# Patient Record
Sex: MALE | Race: WHITE | NOT HISPANIC OR LATINO | Employment: FULL TIME | URBAN - METROPOLITAN AREA
[De-identification: names, ages, dates, MRNs, and addresses within clinical notes are randomized per-mention and may not be internally consistent; named-entity substitution may affect disease eponyms.]

---

## 2017-01-05 ENCOUNTER — ALLSCRIPTS OFFICE VISIT (OUTPATIENT)
Dept: OTHER | Facility: OTHER | Age: 27
End: 2017-01-05

## 2017-02-16 ENCOUNTER — ALLSCRIPTS OFFICE VISIT (OUTPATIENT)
Dept: OTHER | Facility: OTHER | Age: 27
End: 2017-02-16

## 2017-05-16 ENCOUNTER — ALLSCRIPTS OFFICE VISIT (OUTPATIENT)
Dept: OTHER | Facility: OTHER | Age: 27
End: 2017-05-16

## 2017-06-29 ENCOUNTER — ALLSCRIPTS OFFICE VISIT (OUTPATIENT)
Dept: OTHER | Facility: OTHER | Age: 27
End: 2017-06-29

## 2017-07-11 ENCOUNTER — TRANSCRIBE ORDERS (OUTPATIENT)
Dept: SLEEP CENTER | Facility: HOSPITAL | Age: 27
End: 2017-07-11

## 2017-07-11 DIAGNOSIS — G47.33 OBSTRUCTIVE SLEEP APNEA (ADULT) (PEDIATRIC): Primary | ICD-10-CM

## 2017-07-30 ENCOUNTER — HOSPITAL ENCOUNTER (OUTPATIENT)
Dept: SLEEP CENTER | Facility: HOSPITAL | Age: 27
Discharge: HOME | End: 2017-07-30
Payer: COMMERCIAL

## 2017-07-30 DIAGNOSIS — G47.33 OBSTRUCTIVE SLEEP APNEA (ADULT) (PEDIATRIC): ICD-10-CM

## 2017-07-30 PROCEDURE — 95810 POLYSOM 6/> YRS 4/> PARAM: CPT

## 2017-08-02 ENCOUNTER — TRANSCRIBE ORDERS (OUTPATIENT)
Dept: SLEEP CENTER | Facility: HOSPITAL | Age: 27
End: 2017-08-02

## 2017-08-02 DIAGNOSIS — G47.33 OBSTRUCTIVE SLEEP APNEA (ADULT) (PEDIATRIC): Primary | ICD-10-CM

## 2017-08-17 ENCOUNTER — ALLSCRIPTS OFFICE VISIT (OUTPATIENT)
Dept: OTHER | Facility: OTHER | Age: 27
End: 2017-08-17

## 2017-08-17 DIAGNOSIS — G47.33 OBSTRUCTIVE SLEEP APNEA: ICD-10-CM

## 2017-08-17 DIAGNOSIS — I10 ESSENTIAL (PRIMARY) HYPERTENSION: ICD-10-CM

## 2017-08-17 DIAGNOSIS — G25.81 RESTLESS LEGS SYNDROME: ICD-10-CM

## 2017-08-17 DIAGNOSIS — B19.20 VIRAL HEPATITIS C WITHOUT HEPATIC COMA: ICD-10-CM

## 2017-09-02 ENCOUNTER — HOSPITAL ENCOUNTER (OUTPATIENT)
Dept: SLEEP CENTER | Facility: HOSPITAL | Age: 27
Discharge: HOME | End: 2017-09-02
Payer: COMMERCIAL

## 2017-09-02 DIAGNOSIS — G47.33 OBSTRUCTIVE SLEEP APNEA (ADULT) (PEDIATRIC): ICD-10-CM

## 2017-09-02 PROCEDURE — 95811 POLYSOM 6/>YRS CPAP 4/> PARM: CPT

## 2017-09-20 ENCOUNTER — TRANSCRIBE ORDERS (OUTPATIENT)
Dept: SLEEP CENTER | Facility: HOSPITAL | Age: 27
End: 2017-09-20

## 2017-09-20 DIAGNOSIS — G47.33 OBSTRUCTIVE SLEEP APNEA (ADULT) (PEDIATRIC): Primary | ICD-10-CM

## 2017-10-09 ENCOUNTER — APPOINTMENT (OUTPATIENT)
Dept: LAB | Facility: MEDICAL CENTER | Age: 27
End: 2017-10-09
Payer: COMMERCIAL

## 2017-10-09 ENCOUNTER — TRANSCRIBE ORDERS (OUTPATIENT)
Dept: LAB | Facility: MEDICAL CENTER | Age: 27
End: 2017-10-09

## 2017-10-09 DIAGNOSIS — B19.20 VIRAL HEPATITIS C WITHOUT HEPATIC COMA: ICD-10-CM

## 2017-10-09 DIAGNOSIS — G47.33 OBSTRUCTIVE SLEEP APNEA: ICD-10-CM

## 2017-10-09 DIAGNOSIS — I10 ESSENTIAL (PRIMARY) HYPERTENSION: ICD-10-CM

## 2017-10-09 DIAGNOSIS — G25.81 RESTLESS LEGS SYNDROME: ICD-10-CM

## 2017-10-09 LAB
25(OH)D3 SERPL-MCNC: 31.5 NG/ML (ref 30–100)
ALBUMIN SERPL BCP-MCNC: 3.7 G/DL (ref 3.5–5)
ALP SERPL-CCNC: 76 U/L (ref 46–116)
ALT SERPL W P-5'-P-CCNC: 52 U/L (ref 12–78)
ANION GAP SERPL CALCULATED.3IONS-SCNC: 9 MMOL/L (ref 4–13)
AST SERPL W P-5'-P-CCNC: 29 U/L (ref 5–45)
BASOPHILS # BLD AUTO: 0.03 THOUSANDS/ΜL (ref 0–0.1)
BASOPHILS NFR BLD AUTO: 0 % (ref 0–1)
BILIRUB SERPL-MCNC: 0.31 MG/DL (ref 0.2–1)
BUN SERPL-MCNC: 12 MG/DL (ref 5–25)
CALCIUM SERPL-MCNC: 9 MG/DL (ref 8.3–10.1)
CHLORIDE SERPL-SCNC: 102 MMOL/L (ref 100–108)
CHOLEST SERPL-MCNC: 184 MG/DL (ref 50–200)
CO2 SERPL-SCNC: 27 MMOL/L (ref 21–32)
CREAT SERPL-MCNC: 0.78 MG/DL (ref 0.6–1.3)
EOSINOPHIL # BLD AUTO: 0.21 THOUSAND/ΜL (ref 0–0.61)
EOSINOPHIL NFR BLD AUTO: 3 % (ref 0–6)
ERYTHROCYTE [DISTWIDTH] IN BLOOD BY AUTOMATED COUNT: 13.5 % (ref 11.6–15.1)
GFR SERPL CREATININE-BSD FRML MDRD: 124 ML/MIN/1.73SQ M
GLUCOSE P FAST SERPL-MCNC: 90 MG/DL (ref 65–99)
HCT VFR BLD AUTO: 41.6 % (ref 36.5–49.3)
HDLC SERPL-MCNC: 43 MG/DL (ref 40–60)
HGB BLD-MCNC: 13.9 G/DL (ref 12–17)
LDLC SERPL CALC-MCNC: 123 MG/DL (ref 0–100)
LYMPHOCYTES # BLD AUTO: 2.15 THOUSANDS/ΜL (ref 0.6–4.47)
LYMPHOCYTES NFR BLD AUTO: 27 % (ref 14–44)
MCH RBC QN AUTO: 26.9 PG (ref 26.8–34.3)
MCHC RBC AUTO-ENTMCNC: 33.4 G/DL (ref 31.4–37.4)
MCV RBC AUTO: 81 FL (ref 82–98)
MONOCYTES # BLD AUTO: 0.61 THOUSAND/ΜL (ref 0.17–1.22)
MONOCYTES NFR BLD AUTO: 8 % (ref 4–12)
NEUTROPHILS # BLD AUTO: 5.06 THOUSANDS/ΜL (ref 1.85–7.62)
NEUTS SEG NFR BLD AUTO: 62 % (ref 43–75)
NRBC BLD AUTO-RTO: 0 /100 WBCS
PLATELET # BLD AUTO: 248 THOUSANDS/UL (ref 149–390)
PMV BLD AUTO: 9.4 FL (ref 8.9–12.7)
POTASSIUM SERPL-SCNC: 4.3 MMOL/L (ref 3.5–5.3)
PROT SERPL-MCNC: 8.3 G/DL (ref 6.4–8.2)
RBC # BLD AUTO: 5.17 MILLION/UL (ref 3.88–5.62)
SODIUM SERPL-SCNC: 138 MMOL/L (ref 136–145)
TRIGL SERPL-MCNC: 88 MG/DL
TSH SERPL DL<=0.05 MIU/L-ACNC: 2 UIU/ML (ref 0.36–3.74)
WBC # BLD AUTO: 8.08 THOUSAND/UL (ref 4.31–10.16)

## 2017-10-09 PROCEDURE — 80053 COMPREHEN METABOLIC PANEL: CPT

## 2017-10-09 PROCEDURE — 80061 LIPID PANEL: CPT

## 2017-10-09 PROCEDURE — 82306 VITAMIN D 25 HYDROXY: CPT

## 2017-10-09 PROCEDURE — 85025 COMPLETE CBC W/AUTO DIFF WBC: CPT

## 2017-10-09 PROCEDURE — 36415 COLL VENOUS BLD VENIPUNCTURE: CPT

## 2017-10-09 PROCEDURE — 84443 ASSAY THYROID STIM HORMONE: CPT

## 2017-10-19 ENCOUNTER — HOSPITAL ENCOUNTER (OUTPATIENT)
Dept: SLEEP CENTER | Facility: HOSPITAL | Age: 27
Discharge: HOME | End: 2017-10-19
Attending: INTERNAL MEDICINE
Payer: COMMERCIAL

## 2017-10-19 DIAGNOSIS — G47.33 OBSTRUCTIVE SLEEP APNEA: ICD-10-CM

## 2017-10-19 NOTE — PROGRESS NOTES
Consultation - [address]  31 y o  male  :1990  IVN:[id number]    Physician Requesting Consult: Mellisa Nelson DO     Reason for Consult : At your kind request I saw this patient for initial sleep evaluation today  The patient had both diagnostic and therapeutic sleep studies and is here to review results and to initiate therapy  The diagnostic study demonstrated an AHI of 122 per hour  Minimum oxygen saturation was 74% and 97% of the study was spent with saturations less than 90%  Somniloquay was also noted  A subsequent therapeutic study was unsuccessful:  He was titrated to a final setting of 13/7 cm H2O but developed central events, had ongoing obstructive events and desaturations to a ammy of 74% with mean oxygen saturation of 84%  Medications, Past, Family and Social Histories were reviewed  Comorbidities are notable for:  Hypertension, mood disturbance, restless leg syndrome, history of substance abuse for which he is in a methadone rehab program  Herewith findings in summary  Full details are available from our records  HPI:  He presented with complaints of disrupted sleep and awakenings with choking and gasping  He also reported loud snoring and witnessed apneas  Symptoms have been ongoing for several years and have escalated  He has ongoing restless leg symptoms and jerking movements during sleep in spite of Requip  He is aware of sleeptalking, reports vivid nightmares and thinks he acts out dream content  For however, he does not sleep walk and reported no injury to himself or others  Sleep Routine:  He is spending 12-14 hours in bed  He takes 2-3 hours to fall asleep  He attributes this to anxiety and notes no improvement in he is anxiety or sleep having trialed multiple prescription medications medications that include Ambien and Seroquel at high doses  Sleep is interrupted up to 7 times a night  He awakens spontaneously but is not rested  He has excessive daytime drowsiness, rated himself at 13/24 on the [place] Sleepiness Scale and is napping for up to an hour several times a week  Habits:  He is a former smoker and continues to vape  , he stopped alcohol use , he has a history of substance abuse, [he uses limited caffeine  He has started an exercise program ]     ROS:  Weight has been stable  He has nasal congestion and postnasal drip due to allergies  He reports acid reflux  He rated himself at 24 on the PHQ-9 scale  A [10] point review of systems was otherwise unremarkable  Physical Exam: Salient findings on exam, are a body mass index 46  Vitals are stable  He is anxious and has pressured speech  Mental state otherwise appears normal   Craniofacial anatomy [is normal]  Lurlean Euler is excess fatty tissue and neck is thick ] There are no abnormal neck masses  Nasal airway [is patent]  Mucous membranes appeared hyperemic, he has turbinate hypertrophy and clear rhinorrhea l  The oral airway [is crowded ] Base of tongue is at Mallampati class [IV]  Heart sounds are regular, he has tachycardia, there [are no] murmur[s], no JVD or pedal edema  [He has truncal obesity ]  The rest of his exam was unremarkable  Impression:   1  Severe obstructive sleep apnea  2  Sleep-related hypoxia and  3  Central apnea likely due to his use of methadone  4  Insomnia partly due to the above  He also has inadequate sleep hygiene   5  Restless leg syndrome  6  Hypersomnolence secondary to the above  7  Mood disturbance is contributing to his symptoms  8  Morbid obesity  9  Tachycardia  10  Hypertension  11  History of substance abuse    Plan:  1  I reviewed results of the Sleep study with the patient  2  With respect to above conditions, I counseled on pathophysiology, diagnosis, treatment options, risks and benefits; inter-relationship and effects on symptoms and comorbidities; risks of no treatment; costs and insurance aspects     3  I did some  Cognitive behavioral therapy, advised on Sleep Hygiene and behavioral techniques to manage Insomnia  Specifically limiting his time in bed to 7-1/2 hours, keeping a regular sleep-wake routine, avoiding daytime naps and on relaxation techniques  4   I also advised on weight reduction  5   He is in the process of weaning off methadone and states he should be off the medication in a month  6  Patient elected positive airway pressure therapy and this is treatment of choice for him  I gave him a prescription to initiate auto titrating Pap in the range of 10-20 cm H2O  Once he is off methadone, he will need a retitration study  7   He may continue with Requip  Consideration should be given to an alternate to effects off that may underlie his restless leg symptoms  8  Follow-up to be scheduled in a month to monitor progress, compliance and to adjust therapy           Sincerely,    Caty Mccormick MD  Board Certified Specialist

## 2017-10-23 ENCOUNTER — TRANSCRIBE ORDERS (OUTPATIENT)
Dept: SLEEP CENTER | Facility: HOSPITAL | Age: 27
End: 2017-10-23

## 2017-10-23 DIAGNOSIS — G47.33 OBSTRUCTIVE SLEEP APNEA: Primary | ICD-10-CM

## 2017-11-21 ENCOUNTER — ALLSCRIPTS OFFICE VISIT (OUTPATIENT)
Dept: OTHER | Facility: OTHER | Age: 27
End: 2017-11-21
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
## 2017-11-30 ENCOUNTER — HOSPITAL ENCOUNTER (OUTPATIENT)
Dept: SLEEP CENTER | Facility: HOSPITAL | Age: 27
Discharge: HOME | End: 2017-11-30
Attending: INTERNAL MEDICINE
Payer: COMMERCIAL

## 2017-11-30 ENCOUNTER — TRANSCRIBE ORDERS (OUTPATIENT)
Dept: SLEEP CENTER | Facility: HOSPITAL | Age: 27
End: 2017-11-30

## 2017-11-30 DIAGNOSIS — G47.33 OBSTRUCTIVE SLEEP APNEA: Primary | ICD-10-CM

## 2017-11-30 DIAGNOSIS — G47.33 OBSTRUCTIVE SLEEP APNEA: ICD-10-CM

## 2017-12-15 ENCOUNTER — ALLSCRIPTS OFFICE VISIT (OUTPATIENT)
Dept: OTHER | Facility: OTHER | Age: 27
End: 2017-12-15

## 2018-01-04 ENCOUNTER — HOSPITAL ENCOUNTER (OUTPATIENT)
Dept: SLEEP CENTER | Facility: HOSPITAL | Age: 28
Discharge: HOME | End: 2018-01-05
Attending: INTERNAL MEDICINE
Payer: COMMERCIAL

## 2018-01-04 ENCOUNTER — TRANSCRIBE ORDERS (OUTPATIENT)
Dept: SLEEP CENTER | Facility: HOSPITAL | Age: 28
End: 2018-01-04

## 2018-01-04 DIAGNOSIS — G47.33 OBSTRUCTIVE SLEEP APNEA: Primary | ICD-10-CM

## 2018-01-04 DIAGNOSIS — G47.33 OBSTRUCTIVE SLEEP APNEA: ICD-10-CM

## 2018-01-04 NOTE — PROGRESS NOTES
Follow-Up Note - [address]  28 y o  male  :1990  GJE:[id number]    CC: I saw this patient for follow-up in clinic today for his Sleep Disordered Breathing, Coexisting Sleep and Medical Problems  He is having difficulty sleeping  And has not been able to use CPAP as a result  Past medical, Family, Social History, ROS and medications were reviewed  Herewith my findings in summary  Full Details are available on request      HPI:  With respect to  positive airway pressure therapy Compliance data confirms use > 4 hours 18% of the time  The AHI is 53/hour  pressure of 14 cm H2O  He reports:   - tolerating PAP with no adverse effects and experiencing no major difficulties; he has dry nose  - and is benefiting from use;          Sleep Routine: Earl Vazquez reports getting insufficient sleep; he is having difficulty initiating and maintaining sleep  He has racing thoughts and he has anxiety has increased  He is spending 7 hours in bed but estimates he is only getting 2-4 hours sleep  Sharon Burow He awakens feeling unrefreshed  He has excessive drowsiness and He rated himself at 16 /24 on the [place] sleepiness scale  He is dozing off when sedentary in the afternoons  He has restless leg symptoms are controlled on Requip  He has less back pain  He has ongoing knee pain  He saw his psychiatrist and states sleeping pills do not work  He feels he has psychiatrists does not listen to me and medication changes caused worsening of his anxiety  On Exam:  He is clearly anxious and has pressured speech  Physical findings are essentially unchanged from previous  Impression :   1  Severe Obstructive Sleep Apnea  2  Sleep-related hypoxia  3  Insomnia partly due to the above  4  Mood disturbance is playing a large role  5  Hypersomnolence secondary to the above  6  Chronic pain is contributing to his sleep difficulties  7   Comorbidities:  Hypertension, morbid obesity, history of substance abuse    Plan:  1  Treatment with  PAP is medically necessary and Celio Rivera is agreable to continue use  2  Pressure setting: [Change to auto titrating Pap in the range of 16-20 cm H2O      3  Instruction on care of equipment and strategies to improve comfort with use of PAP were discussed  A prescription to replace supplies as needed was provided and care coordinated with the DME provider  4  Need for compliance with therapy and weight reduction were emphasized  5  He is to continue with Requip  6  He plans to change she has psychiatrists  7  Follow-up is advised in 1-2 months or sooner if needed to monitor progress and to adjust therapy  Thank you for allowing me to participate in the care of this patient      Sincerely,    Patricio Briggs MD  Board Certified Specialist

## 2018-01-09 NOTE — RESULT NOTES
Verified Results  (1) BASIC METABOLIC PROFILE 64GNS7467 02:17PM Criss Feliciano Order Number: [id number]     Test Name Result Flag Reference   GLUCOSE,RANDM 105 mg/dL     If the patient is fasting, the ADA then defines impaired fasting glucose as > 100 mg/dL and diabetes as > or equal to 123 mg/dL  SODIUM 140 mmol/L  136-145   POTASSIUM 4 1 mmol/L  3 5-5 3   CHLORIDE 102 mmol/L  100-108   CARBON DIOXIDE 29 mmol/L  21-32   ANION GAP (CALC) 9 mmol/L  4-13   BLOOD UREA NITROGEN 12 mg/dL  5-25   CREATININE 1 12 mg/dL  0 60-1 30   Standardized to IDMS reference method   CALCIUM 9 3 mg/dL  8 3-10 1   eGFR Non-African American      >60 0 ml/min/1 73sq m   [facility] [employer] Disease Education Program recommendations are as follows:  GFR calculation is accurate only with a steady state creatinine  Chronic Kidney disease less than 60 ml/min/1 73 sq  meters  Kidney failure less than 15 ml/min/1 73 sq  meters

## 2018-01-09 NOTE — RESULT NOTES
Verified Results  * XR KNEE 3 VW RIGHT NON INJURY [id number] 08:52AM [place] [employer] Order Number: [id number]     Test Name Result Flag Reference   XR KNEE 3 VW RIGHT (Report)     RIGHT KNEE     INDICATION: Right knee pain  COMPARISON: None     VIEWS: 3; 3 images     FINDINGS:     There is no acute fracture or dislocation  There is no joint effusion  No degenerative changes  No lytic or blastic lesions are seen  Soft tissues are unremarkable  IMPRESSION:     No acute osseous abnormality  Workstation performed: [id number]     Signed by:   Mook Coelho MD   11/11/16     * XR KNEE 3 VW LEFT NON INJURY [id number] 08:52AM Kwagae Meraki Order Number: [id number]     Test Name Result Flag Reference   XR KNEE 3 VW LEFT (Report)     LEFT KNEE     INDICATION: Left knee pain  COMPARISON: None     VIEWS: 3; 3 images     FINDINGS:     There is no acute fracture or dislocation  There is no joint effusion  No degenerative changes  No lytic or blastic lesions are seen  Soft tissues are unremarkable         IMPRESSION:     Unremarkable knee exam       Workstation performed: [id number]     Signed by:   Lulu Oliver MD   11/11/16

## 2018-01-12 VITALS
SYSTOLIC BLOOD PRESSURE: 122 MMHG | HEIGHT: 70 IN | HEART RATE: 106 BPM | TEMPERATURE: 98.2 F | DIASTOLIC BLOOD PRESSURE: 90 MMHG | OXYGEN SATURATION: 98 % | RESPIRATION RATE: 18 BRPM | WEIGHT: 315 LBS | BODY MASS INDEX: 45.1 KG/M2

## 2018-01-12 NOTE — RESULT NOTES
Verified Results  (1) LYME ANTIBODY PROFILE W/REFLEX TO WESTERN BLOT 43Ckl9800 08:13AM Tejal Luis Order Number: [id number]     Test Name Result Flag Reference   LYME IGG 0 31  0 00-0 79   NEGATIVE(0 00-0 79)-Absence of detectable Borrelia IgG Antibodies  A negative result does not exclude the possibility of Borrelia infection  If early Lyme disease is suspected,a second sample should be collected & tested 4 weeks after initial testing  LYME IGM 0 18  0 00-0 79   NEGATIVE (0 00-0 79)-Absence of detectable Borrelia IgM antibodies  A negative result does not exclude the possibility of Borrelia infection  If early lyme disease is suspected, a second sample should be collected & tested 4 weeks after initial testing

## 2018-01-13 VITALS
DIASTOLIC BLOOD PRESSURE: 80 MMHG | HEIGHT: 70 IN | BODY MASS INDEX: 44.24 KG/M2 | TEMPERATURE: 97.5 F | HEART RATE: 92 BPM | WEIGHT: 309 LBS | SYSTOLIC BLOOD PRESSURE: 124 MMHG | RESPIRATION RATE: 20 BRPM

## 2018-01-13 VITALS
SYSTOLIC BLOOD PRESSURE: 128 MMHG | TEMPERATURE: 97.7 F | HEART RATE: 104 BPM | WEIGHT: 310.38 LBS | RESPIRATION RATE: 20 BRPM | BODY MASS INDEX: 44.44 KG/M2 | HEIGHT: 70 IN | DIASTOLIC BLOOD PRESSURE: 84 MMHG

## 2018-01-13 VITALS
HEART RATE: 112 BPM | BODY MASS INDEX: 45.1 KG/M2 | HEIGHT: 70 IN | TEMPERATURE: 98.3 F | RESPIRATION RATE: 24 BRPM | SYSTOLIC BLOOD PRESSURE: 136 MMHG | WEIGHT: 315 LBS | DIASTOLIC BLOOD PRESSURE: 84 MMHG

## 2018-01-13 VITALS
BODY MASS INDEX: 44.67 KG/M2 | TEMPERATURE: 98.6 F | SYSTOLIC BLOOD PRESSURE: 116 MMHG | RESPIRATION RATE: 16 BRPM | WEIGHT: 312 LBS | HEART RATE: 80 BPM | DIASTOLIC BLOOD PRESSURE: 70 MMHG | HEIGHT: 70 IN

## 2018-01-14 NOTE — RESULT NOTES
Verified Results  (1) COMPREHENSIVE METABOLIC PANEL [id number] 99:17ZC [facility] Order Number: [id number]     Test Name Result Flag Reference   GLUCOSE,RANDM 100 mg/dL     If the patient is fasting, the ADA then defines impaired fasting glucose as > 100 mg/dL and diabetes as > or equal to 123 mg/dL  SODIUM 139 mmol/L  136-145   POTASSIUM 4 2 mmol/L  3 5-5 3   CHLORIDE 102 mmol/L  100-108   CARBON DIOXIDE 28 mmol/L  21-32   ANION GAP (CALC) 9 mmol/L  4-13   BLOOD UREA NITROGEN 11 mg/dL  5-25   CREATININE 0 88 mg/dL  0 60-1 30   Standardized to IDMS reference method   CALCIUM 9 1 mg/dL  8 3-10 1   BILI, TOTAL 0 40 mg/dL  0 20-1 00   ALK PHOSPHATAS 78 U/L     ALT (SGPT) 107 U/L H 12-78   AST(SGOT) 60 U/L H 5-45   ALBUMIN 3 7 g/dL  3 5-5 0   TOTAL PROTEIN 7 4 g/dL  6 4-8 2   eGFR Non-African American      >60 0 ml/min/1 73sq m   - Patient Instructions: This bloodwork is non-fasting  Please drink two glasses of water morning of bloodwork  National Kidney Disease Education Program recommendations are as follows:  GFR calculation is accurate only with a steady state creatinine  Chronic Kidney disease less than 60 ml/min/1 73 sq  meters  Kidney failure less than 15 ml/min/1 73 sq  meters       (1) CBC/PLT/DIFF [id number] 08:13AM [facility] Order Number: [id number]     Test Name Result Flag Reference   WBC COUNT 8 66 Thousand/uL  4 31-10 16   RBC COUNT 5 04 Million/uL  3 88-5 62   HEMOGLOBIN 13 5 g/dL  12 0-17 0   HEMATOCRIT 40 0 %  36 5-49 3   MCV 79 fL L 82-98   MCH 26 8 pg  26 8-34 3   MCHC 33 8 g/dL  31 4-37 4   RDW 13 7 %  11 6-15 1   MPV 9 3 fL  8 9-12 7   PLATELET COUNT 035 Thousands/uL  149-390   NEUTROPHILS RELATIVE PERCENT 63 %  43-75   LYMPHOCYTES RELATIVE PERCENT 24 %  14-44   MONOCYTES RELATIVE PERCENT 7 %  4-12   EOSINOPHILS RELATIVE PERCENT 6 %  0-6   BASOPHILS RELATIVE PERCENT 0 %  0-1   NEUTROPHILS ABSOLUTE COUNT 5 47 Thousands/?L  1 85-7 62   LYMPHOCYTES ABSOLUTE COUNT 2 06 Thousands/?L  0 60-4 47   MONOCYTES ABSOLUTE COUNT 0 59 Thousand/?L  0 17-1 22   EOSINOPHILS ABSOLUTE COUNT 0 51 Thousand/?L  0 00-0 61   BASOPHILS ABSOLUTE COUNT 0 03 Thousands/?L  0 00-0 10   - Patient Instructions: This bloodwork is non-fasting  Please drink two glasses of water morning of bloodwork  - Patient Instructions: This bloodwork is non-fasting  Please drink two glasses of water morning of bloodwork  (1) TSH [id number] 08:13AM Santos Gallardo Order Number: [id number]     Test Name Result Flag Reference   TSH 1 848 uIU/mL  0 358-3 740   - Patient Instructions: This bloodwork is non-fasting  Please drink two glasses of water morning of bloodwork  - Patient Instructions: This bloodwork is non-fasting  Please drink two glasses of water morning of bloodwork  Patients undergoing fluorescein dye angiography may retain small amounts of fluorescein in the body for 48-72 hours post procedure  Samples containing fluorescein can produce falsely depressed TSH values  If the patient had this procedure,a specimen should be resubmitted post fluorescein clearance       (1) SED RATE [id number] 08:13AM [facility] Number: [id number]     Test Name Result Flag Reference   SED RATE 25 mm/hour H 0-10     (1) URINALYSIS (will reflex a microscopy if leukocytes, occult blood, protein or nitrites are not within normal limits) [id number] 08:13AM [facility] Order Number: [id number]     Test Name Result Flag Reference   COLOR Yellow     CLARITY Clear     SPECIFIC GRAVITY UA >=1 030  1 003-1 030   PH UA 5 5  4 5-8 0   LEUKOCYTE ESTERASE UA Negative  Negative   NITRITE UA Negative  Negative   PROTEIN UA Negative mg/dl  Negative   GLUCOSE UA Negative mg/dl  Negative   KETONES UA Negative mg/dl  Negative   UROBILINOGEN UA 0 2 E U /dl  0 2, 1 0 E U /dl   BILIRUBIN UA Negative  Negative   BLOOD UA Negative  Negative, Trace-Intact     (1) HEMOGLOBIN A1C [id number] 08:13AM Ivette Trevino Order Number: [id number]     Test Name Result Flag Reference   HEMOGLOBIN A1C 5 5 %  4 2-6 3   EST  AVG  GLUCOSE 111 mg/dl       [facility] CHEST PA & LATERAL [id number] 08:01AM Ivette Trevino Order Number: [id number]     Test Name Result Flag Reference   XR CHEST PA & LATERAL (Report)     CHEST     INDICATION: Shortness of breath  Low back pain  COMPARISON: January 28, 2008     VIEWS: PA and lateral; 3 images     FINDINGS:     The cardiomediastinal silhouette is unremarkable  The lungs are clear  No pleural effusions  Bony thorax unremarkable  IMPRESSION:     No active pulmonary disease          Workstation performed: [id number]     Signed by:   Romayne Cross Carnella Pipe, MD   10/14/16     * XR SPINE LUMBAR MINIMUM 4 VIEWS NON INJURY 14Oct2016 08:01AM Ivette Trevino Order Number: [id number]     Test Name Result Flag Reference   XR SPINE LUMBAR MINIMUM 4 VIEWS (Report)     LUMBAR SPINE     INDICATION: Lower back pain  COMPARISON: None     VIEWS: AP, lateral and bilateral oblique projections; 4 images     FINDINGS:     Alignment is unremarkable  There is no radiographic evidence of acute fracture or destructive osseous lesion  No significant lumbar degenerative change noted  Visualized soft tissues appear unremarkable  IMPRESSION:     Normal lumbar spine radiographs         Workstation performed: [id number]     Signed by:   Gloria Church MD   10/14/16

## 2018-01-15 VITALS
SYSTOLIC BLOOD PRESSURE: 134 MMHG | TEMPERATURE: 98.5 F | RESPIRATION RATE: 22 BRPM | HEIGHT: 70 IN | HEART RATE: 116 BPM | DIASTOLIC BLOOD PRESSURE: 94 MMHG | BODY MASS INDEX: 45.1 KG/M2 | WEIGHT: 315 LBS

## 2018-01-23 VITALS
OXYGEN SATURATION: 97 % | BODY MASS INDEX: 34.07 KG/M2 | DIASTOLIC BLOOD PRESSURE: 88 MMHG | HEART RATE: 96 BPM | SYSTOLIC BLOOD PRESSURE: 118 MMHG | WEIGHT: 238 LBS | HEIGHT: 70 IN
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
## 2018-02-22 ENCOUNTER — OFFICE VISIT (OUTPATIENT)
Dept: INTERNAL MEDICINE CLINIC | Facility: CLINIC | Age: 28
End: 2018-02-22
Payer: COMMERCIAL

## 2018-02-22 VITALS
SYSTOLIC BLOOD PRESSURE: 118 MMHG | BODY MASS INDEX: 44.24 KG/M2 | WEIGHT: 309 LBS | OXYGEN SATURATION: 96 % | RESPIRATION RATE: 18 BRPM | DIASTOLIC BLOOD PRESSURE: 88 MMHG | HEART RATE: 88 BPM | TEMPERATURE: 97.4 F | HEIGHT: 70 IN

## 2018-02-22 DIAGNOSIS — F31.9 BIPOLAR 1 DISORDER (HCC): ICD-10-CM

## 2018-02-22 DIAGNOSIS — J01.00 ACUTE NON-RECURRENT MAXILLARY SINUSITIS: Primary | ICD-10-CM

## 2018-02-22 PROBLEM — G47.33 SLEEP APNEA, OBSTRUCTIVE: Status: ACTIVE | Noted: 2017-08-02

## 2018-02-22 PROBLEM — K21.9 ACID REFLUX: Status: ACTIVE | Noted: 2017-06-29

## 2018-02-22 PROCEDURE — 99214 OFFICE O/P EST MOD 30 MIN: CPT | Performed by: PHYSICIAN ASSISTANT

## 2018-02-22 RX ORDER — ARIPIPRAZOLE 5 MG/1
5 TABLET ORAL
COMMUNITY
End: 2018-02-22 | Stop reason: SDUPTHER

## 2018-02-22 RX ORDER — LANOLIN ALCOHOL/MO/W.PET/CERES
3 CREAM (GRAM) TOPICAL
COMMUNITY
End: 2018-02-22 | Stop reason: SDUPTHER

## 2018-02-22 RX ORDER — ARIPIPRAZOLE 5 MG/1
5 TABLET ORAL
Qty: 30 TABLET | Refills: 3 | Status: ON HOLD | OUTPATIENT
Start: 2018-02-22 | End: 2018-06-25 | Stop reason: SDUPTHER

## 2018-02-22 RX ORDER — AZITHROMYCIN 250 MG/1
250 TABLET, FILM COATED ORAL EVERY 24 HOURS
Qty: 6 TABLET | Refills: 0 | Status: SHIPPED | OUTPATIENT
Start: 2018-02-22 | End: 2018-02-27

## 2018-02-22 RX ORDER — LANOLIN ALCOHOL/MO/W.PET/CERES
3 CREAM (GRAM) TOPICAL
Qty: 30 TABLET | Refills: 0 | Status: SHIPPED | OUTPATIENT
Start: 2018-02-22 | End: 2018-06-24 | Stop reason: ALTCHOICE

## 2018-02-22 NOTE — PROGRESS NOTES
Assessment/Plan:   1  Sinusitis: Start zithromax to combat symptoms  The patient was instructed to change their toothbrush to avoid reinfection  2  Bipolar Disorder: Stable with current regime  Continue current medications  No problem-specific Assessment & Plan notes found for this encounter  Diagnoses and all orders for this visit:    Acute non-recurrent maxillary sinusitis  -     azithromycin (ZITHROMAX) 250 mg tablet; Take 1 tablet (250 mg total) by mouth every 24 hours for 5 days    Bipolar 1 disorder (HCC)  -     ARIPiprazole (ABILIFY) 5 mg tablet; Take 1 tablet (5 mg total) by mouth daily at bedtime  -     melatonin 3 mg; Take 1 tablet (3 mg total) by mouth daily at bedtime    Other orders  -     Discontinue: ARIPiprazole (ABILIFY) 5 mg tablet; Take 5 mg by mouth daily at bedtime  -     Discontinue: melatonin 3 mg; Take 3 mg by mouth daily at bedtime          Subjective:      Patient ID: Juan Mancuso is a 28 y o  male  Cough   This is a new problem  The current episode started in the past 7 days  The problem has been gradually worsening  The problem occurs hourly  The cough is productive of sputum  Associated symptoms include headaches, nasal congestion, postnasal drip, rhinorrhea and shortness of breath  Pertinent negatives include no chest pain, chills, ear pain, fever, myalgias, rash, sore throat or wheezing  The symptoms are aggravated by exercise  The following portions of the patient's history were reviewed and updated as appropriate:   He  has a past medical history of Allergic; GERD (gastroesophageal reflux disease); and Sleep disorder  He There are no active problems to display for this patient  He  has a past surgical history that includes [place] tooth extraction  His family history includes Alzheimer's disease in his maternal grandmother; Colon cancer in his father; Depression in his family  He  reports that he has quit smoking  His smoking use included E-Cigarettes   He has never used smokeless tobacco  He reports that he does not drink alcohol or use drugs  Current Outpatient Prescriptions   Medication Sig Dispense Refill    ARIPiprazole (ABILIFY) 5 mg tablet Take 1 tablet (5 mg total) by mouth daily at bedtime 30 tablet 3    diclofenac (VOLTAREN) 75 mg EC tablet Take 1 tablet by mouth 2 (two) times a day      EPINEPHrine (EPIPEN 2-ALEXI) 0 3 mg/0 3 mL SOAJ Inject as directed      fluticasone (FLONASE) 50 mcg/act nasal spray 2 sprays into each nostril daily      lisinopril (ZESTRIL) 5 mg tablet Take 1 tablet by mouth daily      melatonin 3 mg Take 1 tablet (3 mg total) by mouth daily at bedtime 30 tablet 0    omeprazole (PriLOSEC) 40 MG capsule Take 1 capsule by mouth daily      venlafaxine (EFFEXOR-XR) 150 mg 24 hr capsule Take 1 capsule by mouth daily      azithromycin (ZITHROMAX) 250 mg tablet Take 1 tablet (250 mg total) by mouth every 24 hours for 5 days 6 tablet 0     No current facility-administered medications for this visit  Current Outpatient Prescriptions on File Prior to Visit   Medication Sig    diclofenac (VOLTAREN) 75 mg EC tablet Take 1 tablet by mouth 2 (two) times a day    EPINEPHrine (EPIPEN 2-ALEXI) 0 3 mg/0 3 mL SOAJ Inject as directed    fluticasone (FLONASE) 50 mcg/act nasal spray 2 sprays into each nostril daily    lisinopril (ZESTRIL) 5 mg tablet Take 1 tablet by mouth daily    omeprazole (PriLOSEC) 40 MG capsule Take 1 capsule by mouth daily    venlafaxine (EFFEXOR-XR) 150 mg 24 hr capsule Take 1 capsule by mouth daily    [DISCONTINUED] cloNIDine (CATAPRES) 0 1 mg tablet take 1 tablet by mouth twice a day if needed anxiety    [DISCONTINUED] methadone (DOLOPHINE) 10 mg/mL injection Inject as directed    [DISCONTINUED] polyethylene glycol (MIRALAX) 17 g packet Take 1 packet by mouth daily     No current facility-administered medications on file prior to visit  He is allergic to bee venom       Review of Systems   Constitutional: Negative for chills and fever  HENT: Positive for postnasal drip and rhinorrhea  Negative for congestion, ear pain, hearing loss, sinus pain, sinus pressure, sore throat and trouble swallowing  Eyes: Negative for pain and visual disturbance  Respiratory: Positive for cough and shortness of breath  Negative for chest tightness and wheezing  Cardiovascular: Negative  Negative for chest pain, palpitations and leg swelling  Gastrointestinal: Negative for abdominal pain, blood in stool, constipation, diarrhea, nausea and vomiting  Endocrine: Negative for cold intolerance, heat intolerance, polydipsia, polyphagia and polyuria  Genitourinary: Negative for difficulty urinating, dysuria, flank pain and urgency  Musculoskeletal: Negative for arthralgias, back pain, gait problem and myalgias  Skin: Negative for rash  Allergic/Immunologic: Negative  Neurological: Positive for headaches  Negative for dizziness, weakness and light-headedness  Hematological: Negative  Psychiatric/Behavioral: Negative for behavioral problems, dysphoric mood and sleep disturbance  The patient is not nervous/anxious  Objective:      /88 (BP Location: Left arm, Patient Position: Sitting, Cuff Size: Large)   Pulse 88   Temp (!) 97 4 °F (36 3 °C) (Tympanic)   Resp 18   Ht 5' 10" (1 778 m)   Wt (!) 140 kg (309 lb)   SpO2 96%   BMI 44 34 kg/m²          Physical Exam   Constitutional: He is oriented to person, place, and time  He appears well-developed and well-nourished  No distress  HENT:   Head: Normocephalic and atraumatic  Right Ear: External ear normal    Left Ear: External ear normal    Nose: Mucosal edema and rhinorrhea present  Mouth/Throat: Posterior oropharyngeal edema present  No oropharyngeal exudate  Eyes: Conjunctivae and EOM are normal  Pupils are equal, round, and reactive to light  Right eye exhibits no discharge  Left eye exhibits no discharge  No scleral icterus     Neck: Normal range of motion  Neck supple  No thyromegaly present  Cardiovascular: Normal rate, regular rhythm and normal heart sounds  Exam reveals no gallop and no friction rub  No murmur heard  Pulmonary/Chest: Effort normal and breath sounds normal  No respiratory distress  He has no wheezes  He has no rales  Abdominal: Soft  Bowel sounds are normal  He exhibits no distension  There is no tenderness  Musculoskeletal: Normal range of motion  He exhibits no edema, tenderness or deformity  Neurological: He is alert and oriented to person, place, and time  No cranial nerve deficit  Skin: Skin is warm and dry  He is not diaphoretic  Psychiatric: He has a normal mood and affect   His behavior is normal  Judgment and thought content normal
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
## 2018-03-13 DIAGNOSIS — F32.A DEPRESSION, UNSPECIFIED DEPRESSION TYPE: Primary | ICD-10-CM

## 2018-03-13 RX ORDER — VENLAFAXINE HYDROCHLORIDE 150 MG/1
150 CAPSULE, EXTENDED RELEASE ORAL DAILY
Qty: 30 CAPSULE | Refills: 5 | Status: SHIPPED | OUTPATIENT
Start: 2018-03-13 | End: 2018-07-24

## 2018-03-15 ENCOUNTER — OFFICE VISIT (OUTPATIENT)
Dept: SLEEP CENTER | Facility: CLINIC | Age: 28
End: 2018-03-15

## 2018-03-15 VITALS
OXYGEN SATURATION: 96 % | SYSTOLIC BLOOD PRESSURE: 118 MMHG | WEIGHT: 310 LBS | BODY MASS INDEX: 44.38 KG/M2 | RESPIRATION RATE: 18 BRPM | HEART RATE: 88 BPM | DIASTOLIC BLOOD PRESSURE: 88 MMHG | HEIGHT: 70 IN

## 2018-03-15 DIAGNOSIS — F40.240 CLAUSTROPHOBIA: ICD-10-CM

## 2018-03-15 DIAGNOSIS — G89.4 CHRONIC PAIN DISORDER: ICD-10-CM

## 2018-03-15 DIAGNOSIS — G47.09 OTHER INSOMNIA: ICD-10-CM

## 2018-03-15 DIAGNOSIS — E66.01 MORBID OBESITY WITH BMI OF 40.0-44.9, ADULT (HCC): ICD-10-CM

## 2018-03-15 DIAGNOSIS — IMO0002 SLEEP-RELATED HYPOVENTILATION: ICD-10-CM

## 2018-03-15 DIAGNOSIS — F11.21 OPIOID DEPENDENCE IN REMISSION (HCC): ICD-10-CM

## 2018-03-15 DIAGNOSIS — G47.33 OBSTRUCTIVE SLEEP APNEA: Primary | ICD-10-CM

## 2018-03-15 DIAGNOSIS — G25.81 RESTLESS LEG SYNDROME: ICD-10-CM

## 2018-03-15 DIAGNOSIS — I10 ESSENTIAL HYPERTENSION: ICD-10-CM

## 2018-03-15 DIAGNOSIS — R45.86 MOOD DISTURBANCE: ICD-10-CM

## 2018-03-15 NOTE — PROGRESS NOTES
Review of Systems      Genitourinary none   Cardiology none   Gastrointestinal none   Neurology none   Constitutional none   Integumentary none   Psychiatry none   Musculoskeletal none   Pulmonary none   ENT none   Endocrine none   Hematological none

## 2018-03-15 NOTE — PROGRESS NOTES
Follow-Up Note - [address]  28 y o  male  :1990  JUE:[id number]    CC: I saw this patient for follow-up in clinic today for his Sleep Disordered Breathing, Coexisting Sleep and Medical Problems  Medications, Past, Family & Social History were reviewed  [Interval changes notable for increased anxiety that is affecting sleep and ability to use CPAP]   He  has a past medical history of Allergic; Benign essential hypertension (2016); Claustrophobia (3/15/2018); GERD (gastroesophageal reflux disease); Hepatitis C virus infection (2014); Obesity (10/12/2016); and Sleep disorder  He has a current medication list which includes the following prescription(s): aripiprazole, diclofenac, epinephrine, fluticasone, lisinopril, melatonin, omeprazole, and venlafaxine  ROS:  He denies nasal congestion, dry mouth, respiratory, cardiac or gastrointestinal symptoms  HPI:  With respect to positive airway pressure therapy (PAP) compliance data download shows: [ he is using PAP > 4 hours per night 6 7 % of the time and AHI is 14/hour at [90th percentile] pressure of 16 cm H2O ]   Zahira Seth reports:   -  significant difficulty tolerating PAP; [ Brisa Ebbing  -  benefiting from use ;    -  he feels his restless leg symptoms are controlled on Requip    -  he has states pain is controlled  Habits:  He is using much less caffeine  He stopped tobacco but vapes occasionally  He tries to exercise by walking  Sleep Routine:  Zahira Seth reports getting 5-6 hours sleep but only if he does not use CPAP; without use, has no difficulty initiating or maintaining sleep   He awakens spontaneously feeling unrefreshed He has excessive drowsiness  [facility] rated himself at Total score: 10 /24 on the [place] sleepiness scale ]  He is napping for 1-2 hours during the day  EXAM: Vitals are stable: Blood pressure 118/88, pulse 88, resp   rate 18, height 5' 10" (1 778 m), weight (!) 141 kg (310 lb), SpO2 96 %  Body mass index is 44 48 kg/m²  [facility] Neck Circumference: 47 5 cm  Patient is alert, orientated, cooperative [and in no distress]  He is somewhat anxious  Mental state otherwise appears normal  There are [no] facial pressure marks or rashes  Physical findings are essentially unchanged from previous  IMPRESSION:     1  Obstructive sleep apnea  Sleep F/U W/Compliance    CPAP DME Supplies   2  Sleep-related hypoventilation     3  Other insomnia     4  Claustrophobia     5  Restless leg syndrome     6  Mood disturbance ([facility] )     7  Chronic pain disorder     8  Essential hypertension     9  Morbid obesity with BMI of 40 0-44 9, adult ([facility] )     10  Opioid dependence in remission ([facility] )         PLAN:  1  I reviewed results of the Sleep studies with the patient  2   With respect to above conditions, I once again counseled on pathophysiology, diagnosis, treatment options, risks and benefits; interrelationship and effects on symptoms and comorbidities; risks of no treatment; costs and insurance aspects  3  He has limited treatment options because of severity of his sleep disordered breathing  4  Treatment with  PAP is medically necessary and Raleigh Real is agreable to continue use  5  I advised formal desensitization  Pressure settin to 16 cm H2O in 1 cm increments every 2 days  Then to change to auto titrating Pap in the range of 16-20 cm H2O   6  Instruction on care of equipment and strategies to improve comfort with use of PAP were discussed [:controlling mucosal dryness, nasal symptoms and Mask leaks]  7  Care coordinated with DME provider and prescription to replace supplies as needed was provided  8  Need for compliance with therapy and weight reduction were emphasized  9  Cognitive behavioral therapy was initiated, Sleep Hygiene and behavioral techniques to manage Insomnia were discussed    Specifically keeping a regular routine, limiting his time in bed to 6 hours, avoiding daytime naps and on relaxation techniques  10  He needs he is the psychiatric medications to be reviewed and is trying to get an appointment with his psychiatrist   11   I will see him for follow-up in around 6 weeks to monitor progress and to adjust therapy  Thank you for allowing me to participate in the care of this patient      Sincerely,    Dior Thurston MD  Board Certified Specialist

## 2018-04-24 ENCOUNTER — OFFICE VISIT (OUTPATIENT)
Dept: INTERNAL MEDICINE CLINIC | Facility: CLINIC | Age: 28
End: 2018-04-24
Payer: COMMERCIAL

## 2018-04-24 VITALS
WEIGHT: 305.4 LBS | OXYGEN SATURATION: 98 % | SYSTOLIC BLOOD PRESSURE: 124 MMHG | BODY MASS INDEX: 43.72 KG/M2 | HEIGHT: 70 IN | RESPIRATION RATE: 16 BRPM | HEART RATE: 87 BPM | TEMPERATURE: 97.6 F | DIASTOLIC BLOOD PRESSURE: 86 MMHG

## 2018-04-24 DIAGNOSIS — J30.9 ALLERGIC RHINITIS, UNSPECIFIED SEASONALITY, UNSPECIFIED TRIGGER: Primary | ICD-10-CM

## 2018-04-24 DIAGNOSIS — I10 ESSENTIAL HYPERTENSION: ICD-10-CM

## 2018-04-24 PROCEDURE — 99213 OFFICE O/P EST LOW 20 MIN: CPT | Performed by: PHYSICIAN ASSISTANT

## 2018-04-24 RX ORDER — FLUTICASONE PROPIONATE 50 MCG
2 SPRAY, SUSPENSION (ML) NASAL DAILY
Qty: 16 G | Refills: 0 | Status: SHIPPED | OUTPATIENT
Start: 2018-04-24 | End: 2018-06-24

## 2018-04-24 NOTE — PROGRESS NOTES
Assessment/Plan:    Essential hypertension  Pts symptoms are stable with current regime  No changes at present  Allergic rhinitis  Pts symptoms are stable with current regime  No changes at present  Diagnoses and all orders for this visit:    Allergic rhinitis, unspecified seasonality, unspecified trigger  -     fluticasone (FLONASE) 50 mcg/act nasal spray; 2 sprays into each nostril daily    Essential hypertension          Subjective:      Patient ID: Florentin Rodriguez is a 28 y o  male  Pt presents for routine visit  He is doing well overall  He has some increased allergy symptoms and wants a refill of his flonase  He recently started working in construction  His BP is well controlled  Mood remains stable  He denies any other concerns  The following portions of the patient's history were reviewed and updated as appropriate:   He  has a past medical history of Allergic; Benign essential hypertension (11/17/2016); Claustrophobia (3/15/2018); Depressed (7/14/2016); GERD (gastroesophageal reflux disease); Hepatitis C virus infection (8/14/2014); Obesity (10/12/2016); and Sleep disorder    He   Patient Active Problem List    Diagnosis Date Noted    Claustrophobia 03/15/2018    Restless leg syndrome 03/15/2018    Mood disturbance ([facility] ) 03/15/2018    Morbid obesity with BMI of 40 0-44 9, adult ([place] [address] ) 03/15/2018    Chronic pain disorder 03/15/2018    Sleep-related hypoventilation 08/02/2017    Acid reflux 06/29/2017    Essential hypertension 11/17/2016    Generalized anxiety disorder 10/19/2016    Obesity 10/12/2016    Anxiety 07/14/2016    Depressed 07/14/2016    Methadone maintenance therapy patient ([place] [address] ) 07/14/2016    Obesity, Class III, BMI 40-49 9 (morbid obesity) ([place] [address] ) 07/14/2016    Chronic hepatitis C without hepatic coma ([place] [address] ) 02/21/2016    Bipolar disorder ([place] [address] ) 04/03/2015    Allergic rhinitis 08/14/2014    Hepatitis C virus infection 08/14/2014    Insomnia 08/14/2014    Opioid dependence in remission ([facility] ) 08/14/2014     He  has a past surgical history that includes [place] tooth extraction  His family history includes Alzheimer's disease in his maternal grandmother; Colon cancer in his father; Depression in his family  He  reports that he has quit smoking  His smoking use included E-Cigarettes  He has never used smokeless tobacco  He reports that he does not drink alcohol or use drugs  Current Outpatient Prescriptions   Medication Sig Dispense Refill    ARIPiprazole (ABILIFY) 5 mg tablet Take 1 tablet (5 mg total) by mouth daily at bedtime 30 tablet 3    diclofenac (VOLTAREN) 75 mg EC tablet Take 1 tablet by mouth 2 (two) times a day      EPINEPHrine (EPIPEN 2-ALEXI) 0 3 mg/0 3 mL SOAJ Inject as directed      fluticasone (FLONASE) 50 mcg/act nasal spray 2 sprays into each nostril daily 16 g 0    lisinopril (ZESTRIL) 5 mg tablet Take 1 tablet by mouth daily      melatonin 3 mg Take 1 tablet (3 mg total) by mouth daily at bedtime 30 tablet 0    omeprazole (PriLOSEC) 40 MG capsule Take 1 capsule by mouth daily      venlafaxine (EFFEXOR-XR) 150 mg 24 hr capsule Take 1 capsule (150 mg total) by mouth daily 30 capsule 5     No current facility-administered medications for this visit        Current Outpatient Prescriptions on File Prior to Visit   Medication Sig    ARIPiprazole (ABILIFY) 5 mg tablet Take 1 tablet (5 mg total) by mouth daily at bedtime    diclofenac (VOLTAREN) 75 mg EC tablet Take 1 tablet by mouth 2 (two) times a day    EPINEPHrine (EPIPEN 2-ALEXI) 0 3 mg/0 3 mL SOAJ Inject as directed    lisinopril (ZESTRIL) 5 mg tablet Take 1 tablet by mouth daily    melatonin 3 mg Take 1 tablet (3 mg total) by mouth daily at bedtime    omeprazole (PriLOSEC) 40 MG capsule Take 1 capsule by mouth daily    venlafaxine (EFFEXOR-XR) 150 mg 24 hr capsule Take 1 capsule (150 mg total) by mouth daily    [DISCONTINUED] fluticasone (FLONASE) 50 mcg/act nasal spray 2 sprays into each nostril daily     No current facility-administered medications on file prior to visit  He is allergic to bee venom       Review of Systems   Constitutional: Negative for chills and fever  HENT: Negative for congestion, ear pain, hearing loss, postnasal drip, rhinorrhea, sinus pain, sinus pressure, sore throat and trouble swallowing  Eyes: Negative for pain and visual disturbance  Respiratory: Negative for cough, chest tightness, shortness of breath and wheezing  Cardiovascular: Negative  Negative for chest pain, palpitations and leg swelling  Gastrointestinal: Negative for abdominal pain, blood in stool, constipation, diarrhea, nausea and vomiting  Endocrine: Negative for cold intolerance, heat intolerance, polydipsia, polyphagia and polyuria  Genitourinary: Negative for difficulty urinating, dysuria, flank pain and urgency  Musculoskeletal: Negative for arthralgias, back pain, gait problem and myalgias  Skin: Negative for rash  Allergic/Immunologic: Negative  Neurological: Negative for dizziness, weakness, light-headedness and headaches  Hematological: Negative  Psychiatric/Behavioral: Negative for behavioral problems, dysphoric mood and sleep disturbance  The patient is not nervous/anxious  Objective:      /86 (BP Location: Left arm, Patient Position: Sitting, Cuff Size: Large)   Pulse 87   Temp 97 6 °F (36 4 °C)   Resp 16   Ht 5' 10" (1 778 m)   Wt (!) 139 kg (305 lb 6 4 oz)   SpO2 98%   BMI 43 82 kg/m²          Physical Exam   Constitutional: He is oriented to person, place, and time  He appears well-developed and well-nourished  No distress  HENT:   Head: Normocephalic and atraumatic  Right Ear: External ear normal    Left Ear: External ear normal    Nose: Nose normal    Mouth/Throat: Oropharynx is clear and moist  No oropharyngeal exudate  Eyes: Conjunctivae and EOM are normal  Pupils are equal, round, and reactive to light   Right eye exhibits no discharge  Left eye exhibits no discharge  No scleral icterus  Neck: Normal range of motion  Neck supple  No thyromegaly present  Cardiovascular: Normal rate, regular rhythm and normal heart sounds  Exam reveals no gallop and no friction rub  No murmur heard  Pulmonary/Chest: Effort normal and breath sounds normal  No respiratory distress  He has no wheezes  He has no rales  Abdominal: Soft  Bowel sounds are normal  He exhibits no distension  There is no tenderness  Musculoskeletal: Normal range of motion  He exhibits no edema, tenderness or deformity  Neurological: He is alert and oriented to person, place, and time  No cranial nerve deficit  Skin: Skin is warm and dry  He is not diaphoretic  Psychiatric: He has a normal mood and affect   His behavior is normal  Judgment and thought content normal

## 2018-06-24 ENCOUNTER — HOSPITAL ENCOUNTER (INPATIENT)
Facility: HOSPITAL | Age: 28
LOS: 3 days | Discharge: HOME | DRG: 720 | End: 2018-06-27
Attending: EMERGENCY MEDICINE | Admitting: INTERNAL MEDICINE
Payer: COMMERCIAL

## 2018-06-24 ENCOUNTER — APPOINTMENT (EMERGENCY)
Dept: RADIOLOGY | Facility: HOSPITAL | Age: 28
DRG: 720 | End: 2018-06-24
Payer: COMMERCIAL

## 2018-06-24 ENCOUNTER — APPOINTMENT (EMERGENCY)
Dept: CT IMAGING | Facility: HOSPITAL | Age: 28
DRG: 720 | End: 2018-06-24
Payer: COMMERCIAL

## 2018-06-24 DIAGNOSIS — R09.02 HYPOXIA: ICD-10-CM

## 2018-06-24 DIAGNOSIS — E66.01 MORBID OBESITY WITH BMI OF 40.0-44.9, ADULT (HCC): ICD-10-CM

## 2018-06-24 DIAGNOSIS — R78.81 POSITIVE BLOOD CULTURES: ICD-10-CM

## 2018-06-24 DIAGNOSIS — T40.1X1A HEROIN OVERDOSE, ACCIDENTAL OR UNINTENTIONAL, INITIAL ENCOUNTER (HCC): Primary | ICD-10-CM

## 2018-06-24 DIAGNOSIS — J18.9 ATYPICAL PNEUMONIA: ICD-10-CM

## 2018-06-24 DIAGNOSIS — F31.9 BIPOLAR 1 DISORDER (HCC): ICD-10-CM

## 2018-06-24 DIAGNOSIS — A41.9 SEVERE SEPSIS (HCC): ICD-10-CM

## 2018-06-24 DIAGNOSIS — R65.20 SEVERE SEPSIS (HCC): ICD-10-CM

## 2018-06-24 PROBLEM — G47.33 OBSTRUCTIVE SLEEP APNEA: Status: ACTIVE | Noted: 2018-06-24

## 2018-06-24 PROBLEM — E87.2 LACTIC ACIDOSIS: Status: ACTIVE | Noted: 2018-06-24

## 2018-06-24 PROBLEM — E87.6 HYPOKALEMIA: Status: ACTIVE | Noted: 2018-06-24

## 2018-06-24 PROBLEM — J96.01 ACUTE RESPIRATORY FAILURE WITH HYPOXIA (HCC): Status: ACTIVE | Noted: 2018-06-24

## 2018-06-24 PROBLEM — R19.7 DIARRHEA: Status: ACTIVE | Noted: 2018-06-24

## 2018-06-24 PROBLEM — D64.9 ANEMIA: Status: ACTIVE | Noted: 2018-06-24

## 2018-06-24 PROBLEM — J96.02 ACUTE RESPIRATORY FAILURE WITH HYPOXIA AND HYPERCAPNIA (HCC): Status: ACTIVE | Noted: 2018-06-24

## 2018-06-24 PROBLEM — E87.20 LACTIC ACIDOSIS: Status: ACTIVE | Noted: 2018-06-24

## 2018-06-24 LAB
ALBUMIN SERPL BCP-MCNC: 3.2 G/DL (ref 3.5–5)
ALP SERPL-CCNC: 61 U/L (ref 46–116)
ALT SERPL W P-5'-P-CCNC: 59 U/L (ref 12–78)
AMPHETAMINES SERPL QL SCN: POSITIVE
ANION GAP SERPL CALCULATED.3IONS-SCNC: 14 MMOL/L (ref 4–13)
APAP SERPL-MCNC: <2 UG/ML (ref 10–30)
APTT PPP: 27 SECONDS (ref 24–36)
ARTERIAL PATENCY WRIST A: YES
AST SERPL W P-5'-P-CCNC: 39 U/L (ref 5–45)
BARBITURATES UR QL: NEGATIVE
BASE EXCESS BLDA CALC-SCNC: -2.7 MMOL/L
BASOPHILS # BLD AUTO: 0.02 THOUSANDS/ΜL (ref 0–0.1)
BASOPHILS NFR BLD AUTO: 0 % (ref 0–1)
BENZODIAZ UR QL: NEGATIVE
BILIRUB SERPL-MCNC: 0.4 MG/DL (ref 0.2–1)
BILIRUB UR QL STRIP: NEGATIVE
BUN SERPL-MCNC: 15 MG/DL (ref 5–25)
CALCIUM SERPL-MCNC: 8.2 MG/DL (ref 8.3–10.1)
CHLORIDE SERPL-SCNC: 101 MMOL/L (ref 100–108)
CK SERPL-CCNC: 106 U/L (ref 39–308)
CLARITY UR: CLEAR
CO2 SERPL-SCNC: 23 MMOL/L (ref 21–32)
COCAINE UR QL: NEGATIVE
COLOR UR: YELLOW
CREAT SERPL-MCNC: 1.17 MG/DL (ref 0.6–1.3)
EOSINOPHIL # BLD AUTO: 0.05 THOUSAND/ΜL (ref 0–0.61)
EOSINOPHIL NFR BLD AUTO: 0 % (ref 0–6)
ERYTHROCYTE [DISTWIDTH] IN BLOOD BY AUTOMATED COUNT: 15.9 % (ref 11.6–15.1)
ETHANOL SERPL-MCNC: <3 MG/DL (ref 0–3)
GFR SERPL CREATININE-BSD FRML MDRD: 84 ML/MIN/1.73SQ M
GLUCOSE SERPL-MCNC: 175 MG/DL (ref 65–140)
GLUCOSE UR STRIP-MCNC: NEGATIVE MG/DL
HCO3 BLDA-SCNC: 23.3 MMOL/L (ref 22–28)
HCT VFR BLD AUTO: 35 % (ref 36.5–49.3)
HGB BLD-MCNC: 11.6 G/DL (ref 12–17)
HGB UR QL STRIP.AUTO: NEGATIVE
INR PPP: 1.08 (ref 0.86–1.17)
KETONES UR STRIP-MCNC: NEGATIVE MG/DL
LACTATE SERPL-SCNC: 0.9 MMOL/L (ref 0.5–2)
LACTATE SERPL-SCNC: 3.6 MMOL/L (ref 0.5–2)
LEUKOCYTE ESTERASE UR QL STRIP: NEGATIVE
LIPASE SERPL-CCNC: 57 U/L (ref 73–393)
LYMPHOCYTES # BLD AUTO: 0.48 THOUSANDS/ΜL (ref 0.6–4.47)
LYMPHOCYTES NFR BLD AUTO: 4 % (ref 14–44)
MAGNESIUM SERPL-MCNC: 1.6 MG/DL (ref 1.6–2.6)
MCH RBC QN AUTO: 25.2 PG (ref 26.8–34.3)
MCHC RBC AUTO-ENTMCNC: 33.1 G/DL (ref 31.4–37.4)
MCV RBC AUTO: 76 FL (ref 82–98)
METHADONE UR QL: NEGATIVE
MONOCYTES # BLD AUTO: 0.87 THOUSAND/ΜL (ref 0.17–1.22)
MONOCYTES NFR BLD AUTO: 6 % (ref 4–12)
NASAL CANNULA: ABNORMAL
NEUTROPHILS # BLD AUTO: 12.12 THOUSANDS/ΜL (ref 1.85–7.62)
NEUTS SEG NFR BLD AUTO: 90 % (ref 43–75)
NITRITE UR QL STRIP: NEGATIVE
NT-PROBNP SERPL-MCNC: 135 PG/ML
O2 CT BLDA-SCNC: 14.7 ML/DL (ref 16–23)
OPIATES UR QL SCN: POSITIVE
OXYHGB MFR BLDA: 91.4 % (ref 94–97)
PCO2 BLDA: 45.2 MM HG (ref 36–44)
PCP UR QL: NEGATIVE
PH BLDA: 7.33 [PH] (ref 7.35–7.45)
PH UR STRIP.AUTO: 6 [PH] (ref 4.5–8)
PLATELET # BLD AUTO: 216 THOUSANDS/UL (ref 149–390)
PMV BLD AUTO: 9 FL (ref 8.9–12.7)
PO2 BLDA: 68.8 MM HG (ref 75–129)
POTASSIUM SERPL-SCNC: 3.4 MMOL/L (ref 3.5–5.3)
PROCALCITONIN SERPL-MCNC: 2.46 NG/ML
PROT SERPL-MCNC: 6.9 G/DL (ref 6.4–8.2)
PROT UR STRIP-MCNC: NEGATIVE MG/DL
PROTHROMBIN TIME: 13.5 SECONDS (ref 11.8–14.2)
RBC # BLD AUTO: 4.61 MILLION/UL (ref 3.88–5.62)
SALICYLATES SERPL-MCNC: <3 MG/DL (ref 3–20)
SODIUM SERPL-SCNC: 138 MMOL/L (ref 136–145)
SP GR UR STRIP.AUTO: <=1.005 (ref 1–1.03)
SPECIMEN SOURCE: ABNORMAL
THC UR QL: NEGATIVE
TROPONIN I SERPL-MCNC: <0.02 NG/ML
TROPONIN I SERPL-MCNC: <0.02 NG/ML
TSH SERPL DL<=0.05 MIU/L-ACNC: 1.22 UIU/ML (ref 0.36–3.74)
UROBILINOGEN UR QL STRIP.AUTO: 0.2 E.U./DL
WBC # BLD AUTO: 13.54 THOUSAND/UL (ref 4.31–10.16)

## 2018-06-24 PROCEDURE — 83880 ASSAY OF NATRIURETIC PEPTIDE: CPT | Performed by: EMERGENCY MEDICINE

## 2018-06-24 PROCEDURE — 80053 COMPREHEN METABOLIC PANEL: CPT | Performed by: EMERGENCY MEDICINE

## 2018-06-24 PROCEDURE — 87449 NOS EACH ORGANISM AG IA: CPT | Performed by: INTERNAL MEDICINE

## 2018-06-24 PROCEDURE — 74177 CT ABD & PELVIS W/CONTRAST: CPT

## 2018-06-24 PROCEDURE — 84484 ASSAY OF TROPONIN QUANT: CPT | Performed by: EMERGENCY MEDICINE

## 2018-06-24 PROCEDURE — 83690 ASSAY OF LIPASE: CPT | Performed by: EMERGENCY MEDICINE

## 2018-06-24 PROCEDURE — 99223 1ST HOSP IP/OBS HIGH 75: CPT | Performed by: INTERNAL MEDICINE

## 2018-06-24 PROCEDURE — 87522 HEPATITIS C REVRS TRNSCRPJ: CPT | Performed by: INTERNAL MEDICINE

## 2018-06-24 PROCEDURE — 87186 SC STD MICRODIL/AGAR DIL: CPT | Performed by: EMERGENCY MEDICINE

## 2018-06-24 PROCEDURE — 96374 THER/PROPH/DIAG INJ IV PUSH: CPT

## 2018-06-24 PROCEDURE — 80329 ANALGESICS NON-OPIOID 1 OR 2: CPT | Performed by: EMERGENCY MEDICINE

## 2018-06-24 PROCEDURE — 71045 X-RAY EXAM CHEST 1 VIEW: CPT

## 2018-06-24 PROCEDURE — 87631 RESP VIRUS 3-5 TARGETS: CPT | Performed by: INTERNAL MEDICINE

## 2018-06-24 PROCEDURE — 93005 ELECTROCARDIOGRAM TRACING: CPT

## 2018-06-24 PROCEDURE — 87081 CULTURE SCREEN ONLY: CPT | Performed by: INTERNAL MEDICINE

## 2018-06-24 PROCEDURE — 36600 WITHDRAWAL OF ARTERIAL BLOOD: CPT

## 2018-06-24 PROCEDURE — 96365 THER/PROPH/DIAG IV INF INIT: CPT

## 2018-06-24 PROCEDURE — 70450 CT HEAD/BRAIN W/O DYE: CPT

## 2018-06-24 PROCEDURE — 99285 EMERGENCY DEPT VISIT HI MDM: CPT

## 2018-06-24 PROCEDURE — 71275 CT ANGIOGRAPHY CHEST: CPT

## 2018-06-24 PROCEDURE — 94760 N-INVAS EAR/PLS OXIMETRY 1: CPT

## 2018-06-24 PROCEDURE — 85610 PROTHROMBIN TIME: CPT | Performed by: EMERGENCY MEDICINE

## 2018-06-24 PROCEDURE — 85730 THROMBOPLASTIN TIME PARTIAL: CPT | Performed by: EMERGENCY MEDICINE

## 2018-06-24 PROCEDURE — 80320 DRUG SCREEN QUANTALCOHOLS: CPT | Performed by: EMERGENCY MEDICINE

## 2018-06-24 PROCEDURE — 83605 ASSAY OF LACTIC ACID: CPT | Performed by: INTERNAL MEDICINE

## 2018-06-24 PROCEDURE — 80307 DRUG TEST PRSMV CHEM ANLYZR: CPT | Performed by: INTERNAL MEDICINE

## 2018-06-24 PROCEDURE — 87077 CULTURE AEROBIC IDENTIFY: CPT | Performed by: EMERGENCY MEDICINE

## 2018-06-24 PROCEDURE — 87086 URINE CULTURE/COLONY COUNT: CPT | Performed by: INTERNAL MEDICINE

## 2018-06-24 PROCEDURE — 72125 CT NECK SPINE W/O DYE: CPT

## 2018-06-24 PROCEDURE — 96361 HYDRATE IV INFUSION ADD-ON: CPT

## 2018-06-24 PROCEDURE — 94664 DEMO&/EVAL PT USE INHALER: CPT

## 2018-06-24 PROCEDURE — 81003 URINALYSIS AUTO W/O SCOPE: CPT | Performed by: EMERGENCY MEDICINE

## 2018-06-24 PROCEDURE — 84484 ASSAY OF TROPONIN QUANT: CPT | Performed by: INTERNAL MEDICINE

## 2018-06-24 PROCEDURE — 84443 ASSAY THYROID STIM HORMONE: CPT | Performed by: EMERGENCY MEDICINE

## 2018-06-24 PROCEDURE — 83735 ASSAY OF MAGNESIUM: CPT | Performed by: EMERGENCY MEDICINE

## 2018-06-24 PROCEDURE — 87389 HIV-1 AG W/HIV-1&-2 AB AG IA: CPT | Performed by: INTERNAL MEDICINE

## 2018-06-24 PROCEDURE — 85025 COMPLETE CBC W/AUTO DIFF WBC: CPT | Performed by: EMERGENCY MEDICINE

## 2018-06-24 PROCEDURE — 36415 COLL VENOUS BLD VENIPUNCTURE: CPT | Performed by: EMERGENCY MEDICINE

## 2018-06-24 PROCEDURE — 83605 ASSAY OF LACTIC ACID: CPT | Performed by: EMERGENCY MEDICINE

## 2018-06-24 PROCEDURE — 82805 BLOOD GASES W/O2 SATURATION: CPT | Performed by: EMERGENCY MEDICINE

## 2018-06-24 PROCEDURE — 87040 BLOOD CULTURE FOR BACTERIA: CPT | Performed by: EMERGENCY MEDICINE

## 2018-06-24 PROCEDURE — 84145 PROCALCITONIN (PCT): CPT | Performed by: INTERNAL MEDICINE

## 2018-06-24 PROCEDURE — 82550 ASSAY OF CK (CPK): CPT | Performed by: EMERGENCY MEDICINE

## 2018-06-24 RX ORDER — LISINOPRIL 5 MG/1
5 TABLET ORAL DAILY
Status: DISCONTINUED | OUTPATIENT
Start: 2018-06-25 | End: 2018-06-24

## 2018-06-24 RX ORDER — LEVOFLOXACIN 5 MG/ML
750 INJECTION, SOLUTION INTRAVENOUS EVERY 24 HOURS
Status: DISCONTINUED | OUTPATIENT
Start: 2018-06-24 | End: 2018-06-27 | Stop reason: HOSPADM

## 2018-06-24 RX ORDER — ACETAMINOPHEN 325 MG/1
650 TABLET ORAL EVERY 6 HOURS PRN
Status: DISCONTINUED | OUTPATIENT
Start: 2018-06-24 | End: 2018-06-27 | Stop reason: HOSPADM

## 2018-06-24 RX ORDER — POTASSIUM CHLORIDE 20 MEQ/1
40 TABLET, EXTENDED RELEASE ORAL ONCE
Status: COMPLETED | OUTPATIENT
Start: 2018-06-24 | End: 2018-06-24

## 2018-06-24 RX ORDER — LANOLIN ALCOHOL/MO/W.PET/CERES
3 CREAM (GRAM) TOPICAL
Status: DISCONTINUED | OUTPATIENT
Start: 2018-06-24 | End: 2018-06-27 | Stop reason: HOSPADM

## 2018-06-24 RX ORDER — VENLAFAXINE HYDROCHLORIDE 75 MG/1
150 CAPSULE, EXTENDED RELEASE ORAL DAILY
Status: DISCONTINUED | OUTPATIENT
Start: 2018-06-25 | End: 2018-06-27 | Stop reason: HOSPADM

## 2018-06-24 RX ORDER — ACETAMINOPHEN 325 MG/1
650 TABLET ORAL ONCE
Status: COMPLETED | OUTPATIENT
Start: 2018-06-24 | End: 2018-06-24

## 2018-06-24 RX ORDER — ONDANSETRON 2 MG/ML
4 INJECTION INTRAMUSCULAR; INTRAVENOUS EVERY 6 HOURS PRN
Status: DISCONTINUED | OUTPATIENT
Start: 2018-06-24 | End: 2018-06-27 | Stop reason: HOSPADM

## 2018-06-24 RX ORDER — MAGNESIUM SULFATE HEPTAHYDRATE 40 MG/ML
2 INJECTION, SOLUTION INTRAVENOUS ONCE
Status: COMPLETED | OUTPATIENT
Start: 2018-06-24 | End: 2018-06-25

## 2018-06-24 RX ORDER — ARIPIPRAZOLE 10 MG/1
5 TABLET ORAL
Status: DISCONTINUED | OUTPATIENT
Start: 2018-06-24 | End: 2018-06-27 | Stop reason: HOSPADM

## 2018-06-24 RX ORDER — IPRATROPIUM BROMIDE AND ALBUTEROL SULFATE 2.5; .5 MG/3ML; MG/3ML
3 SOLUTION RESPIRATORY (INHALATION)
Status: DISCONTINUED | OUTPATIENT
Start: 2018-06-24 | End: 2018-06-24

## 2018-06-24 RX ORDER — SODIUM CHLORIDE 9 MG/ML
75 INJECTION, SOLUTION INTRAVENOUS CONTINUOUS
Status: DISCONTINUED | OUTPATIENT
Start: 2018-06-24 | End: 2018-06-27 | Stop reason: HOSPADM

## 2018-06-24 RX ORDER — ONDANSETRON 2 MG/ML
4 INJECTION INTRAMUSCULAR; INTRAVENOUS ONCE
Status: COMPLETED | OUTPATIENT
Start: 2018-06-24 | End: 2018-06-24

## 2018-06-24 RX ORDER — PANTOPRAZOLE SODIUM 40 MG/1
40 TABLET, DELAYED RELEASE ORAL
Status: DISCONTINUED | OUTPATIENT
Start: 2018-06-25 | End: 2018-06-27 | Stop reason: HOSPADM

## 2018-06-24 RX ORDER — CLINDAMYCIN PHOSPHATE 600 MG/50ML
600 INJECTION INTRAVENOUS ONCE
Status: COMPLETED | OUTPATIENT
Start: 2018-06-24 | End: 2018-06-24

## 2018-06-24 RX ADMIN — METRONIDAZOLE 500 MG: 500 INJECTION, SOLUTION INTRAVENOUS at 22:19

## 2018-06-24 RX ADMIN — VANCOMYCIN HYDROCHLORIDE 2000 MG: 1 INJECTION, POWDER, LYOPHILIZED, FOR SOLUTION INTRAVENOUS at 12:43

## 2018-06-24 RX ADMIN — SODIUM CHLORIDE 1000 ML: 0.9 INJECTION, SOLUTION INTRAVENOUS at 11:31

## 2018-06-24 RX ADMIN — ACETAMINOPHEN 650 MG: 325 TABLET ORAL at 11:55

## 2018-06-24 RX ADMIN — SODIUM CHLORIDE 2000 ML: 0.9 INJECTION, SOLUTION INTRAVENOUS at 15:55

## 2018-06-24 RX ADMIN — ARIPIPRAZOLE 5 MG: 10 TABLET ORAL at 22:19

## 2018-06-24 RX ADMIN — POTASSIUM CHLORIDE 40 MEQ: 1500 TABLET, EXTENDED RELEASE ORAL at 15:54

## 2018-06-24 RX ADMIN — CEFEPIME HYDROCHLORIDE 2000 MG: 2 INJECTION, SOLUTION INTRAVENOUS at 11:33

## 2018-06-24 RX ADMIN — ONDANSETRON 4 MG: 2 INJECTION INTRAMUSCULAR; INTRAVENOUS at 10:42

## 2018-06-24 RX ADMIN — MAGNESIUM SULFATE HEPTAHYDRATE 2 G: 40 INJECTION, SOLUTION INTRAVENOUS at 15:43

## 2018-06-24 RX ADMIN — IOHEXOL 100 ML: 350 INJECTION, SOLUTION INTRAVENOUS at 11:11

## 2018-06-24 RX ADMIN — ENOXAPARIN SODIUM 40 MG: 40 INJECTION, SOLUTION INTRAVENOUS; SUBCUTANEOUS at 22:19

## 2018-06-24 RX ADMIN — CLINDAMYCIN PHOSPHATE 600 MG: 12 INJECTION, SOLUTION INTRAMUSCULAR; INTRAVENOUS at 12:15

## 2018-06-24 RX ADMIN — SODIUM CHLORIDE 1000 ML: 0.9 INJECTION, SOLUTION INTRAVENOUS at 10:43

## 2018-06-24 RX ADMIN — LEVOFLOXACIN 750 MG: 5 INJECTION, SOLUTION INTRAVENOUS at 23:09

## 2018-06-24 RX ADMIN — IPRATROPIUM BROMIDE AND ALBUTEROL SULFATE 3 ML: .5; 3 SOLUTION RESPIRATORY (INHALATION) at 11:55

## 2018-06-24 NOTE — ASSESSMENT & PLAN NOTE
-admit to [address] [place] [address] status  -the severe sepsis is present on admission and secondary to commute card pneumonia, possible aspiration pneumonia  -the patient will be given the initial 30 mL/kg normal saline IV fluid resuscitation bolus per the sepsis protocol  -continue normal saline IV fluids at 125 mL/hr after the initial resuscitation fluid bolus is completed  -check blood cultures x 2 sets  -check a urine culture  -check a Streptococcus pneumoniae urinary antigen  -check a Legionella urinary antigen  -the patient received IV vancomycin, IV cefepime, and IV clindamycin in the emergency department  -I will have the patient on IV levofloxacin and IV metronidazole to cover for community-acquired pneumonia pathogens and aspiration pneumonia pathogens  -the patient continues to have fevers, he will need a her cardiogram to rule-out a valve vegetation  -follow the lactic acid level

## 2018-06-24 NOTE — ASSESSMENT & PLAN NOTE
-the patient denies any suicidal ideation/attempt  -I recommended inpatient drug rehabilitation for the patient upon discharge

## 2018-06-24 NOTE — ASSESSMENT & PLAN NOTE
-his blood pressure is in the low-normal range in the setting of severe sepsis  -hold lisinopril  -follow the blood pressure trend

## 2018-06-24 NOTE — ASSESSMENT & PLAN NOTE
-the patient was treated for the hepatitis C  -the patient does admit to sharing needles during 1 instance since he was treated for the hepatitis C  -check a hepatitis C RNA viral load by PCR

## 2018-06-24 NOTE — NURSING NOTE
Patient adm to room 205 CCU placed on monitor, patient alert and oriented with mom present   Instructed on use of call bell, use of bed and fall prevention

## 2018-06-24 NOTE — ASSESSMENT & PLAN NOTE
-the patient is noncompliant with CPAP at home  -I counseled the patient on being compliant with his CPAP machine at home

## 2018-06-24 NOTE — PLAN OF CARE
CARDIOVASCULAR - ADULT     Maintains optimal cardiac output and hemodynamic stability Progressing     Absence of cardiac dysrhythmias or at baseline rhythm Progressing        GASTROINTESTINAL - ADULT     Minimal or absence of nausea and/or vomiting Progressing     Maintains or returns to baseline bowel function Progressing     Maintains adequate nutritional intake Progressing        GENITOURINARY - ADULT     Maintains or returns to baseline urinary function Progressing     Absence of urinary retention Progressing        HEMATOLOGIC - ADULT     Maintains hematologic stability Progressing        METABOLIC, FLUID AND ELECTROLYTES - ADULT     Electrolytes maintained within normal limits Progressing     Fluid balance maintained Progressing     Glucose maintained within target range Progressing        MUSCULOSKELETAL - ADULT     Maintain or return mobility to safest level of function Progressing     Maintain proper alignment of affected body part Progressing        NEUROSENSORY - ADULT     Achieves stable or improved neurological status Progressing     Absence of seizures Progressing     Remains free of injury related to seizures activity Progressing     Achieves maximal functionality and self care Progressing        RESPIRATORY - ADULT     Achieves optimal ventilation and oxygenation Progressing        SKIN/TISSUE INTEGRITY - ADULT     Skin integrity remains intact Progressing     Incision(s), wounds(s) or drain site(s) healing without S/S of infection Progressing     Oral mucous membranes remain intact Progressing

## 2018-06-24 NOTE — H&P
H&P- Carline Kwong 1990, 28 y o  male MRN: [id number]    Unit/Bed#:  Encounter: [id number]    Primary Care Provider: Halle Bailey DO   Date and time admitted to hospital: 6/24/2018 10:18 AM        * Severe sepsis [facility])   Assessment & Plan    -admit to [address] [place] [address] status  -the severe sepsis is present on admission and secondary to community-acquired pneumonia versus possible aspiration pneumonia  -the patient will be given the initial 30 mL/kg normal saline IV fluid resuscitation bolus per the sepsis protocol  -continue normal saline IV fluids at 125 mL/hr after the initial resuscitation fluid bolus is completed  -check blood cultures x 2 sets  -check a urine culture  -check a Streptococcus pneumoniae urinary antigen  -check a Legionella urinary antigen  -the patient received IV vancomycin, IV cefepime, and IV clindamycin in the emergency department  -I will have the patient on IV levofloxacin and IV metronidazole to cover for community-acquired pneumonia pathogens and aspiration pneumonia pathogens  -the patient continues to have fevers, he will need a her cardiogram to rule-out a valve vegetation  -follow the lactic acid level        Acute respiratory failure with hypoxia and hypercapnia (HCC)   Assessment & Plan    -the patient is currently stable from respiratory standpoint on supplemental oxygen via nasal cannula  -goal oxygen saturation levels of 92% and above  -initiate the respiratory protocol and airway clearance protocol  -if the patient's respiratory status declines, he will need either the Vapotherm unit or BiPAP mask        Heroin overdose, accidental or unintentional, initial encounter   Assessment & Plan    -the patient denies any suicidal ideation/attempt  -I recommended inpatient drug rehabilitation for the patient upon discharge        Community acquired pneumonia   Assessment & Plan    -check blood cultures x 2 sets  -check a urine culture  -check a Streptococcus pneumoniae urinary antigen  -check a Legionella urinary antigen  -the patient received IV vancomycin, IV cefepime, and IV clindamycin in the emergency department  -I will have the patient on IV levofloxacin and IV metronidazole to cover for community-acquired pneumonia pathogens and aspiration pneumonia pathogens        Diarrhea   Assessment & Plan    -check stool cultures including a Clostridium difficile culture        Anemia   Assessment & Plan    -check an iron panel, ferritin level, vitamin B12 level, and folate level  -follow the CBC        Hypokalemia   Assessment & Plan    -replete with p o  potassium chloride  -follow the potassium level        Lactic acidosis   Assessment & Plan    -normal saline IV fluids  -secondary to severe sepsis  -follow the lactic acid level        Obstructive sleep apnea   Assessment & Plan    -the patient is noncompliant with CPAP at home  -I counseled the patient on being compliant with his CPAP machine at home        Chronic hepatitis C without hepatic coma ([facility] [address] )   Assessment & Plan    -the patient was treated for the hepatitis C  -the patient does admit to sharing needles during 1 instance since he was treated for the hepatitis C  -check a hepatitis C RNA viral load by PCR        Morbid obesity with BMI of 45 0-49 9, adult (HCC)   Assessment & Plan    -nutrition/dietitian consultation        Essential hypertension   Assessment & Plan    -his blood pressure is in the low-normal range in the setting of severe sepsis  -hold lisinopril  -follow the blood pressure trend            VTE Prophylaxis: Enoxaparin (Lovenox)  40 mg SQ every 12 hours dosed based on his BMI/ sequential compression device   Code Status:  Level 1-Full Code      Anticipated Length of Stay:  Patient will be admitted on an Inpatient basis with an anticipated length of stay of greater than 2 midnights     Justification for Hospital Stay:  Patient requires IV antibiotics, IV fluids, a work-up for severe sepsis, and supplemental oxygen to maintain adequate oxygen saturation levels  Total Time for Visit, including Counseling / Coordination of Care: 45 minutes  Greater than 50% of this total time spent on direct patient counseling and coordination of care  Chief Complaint:  Jennifer Fuentes    History of Present Illness:    Austin Hines is a 28 y o  male who presents to the emergency department after experiencing an accidental heroin overdose  The patient admitted to using intravenous heroin around 8:30 a m  this morning, 06/24/2018  The patient denies any intent to harm self  He denies any suicidal ideations  The patient's parents heard him fall to the ground in their home  He was found to be apneic with his lips turning color blue  He did have a pulse at the time  Resuscitation efforts were initiated, and EMS was summoned  The patient had episode of nausea and vomiting during his unresponsive state  Narcan was administered by EMS  The patient's mental status improved with the Narcan  Per the patient, he has been experiencing multiple episodes of diarrhea daily over the last 3 days  In the emergency department, he was found to have severe sepsis possible aspiration pneumonia  He also was found to have hypoxia requiring supplemental oxygen to maintain adequate oxygen saturation levels  He is currently awake and alert at this time  He does complain of shortness of breath  No chest pain  Review of Systems:    Review of Systems:  Per HPI, all other systems have been reviewed and were negative      Past Medical and Surgical History:     Past Medical History:   Diagnosis Date    Allergic     Anemia 6/24/2018    Benign essential hypertension 11/17/2016    Claustrophobia 3/15/2018    Community acquired pneumonia 6/24/2018    Depressed 7/14/2016    GERD (gastroesophageal reflux disease)     Hepatitis C virus infection 8/14/2014    Obesity 10/12/2016    Sleep disorder        Past Surgical History: Procedure Laterality Date    WISDOM TOOTH EXTRACTION         Meds/Allergies:    Prior to Admission medications    Medication Sig Start Date End Date Taking? Authorizing Provider   ARIPiprazole (ABILIFY) 5 mg tablet Take 1 tablet (5 mg total) by mouth daily at bedtime 2/22/18  Yes Demarco Claire PA-C   EPINEPHrine (EPIPEN 2-ALEXI) 0 3 mg/0 3 mL SOAJ Inject as directed 5/20/15  Yes Historical Provider, MD   lisinopril (ZESTRIL) 5 mg tablet Take 1 tablet by mouth daily 11/17/16  Yes Historical Provider, MD   venlafaxine (EFFEXOR-XR) 150 mg 24 hr capsule Take 1 capsule (150 mg total) by mouth daily 3/13/18  Yes Dalia Rodriguez PA-C   diclofenac (VOLTAREN) 75 mg EC tablet Take 1 tablet by mouth 2 (two) times a day 11/21/17 6/24/18  Historical Provider, MD   fluticasone (FLONASE) 50 mcg/act nasal spray 2 sprays into each nostril daily 4/24/18 6/24/18  Dalia Rodriguez PA-C   melatonin 3 mg Take 1 tablet (3 mg total) by mouth daily at bedtime 2/22/18 6/24/18  Dalia Rodriguez PA-C   omeprazole (PriLOSEC) 40 MG capsule Take 1 capsule by mouth daily 6/29/17 6/24/18  Historical Provider, MD MEYER have reviewed home medications with patient personally  Allergies:    Allergies   Allergen Reactions    Bee Venom        Social History:     Marital Status: Single     Substance Use History:   History   Alcohol Use No     History   Smoking Status    Former Smoker    Types: E-Cigarettes   Smokeless Tobacco    Never Used     History   Drug Use    Types: Heroin     Comment: 1-2 bags daily       Family History:    non-contributory    Physical Exam:     Vitals:   Blood Pressure: 106/55 (06/24/18 1346)  Pulse: 98 (06/24/18 1346)  Temperature: 98 5 °F (36 9 °C) (06/24/18 1346)  Temp Source: Temporal (06/24/18 1346)  Respirations: 15 (06/24/18 1346)  Weight - Scale: (!) 143 kg (315 lb 4 1 oz) (06/24/18 1346)  SpO2: 93 % (06/24/18 1300)    Physical Exam    General:  NAD, awake, alert, oriented x 3, conversational dyspnea is present  HEENT:  NC/AT, mucous membranes dry  Neck:  Supple, No JVP elevation  CV:  + S1, + S2, RRR  Pulm:  Bibasilar crackles  Abd:  Soft, Non-tender, Non-distended  Ext:  No clubbing/cyanosis/edema  Skin:  No rashes      Additional Data:     Lab Results: I have personally reviewed pertinent reports  Results from last 7 days  Lab Units 06/24/18  1035   WBC Thousand/uL 13 54*   HEMOGLOBIN g/dL 11 6*   HEMATOCRIT % 35 0*   PLATELETS Thousands/uL 216   NEUTROS PCT % 90*   LYMPHS PCT % 4*   MONOS PCT % 6   EOS PCT % 0       Results from last 7 days  Lab Units 06/24/18  1035   SODIUM mmol/L 138   POTASSIUM mmol/L 3 4*   CHLORIDE mmol/L 101   CO2 mmol/L 23   BUN mg/dL 15   CREATININE mg/dL 1 17   CALCIUM mg/dL 8 2*   TOTAL PROTEIN g/dL 6 9   BILIRUBIN TOTAL mg/dL 0 40   ALK PHOS U/L 61   ALT U/L 59   AST U/L 39   GLUCOSE RANDOM mg/dL 175*       Results from last 7 days  Lab Units 06/24/18  1035   INR  1 08               Imaging: I have personally reviewed pertinent reports  XR chest 1 view portable   ED Interpretation by Monica Euceda MD (06/24 1043)   Read by me; Radiologist to provide formal interpretation  No acute process      Final Result by Sheila Aragon MD (06/24 1249)      No acute cardiopulmonary disease  Workstation performed: [id number]         PE Study with CT Abdomen and Pelvis with contrast   Final Result by Mitchell Ibrahim MD (06/24 1132)   Addendum 1 of 1 by Mitchell Ibrahim MD (06/24 1136)   ADDENDUM:   Several tiny bilateral pulmonary densities suggesting possible talcosis         Final   No evidence of pulmonary embolism      Diffuse groundglass bilateral pulmonary opacities  This could represent evidence of atypical infection        Diffuse fatty infiltration of the liver      No acute traumatic injury confirmed                  Workstation performed: [id number]         CT cervical spine without contrast   Final Result by Denis Trevino MD (06/24 1121) Straightening of the cervical lordosis may be due to positioning or muscle spasm  Otherwise, normal cervical spine  Workstation performed: [id number]         CT head without contrast   Final Result by Janice Rogers MD (06/24 1116)      Normal head CT  Workstation performed: [id number]             EKG, Pathology, and Other Studies Reviewed on Admission:   · EKG (06/24/2018): Sinus tachycardia with a heart rate of 126 bpm    Allscripts / Epic Records Reviewed: Yes     ** Please Note: This note has been constructed using a voice recognition system   **

## 2018-06-24 NOTE — ED PROVIDER NOTES
History  Chief Complaint   Patient presents with    Heroin Overdose - Accidental     Patient uses one bag of herion every day, today was unresposive with snoring respirations  Patient was given 1mg Narcan in each nares (total of 2mg) with no improvement  Given Narcan 1mg IV, became alert and oriented  Started to complain of left sided neck pain and chest pain in route after report  25-year-old male presents via ambulance with history of unintentional heroin overdose  He typically does 1 bag per day he admits to 1 bag today he recalls doing his heroin by injecting going out on the landing to ask for bottle of water and then the ambulance crew surrounding him  According to family members patient sustained the same level fall he was given 3 mg of Narcan total to intranasally and 1 IV  In route patient complains of left-sided chest pain which has resolved and left neck pain also resolved  He did have 1 emesis in route  He is febrile here when asked if you was feeling well this morning he states he has not felt well in a while chest pain was described as a tight feeling he has trouble with dry heaving on a daily basis as well as heartburn he currently complains of being slightly short of breath no pleuritic pain no prior history of PE or DVT he denies any upper respiratory complaints such as earache sore throat or cough  He has no back pain no numbness or tingling no lower extremity edema no diarrhea no dysuria or increased urinary frequency no complaints of cellulitis  Prior to Admission Medications   Prescriptions Last Dose Informant Patient Reported? Taking?    ARIPiprazole (ABILIFY) 5 mg tablet   No Yes   Sig: Take 1 tablet (5 mg total) by mouth daily at bedtime   EPINEPHrine (EPIPEN 2-ALEXI) 0 3 mg/0 3 mL SOAJ   Yes Yes   Sig: Inject as directed   lisinopril (ZESTRIL) 5 mg tablet   Yes Yes   Sig: Take 1 tablet by mouth daily   venlafaxine (EFFEXOR-XR) 150 mg 24 hr capsule   No Yes   Sig: Take 1 capsule (150 mg total) by mouth daily      Facility-Administered Medications: None       Past Medical History:   Diagnosis Date    Allergic     Anemia 6/24/2018    Benign essential hypertension 11/17/2016    Claustrophobia 3/15/2018    Community acquired pneumonia 6/24/2018    Depressed 7/14/2016    GERD (gastroesophageal reflux disease)     Hepatitis C virus infection 8/14/2014    Obesity 10/12/2016    Sleep disorder        Past Surgical History:   Procedure Laterality Date    WISDOM TOOTH EXTRACTION         Family History   Problem Relation Age of Onset    Colon cancer Father     Alzheimer's disease Maternal Grandmother     Depression Family      I have reviewed and agree with the history as documented  Social History   Substance Use Topics    Smoking status: Former Smoker     Packs/day: 1 00     Years: 13 00     Types: Cigarettes     Quit date: 6/24/2016    Smokeless tobacco: Never Used    Alcohol use No        Review of Systems   Constitutional: Positive for diaphoresis  Negative for activity change, appetite change, chills and fever  HENT: Negative for congestion, ear pain, rhinorrhea, sneezing and sore throat  Eyes: Negative for discharge  Respiratory: Positive for chest tightness and shortness of breath  Negative for cough  Cardiovascular: Positive for chest pain  Negative for leg swelling  Gastrointestinal: Positive for nausea and vomiting  Negative for abdominal pain, blood in stool and diarrhea  Endocrine: Negative for polyuria  Genitourinary: Negative for difficulty urinating, dysuria, frequency and urgency  Musculoskeletal: Positive for neck pain  Negative for back pain and myalgias  Skin: Negative for rash  Neurological: Negative for dizziness, light-headedness, numbness and headaches  Hematological: Negative for adenopathy  Psychiatric/Behavioral: Negative for confusion and suicidal ideas  All other systems reviewed and are negative        Physical Exam  Physical Exam   Constitutional: He is oriented to person, place, and time  He appears well-developed and well-nourished  He appears distressed (tachypnea)  HENT:   Head: Normocephalic and atraumatic  Right Ear: External ear normal    Left Ear: External ear normal    Nose: Nose normal    Mouth/Throat: Oropharynx is clear and moist  No oropharyngeal exudate  Eyes: Conjunctivae and EOM are normal  Pupils are equal, round, and reactive to light  Right eye exhibits no discharge  Left eye exhibits no discharge  No scleral icterus  Neck: Normal range of motion  Neck supple  Cardiovascular: Normal rate, regular rhythm, normal heart sounds and intact distal pulses  Pulmonary/Chest: Breath sounds normal  He is in respiratory distress (tachypnea)  He has no wheezes  He has no rales  He exhibits no tenderness  Abdominal: Soft  Bowel sounds are normal  He exhibits no distension and no mass  There is no tenderness  There is no rebound and no guarding  Back: no mildline or CVA tenderness   Musculoskeletal: Normal range of motion  He exhibits no edema, tenderness or deformity  Lymphadenopathy:     He has no cervical adenopathy  Neurological: He is alert and oriented to person, place, and time  No cranial nerve deficit or sensory deficit  He exhibits normal muscle tone  Coordination normal    Gait steady   Skin: Skin is warm and dry  No rash noted  He is not diaphoretic  Psychiatric: He has a normal mood and affect  Nursing note and vitals reviewed        Vital Signs  ED Triage Vitals [06/24/18 1021]   Temperature Pulse Respirations Blood Pressure SpO2   (!) 101 1 °F (38 4 °C) (!) 124 18 105/56 (!) 82 %      Temp Source Heart Rate Source Patient Position - Orthostatic VS BP Location FiO2 (%)   Temporal Monitor Lying Left arm --      Pain Score       6           Vitals:    06/24/18 1213 06/24/18 1230 06/24/18 1300 06/24/18 1346   BP:  106/62 110/60 106/55   Pulse: (!) 107 (!) 107 104 98   Patient Position - Orthostatic VS:  Lying Lying Lying       Visual Acuity      ED Medications  Medications   ipratropium-albuterol (DUO-NEB) 0 5-2 5 mg/3 mL inhalation solution 3 mL (3 mL Nebulization Given 6/24/18 1155)   sodium chloride 0 9 % bolus 2,000 mL (2,000 mL Intravenous New Bag 6/24/18 1555)   sodium chloride 0 9 % infusion (125 mL/hr Intravenous New Bag 6/24/18 1531)   levofloxacin (LEVAQUIN) IVPB (premix) 750 mg (not administered)   metroNIDAZOLE (FLAGYL) IVPB (premix) 500 mg (not administered)   ARIPiprazole (ABILIFY) tablet 5 mg (not administered)   melatonin tablet 3 mg (not administered)   pantoprazole (PROTONIX) EC tablet 40 mg (not administered)   venlafaxine (EFFEXOR-XR) 24 hr capsule 150 mg (not administered)   ondansetron (ZOFRAN) injection 4 mg (not administered)   enoxaparin (LOVENOX) subcutaneous injection 40 mg (not administered)   acetaminophen (TYLENOL) tablet 650 mg (not administered)    EMS REPLENISHMENT MED ( Does not apply Given to EMS 6/24/18 1030)   ondansetron (ZOFRAN) injection 4 mg (4 mg Intravenous Given 6/24/18 1042)   sodium chloride 0 9 % bolus 1,000 mL (0 mL Intravenous Stopped 6/24/18 1131)   iohexol (OMNIPAQUE) 350 MG/ML injection (MULTI-DOSE) 100 mL (100 mL Intravenous Given 6/24/18 1111)   sodium chloride 0 9 % bolus 1,000 mL (0 mL Intravenous Stopped 6/24/18 1228)   vancomycin (VANCOCIN) 2,000 mg in sodium chloride 0 9 % 500 mL IVPB (2,000 mg Intravenous New Bag 6/24/18 1243)   clindamycin (CLEOCIN) IVPB (premix) 600 mg (0 mg Intravenous Stopped 6/24/18 1243)   acetaminophen (TYLENOL) tablet 650 mg (650 mg Oral Given 6/24/18 1155)   potassium chloride (K-DUR,KLOR-CON) CR tablet 40 mEq (40 mEq Oral Given 6/24/18 1554)   magnesium sulfate 2 g/50 mL IVPB (premix) 2 g (2 g Intravenous New Bag 6/24/18 1543)       Diagnostic Studies  Results Reviewed     Procedure Component Value Units Date/Time    Lactic acid, plasma [[id number]]  (Normal) Collected:  06/24/18 3938    Lab Status: Final result Specimen:  Blood from Arm, Right Updated:  06/24/18 1553     LACTIC ACID 0 9 mmol/L     Narrative:         Result may be elevated if tourniquet was used during collection  Troponin I [[id number]]  (Normal) Collected:  06/24/18 1504    Lab Status:  Final result Specimen:  Blood from Arm, Right Updated:  06/24/18 1553     Troponin I <0 02 ng/mL     HIV 1/2 AG-AB combo [[id number]] Collected:  06/24/18 1509    Lab Status: In process Specimen:  Blood from Arm, Right Updated:  06/24/18 1524    Procalcitonin [[id number]] Collected:  06/24/18 1504    Lab Status: In process Specimen:  Blood from Arm, Right Updated:  06/24/18 1524    MRSA culture [[id number]] Collected:  06/24/18 1454    Lab Status: In process Specimen:  Nares from Nose Updated:  06/24/18 1524    Influenza A/B and RSV by PCR (Indicated for patients > 2 mo of age) [[id number]] Collected:  06/24/18 1454    Lab Status: In process Specimen:  Nasopharyngeal from Nasopharyngeal Swab Updated:  06/24/18 1523    Blood gas, arterial [[id number]]  (Abnormal) Collected:  06/24/18 1219    Lab Status:  Final result Specimen:  Blood, Arterial from Radial, Left Updated:  06/24/18 1226     pH, Arterial 7 330 (L)     pCO2, Arterial 45 2 (H) mm Hg      pO2, Arterial 68 8 (L) mm Hg      HCO3, Arterial 23 3 mmol/L      Base Excess, Arterial -2 7 mmol/L      O2 Content, Arterial 14 7 (L) mL/dL      O2 HGB,Arterial  91 4 (L) %      SOURCE Radial, Left     LESLY TEST Yes     Nasal Cannula 6L    Strep Pneumoniae, Urine [[id number]]     Lab Status:  No result Specimen:  Urine from Urine, Clean Catch     Legionella antigen, urine [[id number]]     Lab Status:  No result Specimen:  Urine     Urine culture [[id number]]     Lab Status:  No result Specimen:  Urine from Urine, Clean Catch     Rapid drug screen, urine [[id number]]     Lab Status:  No result Specimen:  Urine     Blood culture #2 [[id number]] Collected:  06/24/18 1128    Lab Status:   In process Specimen:  Blood from Hand, Left Updated:  06/24/18 1133    Acetaminophen level [[id number]]  (Abnormal) Collected:  06/24/18 1035    Lab Status:  Final result Specimen:  Blood from Hand, Right Updated:  06/24/18 1124     Acetaminophen Level <2 (L) ug/mL     Lactic acid, plasma [[id number]]  (Abnormal) Collected:  06/24/18 1035    Lab Status:  Final result Specimen:  Blood from Hand, Right Updated:  06/24/18 1109     LACTIC ACID 3 6 (HH) mmol/L     Narrative:         Result may be elevated if tourniquet was used during collection  CBC and differential [[id number]]  (Abnormal) Collected:  06/24/18 1035    Lab Status:  Final result Specimen:  Blood from Hand, Right Updated:  06/24/18 1108     WBC 13 54 (H) Thousand/uL      RBC 4 61 Million/uL      Hemoglobin 11 6 (L) g/dL      Hematocrit 35 0 (L) %      MCV 76 (L) fL      MCH 25 2 (L) pg      MCHC 33 1 g/dL      RDW 15 9 (H) %      MPV 9 0 fL      Platelets 842 Thousands/uL      Neutrophils Relative 90 (H) %      Lymphocytes Relative 4 (L) %      Monocytes Relative 6 %      Eosinophils Relative 0 %      Basophils Relative 0 %      Neutrophils Absolute 12 12 (H) Thousands/µL      Lymphocytes Absolute 0 48 (L) Thousands/µL      Monocytes Absolute 0 87 Thousand/µL      Eosinophils Absolute 0 05 Thousand/µL      Basophils Absolute 0 02 Thousands/µL     Narrative: This is an appended report  These results have been appended to a previously verified report      Comprehensive metabolic panel [[id number]]  (Abnormal) Collected:  06/24/18 1035    Lab Status:  Final result Specimen:  Blood from Hand, Right Updated:  06/24/18 1108     Sodium 138 mmol/L      Potassium 3 4 (L) mmol/L      Chloride 101 mmol/L      CO2 23 mmol/L      Anion Gap 14 (H) mmol/L      BUN 15 mg/dL      Creatinine 1 17 mg/dL      Glucose 175 (H) mg/dL      Calcium 8 2 (L) mg/dL      AST 39 U/L      ALT 59 U/L      Alkaline Phosphatase 61 U/L      Total Protein 6 9 g/dL      Albumin 3 2 (L) g/dL      Total Bilirubin 0 40 mg/dL      eGFR 80 ml/min/1 73sq m     Narrative:         National Kidney Disease Education Program recommendations are as follows:  GFR calculation is accurate only with a steady state creatinine  Chronic Kidney disease less than 60 ml/min/1 73 sq  meters  Kidney failure less than 15 ml/min/1 73 sq  meters  TSH [[id number]]  (Normal) Collected:  06/24/18 1035    Lab Status:  Final result Specimen:  Blood from Hand, Right Updated:  06/24/18 1108     TSH 3RD GENERATON 1 219 uIU/mL     Narrative:         Patients undergoing fluorescein dye angiography may retain small amounts of fluorescein in the body for 48-72 hours post procedure  Samples containing fluorescein can produce falsely depressed TSH values  If the patient had this procedure,a specimen should be resubmitted post fluorescein clearance      Lipase [[id number]]  (Abnormal) Collected:  06/24/18 1035    Lab Status:  Final result Specimen:  Blood from Hand, Right Updated:  06/24/18 1108     Lipase 57 (L) u/L     Magnesium [[id number]]  (Normal) Collected:  06/24/18 1035    Lab Status:  Final result Specimen:  Blood from Hand, Right Updated:  06/24/18 1108     Magnesium 1 6 mg/dL     B-type natriuretic peptide [[id number]]  (Abnormal) Collected:  06/24/18 1035    Lab Status:  Final result Specimen:  Blood from Hand, Right Updated:  06/24/18 1108     NT-proBNP 135 (H) pg/mL     Salicylate level [[id number]]  (Abnormal) Collected:  06/24/18 1035    Lab Status:  Final result Specimen:  Blood from Hand, Right Updated:  73/91/13 1137     Salicylate Lvl <3 (L) mg/dL     CK Total with Reflex CKMB [[id number]]  (Normal) Collected:  06/24/18 1035    Lab Status:  Final result Specimen:  Blood from Hand, Right Updated:  06/24/18 1108     Total  U/L     Troponin I [[id number]]  (Normal) Collected:  06/24/18 1035    Lab Status:  Final result Specimen:  Blood from Hand, Right Updated:  06/24/18 1101     Troponin I <0 02 ng/mL     Ethanol [[id number]]  (Normal) Collected:  06/24/18 1035    Lab Status:  Final result Specimen:  Blood from Hand, Right Updated:  06/24/18 1100     Ethanol Lvl <3 mg/dL     Protime-INR [[id number]]  (Normal) Collected:  06/24/18 1035    Lab Status:  Final result Specimen:  Blood from Hand, Right Updated:  06/24/18 1054     Protime 13 5 seconds      INR 1 08    APTT [[id number]]  (Normal) Collected:  06/24/18 1035    Lab Status:  Final result Specimen:  Blood from Hand, Right Updated:  06/24/18 1054     PTT 27 seconds     Blood culture #1 [[id number]] Collected:  06/24/18 1035    Lab Status: In process Specimen:  Blood from Hand, Right Updated:  06/24/18 1039    UA w Reflex to Microscopic w Reflex to Culture [[id number]]     Lab Status:  No result Specimen:  Urine                  XR chest 1 view portable   ED Interpretation by Irene Yu MD (06/24 1043)   Read by me; Radiologist to provide formal interpretation  No acute process      Final Result by Therese Singh MD (06/24 1249)      No acute cardiopulmonary disease  Workstation performed: [id number]         PE Study with CT Abdomen and Pelvis with contrast   Final Result by Sheldon Cunningham MD (06/24 1132)   Addendum 1 of 1 by Sheldon Cunningham MD (06/24 1136)   ADDENDUM:   Several tiny bilateral pulmonary densities suggesting possible talcosis         Final   No evidence of pulmonary embolism      Diffuse groundglass bilateral pulmonary opacities  This could represent evidence of atypical infection  Diffuse fatty infiltration of the liver      No acute traumatic injury confirmed                  Workstation performed: [id number]         CT cervical spine without contrast   Final Result by Janice Rogers MD (06/24 1121)      Straightening of the cervical lordosis may be due to positioning or muscle spasm  Otherwise, normal cervical spine  Workstation performed: [id number]         CT head without contrast   Final Result by Janice Rogers MD (06/24 1116)      Normal head CT  Workstation performed: [id number]                    Procedures  ECG 12 Lead Documentation  Date/Time: 6/24/2018 10:34 AM  Performed by: Anamaria Gonzales  Authorized by: Anamaria Gonzales     Indications / Diagnosis:  Tachy  ECG reviewed by me, the ED Provider: yes    Patient location:  ED  Previous ECG:     Previous ECG:  Unavailable  Rate:     ECG rate:  126    ECG rate assessment: tachycardic    Rhythm:     Rhythm: sinus tachycardia    QRS:     QRS axis:  Normal  Comments:      No acute ischemic changes           Phone Contacts  ED Phone Contact    ED Course  ED Course as of Jun 24 1720   Sun Jun 24, 2018   1155 Reviewed findings with patient and his Mom agreeable to admission  Jud Granger accepts patient to ICU                          Initial Sepsis Screening     [address] Name 06/24/18 1206                Is the patient's history suggestive of a new or worsening infection? (!)  Yes (Proceed)  -        Suspected source of infection pneumonia  LIFESCAPE        Are two or more of the following signs & symptoms of infection both present and new to the patient? (!)  Yes (Proceed)  -        Indicate SIRS criteria Hyperthemia > 38 3C (100 9F); Tachycardia > 90 bpm;Tachypnea > 20 resp per min;Leukocytosis (WBC > 29576 IJL)  LIFESCAPE        If the answer is yes to both questions, suspicion of sepsis is present          If severe sepsis is present AND tissue hypoperfusion perists in the hour after fluid resuscitation or lactate > 4, the patient meets criteria for SEPTIC SHOCK          Are any of the following organ dysfunction criteria present within 6 hours of suspected infection and SIRS criteria that are NOT considered to be chronic conditions?  Mahogany Chávez  LIFESCAPE        Organ dysfunction Lactate > 2 0 mmol/L  LIFESCAPE        Date of presentation of severe sepsis          Time of presentation of severe sepsis          Tissue hypoperfusion persists in the hour after crystalloid fluid administration, evidenced, by either:          Was hypotension present within one hour of the conclusion of crystalloid fluid administration? No  LIFESCAPE        Date of presentation of septic shock          Time of presentation of septic shock            User Key  (r) = Recorded By, (t) = Taken By, (c) = Cosigned By    [address] Name Provider Type    62Kate Phyllis Gamino DO Physician           Default Flowsheet Data (last 720 hours)      Sepsis Reassessment     Row Name 06/24/18 1207                   Volume Status and Tissue Perfusion Post Fluid Resuscitation- Must Document ALL of the Following:    Vital Signs Reviewed Yes  LIFESCAPE        Cardio (!)  Tachycardia  LIFESCAPE        Pulmonary (!)  Rales  -JH        Capillary Refill Brisk  -JH        Peripheral Pulses Radial  -JH        Peripheral Pulse +2  -JH        Skin Warm  -JH           *OR*   Intensive Monitoring- Must Document Two * of the Following Four *:    Vital Signs Reviewed Yes  -JH        * Central Venous Pressure (CVP or RAP)          * Central Venous Oxygen (SVO2, ScvO2 or Oxygen saturation via central catheter)          * Bedside Cardiovascular US in IVC diameter and % collapse          * Passive Leg Raise OR Crystalloid Challenge            User Key  (r) = Recorded By, (t) = Taken By, (c) = Cosigned By    Initials Name Provider Type    62Kate Phyllis Gamino DO Physician                MDM  Number of Diagnoses or Management Options  Diagnosis management comments: Mdm: unintentional heroin OD with same level fall and hypoxia with fever: noncardiogenic pulmonary edema, pneumonia aspiration or multifocal, endocarditis, pancreatitis, will pursue CT head, neck CTA chest, a/p; cultures, electrolytes, LFT lipase, cpk, trop and reassess        The patient presented with a condition in which there was a high probability of imminent or life-threatening deterioration, and critical care services (excluding separately billable procedures) totalled 30-74 minutes  Disposition  Final diagnoses:   Heroin overdose, accidental or unintentional, initial encounter   Hypoxia   Atypical pneumonia     Time reflects when diagnosis was documented in both MDM as applicable and the Disposition within this note     Time User Action Codes Description Comment    6/24/2018 11:54 AM Do Amrit Add [T40 1X1A] Heroin overdose, accidental or unintentional, initial encounter     6/24/2018 11:54 AM Do Amrit Add [R09 02] Hypoxia     6/24/2018 11:55 AM Do Amrit Add [J18 9] Atypical pneumonia     6/24/2018  2:24 PM Kayla Urias [E66 01,  Z68 41] Morbid obesity with BMI of 40 0-44 9, adult [facility])       ED Disposition     ED Disposition Condition Comment    Admit  Case was discussed with Dr Gayle Fleming  and the patient's admission status was agreed to be Admission Status: inpatient status to the service of Dr Gayle Fleming   Follow-up Information     Follow up With Specialties Details Why Pete Jackson MD Gastroenterology Call in 1 week(s) follow-up for your hepatitis C [address] [address]            Current Discharge Medication List      CONTINUE these medications which have NOT CHANGED    Details   ARIPiprazole (ABILIFY) 5 mg tablet Take 1 tablet (5 mg total) by mouth daily at bedtime  Qty: 30 tablet, Refills: 3    Associated Diagnoses: Bipolar 1 disorder (HCC)      EPINEPHrine (EPIPEN 2-ALEXI) 0 3 mg/0 3 mL SOAJ Inject as directed      lisinopril (ZESTRIL) 5 mg tablet Take 1 tablet by mouth daily      venlafaxine (EFFEXOR-XR) 150 mg 24 hr capsule Take 1 capsule (150 mg total) by mouth daily  Qty: 30 capsule, Refills: 5    Associated Diagnoses: Depression, unspecified depression type         STOP taking these medications       melatonin 3 mg Comments:   Reason for Stopping:         omeprazole (PriLOSEC) 40 MG capsule Comments:   Reason for Stopping:             No discharge procedures on file      ED Provider  Electronically Signed by           Shawn Sood MD  06/24/18 5697

## 2018-06-24 NOTE — RESPIRATORY THERAPY NOTE
RT Protocol Note  Carla Elias 28 y o  male MRN: [id number]  Unit/Bed#: RM04 Encounter: [id number]    Assessment    Principal Problem:    Severe sepsis [facility])  Active Problems:    Essential hypertension    Morbid obesity with BMI of 40 0-44 9, adult ([facility] [address] )    Chronic hepatitis C without hepatic coma ([facility] )    Obstructive sleep apnea    Community acquired pneumonia    Lactic acidosis    Heroin overdose, accidental or unintentional, initial encounter    Hypokalemia    Acute respiratory failure with hypoxia (HCC)    Anemia      Home Pulmonary Medications:  None  Home Devices/Therapy: BiPAP/CPAP- non complaint with PAP therapy at home  Past Medical History:   Diagnosis Date    Allergic     Anemia 6/24/2018    Benign essential hypertension 11/17/2016    Claustrophobia 3/15/2018    Community acquired pneumonia 6/24/2018    Depressed 7/14/2016    GERD (gastroesophageal reflux disease)     Hepatitis C virus infection 8/14/2014    Obesity 10/12/2016    Sleep disorder      Social History     Social History    Marital status: Single     Spouse name: N/A    Number of children: N/A    Years of education: N/A     Social History Main Topics    Smoking status: Former Smoker     Types: E-Cigarettes    Smokeless tobacco: Never Used    Alcohol use No    Drug use: Yes     Types: Heroin      Comment: 1-2 bags daily    Sexual activity: Not Asked     Other Topics Concern    None     Social History Narrative    H/O intravenous drug use in remission    Hepatitis C virus Infection    Lives with parents    No caffeine use    Unemployed           Subjective         Objective    Physical Exam:   Assessment Type: Assess only  General Appearance: Awake, Alert  Respiratory Pattern: Normal  Chest Assessment: Chest expansion symmetrical  Bilateral Breath Sounds: Diminished, Coarse    Vitals:  Blood pressure 110/60, pulse 104, temperature (!) 101 1 °F (38 4 °C), temperature source Temporal, resp   rate 12, weight 135 kg (298 lb 11 6 oz), SpO2 93 %  Results from last 7 days  Lab Units 06/24/18  1219   PH ART  7 330*   PCO2 ART mm Hg 45 2*   PO2 ART mm Hg 68 8*   HCO3 ART mmol/L 23 3   BASE EXC ART mmol/L -2 7   O2 CONTENT ART mL/dL 14 7*   O2 HGB, ARTERIAL % 91 4*   ABG SOURCE  Radial, Left   LESLY TEST  Yes       Imaging and other studies: I have personally reviewed pertinent reports  Plan    Respiratory Plan: No distress/Pulmonary history  Airway Clearance Plan: Incentive Spirometer       Maintain NC for oxygenation, Bipap/ CPAP therapy at HS as pt tolerated  No respiratory medications needed at current time

## 2018-06-24 NOTE — ASSESSMENT & PLAN NOTE
-the patient is currently stable from respiratory standpoint on supplemental oxygen via nasal cannula  -goal oxygen saturation levels of 92% and above  -initiate the respiratory protocol and airway clearance protocol  -if the patient's respiratory status declines, he will need either the Vapotherm unit or BiPAP mask

## 2018-06-24 NOTE — LETTER
[address] [place] [address]  Dept: [phone]    June 26, 2018     Patient: Kathleen Diaz   YOB: 1990   Date of Visit: 6/24/2018       To Whom it May Concern:    Navin Nuñez is under my professional care  He was seen in the hospital from 6/24/2018   to 06/26/18  He ***  If you have any questions or concerns, please don't hesitate to call           Sincerely,          [facility], DO

## 2018-06-25 ENCOUNTER — APPOINTMENT (INPATIENT)
Dept: NON INVASIVE DIAGNOSTICS | Facility: HOSPITAL | Age: 28
DRG: 720 | End: 2018-06-25
Payer: COMMERCIAL

## 2018-06-25 DIAGNOSIS — F31.9 BIPOLAR 1 DISORDER (HCC): ICD-10-CM

## 2018-06-25 PROBLEM — E87.6 HYPOKALEMIA: Status: RESOLVED | Noted: 2018-06-24 | Resolved: 2018-06-25

## 2018-06-25 PROBLEM — R78.81 POSITIVE BLOOD CULTURES: Status: ACTIVE | Noted: 2018-06-25

## 2018-06-25 PROBLEM — E87.20 LACTIC ACIDOSIS: Status: RESOLVED | Noted: 2018-06-24 | Resolved: 2018-06-25

## 2018-06-25 PROBLEM — E87.2 LACTIC ACIDOSIS: Status: RESOLVED | Noted: 2018-06-24 | Resolved: 2018-06-25

## 2018-06-25 LAB
ALBUMIN SERPL BCP-MCNC: 2.7 G/DL (ref 3.5–5)
ALP SERPL-CCNC: 63 U/L (ref 46–116)
ALT SERPL W P-5'-P-CCNC: 81 U/L (ref 12–78)
ANION GAP SERPL CALCULATED.3IONS-SCNC: 6 MMOL/L (ref 4–13)
AST SERPL W P-5'-P-CCNC: 67 U/L (ref 5–45)
ATRIAL RATE: 127 BPM
BASOPHILS # BLD AUTO: 0.01 THOUSANDS/ΜL (ref 0–0.1)
BASOPHILS NFR BLD AUTO: 0 % (ref 0–1)
BILIRUB SERPL-MCNC: 0.3 MG/DL (ref 0.2–1)
BUN SERPL-MCNC: 9 MG/DL (ref 5–25)
CA-I BLD-SCNC: 1.16 MMOL/L (ref 1.12–1.32)
CALCIUM SERPL-MCNC: 8.3 MG/DL (ref 8.3–10.1)
CHLORIDE SERPL-SCNC: 109 MMOL/L (ref 100–108)
CK SERPL-CCNC: 69 U/L (ref 39–308)
CO2 SERPL-SCNC: 29 MMOL/L (ref 21–32)
CREAT SERPL-MCNC: 0.72 MG/DL (ref 0.6–1.3)
EOSINOPHIL # BLD AUTO: 0.18 THOUSAND/ΜL (ref 0–0.61)
EOSINOPHIL NFR BLD AUTO: 2 % (ref 0–6)
ERYTHROCYTE [DISTWIDTH] IN BLOOD BY AUTOMATED COUNT: 16.3 % (ref 11.6–15.1)
EST. AVERAGE GLUCOSE BLD GHB EST-MCNC: 108 MG/DL
FERRITIN SERPL-MCNC: 92 NG/ML (ref 8–388)
FERRITIN SERPL-MCNC: 92 NG/ML (ref 8–388)
FLUAV AG SPEC QL: NORMAL
FLUBV AG SPEC QL: NORMAL
FOLATE SERPL-MCNC: 4.2 NG/ML (ref 3.1–17.5)
GFR SERPL CREATININE-BSD FRML MDRD: 127 ML/MIN/1.73SQ M
GLUCOSE SERPL-MCNC: 96 MG/DL (ref 65–140)
HBA1C MFR BLD: 5.4 % (ref 4.2–6.3)
HCT VFR BLD AUTO: 32.2 % (ref 36.5–49.3)
HGB BLD-MCNC: 10.4 G/DL (ref 12–17)
HIV 1+2 AB+HIV1 P24 AG SERPL QL IA: NORMAL
IRON SATN MFR SERPL: 8 %
IRON SERPL-MCNC: 19 UG/DL (ref 65–175)
L PNEUMO1 AG UR QL IA.RAPID: NEGATIVE
LYMPHOCYTES # BLD AUTO: 1.04 THOUSANDS/ΜL (ref 0.6–4.47)
LYMPHOCYTES NFR BLD AUTO: 13 % (ref 14–44)
MAGNESIUM SERPL-MCNC: 2.2 MG/DL (ref 1.6–2.6)
MCH RBC QN AUTO: 25.1 PG (ref 26.8–34.3)
MCHC RBC AUTO-ENTMCNC: 32.3 G/DL (ref 31.4–37.4)
MCV RBC AUTO: 78 FL (ref 82–98)
MONOCYTES # BLD AUTO: 0.49 THOUSAND/ΜL (ref 0.17–1.22)
MONOCYTES NFR BLD AUTO: 6 % (ref 4–12)
NEUTROPHILS # BLD AUTO: 6.42 THOUSANDS/ΜL (ref 1.85–7.62)
NEUTS SEG NFR BLD AUTO: 79 % (ref 43–75)
P AXIS: 66 DEGREES
PHOSPHATE SERPL-MCNC: 2.2 MG/DL (ref 2.7–4.5)
PLATELET # BLD AUTO: 157 THOUSANDS/UL (ref 149–390)
PMV BLD AUTO: 9.8 FL (ref 8.9–12.7)
POTASSIUM SERPL-SCNC: 4 MMOL/L (ref 3.5–5.3)
PR INTERVAL: 156 MS
PROCALCITONIN SERPL-MCNC: 1.52 NG/ML
PROCALCITONIN SERPL-MCNC: 1.93 NG/ML
PROT SERPL-MCNC: 6.6 G/DL (ref 6.4–8.2)
QRS AXIS: 79 DEGREES
QRSD INTERVAL: 106 MS
QT INTERVAL: 306 MS
QTC INTERVAL: 443 MS
RBC # BLD AUTO: 4.15 MILLION/UL (ref 3.88–5.62)
RSV B RNA SPEC QL NAA+PROBE: NORMAL
S PNEUM AG UR QL: NEGATIVE
SODIUM SERPL-SCNC: 144 MMOL/L (ref 136–145)
T WAVE AXIS: 45 DEGREES
TIBC SERPL-MCNC: 249 UG/DL (ref 250–450)
TROPONIN I SERPL-MCNC: <0.02 NG/ML
VENTRICULAR RATE: 126 BPM
VIT B12 SERPL-MCNC: 436 PG/ML (ref 100–900)
WBC # BLD AUTO: 8.14 THOUSAND/UL (ref 4.31–10.16)

## 2018-06-25 PROCEDURE — 83550 IRON BINDING TEST: CPT | Performed by: INTERNAL MEDICINE

## 2018-06-25 PROCEDURE — G8980 MOBILITY D/C STATUS: HCPCS | Performed by: PHYSICAL THERAPIST

## 2018-06-25 PROCEDURE — 94760 N-INVAS EAR/PLS OXIMETRY 1: CPT

## 2018-06-25 PROCEDURE — 84100 ASSAY OF PHOSPHORUS: CPT | Performed by: INTERNAL MEDICINE

## 2018-06-25 PROCEDURE — 83540 ASSAY OF IRON: CPT | Performed by: INTERNAL MEDICINE

## 2018-06-25 PROCEDURE — 94761 N-INVAS EAR/PLS OXIMETRY MLT: CPT

## 2018-06-25 PROCEDURE — 84145 PROCALCITONIN (PCT): CPT | Performed by: INTERNAL MEDICINE

## 2018-06-25 PROCEDURE — 82550 ASSAY OF CK (CPK): CPT | Performed by: INTERNAL MEDICINE

## 2018-06-25 PROCEDURE — 87040 BLOOD CULTURE FOR BACTERIA: CPT | Performed by: PHYSICIAN ASSISTANT

## 2018-06-25 PROCEDURE — 93010 ELECTROCARDIOGRAM REPORT: CPT | Performed by: INTERNAL MEDICINE

## 2018-06-25 PROCEDURE — 84484 ASSAY OF TROPONIN QUANT: CPT | Performed by: INTERNAL MEDICINE

## 2018-06-25 PROCEDURE — 85025 COMPLETE CBC W/AUTO DIFF WBC: CPT | Performed by: INTERNAL MEDICINE

## 2018-06-25 PROCEDURE — 83735 ASSAY OF MAGNESIUM: CPT | Performed by: INTERNAL MEDICINE

## 2018-06-25 PROCEDURE — 99232 SBSQ HOSP IP/OBS MODERATE 35: CPT | Performed by: PHYSICIAN ASSISTANT

## 2018-06-25 PROCEDURE — 82746 ASSAY OF FOLIC ACID SERUM: CPT | Performed by: INTERNAL MEDICINE

## 2018-06-25 PROCEDURE — 84145 PROCALCITONIN (PCT): CPT | Performed by: PHYSICIAN ASSISTANT

## 2018-06-25 PROCEDURE — 93306 TTE W/DOPPLER COMPLETE: CPT | Performed by: INTERNAL MEDICINE

## 2018-06-25 PROCEDURE — 94762 N-INVAS EAR/PLS OXIMTRY CONT: CPT

## 2018-06-25 PROCEDURE — 93306 TTE W/DOPPLER COMPLETE: CPT

## 2018-06-25 PROCEDURE — 82607 VITAMIN B-12: CPT | Performed by: INTERNAL MEDICINE

## 2018-06-25 PROCEDURE — G8979 MOBILITY GOAL STATUS: HCPCS | Performed by: PHYSICAL THERAPIST

## 2018-06-25 PROCEDURE — 97163 PT EVAL HIGH COMPLEX 45 MIN: CPT | Performed by: PHYSICAL THERAPIST

## 2018-06-25 PROCEDURE — 80053 COMPREHEN METABOLIC PANEL: CPT | Performed by: INTERNAL MEDICINE

## 2018-06-25 PROCEDURE — G8988 SELF CARE GOAL STATUS: HCPCS

## 2018-06-25 PROCEDURE — 97166 OT EVAL MOD COMPLEX 45 MIN: CPT

## 2018-06-25 PROCEDURE — G8987 SELF CARE CURRENT STATUS: HCPCS

## 2018-06-25 PROCEDURE — 82330 ASSAY OF CALCIUM: CPT | Performed by: INTERNAL MEDICINE

## 2018-06-25 PROCEDURE — G8989 SELF CARE D/C STATUS: HCPCS

## 2018-06-25 PROCEDURE — G8978 MOBILITY CURRENT STATUS: HCPCS | Performed by: PHYSICAL THERAPIST

## 2018-06-25 PROCEDURE — 83036 HEMOGLOBIN GLYCOSYLATED A1C: CPT | Performed by: INTERNAL MEDICINE

## 2018-06-25 PROCEDURE — 82728 ASSAY OF FERRITIN: CPT | Performed by: INTERNAL MEDICINE

## 2018-06-25 RX ORDER — LORAZEPAM 2 MG/ML
1 INJECTION INTRAMUSCULAR
Status: DISCONTINUED | OUTPATIENT
Start: 2018-06-25 | End: 2018-06-26

## 2018-06-25 RX ORDER — CHLORDIAZEPOXIDE HYDROCHLORIDE 25 MG/1
25 CAPSULE, GELATIN COATED ORAL EVERY 8 HOURS SCHEDULED
Status: DISCONTINUED | OUTPATIENT
Start: 2018-06-25 | End: 2018-06-26

## 2018-06-25 RX ORDER — LORAZEPAM 2 MG/ML
0.5 INJECTION INTRAMUSCULAR EVERY 4 HOURS PRN
Status: DISCONTINUED | OUTPATIENT
Start: 2018-06-25 | End: 2018-06-25

## 2018-06-25 RX ORDER — ARIPIPRAZOLE 5 MG/1
TABLET ORAL
Qty: 30 TABLET | Refills: 3 | Status: SHIPPED | OUTPATIENT
Start: 2018-06-25 | End: 2018-07-24 | Stop reason: SDUPTHER

## 2018-06-25 RX ADMIN — ENOXAPARIN SODIUM 40 MG: 40 INJECTION, SOLUTION INTRAVENOUS; SUBCUTANEOUS at 21:50

## 2018-06-25 RX ADMIN — LORAZEPAM 1 MG: 2 INJECTION INTRAMUSCULAR; INTRAVENOUS at 22:43

## 2018-06-25 RX ADMIN — SODIUM CHLORIDE 125 ML/HR: 0.9 INJECTION, SOLUTION INTRAVENOUS at 21:52

## 2018-06-25 RX ADMIN — METRONIDAZOLE 500 MG: 500 INJECTION, SOLUTION INTRAVENOUS at 21:50

## 2018-06-25 RX ADMIN — ONDANSETRON 4 MG: 2 INJECTION INTRAMUSCULAR; INTRAVENOUS at 07:31

## 2018-06-25 RX ADMIN — CHLORDIAZEPOXIDE HYDROCHLORIDE 25 MG: 25 CAPSULE ORAL at 11:21

## 2018-06-25 RX ADMIN — MELATONIN TAB 3 MG 3 MG: 3 TAB at 21:50

## 2018-06-25 RX ADMIN — LORAZEPAM 1 MG: 2 INJECTION INTRAMUSCULAR; INTRAVENOUS at 19:37

## 2018-06-25 RX ADMIN — LORAZEPAM 0.5 MG: 2 INJECTION INTRAMUSCULAR; INTRAVENOUS at 12:48

## 2018-06-25 RX ADMIN — SODIUM CHLORIDE 125 ML/HR: 0.9 INJECTION, SOLUTION INTRAVENOUS at 05:13

## 2018-06-25 RX ADMIN — LEVOFLOXACIN 750 MG: 5 INJECTION, SOLUTION INTRAVENOUS at 22:43

## 2018-06-25 RX ADMIN — VENLAFAXINE HYDROCHLORIDE 150 MG: 75 CAPSULE, EXTENDED RELEASE ORAL at 08:02

## 2018-06-25 RX ADMIN — PANTOPRAZOLE SODIUM 40 MG: 40 TABLET, DELAYED RELEASE ORAL at 05:13

## 2018-06-25 RX ADMIN — ARIPIPRAZOLE 5 MG: 10 TABLET ORAL at 21:50

## 2018-06-25 RX ADMIN — CHLORDIAZEPOXIDE HYDROCHLORIDE 25 MG: 25 CAPSULE ORAL at 21:50

## 2018-06-25 RX ADMIN — LORAZEPAM 0.5 MG: 2 INJECTION INTRAMUSCULAR; INTRAVENOUS at 08:44

## 2018-06-25 RX ADMIN — METRONIDAZOLE 500 MG: 500 INJECTION, SOLUTION INTRAVENOUS at 05:13

## 2018-06-25 RX ADMIN — ENOXAPARIN SODIUM 40 MG: 40 INJECTION, SOLUTION INTRAVENOUS; SUBCUTANEOUS at 08:02

## 2018-06-25 RX ADMIN — METRONIDAZOLE 500 MG: 500 INJECTION, SOLUTION INTRAVENOUS at 13:14

## 2018-06-25 RX ADMIN — SODIUM CHLORIDE 125 ML/HR: 0.9 INJECTION, SOLUTION INTRAVENOUS at 13:08

## 2018-06-25 NOTE — PHYSICAL THERAPY NOTE
PT Evaluation     06/25/18 0956   Note Type   Note type Eval only   Pain Assessment   Pain Assessment No/denies pain   Pain Score No Pain   Home Living   Type of [address] Multi-level;Bed/bath upstairs;Stairs to enter with rails  (bathroom 1st floor, 9 BROCK with HR, full flight to 2nd)   Bathroom Shower/Tub Walk-in shower   Bathroom Toilet Standard   [address] (no DME)   Prior Function   Level of [place] Independent with ADLs and functional mobility   Lives With Family  (parents)   ADL Assistance Independent   IADLs Independent   Vocational Full time employment   Comments (I) with driving   Restrictions/Precautions   Other Precautions Contact/isolation;Droplet precautions;Multiple lines;Telemetry   General   Family/Caregiver Present No   Cognition   Arousal/Participation Alert   Orientation Level Oriented X4   Following Commands Follows all commands and directions without difficulty   RLE Assessment   RLE Assessment WFL  (5/5)   LLE Assessment   LLE Assessment WFL  (5/5)   Coordination   Sensation WFL   Light Touch   RLE Light Touch Grossly intact   LLE Light Touch Grossly intact   Bed Mobility   Additional Comments pt seated in chair when PT entered and returned to sitting in chair at bedside with bell in reach  Transfers   Sit to Stand 7  Independent   Stand to Sit 7  Independent   Stand pivot 7  Independent   Additional Comments no difficulty with transfers or ambulation  Pt with no complaint of lightheadedness dizziness or SOB during ambulation     Ambulation/Elevation   Gait pattern WNL   Gait Assistance 7  Independent   Assistive Device None   Distance 400ft no A D  (I) normal gait pattern no LOB   Balance   Static Sitting Good   Dynamic Sitting Good   Static Standing Good   Dynamic Standing Good   Ambulatory Good   Endurance Deficit   Endurance Deficit No   Activity Tolerance   Activity Tolerance Patient tolerated treatment well   Assessment Prognosis Good   Assessment Pt is a 28year old male presenting with good strength balance endurance and functional mobility performing all transfers and ambulation at an (I) level with no LOB instability or complaint  Pt safe to ambulate in room and hallway ad jai  No skilled PT indicated  Goals   Patient Goals To return home and to work   Plan   Treatment/Interventions Functional transfer training; Endurance training;Patient/family training;Gait training   PT Frequency One time visit   Recommendation   Recommendation Home independently   PT - OK to Discharge Yes   Pt with SCD's on when PT entered room  SCD's reapplied and turned on with pt seated in chair at bedside with call bell in reach

## 2018-06-25 NOTE — SPEECH THERAPY NOTE
Speech Language/Pathology    Consult received and chart reviewed  Nursing deferred evaluation; as patient has been without s/s with solids and liquids  Continue diet as ordered

## 2018-06-25 NOTE — PLAN OF CARE
Problem: NEUROSENSORY - ADULT  Goal: Achieves stable or improved neurological status  INTERVENTIONS  - Monitor and report changes in neurological status  - Initiate measures to prevent increased intracranial pressure  - Maintain blood pressure and fluid volume within ordered parameters to optimize cerebral perfusion  - Monitor temperature, glucose, and sodium or any other associated labs   Initiate appropriate interventions as ordered  - Monitor for seizure activity   - Administer anti-seizure medications as ordered   Outcome: Progressing    Goal: Absence of seizures  INTERVENTIONS  - Monitor for seizure activity  - Administer anti-seizure medications as ordered  - Monitor neurological status   Outcome: Progressing    Goal: Remains free of injury related to seizures activity  INTERVENTIONS  - Maintain airway, patient safety  and administer oxygen as ordered  - Monitor patient for seizure activity, document and report duration and description of seizure to physician/advanced practitioner  - If seizure occurs,  ensure patient safety during seizure  - Reorient patient post seizure  - Seizure pads on all 4 side rails  - Instruct patient/family to notify RN of any seizure activity including if an aura is experienced  - Instruct patient/family to call for assistance with activity based on nursing assessment  - Administer anti-seizure medications as ordered  - Monitor fetal well being   Outcome: Progressing    Goal: Achieves maximal functionality and self care  INTERVENTIONS  - Monitor swallowing and airway patency with patient fatigue and changes in neurological status  - Encourage and assist patient to increase activity and self care with guidance from rehab services  - Encourage visually impaired, hearing impaired and aphasic patients to use assistive/communication devices   Outcome: Progressing      Problem: CARDIOVASCULAR - ADULT  Goal: Maintains optimal cardiac output and hemodynamic stability  INTERVENTIONS:  - Monitor I/O, vital signs and rhythm  - Monitor for S/S and trends of decreased cardiac output i e  bleeding, hypotension  - Administer and titrate ordered vasoactive medications to optimize hemodynamic stability  - Assess quality of pulses, skin color and temperature  - Assess for signs of decreased coronary artery perfusion - ex   Angina  - Instruct patient to report change in severity of symptoms   Outcome: Progressing    Goal: Absence of cardiac dysrhythmias or at baseline rhythm  INTERVENTIONS:  - Continuous cardiac monitoring, monitor vital signs, obtain 12 lead EKG if indicated  - Administer antiarrhythmic and heart rate control medications as ordered  - Monitor electrolytes and administer replacement therapy as ordered   Outcome: Progressing      Problem: RESPIRATORY - ADULT  Goal: Achieves optimal ventilation and oxygenation  INTERVENTIONS:  - Assess for changes in respiratory status  - Assess for changes in mentation and behavior  - Position to facilitate oxygenation and minimize respiratory effort  - Oxygen administration by appropriate delivery method based on oxygen saturation (per order) or ABGs  - Initiate smoking cessation education as indicated  - Encourage broncho-pulmonary hygiene including cough, deep breathe, Incentive Spirometry  - Assess the need for suctioning and aspirate as needed  - Assess and instruct to report SOB or any respiratory difficulty  - Respiratory Therapy support as indicated   Outcome: Progressing      Problem: GASTROINTESTINAL - ADULT  Goal: Minimal or absence of nausea and/or vomiting  INTERVENTIONS:  - Administer IV fluids as ordered to ensure adequate hydration  - Maintain NPO status until nausea and vomiting are resolved  - Nasogastric tube as ordered  - Administer ordered antiemetic medications as needed  - Provide nonpharmacologic comfort measures as appropriate  - Advance diet as tolerated, if ordered  - Nutrition services referral to assist patient with adequate nutrition and appropriate food choices   Outcome: Progressing    Goal: Maintains or returns to baseline bowel function  INTERVENTIONS:  - Assess bowel function  - Encourage oral fluids to ensure adequate hydration  - Administer IV fluids as ordered to ensure adequate hydration  - Administer ordered medications as needed  - Encourage mobilization and activity  - Nutrition services referral to assist patient with appropriate food choices   Outcome: Progressing    Goal: Maintains adequate nutritional intake  INTERVENTIONS:  - Monitor percentage of each meal consumed  - Identify factors contributing to decreased intake, treat as appropriate  - Assist with meals as needed  - Monitor I&O, WT and lab values  - Obtain nutrition services referral as needed   Outcome: Progressing      Problem: GENITOURINARY - ADULT  Goal: Maintains or returns to baseline urinary function  INTERVENTIONS:  - Assess urinary function  - Encourage oral fluids to ensure adequate hydration  - Administer IV fluids as ordered to ensure adequate hydration  - Administer ordered medications as needed  - Offer frequent toileting  - Follow urinary retention protocol if ordered   Outcome: Progressing    Goal: Absence of urinary retention  INTERVENTIONS:  - Assess patients ability to void and empty bladder  - Monitor I/O  - Bladder scan as needed  - Discuss with physician/AP medications to alleviate retention as needed  - Discuss catheterization for long term situations as appropriate   Outcome: Progressing      Problem: METABOLIC, FLUID AND ELECTROLYTES - ADULT  Goal: Electrolytes maintained within normal limits  INTERVENTIONS:  - Monitor labs and assess patient for signs and symptoms of electrolyte imbalances  - Administer electrolyte replacement as ordered  - Monitor response to electrolyte replacements, including repeat lab results as appropriate  - Instruct patient on fluid and nutrition as appropriate   Outcome: Progressing    Goal: Fluid balance maintained  INTERVENTIONS:  - Monitor labs and assess for signs and symptoms of volume excess or deficit  - Monitor I/O and WT  - Instruct patient on fluid and nutrition as appropriate   Outcome: Progressing    Goal: Glucose maintained within target range  INTERVENTIONS:  - Monitor Blood Glucose as ordered  - Assess for signs and symptoms of hyperglycemia and hypoglycemia  - Administer ordered medications to maintain glucose within target range  - Assess nutritional intake and initiate nutrition service referral as needed   Outcome: Progressing      Problem: SKIN/TISSUE INTEGRITY - ADULT  Goal: Skin integrity remains intact  INTERVENTIONS  - Identify patients at risk for skin breakdown  - Assess and monitor skin integrity  - Assess and monitor nutrition and hydration status  - Monitor labs (i e  albumin)  - Assess for incontinence   - Turn and reposition patient  - Assist with mobility/ambulation  - Relieve pressure over bony prominences  - Avoid friction and shearing  - Provide appropriate hygiene as needed including keeping skin clean and dry  - Evaluate need for skin moisturizer/barrier cream  - Collaborate with interdisciplinary team (i e  Nutrition, Rehabilitation, etc )   - Patient/family teaching   Outcome: Progressing    Goal: Incision(s), wounds(s) or drain site(s) healing without S/S of infection  INTERVENTIONS  - Assess and document risk factors for skin impairment   - Assess and document dressing, incision, wound bed, drain sites and surrounding tissue  - Initiate Nutrition services consult and/or wound management as needed   Outcome: Progressing    Goal: Oral mucous membranes remain intact  INTERVENTIONS  - Assess oral mucosa and hygiene practices  - Implement preventative oral hygiene regimen  - Implement oral medicated treatments as ordered  - Initiate Nutrition services referral as needed   Outcome: Progressing      Problem: HEMATOLOGIC - ADULT  Goal: Maintains hematologic stability  INTERVENTIONS  - Assess for signs and symptoms of bleeding or hemorrhage  - Monitor labs  - Administer supportive blood products/factors as ordered and appropriate   Outcome: Progressing      Problem: MUSCULOSKELETAL - ADULT  Goal: Maintain or return mobility to safest level of function  INTERVENTIONS:  - Assess patient's ability to carry out ADLs; assess patient's baseline for ADL function and identify physical deficits which impact ability to perform ADLs (bathing, care of mouth/teeth, toileting, grooming, dressing, etc )  - Assess/evaluate cause of self-care deficits   - Assess range of motion  - Assess patient's mobility; develop plan if impaired  - Assess patient's need for assistive devices and provide as appropriate  - Encourage maximum independence but intervene and supervise when necessary  - Involve family in performance of ADLs  - Assess for home care needs following discharge   - Request OT consult to assist with ADL evaluation and planning for discharge  - Provide patient education as appropriate   Outcome: Progressing    Goal: Maintain proper alignment of affected body part  INTERVENTIONS:  - Support, maintain and protect limb and body alignment  - Provide pt/fam with appropriate education   Outcome: Progressing      Problem: Potential for Falls  Goal: Patient will remain free of falls  INTERVENTIONS:  - Assess patient frequently for physical needs  -  Identify cognitive and physical deficits and behaviors that affect risk of falls    -  [place] fall precautions as indicated by assessment   - Educate patient/family on patient safety including physical limitations  - Instruct patient to call for assistance with activity based on assessment  - Modify environment to reduce risk of injury  - Consider OT/PT consult to assist with strengthening/mobility   Outcome: Progressing

## 2018-06-25 NOTE — ASSESSMENT & PLAN NOTE
Severe sepsis was present on admission and secondary to community acquired pneumonia, possible aspiration pneumonia   The patient will be given the initial 30 mL/kg normal saline IV fluid resuscitation bolus per the sepsis protocol  - continue normal saline IV fluids at 125 mL/hr after the initial resuscitation fluid bolus is completed  urine culture pending  - Streptococcus pneumoniae urinary antigen and Legionella urinary antigen pending   - the patient received IV vancomycin, IV cefepime, and IV clindamycin in the emergency department  - Continue IV levofloxacin and IV metronidazole to cover for community-acquired pneumonia pathogens and aspiration pneumonia pathogens  - the patient continues to have fevers, he will need a her cardiogram to rule-out a valve vegetation  - follow the lactic acid level

## 2018-06-25 NOTE — ASSESSMENT & PLAN NOTE
- the patient denies any suicidal ideation/attempt  - I recommended inpatient drug rehabilitation for the patient upon discharge

## 2018-06-25 NOTE — ASSESSMENT & PLAN NOTE
So far 1/2 blood cultures are positive for gram negative rods, likely contamination but will await final cultures and 2nd set of blood cultures  Repeat blood cultures now after 24 hours of antibiotic treatment with Levaquin     Will also check echocardiogram

## 2018-06-25 NOTE — RESPIRATORY THERAPY NOTE
Home Oxygen Qualifying Test       Patient name: Nile Choi        : 1990   Date of Test:  2018  Diagnosis: Severe Sepsis     Home Oxygen Test:    **Medicare Guidelines require item(s) 1-5 on all ambulatory patients or 1 and 2 on on-ambulatory patients  1   Baseline SPO2 on Room Air at rest 97 %  If = or < 88% on room air add O2 via NC and titrate patient  Patient must be ambulated with O2 and titrated to > 88% with exertion  2   SPO2 on Oxygen at rest n/a %  3   SPO2 during exercise on Room Air 97 %  4   SPO2 during exercise on Oxygen  n/a% at a liter flow of n/a lpm     5   Exercise performed:    [x] Walking     [] Stairs     [x] Duration 10 (min )     [x] Distance 150 (ft )       []  Supplemental Home Oxygen is indicated  [x]  Client does not qualify for home oxygen        Jazmín Elena, RT

## 2018-06-25 NOTE — PROGRESS NOTES
Report called and given to [facility] 4th floor  Pt having echo, will call to take pt up to room when finished

## 2018-06-25 NOTE — PROGRESS NOTES
Pt states that he feels "crappy" and restless, will call to get ativan order changed  Explained to pt that if he feels as if he needs more ativan or the duration is too long to let RN know and we can get medication adjusted

## 2018-06-25 NOTE — PROGRESS NOTES
Progress Note - Meghan Partida 1990, 28 y o  male MRN: [id number]    Unit/Bed#:  Encounter: [id number]    Primary Care Provider: Meli Benítez DO   Date and time admitted to hospital: 6/24/2018 10:18 AM        Positive blood cultures   Assessment & Plan    So far 1/2 blood cultures are positive for gram negative rods, likely contamination but will await final cultures and 2nd set of blood cultures  Repeat blood cultures now after 24 hours of antibiotic treatment with Levaquin  Will also check echocardiogram         Heroin overdose, accidental or unintentional, initial encounter   Assessment & Plan    - the patient denies any suicidal ideation/attempt  - I recommended inpatient drug rehabilitation for the patient upon discharge  - will start librium and increase RTC ativan for withdrawal symptoms  Community acquired pneumonia   Assessment & Plan     urine culture pending   - check a Streptococcus pneumoniae urinary antigen  - check a Legionella urinary antigen  - the patient received IV vancomycin, IV cefepime, and IV clindamycin in the emergency department  - I will have the patient on IV levofloxacin and IV metronidazole to cover for community-acquired pneumonia pathogens and aspiration pneumonia pathogens        * Severe sepsis ([address] )   Assessment & Plan    Severe sepsis was present on admission and secondary to community acquired pneumonia, possible aspiration pneumonia   The patient will be given the initial 30 mL/kg normal saline IV fluid resuscitation bolus per the sepsis protocol  - continue normal saline IV fluids at 125 mL/hr after the initial resuscitation fluid bolus is completed  urine culture pending  - Streptococcus pneumoniae urinary antigen and Legionella urinary antigen pending   - the patient received IV vancomycin, IV cefepime, and IV clindamycin in the emergency department  - Continue IV levofloxacin and IV metronidazole to cover for community-acquired pneumonia pathogens and aspiration pneumonia pathogens  - the patient continues to have fevers, he will need a her cardiogram to rule-out a valve vegetation  - follow the lactic acid level        Diarrhea   Assessment & Plan    - check stool cultures including a Clostridium difficile culture        Anemia   Assessment & Plan    - check an iron panel, ferritin level, vitamin B12 level, and folate level  - follow the CBC        Acute respiratory failure with hypoxia and hypercapnia (HCC)   Assessment & Plan    - the patient is currently stable from respiratory standpoint on supplemental oxygen via nasal cannula  - attempt to titrate down, will do ambulatory pulse oximetry tomorrow morning   - goal oxygen saturation levels of 92% and above  - initiate the respiratory protocol and airway clearance protocol   - if the patient's respiratory status declines, he will need either the Vapotherm unit or BiPAP mask  Obstructive sleep apnea   Assessment & Plan    - the patient is noncompliant with CPAP at home  - I counseled the patient on being compliant with his CPAP machine at home        Essential hypertension   Assessment & Plan    - his blood pressure was low-normal range in the setting of severe sepsis, but now improving    - will continue to hold lisinopril for now, plan to restart tomorrow  - follow the blood pressure trend        Chronic hepatitis C without hepatic coma (HCC)   Assessment & Plan    - the patient was treated for the hepatitis C  - the patient does admit to sharing needles during 1 instance since he was treated for the hepatitis C  - check a hepatitis C RNA viral load by PCR        Morbid obesity with BMI of 45 0-49 9, adult [facility])   Assessment & Plan    - nutrition/dietitian consultation            VTE Pharmacologic Prophylaxis:   Pharmacologic: Enoxaparin (Lovenox)  Mechanical VTE Prophylaxis in Place: No        Time Spent for Care: 30 minutes    More than 50% of total time spent on counseling and coordination of care as described above  Current Length of Stay: 1 day(s)    Current Patient Status: Inpatient   Certification Statement: The patient will continue to require additional inpatient hospital stay due to need for IV antibiotics, additional studies, close monitoring  Discharge Plan / Estimated Discharge Date: possibly tomorrow      Code Status: Level 1 - Full Code        Subjective:   Patient is feeling withdrawal symptoms currently  Nausea and anxiety present  No fevers overnight  Objective:   Vitals:   Temp (24hrs), Av 2 °F (36 2 °C), Min:95 4 °F (35 2 °C), Max:98 5 °F (36 9 °C)    HR:  [] 83  Resp:  [9-20] 13  BP: ()/(55-79) 133/74  SpO2:  [92 %-100 %] 97 %  Body mass index is 45 36 kg/m²  Input and Output Summary (last 24 hours): Intake/Output Summary (Last 24 hours) at 18 1154  Last data filed at 18 1131   Gross per 24 hour   Intake          7413 83 ml   Output             5750 ml   Net          1663 83 ml         Physical Exam:   Physical Exam   Constitutional: He is oriented to person, place, and time  He appears well-developed  HENT:   Mouth/Throat: Oropharynx is clear and moist    Neck: Neck supple  No JVD present  Cardiovascular: Normal rate, regular rhythm and normal heart sounds  No murmur heard  Pulmonary/Chest: Effort normal and breath sounds normal  No respiratory distress  He has no wheezes  He has no rales  Abdominal: Soft  Bowel sounds are normal  He exhibits no distension  Musculoskeletal: He exhibits no edema  Neurological: He is alert and oriented to person, place, and time  Skin: Skin is warm  He is diaphoretic  Psychiatric: He has a normal mood and affect  Nursing note and vitals reviewed        Additional Data:   Labs:    Results from last 7 days  Lab Units 18  0527   WBC Thousand/uL 8 14   HEMOGLOBIN g/dL 10 4*   HEMATOCRIT % 32 2*   PLATELETS Thousands/uL 157   NEUTROS PCT % 79*   LYMPHS PCT % 13*   MONOS PCT % 6   EOS PCT % 2       Results from last 7 days  Lab Units 06/25/18  0527   SODIUM mmol/L 144   POTASSIUM mmol/L 4 0   CHLORIDE mmol/L 109*   CO2 mmol/L 29   BUN mg/dL 9   CREATININE mg/dL 0 72   CALCIUM mg/dL 8 3   TOTAL PROTEIN g/dL 6 6   BILIRUBIN TOTAL mg/dL 0 30   ALK PHOS U/L 63   ALT U/L 81*   AST U/L 67*   GLUCOSE RANDOM mg/dL 96       Results from last 7 days  Lab Units 06/24/18  1035   INR  1 08       * I Have Reviewed All Lab Data Listed Above  * Additional Pertinent Lab Tests Reviewed:  [place] [facility] Admission Reviewed    Imaging:  Imaging Reports Reviewed Today Include: reviewed CTA chest         Recent Cultures (last 7 days):       Results from last 7 days  Lab Units 06/24/18  1454 06/24/18  1035   GRAM STAIN RESULT   --  Gram negative rods   INFLUENZA A PCR  None Detected  --    INFLUENZA B PCR  None Detected  --    RSV PCR  None Detected  --        Last 24 Hours Medication List:     Current Facility-Administered Medications:  acetaminophen 650 mg Oral Q6H PRN Marquise Britain, DO    ARIPiprazole 5 mg Oral HS [facility], DO    chlordiazePOXIDE 25 mg Oral Q8H [facility] Evaristo Herzog PA-C    enoxaparin 40 mg Subcutaneous Q12H [facility], DO    levofloxacin 750 mg Intravenous Q24H Marquise Britain, DO Last Rate: 750 mg (06/24/18 2309)   LORazepam 0 5 mg Intravenous Q4H PRN Phillip Massey PA-C    melatonin 3 mg Oral HS [facility], DO    metroNIDAZOLE 500 mg Intravenous Q8H Leon Urias, DO Last Rate: 500 mg (06/25/18 0513)   ondansetron 4 mg Intravenous Q6H PRN Marquise Britain, DO    pantoprazole 40 mg Oral Early Morning Leon Kentr, DO    sodium chloride 125 mL/hr Intravenous Continuous Marquise Britain, DO Last Rate: 125 mL/hr (06/25/18 0513)   venlafaxine 150 mg Oral Daily Marquise Britain, DO         Today, Patient Was Seen By: Phillip Massey PA-C    ** Please Note: Dragon 360 Dictation voice to text software may have been used in the creation of this document   **

## 2018-06-25 NOTE — ASSESSMENT & PLAN NOTE
- his blood pressure was low-normal range in the setting of severe sepsis, but now improving    - will continue to hold lisinopril for now, plan to restart tomorrow     - follow the blood pressure trend

## 2018-06-25 NOTE — CASE MANAGEMENT
Initial Clinical Review    Thank you,  [address] [facility] in the [facility] by Yovani Hernandez for 2017  Network Utilization Review Department  Phone: [phone]; Fax [phone]  ATTENTION: The Network Utilization Review Department is now centralized for our 7 Facilities  Make a note that we have a new phone and fax numbers for our Department  Please call with any questions or concerns to [phone] and carefully follow the prompts so that you are directed to the right person  All voicemails are confidential  Fax any determinations, approvals, denials, and requests for initial or continue stay review clinical to [phone]  Due to HIGH CALL volume, it would be easier if you could please send faxed requests to expedite your requests and in part, help us provide discharge notifications faster  Admission: Date/Time/Statement: 6/24/18 @ 1157     Orders Placed This Encounter   Procedures    Inpatient Admission     Standing Status:   Standing     Number of Occurrences:   1     Order Specific Question:   Admitting Physician     Answer:   Noe Sommers     Order Specific Question:   Level of Care     Answer:   Level 1 Stepdown [13]     Order Specific Question:   Estimated length of stay     Answer:   More than 2 Midnights     Order Specific Question:   Certification     Answer:   I certify that inpatient services are medically necessary for this patient for a duration of greater than two midnights  See H&P and MD Progress Notes for additional information about the patient's course of treatment         ED: Date/Time/Mode of Arrival:   ED Arrival Information     Expected Arrival Acuity Means of Arrival Escorted By Service Admission Type    6/24/2018 10:18 6/24/2018 10:18 Emergent Ambulance [facility]    Arrival Complaint    overdose          Chief Complaint:   Chief Complaint   Patient presents with    Heroin Overdose - Accidental     Patient uses one bag of herion every day, today was unresposive with snoring respirations  Patient was given 1mg Narcan in each nares (total of 2mg) with no improvement  Given Narcan 1mg IV, became alert and oriented  Started to complain of left sided neck pain and chest pain in route after report  History of Illness:  28 y o  male who presents to the emergency department after experiencing an accidental heroin overdose  The patient admitted to using intravenous heroin around 8:30 a m  this morning, 06/24/2018  The patient denies any intent to harm self  He denies any suicidal ideations  The patient's parents heard him fall to the ground in their home  He was found to be apneic with his lips turning color blue  He did have a pulse at the time  Resuscitation efforts were initiated, and EMS was summoned  The patient had episode of nausea and vomiting during his unresponsive state  Narcan was administered by EMS  The patient's mental status improved with the Narcan  Per the patient, he has been experiencing multiple episodes of diarrhea daily over the last 3 days  In the emergency department, he was found to have severe sepsis possible aspiration pneumonia  He also was found to have hypoxia requiring supplemental oxygen to maintain adequate oxygen saturation levels  He is currently awake and alert at this time  He does complain of shortness of breath         ED Vital Signs:   ED Triage Vitals [06/24/18 1021]   Temperature Pulse Respirations Blood Pressure SpO2   (!) 101 1 °F (38 4 °C) (!) 124 18 105/56 (!) 82 %      Temp Source Heart Rate Source Patient Position - Orthostatic VS BP Location FiO2 (%)   Temporal Monitor Lying Left arm --      Pain Score       6        Wt Readings from Last 1 Encounters:   06/25/18 (!) 143 kg (316 lb 2 2 oz)       Vital Signs:  06/24 0701  06/25 0700 06/25 0701  06/25 1041  Most Recent     Temperature (°F) 95 4101 1    96 8 (36)    Pulse 77124    83 Respirations 920    13    Blood Pressure 98/55139/68    133/74    SpO2 (%) 82100 97  97        Abnormal Labs/Diagnostic Test Results:    Ref Range & Units 06/24/18 1219   pH, Arterial 7 350 - 7 450 7 330     pCO2, Arterial 36 0 - 44 0 mm Hg 45 2     pO2, Arterial 75 0 - 129 0 mm Hg 68 8     HCO3, Arterial 22 0 - 28 0 mmol/L 23 3    Base Excess, Arterial mmol/L -2 7    O2 Content, Arterial 16 0 - 23 0 mL/dL 14 7     O2 HGB,Arterial  94 0 - 97 0 % 91 4     SOURCE  Radial, Left    LESLY TEST  Yes    Nasal Cannula  6L       Ref Range & Units 06/24/18 1035   Sodium 136 - 145 mmol/L 138    Potassium 3 5 - 5 3 mmol/L 3 4     Chloride 100 - 108 mmol/L 101    CO2 21 - 32 mmol/L 23    Anion Gap 4 - 13 mmol/L 14     BUN 5 - 25 mg/dL 15    Creatinine 0 60 - 1 30 mg/dL 1 17    Glucose 65 - 140 mg/dL 175     Calcium 8 3 - 10 1 mg/dL 8 2        Ref Range & Units 06/24/18 1035   WBC 4 31 - 10 16 Thousand/uL 13 54     RBC 3 88 - 5 62 Million/uL 4 61    Hemoglobin 12 0 - 17 0 g/dL 11 6     Hematocrit 36 5 - 49 3 % 35 0     MCV 82 - 98 fL 76     MCH 26 8 - 34 3 pg 25 2     MCHC 31 4 - 37 4 g/dL 33 1    RDW 11 6 - 15 1 % 15 9     MPV 8 9 - 12 7 fL 9 0    Platelets 672 - 876 Thousands/uL 216    Neutrophils Relative 43 - 75 % 90     Lymphocytes Relative 14 - 44 % 4     Monocytes Relative 4 - 12 % 6    Eosinophils Relative 0 - 6 % 0    Basophils Relative 0 - 1 % 0    Neutrophils Absolute 1 85 - 7 62 Thousands/µL 12 12     Lymphocytes Absolute 0 60 - 4 47 Thousands/µL 0 48        Ref Range & Units 06/24/18 1721   Amph/Meth UR Negative Positive     Barbiturate Ur Negative Negative    Benzodiazepine Urine Negative Negative    Cocaine Urine Negative Negative    Methadone Urine Negative Negative    Opiate Urine Negative Positive     PCP Ur Negative Negative    THC Urine Negative Negative      Blood cxs -- (P)      ED Treatment:   Medication Administration from 06/24/2018 1017 to 06/24/2018 1346       Date/Time Order Dose Route Action Action by Comments     06/24/2018 1042 ondansetron (ZOFRAN) injection 4 mg 4 mg Intravenous Given Harpal Sam RN      06/24/2018 1043 sodium chloride 0 9 % bolus 1,000 mL 1,000 mL Intravenous New Bag Harpal Sam RN      06/24/2018 1111 iohexol (OMNIPAQUE) 350 MG/ML injection (MULTI-DOSE) 100 mL 100 mL Intravenous Given Carlos Dorantes      06/24/2018 1131 sodium chloride 0 9 % bolus 1,000 mL 1,000 mL Intravenous New Bag Harpal Sam RN      06/24/2018 1243 vancomycin (VANCOCIN) 2,000 mg in sodium chloride 0 9 % 500 mL IVPB 2,000 mg Intravenous New Bag Pepper Bautista RN      06/24/2018 1133 cefepime (MAXIPIME) IVPB (premix) 2,000 mg 2,000 mg Intravenous New Bag Harpal Sam RN      06/24/2018 1215 clindamycin (CLEOCIN) IVPB (premix) 600 mg 600 mg Intravenous New [address] Pepper Bautista RN      06/24/2018 1155 acetaminophen (TYLENOL) tablet 650 mg 650 mg Oral Given Pepper Bautista RN      06/24/2018 1155 ipratropium-albuterol (DUO-NEB) 0 5-2 5 mg/3 mL inhalation solution 3 mL 3 mL Nebulization Given Pepper Bautista RN           Past Medical/Surgical History:   Diagnosis Date    Allergic     Anemia 6/24/2018    Benign essential hypertension 11/17/2016    Claustrophobia 3/15/2018    Community acquired pneumonia 6/24/2018    Depressed 7/14/2016    GERD (gastroesophageal reflux disease)     Hepatitis C virus infection 8/14/2014    Obesity 10/12/2016    Sleep disorder        Admitting Diagnosis: Overdose [T50 901A]  Hypoxia [R09 02]  Atypical pneumonia [J18 9]  Heroin overdose, accidental or unintentional, initial encounter [T40 1X1A]    Age/Sex: 28 y o  male    Assessment/Plan:   * Severe sepsis ([address] )   Assessment & Plan     -admit to [address] [place] [address] status  -the severe sepsis is present on admission and secondary to commute card pneumonia, possible aspiration pneumonia  -the patient will be given the initial 30 mL/kg normal saline IV fluid resuscitation bolus per the sepsis protocol  -continue normal saline IV fluids at 125 mL/hr after the initial resuscitation fluid bolus is completed  -check blood cultures x 2 sets  -check a urine culture  -check a Streptococcus pneumoniae urinary antigen  -check a Legionella urinary antigen  -the patient received IV vancomycin, IV cefepime, and IV clindamycin in the emergency department  -I will have the patient on IV levofloxacin and IV metronidazole to cover for community-acquired pneumonia pathogens and aspiration pneumonia pathogens  -the patient continues to have fevers, he will need a her cardiogram to rule-out a valve vegetation  -follow the lactic acid level          Acute respiratory failure with hypoxia and hypercapnia (HCC)   Assessment & Plan     -the patient is currently stable from respiratory standpoint on supplemental oxygen via nasal cannula  -goal oxygen saturation levels of 92% and above  -initiate the respiratory protocol and airway clearance protocol  -if the patient's respiratory status declines, he will need either the Vapotherm unit or BiPAP mask          Heroin overdose, accidental or unintentional, initial encounter   Assessment & Plan     -the patient denies any suicidal ideation/attempt  -I recommended inpatient drug rehabilitation for the patient upon discharge          Community acquired pneumonia   Assessment & Plan     -check blood cultures x 2 sets  -check a urine culture  -check a Streptococcus pneumoniae urinary antigen  -check a Legionella urinary antigen  -the patient received IV vancomycin, IV cefepime, and IV clindamycin in the emergency department  -I will have the patient on IV levofloxacin and IV metronidazole to cover for community-acquired pneumonia pathogens and aspiration pneumonia pathogens          Diarrhea   Assessment & Plan     -check stool cultures including a Clostridium difficile culture          Anemia   Assessment & Plan     -check an iron panel, ferritin level, vitamin B12 level, and folate level  -follow the CBC          Hypokalemia   Assessment & Plan     -replete with p o  potassium chloride  -follow the potassium level          Lactic acidosis   Assessment & Plan     -normal saline IV fluids  -secondary to severe sepsis  -follow the lactic acid level          Obstructive sleep apnea   Assessment & Plan     -the patient is noncompliant with CPAP at home  -I counseled the patient on being compliant with his CPAP machine at home          Chronic hepatitis C without hepatic coma ([facility] [address] )   Assessment & Plan     -the patient was treated for the hepatitis C  -the patient does admit to sharing needles during 1 instance since he was treated for the hepatitis C  -check a hepatitis C RNA viral load by PCR          Morbid obesity with BMI of 45 0-49 9, adult (HCC)   Assessment & Plan     -nutrition/dietitian consultation          Essential hypertension   Assessment & Plan     -his blood pressure is in the low-normal range in the setting of severe sepsis  -hold lisinopril  -follow the blood pressure trend                VTE Prophylaxis: Enoxaparin (Lovenox)  40 mg SQ every 12 hours dosed based on his BMI/ sequential compression device   Code Status:  Level 1-Full Code     Anticipated Length of Stay:  Patient will be admitted on an Inpatient basis with an anticipated length of stay of greater than 2 midnights  Justification for Hospital Stay:  Patient requires IV antibiotics, IV fluids, a work-up for severe sepsis, and supplemental oxygen to maintain adequate oxygen saturation levels        Admission Orders:  Admit to ICU  Telem  Cont pox  O2 6 lpm n/c --> 2 lpm  Respiratory protocol  Regular diet  Monitor I&O's  Daily weights  Up with assistance  PT/OT/Speech evals    Scheduled Meds:   Current Facility-Administered Medications:  acetaminophen 650 mg Oral Q6H PRN John Paul Banda, DO    ARIPiprazole 5 mg Oral HS Leon Urias, DO    enoxaparin 40 mg Subcutaneous Q12H [address] Leon Urias, DO    levofloxacin 750 mg Intravenous Q24H Vinod Hoffmanman, DO Last Rate: 750 mg (06/24/18 2309)   LORazepam 0 5 mg Intravenous Q4H PRN Vinod Hoffmanman, DO    melatonin 3 mg Oral HS Leon Urias, DO    metroNIDAZOLE 500 mg Intravenous Q8H Vinod Hoffmanman, DO Last Rate: 500 mg (06/25/18 0513)   ondansetron 4 mg Intravenous Q6H PRN Vinod Hoffmanman, DO    pantoprazole 40 mg Oral Early Morning Vinod Gu, DO    sodium chloride 125 mL/hr Intravenous Continuous Vinod Hoffmanman, DO Last Rate: 125 mL/hr (06/25/18 0513)   venlafaxine 150 mg Oral Daily Vinod Hoffmanman, DO      Continuous Infusions:   sodium chloride 125 mL/hr Last Rate: 125 mL/hr (06/25/18 0513)     PRN Meds:   acetaminophen    LORazepam    ondansetron

## 2018-06-25 NOTE — ASSESSMENT & PLAN NOTE
urine culture pending   - check a Streptococcus pneumoniae urinary antigen  - check a Legionella urinary antigen  - the patient received IV vancomycin, IV cefepime, and IV clindamycin in the emergency department  - I will have the patient on IV levofloxacin and IV metronidazole to cover for community-acquired pneumonia pathogens and aspiration pneumonia pathogens

## 2018-06-25 NOTE — PROGRESS NOTES
No pain, gave zofran for nausea/vomiting - yellow sputum, pt states that he is starting to feel slight signs of withdrawal - restless/jittery/slight cramping

## 2018-06-25 NOTE — RESPIRATORY THERAPY NOTE
A duplicate cont pulse ox order in, Dc'd one of them, pt placed on cont pulse ox at 1735 in rm 400   Room air sat 100% IW=[id number]

## 2018-06-25 NOTE — PLAN OF CARE
Problem: PHYSICAL THERAPY ADULT  Goal: Performs mobility at highest level of function for planned discharge setting  See evaluation for individualized goals  Outcome: Completed Date Met: 06/25/18  Prognosis: Good     Assessment: Pt is a 28year old male presenting with good strength balance endurance and functional mobility performing all transfers and ambulation at an (I) level with no LOB instability or complaint  Pt safe to ambulate in room and hallway ad jai  No skilled PT indicated  Recommendation: Home independently     PT - OK to Discharge: Yes    See flowsheet documentation for full assessment

## 2018-06-25 NOTE — ASSESSMENT & PLAN NOTE
- the patient was treated for the hepatitis C  - the patient does admit to sharing needles during 1 instance since he was treated for the hepatitis C  - check a hepatitis C RNA viral load by PCR

## 2018-06-25 NOTE — ASSESSMENT & PLAN NOTE
- the patient is currently stable from respiratory standpoint on supplemental oxygen via nasal cannula  - attempt to titrate down, will do ambulatory pulse oximetry tomorrow morning   - goal oxygen saturation levels of 92% and above  - initiate the respiratory protocol and airway clearance protocol   - if the patient's respiratory status declines, he will need either the Vapotherm unit or BiPAP mask

## 2018-06-25 NOTE — SOCIAL WORK
Met with pt to discuss role as  in helping pt to develop discharge plan and to help pt carry out their plan  Pt lives in a house with his parents   Pt has 9 BROCK  Pt has 13 steps on the inside  Pt has bedroom on the 2nd floor  Pt has bathroom on the 2nd floor  Pt has no DME at Home  Pt is independent with ADL's  Pt's mom does the cooking  Pt drives   Pt has never had home care or any services in the home  Pt's PCP is Zaid Villanueva  Pt uses the [employer] in [place]  Pt with no Case Mangement needs will continue to follow

## 2018-06-25 NOTE — OCCUPATIONAL THERAPY NOTE
Occupational Therapy Evaluation     Patient Name: Carline Kwong  JMAHJ'E Date: 6/25/2018  Problem List  Patient Active Problem List   Diagnosis    Acid reflux    Allergic rhinitis    Essential hypertension    Bipolar disorder ([place] [address] )    Generalized anxiety disorder    Hepatitis C virus infection    Insomnia    Obesity    Opioid dependence in remission ([place] [address] )    Sleep-related hypoventilation    Claustrophobia    Restless leg syndrome    Mood disturbance (HCC)    Morbid obesity with BMI of 45 0-49 9, adult (HCC)    Chronic pain disorder    Anxiety    Chronic hepatitis C without hepatic coma (HCC)    Depressed    Methadone maintenance therapy patient ([place] [address] )    Obesity, Class III, BMI 40-49 9 (morbid obesity) ([place] [address] )    Obstructive sleep apnea    Severe sepsis ([place] [address] )    Community acquired pneumonia    Heroin overdose, accidental or unintentional, initial encounter    Acute respiratory failure with hypoxia and hypercapnia (HCC)    Anemia    Diarrhea    Positive blood cultures     Past Medical History  Past Medical History:   Diagnosis Date    Allergic     Anemia 6/24/2018    Benign essential hypertension 11/17/2016    Claustrophobia 3/15/2018    Community acquired pneumonia 6/24/2018    Depressed 7/14/2016    GERD (gastroesophageal reflux disease)     Hepatitis C virus infection 8/14/2014    Obesity 10/12/2016    Sleep disorder      Past Surgical History  Past Surgical History:   Procedure Laterality Date    WISDOM TOOTH EXTRACTION               06/25/18 1016   Note Type   Note type Eval only   Restrictions/Precautions   Weight Bearing Precautions Per Order No   Other Precautions Contact/isolation;Droplet precautions;Multiple lines;Telemetry; Fall Risk   Pain Assessment   Pain Assessment No/denies pain   Home Living   Additional Comments see PT evaluation for details    Prior Function   Level of [place] Independent with ADLs and functional mobility   Lives With Family   ADL Assistance Independent   IADLs Independent   Falls in the last 6 months 0   Vocational Full time employment   Comments pt is (I) c driving    Psychosocial   Psychosocial (WDL) WDL   ADL   Where Assessed Chair   LB Dressing Assistance 7  Independent   Bed Mobility   Additional Comments seated in chair at start and end of session   Transfers   Sit to Stand 7  Independent   Stand to Sit 7  Independent   Stand pivot 7  Independent   Additional Comments no difficulties with transfers   Functional Mobility   Functional Mobility 7  Independent   Additional Comments performed 200ft of FM with no difficulties and no SOB   Balance   Static Sitting Good   Dynamic Sitting Good   Static Standing Good   Dynamic Standing Good   Ambulatory Good   Activity Tolerance   Activity Tolerance Patient tolerated treatment well   RUE Assessment   RUE Assessment WFL   LUE Assessment   LUE Assessment WFL   Hand Function   Gross Motor Coordination Functional   Fine Motor Coordination Functional   Sensation   Light Touch No apparent deficits   Sharp/Dull No apparent deficits   Cognition   Overall Cognitive Status WFL   Arousal/Participation Alert   Attention Within functional limits   Orientation Level Oriented X4   Memory Within functional limits   Following Commands Follows all commands and directions without difficulty   Assessment   Assessment pt with no functional deficits at this time requiring skilled OT intervention   Recommendation   OT Discharge Recommendation Home independent   OT - OK to Discharge No   Barthel Index   Feeding 10   Bathing 5   Grooming Score 5   Dressing Score 10   Bladder Score 10   Bowels Score 10   Toilet Use Score 10   Transfers (Bed/Chair) Score 15   Mobility (Level Surface) Score 15   Stairs Score 0   Barthel Index Score 90       Pt is performing at PLOF; OT services will be discontinued at this time    Pt left seated in chair at end of session; all needs within reach; all lines intact; scds connected and turned on

## 2018-06-25 NOTE — ASSESSMENT & PLAN NOTE
- the patient is noncompliant with CPAP at home  - I counseled the patient on being compliant with his CPAP machine at home

## 2018-06-26 LAB
ANION GAP SERPL CALCULATED.3IONS-SCNC: 9 MMOL/L (ref 4–13)
BACTERIA UR CULT: NORMAL
BASOPHILS # BLD AUTO: 0.02 THOUSANDS/ΜL (ref 0–0.1)
BASOPHILS NFR BLD AUTO: 0 % (ref 0–1)
BUN SERPL-MCNC: 5 MG/DL (ref 5–25)
CALCIUM SERPL-MCNC: 8.7 MG/DL (ref 8.3–10.1)
CHLORIDE SERPL-SCNC: 108 MMOL/L (ref 100–108)
CO2 SERPL-SCNC: 26 MMOL/L (ref 21–32)
CREAT SERPL-MCNC: 0.65 MG/DL (ref 0.6–1.3)
EOSINOPHIL # BLD AUTO: 0.2 THOUSAND/ΜL (ref 0–0.61)
EOSINOPHIL NFR BLD AUTO: 3 % (ref 0–6)
ERYTHROCYTE [DISTWIDTH] IN BLOOD BY AUTOMATED COUNT: 15.5 % (ref 11.6–15.1)
GFR SERPL CREATININE-BSD FRML MDRD: 132 ML/MIN/1.73SQ M
GLUCOSE SERPL-MCNC: 106 MG/DL (ref 65–140)
HCT VFR BLD AUTO: 32.4 % (ref 36.5–49.3)
HGB BLD-MCNC: 10.6 G/DL (ref 12–17)
LYMPHOCYTES # BLD AUTO: 1.18 THOUSANDS/ΜL (ref 0.6–4.47)
LYMPHOCYTES NFR BLD AUTO: 16 % (ref 14–44)
MCH RBC QN AUTO: 24.7 PG (ref 26.8–34.3)
MCHC RBC AUTO-ENTMCNC: 32.7 G/DL (ref 31.4–37.4)
MCV RBC AUTO: 76 FL (ref 82–98)
MONOCYTES # BLD AUTO: 0.51 THOUSAND/ΜL (ref 0.17–1.22)
MONOCYTES NFR BLD AUTO: 7 % (ref 4–12)
NEUTROPHILS # BLD AUTO: 5.6 THOUSANDS/ΜL (ref 1.85–7.62)
NEUTS SEG NFR BLD AUTO: 75 % (ref 43–75)
PLATELET # BLD AUTO: 219 THOUSANDS/UL (ref 149–390)
PMV BLD AUTO: 9.3 FL (ref 8.9–12.7)
POTASSIUM SERPL-SCNC: 3.5 MMOL/L (ref 3.5–5.3)
RBC # BLD AUTO: 4.29 MILLION/UL (ref 3.88–5.62)
SODIUM SERPL-SCNC: 143 MMOL/L (ref 136–145)
WBC # BLD AUTO: 7.51 THOUSAND/UL (ref 4.31–10.16)

## 2018-06-26 PROCEDURE — 94761 N-INVAS EAR/PLS OXIMETRY MLT: CPT

## 2018-06-26 PROCEDURE — 94760 N-INVAS EAR/PLS OXIMETRY 1: CPT

## 2018-06-26 PROCEDURE — 80048 BASIC METABOLIC PNL TOTAL CA: CPT | Performed by: PHYSICIAN ASSISTANT

## 2018-06-26 PROCEDURE — 99232 SBSQ HOSP IP/OBS MODERATE 35: CPT | Performed by: PHYSICIAN ASSISTANT

## 2018-06-26 PROCEDURE — 85025 COMPLETE CBC W/AUTO DIFF WBC: CPT | Performed by: PHYSICIAN ASSISTANT

## 2018-06-26 RX ORDER — CHLORDIAZEPOXIDE HYDROCHLORIDE 25 MG/1
50 CAPSULE, GELATIN COATED ORAL EVERY 8 HOURS SCHEDULED
Status: DISCONTINUED | OUTPATIENT
Start: 2018-06-26 | End: 2018-06-27 | Stop reason: HOSPADM

## 2018-06-26 RX ORDER — LORAZEPAM 2 MG/ML
1 INJECTION INTRAMUSCULAR EVERY 2 HOUR PRN
Status: DISCONTINUED | OUTPATIENT
Start: 2018-06-26 | End: 2018-06-27 | Stop reason: HOSPADM

## 2018-06-26 RX ORDER — CLONIDINE HYDROCHLORIDE 0.1 MG/1
0.1 TABLET ORAL EVERY 12 HOURS SCHEDULED
Status: DISCONTINUED | OUTPATIENT
Start: 2018-06-26 | End: 2018-06-27 | Stop reason: HOSPADM

## 2018-06-26 RX ORDER — LISINOPRIL 5 MG/1
5 TABLET ORAL DAILY
Status: DISCONTINUED | OUTPATIENT
Start: 2018-06-26 | End: 2018-06-27 | Stop reason: HOSPADM

## 2018-06-26 RX ADMIN — VENLAFAXINE HYDROCHLORIDE 150 MG: 75 CAPSULE, EXTENDED RELEASE ORAL at 09:29

## 2018-06-26 RX ADMIN — LORAZEPAM 1 MG: 2 INJECTION INTRAMUSCULAR; INTRAVENOUS at 07:23

## 2018-06-26 RX ADMIN — MELATONIN TAB 3 MG 3 MG: 3 TAB at 21:08

## 2018-06-26 RX ADMIN — METRONIDAZOLE 500 MG: 500 INJECTION, SOLUTION INTRAVENOUS at 21:08

## 2018-06-26 RX ADMIN — METRONIDAZOLE 500 MG: 500 INJECTION, SOLUTION INTRAVENOUS at 13:52

## 2018-06-26 RX ADMIN — PANTOPRAZOLE SODIUM 40 MG: 40 TABLET, DELAYED RELEASE ORAL at 06:13

## 2018-06-26 RX ADMIN — LORAZEPAM 1 MG: 2 INJECTION INTRAMUSCULAR; INTRAVENOUS at 16:59

## 2018-06-26 RX ADMIN — LORAZEPAM 1 MG: 2 INJECTION INTRAMUSCULAR; INTRAVENOUS at 12:45

## 2018-06-26 RX ADMIN — CHLORDIAZEPOXIDE HYDROCHLORIDE 50 MG: 25 CAPSULE ORAL at 13:52

## 2018-06-26 RX ADMIN — ENOXAPARIN SODIUM 40 MG: 40 INJECTION, SOLUTION INTRAVENOUS; SUBCUTANEOUS at 21:08

## 2018-06-26 RX ADMIN — CHLORDIAZEPOXIDE HYDROCHLORIDE 25 MG: 25 CAPSULE ORAL at 06:13

## 2018-06-26 RX ADMIN — CLONIDINE HYDROCHLORIDE 0.1 MG: 0.1 TABLET ORAL at 21:08

## 2018-06-26 RX ADMIN — CLONIDINE HYDROCHLORIDE 0.1 MG: 0.1 TABLET ORAL at 12:46

## 2018-06-26 RX ADMIN — METRONIDAZOLE 500 MG: 500 INJECTION, SOLUTION INTRAVENOUS at 06:14

## 2018-06-26 RX ADMIN — LISINOPRIL 5 MG: 5 TABLET ORAL at 12:46

## 2018-06-26 RX ADMIN — ARIPIPRAZOLE 5 MG: 10 TABLET ORAL at 21:08

## 2018-06-26 RX ADMIN — LEVOFLOXACIN 750 MG: 5 INJECTION, SOLUTION INTRAVENOUS at 21:52

## 2018-06-26 RX ADMIN — CHLORDIAZEPOXIDE HYDROCHLORIDE 50 MG: 25 CAPSULE ORAL at 21:08

## 2018-06-26 RX ADMIN — LORAZEPAM 1 MG: 2 INJECTION INTRAMUSCULAR; INTRAVENOUS at 23:22

## 2018-06-26 RX ADMIN — SODIUM CHLORIDE 75 ML/HR: 0.9 INJECTION, SOLUTION INTRAVENOUS at 09:43

## 2018-06-26 RX ADMIN — ENOXAPARIN SODIUM 40 MG: 40 INJECTION, SOLUTION INTRAVENOUS; SUBCUTANEOUS at 09:29

## 2018-06-26 NOTE — ASSESSMENT & PLAN NOTE
- the patient denies any suicidal ideation/attempt  - I recommended inpatient drug rehabilitation for the patient upon discharge  - patient is considering rehab vs  Restarting methadone    - 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, likely due to excessive drug use  Discussed with patient and encouraged cessation of illicit drug use  - Patient showing signs of drug withdrawal, will increase librium to 50mg TID with PRN ativan at shorter intervals

## 2018-06-26 NOTE — ASSESSMENT & PLAN NOTE
Severe sepsis was present on admission and secondary to community acquired pneumonia, possible aspiration pneumonia  The patient will be given the initial 30 mL/kg normal saline IV fluid resuscitation bolus per the sepsis protocol  - continue normal saline IV fluids at 125 mL/hr after the initial resuscitation fluid bolus is completed  Will decrease to 75mL/hr   urine culture showing no growth  - Streptococcus pneumoniae urinary antigen and Legionella urinary antigen both negative  - the patient received IV vancomycin, IV cefepime, and IV clindamycin in the emergency department  - Continue IV levofloxacin and IV metronidazole to cover for community-acquired pneumonia pathogens and aspiration pneumonia pathogens  -  echocardiogram did not show any gross valve vegetations  - Lactic acid cleared

## 2018-06-26 NOTE — PLAN OF CARE
Problem: RESPIRATORY - ADULT  Goal: Achieves optimal ventilation and oxygenation  INTERVENTIONS:  - Assess for changes in respiratory status  - Assess for changes in mentation and behavior  - Oxygen administration by appropriate delivery method based on oxygen saturation (per order) or ABGs  - Initiate smoking cessation education as indicated  - Encourage broncho-pulmonary hygiene including cough, deep breathe, Incentive Spirometry  - Assess and instruct to report SOB or any respiratory difficulty  - Respiratory Therapy support as indicated   Outcome: Progressing      Problem: METABOLIC, FLUID AND ELECTROLYTES - ADULT  Goal: Electrolytes maintained within normal limits  INTERVENTIONS:  - Monitor labs and assess patient for signs and symptoms of electrolyte imbalances  - Administer electrolyte replacement as ordered  - Monitor response to electrolyte replacements, including repeat lab results as appropriate  - Instruct patient on fluid and nutrition as appropriate   Outcome: Completed Date Met: 06/26/18    Goal: Fluid balance maintained  INTERVENTIONS:  - Monitor labs and assess for signs and symptoms of volume excess or deficit  - Monitor I/O and WT  - Instruct patient on fluid and nutrition as appropriate   Outcome: Progressing    Goal: Glucose maintained within target range  INTERVENTIONS:  - Monitor Blood Glucose as ordered  - Assess for signs and symptoms of hyperglycemia and hypoglycemia  - Administer ordered medications to maintain glucose within target range  - Assess nutritional intake and initiate nutrition service referral as needed   Outcome: Completed Date Met: 06/26/18      Problem: SKIN/TISSUE INTEGRITY - ADULT  Goal: Skin integrity remains intact  INTERVENTIONS  - Identify patients at risk for skin breakdown, patient is low risk, self repositions and ambulates independently  - Assess and monitor skin integrity  - Assess and monitor nutrition and hydration status  - Monitor labs (i e  albumin)  - Assess for incontinence Patient is continent  - Turn and reposition patient, self repositions  - Assist with mobility/ambulation  - Relieve pressure over bony prominences  - Avoid friction and shearing  - Provide appropriate hygiene as needed including keeping skin clean and dry  - Evaluate need for skin moisturizer/barrier cream  - Collaborate with interdisciplinary team (i e  Nutrition, Rehabilitation, etc )   - Patient/family teaching   Outcome: Progressing    Goal: Incision(s), wounds(s) or drain site(s) healing without S/S of infection  INTERVENTIONS  - Assess and document risk factors for skin impairment   - Assess and document dressing, incision, wound bed, drain sites and surrounding tissue  - Initiate Nutrition services consult and/or wound management as needed   Outcome: Completed Date Met: 06/26/18    Goal: Oral mucous membranes remain intact  INTERVENTIONS  - Assess oral mucosa and hygiene practices  - Implement preventative oral hygiene regimen  - Implement oral medicated treatments as ordered  - Initiate Nutrition services referral as needed   Outcome: Completed Date Met: 06/26/18      Problem: MUSCULOSKELETAL - ADULT  Goal: Maintain or return mobility to safest level of function  INTERVENTIONS:  - Assess patient's ability to carry out ADLs; assess patient's baseline for ADL function and identify physical deficits which impact ability to perform ADLs (bathing, care of mouth/teeth, toileting, grooming, dressing, etc )  - Assess/evaluate cause of self-care deficits   - Assess range of motion  - Assess patient's mobility; develop plan if impaired  - Assess patient's need for assistive devices and provide as appropriate  - Encourage maximum independence but intervene and supervise when necessary  - Involve family in performance of ADLs  - Assess for home care needs following discharge   - Request OT consult to assist with ADL evaluation and planning for discharge  - Provide patient education as appropriate Outcome: Progressing    Goal: Maintain proper alignment of affected body part  INTERVENTIONS:  - Support, maintain and protect limb and body alignment  - Provide pt/fam with appropriate education   Outcome: Completed Date Met: 06/26/18      Problem: Potential for Falls  Goal: Patient will remain free of falls  INTERVENTIONS:  - Assess patient frequently for physical needs  -  Identify cognitive and physical deficits and behaviors that affect risk of falls    -  [place] fall precautions as indicated by assessment   - Educate patient/family on patient safety including physical limitations  - Instruct patient to call for assistance with activity based on assessment  - Modify environment to reduce risk of injury  - Consider OT/PT consult to assist with strengthening/mobility   Outcome: Progressing      Problem: DISCHARGE PLANNING - CARE MANAGEMENT  Goal: Discharge to post-acute care or home with appropriate resources  INTERVENTIONS:  - Conduct assessment to determine patient/family and health care team treatment goals, and need for post-acute services based on payer coverage, community resources, and patient preferences, and barriers to discharge  - Address psychosocial, clinical, and financial barriers to discharge as identified in assessment in conjunction with the patient/family and health care team  - Arrange appropriate level of post-acute services according to patient's   needs and preference and payer coverage in collaboration with the physician and health care team  - Communicate with and update the patient/family, physician, and health care team regarding progress on the discharge plan  - Arrange appropriate transportation to post-acute venues   Outcome: Progressing      Problem: Nutrition/Hydration-ADULT  Goal: Nutrient/Hydration intake appropriate for improving, restoring or maintaining nutritional needs  Monitor and assess patient's nutrition/hydration status for malnutrition (ex- brittle hair, bruises, dry skin, pale skin and conjunctiva, muscle wasting, smooth red tongue, and disorientation)  Collaborate with interdisciplinary team and initiate plan and interventions as ordered  Monitor patient's weight and dietary intake as ordered or per policy  Utilize nutrition screening tool and intervene per policy  Determine patient's food preferences and provide high-protein, high-caloric foods as appropriate       INTERVENTIONS:  - Monitor oral intake, urinary output, labs, and treatment plans  - Assess nutrition and hydration status and recommend course of action  - Evaluate amount of meals eaten  - Assist patient with eating if necessary   - Allow adequate time for meals  - Recommend/ encourage appropriate diets, oral nutritional supplements, and vitamin/mineral supplements  - Order, calculate, and assess calorie counts as needed  - Recommend, monitor, and adjust tube feedings and TPN/PPN based on assessed needs  - Assess need for intravenous fluids  - Provide specific nutrition/hydration education as appropriate  - Include patient/family/caregiver in decisions related to nutrition   Outcome: Progressing

## 2018-06-26 NOTE — PROGRESS NOTES
Progress Note - James Camacho 1990, 28 y o  male MRN: [id number]    Unit/Bed#: 400-01 Encounter: [id number]    Primary Care Provider: Jaron Hardwick DO   Date and time admitted to hospital: 6/24/2018 10:18 AM        Positive blood cultures   Assessment & Plan    So far 1/2 blood cultures are positive for non lactose fermenting gram negative rods, likely contamination but will await final cultures and 2nd set of blood cultures  Repeat blood cultures now after 24 hours of antibiotic treatment with Levaquin  TTE was negative for valve vegetations, consider ZORAIDA pending culture results and clinical course  Chloe Hector Heroin overdose, accidental or unintentional, initial encounter   Assessment & Plan    - the patient denies any suicidal ideation/attempt  - I recommended inpatient drug rehabilitation for the patient upon discharge  - patient is considering rehab vs  Restarting methadone    - 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, likely due to excessive drug use  Discussed with patient and encouraged cessation of illicit drug use  - Patient showing signs of drug withdrawal, will increase librium to 50mg TID with PRN ativan at shorter intervals  Community acquired pneumonia   Assessment & Plan    Streptococcus pneumoniae urinary antigen, Legionella urinary antigen both negative  - the patient received IV vancomycin, IV cefepime, and IV clindamycin in the emergency department  - I will have the patient on IV levofloxacin and IV metronidazole to cover for community-acquired pneumonia pathogens and aspiration pneumonia pathogens        * Severe sepsis [facility])   Assessment & Plan    Severe sepsis was present on admission and secondary to community acquired pneumonia, possible aspiration pneumonia   The patient will be given the initial 30 mL/kg normal saline IV fluid resuscitation bolus per the sepsis protocol  - continue normal saline IV fluids at 125 mL/hr after the initial resuscitation fluid bolus is completed  Will decrease to 75mL/hr   urine culture showing no growth  - Streptococcus pneumoniae urinary antigen and Legionella urinary antigen both negative  - the patient received IV vancomycin, IV cefepime, and IV clindamycin in the emergency department  - Continue IV levofloxacin and IV metronidazole to cover for community-acquired pneumonia pathogens and aspiration pneumonia pathogens  -  echocardiogram did not show any gross valve vegetations  - Lactic acid cleared  Diarrhea   Assessment & Plan    - now resolved  No need to obtain c diff studies  Anemia   Assessment & Plan    -iron panel, ferritin level, vitamin B12 level, and folate level all unremarkable  - follow the CBC        Acute respiratory failure with hypoxia and hypercapnia (HCC)   Assessment & Plan    - the patient is currently stable from respiratory standpoint, off supplemental o2  ambulatory pulse oximetry was normal    - goal oxygen saturation levels of 92% and above  - initiate the respiratory protocol and airway clearance protocol  Obstructive sleep apnea   Assessment & Plan    - the patient is noncompliant with CPAP at home  - I counseled the patient on being compliant with his CPAP machine at home  Essential hypertension   Assessment & Plan    - his blood pressure was low-normal range in the setting of severe sepsis, but now improving    - will restart lisinopril   - follow the blood pressure trend        Chronic hepatitis C without hepatic coma ([facility] [address] )   Assessment & Plan    - the patient was treated for the hepatitis C  - the patient does admit to sharing needles during 1 instance since he was treated for the hepatitis C  - check a hepatitis C RNA viral load by PCR - still pending          Morbid obesity with BMI of 45 0-49 9, adult [facility])   Assessment & Plan    - nutrition/dietitian consultation            VTE Pharmacologic Prophylaxis:   Pharmacologic: Enoxaparin (Lovenox)  Mechanical VTE Prophylaxis in Place: Yes        Time Spent for Care: 30 minutes  More than 50% of total time spent on counseling and coordination of care as described above  Current Length of Stay: 2 day(s)    Current Patient Status: Inpatient   Certification Statement: The patient will continue to require additional inpatient hospital stay due to need for IV fluid hydration, IV antibiotics  Discharge Plan / Estimated Discharge Date: likely to drug and alcohol rehab tomorrow pending clinical course - need blood culture results  Code Status: Level 1 - Full Code      Subjective:   Patient feels anxious today  States ativan improves symptoms but only temporarily  Nursing confirms this  No drowsiness  No fevers, chills overnight  No SOB  Objective:   Vitals:   Temp (24hrs), Av 1 °F (36 7 °C), Min:97 9 °F (36 6 °C), Max:98 2 °F (36 8 °C)    HR:  [] 73  Resp:  [18] 18  BP: (116-146)/(75-89) 146/89  SpO2:  [92 %-100 %] 98 %  Body mass index is 42 7 kg/m²  Input and Output Summary (last 24 hours): Intake/Output Summary (Last 24 hours) at 18 1056  Last data filed at 18 0954   Gross per 24 hour   Intake          4811 75 ml   Output             6375 ml   Net         -1563 25 ml       Physical Exam:   Physical Exam   Constitutional: He is oriented to person, place, and time  He appears well-developed and well-nourished  Appears slightly anxious   HENT:   Head: Normocephalic and atraumatic  Cardiovascular: Normal rate and normal heart sounds  No murmur heard  Pulmonary/Chest: Effort normal and breath sounds normal  No respiratory distress  He has no wheezes  He has no rales  Abdominal: Soft  He exhibits no distension  There is no tenderness  Musculoskeletal: He exhibits no edema  Neurological: He is alert and oriented to person, place, and time  Skin: Skin is warm  He is diaphoretic  No erythema  Nursing note and vitals reviewed        Additional Data:   Labs:    Results from last 7 days  Lab Units 06/26/18  0653   WBC Thousand/uL 7 51   HEMOGLOBIN g/dL 10 6*   HEMATOCRIT % 32 4*   PLATELETS Thousands/uL 219   NEUTROS PCT % 75   LYMPHS PCT % 16   MONOS PCT % 7   EOS PCT % 3       Results from last 7 days  Lab Units 06/26/18  0653 06/25/18  0527   SODIUM mmol/L 143 144   POTASSIUM mmol/L 3 5 4 0   CHLORIDE mmol/L 108 109*   CO2 mmol/L 26 29   BUN mg/dL 5 9   CREATININE mg/dL 0 65 0 72   CALCIUM mg/dL 8 7 8 3   TOTAL PROTEIN g/dL  --  6 6   BILIRUBIN TOTAL mg/dL  --  0 30   ALK PHOS U/L  --  63   ALT U/L  --  81*   AST U/L  --  67*   GLUCOSE RANDOM mg/dL 106 96       Results from last 7 days  Lab Units 06/24/18  1035   INR  1 08         * I Have Reviewed All Lab Data Listed Above  * Additional Pertinent Lab Tests Reviewed: All Labs Within Last 24 Hours Reviewed        Recent Cultures (last 7 days):     Results from last 7 days  Lab Units 06/24/18  1726 06/24/18  1723 06/24/18  1454 06/24/18  1128 06/24/18  1035   BLOOD CULTURE   --   --   --  No Growth at 24 hrs   Non lactose fermenting gram negative anton*   GRAM STAIN RESULT   --   --   --   --  Gram negative rods   URINE CULTURE   --  No Growth <1000 cfu/mL  --   --   --    INFLUENZA A PCR   --   --  None Detected  --   --    INFLUENZA B PCR   --   --  None Detected  --   --    RSV PCR   --   --  None Detected  --   --    LEGIONELLA URINARY ANTIGEN  Negative  --   --   --   --        Last 24 Hours Medication List:     Current Facility-Administered Medications:  acetaminophen 650 mg Oral Q6H PRN Danay Bee, DO    ARIPiprazole 5 mg Oral HS Danay Bee DO    chlordiazePOXIDE 25 mg Oral Q8H [address] Evaristo Herzog PA-C    enoxaparin 40 mg Subcutaneous Q12H [address] Leon Urias DO    levofloxacin 750 mg Intravenous Q24H Danay Bee DO Last Rate: 750 mg (06/25/18 2243)   LORazepam 1 mg Intravenous Q3H PRN Danay Bee, DO    melatonin 3 mg Oral HS Leon Urias DO    metroNIDAZOLE 500 mg Intravenous Q8H Danay Bee, DO Last Rate: 500 mg (06/26/18 2654)   ondansetron 4 mg Intravenous Q6H PRN Tara Ramón, DO    pantoprazole 40 mg Oral Early Morning Tara Ramón, DO    sodium chloride 75 mL/hr Intravenous Continuous Jacklyn Molina PA-C Last Rate: 75 mL/hr (06/26/18 8327)   venlafaxine 150 mg Oral Daily Tara Ramón, DO         Today, Patient Was Seen By: Jacklyn Molina PA-C    ** Please Note: Dragon 360 Dictation voice to text software may have been used in the creation of this document   **

## 2018-06-26 NOTE — ASSESSMENT & PLAN NOTE
So far 1/2 blood cultures are positive for non lactose fermenting gram negative rods, likely contamination but will await final cultures and 2nd set of blood cultures  Repeat blood cultures now after 24 hours of antibiotic treatment with Levaquin  TTE was negative for valve vegetations, consider ZORAIDA pending culture results and clinical course  Douglas Deal

## 2018-06-26 NOTE — RESPIRATORY THERAPY NOTE
Home Oxygen Qualifying Test       Patient name: Zeus March        : 1990   Date of Test:  2018  Diagnosis: Severe Sepsis     Home Oxygen Test:    **Medicare Guidelines require item(s) 1-5 on all ambulatory patients or 1 and 2 on on-ambulatory patients  1   Baseline SPO2 on Room Air at rest 98 %  LD86   If = or < 88% on room air add O2 via NC and titrate patient  Patient must be ambulated with O2 and titrated to > 88% with exertion  2   SPO2 on Oxygen at rest n/a %  3   SPO2 during exercise on Room Air 98 %      4  SPO2 during exercise on Oxygen  n/a% at a liter flow of n/a lpm     5   Exercise performed:    [x] Walking     [] Stairs     [x] Duration 10 (min )     [x] Distance 900 (ft )       []  Supplemental Home Oxygen is indicated  [x]  Client does not qualify for home oxygen        Alok Frye, RT

## 2018-06-26 NOTE — ASSESSMENT & PLAN NOTE
Streptococcus pneumoniae urinary antigen, Legionella urinary antigen both negative    - the patient received IV vancomycin, IV cefepime, and IV clindamycin in the emergency department  - I will have the patient on IV levofloxacin and IV metronidazole to cover for community-acquired pneumonia pathogens and aspiration pneumonia pathogens

## 2018-06-26 NOTE — ASSESSMENT & PLAN NOTE
- his blood pressure was low-normal range in the setting of severe sepsis, but now improving    - will restart lisinopril   - follow the blood pressure trend

## 2018-06-26 NOTE — ASSESSMENT & PLAN NOTE
- the patient is currently stable from respiratory standpoint, off supplemental o2  ambulatory pulse oximetry was normal    - goal oxygen saturation levels of 92% and above  - initiate the respiratory protocol and airway clearance protocol

## 2018-06-26 NOTE — ASSESSMENT & PLAN NOTE
- the patient was treated for the hepatitis C  - the patient does admit to sharing needles during 1 instance since he was treated for the hepatitis C  - check a hepatitis C RNA viral load by PCR - still pending

## 2018-06-26 NOTE — ASSESSMENT & PLAN NOTE
-iron panel, ferritin level, vitamin B12 level, and folate level all unremarkable    - follow the CBC

## 2018-06-26 NOTE — SOCIAL WORK
Pt is agreeable to going to short term inpatient drug rehab  Called pt's insurance to see what facilities are in network  As per insurance company pt's insurance will end as of July 1, 2018  Talked to pt and his parents and they are going to verify about insurance and see if they can get his insurance longer  Will call  facilities in network in the am if he does have insurance

## 2018-06-27 ENCOUNTER — TRANSITIONAL CARE MANAGEMENT (OUTPATIENT)
Dept: INTERNAL MEDICINE CLINIC | Facility: CLINIC | Age: 28
End: 2018-06-27

## 2018-06-27 VITALS
SYSTOLIC BLOOD PRESSURE: 120 MMHG | DIASTOLIC BLOOD PRESSURE: 76 MMHG | WEIGHT: 297.62 LBS | OXYGEN SATURATION: 98 % | TEMPERATURE: 98 F | HEART RATE: 72 BPM | HEIGHT: 70 IN | RESPIRATION RATE: 18 BRPM | BODY MASS INDEX: 42.61 KG/M2

## 2018-06-27 LAB
ALBUMIN SERPL BCP-MCNC: 3 G/DL (ref 3.5–5)
ALP SERPL-CCNC: 57 U/L (ref 46–116)
ALT SERPL W P-5'-P-CCNC: 60 U/L (ref 12–78)
ANION GAP SERPL CALCULATED.3IONS-SCNC: 9 MMOL/L (ref 4–13)
AST SERPL W P-5'-P-CCNC: 27 U/L (ref 5–45)
BASOPHILS # BLD AUTO: 0.02 THOUSANDS/ΜL (ref 0–0.1)
BASOPHILS NFR BLD AUTO: 0 % (ref 0–1)
BILIRUB SERPL-MCNC: 0.3 MG/DL (ref 0.2–1)
BUN SERPL-MCNC: 5 MG/DL (ref 5–25)
CALCIUM SERPL-MCNC: 9 MG/DL (ref 8.3–10.1)
CHLORIDE SERPL-SCNC: 108 MMOL/L (ref 100–108)
CO2 SERPL-SCNC: 27 MMOL/L (ref 21–32)
CREAT SERPL-MCNC: 0.68 MG/DL (ref 0.6–1.3)
EOSINOPHIL # BLD AUTO: 0.23 THOUSAND/ΜL (ref 0–0.61)
EOSINOPHIL NFR BLD AUTO: 3 % (ref 0–6)
ERYTHROCYTE [DISTWIDTH] IN BLOOD BY AUTOMATED COUNT: 15.8 % (ref 11.6–15.1)
GFR SERPL CREATININE-BSD FRML MDRD: 130 ML/MIN/1.73SQ M
GLUCOSE SERPL-MCNC: 103 MG/DL (ref 65–140)
HCT VFR BLD AUTO: 35.2 % (ref 36.5–49.3)
HCV RNA SERPL NAA+PROBE-ACNC: 8380 IU/ML
HCV RNA SERPL NAA+PROBE-LOG IU: 3.92 LOG10 IU/ML
HGB BLD-MCNC: 11.7 G/DL (ref 12–17)
LACTATE SERPL-SCNC: 1.7 MMOL/L (ref 0.5–2)
LYMPHOCYTES # BLD AUTO: 1.57 THOUSANDS/ΜL (ref 0.6–4.47)
LYMPHOCYTES NFR BLD AUTO: 22 % (ref 14–44)
MAGNESIUM SERPL-MCNC: 1.8 MG/DL (ref 1.6–2.6)
MCH RBC QN AUTO: 25 PG (ref 26.8–34.3)
MCHC RBC AUTO-ENTMCNC: 33.2 G/DL (ref 31.4–37.4)
MCV RBC AUTO: 75 FL (ref 82–98)
MONOCYTES # BLD AUTO: 0.44 THOUSAND/ΜL (ref 0.17–1.22)
MONOCYTES NFR BLD AUTO: 6 % (ref 4–12)
NEUTROPHILS # BLD AUTO: 4.82 THOUSANDS/ΜL (ref 1.85–7.62)
NEUTS SEG NFR BLD AUTO: 68 % (ref 43–75)
PHOSPHATE SERPL-MCNC: 3.5 MG/DL (ref 2.7–4.5)
PLATELET # BLD AUTO: 271 THOUSANDS/UL (ref 149–390)
PMV BLD AUTO: 9.9 FL (ref 8.9–12.7)
POTASSIUM SERPL-SCNC: 3.8 MMOL/L (ref 3.5–5.3)
PROCALCITONIN SERPL-MCNC: 0.49 NG/ML
PROT SERPL-MCNC: 7.3 G/DL (ref 6.4–8.2)
RBC # BLD AUTO: 4.68 MILLION/UL (ref 3.88–5.62)
SODIUM SERPL-SCNC: 144 MMOL/L (ref 136–145)
TEST INFORMATION: NORMAL
WBC # BLD AUTO: 7.08 THOUSAND/UL (ref 4.31–10.16)

## 2018-06-27 PROCEDURE — 84145 PROCALCITONIN (PCT): CPT | Performed by: INTERNAL MEDICINE

## 2018-06-27 PROCEDURE — 83735 ASSAY OF MAGNESIUM: CPT | Performed by: INTERNAL MEDICINE

## 2018-06-27 PROCEDURE — 83605 ASSAY OF LACTIC ACID: CPT | Performed by: INTERNAL MEDICINE

## 2018-06-27 PROCEDURE — 84100 ASSAY OF PHOSPHORUS: CPT | Performed by: INTERNAL MEDICINE

## 2018-06-27 PROCEDURE — 99255 IP/OBS CONSLTJ NEW/EST HI 80: CPT | Performed by: INTERNAL MEDICINE

## 2018-06-27 PROCEDURE — 85025 COMPLETE CBC W/AUTO DIFF WBC: CPT | Performed by: PHYSICIAN ASSISTANT

## 2018-06-27 PROCEDURE — 99239 HOSP IP/OBS DSCHRG MGMT >30: CPT | Performed by: PHYSICIAN ASSISTANT

## 2018-06-27 PROCEDURE — 80053 COMPREHEN METABOLIC PANEL: CPT | Performed by: INTERNAL MEDICINE

## 2018-06-27 RX ORDER — LEVOFLOXACIN 750 MG/1
750 TABLET ORAL EVERY 24 HOURS
Qty: 12 TABLET | Refills: 0 | Status: SHIPPED | OUTPATIENT
Start: 2018-06-27 | End: 2018-07-09

## 2018-06-27 RX ORDER — LANOLIN ALCOHOL/MO/W.PET/CERES
3 CREAM (GRAM) TOPICAL
Qty: 30 TABLET | Refills: 0 | Status: CANCELLED | OUTPATIENT
Start: 2018-06-27

## 2018-06-27 RX ADMIN — CHLORDIAZEPOXIDE HYDROCHLORIDE 50 MG: 25 CAPSULE ORAL at 05:42

## 2018-06-27 RX ADMIN — CHLORDIAZEPOXIDE HYDROCHLORIDE 50 MG: 25 CAPSULE ORAL at 13:54

## 2018-06-27 RX ADMIN — VENLAFAXINE HYDROCHLORIDE 150 MG: 75 CAPSULE, EXTENDED RELEASE ORAL at 08:00

## 2018-06-27 RX ADMIN — PANTOPRAZOLE SODIUM 40 MG: 40 TABLET, DELAYED RELEASE ORAL at 05:42

## 2018-06-27 RX ADMIN — METRONIDAZOLE 500 MG: 500 INJECTION, SOLUTION INTRAVENOUS at 05:42

## 2018-06-27 RX ADMIN — ENOXAPARIN SODIUM 40 MG: 40 INJECTION, SOLUTION INTRAVENOUS; SUBCUTANEOUS at 08:00

## 2018-06-27 RX ADMIN — CLONIDINE HYDROCHLORIDE 0.1 MG: 0.1 TABLET ORAL at 08:00

## 2018-06-27 RX ADMIN — LISINOPRIL 5 MG: 5 TABLET ORAL at 08:00

## 2018-06-27 RX ADMIN — SODIUM CHLORIDE 75 ML/HR: 0.9 INJECTION, SOLUTION INTRAVENOUS at 04:56

## 2018-06-27 RX ADMIN — METRONIDAZOLE 500 MG: 500 INJECTION, SOLUTION INTRAVENOUS at 13:56

## 2018-06-27 NOTE — PLAN OF CARE

## 2018-06-27 NOTE — SOCIAL WORK
Gave  The pt phone  number for [facility] to call to get emergency inpatient drug treatment  Pt is agreeable to same  Pt is going to call immediately to see if he can get approved for inpatient funding

## 2018-06-27 NOTE — SOCIAL WORK
Pt did not make phone calls to [facility] yet because he prefers to do from home  Pt is being discharged today  Pt's parents will give the pt a ride home  Pt prefers to make his own follow up appts  Case Management reviewed discharge planning process including the following: identifying help that is needed at home, pt's preference for discharge needs and Meds at [facility]  Reviewed with Pt that any member of the healthcare team can answer questions regarding : medications, jmportance of recognizing  Signs and symptoms of any  medical problems  Case Management also encouraged pt to follow up with all recommended appointments after discharge

## 2018-06-27 NOTE — ASSESSMENT & PLAN NOTE
Severe sepsis was present on admission and secondary to community acquired pneumonia, possible aspiration pneumonia  The patient will be given the initial 30 mL/kg normal saline IV fluid resuscitation bolus per the sepsis protocol  - continue normal saline IV fluids at 125 mL/hr after the initial resuscitation fluid bolus is completed  Will decrease to 75mL/hr   urine culture showing no growth  - Streptococcus pneumoniae urinary antigen and Legionella urinary antigen both negative  - the patient received IV vancomycin, IV cefepime, and IV clindamycin in the emergency department  - Continue IV levofloxacin and IV metronidazole to cover for community-acquired pneumonia pathogens and aspiration pneumonia pathogens  -  echocardiogram did not show any gross valve vegetations  - Lactic acid cleared  - Blood culture, however was 1/2 positive for Serratia marcescens  See problem/note below

## 2018-06-27 NOTE — ASSESSMENT & PLAN NOTE
-iron panel, ferritin level, vitamin B12 level, and folate level all unremarkable  - outpatient follow up appropriate

## 2018-06-27 NOTE — TELEMEDICINE
Consultation - Infectious Disease   Estefania Baker 28 y o  male MRN: [id number]  Unit/Bed#: 400-01 Encounter: [id number]      Inpatient consult to Infectious Diseases  Consult performed by: Duc Jacobs ordered by: Toni Irwin            REQUIRED DOCUMENTATION:     1  This service was provided via Telemedicine  2  Provider located at Home  3  TeleMed provider: Vickye Boast, MD   4  Identify all parties in room with patient during tele consult:  n/a  5  After connecting through [employer], patient was identified by name and date of birth and assistant checked wristband  Patient was then informed that this was a Telemedicine visit and that the exam was being conducted confidentially over secure lines  My office door was closed  No one else was in the room  Patient acknowledged consent and understanding of privacy and security of the Telemedicine visit, and gave us permission to have the assistant stay in the room in order to assist with the history and to conduct the exam   I informed the patient that I have reviewed their record in Epic and presented the opportunity for them to ask any questions regarding the visit today  The patient agreed to participate  Assessment/Recommendations     54-year-old male, injection drug user, presents with accidental heroin overdose, severe sepsis secondary to Serratia bacteremia    1 , Serratia bacteremia, severe sepsis present on admission  -source likely due to injection drug use  -bacteremia has cleared and patient is clinically asymptomatic  -TTE negative for vegetation    · Patient will require 2 weeks of therapy through 7/9  · Would recommend oral levofloxacin 750 daily or oral ciprofloxacin 750 q 12h    2    Acute hypoxic respiratory failure on admission, possible community acquired/aspiration pneumonia  - Reports of productive cough shortly after admission, no well defined infiltrate on CT imaging on admission    · Recommend treatment as above   Would discontinue metronidazole on 06/28    3  Injection drug use, chronic active hepatitis C  - HIV negative    · Referral to inpatient rehab as per primary team    Thank you for involving me in the care of your patient  Please call with questions, change in clinical status or if tests recommended above are abnormal      Discussed with the primary service  History     Reason for Consult: Bacteremia  HPI: Irina Elena is a 28y o  year old male with chronic hep C and injection drug use who presented to the emergency room on 06/24 with accidental heroin overdose  The patient reports that he has not felt well for the past 1-2 weeks with nausea and dry heaves  He has chronic aches and pains and noticed malaise over the past few weeks  On the morning of presentation he woke up and had an episode nonbilious vomiting  He does not recall any fever or chills  He normally injects 1 bag of heroin once to twice a day and reports that he accidentally overdosed but cannot recall details  He normally uses sterile needles and bottled water to inject heroin  In the ER he was febrile to 101 1 and tachycardic and hypoxic  Labs were notable for lactic acidosis and leukocytosis  X-ray chest showed no acute abnormality  CT PE study showed diffuse ground-glass opacities bilaterally which could represent atypical infection  EKG showed sinus tachycardia  He received a dose of vancomycin, cefepime, clindamycin in the ER and was admitted and started on IV levofloxacin and metronidazole  Since admission he has remained afebrile and apart from 1 episode of diarrhea shortly after admission he otherwise feels well and denies nausea, vomiting, diarrhea or rash  His white count has normalized  Blood culture from admission, 1 of 2 sets, has grown Serratia marcesans which is fluoroquinolone susceptible  Repeat blood cultures had been negative  TTE shows no vegetation    Infectious disease is being consulted for diagnostic work up and antibiotic management  Review of Systems  A [id number] point system-based review of systems is otherwise negative  PAST MEDICAL HISTORY:  Past Medical History:   Diagnosis Date    Allergic     Anemia 2018    Benign essential hypertension 2016    Claustrophobia 3/15/2018    Community acquired pneumonia 2018    Depressed 2016    GERD (gastroesophageal reflux disease)     Hepatitis C virus infection 2014    Obesity 10/12/2016    Sleep disorder      Past Surgical History:   Procedure Laterality Date    WISDOM TOOTH EXTRACTION         FAMILY HISTORY:  Non-contributory    SOCIAL HISTORY:  Social History   Single  History   Alcohol Use No     History   Drug Use    Types: Heroin     Comment: 1-2 bags daily     History   Smoking Status    Former Smoker    Packs/day: 1 00    Years: 13 00    Types: Cigarettes    Quit date: 2016   Smokeless Tobacco    Never Used       ALLERGIES:  Allergies   Allergen Reactions    Bee Venom        MEDICATIONS:  All current active medications have been reviewed      Physical Exam     HR:  [72-77] 72  Resp:  [18] 18  BP: (120-141)/(76-87) 120/76  SpO2:  [98 %-100 %] 98 %  Temp (24hrs), Av 3 °F (36 8 °C), Min:98 °F (36 7 °C), Max:98 6 °F (37 °C)  Current: Temperature: 98 °F (36 7 °C)    Intake/Output Summary (Last 24 hours) at 18 1157  Last data filed at 18 1150   Gross per 24 hour   Intake             1550 ml   Output             2650 ml   Net            -1100 ml       Physical exam findings reported by bedside and primary medical team staff    General Appearance:  Appearing well, nontoxic, and in no distress, appears stated age   Head:  Normocephalic, without obvious abnormality, atraumatic   Eyes:  PERRL, conjunctiva pink and sclera anicteric, both eyes   Nose: Nares normal, mucosa normal, no drainage   Throat: Oropharynx moist without lesions; lips, mucosa, and tongue normal; teeth and gums normal Neck: Supple, symmetrical, trachea midline, no adenopathy, no tenderness/mass/nodules   Back:   Symmetric, no curvature, ROM normal, no CVA tenderness   Lungs:   Clear to auscultation bilaterally, no audible wheezes, rhonchi and rales, respirations unlabored   Chest Wall:  No tenderness or deformity   Heart:  Regular rate and rhythm, S1, S2 normal, no murmur, rub or gallop   Abdomen:   Soft, non-tender, non-distended, positive bowel sounds, no masses, no organomegaly    No CVA tenderness   Extremities: Extremities normal, atraumatic, no cyanosis, clubbing or edema   Skin: Skin color, texture, turgor normal, no rashes or lesions  No draining wounds noted  Lymph nodes: Cervical, supraclavicular, and axillary nodes normal   Neurologic: Alert and oriented times 3, extremity strength 5/5 and symmetric       Invasive Devices:   Peripheral IV 06/27/18 Left Wrist (Active)   Site Assessment Clean;Dry; Intact 6/27/2018  1:54 AM   Dressing Type Transparent 6/27/2018  1:54 AM   Line Status Flushed; Infusing 6/27/2018  1:54 AM   Dressing Status Clean; Intact;Dry 6/27/2018  1:54 AM       Labs, Imaging, & Other Studies     Lab Results:    I have personally reviewed pertinent labs        Results from last 7 days  Lab Units 06/27/18  0604 06/26/18  0653 06/25/18  0527   WBC Thousand/uL 7 08 7 51 8 14   HEMOGLOBIN g/dL 11 7* 10 6* 10 4*   PLATELETS Thousands/uL 271 219 157       Results from last 7 days  Lab Units 06/27/18  0604 06/26/18  0653 06/25/18  0527 06/24/18  1035   SODIUM mmol/L 144 143 144 138   POTASSIUM mmol/L 3 8 3 5 4 0 3 4*   CHLORIDE mmol/L 108 108 109* 101   CO2 mmol/L 27 26 29 23   ANION GAP mmol/L 9 9 6 14*   BUN mg/dL 5 5 9 15   CREATININE mg/dL 0 68 0 65 0 72 1 17   EGFR ml/min/1 73sq m 130 132 127 84   GLUCOSE RANDOM mg/dL 103 106 96 175*   CALCIUM mg/dL 9 0 8 7 8 3 8 2*   AST U/L 27  --  67* 39   ALT U/L 60  --  81* 59   ALK PHOS U/L 57  --  63 61   TOTAL PROTEIN g/dL 7 3  --  6 6 6 9   BILIRUBIN TOTAL mg/dL 0 30  --  0 30 0 40       Results from last 7 days  Lab Units 06/25/18  1129 06/24/18  1726 06/24/18  1723 06/24/18  1454 06/24/18  1128 06/24/18  1035   BLOOD CULTURE  No Growth at 24 hrs  No Growth at 24 hrs   --   --   --  No Growth at 48 hrs  Serratia marcescens*   GRAM STAIN RESULT   --   --   --   --   --  Gram negative rods   URINE CULTURE   --   --  No Growth <1000 cfu/mL  --   --   --    MRSA CULTURE ONLY   --   --   --  Culture results to follow  --   --    INFLUENZA A PCR   --   --   --  None Detected  --   --    INFLUENZA B PCR   --   --   --  None Detected  --   --    RSV PCR   --   --   --  None Detected  --   --    LEGIONELLA URINARY ANTIGEN   --  Negative  --   --   --   --        Imaging Studies:   I have personally reviewed pertinent imaging study reports and images in PACS  EKG, Pathology, and Other Studies:   I have personally reviewed pertinent reports  Counseling/Coordination of care:     Labs, medical tests and imaging studies were independently reviewed by me as noted above in HPI and old records were obtained and summarized as noted above in HPI

## 2018-06-27 NOTE — ASSESSMENT & PLAN NOTE
1/2 blood cultures are positive for Serratia Marcescens, likely due to IV drug use  Repeat blood cultures now after 24 hours of antibiotic treatment with Levaquin  TTE was negative for valve vegetations  Infectious disease consult appreciated  Will Continue PO levaquin 750mg daily for a total 2 week course  Patient will need close follow up with his PCP after discharge to ensure compliance and asymptomatic

## 2018-06-27 NOTE — DISCHARGE SUMMARY
Discharge- Kacey Jones 1990, 28 y o  male MRN: [id number]    Unit/Bed#: 400-01 Encounter: [id number]    Primary Care Provider: Cindi Simmons DO   Date and time admitted to hospital: 6/24/2018 10:18 AM        Bacteremia due to Gram-negative bacteria   Assessment & Plan    1/2 blood cultures are positive for Serratia Marcescens, likely due to IV drug use  Repeat blood cultures now after 24 hours of antibiotic treatment with Levaquin  TTE was negative for valve vegetations  Infectious disease consult appreciated  Will Continue PO levaquin 750mg daily for a total 2 week course  Patient will need close follow up with his PCP after discharge to ensure compliance and asymptomatic  Heroin overdose, accidental or unintentional, initial encounter   Assessment & Plan    - the patient denies any suicidal ideation/attempt  - I recommended inpatient drug rehabilitation for the patient upon discharge, however, due to lack of insurance coverage this is not possible  - patient agreeable to Restarting methadone and willing, motivated to take the steps to do so  - Discussed with patient has possible talcosis in his lungs, likely due to excessive drug use  Discussed with patient and encouraged cessation of illicit drug use  - Patient received both librium, ativan and clonidine to help with narcotic withdrawal during this stay  Community acquired pneumonia   Assessment & Plan    Streptococcus pneumoniae urinary antigen, Legionella urinary antigen both negative  - the patient received IV vancomycin, IV cefepime, and IV clindamycin in the emergency department  - I will have the patient on IV levofloxacin and IV metronidazole to cover for community-acquired pneumonia pathogens and aspiration pneumonia pathogens  - Patient denies respiratory symptoms and CT chest negative for infiltrate  Will discontinue IV flagyl  Levaquin will be continued for serratia bacteremia          * Severe sepsis ([address] ) Assessment & Plan    Severe sepsis was present on admission and secondary to community acquired pneumonia, possible aspiration pneumonia  The patient will be given the initial 30 mL/kg normal saline IV fluid resuscitation bolus per the sepsis protocol  - continue normal saline IV fluids at 125 mL/hr after the initial resuscitation fluid bolus is completed  Will decrease to 75mL/hr   urine culture showing no growth  - Streptococcus pneumoniae urinary antigen and Legionella urinary antigen both negative  - the patient received IV vancomycin, IV cefepime, and IV clindamycin in the emergency department  - Continue IV levofloxacin and IV metronidazole to cover for community-acquired pneumonia pathogens and aspiration pneumonia pathogens  -  echocardiogram did not show any gross valve vegetations  - Lactic acid cleared  - Blood culture, however was 1/2 positive for Serratia marcescens  See problem/note below  Diarrhea   Assessment & Plan    - now resolved  No need to obtain c diff studies  Anemia   Assessment & Plan    -iron panel, ferritin level, vitamin B12 level, and folate level all unremarkable  - outpatient follow up appropriate  Acute respiratory failure with hypoxia and hypercapnia (HCC)   Assessment & Plan    - the patient is currently stable from respiratory standpoint, off supplemental o2  ambulatory pulse oximetry was normal    - goal oxygen saturation levels of 92% and above  - initiate the respiratory protocol and airway clearance protocol  Obstructive sleep apnea   Assessment & Plan    - the patient is noncompliant with CPAP at home  - I counseled the patient on being compliant with his CPAP machine at home          Essential hypertension   Assessment & Plan    - his blood pressure was low-normal range in the setting of severe sepsis, but now improving    - will restart lisinopril   - follow the blood pressure trend        Chronic hepatitis C without hepatic coma [facility])   Assessment & Plan    - the patient was treated for the hepatitis C  - the patient does admit to sharing needles during 1 instance since he was treated for the hepatitis C   hepatitis C RNA viral load by PCR - showed presence of virus again  Will need outpatient GI follow up  HIV testing was negative  Morbid obesity with BMI of 45 0-49 9, adult [facility])   Assessment & Plan    - nutrition/dietitian consultation            Discharging Physician / Practitioner: Angela Orourke PA-C  PCP: Valerie Lee DO  Admission Date: 6/24/18  Discharge Date: 06/27/18    Disposition:    Other: home        Reason for Admission: sepsis, s/p overdose  Discharge Diagnoses:     Please see assessment and plan section above for further details regarding discharge diagnoses  Resolved Problems  Date Reviewed: 6/27/2018          Resolved    Lactic acidosis 6/25/2018     Resolved by  Angela Orourke PA-C    Hypokalemia 6/25/2018     Resolved by  Angela Orourke PA-C          Consultations During Hospital Stay:  · Infectious disease    Procedures Performed:   · none        Diet Recommendations at Discharge:  Diet - regular       Significant Findings / Test Results:     XR chest 1 view portable   ED Interpretation by Danielito Narvaez MD (06/24 1043)   Read by me; Radiologist to provide formal interpretation  No acute process      Final Result by Lulu Oliver MD (06/24 1249)      No acute cardiopulmonary disease  Workstation performed: [id number]         PE Study with CT Abdomen and Pelvis with contrast   Final Result by Royce Velasquez MD (06/24 1132)   Addendum 1 of 1 by Royce Velasquez MD (06/24 113)   ADDENDUM:   Several tiny bilateral pulmonary densities suggesting possible talcosis         Final   No evidence of pulmonary embolism      Diffuse groundglass bilateral pulmonary opacities  This could represent evidence of atypical infection        Diffuse fatty infiltration of the liver      No acute traumatic injury confirmed                  Workstation performed: [id number]         CT cervical spine without contrast   Final Result by Thanh Simon MD (06/24 1121)      Straightening of the cervical lordosis may be due to positioning or muscle spasm  Otherwise, normal cervical spine  Workstation performed: [id number]         CT head without contrast   Final Result by Thanh Simon MD (06/24 1116)      Normal head CT  Workstation performed: [id number]             Incidental Findings:   · See CT chest findings above  · Hepatitis C RNA load is positive - 8380    Test Results Pending at Discharge (will require follow up):   · none     Outpatient Tests Requested:  · none    Complications:  none    Hospital Course:   =  Austin Hines is a 28 y o  male patient who originally presented to the hospital on 6/24/2018 due to unresponsiveness following a heroin overdose  He met sepsis criteria and therefore was admitted for further treatment  Please see problem list above for more details  Condition at Discharge: good     Discharge Day Visit / Exam:     Subjective:  Patient feeling well today  Less withdrawal symptoms  No fevers  Vitals: Blood Pressure: 120/76 (06/27/18 0718)  Pulse: 72 (06/27/18 0718)  Temperature: 98 °F (36 7 °C) (06/27/18 0718)  Temp Source: Temporal (06/27/18 0718)  Respirations: 18 (06/27/18 0718)  Height: 5' 10" (177 8 cm) (06/24/18 1346)  Weight - Scale: 135 kg (297 lb 9 9 oz) (06/26/18 0600)  SpO2: 98 % (06/27/18 0718)  Exam:   Physical Exam   Constitutional: He is oriented to person, place, and time  He appears well-developed and well-nourished  No distress  Cardiovascular: Normal rate, regular rhythm and normal heart sounds  No murmur heard  Pulmonary/Chest: Effort normal and breath sounds normal  No respiratory distress  He has no wheezes  He has no rales  Abdominal: Soft  Bowel sounds are normal  He exhibits no distension   There is no tenderness  Musculoskeletal: He exhibits no edema  Neurological: He is alert and oriented to person, place, and time  Skin: Skin is warm and dry  He is not diaphoretic  Psychiatric: He has a normal mood and affect  Nursing note and vitals reviewed  Discharge instructions/Information to patient and family:   See after visit summary section titled Discharge Instructions for information provided to patient and family  Planned Readmission: no     Discharge Statement:  I spent 40 minutes discharging the patient  This time was spent on the day of discharge  I had direct contact with the patient on the day of discharge  Greater than 50% of the total time was spent examining patient, answering all patient questions, arranging and discussing plan of care with patient as well as directly providing post-discharge instructions  Additional time then spent on discharge activities      ** Please Note: This note has been constructed using a voice recognition system **

## 2018-06-27 NOTE — ASSESSMENT & PLAN NOTE
- the patient denies any suicidal ideation/attempt  - I recommended inpatient drug rehabilitation for the patient upon discharge, however, due to lack of insurance coverage this is not possible  - patient agreeable to Restarting methadone and willing, motivated to take the steps to do so  - Discussed with patient has possible talcosis in his lungs, likely due to excessive drug use  Discussed with patient and encouraged cessation of illicit drug use  - Patient received both librium, ativan and clonidine to help with narcotic withdrawal during this stay

## 2018-06-27 NOTE — DISCHARGE INSTRUCTIONS
Follow up with the methadone clinic ASAP  Given drug and alcohol rehab numbers to call if needed  Take your first dose of Levaquin this evening, 6/27/18

## 2018-06-27 NOTE — PLAN OF CARE
METABOLIC, FLUID AND ELECTROLYTES - ADULT     Fluid balance maintained Adequate for Discharge        MUSCULOSKELETAL - ADULT     Maintain or return mobility to safest level of function Adequate for Discharge        Nutrition/Hydration-ADULT     Nutrient/Hydration intake appropriate for improving, restoring or maintaining nutritional needs Adequate for Discharge        Potential for Falls     Patient will remain free of falls Adequate for Discharge        RESPIRATORY - ADULT     Achieves optimal ventilation and oxygenation Adequate for Discharge        SKIN/TISSUE INTEGRITY - ADULT     Skin integrity remains intact Adequate for Discharge

## 2018-06-27 NOTE — PLAN OF CARE
DISCHARGE PLANNING - CARE MANAGEMENT     Discharge to post-acute care or home with appropriate resources Progressing        METABOLIC, FLUID AND ELECTROLYTES - ADULT     Fluid balance maintained Progressing        MUSCULOSKELETAL - ADULT     Maintain or return mobility to safest level of function Progressing        Nutrition/Hydration-ADULT     Nutrient/Hydration intake appropriate for improving, restoring or maintaining nutritional needs Progressing        Potential for Falls     Patient will remain free of falls Progressing        RESPIRATORY - ADULT     Achieves optimal ventilation and oxygenation Progressing        SKIN/TISSUE INTEGRITY - ADULT     Skin integrity remains intact Progressing

## 2018-06-27 NOTE — ASSESSMENT & PLAN NOTE
- the patient was treated for the hepatitis C  - the patient does admit to sharing needles during 1 instance since he was treated for the hepatitis C   hepatitis C RNA viral load by PCR - showed presence of virus again  Will need outpatient GI follow up  HIV testing was negative

## 2018-06-27 NOTE — ASSESSMENT & PLAN NOTE
Streptococcus pneumoniae urinary antigen, Legionella urinary antigen both negative  - the patient received IV vancomycin, IV cefepime, and IV clindamycin in the emergency department  - I will have the patient on IV levofloxacin and IV metronidazole to cover for community-acquired pneumonia pathogens and aspiration pneumonia pathogens  - Patient denies respiratory symptoms and CT chest negative for infiltrate  Will discontinue IV flagyl  Levaquin will be continued for serratia bacteremia

## 2018-06-28 ENCOUNTER — OFFICE VISIT (OUTPATIENT)
Dept: INTERNAL MEDICINE CLINIC | Facility: CLINIC | Age: 28
End: 2018-06-28
Payer: COMMERCIAL

## 2018-06-28 VITALS
HEART RATE: 96 BPM | SYSTOLIC BLOOD PRESSURE: 116 MMHG | HEIGHT: 70 IN | TEMPERATURE: 98.6 F | WEIGHT: 298 LBS | BODY MASS INDEX: 42.66 KG/M2 | DIASTOLIC BLOOD PRESSURE: 80 MMHG | RESPIRATION RATE: 18 BRPM | OXYGEN SATURATION: 99 %

## 2018-06-28 DIAGNOSIS — A41.9 SEVERE SEPSIS (HCC): Primary | ICD-10-CM

## 2018-06-28 DIAGNOSIS — T40.1X1A HEROIN OVERDOSE, ACCIDENTAL OR UNINTENTIONAL, INITIAL ENCOUNTER (HCC): ICD-10-CM

## 2018-06-28 DIAGNOSIS — R65.20 SEVERE SEPSIS (HCC): Primary | ICD-10-CM

## 2018-06-28 DIAGNOSIS — F11.23 WITHDRAWAL FROM OPIOIDS (HCC): ICD-10-CM

## 2018-06-28 PROBLEM — F11.93 WITHDRAWAL FROM OPIOIDS (HCC): Status: ACTIVE | Noted: 2018-06-28

## 2018-06-28 LAB
BACTERIA BLD CULT: ABNORMAL
GRAM STN SPEC: ABNORMAL
MRSA NOSE QL CULT: NORMAL

## 2018-06-28 PROCEDURE — 1111F DSCHRG MED/CURRENT MED MERGE: CPT | Performed by: PHYSICIAN ASSISTANT

## 2018-06-28 PROCEDURE — 99496 TRANSJ CARE MGMT HIGH F2F 7D: CPT | Performed by: PHYSICIAN ASSISTANT

## 2018-06-28 RX ORDER — LORAZEPAM 0.5 MG/1
0.5 TABLET ORAL EVERY 8 HOURS PRN
Qty: 21 TABLET | Refills: 0 | Status: SHIPPED | OUTPATIENT
Start: 2018-06-28 | End: 2018-07-24

## 2018-06-28 RX ORDER — CHLORDIAZEPOXIDE HYDROCHLORIDE 25 MG/1
25 CAPSULE, GELATIN COATED ORAL 3 TIMES DAILY PRN
Qty: 21 CAPSULE | Refills: 0 | Status: SHIPPED | OUTPATIENT
Start: 2018-06-28 | End: 2018-07-24

## 2018-06-28 RX ORDER — CLONIDINE HYDROCHLORIDE 0.1 MG/1
0.1 TABLET ORAL EVERY 12 HOURS SCHEDULED
Qty: 14 TABLET | Refills: 0 | Status: SHIPPED | OUTPATIENT
Start: 2018-06-28 | End: 2018-07-24

## 2018-06-28 NOTE — PROGRESS NOTES
Transition of Care  Follow-up After Hospitalization    Altagracia Botello 28 y o  male   Date:  6/28/2018    Date and time hospital follow up call was made:  6/27/2018  4:15 PM  Patient was hopsitalized at:  [address]  Date of admission:  6/24/18  Date of discharge:  6/27/18  Diagnosis:  pneumonia       Admit Date:6/24/18  Discharge Date:6/27/18  Diagnosis: sepsis s/p heroin overdose  Location: [facility] records were reviewed  Medications upon discharge reviewed/updated  Medication Changes: See body of note  Imaging: CT head and c-spine  Ct PE study, Ct abdomen and pelvis, CXR, TTE  Consults: Infectious disease  Discharge Disposition:  home  Follow up visits with other specialists: None      Assessment and Plan:    There are no diagnoses linked to this encounter  HPI:  Pt presents for KALINA  He was transported vis ambulance to [address] [place] [address] on 8/24/18 after being found unresponsive by his father from a heroin overdose  He was given 4 doses of narcan  He met sepsis criteria in the ER and was admitted  He had CXR, CT c-spine, and CT head that were normal  He had TTE that was normal  He had CT PE study and CT abdomen and pelvis which revealed multiple small infiltrates in the lungs possibly due to talcosis from drug use  In the ER he was given IV vanco, cefepime and clindamycin  He was then transitioned to IV Flagyl and IV levaquin  The Flagyl was eventually discontinued once culture results were obtained  His labs revealed positive hep C  He was started on ativan, clonidine and librium to combat his withdrawal symptoms  He improved during his stay and was discharged on 8/27/18 to home  He is unable to go to rehab due to not having insurance  He is interested in restarting the methadone program that he was in up until a earlier this year when he started to relapse  He has already called them to schedule an appt and is waiting for a call back   He continues with withdrawal symptoms but has not used since his discharge yesterday  He is now on PO levaquin x 14 days  ROS: Review of Systems   Constitutional: Positive for diaphoresis and fatigue  Negative for chills and fever  HENT: Negative for congestion, ear pain, hearing loss, postnasal drip, rhinorrhea, sinus pain, sinus pressure, sore throat and trouble swallowing  Eyes: Negative for pain and visual disturbance  Respiratory: Negative for cough, chest tightness, shortness of breath and wheezing  Cardiovascular: Negative  Negative for chest pain, palpitations and leg swelling  Gastrointestinal: Negative for abdominal pain, blood in stool, constipation, diarrhea, nausea and vomiting  Endocrine: Negative for cold intolerance, heat intolerance, polydipsia, polyphagia and polyuria  Genitourinary: Negative for difficulty urinating, dysuria, flank pain and urgency  Musculoskeletal: Negative for arthralgias, back pain, gait problem and myalgias  Skin: Negative for rash  Allergic/Immunologic: Negative  Neurological: Negative for dizziness, weakness, light-headedness and headaches  Hematological: Negative  Psychiatric/Behavioral: Negative for behavioral problems, dysphoric mood and sleep disturbance  The patient is nervous/anxious          Past Medical History:   Diagnosis Date    Allergic     Anemia 6/24/2018    Benign essential hypertension 11/17/2016    Claustrophobia 3/15/2018    Community acquired pneumonia 6/24/2018    Depressed 7/14/2016    GERD (gastroesophageal reflux disease)     Hepatitis C virus infection 8/14/2014    Obesity 10/12/2016    Sleep disorder        Past Surgical History:   Procedure Laterality Date    WISDOM TOOTH EXTRACTION         Social History     Social History    Marital status: Single     Spouse name: N/A    Number of children: N/A    Years of education: N/A     Social History Main Topics    Smoking status: Former Smoker     Packs/day: 1 00     Years: 13 00     Types: Cigarettes     Quit date: 6/24/2016    Smokeless tobacco: Never Used    Alcohol use No    Drug use: Yes     Types: Heroin      Comment: 1-2 bags daily    Sexual activity: Not Currently     Other Topics Concern    None     Social History Narrative    H/O intravenous drug use in remission    Hepatitis C virus Infection    Lives with parents    No caffeine use    Unemployed           Family History   Problem Relation Age of Onset    Colon cancer Father     Alzheimer's disease Maternal Grandmother     Depression Family        Allergies   Allergen Reactions    Bee Venom          Current Outpatient Prescriptions:     ARIPiprazole (ABILIFY) 5 mg tablet, take 1 tablet by mouth at bedtime, Disp: 30 tablet, Rfl: 3    EPINEPHrine (EPIPEN 2-ALEXI) 0 3 mg/0 3 mL SOAJ, Inject as directed, Disp: , Rfl:     levofloxacin (LEVAQUIN) 750 mg tablet, Take 1 tablet (750 mg total) by mouth every 24 hours for 12 days, Disp: 12 tablet, Rfl: 0    lisinopril (ZESTRIL) 5 mg tablet, Take 1 tablet by mouth daily, Disp: , Rfl:     venlafaxine (EFFEXOR-XR) 150 mg 24 hr capsule, Take 1 capsule (150 mg total) by mouth daily, Disp: 30 capsule, Rfl: 5  No current facility-administered medications for this visit  Physical Exam:  /80 (BP Location: Left arm, Patient Position: Sitting, Cuff Size: Large)   Pulse 96   Temp 98 6 °F (37 °C) (Tympanic)   Resp 18   Ht 5' 10" (1 778 m)   Wt 135 kg (298 lb)   SpO2 99%   BMI 42 76 kg/m²     Physical Exam   Constitutional: He is oriented to person, place, and time  He appears well-developed and well-nourished  No distress  HENT:   Head: Normocephalic and atraumatic  Right Ear: External ear normal    Left Ear: External ear normal    Nose: Nose normal    Mouth/Throat: Oropharynx is clear and moist  No oropharyngeal exudate  Eyes: Conjunctivae and EOM are normal  Pupils are equal, round, and reactive to light  Right eye exhibits no discharge  Left eye exhibits no discharge  No scleral icterus     Neck: Normal range of motion  Neck supple  No thyromegaly present  Cardiovascular: Normal rate, regular rhythm and normal heart sounds  Exam reveals no gallop and no friction rub  No murmur heard  Pulmonary/Chest: Effort normal and breath sounds normal  No respiratory distress  He has no wheezes  He has no rales  Abdominal: Soft  Bowel sounds are normal  He exhibits no distension  There is no tenderness  Musculoskeletal: Normal range of motion  He exhibits no edema, tenderness or deformity  Neurological: He is alert and oriented to person, place, and time  No cranial nerve deficit  Skin: Skin is warm  He is diaphoretic  Psychiatric: His behavior is normal  Judgment and thought content normal  His mood appears anxious  Labs:  Lab Results   Component Value Date    WBC 7 08 06/27/2018    HGB 11 7 (L) 06/27/2018    HCT 35 2 (L) 06/27/2018    MCV 75 (L) 06/27/2018     06/27/2018     Lab Results   Component Value Date     06/27/2018    K 3 8 06/27/2018     06/27/2018    CO2 27 06/27/2018    ANIONGAP 9 06/27/2018    BUN 5 06/27/2018    CREATININE 0 68 06/27/2018    GLUCOSE 103 06/27/2018    GLUF 90 10/09/2017    CALCIUM 9 0 06/27/2018    AST 27 06/27/2018    ALT 60 06/27/2018    ALKPHOS 57 06/27/2018    PROT 7 3 06/27/2018    BILITOT 0 30 06/27/2018    EGFR 130 06/27/2018           A/P:    1  Sepsis: Pt improving and is to be on antibiotics for 2 weeks  Labs from yesterday and improved  2  IV Heroin Use:  Pt is contacting the methadone clinic to be enrolled again  He was given contact information for a few other clinics  He will be given a 1 week supply of the ativan, clonidine and librium for his withdrawal symptoms in the meantime  3  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

## 2018-06-30 LAB
BACTERIA BLD CULT: NORMAL

## 2018-07-05 ENCOUNTER — TELEPHONE (OUTPATIENT)
Dept: FAMILY MEDICINE CLINIC | Facility: CLINIC | Age: 28
End: 2018-07-05

## 2018-07-05 NOTE — TELEPHONE ENCOUNTER
It was prescribed to help him go through withdrawal, but it has been a week since I ordered this so he may not need it now?

## 2018-07-05 NOTE — TELEPHONE ENCOUNTER
Chlord[facility]oxide 25[facility] is on two benzo;s

## 2018-07-14 ENCOUNTER — HOSPITAL ENCOUNTER (EMERGENCY)
Facility: HOSPITAL | Age: 28
Discharge: HOME | End: 2018-07-14
Attending: EMERGENCY MEDICINE

## 2018-07-14 VITALS
OXYGEN SATURATION: 95 % | DIASTOLIC BLOOD PRESSURE: 64 MMHG | HEART RATE: 98 BPM | BODY MASS INDEX: 42.61 KG/M2 | RESPIRATION RATE: 18 BRPM | TEMPERATURE: 97.5 F | WEIGHT: 296.96 LBS | SYSTOLIC BLOOD PRESSURE: 119 MMHG

## 2018-07-14 DIAGNOSIS — T40.601A OVERDOSE OF OPIATE OR RELATED NARCOTIC, ACCIDENTAL OR UNINTENTIONAL, INITIAL ENCOUNTER (HCC): Primary | ICD-10-CM

## 2018-07-14 PROCEDURE — 99284 EMERGENCY DEPT VISIT MOD MDM: CPT

## 2018-07-14 NOTE — DISCHARGE INSTRUCTIONS
Narcotic Abuse   WHAT YOU NEED TO KNOW:   Narcotics are medicines used to decrease or take away severe pain  Narcotics may also be called opioids  Some common names of narcotics ordered by a doctor are codeine and morphine  Heroin is an illegal street drug that is made from morphine  DISCHARGE INSTRUCTIONS:   Return to the emergency department if:   · You are very drowsy  · Your speech is slurred  · You have trouble thinking, remembering things, or focusing  Contact your healthcare provider if:   · You want help or information on how to stop using or abusing narcotics  Follow up with your healthcare provider as directed:  Write down your questions so you remember to ask them during your visits  Narcotic intoxication  usually lasts for several hours  You may have the following during or after you use narcotics:  · Behavior or mood changes, such as a great feeling followed by the feeling that you do not care about anyone or anything    · Trouble thinking, remembering things, or focusing    · Small pupils    · Feeling very drowsy    · Slurred speech  Narcotic withdrawal  occurs if you stop using narcotics after using them heavily over a period of time  Signs and symptoms may begin within minutes or days and continue for days or even months:  · Depression and anxiety    · Nausea or vomiting    · Muscle aches    · Watery eyes or runny nose    · Large pupils    · Sweating or goosebumps on your skin    · Diarrhea    · Fever    · Trouble sleeping  How narcotics can harm a pregnant woman and her baby:   · Tell your healthcare provider right away if you are trying to get pregnant or you are pregnant and you are using narcotics  Your doctor may suggest other medicines to control pain and prevent withdrawal  If you go through withdrawal while pregnant, you may miscarry your baby, or he may be stillborn  He may be very small and have other medical problems      · When your baby is born, he may show signs of withdrawal  This includes unexpected weight loss, poor feeding, and more crying than normal  Your baby may also have a fever, vomit, and have diarrhea  He may also have learning problems or other health issues when he gets older  If you have a baby and you are using narcotics, you may have trouble caring for your baby  Narcotics may be passed to your baby through breast milk  Talk to your healthcare provider before breastfeeding your baby if you are using narcotics  For support and more information:   · Substance Abuse and [address] , [address] [place]  Web Address: https://[employer]/  · [facility] on Drug Abuse  [address] [place] [address]  Phone: 1- 253 - 650-7348  Web Address: www [employer]  © 2017 [address] [place] [address] Street is for End User's use only and may not be sold, redistributed or otherwise used for commercial purposes  All illustrations and images included in CareNotes® are the copyrighted property of A D A M , Inc  or Yovani Hernandez  The above information is an  only  It is not intended as medical advice for individual conditions or treatments  Talk to your doctor, nurse or pharmacist before following any medical regimen to see if it is safe and effective for you

## 2018-07-14 NOTE — ED PROVIDER NOTES
History  Chief Complaint   Patient presents with    Overdose - Accidental     pt took a bag of what he thinks is fentanyl  found by mother unconscious and blue  now awake     Patient presents via EMS after being found this morning unresponsive and cyanotic by his mother  Patient states that he used 1 bag of what he bought as heroin this morning around 0700 hr, talking to his mother about getting a freeze pop and the next thing he remembers was EMS waking him up  EMS reports that the mother said he was somnolent, appeared cyanotic about the lips but started breathing spontaneously when she shook him  She did not perform any cardiac or respiratory resuscitation  EMS did not give any Narcan  He was awake, alert and oriented for EMS throughout their transport  Patient reports he last used heroin on 24 June, after which he was admitted to the ICU with sepsis  He thinks when he got was fentanyl instead of heroin  He denies concomitant recreational drug, prescription drug or alcohol use  He denies any component of this as means of harming himself, including suicide attempt  Denies past h/o suicide attempt by overdose  He injected into his right antecubital fossa  Patient's only complaint at this time is thirst   Denies f/c, HA, CP, SOB, abdominal pain, n/v/d  12 system ROS o/w negative  History provided by:  Patient and medical records  Overdose - Accidental   Ingested substance:  Illicit drugs  Illicit drug type:   Other  Time since incident:  1 hour  Ingestion amount:  1 bag  Witnesses present: no    Called poison control: no    Incident location:  Home  Context: accidental ingestion and recreational    Context: not suicide attempt    Associated symptoms: lethargy (Resolved) and unresponsiveness (Resolved spontaneously)    Associated symptoms: no abdominal pain, no agitation, no anxiety, no chest pain, no cough, no diaphoresis, no diarrhea, no headaches, no nausea, no shortness of breath and no vomiting    Risk factors: similar prior episodes        Prior to Admission Medications   Prescriptions Last Dose Informant Patient Reported? Taking? ARIPiprazole (ABILIFY) 5 mg tablet   No Yes   Sig: take 1 tablet by mouth at bedtime   EPINEPHrine (EPIPEN 2-ALEXI) 0 3 mg/0 3 mL SOAJ   Yes No   Sig: Inject as directed   LORazepam (ATIVAN) 0 5 mg tablet   No No   Sig: Take 1 tablet (0 5 mg total) by mouth every 8 (eight) hours as needed for anxiety   chlordiazePOXIDE (LIBRIUM) 25 mg capsule   No No   Sig: Take 1 capsule (25 mg total) by mouth 3 (three) times a day as needed for anxiety   cloNIDine (CATAPRES) 0 1 mg tablet   No No   Sig: Take 1 tablet (0 1 mg total) by mouth every 12 (twelve) hours   lisinopril (ZESTRIL) 5 mg tablet   Yes Yes   Sig: Take 1 tablet by mouth daily   venlafaxine (EFFEXOR-XR) 150 mg 24 hr capsule   No No   Sig: Take 1 capsule (150 mg total) by mouth daily      Facility-Administered Medications: None       Past Medical History:   Diagnosis Date    Allergic     Anemia 6/24/2018    Benign essential hypertension 11/17/2016    Claustrophobia 3/15/2018    Community acquired pneumonia 6/24/2018    Depressed 7/14/2016    GERD (gastroesophageal reflux disease)     Hepatitis C virus infection 8/14/2014    Obesity 10/12/2016    Sleep disorder        Past Surgical History:   Procedure Laterality Date    WISDOM TOOTH EXTRACTION         Family History   Problem Relation Age of Onset    Colon cancer Father     Alzheimer's disease Maternal Grandmother     Depression Family      I have reviewed and agree with the history as documented  Social History   Substance Use Topics    Smoking status: Former Smoker     Packs/day: 1 00     Years: 13 00     Types: Cigarettes     Quit date: 6/24/2016    Smokeless tobacco: Never Used    Alcohol use No        Review of Systems   Constitutional: Negative for chills, diaphoresis and fever  HENT: Negative  Eyes: Negative      Respiratory: Negative for cough and shortness of breath  Cardiovascular: Negative for chest pain and palpitations  Gastrointestinal: Negative for abdominal pain, diarrhea, nausea and vomiting  Genitourinary: Negative  Musculoskeletal: Negative for back pain and neck pain  Skin: Negative for color change and wound  Neurological: Negative  Negative for syncope, light-headedness and headaches  Psychiatric/Behavioral: Negative  Negative for agitation, confusion and suicidal ideas  All other systems reviewed and are negative  Physical Exam  Physical Exam   Constitutional: He is oriented to person, place, and time  He appears well-developed and well-nourished  No distress  HENT:   Head: Normocephalic and atraumatic  Mouth/Throat: Oropharynx is clear and moist    Eyes: EOM are normal  Pupils are equal, round, and reactive to light  Neck: Normal range of motion  Neck supple  Cardiovascular: Normal rate and regular rhythm  No murmur heard  Pulmonary/Chest: Effort normal and breath sounds normal  No respiratory distress  Abdominal: Soft  Bowel sounds are normal    Neurological: He is alert and oriented to person, place, and time  No cranial nerve deficit or sensory deficit  He exhibits normal muscle tone  Coordination normal    Skin: Skin is warm and dry  Psychiatric: He has a normal mood and affect  His behavior is normal  Thought content normal    Vitals reviewed        Vital Signs  ED Triage Vitals [07/14/18 0801]   Temperature Pulse Respirations Blood Pressure SpO2   97 5 °F (36 4 °C) (!) 116 18 111/70 95 %      Temp Source Heart Rate Source Patient Position - Orthostatic VS BP Location FiO2 (%)   Temporal Monitor Sitting Left arm --      Pain Score       No Pain           Vitals:    07/14/18 0801   BP: 111/70   Pulse: (!) 116   Patient Position - Orthostatic VS: Sitting       Visual Acuity      ED Medications  Medications - No data to display    Diagnostic Studies  Results Reviewed     None No orders to display              Procedures  Procedures       Phone Contacts  ED Phone Contact    ED Course                               MDM  Number of Diagnoses or Management Options  Diagnosis management comments: DDx:  Narcotic overdose, no SI  A/P: Will monitor for re-sedation, discharge into the responsibility of a sober adult  Amount and/or Complexity of Data Reviewed  Obtain history from someone other than the patient: yes (EMS)  Review and summarize past medical records: yes      CritCare Time    Disposition  Final diagnoses:   Overdose of opiate or related narcotic, accidental or unintentional, initial encounter     Time reflects when diagnosis was documented in both MDM as applicable and the Disposition within this note     Time User Action Codes Description Comment    7/14/2018  8:40 AM Jamir Barnett Add [T47 [id number]] Overdose of opiate or related narcotic, accidental or unintentional, initial encounter       ED Disposition     ED Disposition Condition Comment    Discharge  Tarah Hence discharge to home/self care  Condition at discharge: Stable        Follow-up Information     Follow up With Specialties Details Why [facility] Internal Medicine Schedule an appointment as soon as possible for a visit If symptoms worsen [address] [place] [address]  [place] [address]  [phone]            Patient's Medications   Discharge Prescriptions    No medications on file     No discharge procedures on file      ED Provider  Electronically Signed by           John Tee DO  07/14/18 0814

## 2018-07-14 NOTE — ED NOTES
Requesting a freeze pop and water   Given same with no n/v     Kristopher Butts, KATEHRINE  07/14/18 1908

## 2018-07-24 ENCOUNTER — OFFICE VISIT (OUTPATIENT)
Dept: INTERNAL MEDICINE CLINIC | Facility: CLINIC | Age: 28
End: 2018-07-24

## 2018-07-24 VITALS
WEIGHT: 282.38 LBS | OXYGEN SATURATION: 98 % | HEART RATE: 97 BPM | HEIGHT: 70 IN | BODY MASS INDEX: 40.43 KG/M2 | TEMPERATURE: 98.4 F | SYSTOLIC BLOOD PRESSURE: 110 MMHG | DIASTOLIC BLOOD PRESSURE: 60 MMHG

## 2018-07-24 DIAGNOSIS — F33.41 RECURRENT MAJOR DEPRESSIVE DISORDER, IN PARTIAL REMISSION (HCC): Primary | ICD-10-CM

## 2018-07-24 DIAGNOSIS — I10 ESSENTIAL HYPERTENSION: ICD-10-CM

## 2018-07-24 DIAGNOSIS — F41.9 ANXIETY: ICD-10-CM

## 2018-07-24 DIAGNOSIS — F11.10 HEROIN ABUSE (HCC): ICD-10-CM

## 2018-07-24 DIAGNOSIS — F31.9 BIPOLAR 1 DISORDER (HCC): ICD-10-CM

## 2018-07-24 PROBLEM — J96.02 ACUTE RESPIRATORY FAILURE WITH HYPOXIA AND HYPERCAPNIA (HCC): Status: RESOLVED | Noted: 2018-06-24 | Resolved: 2018-07-24

## 2018-07-24 PROBLEM — A41.9 SEVERE SEPSIS (HCC): Status: RESOLVED | Noted: 2018-06-24 | Resolved: 2018-07-24

## 2018-07-24 PROBLEM — J96.01 ACUTE RESPIRATORY FAILURE WITH HYPOXIA AND HYPERCAPNIA (HCC): Status: RESOLVED | Noted: 2018-06-24 | Resolved: 2018-07-24

## 2018-07-24 PROBLEM — R78.81 BACTEREMIA DUE TO GRAM-NEGATIVE BACTERIA: Status: RESOLVED | Noted: 2018-06-25 | Resolved: 2018-07-24

## 2018-07-24 PROBLEM — T40.1X1A HEROIN OVERDOSE, ACCIDENTAL OR UNINTENTIONAL, INITIAL ENCOUNTER (HCC): Status: RESOLVED | Noted: 2018-06-24 | Resolved: 2018-07-24

## 2018-07-24 PROBLEM — R19.7 DIARRHEA: Status: RESOLVED | Noted: 2018-06-24 | Resolved: 2018-07-24

## 2018-07-24 PROBLEM — R65.20 SEVERE SEPSIS (HCC): Status: RESOLVED | Noted: 2018-06-24 | Resolved: 2018-07-24

## 2018-07-24 PROBLEM — J18.9 COMMUNITY ACQUIRED PNEUMONIA: Status: RESOLVED | Noted: 2018-06-24 | Resolved: 2018-07-24

## 2018-07-24 PROBLEM — D64.9 ANEMIA: Status: RESOLVED | Noted: 2018-06-24 | Resolved: 2018-07-24

## 2018-07-24 PROCEDURE — 99212 OFFICE O/P EST SF 10 MIN: CPT | Performed by: PHYSICIAN ASSISTANT

## 2018-07-24 RX ORDER — LISINOPRIL 5 MG/1
5 TABLET ORAL DAILY
Qty: 30 TABLET | Refills: 1 | Status: SHIPPED | OUTPATIENT
Start: 2018-07-24 | End: 2018-12-19

## 2018-07-24 RX ORDER — ARIPIPRAZOLE 5 MG/1
5 TABLET ORAL
Qty: 30 TABLET | Refills: 0 | Status: SHIPPED | OUTPATIENT
Start: 2018-07-24 | End: 2018-12-19

## 2018-07-24 RX ORDER — DICLOFENAC SODIUM 75 MG/1
TABLET, DELAYED RELEASE ORAL
Refills: 0 | COMMUNITY
Start: 2018-07-13 | End: 2019-01-22

## 2018-07-24 RX ORDER — FLUOXETINE 20 MG/1
20 TABLET, FILM COATED ORAL DAILY
Qty: 30 TABLET | Refills: 2 | Status: SHIPPED | OUTPATIENT
Start: 2018-07-24 | End: 2018-08-23

## 2018-07-24 NOTE — PROGRESS NOTES
Assessment/Plan:    Depressed  Start prozac to combat depression and anxiety    Heroin abuse  Pt will try methadone clinic again  He will call us when he gets insurance to pursue Vivitrol  Diagnoses and all orders for this visit:    Recurrent major depressive disorder, in partial remission (HCC)  -     FLUoxetine (PROzac) 20 MG tablet; Take 1 tablet (20 mg total) by mouth daily    Bipolar 1 disorder (HCC)  -     ARIPiprazole (ABILIFY) 5 mg tablet; Take 1 tablet (5 mg total) by mouth daily at bedtime    Essential hypertension  -     lisinopril (ZESTRIL) 5 mg tablet; Take 1 tablet (5 mg total) by mouth daily    Anxiety    Heroin abuse    Other orders  -     diclofenac (VOLTAREN) 75 mg EC tablet;           Subjective:      Patient ID: Yuli Duenas is a 28 y o  male  Pt presents for follow up visit  He was unable to get in with the methadone clinic as they did not return his phone calls  He maintained his sobriety until July 14 when he used heroin again and suffered another heroin overdose requiring a trip to the ER  He states he has not used since the 14th  He is interested in Vivitrol as suboxone made him sick, however, he does not have insurance right now and probably will not for another month or so  He also notes some ongoing depression  He does not feel effexor is effective anymore  The following portions of the patient's history were reviewed and updated as appropriate:   He  has a past medical history of Allergic; Anemia (6/24/2018); Benign essential hypertension (11/17/2016); Claustrophobia (3/15/2018); Community acquired pneumonia (6/24/2018); Depressed (7/14/2016); GERD (gastroesophageal reflux disease); Hepatitis C virus infection (8/14/2014); Heroin abuse (7/24/2018); Obesity (10/12/2016); and Sleep disorder    He   Patient Active Problem List    Diagnosis Date Noted    Heroin abuse 07/24/2018    [facility] 06/28/2018    Obstructive sleep apnea 06/24/2018    Claustrophobia 03/15/2018    Restless leg syndrome 03/15/2018    Mood disturbance ([place] [id number] ) 03/15/2018    Morbid obesity with BMI of 45 0-49 9, adult ([place] [id number] ) 03/15/2018    Chronic pain disorder 03/15/2018    Sleep-related hypoventilation 08/02/2017    Acid reflux 06/29/2017    Essential hypertension 11/17/2016    Generalized anxiety disorder 10/19/2016    Obesity 10/12/2016    Anxiety 07/14/2016    Depressed 07/14/2016    Obesity, Class III, BMI 40-49 9 (morbid obesity) ([place] [id number] ) 07/14/2016    Chronic hepatitis C without hepatic coma ([place] [id number] ) 02/21/2016    Bipolar disorder ([place] [id number] ) 04/03/2015    Allergic rhinitis 08/14/2014    Insomnia 08/14/2014    Opioid dependence in remission ([place] [id number] ) 08/14/2014     He  has a past surgical history that includes [place] tooth extraction  His family history includes Alzheimer's disease in his maternal grandmother; Colon cancer in his father; Depression in his family  He  reports that he quit smoking about 2 years ago  His smoking use included Cigarettes  He has a 13 00 pack-year smoking history  He has never used smokeless tobacco  He reports that he uses drugs, including Heroin  He reports that he does not drink alcohol  Current Outpatient Prescriptions   Medication Sig Dispense Refill    ARIPiprazole (ABILIFY) 5 mg tablet Take 1 tablet (5 mg total) by mouth daily at bedtime 30 tablet 0    EPINEPHrine (EPIPEN 2-ALEXI) 0 3 mg/0 3 mL SOAJ Inject as directed      lisinopril (ZESTRIL) 5 mg tablet Take 1 tablet (5 mg total) by mouth daily 30 tablet 1    diclofenac (VOLTAREN) 75 mg EC tablet   0    FLUoxetine (PROzac) 20 MG tablet Take 1 tablet (20 mg total) by mouth daily 30 tablet 2     No current facility-administered medications for this visit        Current Outpatient Prescriptions on File Prior to Visit   Medication Sig    EPINEPHrine (EPIPEN 2-ALEXI) 0 3 mg/0 3 mL SOAJ Inject as directed    [DISCONTINUED] ARIPiprazole (ABILIFY) 5 mg tablet take 1 tablet by mouth at bedtime    [DISCONTINUED] lisinopril (ZESTRIL) 5 mg tablet Take 1 tablet by mouth daily    [DISCONTINUED] chlordiazePOXIDE (LIBRIUM) 25 mg capsule Take 1 capsule (25 mg total) by mouth 3 (three) times a day as needed for anxiety    [DISCONTINUED] cloNIDine (CATAPRES) 0 1 mg tablet Take 1 tablet (0 1 mg total) by mouth every 12 (twelve) hours    [DISCONTINUED] LORazepam (ATIVAN) 0 5 mg tablet Take 1 tablet (0 5 mg total) by mouth every 8 (eight) hours as needed for anxiety    [DISCONTINUED] venlafaxine (EFFEXOR-XR) 150 mg 24 hr capsule Take 1 capsule (150 mg total) by mouth daily     No current facility-administered medications on file prior to visit  He is allergic to bee venom       Review of Systems   Constitutional: Negative for chills and fever  HENT: Negative for congestion, ear pain, hearing loss, postnasal drip, rhinorrhea, sinus pain, sinus pressure, sore throat and trouble swallowing  Eyes: Negative for pain and visual disturbance  Respiratory: Negative for cough, chest tightness, shortness of breath and wheezing  Cardiovascular: Negative  Negative for chest pain, palpitations and leg swelling  Gastrointestinal: Negative for abdominal pain, blood in stool, constipation, diarrhea, nausea and vomiting  Endocrine: Negative for cold intolerance, heat intolerance, polydipsia, polyphagia and polyuria  Genitourinary: Negative for difficulty urinating, dysuria, flank pain and urgency  Musculoskeletal: Negative for arthralgias, back pain, gait problem and myalgias  Skin: Negative for rash  Allergic/Immunologic: Negative  Neurological: Negative for dizziness, weakness, light-headedness and headaches  Hematological: Negative  Psychiatric/Behavioral: Positive for dysphoric mood  Negative for behavioral problems and sleep disturbance  The patient is nervous/anxious            Objective:      /60   Pulse 97   Temp 98 4 °F (36 9 °C)   Ht 5' 10" (1 778 m)   Wt 128 kg (282 lb 6 oz) SpO2 98%   BMI 40 52 kg/m²          Physical Exam   Constitutional: He is oriented to person, place, and time  He appears well-developed and well-nourished  No distress  HENT:   Head: Normocephalic and atraumatic  Right Ear: External ear normal    Left Ear: External ear normal    Nose: Nose normal    Mouth/Throat: Oropharynx is clear and moist  No oropharyngeal exudate  Eyes: Conjunctivae and EOM are normal  Pupils are equal, round, and reactive to light  Right eye exhibits no discharge  Left eye exhibits no discharge  No scleral icterus  Neck: Normal range of motion  Neck supple  No thyromegaly present  Cardiovascular: Normal rate, regular rhythm and normal heart sounds  Exam reveals no gallop and no friction rub  No murmur heard  Pulmonary/Chest: Effort normal and breath sounds normal  No respiratory distress  He has no wheezes  He has no rales  Abdominal: Soft  Bowel sounds are normal  He exhibits no distension  There is no tenderness  Musculoskeletal: Normal range of motion  He exhibits no edema, tenderness or deformity  Neurological: He is alert and oriented to person, place, and time  No cranial nerve deficit  Skin: Skin is warm and dry  He is not diaphoretic  Psychiatric: His behavior is normal  Judgment and thought content normal  His mood appears anxious  He exhibits a depressed mood

## 2018-08-17 ENCOUNTER — APPOINTMENT (EMERGENCY)
Dept: CT IMAGING | Facility: HOSPITAL | Age: 28
End: 2018-08-17

## 2018-08-17 ENCOUNTER — HOSPITAL ENCOUNTER (EMERGENCY)
Facility: HOSPITAL | Age: 28
Discharge: HOME | End: 2018-08-17
Attending: EMERGENCY MEDICINE | Admitting: EMERGENCY MEDICINE

## 2018-08-17 VITALS
BODY MASS INDEX: 40.87 KG/M2 | DIASTOLIC BLOOD PRESSURE: 71 MMHG | SYSTOLIC BLOOD PRESSURE: 142 MMHG | OXYGEN SATURATION: 97 % | RESPIRATION RATE: 16 BRPM | WEIGHT: 284.83 LBS | TEMPERATURE: 97.7 F | HEART RATE: 91 BPM

## 2018-08-17 DIAGNOSIS — F11.23 WITHDRAWAL FROM OPIOIDS (HCC): ICD-10-CM

## 2018-08-17 DIAGNOSIS — T40.1X1A HEROIN OVERDOSE, ACCIDENTAL OR UNINTENTIONAL, INITIAL ENCOUNTER (HCC): Primary | ICD-10-CM

## 2018-08-17 DIAGNOSIS — S00.83XA CONTUSION OF FOREHEAD, INITIAL ENCOUNTER: ICD-10-CM

## 2018-08-17 LAB
ALBUMIN SERPL BCP-MCNC: 3.5 G/DL (ref 3.5–5)
ALP SERPL-CCNC: 70 U/L (ref 46–116)
ALT SERPL W P-5'-P-CCNC: 83 U/L (ref 12–78)
AMPHETAMINES SERPL QL SCN: POSITIVE
ANION GAP SERPL CALCULATED.3IONS-SCNC: 10 MMOL/L (ref 4–13)
APAP SERPL-MCNC: <2 UG/ML (ref 10–30)
APTT PPP: 25 SECONDS (ref 24–36)
AST SERPL W P-5'-P-CCNC: 35 U/L (ref 5–45)
ATRIAL RATE: 96 BPM
BARBITURATES UR QL: NEGATIVE
BASOPHILS # BLD AUTO: 0.01 THOUSANDS/ΜL (ref 0–0.1)
BASOPHILS NFR BLD AUTO: 0 % (ref 0–1)
BENZODIAZ UR QL: NEGATIVE
BILIRUB SERPL-MCNC: 0.2 MG/DL (ref 0.2–1)
BUN SERPL-MCNC: 15 MG/DL (ref 5–25)
CALCIUM SERPL-MCNC: 8.8 MG/DL (ref 8.3–10.1)
CHLORIDE SERPL-SCNC: 103 MMOL/L (ref 100–108)
CK SERPL-CCNC: 142 U/L (ref 39–308)
CO2 SERPL-SCNC: 25 MMOL/L (ref 21–32)
COCAINE UR QL: NEGATIVE
CREAT SERPL-MCNC: 0.91 MG/DL (ref 0.6–1.3)
EOSINOPHIL # BLD AUTO: 0.13 THOUSAND/ΜL (ref 0–0.61)
EOSINOPHIL NFR BLD AUTO: 3 % (ref 0–6)
ERYTHROCYTE [DISTWIDTH] IN BLOOD BY AUTOMATED COUNT: 15.3 % (ref 11.6–15.1)
ETHANOL SERPL-MCNC: <3 MG/DL (ref 0–3)
GFR SERPL CREATININE-BSD FRML MDRD: 114 ML/MIN/1.73SQ M
GLUCOSE SERPL-MCNC: 174 MG/DL (ref 65–140)
HCT VFR BLD AUTO: 41.3 % (ref 36.5–49.3)
HGB BLD-MCNC: 13.1 G/DL (ref 12–17)
IMM GRANULOCYTES # BLD AUTO: 0.02 THOUSAND/UL (ref 0–0.2)
IMM GRANULOCYTES NFR BLD AUTO: 0 % (ref 0–2)
INR PPP: 0.93 (ref 0.86–1.17)
LACTATE SERPL-SCNC: 1.1 MMOL/L (ref 0.5–2)
LYMPHOCYTES # BLD AUTO: 1.31 THOUSANDS/ΜL (ref 0.6–4.47)
LYMPHOCYTES NFR BLD AUTO: 26 % (ref 14–44)
MCH RBC QN AUTO: 24.9 PG (ref 26.8–34.3)
MCHC RBC AUTO-ENTMCNC: 31.7 G/DL (ref 31.4–37.4)
MCV RBC AUTO: 79 FL (ref 82–98)
METHADONE UR QL: NEGATIVE
MONOCYTES # BLD AUTO: 0.23 THOUSAND/ΜL (ref 0.17–1.22)
MONOCYTES NFR BLD AUTO: 5 % (ref 4–12)
NEUTROPHILS # BLD AUTO: 3.4 THOUSANDS/ΜL (ref 1.85–7.62)
NEUTS SEG NFR BLD AUTO: 66 % (ref 43–75)
NRBC BLD AUTO-RTO: 0 /100 WBCS
OPIATES UR QL SCN: POSITIVE
P AXIS: 70 DEGREES
PCP UR QL: NEGATIVE
PLATELET # BLD AUTO: 200 THOUSANDS/UL (ref 149–390)
PMV BLD AUTO: 9.1 FL (ref 8.9–12.7)
POTASSIUM SERPL-SCNC: 3.8 MMOL/L (ref 3.5–5.3)
PR INTERVAL: 166 MS
PROT SERPL-MCNC: 7.1 G/DL (ref 6.4–8.2)
PROTHROMBIN TIME: 12 SECONDS (ref 11.8–14.2)
QRS AXIS: 80 DEGREES
QRSD INTERVAL: 98 MS
QT INTERVAL: 368 MS
QTC INTERVAL: 464 MS
RBC # BLD AUTO: 5.26 MILLION/UL (ref 3.88–5.62)
SALICYLATES SERPL-MCNC: <3 MG/DL (ref 3–20)
SODIUM SERPL-SCNC: 138 MMOL/L (ref 136–145)
T WAVE AXIS: 50 DEGREES
THC UR QL: POSITIVE
TROPONIN I SERPL-MCNC: <0.02 NG/ML
VENTRICULAR RATE: 96 BPM
WBC # BLD AUTO: 5.1 THOUSAND/UL (ref 4.31–10.16)

## 2018-08-17 PROCEDURE — 36415 COLL VENOUS BLD VENIPUNCTURE: CPT | Performed by: EMERGENCY MEDICINE

## 2018-08-17 PROCEDURE — 99285 EMERGENCY DEPT VISIT HI MDM: CPT

## 2018-08-17 PROCEDURE — 85730 THROMBOPLASTIN TIME PARTIAL: CPT | Performed by: EMERGENCY MEDICINE

## 2018-08-17 PROCEDURE — 83605 ASSAY OF LACTIC ACID: CPT | Performed by: EMERGENCY MEDICINE

## 2018-08-17 PROCEDURE — 71260 CT THORAX DX C+: CPT

## 2018-08-17 PROCEDURE — 96361 HYDRATE IV INFUSION ADD-ON: CPT

## 2018-08-17 PROCEDURE — 80053 COMPREHEN METABOLIC PANEL: CPT | Performed by: EMERGENCY MEDICINE

## 2018-08-17 PROCEDURE — 80307 DRUG TEST PRSMV CHEM ANLYZR: CPT | Performed by: EMERGENCY MEDICINE

## 2018-08-17 PROCEDURE — 93010 ELECTROCARDIOGRAM REPORT: CPT | Performed by: INTERNAL MEDICINE

## 2018-08-17 PROCEDURE — 70450 CT HEAD/BRAIN W/O DYE: CPT

## 2018-08-17 PROCEDURE — 87040 BLOOD CULTURE FOR BACTERIA: CPT | Performed by: EMERGENCY MEDICINE

## 2018-08-17 PROCEDURE — 80320 DRUG SCREEN QUANTALCOHOLS: CPT | Performed by: EMERGENCY MEDICINE

## 2018-08-17 PROCEDURE — 93005 ELECTROCARDIOGRAM TRACING: CPT

## 2018-08-17 PROCEDURE — 85025 COMPLETE CBC W/AUTO DIFF WBC: CPT | Performed by: EMERGENCY MEDICINE

## 2018-08-17 PROCEDURE — 80329 ANALGESICS NON-OPIOID 1 OR 2: CPT | Performed by: EMERGENCY MEDICINE

## 2018-08-17 PROCEDURE — 72125 CT NECK SPINE W/O DYE: CPT

## 2018-08-17 PROCEDURE — 85610 PROTHROMBIN TIME: CPT | Performed by: EMERGENCY MEDICINE

## 2018-08-17 PROCEDURE — 96374 THER/PROPH/DIAG INJ IV PUSH: CPT

## 2018-08-17 PROCEDURE — 74177 CT ABD & PELVIS W/CONTRAST: CPT

## 2018-08-17 PROCEDURE — 82550 ASSAY OF CK (CPK): CPT | Performed by: EMERGENCY MEDICINE

## 2018-08-17 PROCEDURE — 84484 ASSAY OF TROPONIN QUANT: CPT | Performed by: EMERGENCY MEDICINE

## 2018-08-17 RX ORDER — LOPERAMIDE HYDROCHLORIDE 2 MG/1
2 CAPSULE ORAL 4 TIMES DAILY PRN
Qty: 12 CAPSULE | Refills: 0 | Status: SHIPPED | OUTPATIENT
Start: 2018-08-17 | End: 2018-09-25

## 2018-08-17 RX ORDER — CLONIDINE HYDROCHLORIDE 0.1 MG/1
0.2 TABLET ORAL ONCE
Status: COMPLETED | OUTPATIENT
Start: 2018-08-17 | End: 2018-08-17

## 2018-08-17 RX ORDER — ONDANSETRON 2 MG/ML
4 INJECTION INTRAMUSCULAR; INTRAVENOUS ONCE
Status: COMPLETED | OUTPATIENT
Start: 2018-08-17 | End: 2018-08-17

## 2018-08-17 RX ORDER — CLONIDINE HYDROCHLORIDE 0.2 MG/1
0.2 TABLET ORAL 2 TIMES DAILY PRN
Qty: 30 TABLET | Refills: 0 | Status: SHIPPED | OUTPATIENT
Start: 2018-08-17 | End: 2018-09-25

## 2018-08-17 RX ORDER — ONDANSETRON 4 MG/1
4 TABLET, ORALLY DISINTEGRATING ORAL EVERY 6 HOURS PRN
Qty: 12 TABLET | Refills: 0 | Status: SHIPPED | OUTPATIENT
Start: 2018-08-17 | End: 2018-09-25

## 2018-08-17 RX ORDER — LOPERAMIDE HYDROCHLORIDE 2 MG/1
4 CAPSULE ORAL ONCE
Status: COMPLETED | OUTPATIENT
Start: 2018-08-17 | End: 2018-08-17

## 2018-08-17 RX ADMIN — SODIUM CHLORIDE 1000 ML: 0.9 INJECTION, SOLUTION INTRAVENOUS at 08:57

## 2018-08-17 RX ADMIN — IOHEXOL 100 ML: 350 INJECTION, SOLUTION INTRAVENOUS at 07:42

## 2018-08-17 RX ADMIN — LOPERAMIDE HYDROCHLORIDE 4 MG: 2 CAPSULE ORAL at 12:17

## 2018-08-17 RX ADMIN — ONDANSETRON 4 MG: 2 INJECTION INTRAMUSCULAR; INTRAVENOUS at 12:17

## 2018-08-17 RX ADMIN — CLONIDINE HYDROCHLORIDE 0.2 MG: 0.1 TABLET ORAL at 12:17

## 2018-08-17 NOTE — DISCHARGE INSTRUCTIONS
Head Injury   WHAT YOU NEED TO KNOW:   A head injury is most often caused by a blow to the head  This may occur from a fall, bicycle injury, sports injury, being struck in the head, or a motor vehicle accident  DISCHARGE INSTRUCTIONS:   Call 911 or have someone else call for any of the following:   · You cannot be woken  · You have a seizure  · You stop responding to others or you faint  · You have blurry or double vision  · Your speech becomes slurred or confused  · You have arm or leg weakness, loss of feeling, or new problems with coordination  · Your pupils are larger than usual or one pupil is a different size than the other  · You have blood or clear fluid coming out of your ears or nose  Return to the emergency department if:   · You have repeated or forceful vomiting  · You feel confused  · Your headache gets worse or becomes severe  · You or someone caring for you notices that you are harder to wake than usual   Contact your healthcare provider if:   · Your symptoms last longer than 6 weeks after the injury  · You have questions or concerns about your condition or care  Medicines:   · Acetaminophen  decreases pain  Acetaminophen is available without a doctor's order  Ask how much to take and how often to take it  Follow directions  Acetaminophen can cause liver damage if not taken correctly  · Take your medicine as directed  Contact your healthcare provider if you think your medicine is not helping or if you have side effects  Tell him or her if you are allergic to any medicine  Keep a list of the medicines, vitamins, and herbs you take  Include the amounts, and when and why you take them  Bring the list or the pill bottles to follow-up visits  Carry your medicine list with you in case of an emergency  Self-care:   · Rest  or do quiet activities for 24 to 48 hours   Limit your time watching TV, using the computer, or doing tasks that require a lot of thinking  Slowly return to your normal activities as directed  Do not play sports or do activities that may cause you to get hit in the head  Ask your healthcare provider when you can return to sports  · Apply ice  on your head for 15 to 20 minutes every hour or as directed  Use an ice pack, or put crushed ice in a plastic bag  Cover it with a towel before you apply it to your skin  Ice helps prevent tissue damage and decreases swelling and pain  · Have someone stay with you for 24 hours  or as directed  This person can monitor you for complications and call [phone]  When you are awake the person should ask you a few questions to see if you are thinking clearly  An example would be to ask your name or your address  Prevent another head injury:   · Wear a helmet that fits properly  Do this when you play sports, or ride a bike, scooter, or skateboard  Helmets help decrease your risk of a serious head injury  Talk to your healthcare provider about other ways you can protect yourself if you play sports  · Wear your seat belt every time you are in a car  This helps to decrease your risk for a head injury if you are in a car accident  Follow up with your healthcare provider as directed:  Write down your questions so you remember to ask them during your visits  © 2017 [employer]  Information is for End User's use only and may not be sold, redistributed or otherwise used for commercial purposes  All illustrations and images included in CareNotes® are the copyrighted property of [employer]  or [employer]  The above information is an  only  It is not intended as medical advice for individual conditions or treatments  Talk to your doctor, nurse or pharmacist before following any medical regimen to see if it is safe and effective for you  Opioid Overdose   WHAT YOU NEED TO KNOW:   Opioids are prescription medicines used to treat pain   Some examples include morphine, codeine, methadone, oxycodone, and hydrocodone  An overdose can occur if you take more than the recommended amount  It can also occur if you take opioids with alcohol or certain medicines that can cause harm if taken together  An overdose can also occur if you take an opioid that was prescribed for someone else  Learn to take these medicines safely  An opioid overdose can be life-threatening  DISCHARGE INSTRUCTIONS:   Call 911 for any of the following:   · You have trouble staying awake and your breathing is slow or shallow  Return to the emergency department if:   · Your speech is slurred, or you are confused  · You are dizzy or stumble when you walk  · You are extremely drowsy, or you have trouble staying awake or speaking  · Your body is limp  · You have pale or clammy skin  · You have blue fingernails or lips  · Your heartbeat is slower than normal   Contact your healthcare provider if:   · You have questions or concerns about your condition or care  Follow up with your healthcare provider as directed:  Write down your questions so you remember to ask them during your visits  Prevent opioid overdose:   · Take your medicine exactly as directed  Do not take more of the recommended amount of opioids each time you take it  Do not take opioids more often than recommended  If you use a pain patch, be sure to remove the old patch before you place a new one  · Do not take opioids that belong to someone else  The amount of opioids that person takes may not be right for you  · Do not mix opioids with alcohol, sleeping pills, or street drugs  The combination of these substances can cause an overdose  · Learn about the signs of an overdose  so you know how to respond  Tell others about these symptoms so they will know what to do if needed  Talk to your healthcare provider about naloxone  In some states, you may be able to keep naloxone at home in case of an overdose   Your family and friends can also be trained on how to give it to you if needed  · Keep opioids out of the reach of children  Store opioids in a locked cabinet or in a location that children cannot get to  Ask your healthcare provider how to dispose of any unused opioid medicines  Counseling: Your healthcare provider may recommend that you see a counselor if you are abusing opioids  © 2017 [employer] [place] Information is for End User's use only and may not be sold, redistributed or otherwise used for commercial purposes  All illustrations and images included in CareNotes® are the copyrighted property of [employer]  or [employer]ov[employer] Hernandez  The above information is an  only  It is not intended as medical advice for individual conditions or treatments  Talk to your doctor, nurse or pharmacist before following any medical regimen to see if it is safe and effective for you

## 2018-08-17 NOTE — ED PROVIDER NOTES
History  Chief Complaint   Patient presents with    Heroin Overdose - Accidental     Patient OD on heroin and fell forward hitting head off some bricks  Claims to use one bag of heroin     Fall     Patient: Carline Kwong  27 y o /male  YOB: 1990  MRN: [id number]  PCP: Halle Bailey DO  Date of evaluation: 8/17/2018    (N B   [address] may have been used in the preparation of this document  Occasional wrong word or "sound-alike" substitutions may have occurred due to the inherent limitations of voice recognition software  Interpretation should be guided by context )    History is from EMS - pt only remembers using one bag of heroin  The next thing he remembers is being in the ambulance  Bystanders reported that he fell forward and struck his head on bricks  On BLS arrival he had agonal respirations and O2 sat in the 50s  They placed a nasal airway and bagged him  ALS arrived and treated pt with naloxone  Pt did respond, although en route he did not respond to  paramedic's questioning - he responded briskly to a sternal rub and then remained alert  History provided by:  Patient and EMS personnel  History limited by:  Mental status change  Overdose - Accidental   Ingested substance: heroin  Witnesses present: yes    Called poison control: no    Incident location:  Home  Context: recreational    Associated symptoms: altered mental status and unresponsiveness        Prior to Admission Medications   Prescriptions Last Dose Informant Patient Reported? Taking?    ARIPiprazole (ABILIFY) 5 mg tablet   No Yes   Sig: Take 1 tablet (5 mg total) by mouth daily at bedtime   EPINEPHrine (EPIPEN 2-ALEXI) 0 3 mg/0 3 mL SOAJ   Yes Yes   Sig: Inject as directed   FLUoxetine (PROzac) 20 MG tablet   No Yes   Sig: Take 1 tablet (20 mg total) by mouth daily   diclofenac (VOLTAREN) 75 mg EC tablet   Yes No   lisinopril (ZESTRIL) 5 mg tablet   No Yes   Sig: Take 1 tablet (5 mg total) by mouth daily      Facility-Administered Medications: None       Past Medical History:   Diagnosis Date    Allergic     Anemia 6/24/2018    Anxiety     from records    Benign essential hypertension 11/17/2016    Bipolar 1 disorder ([address] )     from records    Chronic pain     Claustrophobia 3/15/2018    Community acquired pneumonia 6/24/2018    Depressed 7/14/2016    Depression     from records    GERD (gastroesophageal reflux disease)     Hepatitis C virus infection 8/14/2014    Heroin abuse 7/24/2018    Obesity 10/12/2016    Obstructive sleep apnea     from records    Sleep disorder        Past Surgical History:   Procedure Laterality Date    WISDOM TOOTH EXTRACTION         Family History   Problem Relation Age of Onset    Colon cancer Father     Alzheimer's disease Maternal Grandmother     Depression Family      I have reviewed and agree with the history as documented  Social History   Substance Use Topics    Smoking status: Former Smoker     Packs/day: 1 00     Years: 13 00     Types: Cigarettes     Quit date: 6/24/2016    Smokeless tobacco: Never Used    Alcohol use No        Review of Systems   Unable to perform ROS: Mental status change       Physical Exam  Physical Exam   Constitutional: He appears well-developed  He appears listless  Non-toxic appearance  HENT:   Head: Normocephalic  Head is without raccoon's eyes and without Zhang's sign  Right Ear: Tympanic membrane and ear canal normal    Left Ear: Tympanic membrane and ear canal normal    Nose: No rhinorrhea, nasal deformity or nasal septal hematoma  Mouth/Throat: Uvula is midline, oropharynx is clear and moist and mucous membranes are normal    Eyes: Conjunctivae and EOM are normal  Pupils are equal, round, and reactive to light  Right pupil is round  Left pupil is round  Pupils are equal    2mm not reactive   Neck: No spinous process tenderness present     Cardiovascular: Normal rate, regular rhythm and intact distal pulses  Pulmonary/Chest: Effort normal and breath sounds normal    Symmetric expansion   Abdominal: Soft  Normal appearance and bowel sounds are normal  There is no tenderness  No ecchymosis   Musculoskeletal:   Nml ROM all extremities  No deformity  Neurological: He has normal strength  He appears listless  No cranial nerve deficit  GCS eye subscore is 3  GCS verbal subscore is 5  GCS motor subscore is 6  Skin: Skin is warm and dry  Capillary refill takes less than 2 seconds  Psychiatric: He has a normal mood and affect  His speech is normal and behavior is normal  He is attentive  Nursing note and vitals reviewed        Vital Signs  ED Triage Vitals   Temperature Pulse Respirations Blood Pressure SpO2   08/17/18 0710 08/17/18 0710 08/17/18 0710 08/17/18 0710 08/17/18 0710   97 7 °F (36 5 °C) 103 16 122/79 (!) 85 %      Temp Source Heart Rate Source Patient Position - Orthostatic VS BP Location FiO2 (%)   08/17/18 0710 08/17/18 0725 08/17/18 0710 08/17/18 0710 --   Temporal Monitor Lying Left arm       Pain Score       08/17/18 0710       6           Vitals:    08/17/18 1011 08/17/18 1055 08/17/18 1155 08/17/18 1255   BP: 137/74 141/66 149/76 142/71   Pulse: 91 89 99 91   Patient Position - Orthostatic VS:    Lying       Visual Acuity  Visual Acuity      Most Recent Value   L Pupil Size (mm)  3   R Pupil Size (mm)  3          ED Medications  Medications    EMS REPLENISHMENT MED ( Does not apply Given to EMS 8/17/18 0745)   iohexol (OMNIPAQUE) 350 MG/ML injection (SINGLE-DOSE) 100 mL (100 mL Intravenous Given 8/17/18 0742)   sodium chloride 0 9 % bolus 1,000 mL (0 mL Intravenous Stopped 8/17/18 1011)   ondansetron (ZOFRAN) injection 4 mg (4 mg Intravenous Given 8/17/18 1217)   cloNIDine (CATAPRES) tablet 0 2 mg (0 2 mg Oral Given 8/17/18 1217)   loperamide (IMODIUM) capsule 4 mg (4 mg Oral Given 8/17/18 1217)       Diagnostic Studies  Results Reviewed     Procedure Component Value Units Date/Time Blood culture #1 [[id number]] Collected:  08/17/18 0852    Lab Status:  Preliminary result Specimen:  Blood from Arm, Right Updated:  08/19/18 1301     Blood Culture No Growth at 48 hrs  Blood culture #2 [[id number]] Collected:  08/17/18 0849    Lab Status:  Preliminary result Specimen:  Blood from Hand, Left Updated:  08/19/18 1301     Blood Culture No Growth at 48 hrs  Rapid drug screen, urine [[id number]]  (Abnormal) Collected:  08/17/18 1024    Lab Status:  Final result Specimen:  Urine from Urine, Clean Catch Updated:  08/17/18 1043     Amph/Meth UR Positive (A)     Barbiturate Ur Negative     Benzodiazepine Urine Negative     Cocaine Urine Negative     Methadone Urine Negative     Opiate Urine Positive (A)     PCP Ur Negative     THC Urine Positive (A)    Narrative:         FOR MEDICAL PURPOSES ONLY  IF CONFIRMATION NEEDED PLEASE CONTACT THE LAB WITHIN 5 DAYS  Drug Screen Cutoff Levels:  AMPHETAMINE/METHAMPHETAMINES  1000 ng/mL  BARBITURATES     200 ng/mL  BENZODIAZEPINES     200 ng/mL  COCAINE      300 ng/mL  METHADONE      300 ng/mL  OPIATES      300 ng/mL  PHENCYCLIDINE     25 ng/mL  THC       50 ng/mL    Presumptive report  If requested, specimen will be sent to reference lab for confirmation  Lactic acid, plasma [[id number]]  (Normal) Collected:  08/17/18 0849    Lab Status:  Final result Specimen:  Blood from Hand, Left Updated:  08/17/18 0921     LACTIC ACID 1 1 mmol/L     Narrative:         Result may be elevated if tourniquet was used during collection  CK (with reflex to MB) [[id number]]  (Normal) Collected:  08/17/18 0721    Lab Status:  Final result Specimen:  Blood from Arm, Right Updated:  08/17/18 0828     Total  U/L     [place] draw [[id number]] Collected:  08/17/18 0724    Lab Status:  Final result Specimen:  Blood Updated:  08/17/18 0801    Narrative: The following orders were created for panel order [place] draw    Procedure                               Abnormality Status                     ---------                               -----------         ------                     Kelly Coffey Top 7ml on HYVA[[id number]]                          Final result                 Please view results for these tests on the individual orders  Acetaminophen level [[id number]]  (Abnormal) Collected:  08/17/18 0721    Lab Status:  Final result Specimen:  Blood from Arm, Right Updated:  08/17/18 0759     Acetaminophen Level <2 0 (L) ug/mL     Troponin I [[id number]]  (Normal) Collected:  08/17/18 0721    Lab Status:  Final result Specimen:  Blood from Arm, Right Updated:  08/17/18 0752     Troponin I <0 02 ng/mL     Ethanol [[id number]]  (Normal) Collected:  08/17/18 0721    Lab Status:  Final result Specimen:  Blood from Arm, Right Updated:  08/17/18 0751     Ethanol Lvl <3 mg/dL     Salicylate level [[id number]]  (Abnormal) Collected:  08/17/18 0721    Lab Status:  Final result Specimen:  Blood from Arm, Right Updated:  47/43/86 0606     Salicylate Lvl <3 (L) mg/dL     Comprehensive metabolic panel [[id number]]  (Abnormal) Collected:  08/17/18 0721    Lab Status:  Final result Specimen:  Blood from Arm, Right Updated:  08/17/18 0748     Sodium 138 mmol/L      Potassium 3 8 mmol/L      Chloride 103 mmol/L      CO2 25 mmol/L      Anion Gap 10 mmol/L      BUN 15 mg/dL      Creatinine 0 91 mg/dL      Glucose 174 (H) mg/dL      Calcium 8 8 mg/dL      AST 35 U/L      ALT 83 (H) U/L      Alkaline Phosphatase 70 U/L      Total Protein 7 1 g/dL      Albumin 3 5 g/dL      Total Bilirubin 0 20 mg/dL      eGFR 114 ml/min/1 73sq m     Narrative:         National Kidney Disease Education Program recommendations are as follows:  GFR calculation is accurate only with a steady state creatinine  Chronic Kidney disease less than 60 ml/min/1 73 sq  meters  Kidney failure less than 15 ml/min/1 73 sq  meters      Protime-INR [[id number]]  (Normal) Collected:  08/17/18 0721    Lab Status:  Final result Specimen:  Blood from Arm, Right Updated:  08/17/18 0740     Protime 12 0 seconds      INR 0 93    APTT [[id number]]  (Normal) Collected:  08/17/18 0721    Lab Status:  Final result Specimen:  Blood from Arm, Right Updated:  08/17/18 0740     PTT 25 seconds     CBC and differential [[id number]]  (Abnormal) Collected:  08/17/18 0721    Lab Status:  Final result Specimen:  Blood from Arm, Right Updated:  08/17/18 0736     WBC 5 10 Thousand/uL      RBC 5 26 Million/uL      Hemoglobin 13 1 g/dL      Hematocrit 41 3 %      MCV 79 (L) fL      MCH 24 9 (L) pg      MCHC 31 7 g/dL      RDW 15 3 (H) %      MPV 9 1 fL      Platelets 055 Thousands/uL      nRBC 0 /100 WBCs      Neutrophils Relative 66 %      Immat GRANS % 0 %      Lymphocytes Relative 26 %      Monocytes Relative 5 %      Eosinophils Relative 3 %      Basophils Relative 0 %      Neutrophils Absolute 3 40 Thousands/µL      Immature Grans Absolute 0 02 Thousand/uL      Lymphocytes Absolute 1 31 Thousands/µL      Monocytes Absolute 0 23 Thousand/µL      Eosinophils Absolute 0 13 Thousand/µL      Basophils Absolute 0 01 Thousands/µL                  CT chest abdomen pelvis w contrast   Final Result by Ivon Hou MD (08/17 7963)      CT chest:      Small diffuse multilobar groundglass alveolar opacities right greater than left  Findings have moderately improved or are recurrent since June 24, 2018  This likely represents typical or atypical infection or pulmonary inflammatory disease  Differential includes recurrent lung contusions though thought to be less likely  No acute fracture  Trace bilateral gynecomastia  CT abdomen and pelvis:      No acute intra-abdominal or pelvic process  No acute fracture  Hepatic steatosis  Stable 1 4 cm portacaval and celiac axis and subcentimeter gastrohepatic short axis lymph nodes  This is likely secondary to chronic hepatocellular disease        3 x 2 8 x 2 5 cm mid descending colonic intramural mass with nodular calcifications  No significant change in retrospect since at least June 4, 2015  Vascular lesion, for example hemangioma or AVM is more likely  The examination demonstrates a significant  finding and was documented as such in [facility] for liaison and referring practitioner notification  Workstation performed: [id number]         CT spine cervical wo contrast   Final Result by Antonia Sever, MD (08/17 6619)      Straightening of normal cervical lordosis similar to June 24, 2018  Otherwise, no cervical spine fracture or traumatic malalignment  Workstation performed: [id number]         CT head wo contrast   Final Result by Antonia Sever, MD (08/17 0264)      Trace left lateral frontal scalp soft tissue contusion  Otherwise, no acute intracranial process or significant interval change  No skull fracture                    Workstation performed: [id number]                    Procedures  ECG 12 Lead Documentation  Date/Time: 8/17/2018 8:02 AM  Performed by: Wilber Fay by: Rafa Hernandez     Indications / Diagnosis:  Overdose  ECG reviewed by me, the ED Provider: yes    Patient location:  ED  Previous ECG:     Comparison to cardiac monitor: Yes    Interpretation:     Interpretation: normal    Rate:     ECG rate:  96    ECG rate assessment: normal    Rhythm:     Rhythm: sinus rhythm    Ectopy:     Ectopy: none    QRS:     QRS axis:  Normal    QRS intervals:  Normal  Conduction:     Conduction: normal    ST segments:     ST segments:  Normal  T waves:     T waves: normal    CriticalCare Time  Performed by: Wilber Fay by: Rafa Hernandez     Critical care provider statement:     Critical care time (minutes):  35    Critical care time was exclusive of:  Separately billable procedures and treating other patients and teaching time    Critical care was necessary to treat or prevent imminent or life-threatening deterioration of the following conditions:  Trauma and toxidrome    Critical care was time spent personally by me on the following activities:  Obtaining history from patient or surrogate, development of treatment plan with patient or surrogate, evaluation of patient's response to treatment, examination of patient, re-evaluation of patient's condition, ordering and review of radiographic studies, ordering and review of laboratory studies and review of old charts    I assumed direction of critical care for this patient from another provider in my specialty: no             Phone Contacts  ED Phone Contact    ED Course  ED Course as of Aug 19 2255   Fri Aug 17, 2018   0825 CT head wo contrast   0826 CT spine cervical wo contrast   0835 CT chest abdomen pelvis w contrast   0841 LUNGS:  Diffuse bilateral multi lobar small patchy groundglass alveolar opacities right greater than left  Moderate improvement versus recurrent disease since June 24, 2018  Minimal platelike subsegmental atelectasis posterior segment right upper lobe   CT chest abdomen pelvis w contrast         HEART Risk Score      Most Recent Value   History  0 Filed at: 08/17/2018 0828   ECG  0 Filed at: 08/17/2018 2628   Age  0 Filed at: 08/17/2018 6300   Risk Factors  1 Filed at: 08/17/2018 0828   Troponin  0 Filed at: 08/17/2018 0828   Heart Score Risk Calculator   History  0 Filed at: 08/17/2018 0828   ECG  0 Filed at: 08/17/2018 2940   Age  0 Filed at: 08/17/2018 1659   Risk Factors  1 Filed at: 08/17/2018 0828   Troponin  0 Filed at: 08/17/2018 2586   HEART Score  1 Filed at: 08/17/2018 8306   HEART Score  1 Filed at: 08/17/2018 1677                            MDM  Number of Diagnoses or Management Options     Amount and/or Complexity of Data Reviewed  Tests in the radiology section of CPT®: ordered and reviewed  Tests in the medicine section of CPT®: ordered and reviewed  Decide to obtain previous medical records or to obtain history from someone other than the patient: yes  Obtain history from someone other than the patient: yes  Independent visualization of images, tracings, or specimens: yes    Risk of Complications, Morbidity, and/or Mortality  Presenting problems: high    Patient Progress  Patient progress: improved    The patient presented with a condition in which there was a high probability of imminent or life-threatening deterioration, and critical care services (excluding separately billable procedures) totalled 30-74 minutes  Disposition  Final diagnoses:   Heroin overdose, accidental or unintentional, initial encounter   Contusion of forehead, initial encounter   Withdrawal from opioids [facility])     Time reflects when diagnosis was documented in both MDM as applicable and the Disposition within this note     Time User Action Codes Description Comment    8/17/2018 12:36 PM Hosie Half Add [T40 1X1A] Heroin overdose, accidental or unintentional, initial encounter     8/17/2018 12:38 PM Hosie Half Add [S00 83XA] Contusion of forehead, initial encounter     8/17/2018 12:42 PM Hosie Half Add [F11 23] Withdrawal from opioids [facility])       ED Disposition     ED Disposition Condition Comment    Discharge  Yuli Loose discharge to home/self care  Condition at discharge: Good        Follow-up Information     Follow up With Specialties Details Why Reid 47-7, DO Internal Medicine Call today For followup, Tell about this ER visit   [address] [facility]            Discharge Medication List as of 8/17/2018 12:43 PM      START taking these medications    Details   cloNIDine (CATAPRES) 0 2 mg tablet Take 1 tablet (0 2 mg total) by mouth 2 (two) times a day as needed (symptoms), Starting Fri 8/17/2018, Print      loperamide (IMODIUM) 2 mg capsule Take 1 capsule (2 mg total) by mouth 4 (four) times a day as needed for diarrhea, Starting Fri 8/17/2018, Print      ondansetron (ZOFRAN-ODT) 4 mg disintegrating tablet Take 1 tablet (4 mg total) by mouth every 6 (six) hours as needed for nausea or vomiting, Starting Fri 8/17/2018, Print         CONTINUE these medications which have NOT CHANGED    Details   ARIPiprazole (ABILIFY) 5 mg tablet Take 1 tablet (5 mg total) by mouth daily at bedtime, Starting Tue 7/24/2018, Normal      EPINEPHrine (EPIPEN 2-ALEXI) 0 3 mg/0 3 mL SOAJ Inject as directed, Starting Wed 5/20/2015, Historical Med      FLUoxetine (PROzac) 20 MG tablet Take 1 tablet (20 mg total) by mouth daily, Starting Tue 7/24/2018, Normal      lisinopril (ZESTRIL) 5 mg tablet Take 1 tablet (5 mg total) by mouth daily, Starting Tue 7/24/2018, Normal      diclofenac (VOLTAREN) 75 mg EC tablet Starting Fri 7/13/2018, Historical Med           No discharge procedures on file      ED Provider  Electronically Signed by           Marivel Salomon MD  08/17/18 3813       Marivel Salomon MD  08/19/18 7543

## 2018-08-22 LAB
BACTERIA BLD CULT: NORMAL
BACTERIA BLD CULT: NORMAL

## 2018-08-23 ENCOUNTER — OFFICE VISIT (OUTPATIENT)
Dept: INTERNAL MEDICINE CLINIC | Facility: CLINIC | Age: 28
End: 2018-08-23

## 2018-08-23 VITALS
TEMPERATURE: 98.1 F | BODY MASS INDEX: 39.94 KG/M2 | HEIGHT: 70 IN | HEART RATE: 92 BPM | DIASTOLIC BLOOD PRESSURE: 80 MMHG | OXYGEN SATURATION: 97 % | RESPIRATION RATE: 18 BRPM | SYSTOLIC BLOOD PRESSURE: 122 MMHG | WEIGHT: 279 LBS

## 2018-08-23 DIAGNOSIS — F33.41 RECURRENT MAJOR DEPRESSIVE DISORDER, IN PARTIAL REMISSION (HCC): Primary | ICD-10-CM

## 2018-08-23 DIAGNOSIS — F11.10 HEROIN ABUSE (HCC): ICD-10-CM

## 2018-08-23 PROCEDURE — 99212 OFFICE O/P EST SF 10 MIN: CPT | Performed by: PHYSICIAN ASSISTANT

## 2018-08-23 RX ORDER — FLUOXETINE HYDROCHLORIDE 40 MG/1
40 CAPSULE ORAL DAILY
Qty: 30 CAPSULE | Refills: 2 | Status: SHIPPED | OUTPATIENT
Start: 2018-08-23 | End: 2018-12-19

## 2018-08-23 NOTE — PROGRESS NOTES
Assessment/Plan:    Depressed  Will increase prozac to 40mg daily    Heroin abuse  Pt is in the process of getting health insurance  He is interested in pursuing vivitrol once that happens  In the meantime he is waiting to hear back from the methadone clinic  We discussed risks at length  Diagnoses and all orders for this visit:    Heroin abuse    Recurrent major depressive disorder, in partial remission (HCC)  -     FLUoxetine (PROzac) 40 MG capsule; Take 1 capsule (40 mg total) by mouth daily          Subjective:      Patient ID: Kathleen iDaz is a 28 y o  male  Pt presents for 1 month follow up  He unfortunately suffered another accidental heroin overdose on 8/17  This is his 3rd OD since June  He has no health insurance so he could not get into rehab  He states he tried suboxone in the past but it made him sick  He was trying to get back into his former methadone clinic but has had a hard time reaching them  He is tolerating his prozac well but denies significant improvement in his mood  The following portions of the patient's history were reviewed and updated as appropriate:   He  has a past medical history of Allergic; Anemia (6/24/2018); Anxiety; Benign essential hypertension (11/17/2016); Bipolar 1 disorder ([facility] ); Chronic pain; Claustrophobia (3/15/2018); Community acquired pneumonia (6/24/2018); Depressed (7/14/2016); Depression; GERD (gastroesophageal reflux disease); Hepatitis C virus infection (8/14/2014); Heroin abuse (7/24/2018); Obesity (10/12/2016); Obstructive sleep apnea; and Sleep disorder    He   Patient Active Problem List    Diagnosis Date Noted    Heroin abuse 07/24/2018    Withdrawal from opioids ([facility] ) 06/28/2018    Obstructive sleep apnea 06/24/2018    Claustrophobia 03/15/2018    Restless leg syndrome 03/15/2018    Mood disturbance ([facility] ) 03/15/2018    Morbid obesity with BMI of 45 0-49 9, adult ([facility] ) 03/15/2018    Chronic pain disorder 03/15/2018    Sleep-related hypoventilation 08/02/2017    Acid reflux 06/29/2017    Essential hypertension 11/17/2016    Generalized anxiety disorder 10/19/2016    Obesity 10/12/2016    Anxiety 07/14/2016    Depressed 07/14/2016    Obesity, Class III, BMI 40-49 9 (morbid obesity) ([place] [address] ) 07/14/2016    Chronic hepatitis C without hepatic coma ([place] [address] ) 02/21/2016    Bipolar disorder ([place] [address] ) 04/03/2015    Allergic rhinitis 08/14/2014    Insomnia 08/14/2014    Opioid dependence in remission ([place] [address] ) 08/14/2014     He  has a past surgical history that includes [place] tooth extraction  His family history includes Alzheimer's disease in his maternal grandmother; Colon cancer in his father; Depression in his family  He  reports that he quit smoking about 2 years ago  His smoking use included Cigarettes  He has a 13 00 pack-year smoking history  He has never used smokeless tobacco  He reports that he uses drugs, including Heroin  He reports that he does not drink alcohol  Current Outpatient Prescriptions   Medication Sig Dispense Refill    ARIPiprazole (ABILIFY) 5 mg tablet Take 1 tablet (5 mg total) by mouth daily at bedtime 30 tablet 0    cloNIDine (CATAPRES) 0 2 mg tablet Take 1 tablet (0 2 mg total) by mouth 2 (two) times a day as needed (symptoms) 30 tablet 0    diclofenac (VOLTAREN) 75 mg EC tablet   0    EPINEPHrine (EPIPEN 2-ALEXI) 0 3 mg/0 3 mL SOAJ Inject as directed      lisinopril (ZESTRIL) 5 mg tablet Take 1 tablet (5 mg total) by mouth daily 30 tablet 1    loperamide (IMODIUM) 2 mg capsule Take 1 capsule (2 mg total) by mouth 4 (four) times a day as needed for diarrhea 12 capsule 0    ondansetron (ZOFRAN-ODT) 4 mg disintegrating tablet Take 1 tablet (4 mg total) by mouth every 6 (six) hours as needed for nausea or vomiting 12 tablet 0    FLUoxetine (PROzac) 40 MG capsule Take 1 capsule (40 mg total) by mouth daily 30 capsule 2     No current facility-administered medications for this visit        Current Outpatient Prescriptions on File Prior to Visit   Medication Sig    ARIPiprazole (ABILIFY) 5 mg tablet Take 1 tablet (5 mg total) by mouth daily at bedtime    cloNIDine (CATAPRES) 0 2 mg tablet Take 1 tablet (0 2 mg total) by mouth 2 (two) times a day as needed (symptoms)    diclofenac (VOLTAREN) 75 mg EC tablet     EPINEPHrine (EPIPEN 2-ALEXI) 0 3 mg/0 3 mL SOAJ Inject as directed    lisinopril (ZESTRIL) 5 mg tablet Take 1 tablet (5 mg total) by mouth daily    loperamide (IMODIUM) 2 mg capsule Take 1 capsule (2 mg total) by mouth 4 (four) times a day as needed for diarrhea    ondansetron (ZOFRAN-ODT) 4 mg disintegrating tablet Take 1 tablet (4 mg total) by mouth every 6 (six) hours as needed for nausea or vomiting    [DISCONTINUED] FLUoxetine (PROzac) 20 MG tablet Take 1 tablet (20 mg total) by mouth daily     No current facility-administered medications on file prior to visit  He is allergic to bee venom       Review of Systems   Constitutional: Negative for chills and fever  HENT: Negative for congestion, ear pain, hearing loss, postnasal drip, rhinorrhea, sinus pain, sinus pressure, sore throat and trouble swallowing  Eyes: Negative for pain and visual disturbance  Respiratory: Negative for cough, chest tightness, shortness of breath and wheezing  Cardiovascular: Negative  Negative for chest pain, palpitations and leg swelling  Gastrointestinal: Negative for abdominal pain, blood in stool, constipation, diarrhea, nausea and vomiting  Endocrine: Negative for cold intolerance, heat intolerance, polydipsia, polyphagia and polyuria  Genitourinary: Negative for difficulty urinating, dysuria, flank pain and urgency  Musculoskeletal: Negative for arthralgias, back pain, gait problem and myalgias  Skin: Negative for rash  Allergic/Immunologic: Negative  Neurological: Negative for dizziness, weakness, light-headedness and headaches  Hematological: Negative      Psychiatric/Behavioral: Positive for dysphoric mood  Negative for behavioral problems and sleep disturbance  The patient is not nervous/anxious  Objective:      /80 (BP Location: Left arm, Patient Position: Sitting, Cuff Size: Large)   Pulse 92   Temp 98 1 °F (36 7 °C) (Tympanic)   Resp 18   Ht 5' 10" (1 778 m)   Wt 127 kg (279 lb)   SpO2 97%   BMI 40 03 kg/m²          Physical Exam   Constitutional: He is oriented to person, place, and time  He appears well-developed and well-nourished  No distress  HENT:   Head: Normocephalic and atraumatic  Right Ear: External ear normal    Left Ear: External ear normal    Nose: Nose normal    Mouth/Throat: Oropharynx is clear and moist  No oropharyngeal exudate  Eyes: Conjunctivae and EOM are normal  Pupils are equal, round, and reactive to light  Right eye exhibits no discharge  Left eye exhibits no discharge  No scleral icterus  Neck: Normal range of motion  Neck supple  No thyromegaly present  Cardiovascular: Normal rate, regular rhythm and normal heart sounds  Exam reveals no gallop and no friction rub  No murmur heard  Pulmonary/Chest: Effort normal and breath sounds normal  No respiratory distress  He has no wheezes  He has no rales  Abdominal: Soft  Bowel sounds are normal  He exhibits no distension  There is no tenderness  Musculoskeletal: Normal range of motion  He exhibits no edema, tenderness or deformity  Neurological: He is alert and oriented to person, place, and time  No cranial nerve deficit  Skin: Skin is warm and dry  He is not diaphoretic  Psychiatric: His behavior is normal  Judgment and thought content normal  He exhibits a depressed mood

## 2018-08-23 NOTE — ASSESSMENT & PLAN NOTE
Pt is in the process of getting health insurance  He is interested in pursuing vivitrol once that happens  In the meantime he is waiting to hear back from the methadone clinic  We discussed risks at length

## 2018-09-25 ENCOUNTER — OFFICE VISIT (OUTPATIENT)
Dept: INTERNAL MEDICINE CLINIC | Facility: CLINIC | Age: 28
End: 2018-09-25
Payer: COMMERCIAL

## 2018-09-25 VITALS
BODY MASS INDEX: 41.4 KG/M2 | HEIGHT: 70 IN | OXYGEN SATURATION: 95 % | RESPIRATION RATE: 18 BRPM | TEMPERATURE: 98.7 F | HEART RATE: 94 BPM | DIASTOLIC BLOOD PRESSURE: 68 MMHG | SYSTOLIC BLOOD PRESSURE: 112 MMHG | WEIGHT: 289.2 LBS

## 2018-09-25 DIAGNOSIS — I10 ESSENTIAL HYPERTENSION: Primary | ICD-10-CM

## 2018-09-25 DIAGNOSIS — F11.21 OPIOID DEPENDENCE IN REMISSION (HCC): ICD-10-CM

## 2018-09-25 DIAGNOSIS — F33.41 RECURRENT MAJOR DEPRESSIVE DISORDER, IN PARTIAL REMISSION (HCC): ICD-10-CM

## 2018-09-25 PROBLEM — F11.23 WITHDRAWAL FROM OPIOIDS (HCC): Status: RESOLVED | Noted: 2018-06-28 | Resolved: 2018-09-25

## 2018-09-25 PROBLEM — F11.93 WITHDRAWAL FROM OPIOIDS (HCC): Status: RESOLVED | Noted: 2018-06-28 | Resolved: 2018-09-25

## 2018-09-25 PROBLEM — F11.10 HEROIN ABUSE (HCC): Status: RESOLVED | Noted: 2018-07-24 | Resolved: 2018-09-25

## 2018-09-25 PROCEDURE — 3078F DIAST BP <80 MM HG: CPT | Performed by: PHYSICIAN ASSISTANT

## 2018-09-25 PROCEDURE — 3008F BODY MASS INDEX DOCD: CPT | Performed by: PHYSICIAN ASSISTANT

## 2018-09-25 PROCEDURE — 3074F SYST BP LT 130 MM HG: CPT | Performed by: PHYSICIAN ASSISTANT

## 2018-09-25 PROCEDURE — 99213 OFFICE O/P EST LOW 20 MIN: CPT | Performed by: PHYSICIAN ASSISTANT

## 2018-09-25 PROCEDURE — 1036F TOBACCO NON-USER: CPT | Performed by: PHYSICIAN ASSISTANT

## 2018-09-25 NOTE — PROGRESS NOTES
Assessment/Plan:    Essential hypertension  Pts symptoms are stable with current regime  No changes at present  Opioid dependence in remission ([facility] )  Continue with suboxone    Depressed  Pts symptoms are stable with current regime  No changes at present  Diagnoses and all orders for this visit:    Essential hypertension    Opioid dependence in remission ([facility] )    Recurrent major depressive disorder, in partial remission ([place] [address] )          Subjective:      Patient ID: Austin Hines is a 28 y o  male  Pt presents for routine visit  He is sober for one week and has not had any further overdoses since our last visit  He is now on suboxone and also seeing someone later today for medical marijuana  He is noting improvement in his mood with higher dose of prozac  Blood pressure remains well controlled with current regime  The following portions of the patient's history were reviewed and updated as appropriate:   He  has a past medical history of Allergic; Anemia (6/24/2018); Anxiety; Benign essential hypertension (11/17/2016); Bipolar 1 disorder ([place] [address] ); Chronic pain; Claustrophobia (3/15/2018); Community acquired pneumonia (6/24/2018); Depressed (7/14/2016); Depression; GERD (gastroesophageal reflux disease); Hepatitis C virus infection (8/14/2014); Heroin abuse (7/24/2018); Obesity (10/12/2016); Obstructive sleep apnea; and Sleep disorder    He   Patient Active Problem List    Diagnosis Date Noted    Obstructive sleep apnea 06/24/2018    Claustrophobia 03/15/2018    Restless leg syndrome 03/15/2018    Mood disturbance ([facility] ) 03/15/2018    Morbid obesity with BMI of 45 0-49 9, adult ([place] [address] ) 03/15/2018    Chronic pain disorder 03/15/2018    Sleep-related hypoventilation 08/02/2017    Acid reflux 06/29/2017    Essential hypertension 11/17/2016    Generalized anxiety disorder 10/19/2016    Obesity 10/12/2016    Anxiety 07/14/2016    Depressed 07/14/2016    Obesity, Class III, BMI 40-49 9 (morbid obesity) ([place] [address] ) 07/14/2016    Chronic hepatitis C without hepatic coma ([place] [address] ) 02/21/2016    Bipolar disorder ([place] [address] ) 04/03/2015    Allergic rhinitis 08/14/2014    Insomnia 08/14/2014    Opioid dependence in remission ([place] [address] ) 08/14/2014     He  has a past surgical history that includes [place] tooth extraction  His family history includes Alzheimer's disease in his maternal grandmother; Colon cancer in his father; Depression in his family; Diabetes in his mother  He  reports that he quit smoking about 2 years ago  His smoking use included Cigarettes  He has a 13 00 pack-year smoking history  He has never used smokeless tobacco  He reports that he uses drugs, including Heroin  He reports that he does not drink alcohol  Current Outpatient Prescriptions   Medication Sig Dispense Refill    ARIPiprazole (ABILIFY) 5 mg tablet Take 1 tablet (5 mg total) by mouth daily at bedtime 30 tablet 0    diclofenac (VOLTAREN) 75 mg EC tablet   0    EPINEPHrine (EPIPEN 2-ALEXI) 0 3 mg/0 3 mL SOAJ Inject as directed      FLUoxetine (PROzac) 40 MG capsule Take 1 capsule (40 mg total) by mouth daily 30 capsule 2    lisinopril (ZESTRIL) 5 mg tablet Take 1 tablet (5 mg total) by mouth daily 30 tablet 1     No current facility-administered medications for this visit        Current Outpatient Prescriptions on File Prior to Visit   Medication Sig    ARIPiprazole (ABILIFY) 5 mg tablet Take 1 tablet (5 mg total) by mouth daily at bedtime    diclofenac (VOLTAREN) 75 mg EC tablet     EPINEPHrine (EPIPEN 2-ALEXI) 0 3 mg/0 3 mL SOAJ Inject as directed    FLUoxetine (PROzac) 40 MG capsule Take 1 capsule (40 mg total) by mouth daily    lisinopril (ZESTRIL) 5 mg tablet Take 1 tablet (5 mg total) by mouth daily    [DISCONTINUED] cloNIDine (CATAPRES) 0 2 mg tablet Take 1 tablet (0 2 mg total) by mouth 2 (two) times a day as needed (symptoms) (Patient not taking: Reported on 9/25/2018 )    [DISCONTINUED] loperamide (IMODIUM) 2 mg capsule Take 1 capsule (2 mg total) by mouth 4 (four) times a day as needed for diarrhea (Patient not taking: Reported on 9/25/2018 )    [DISCONTINUED] ondansetron (ZOFRAN-ODT) 4 mg disintegrating tablet Take 1 tablet (4 mg total) by mouth every 6 (six) hours as needed for nausea or vomiting (Patient not taking: Reported on 9/25/2018 )     No current facility-administered medications on file prior to visit  He is allergic to bee venom       Review of Systems   Constitutional: Negative for chills and fever  HENT: Negative for congestion, ear pain, hearing loss, postnasal drip, rhinorrhea, sinus pain, sinus pressure, sore throat and trouble swallowing  Eyes: Negative for pain and visual disturbance  Respiratory: Negative for cough, chest tightness, shortness of breath and wheezing  Cardiovascular: Negative  Negative for chest pain, palpitations and leg swelling  Gastrointestinal: Negative for abdominal pain, blood in stool, constipation, diarrhea, nausea and vomiting  Endocrine: Negative for cold intolerance, heat intolerance, polydipsia, polyphagia and polyuria  Genitourinary: Negative for difficulty urinating, dysuria, flank pain and urgency  Musculoskeletal: Negative for arthralgias, back pain, gait problem and myalgias  Skin: Negative for rash  Allergic/Immunologic: Negative  Neurological: Negative for dizziness, weakness, light-headedness and headaches  Hematological: Negative  Psychiatric/Behavioral: Negative for behavioral problems, dysphoric mood and sleep disturbance  The patient is not nervous/anxious  Objective:      /68 (BP Location: Left arm, Patient Position: Sitting, Cuff Size: Large)   Pulse 94   Temp 98 7 °F (37 1 °C)   Resp 18   Ht 5' 10" (1 778 m)   Wt 131 kg (289 lb 3 2 oz)   SpO2 95%   BMI 41 50 kg/m²          Physical Exam   Constitutional: He is oriented to person, place, and time  He appears well-developed and well-nourished  No distress  HENT:   Head: Normocephalic and atraumatic  Right Ear: External ear normal    Left Ear: External ear normal    Nose: Nose normal    Mouth/Throat: Oropharynx is clear and moist  No oropharyngeal exudate  Eyes: Conjunctivae and EOM are normal  Pupils are equal, round, and reactive to light  Right eye exhibits no discharge  Left eye exhibits no discharge  No scleral icterus  Neck: Normal range of motion  Neck supple  No thyromegaly present  Cardiovascular: Normal rate, regular rhythm and normal heart sounds  Exam reveals no gallop and no friction rub  No murmur heard  Pulmonary/Chest: Effort normal and breath sounds normal  No respiratory distress  He has no wheezes  He has no rales  Abdominal: Soft  Bowel sounds are normal  He exhibits no distension  There is no tenderness  Musculoskeletal: Normal range of motion  He exhibits no edema, tenderness or deformity  Neurological: He is alert and oriented to person, place, and time  No cranial nerve deficit  Skin: Skin is warm and dry  He is not diaphoretic  Psychiatric: He has a normal mood and affect   His behavior is normal  Judgment and thought content normal

## 2018-12-19 ENCOUNTER — OFFICE VISIT (OUTPATIENT)
Dept: INTERNAL MEDICINE CLINIC | Facility: CLINIC | Age: 28
End: 2018-12-19
Payer: COMMERCIAL

## 2018-12-19 VITALS
HEART RATE: 79 BPM | HEIGHT: 70 IN | SYSTOLIC BLOOD PRESSURE: 112 MMHG | BODY MASS INDEX: 41.86 KG/M2 | TEMPERATURE: 97.3 F | OXYGEN SATURATION: 98 % | RESPIRATION RATE: 18 BRPM | DIASTOLIC BLOOD PRESSURE: 74 MMHG | WEIGHT: 292.4 LBS

## 2018-12-19 DIAGNOSIS — Z23 NEED FOR INFLUENZA VACCINATION: Primary | ICD-10-CM

## 2018-12-19 DIAGNOSIS — F11.21 OPIOID DEPENDENCE IN REMISSION (HCC): ICD-10-CM

## 2018-12-19 DIAGNOSIS — I10 ESSENTIAL HYPERTENSION: Primary | ICD-10-CM

## 2018-12-19 DIAGNOSIS — F31.77 BIPOLAR DISORDER, IN PARTIAL REMISSION, MOST RECENT EPISODE MIXED (HCC): ICD-10-CM

## 2018-12-19 PROBLEM — IMO0002 SLEEP-RELATED HYPOVENTILATION: Status: RESOLVED | Noted: 2017-08-02 | Resolved: 2018-12-19

## 2018-12-19 PROBLEM — R45.86 MOOD DISTURBANCE: Status: RESOLVED | Noted: 2018-03-15 | Resolved: 2018-12-19

## 2018-12-19 PROCEDURE — 1036F TOBACCO NON-USER: CPT | Performed by: PHYSICIAN ASSISTANT

## 2018-12-19 PROCEDURE — 3008F BODY MASS INDEX DOCD: CPT | Performed by: PHYSICIAN ASSISTANT

## 2018-12-19 PROCEDURE — 90471 IMMUNIZATION ADMIN: CPT

## 2018-12-19 PROCEDURE — 3074F SYST BP LT 130 MM HG: CPT | Performed by: PHYSICIAN ASSISTANT

## 2018-12-19 PROCEDURE — 3078F DIAST BP <80 MM HG: CPT | Performed by: PHYSICIAN ASSISTANT

## 2018-12-19 PROCEDURE — 90686 IIV4 VACC NO PRSV 0.5 ML IM: CPT

## 2018-12-19 PROCEDURE — 99214 OFFICE O/P EST MOD 30 MIN: CPT | Performed by: PHYSICIAN ASSISTANT

## 2018-12-19 RX ORDER — ACETAMINOPHEN 500 MG
500 TABLET ORAL EVERY 6 HOURS PRN
COMMUNITY
Start: 2017-12-04 | End: 2019-01-22

## 2018-12-19 NOTE — ASSESSMENT & PLAN NOTE
Pt stopped abilify and prozac and does not wish to restart at this time  Symptoms stable since being placed on medical marijuana

## 2018-12-19 NOTE — PROGRESS NOTES
Assessment/Plan:    Opioid dependence in remission ([facility] )  Suboxone intake paperwork completed    Bipolar disorder ([place] [address] )  Pt stopped abilify and prozac and does not wish to restart at this time  Symptoms stable since being placed on medical marijuana  Problem List Items Addressed This Visit     None            Subjective:      Patient ID: Alessandra [place] is a 33 y o  male  Pt presents for routine visit  He has been following with Dr Raquel Diggs for medical marijuana  He has not been taking his lisinopril, abilify or prozac and states he feels fine without them  His BP is normal at todays visit  He admits to using heroin twice over the last month and continues to struggle with his sobriety  He is interested in enrolling in our suboxone program          The following portions of the patient's history were reviewed and updated as appropriate: He  has a past medical history of Allergic; Anemia (6/24/2018); Anxiety; Benign essential hypertension (11/17/2016); Bipolar 1 disorder ([place] [address] ); Chronic pain; Claustrophobia (3/15/2018); Community acquired pneumonia (6/24/2018); Depressed (7/14/2016); Depression; GERD (gastroesophageal reflux disease); Hepatitis C virus infection (8/14/2014); Heroin abuse ([place] [address] ) (7/24/2018); Obesity (10/12/2016); Obstructive sleep apnea; and Sleep disorder    He   Patient Active Problem List    Diagnosis Date Noted    Obstructive sleep apnea 06/24/2018    Claustrophobia 03/15/2018    Restless leg syndrome 03/15/2018    Morbid obesity with BMI of 45 0-49 9, adult ([place] [address] ) 03/15/2018    Chronic pain disorder 03/15/2018    Acid reflux 06/29/2017    Essential hypertension 11/17/2016    Generalized anxiety disorder 10/19/2016    Obesity 10/12/2016    Anxiety 07/14/2016    Depressed 07/14/2016    Obesity, Class III, BMI 40-49 9 (morbid obesity) ([place] [address] ) 07/14/2016    Chronic hepatitis C without hepatic coma ([place] [address] ) 02/21/2016    Bipolar disorder ([place] [address] ) 04/03/2015    Allergic rhinitis 08/14/2014    Insomnia 08/14/2014    Opio[facility] dependence in [facility] ([facility] ) 08/14/2014     He  has a past surgical history that includes [place] tooth extraction  His family history includes Alzheimer's disease in his maternal grandmother; Colon cancer in his father; Depression in his family; Diabetes in his mother  He  reports that he quit smoking about 2 years ago  His smoking use included Cigarettes  He has a 13 00 pack-year smoking history  He has never used smokeless tobacco  He reports that he does not drink alcohol or use drugs  Current Outpatient Prescriptions   Medication Sig Dispense Refill    acetaminophen (TYLENOL) 500 mg tablet Take 500 mg by mouth every 6 (six) hours as needed      EPINEPHrine (EPIPEN 2-ALEXI) 0 3 mg/0 3 mL SOAJ Inject as directed      diclofenac (VOLTAREN) 75 mg EC tablet   0     No current facility-administered medications for this visit  Current Outpatient Prescriptions on File Prior to Visit   Medication Sig    EPINEPHrine (EPIPEN 2-ALEXI) 0 3 mg/0 3 mL SOAJ Inject as directed    diclofenac (VOLTAREN) 75 mg EC tablet     [DISCONTINUED] ARIPiprazole (ABILIFY) 5 mg tablet Take 1 tablet (5 mg total) by mouth daily at bedtime (Patient not taking: Reported on 12/19/2018 )    [DISCONTINUED] FLUoxetine (PROzac) 40 MG capsule Take 1 capsule (40 mg total) by mouth daily (Patient not taking: Reported on 12/19/2018 )    [DISCONTINUED] lisinopril (ZESTRIL) 5 mg tablet Take 1 tablet (5 mg total) by mouth daily (Patient not taking: Reported on 12/19/2018 )     No current facility-administered medications on file prior to visit  He is allergic to bee venom       Review of Systems   Constitutional: Negative for chills and fever  HENT: Negative for congestion, ear pain, hearing loss, postnasal drip, rhinorrhea, sinus pain, sinus pressure, sore throat and trouble swallowing  Eyes: Negative for pain and visual disturbance     Respiratory: Negative for cough, chest tightness, shortness of breath and wheezing  Cardiovascular: Negative  Negative for chest pain, palpitations and leg swelling  Gastrointestinal: Negative for abdominal pain, blood in stool, constipation, diarrhea, nausea and vomiting  Endocrine: Negative for cold intolerance, heat intolerance, polydipsia, polyphagia and polyuria  Genitourinary: Negative for difficulty urinating, dysuria, flank pain and urgency  Musculoskeletal: Negative for arthralgias, back pain, gait problem and myalgias  Skin: Negative for rash  Allergic/Immunologic: Negative  Neurological: Negative for dizziness, weakness, light-headedness and headaches  Hematological: Negative  Psychiatric/Behavioral: Negative for behavioral problems, dysphoric mood and sleep disturbance  The patient is not nervous/anxious  Objective:      /74 (BP Location: Left arm, Patient Position: Sitting, Cuff Size: Adult)   Pulse 79   Temp (!) 97 3 °F (36 3 °C) (Tympanic)   Resp 18   Ht 5' 10" (1 778 m)   Wt 133 kg (292 lb 6 4 oz)   SpO2 98%   BMI 41 96 kg/m²          Physical Exam   Constitutional: He is oriented to person, place, and time  He appears well-developed and well-nourished  No distress  HENT:   Head: Normocephalic and atraumatic  Right Ear: External ear normal    Left Ear: External ear normal    Nose: Nose normal    Mouth/Throat: Oropharynx is clear and moist  No oropharyngeal exudate  Eyes: Pupils are equal, round, and reactive to light  Conjunctivae and EOM are normal  Right eye exhibits no discharge  Left eye exhibits no discharge  No scleral icterus  Neck: Normal range of motion  Neck supple  No thyromegaly present  Cardiovascular: Normal rate, regular rhythm and normal heart sounds  Exam reveals no gallop and no friction rub  No murmur heard  Pulmonary/Chest: Effort normal and breath sounds normal  No respiratory distress  He has no wheezes  He has no rales  Abdominal: Soft   Bowel sounds are normal  He exhibits no distension  There is no tenderness  Musculoskeletal: Normal range of motion  He exhibits no edema, tenderness or deformity  Neurological: He is alert and oriented to person, place, and time  No cranial nerve deficit  Skin: Skin is warm and dry  He is not diaphoretic  Psychiatric: He has a normal mood and affect   His behavior is normal  Judgment and thought content normal

## 2019-01-22 ENCOUNTER — OFFICE VISIT (OUTPATIENT)
Dept: INTERNAL MEDICINE CLINIC | Facility: CLINIC | Age: 29
End: 2019-01-22
Payer: COMMERCIAL

## 2019-01-22 VITALS
OXYGEN SATURATION: 100 % | HEART RATE: 98 BPM | HEIGHT: 70 IN | DIASTOLIC BLOOD PRESSURE: 84 MMHG | SYSTOLIC BLOOD PRESSURE: 120 MMHG | BODY MASS INDEX: 41.8 KG/M2 | WEIGHT: 292 LBS | RESPIRATION RATE: 18 BRPM | TEMPERATURE: 97.8 F

## 2019-01-22 DIAGNOSIS — F19.20 DRUG DEPENDENCE (HCC): Primary | ICD-10-CM

## 2019-01-22 DIAGNOSIS — Z79.899 ENCOUNTER FOR MONITORING SUBOXONE MAINTENANCE THERAPY: ICD-10-CM

## 2019-01-22 DIAGNOSIS — Z51.81 ENCOUNTER FOR MONITORING SUBOXONE MAINTENANCE THERAPY: ICD-10-CM

## 2019-01-22 DIAGNOSIS — F19.10 IV DRUG ABUSE (HCC): ICD-10-CM

## 2019-01-22 DIAGNOSIS — F11.20 HEROIN ADDICTION (HCC): ICD-10-CM

## 2019-01-22 PROBLEM — Z79.891 ENCOUNTER FOR MONITORING SUBOXONE MAINTENANCE THERAPY: Status: ACTIVE | Noted: 2019-01-22

## 2019-01-22 PROCEDURE — 80307 DRUG TEST PRSMV CHEM ANLYZR: CPT | Performed by: INTERNAL MEDICINE

## 2019-01-22 PROCEDURE — 99214 OFFICE O/P EST MOD 30 MIN: CPT | Performed by: INTERNAL MEDICINE

## 2019-01-22 RX ORDER — BUPRENORPHINE AND NALOXONE 8; 2 MG/1; MG/1
FILM, SOLUBLE BUCCAL; SUBLINGUAL
Qty: 2 FILM | Refills: 0 | Status: SHIPPED | OUTPATIENT
Start: 2019-01-22 | End: 2019-01-22

## 2019-01-22 RX ORDER — BUPRENORPHINE AND NALOXONE 8; 2 MG/1; MG/1
FILM, SOLUBLE BUCCAL; SUBLINGUAL
Qty: 14 FILM | Refills: 0 | Status: SHIPPED | OUTPATIENT
Start: 2019-01-22 | End: 2019-01-28

## 2019-01-22 NOTE — PROGRESS NOTES
Assessment/Plan:    No problem-specific Assessment & Plan notes found for this encounter  Diagnoses and all orders for this visit:    Drug dependence ([facility] [address] )  -     Suboxone  -     Toxicology screen, urine  -     Discontinue: buprenorphine-naloxone (SUBOXONE) 8-2 mg; Return to the office with the med for dosing   -     Suboxone  -     Toxicology screen, urine  -     buprenorphine-naloxone (SUBOXONE) 8-2 mg; Two strips sl q day  Encounter for monitoring Suboxone maintenance therapy  -     Discontinue: buprenorphine-naloxone (SUBOXONE) 8-2 mg; Return to the office with the med for dosing   -     Suboxone  -     Toxicology screen, urine  -     buprenorphine-naloxone (SUBOXONE) 8-2 mg; Two strips sl q day  Heroin addiction ([facility] [address] )  -     Discontinue: buprenorphine-naloxone (SUBOXONE) 8-2 mg; Return to the office with the med for dosing   -     Suboxone  -     Toxicology screen, urine  -     buprenorphine-naloxone (SUBOXONE) 8-2 mg; Two strips sl q day  [facility] drug abuse ([facility] )  -     Discontinue: buprenorphine-naloxone (SUBOXONE) 8-2 mg; Return to the office with the med for dosing   -     Suboxone  -     Toxicology screen, urine  -     buprenorphine-naloxone (SUBOXONE) 8-2 mg; Two strips sl q day  A/P: In withdraw  Pt given a script for 16mg films and RTC from the pharmacy with meds and doses  After 15 minutes, noted some improvement and no side effects  D/c'd to home on 16mg films, dose 1/6  Get into counseling  Reminded to keep meds safe and out of reach of children  RTC one week for f/u  Subjective:      Patient ID: Gray Nelson is a 33 y o  male  WM, pt of Dalia Banda, presents for initial suboxone  Pt reports starting to use prescription narcs and cocaine nasal at age 12  After several years, graduated to IV heroin and has done this since  Max of 3-4 bundles per day  Several attempts at detox with the last one year ago at [facility] after two OD's that year   Has been in and out of suboxone programs with the last about a year ago with Dr Iris Calle  No counseling currently and last used about 24 hours ago and is in withdraw  Has also used THC, meth amp, benzo, and methadone in the past  No trouble with the law  The following portions of the patient's history were reviewed and updated as appropriate:   He  has a past medical history of Allergic; Anemia (6/24/2018); Anxiety; Benign essential hypertension (11/17/2016); Bipolar 1 disorder ([place] [address] ); Chronic pain; Claustrophobia (3/15/2018); Community acquired pneumonia (6/24/2018); Depressed (7/14/2016); Depression; GERD (gastroesophageal reflux disease); Hepatitis C virus infection (8/14/2014); Heroin abuse ([place] [address] ) (7/24/2018); Obesity (10/12/2016); Obstructive sleep apnea; and Sleep disorder  He   Patient Active Problem List    Diagnosis Date Noted    Drug dependence ([place] [address] ) 01/22/2019    Medication therapy continued 01/22/2019    Encounter for monitoring Suboxone maintenance therapy 01/22/2019    IV drug abuse ([place] [address] ) 01/22/2019    Heroin addiction ([place] [address] ) 01/22/2019    Obstructive sleep apnea 06/24/2018    Claustrophobia 03/15/2018    Restless leg syndrome 03/15/2018    Morbid obesity with BMI of 45 0-49 9, adult ([place] [address] ) 03/15/2018    Chronic pain disorder 03/15/2018    Acid reflux 06/29/2017    Essential hypertension 11/17/2016    Generalized anxiety disorder 10/19/2016    Obesity 10/12/2016    Anxiety 07/14/2016    Depressed 07/14/2016    Obesity, Class III, BMI 40-49 9 (morbid obesity) ([place] [address] ) 07/14/2016    Chronic hepatitis C without hepatic coma ([place] [address] ) 02/21/2016    Bipolar disorder ([place] [address] ) 04/03/2015    Allergic rhinitis 08/14/2014    Insomnia 08/14/2014    Opioid dependence in remission ([place] [address] ) 08/14/2014     He  has a past surgical history that includes [place] tooth extraction  His family history includes Alzheimer's disease in his maternal grandmother; Colon cancer in his father; Depression in his family; Diabetes in his mother    He reports that he quit smoking about 2 years ago  His smoking use included Cigarettes  He has a 13 00 pack-year smoking history  He has never used smokeless tobacco  He reports that he does not drink alcohol or use drugs  Current Outpatient Prescriptions   Medication Sig Dispense Refill    EPINEPHrine (EPIPEN 2-ALEXI) 0 3 mg/0 3 mL SOAJ Inject as directed      buprenorphine-naloxone (SUBOXONE) 8-2 mg Two strips sl q day  14 Film 0     No current facility-administered medications for this visit  Current Outpatient Prescriptions on File Prior to Visit   Medication Sig    EPINEPHrine (EPIPEN 2-ALEXI) 0 3 mg/0 3 mL SOAJ Inject as directed    [DISCONTINUED] acetaminophen (TYLENOL) 500 mg tablet Take 500 mg by mouth every 6 (six) hours as needed    [DISCONTINUED] diclofenac (VOLTAREN) 75 mg EC tablet      No current facility-administered medications on file prior to visit  He is allergic to bee venom       Review of Systems   Constitutional: Positive for diaphoresis  Negative for activity change, chills, fatigue and fever  HENT: Positive for rhinorrhea  Respiratory: Positive for chest tightness  Negative for cough, shortness of breath and wheezing  Cardiovascular: Positive for palpitations  Negative for chest pain and leg swelling  Gastrointestinal: Positive for abdominal pain, diarrhea and nausea  Negative for constipation and vomiting  Genitourinary: Negative for difficulty urinating, dysuria and frequency  Musculoskeletal: Negative for arthralgias, gait problem and myalgias  Neurological: Positive for headaches  Negative for dizziness, syncope, weakness and light-headedness  Psychiatric/Behavioral: Negative for confusion, dysphoric mood, self-injury, sleep disturbance and suicidal ideas  The patient is nervous/anxious            Objective:      /84 (BP Location: Left arm, Patient Position: Sitting, Cuff Size: Large)   Pulse 98   Temp 97 8 °F (36 6 °C) (Tympanic)   Resp 18   Ht 5' 10" (1 778 m)   Wt 132 kg (292 lb)   SpO2 100%   BMI 41 90 kg/m²          Physical Exam   Constitutional: He is oriented to person, place, and time  He appears well-developed and well-nourished  He appears distressed  HENT:   Head: Normocephalic and atraumatic  Mouth/Throat: Oropharynx is clear and moist    Eyes: Pupils are equal, round, and reactive to light  Conjunctivae and EOM are normal    Cardiovascular: Normal rate and normal heart sounds  No murmur heard  Pulmonary/Chest: Effort normal and breath sounds normal  No respiratory distress  He has no wheezes  He has no rales  Abdominal: Soft  Bowel sounds are normal  He exhibits no distension  There is tenderness  Musculoskeletal: He exhibits no edema  Neurological: He is alert and oriented to person, place, and time  He has normal reflexes  No cranial nerve deficit  Coordination normal    Psychiatric: He has a normal mood and affect  His behavior is normal  Judgment and thought content normal    Nursing note and vitals reviewed

## 2019-01-22 NOTE — PATIENT INSTRUCTIONS
Buprenorphine/Naloxone (Into the mouth)   Buprenorphine (bue-pre-NOR-feen), Naloxone (nal-OX-one)  Treats narcotic dependence  Brand Name(s): Bunavail, Suboxone, Zubsolv   There may be other brand names for this medicine  When This Medicine Should Not Be Used: This medicine is not right for everyone  Do not use it if you had an allergic reaction to buprenorphine or naloxone  How to Use This Medicine: Thin Sheet, Tablet  · Take your medicine as directed  Your dose may need to be changed several times to find what works best for you  · You must let the medicine dissolve  Never swallow the film or tablet  Your body may not absorb enough of the medicine if you swallow it  · Your health caregiver should show you how to use the medicine  If you do not understand, ask for help  It is important to use the medicine correctly  · Do not talk while the medicine is in your mouth  · Buccal film: Rinse your mouth with water to moisten it  Place the film against the inside of your cheek  If your doctor told you to use more than 1 film, place the second film inside your other cheek  Do not place more than 2 films inside of 1 cheek at a time  Do not move or touch the film  Do not eat or drink anything until the film is completely dissolved  · Sublingual tablet: Place the tablet under your tongue  If your doctor told you to use more than 1 tablet, place all of the tablets in different places under your tongue at the same time  You can use 2 tablets at a time until you have taken all of the medicine, if that is easier for you  Let the tablets dissolve completely in your mouth  Do not eat or drink anything until the tablets are completely dissolved  · Sublingual film: Drink some water to help moisten your mouth  Place the film under your tongue  If your doctor told you to use more than 1 film, place the second film on the opposite side from the first one  Do not move the film after you place it under your tongue   If you are supposed to use more than 2 films, use them the same way, but do not start until the first 2 films are completely dissolved  · Do not break, crush, chew, or cut the film or tablet  · This medicine should come with a Medication Guide  Ask your pharmacist for a copy if you do not have one  · Missed dose: Take a dose as soon as you remember  If it is almost time for your next dose, wait until then and take a regular dose  Do not take extra medicine to make up for a missed dose  · Store the medicine in a closed container at room temperature, away from heat, moisture, and direct light  Ask your pharmacist about the best way to dispose of medicine you do not use  Drugs and Foods to Avoid:   Ask your doctor or pharmacist before using any other medicine, including over-the-counter medicines, vitamins, and herbal products  · Do not use this medicine if you are using or have used an MAO inhibitor within the past 14 days  · Some medicines can affect how buprenorphine/naloxone works  Tell your doctor if you are using the following:   ¨ Carbamazepine, erythromycin, ketoconazole, mirtazapine, phenobarbital, phenytoin, rifampin, tramadol, or trazodone  ¨ Medicine to treat depression  ¨ Medicine to treat HIV/AIDS (including atazanavir, delavirdine, efavirenz, etravirine, nevirapine, ritonavir)  ¨ Phenothiazine medicine  · Do not drink alcohol while you are using this medicine  · Tell your doctor if you use anything else that makes you sleepy  Some examples are allergy medicine, narcotic pain medicine, and alcohol  Tell your doctor if you are also using butorphanol, nalbuphine, pentazocine, or a muscle relaxer    Warnings While Using This Medicine:   · Tell your doctor if you are pregnant or breastfeeding, or if you have kidney disease, liver disease (including hepatitis), lung or breathing problems, adrenal gland problems, an enlarged prostate, trouble urinating, gallbladder problems, low thyroid levels, stomach problems, or a history of depression, brain tumor, head injury, alcohol or drug abuse  · This medicine may cause the following problems:  ¨ High risk of overdose, which can lead to death  ¨ Respiratory depression (serious breathing problem that can be life-threatening)  ¨ Liver problems  ¨ Serotonin syndrome, when used with certain medicines  · This medicine may make you dizzy or drowsy  Do not drive or do anything that could be dangerous until you know how this medicine affects you  Stand or sit up slowly if you feel lightheaded or dizzy  · Tell any doctor or dentist who treats you that you are using this medicine  · This medicine can be habit-forming  Do not use more than your prescribed dose  Call your doctor if you think your medicine is not working  · Do not stop using this medicine suddenly  Your doctor will need to slowly decrease your dose before you stop it completely  · This medicine could cause infertility  Talk with your doctor before using this medicine if you plan to have children  · Keep all medicine out of the reach of children  Never share your medicine with anyone    Possible Side Effects While Using This Medicine:   Call your doctor right away if you notice any of these side effects:  · Allergic reaction: Itching or hives, swelling in your face or hands, swelling or tingling in your mouth or throat, chest tightness, trouble breathing  · Blue lips, fingernails, or skin  · Dark urine or pale stools, nausea, vomiting, loss of appetite, stomach pain, yellow skin or eyes  · Extreme dizziness or weakness, shallow breathing, sweating, seizures, cold or clammy skin  · Severe confusion, lightheadedness, dizziness, or fainting  · Trouble breathing or slow breathing  If you notice these less serious side effects, talk with your doctor:   · Headache, trouble sleeping  · Constipation or upset stomach  · Shaking, feeling hot or cold, runny nose, watery eyes, diarrhea, vomiting, and muscle aches  If you notice other side effects that you think are caused by this medicine, tell your doctor  Call your doctor for medical advice about side effects  You may report side effects to FDA at 2-416-TFE-8779  © 2017 [address] Information is for End User's use only and may not be sold, redistributed or otherwise used for commercial purposes  The above information is an  only  It is not intended as medical advice for individual conditions or treatments  Talk to your doctor, nurse or pharmacist before following any medical regimen to see if it is safe and effective for you

## 2019-01-23 LAB — BUPRENORPHINE UR QL CFM: NEGATIVE NG/ML

## 2019-01-28 ENCOUNTER — OFFICE VISIT (OUTPATIENT)
Dept: INTERNAL MEDICINE CLINIC | Facility: CLINIC | Age: 29
End: 2019-01-28
Payer: COMMERCIAL

## 2019-01-28 VITALS
SYSTOLIC BLOOD PRESSURE: 120 MMHG | TEMPERATURE: 98.2 F | HEIGHT: 70 IN | RESPIRATION RATE: 16 BRPM | DIASTOLIC BLOOD PRESSURE: 70 MMHG | HEART RATE: 84 BPM | WEIGHT: 290 LBS | BODY MASS INDEX: 41.52 KG/M2

## 2019-01-28 DIAGNOSIS — F19.10 IV DRUG ABUSE (HCC): ICD-10-CM

## 2019-01-28 DIAGNOSIS — F19.20 DRUG DEPENDENCE (HCC): Primary | ICD-10-CM

## 2019-01-28 DIAGNOSIS — F11.20 HEROIN ADDICTION (HCC): ICD-10-CM

## 2019-01-28 DIAGNOSIS — Z51.81 ENCOUNTER FOR MONITORING SUBOXONE MAINTENANCE THERAPY: ICD-10-CM

## 2019-01-28 DIAGNOSIS — Z79.899 ENCOUNTER FOR MONITORING SUBOXONE MAINTENANCE THERAPY: ICD-10-CM

## 2019-01-28 DIAGNOSIS — Z79.899 MEDICATION THERAPY CONTINUED: ICD-10-CM

## 2019-01-28 PROCEDURE — 99213 OFFICE O/P EST LOW 20 MIN: CPT | Performed by: INTERNAL MEDICINE

## 2019-01-28 PROCEDURE — 1036F TOBACCO NON-USER: CPT | Performed by: INTERNAL MEDICINE

## 2019-01-28 PROCEDURE — 3008F BODY MASS INDEX DOCD: CPT | Performed by: INTERNAL MEDICINE

## 2019-01-28 PROCEDURE — 80307 DRUG TEST PRSMV CHEM ANLYZR: CPT | Performed by: INTERNAL MEDICINE

## 2019-01-28 RX ORDER — BUPRENORPHINE AND NALOXONE 8; 2 MG/1; MG/1
FILM, SOLUBLE BUCCAL; SUBLINGUAL
Qty: 30 FILM | Refills: 0 | Status: ON HOLD | OUTPATIENT
Start: 2019-01-28 | End: 2019-05-02

## 2019-01-28 NOTE — PATIENT INSTRUCTIONS
Buprenorphine/Naloxone (Into the mouth)   Buprenorphine (bue-pre-NOR-feen), Naloxone (nal-OX-one)  Treats narcotic dependence  Brand Name(s): Bunavail, Suboxone, Zubsolv   There may be other brand names for this medicine  When This Medicine Should Not Be Used: This medicine is not right for everyone  Do not use it if you had an allergic reaction to buprenorphine or naloxone  How to Use This Medicine: Thin Sheet, Tablet  · Take your medicine as directed  Your dose may need to be changed several times to find what works best for you  · You must let the medicine dissolve  Never swallow the film or tablet  Your body may not absorb enough of the medicine if you swallow it  · Your health caregiver should show you how to use the medicine  If you do not understand, ask for help  It is important to use the medicine correctly  · Do not talk while the medicine is in your mouth  · Buccal film: Rinse your mouth with water to moisten it  Place the film against the inside of your cheek  If your doctor told you to use more than 1 film, place the second film inside your other cheek  Do not place more than 2 films inside of 1 cheek at a time  Do not move or touch the film  Do not eat or drink anything until the film is completely dissolved  · Sublingual tablet: Place the tablet under your tongue  If your doctor told you to use more than 1 tablet, place all of the tablets in different places under your tongue at the same time  You can use 2 tablets at a time until you have taken all of the medicine, if that is easier for you  Let the tablets dissolve completely in your mouth  Do not eat or drink anything until the tablets are completely dissolved  · Sublingual film: Drink some water to help moisten your mouth  Place the film under your tongue  If your doctor told you to use more than 1 film, place the second film on the opposite side from the first one  Do not move the film after you place it under your tongue   If you are supposed to use more than 2 films, use them the same way, but do not start until the first 2 films are completely dissolved  · Do not break, crush, chew, or cut the film or tablet  · This medicine should come with a Medication Guide  Ask your pharmacist for a copy if you do not have one  · Missed dose: Take a dose as soon as you remember  If it is almost time for your next dose, wait until then and take a regular dose  Do not take extra medicine to make up for a missed dose  · Store the medicine in a closed container at room temperature, away from heat, moisture, and direct light  Ask your pharmacist about the best way to dispose of medicine you do not use  Drugs and Foods to Avoid:   Ask your doctor or pharmacist before using any other medicine, including over-the-counter medicines, vitamins, and herbal products  · Do not use this medicine if you are using or have used an MAO inhibitor within the past 14 days  · Some medicines can affect how buprenorphine/naloxone works  Tell your doctor if you are using the following:   ¨ Carbamazepine, erythromycin, ketoconazole, mirtazapine, phenobarbital, phenytoin, rifampin, tramadol, or trazodone  ¨ Medicine to treat depression  ¨ Medicine to treat HIV/AIDS (including atazanavir, delavirdine, efavirenz, etravirine, nevirapine, ritonavir)  ¨ Phenothiazine medicine  · Do not drink alcohol while you are using this medicine  · Tell your doctor if you use anything else that makes you sleepy  Some examples are allergy medicine, narcotic pain medicine, and alcohol  Tell your doctor if you are also using butorphanol, nalbuphine, pentazocine, or a muscle relaxer    Warnings While Using This Medicine:   · Tell your doctor if you are pregnant or breastfeeding, or if you have kidney disease, liver disease (including hepatitis), lung or breathing problems, adrenal gland problems, an enlarged prostate, trouble urinating, gallbladder problems, low thyroid levels, stomach problems, or a history of depression, brain tumor, head injury, alcohol or drug abuse  · This medicine may cause the following problems:  ¨ High risk of overdose, which can lead to death  ¨ Respiratory depression (serious breathing problem that can be life-threatening)  ¨ Liver problems  ¨ Serotonin syndrome, when used with certain medicines  · This medicine may make you dizzy or drowsy  Do not drive or do anything that could be dangerous until you know how this medicine affects you  Stand or sit up slowly if you feel lightheaded or dizzy  · Tell any doctor or dentist who treats you that you are using this medicine  · This medicine can be habit-forming  Do not use more than your prescribed dose  Call your doctor if you think your medicine is not working  · Do not stop using this medicine suddenly  Your doctor will need to slowly decrease your dose before you stop it completely  · This medicine could cause infertility  Talk with your doctor before using this medicine if you plan to have children  · Keep all medicine out of the reach of children  Never share your medicine with anyone    Possible Side Effects While Using This Medicine:   Call your doctor right away if you notice any of these side effects:  · Allergic reaction: Itching or hives, swelling in your face or hands, swelling or tingling in your mouth or throat, chest tightness, trouble breathing  · Blue lips, fingernails, or skin  · Dark urine or pale stools, nausea, vomiting, loss of appetite, stomach pain, yellow skin or eyes  · Extreme dizziness or weakness, shallow breathing, sweating, seizures, cold or clammy skin  · Severe confusion, lightheadedness, dizziness, or fainting  · Trouble breathing or slow breathing  If you notice these less serious side effects, talk with your doctor:   · Headache, trouble sleeping  · Constipation or upset stomach  · Shaking, feeling hot or cold, runny nose, watery eyes, diarrhea, vomiting, and muscle aches  If you notice other side effects that you think are caused by this medicine, tell your doctor  Call your doctor for medical advice about side effects  You may report side effects to FDA at 9-322-WZH-7030  © 2017 [address] Information is for End User's use only and may not be sold, redistributed or otherwise used for commercial purposes  The above information is an  only  It is not intended as medical advice for individual conditions or treatments  Talk to your doctor, nurse or pharmacist before following any medical regimen to see if it is safe and effective for you

## 2019-01-28 NOTE — PROGRESS NOTES
Assessment/Plan:    No problem-specific Assessment & Plan notes found for this encounter  Diagnoses and all orders for this visit:    Drug dependence ([facility] [address] )  -     buprenorphine-naloxone (SUBOXONE) 8-2 mg; One or two strips sl q day  -     Suboxone  -     Toxicology screen, urine    Encounter for monitoring Suboxone maintenance therapy  -     buprenorphine-naloxone (SUBOXONE) 8-2 mg; One or two strips sl q day  -     Suboxone  -     Toxicology screen, urine    Heroin addiction (HCC)  -     buprenorphine-naloxone (SUBOXONE) 8-2 mg; One or two strips sl q day  -     Suboxone  -     Toxicology screen, urine    IV drug abuse (HCC)  -     buprenorphine-naloxone (SUBOXONE) 8-2 mg; One or two strips sl q day  -     Suboxone  -     Toxicology screen, urine    Medication therapy continued  -     buprenorphine-naloxone (SUBOXONE) 8-2 mg; One or two strips sl q day  -     Suboxone  -     Toxicology screen, urine      A/P: Discussed the using of heroin or narcs while on suboxone and the risk of complications  Will continue with 16mg films, dose 1/6 and get into counseling ASAP  Reminded to keep meds safe and out of reach of children  RTC two weeks  Subjective:      Patient ID: Criss Hammond is a 33 y o  male  WM RTC for f/u suboxone  Doing well and no c/o's, but reports using heroin since his initial visit because he did not have money to buy the meds  Purchased the suboxone two days ago and is free of illicit drugs  No counseling yet  UDT obtained and sent to the lab  Tolerating the meds and no side effects  The following portions of the patient's history were reviewed and updated as appropriate:   He  has a past medical history of Allergic; Anemia (6/24/2018); Anxiety; Benign essential hypertension (11/17/2016); Bipolar 1 disorder ([facility] [address] ); Chronic pain; Claustrophobia (3/15/2018); Community acquired pneumonia (6/24/2018); Depressed (7/14/2016);  Depression; GERD (gastroesophageal reflux disease); Hepatitis C virus infection (8/14/2014); Heroin abuse ([place] [address] ) (7/24/2018); Obesity (10/12/2016); Obstructive sleep apnea; and Sleep disorder  He   Patient Active Problem List    Diagnosis Date Noted    Drug dependence ([place] [address] ) 01/22/2019    Medication therapy continued 01/22/2019    Encounter for monitoring Suboxone maintenance therapy 01/22/2019    IV drug abuse ([place] [address] ) 01/22/2019    Heroin addiction ([place] [address] ) 01/22/2019    Obstructive sleep apnea 06/24/2018    Claustrophobia 03/15/2018    Restless leg syndrome 03/15/2018    Morbid obesity with BMI of 45 0-49 9, adult ([place] [address] ) 03/15/2018    Chronic pain disorder 03/15/2018    Acid reflux 06/29/2017    Essential hypertension 11/17/2016    Generalized anxiety disorder 10/19/2016    Obesity 10/12/2016    Anxiety 07/14/2016    Depressed 07/14/2016    Obesity, Class III, BMI 40-49 9 (morbid obesity) ([place] [address] ) 07/14/2016    Chronic hepatitis C without hepatic coma ([place] [address] ) 02/21/2016    Bipolar disorder ([place] [address] ) 04/03/2015    Allergic rhinitis 08/14/2014    Insomnia 08/14/2014    Opioid dependence in remission ([place] [address] ) 08/14/2014     He  has a past surgical history that includes [place] tooth extraction  His family history includes Alzheimer's disease in his maternal grandmother; Colon cancer in his father; Depression in his family; Diabetes in his mother  He  reports that he quit smoking about 2 years ago  His smoking use included Cigarettes  He has a 13 00 pack-year smoking history  He has never used smokeless tobacco  He reports that he does not drink alcohol or use drugs  Current Outpatient Prescriptions   Medication Sig Dispense Refill    EPINEPHrine (EPIPEN 2-ALEXI) 0 3 mg/0 3 mL SOAJ Inject as directed      buprenorphine-naloxone (SUBOXONE) 8-2 mg One or two strips sl q day  30 Film 0     No current facility-administered medications for this visit        Current Outpatient Prescriptions on File Prior to Visit   Medication Sig    EPINEPHrine (EPIPEN 2-ALEXI) 0 3 mg/0 3 mL SOAJ Inject as directed    [DISCONTINUED] buprenorphine-naloxone (SUBOXONE) 8-2 mg Two strips sl q day  No current facility-administered medications on file prior to visit  He is allergic to bee venom       Review of Systems   Constitutional: Negative for activity change, chills, diaphoresis, fatigue and fever  Respiratory: Negative for cough, chest tightness, shortness of breath and wheezing  Cardiovascular: Negative for chest pain, palpitations and leg swelling  Gastrointestinal: Negative for abdominal pain, constipation, diarrhea, nausea and vomiting  Genitourinary: Negative for difficulty urinating, dysuria and frequency  Musculoskeletal: Negative for arthralgias, gait problem and myalgias  Neurological: Negative for dizziness, seizures, syncope, weakness, light-headedness and headaches  Psychiatric/Behavioral: Negative for confusion, dysphoric mood, self-injury, sleep disturbance and suicidal ideas  The patient is not nervous/anxious  Objective:      /70   Pulse 84   Temp 98 2 °F (36 8 °C) (Tympanic)   Resp 16   Ht 5' 10" (1 778 m)   Wt 132 kg (290 lb)   BMI 41 61 kg/m²          Physical Exam   Constitutional: He is oriented to person, place, and time  He appears well-developed and well-nourished  No distress  HENT:   Head: Normocephalic and atraumatic  Mouth/Throat: Oropharynx is clear and moist    Eyes: Pupils are equal, round, and reactive to light  Conjunctivae and EOM are normal    Cardiovascular: Normal rate, regular rhythm and normal heart sounds  No murmur heard  Pulmonary/Chest: Effort normal and breath sounds normal  No respiratory distress  He has no wheezes  He has no rales  Abdominal: Soft  Bowel sounds are normal  He exhibits no distension  There is no tenderness  Neurological: He is alert and oriented to person, place, and time  Psychiatric: He has a normal mood and affect   His behavior is normal  Judgment and thought content normal  Nursing note and vitals reviewed

## 2019-01-30 LAB
AMPHETAMINES UR QL SCN: NEGATIVE NG/ML
BARBITURATES UR QL SCN: NEGATIVE NG/ML
BENZODIAZ UR QL SCN: NEGATIVE NG/ML
BZE UR QL: NEGATIVE NG/ML
CANNABINOIDS UR QL SCN: NEGATIVE NG/ML
METHADONE UR QL SCN: NEGATIVE NG/ML
OPIATES UR QL: POSITIVE
PCP UR QL: NEGATIVE NG/ML
PROPOXYPH UR QL: NEGATIVE NG/ML

## 2019-02-03 LAB
AMPHETAMINES UR QL SCN: NEGATIVE NG/ML
BARBITURATES UR QL SCN: NEGATIVE NG/ML
BENZODIAZ UR QL: NEGATIVE NG/ML
BZE UR QL: NEGATIVE NG/ML
CANNABINOIDS UR QL SCN: NEGATIVE NG/ML
METHADONE UR QL SCN: NEGATIVE NG/ML
OPIATES UR QL: POSITIVE
PCP UR QL: NEGATIVE NG/ML
PROPOXYPH UR QL SCN: NEGATIVE NG/ML

## 2019-02-05 LAB
BUPRENORPHINE UR CFM-MCNC: 170 NG/ML
BUPRENORPHINE UR QL CFM: NORMAL NG/ML
BUPRENORPHINE UR QL CFM: POSITIVE
BUPRENORPHINE+NOR UR QL: POSITIVE
NORBUPRENORPHINE UR CFM-MCNC: 218 NG/ML
NORBUPRENORPHINE UR QL CFM: POSITIVE

## 2019-04-28 ENCOUNTER — APPOINTMENT (EMERGENCY)
Dept: RADIOLOGY | Facility: HOSPITAL | Age: 29
End: 2019-04-28
Payer: COMMERCIAL

## 2019-04-28 ENCOUNTER — APPOINTMENT (EMERGENCY)
Dept: ULTRASOUND IMAGING | Facility: HOSPITAL | Age: 29
End: 2019-04-28
Payer: COMMERCIAL

## 2019-04-28 ENCOUNTER — HOSPITAL ENCOUNTER (EMERGENCY)
Facility: HOSPITAL | Age: 29
Discharge: HOME | End: 2019-04-29
Attending: EMERGENCY MEDICINE | Admitting: EMERGENCY MEDICINE
Payer: COMMERCIAL

## 2019-04-28 DIAGNOSIS — L03.114 CELLULITIS OF LEFT ARM: Primary | ICD-10-CM

## 2019-04-28 DIAGNOSIS — M79.5 FOREIGN BODY (FB) IN SOFT TISSUE: ICD-10-CM

## 2019-04-28 DIAGNOSIS — M79.89 LEFT ARM SWELLING: ICD-10-CM

## 2019-04-28 LAB
ALBUMIN SERPL BCP-MCNC: 3.1 G/DL (ref 3.5–5)
ALP SERPL-CCNC: 57 U/L (ref 46–116)
ALT SERPL W P-5'-P-CCNC: 42 U/L (ref 12–78)
ANION GAP SERPL CALCULATED.3IONS-SCNC: 6 MMOL/L (ref 4–13)
APTT PPP: 32 SECONDS (ref 26–38)
AST SERPL W P-5'-P-CCNC: 30 U/L (ref 5–45)
BASOPHILS # BLD AUTO: 0.04 THOUSANDS/ΜL (ref 0–0.1)
BASOPHILS NFR BLD AUTO: 1 % (ref 0–1)
BILIRUB SERPL-MCNC: 0.3 MG/DL (ref 0.2–1)
BUN SERPL-MCNC: 10 MG/DL (ref 5–25)
CALCIUM SERPL-MCNC: 8.9 MG/DL (ref 8.3–10.1)
CHLORIDE SERPL-SCNC: 98 MMOL/L (ref 100–108)
CK SERPL-CCNC: 66 U/L (ref 39–308)
CO2 SERPL-SCNC: 29 MMOL/L (ref 21–32)
CREAT SERPL-MCNC: 0.86 MG/DL (ref 0.6–1.3)
EOSINOPHIL # BLD AUTO: 0.16 THOUSAND/ΜL (ref 0–0.61)
EOSINOPHIL NFR BLD AUTO: 2 % (ref 0–6)
ERYTHROCYTE [DISTWIDTH] IN BLOOD BY AUTOMATED COUNT: 13.1 % (ref 11.6–15.1)
GFR SERPL CREATININE-BSD FRML MDRD: 117 ML/MIN/1.73SQ M
GLUCOSE SERPL-MCNC: 132 MG/DL (ref 65–140)
HCT VFR BLD AUTO: 35.9 % (ref 36.5–49.3)
HGB BLD-MCNC: 11.7 G/DL (ref 12–17)
IMM GRANULOCYTES # BLD AUTO: 0.02 THOUSAND/UL (ref 0–0.2)
IMM GRANULOCYTES NFR BLD AUTO: 0 % (ref 0–2)
INR PPP: 1.01 (ref 0.86–1.17)
LACTATE SERPL-SCNC: 1.8 MMOL/L (ref 0.5–2)
LYMPHOCYTES # BLD AUTO: 1.99 THOUSANDS/ΜL (ref 0.6–4.47)
LYMPHOCYTES NFR BLD AUTO: 26 % (ref 14–44)
MCH RBC QN AUTO: 25.8 PG (ref 26.8–34.3)
MCHC RBC AUTO-ENTMCNC: 32.6 G/DL (ref 31.4–37.4)
MCV RBC AUTO: 79 FL (ref 82–98)
MONOCYTES # BLD AUTO: 0.48 THOUSAND/ΜL (ref 0.17–1.22)
MONOCYTES NFR BLD AUTO: 6 % (ref 4–12)
NEUTROPHILS # BLD AUTO: 4.88 THOUSANDS/ΜL (ref 1.85–7.62)
NEUTS SEG NFR BLD AUTO: 65 % (ref 43–75)
NRBC BLD AUTO-RTO: 0 /100 WBCS
PLATELET # BLD AUTO: 237 THOUSANDS/UL (ref 149–390)
PMV BLD AUTO: 9.4 FL (ref 8.9–12.7)
POTASSIUM SERPL-SCNC: 4.4 MMOL/L (ref 3.5–5.3)
PROT SERPL-MCNC: 7.9 G/DL (ref 6.4–8.2)
PROTHROMBIN TIME: 12.8 SECONDS (ref 11.8–14.2)
RBC # BLD AUTO: 4.53 MILLION/UL (ref 3.88–5.62)
SODIUM SERPL-SCNC: 133 MMOL/L (ref 136–145)
WBC # BLD AUTO: 7.57 THOUSAND/UL (ref 4.31–10.16)

## 2019-04-28 PROCEDURE — 73130 X-RAY EXAM OF HAND: CPT

## 2019-04-28 PROCEDURE — 99284 EMERGENCY DEPT VISIT MOD MDM: CPT | Performed by: EMERGENCY MEDICINE

## 2019-04-28 PROCEDURE — 85025 COMPLETE CBC W/AUTO DIFF WBC: CPT | Performed by: EMERGENCY MEDICINE

## 2019-04-28 PROCEDURE — 96366 THER/PROPH/DIAG IV INF ADDON: CPT

## 2019-04-28 PROCEDURE — 85610 PROTHROMBIN TIME: CPT | Performed by: EMERGENCY MEDICINE

## 2019-04-28 PROCEDURE — 83605 ASSAY OF LACTIC ACID: CPT | Performed by: EMERGENCY MEDICINE

## 2019-04-28 PROCEDURE — 73060 X-RAY EXAM OF HUMERUS: CPT

## 2019-04-28 PROCEDURE — 96375 TX/PRO/DX INJ NEW DRUG ADDON: CPT

## 2019-04-28 PROCEDURE — 96367 TX/PROPH/DG ADDL SEQ IV INF: CPT

## 2019-04-28 PROCEDURE — 36415 COLL VENOUS BLD VENIPUNCTURE: CPT | Performed by: EMERGENCY MEDICINE

## 2019-04-28 PROCEDURE — 80053 COMPREHEN METABOLIC PANEL: CPT | Performed by: EMERGENCY MEDICINE

## 2019-04-28 PROCEDURE — 87040 BLOOD CULTURE FOR BACTERIA: CPT | Performed by: EMERGENCY MEDICINE

## 2019-04-28 PROCEDURE — 82550 ASSAY OF CK (CPK): CPT | Performed by: EMERGENCY MEDICINE

## 2019-04-28 PROCEDURE — 93971 EXTREMITY STUDY: CPT

## 2019-04-28 PROCEDURE — 85730 THROMBOPLASTIN TIME PARTIAL: CPT | Performed by: EMERGENCY MEDICINE

## 2019-04-28 PROCEDURE — 71046 X-RAY EXAM CHEST 2 VIEWS: CPT

## 2019-04-28 PROCEDURE — 96365 THER/PROPH/DIAG IV INF INIT: CPT

## 2019-04-28 PROCEDURE — 99284 EMERGENCY DEPT VISIT MOD MDM: CPT

## 2019-04-28 PROCEDURE — 73090 X-RAY EXAM OF FOREARM: CPT

## 2019-04-28 RX ORDER — KETAMINE HCL IN NACL, ISO-OSM 100MG/10ML
10 SYRINGE (ML) INJECTION ONCE
Status: COMPLETED | OUTPATIENT
Start: 2019-04-28 | End: 2019-04-28

## 2019-04-28 RX ORDER — DOXYCYCLINE 100 MG/1
100 TABLET ORAL 2 TIMES DAILY
Qty: 20 TABLET | Refills: 0 | Status: SHIPPED | OUTPATIENT
Start: 2019-04-28 | End: 2019-05-02 | Stop reason: HOSPADM

## 2019-04-28 RX ORDER — CLINDAMYCIN HYDROCHLORIDE 150 MG/1
450 CAPSULE ORAL EVERY 8 HOURS SCHEDULED
Qty: 90 CAPSULE | Refills: 0 | Status: ON HOLD | OUTPATIENT
Start: 2019-04-28 | End: 2019-05-02 | Stop reason: SDUPTHER

## 2019-04-28 RX ORDER — SACCHAROMYCES BOULARDII 250 MG
250 CAPSULE ORAL 2 TIMES DAILY
Qty: 30 CAPSULE | Refills: 0 | Status: SHIPPED | OUTPATIENT
Start: 2019-04-28 | End: 2019-05-13

## 2019-04-28 RX ORDER — CLINDAMYCIN PHOSPHATE 600 MG/50ML
600 INJECTION INTRAVENOUS ONCE
Status: COMPLETED | OUTPATIENT
Start: 2019-04-28 | End: 2019-04-28

## 2019-04-28 RX ADMIN — VANCOMYCIN HYDROCHLORIDE 2000 MG: 1 INJECTION, POWDER, LYOPHILIZED, FOR SOLUTION INTRAVENOUS at 22:48

## 2019-04-28 RX ADMIN — Medication 10 MG: at 21:48

## 2019-04-28 RX ADMIN — CLINDAMYCIN PHOSPHATE 600 MG: 600 INJECTION, SOLUTION INTRAVENOUS at 21:58

## 2019-04-29 ENCOUNTER — TELEPHONE (OUTPATIENT)
Dept: OBGYN CLINIC | Facility: CLINIC | Age: 29
End: 2019-04-29

## 2019-04-29 ENCOUNTER — HOSPITAL ENCOUNTER (INPATIENT)
Facility: HOSPITAL | Age: 29
LOS: 1 days | Discharge: HOME | DRG: 988 | End: 2019-05-02
Attending: EMERGENCY MEDICINE | Admitting: FAMILY MEDICINE
Payer: COMMERCIAL

## 2019-04-29 ENCOUNTER — APPOINTMENT (EMERGENCY)
Dept: CT IMAGING | Facility: HOSPITAL | Age: 29
DRG: 988 | End: 2019-04-29
Payer: COMMERCIAL

## 2019-04-29 VITALS
WEIGHT: 276.8 LBS | TEMPERATURE: 98.9 F | DIASTOLIC BLOOD PRESSURE: 65 MMHG | HEART RATE: 77 BPM | RESPIRATION RATE: 18 BRPM | SYSTOLIC BLOOD PRESSURE: 115 MMHG | BODY MASS INDEX: 39.72 KG/M2 | OXYGEN SATURATION: 94 %

## 2019-04-29 DIAGNOSIS — L03.114 CELLULITIS OF LEFT ARM: ICD-10-CM

## 2019-04-29 DIAGNOSIS — L03.114 CELLULITIS OF LEFT HAND: Primary | ICD-10-CM

## 2019-04-29 DIAGNOSIS — L03.114 CELLULITIS OF LEFT UPPER EXTREMITY: ICD-10-CM

## 2019-04-29 DIAGNOSIS — Z72.0 TOBACCO USE: ICD-10-CM

## 2019-04-29 PROBLEM — L03.90 CELLULITIS: Status: ACTIVE | Noted: 2019-04-29

## 2019-04-29 LAB
ALBUMIN SERPL BCP-MCNC: 3.1 G/DL (ref 3.5–5)
ALP SERPL-CCNC: 63 U/L (ref 46–116)
ALT SERPL W P-5'-P-CCNC: 40 U/L (ref 12–78)
ANION GAP SERPL CALCULATED.3IONS-SCNC: 3 MMOL/L (ref 4–13)
AST SERPL W P-5'-P-CCNC: 19 U/L (ref 5–45)
BASOPHILS # BLD AUTO: 0.02 THOUSANDS/ΜL (ref 0–0.1)
BASOPHILS NFR BLD AUTO: 0 % (ref 0–1)
BILIRUB SERPL-MCNC: 0.2 MG/DL (ref 0.2–1)
BUN SERPL-MCNC: 12 MG/DL (ref 5–25)
CALCIUM SERPL-MCNC: 8.9 MG/DL (ref 8.3–10.1)
CHLORIDE SERPL-SCNC: 104 MMOL/L (ref 100–108)
CK SERPL-CCNC: 53 U/L (ref 39–308)
CO2 SERPL-SCNC: 32 MMOL/L (ref 21–32)
CREAT SERPL-MCNC: 0.84 MG/DL (ref 0.6–1.3)
EOSINOPHIL # BLD AUTO: 0.15 THOUSAND/ΜL (ref 0–0.61)
EOSINOPHIL NFR BLD AUTO: 3 % (ref 0–6)
ERYTHROCYTE [DISTWIDTH] IN BLOOD BY AUTOMATED COUNT: 13 % (ref 11.6–15.1)
GFR SERPL CREATININE-BSD FRML MDRD: 118 ML/MIN/1.73SQ M
GLUCOSE SERPL-MCNC: 91 MG/DL (ref 65–140)
HCT VFR BLD AUTO: 35.1 % (ref 36.5–49.3)
HGB BLD-MCNC: 11.2 G/DL (ref 12–17)
IMM GRANULOCYTES # BLD AUTO: 0.01 THOUSAND/UL (ref 0–0.2)
IMM GRANULOCYTES NFR BLD AUTO: 0 % (ref 0–2)
LYMPHOCYTES # BLD AUTO: 1.46 THOUSANDS/ΜL (ref 0.6–4.47)
LYMPHOCYTES NFR BLD AUTO: 26 % (ref 14–44)
MAGNESIUM SERPL-MCNC: 1.9 MG/DL (ref 1.6–2.6)
MCH RBC QN AUTO: 25.6 PG (ref 26.8–34.3)
MCHC RBC AUTO-ENTMCNC: 31.9 G/DL (ref 31.4–37.4)
MCV RBC AUTO: 80 FL (ref 82–98)
MONOCYTES # BLD AUTO: 0.41 THOUSAND/ΜL (ref 0.17–1.22)
MONOCYTES NFR BLD AUTO: 7 % (ref 4–12)
NEUTROPHILS # BLD AUTO: 3.49 THOUSANDS/ΜL (ref 1.85–7.62)
NEUTS SEG NFR BLD AUTO: 64 % (ref 43–75)
NRBC BLD AUTO-RTO: 0 /100 WBCS
PLATELET # BLD AUTO: 208 THOUSANDS/UL (ref 149–390)
PMV BLD AUTO: 9.2 FL (ref 8.9–12.7)
POTASSIUM SERPL-SCNC: 4.1 MMOL/L (ref 3.5–5.3)
PROT SERPL-MCNC: 7.5 G/DL (ref 6.4–8.2)
RBC # BLD AUTO: 4.38 MILLION/UL (ref 3.88–5.62)
SODIUM SERPL-SCNC: 139 MMOL/L (ref 136–145)
WBC # BLD AUTO: 5.54 THOUSAND/UL (ref 4.31–10.16)

## 2019-04-29 PROCEDURE — 96375 TX/PRO/DX INJ NEW DRUG ADDON: CPT

## 2019-04-29 PROCEDURE — 96365 THER/PROPH/DIAG IV INF INIT: CPT

## 2019-04-29 PROCEDURE — 82550 ASSAY OF CK (CPK): CPT | Performed by: EMERGENCY MEDICINE

## 2019-04-29 PROCEDURE — 73201 CT UPPER EXTREMITY W/DYE: CPT

## 2019-04-29 PROCEDURE — 83735 ASSAY OF MAGNESIUM: CPT | Performed by: EMERGENCY MEDICINE

## 2019-04-29 PROCEDURE — 85025 COMPLETE CBC W/AUTO DIFF WBC: CPT | Performed by: EMERGENCY MEDICINE

## 2019-04-29 PROCEDURE — 93971 EXTREMITY STUDY: CPT | Performed by: SURGERY

## 2019-04-29 PROCEDURE — 99283 EMERGENCY DEPT VISIT LOW MDM: CPT | Performed by: EMERGENCY MEDICINE

## 2019-04-29 PROCEDURE — 99284 EMERGENCY DEPT VISIT MOD MDM: CPT

## 2019-04-29 PROCEDURE — 80053 COMPREHEN METABOLIC PANEL: CPT | Performed by: EMERGENCY MEDICINE

## 2019-04-29 PROCEDURE — 99220 PR INITIAL OBSERVATION CARE/DAY 70 MINUTES: CPT | Performed by: NURSE PRACTITIONER

## 2019-04-29 PROCEDURE — 87040 BLOOD CULTURE FOR BACTERIA: CPT | Performed by: EMERGENCY MEDICINE

## 2019-04-29 PROCEDURE — 36415 COLL VENOUS BLD VENIPUNCTURE: CPT | Performed by: EMERGENCY MEDICINE

## 2019-04-29 RX ORDER — KETOROLAC TROMETHAMINE 30 MG/ML
15 INJECTION, SOLUTION INTRAMUSCULAR; INTRAVENOUS ONCE
Status: COMPLETED | OUTPATIENT
Start: 2019-04-29 | End: 2019-04-29

## 2019-04-29 RX ORDER — SACCHAROMYCES BOULARDII 250 MG
250 CAPSULE ORAL 2 TIMES DAILY
Status: DISCONTINUED | OUTPATIENT
Start: 2019-04-30 | End: 2019-05-02 | Stop reason: HOSPADM

## 2019-04-29 RX ORDER — NICOTINE 21 MG/24HR
1 PATCH, TRANSDERMAL 24 HOURS TRANSDERMAL DAILY
Status: DISCONTINUED | OUTPATIENT
Start: 2019-04-30 | End: 2019-05-02 | Stop reason: HOSPADM

## 2019-04-29 RX ADMIN — KETOROLAC TROMETHAMINE 15 MG: 30 INJECTION, SOLUTION INTRAMUSCULAR; INTRAVENOUS at 20:51

## 2019-04-29 RX ADMIN — VANCOMYCIN HYDROCHLORIDE 2000 MG: 1 INJECTION, POWDER, LYOPHILIZED, FOR SOLUTION INTRAVENOUS at 20:00

## 2019-04-29 RX ADMIN — IOHEXOL 100 ML: 350 INJECTION, SOLUTION INTRAVENOUS at 19:41

## 2019-04-30 PROBLEM — S40.852A: Status: ACTIVE | Noted: 2019-04-30

## 2019-04-30 PROBLEM — L03.114 CELLULITIS OF LEFT UPPER EXTREMITY: Status: ACTIVE | Noted: 2019-04-29

## 2019-04-30 LAB
AMPHETAMINES SERPL QL SCN: NEGATIVE
ANION GAP SERPL CALCULATED.3IONS-SCNC: 2 MMOL/L (ref 4–13)
BARBITURATES UR QL: NEGATIVE
BENZODIAZ UR QL: NEGATIVE
BUN SERPL-MCNC: 11 MG/DL (ref 5–25)
CALCIUM SERPL-MCNC: 8.7 MG/DL (ref 8.3–10.1)
CHLORIDE SERPL-SCNC: 105 MMOL/L (ref 100–108)
CO2 SERPL-SCNC: 31 MMOL/L (ref 21–32)
COCAINE UR QL: NEGATIVE
CREAT SERPL-MCNC: 0.69 MG/DL (ref 0.6–1.3)
ERYTHROCYTE [DISTWIDTH] IN BLOOD BY AUTOMATED COUNT: 13.4 % (ref 11.6–15.1)
GFR SERPL CREATININE-BSD FRML MDRD: 128 ML/MIN/1.73SQ M
GLUCOSE SERPL-MCNC: 87 MG/DL (ref 65–140)
HCT VFR BLD AUTO: 32.3 % (ref 36.5–49.3)
HGB BLD-MCNC: 10.5 G/DL (ref 12–17)
MAGNESIUM SERPL-MCNC: 2 MG/DL (ref 1.6–2.6)
MCH RBC QN AUTO: 25.7 PG (ref 26.8–34.3)
MCHC RBC AUTO-ENTMCNC: 32.5 G/DL (ref 31.4–37.4)
MCV RBC AUTO: 79 FL (ref 82–98)
METHADONE UR QL: NEGATIVE
OPIATES UR QL SCN: NEGATIVE
PCP UR QL: NEGATIVE
PHOSPHATE SERPL-MCNC: 4.1 MG/DL (ref 2.7–4.5)
PLATELET # BLD AUTO: 196 THOUSANDS/UL (ref 149–390)
PMV BLD AUTO: 9.7 FL (ref 8.9–12.7)
POTASSIUM SERPL-SCNC: 4.2 MMOL/L (ref 3.5–5.3)
PROCALCITONIN SERPL-MCNC: 0.05 NG/ML
RBC # BLD AUTO: 4.08 MILLION/UL (ref 3.88–5.62)
SODIUM SERPL-SCNC: 138 MMOL/L (ref 136–145)
THC UR QL: POSITIVE
WBC # BLD AUTO: 5.45 THOUSAND/UL (ref 4.31–10.16)

## 2019-04-30 PROCEDURE — G0427 INPT/ED TELECONSULT70: HCPCS | Performed by: INTERNAL MEDICINE

## 2019-04-30 PROCEDURE — 80307 DRUG TEST PRSMV CHEM ANLYZR: CPT | Performed by: FAMILY MEDICINE

## 2019-04-30 PROCEDURE — 85027 COMPLETE CBC AUTOMATED: CPT | Performed by: NURSE PRACTITIONER

## 2019-04-30 PROCEDURE — NC001 PR NO CHARGE: Performed by: PHYSICIAN ASSISTANT

## 2019-04-30 PROCEDURE — 84100 ASSAY OF PHOSPHORUS: CPT | Performed by: NURSE PRACTITIONER

## 2019-04-30 PROCEDURE — 80048 BASIC METABOLIC PNL TOTAL CA: CPT | Performed by: NURSE PRACTITIONER

## 2019-04-30 PROCEDURE — 83735 ASSAY OF MAGNESIUM: CPT | Performed by: NURSE PRACTITIONER

## 2019-04-30 PROCEDURE — 99244 OFF/OP CNSLTJ NEW/EST MOD 40: CPT | Performed by: ORTHOPAEDIC SURGERY

## 2019-04-30 PROCEDURE — 84145 PROCALCITONIN (PCT): CPT | Performed by: NURSE PRACTITIONER

## 2019-04-30 PROCEDURE — 99225 PR SBSQ OBSERVATION CARE/DAY 25 MINUTES: CPT | Performed by: FAMILY MEDICINE

## 2019-04-30 RX ORDER — ACETAMINOPHEN 325 MG/1
650 TABLET ORAL EVERY 6 HOURS PRN
Status: DISCONTINUED | OUTPATIENT
Start: 2019-04-30 | End: 2019-05-02 | Stop reason: HOSPADM

## 2019-04-30 RX ORDER — OXYCODONE HYDROCHLORIDE 5 MG/1
5 TABLET ORAL EVERY 4 HOURS PRN
Status: DISCONTINUED | OUTPATIENT
Start: 2019-04-30 | End: 2019-05-02

## 2019-04-30 RX ORDER — OXYCODONE HYDROCHLORIDE 10 MG/1
10 TABLET ORAL EVERY 4 HOURS PRN
Status: DISCONTINUED | OUTPATIENT
Start: 2019-04-30 | End: 2019-05-02 | Stop reason: HOSPADM

## 2019-04-30 RX ORDER — LORAZEPAM 2 MG/ML
1 INJECTION INTRAMUSCULAR ONCE
Status: COMPLETED | OUTPATIENT
Start: 2019-04-30 | End: 2019-05-01

## 2019-04-30 RX ADMIN — Medication 250 MG: at 09:56

## 2019-04-30 RX ADMIN — OXYCODONE HYDROCHLORIDE 5 MG: 5 TABLET ORAL at 11:38

## 2019-04-30 RX ADMIN — NICOTINE 1 PATCH: 21 PATCH TRANSDERMAL at 09:57

## 2019-04-30 RX ADMIN — VANCOMYCIN HYDROCHLORIDE 2000 MG: 1 INJECTION, POWDER, LYOPHILIZED, FOR SOLUTION INTRAVENOUS at 09:56

## 2019-04-30 RX ADMIN — Medication 250 MG: at 18:53

## 2019-04-30 RX ADMIN — VANCOMYCIN HYDROCHLORIDE 1500 MG: 1 INJECTION, POWDER, LYOPHILIZED, FOR SOLUTION INTRAVENOUS at 20:25

## 2019-04-30 NOTE — H&P
H&P- Irina Elena 1990, 33 y o  male MRN: [id number]    Unit/Bed#: RM02 Encounter: [id number]    Primary Care Provider: Timbo Valderrama PA-C   Date and time admitted to hospital: 4/29/2019  6:34 PM        IV drug abuse [facility])  Assessment & Plan  IV drug use-heroin  Provide supportive care  Admit to med surge-monitor per protocol    * Cellulitis  Assessment & Plan  Ortho consulted  Infectious Disease consulted  Blood cultures pending  Procalcitonin ordered  Vancomycin q 12 hours  Admit to med surge  Monitor per protocol      Bipolar disorder [facility])  Assessment & Plan  Provide supportive care          VTE Prophylaxis: Low risk early ambulation  / reason for no mechanical VTE prophylaxis Low risk early ambulation   Code Status:  Full  POLST: POLST form is on file already (pre-hospital)    Anticipated Length of Stay:  Patient will be admitted on an Observation basis with an anticipated length of stay of  less than 2 midnights  Justification for Hospital Stay:  Acute cellulitis of left upper extremity    Total Time for Visit, including Counseling / Coordination of Care: 1 hour  Greater than 50% of this total time spent on direct patient counseling and coordination of care  Chief Complaint:   Left arm and wrist swelling    History of Present Illness:    Irina Elena is a 33 y o  male who presented to emergency room for evaluation of left hand wrist and forearm  Patient reports was seen in the emergency room last evening and was recommended stay at inpatient basis, patient elected to leave AMA patient returns this evening can't to continue recommended treatment plan previous discussed by emergency room physician on the prior evening  CT images were collected patient states was given antibiotics from the emergency room doctor never filled them    Patient denies fevers chills nausea vomiting diarrhea denies dysuria urgency or frequency denies constipation, denies melena or hematochezia, admits to left arm swelling and pain and decreased range of motion  Patient also admits to using IV heroin injected into the left arm  Images and labs were collected in the emergency room-see below  Patient was admitted by emergency room doctor    Review of Systems:    Review of Systems   Musculoskeletal:        Left arm and hand   All other systems reviewed and are negative  Past Medical and Surgical History:     Past Medical History:   Diagnosis Date    Allergic     Anemia 6/24/2018    Anxiety     from records    Benign essential hypertension 11/17/2016    Bipolar 1 disorder ([facility] [address] )     from records    Chronic pain     Claustrophobia 3/15/2018    Community acquired pneumonia 6/24/2018    Depressed 7/14/2016    Depression     from records    GERD (gastroesophageal reflux disease)     Hepatitis C virus infection 8/14/2014    [facility] 7/24/2018    Obesity 10/12/2016    Obstructive sleep apnea     from records    Sleep disorder        Past Surgical History:   Procedure Laterality Date    WISDOM TOOTH EXTRACTION         Meds/Allergies:    Prior to Admission medications    Medication Sig Start Date End Date Taking? Authorizing Provider   buprenorphine-naloxone (SUBOXONE) 8-2 mg One or two strips sl q day  1/28/19   Kristyn Coreas DO   clindamycin (CLEOCIN) 150 mg capsule Take 3 capsules (450 mg total) by mouth every 8 (eight) hours for 10 days 4/28/19 5/8/19  Wendi Cesar MD   doxycycline (ADOXA) 100 MG tablet Take 1 tablet (100 mg total) by mouth 2 (two) times a day for 10 days 4/28/19 5/8/19  Wendi Cesar MD   EPINEPHrine (EPIPEN 2-ALEXI) 0 3 mg/0 3 mL SOAJ Inject as directed 5/20/15   Historical Provider, MD   saccharomyces boulardii (FLORASTOR) 250 mg capsule Take 1 capsule (250 mg total) by mouth 2 (two) times a day for 30 doses 4/28/19 5/13/19  Wendi Cesar MD     I have reviewed home medications with patient personally  Allergies:    Allergies   Allergen Reactions    Bee Venom Social History:     Marital Status: Single   Occupation:  Noncontributory  Patient Pre-hospital Living Situation:  Independent  Patient Pre-hospital Level of Mobility:  Independent  Patient Pre-hospital Diet Restrictions:  Denies  Substance Use History:   Social History     Substance and Sexual Activity   Alcohol Use No     Social History     Tobacco Use   Smoking Status Current Every Day Smoker    Packs/day: 1 00    Years: 13 00    Pack years: 13     Types: Cigarettes, E-Cigarettes    Last attempt to quit: 2016    Years since quittin 8   Smokeless Tobacco Never Used     Social History     Substance and Sexual Activity   Drug Use No    Types: Heroin    Comment: 1-2 bags daily, last used a few hours ago       Family History:    Family History   Problem Relation Age of Onset    Colon cancer Father     Alzheimer's disease Maternal Grandmother     Depression Family     Diabetes Mother        Physical Exam:     Vitals:   Blood Pressure: 113/59 (19)  Pulse: 67 (19)  Temperature: 98 3 °F (36 8 °C) (19)  Temp Source: Temporal (19)  Respirations: 18 (19)  Weight - Scale: 130 kg (286 lb 13 1 oz) (19)  SpO2: 95 % (19)    Physical Exam   Constitutional: He is oriented to person, place, and time  He appears well-developed and well-nourished  No distress  HENT:   Head: Normocephalic and atraumatic  Eyes: Pupils are equal, round, and reactive to light  EOM are normal  Right eye exhibits no discharge  Left eye exhibits no discharge  Neck: Normal range of motion  Neck supple  No JVD present  No tracheal deviation present  No thyromegaly present  Cardiovascular: Normal rate, regular rhythm, normal heart sounds and intact distal pulses  Exam reveals no gallop and no friction rub  No murmur heard  Pulmonary/Chest: Effort normal and breath sounds normal  No stridor  No respiratory distress  He has no wheezes     Abdominal: Soft  Bowel sounds are normal    Musculoskeletal: He exhibits edema (left forearm and hand )  Right shoulder: He exhibits decreased range of motion (left wrist ), swelling (left forearm and wrist, hand) and pain (left hand )  Neurological: He is alert and oriented to person, place, and time  No cranial nerve deficit  GCS eye subscore is 4  GCS verbal subscore is 5  GCS motor subscore is 6  Skin: Capillary refill takes 2 to 3 seconds  He is not diaphoretic  There is erythema (left hand / wrist)  Psychiatric: He has a normal mood and affect  His behavior is normal  Judgment and thought content normal        Additional Data:     Lab Results: I have personally reviewed pertinent reports  Results from last 7 days   Lab Units 04/29/19  1926   WBC Thousand/uL 5 54   HEMOGLOBIN g/dL 11 2*   HEMATOCRIT % 35 1*   PLATELETS Thousands/uL 208   NEUTROS PCT % 64   LYMPHS PCT % 26   MONOS PCT % 7   EOS PCT % 3     Results from last 7 days   Lab Units 04/29/19  1926   POTASSIUM mmol/L 4 1   CHLORIDE mmol/L 104   CO2 mmol/L 32   BUN mg/dL 12   CREATININE mg/dL 0 84   CALCIUM mg/dL 8 9   ALK PHOS U/L 63   ALT U/L 40   AST U/L 19     Results from last 7 days   Lab Units 04/28/19  2144   INR  1 01       Imaging: I have personally reviewed pertinent reports  Xr Chest 2 Views    Result Date: 4/29/2019  Narrative: CHEST INDICATION:   arm pain  COMPARISON:  6/24/2018 EXAM PERFORMED/VIEWS:  XR CHEST PA & LATERAL FINDINGS: Cardiomediastinal silhouette appears unremarkable  The lungs are clear  No pneumothorax or pleural effusion  Osseous structures appear within normal limits for patient age  Impression: No acute cardiopulmonary disease  Workstation performed: [id number]     Xr Humerus Left    Result Date: 4/29/2019  Narrative: LEFT HUMERUS INDICATION:   ? fb distal humerus  COMPARISON:  None VIEWS:  XR HUMERUS LEFT FINDINGS: There is no acute fracture or dislocation  No degenerative changes   No lytic or blastic lesions are seen  Soft tissues are unremarkable  Impression: No acute osseous abnormality  Workstation performed: [id number]     Xr Forearm 2 Views Left    Result Date: 4/29/2019  Narrative: LEFT FOREARM INDICATION:   iv drug abuser forearm swelling - soft tissue film  COMPARISON:  None VIEWS:  XR FOREARM 2 VW LEFT FINDINGS: There is no acute fracture or dislocation  No degenerative changes  No lytic or blastic lesions are seen  Soft tissues are unremarkable  Impression: No acute osseous abnormality  Workstation performed: [id number]     Xr Hand 3+ Views Left    Result Date: 4/29/2019  Narrative: LEFT HAND INDICATION:   IV drug abuser left hand swelling  COMPARISON:  7/27/2010  VIEWS:  XR HAND 3+ VW LEFT For the purposes of institution wide universal language the following terms will apply: (thumb=1st digit/finger, index finger=2nd digit/finger, long finger=3rd digit/finger, ring=4th digit/finger and small finger=5th digit/finger) FINDINGS: There is no acute fracture or dislocation  No significant degenerative changes  No lytic or blastic lesions are seen  Dorsal hand soft tissue swelling noted  Impression: Dorsal hand soft tissue swelling noted  Correlate with clinical exam  No acute osseous abnormality  Workstation performed: [id number]     Ct Forearm Left W Contrast    Result Date: 4/29/2019  Narrative: CT LEFT ARM WITH IV CONTRAST INDICATION: IVDU, cellulitis, suspect abscesses  COMPARISON: X-ray dated 4/28/2019 TECHNIQUE: CT examination of the left arm was performed  This examination, like all CT scans performed in the [facility], was performed utilizing techniques to minimize radiation dose exposure, including the use of iterative reconstruction and automated exposure control software  Sagittal and coronal two dimensional reconstructed images were also submitted for interpretation   IV Contrast: 100 mL of iohexol (OMNIPAQUE) Rad dose  0 mGy-cm FINDINGS: OSSEOUS STRUCTURES:  No fracture, dislocation or destructive osseous lesion  VISUALIZED MUSCULATURE:  Normal density of the musculature of the upper arm with no hematoma or abscess identified  There is a thin metallic foreign body identified within the anterior muscle group of the distal aspect of the upper arm measuring 1 2 cm in length  This is consistent with a broken needle fragment, best seen on series 4 image 145  Muscles of the forearm demonstrate no hematoma or mass  No edema within the deep fascial planes  SOFT TISSUES:  There is edema present within the subcutaneous fat along the posterior aspect of the ulna beginning at the level of the elbow and extending distally towards the wrist  Periarticular fluid is identified within the distal radioulnar joint and surrounding the proximal carpal row possibly representing joint effusions  Moderate edema identified within the dorsum of the hand overlying the metacarpals extending to the metacarpal phalangeal joints  OTHER PERTINENT FINDINGS:  Normal enhancement of the vasculature  Impression: There is a broken needle fragment identified within the anterior muscle group of the distal aspect of the upper arm best seen on series 4 image 145 measuring just over 1 cm in length  No surrounding abscess or hematoma  Within the forearm there is edema and stranding within the subcutaneous fat posterior to the ulna beginning at the level of the elbow and extending to the wrist   Findings suggest cellulitis  The edema extends into the dorsum of the wrist and hand overlying the carpal and metacarpal bones without a discrete abscess  There is fluid identified surrounding the proximal carpal row and within the distal radioulnar joint suggesting joint effusions  The possibility of septic arthritis not excluded  Consider joint aspiration  The study was marked in [facility] for immediate notification   Workstation performed: [id number]     Vas Upper Limb Venous Duplex Scan, Unilateral/limited    Result Date: 4/29/2019  Narrative:  [facility] REPORT CLINICAL: Indications:  Patient presents with pain and swelling left upper extremity mainly at the wrist  History of IV drug abuse  CONCLUSION: Impression RIGHT UPPER LIMB LIMITED: Evaluation shows no evidence of thrombus in the internal jugular vein  LEFT UPPER LIMB: No evidence of acute or chronic deep vein thrombosis  No evidence of superficial thrombophlebitis noted  Doppler evaluation shows a normal response to augmentation maneuvers  SIGNATURE: Electronically Signed by: Marquita Pimentel MD, RPVI on 2019-04-29 10:22:59 AM          [facility] Reviewed: Yes     ** Please Note: This note has been constructed using a voice recognition system   **

## 2019-04-30 NOTE — PLAN OF CARE
Problem: PAIN - ADULT  Goal: Verbalizes/displays adequate comfort level or baseline comfort level  Description  Interventions:  - Encourage patient to monitor pain and request assistance  - Assess pain using appropriate pain scale; 0-10 pain scale  - Administer analgesics based on type and severity of pain and evaluate response  - Implement non-pharmacological measures as appropriate and evaluate response  - Consider cultural and social influences on pain and pain management  - Notify physician/advanced practitioner if interventions unsuccessful or patient reports new pain   Outcome: Progressing     Problem: DISCHARGE PLANNING  Goal: Discharge to home or other facility with appropriate resources  Description  INTERVENTIONS:  - Identify barriers to discharge w/patient and caregiver  - Arrange for needed discharge resources and transportation as appropriate  - Identify discharge learning needs (meds, wound care, etc )  - Refer to Case Management Department for coordinating discharge planning if the patient needs post-hospital services based on physician/advanced practitioner order or complex needs related to functional status, cognitive ability, or social support system   Outcome: Progressing     Problem: Knowledge Deficit  Goal: Patient/family/caregiver demonstrates understanding of disease process, treatment plan, medications, and discharge instructions  Description  Complete learning assessment and assess knowledge base    Interventions:  - Provide teaching at level of understanding  - Provide teaching via preferred learning methods  Outcome: Progressing     Problem: SKIN/TISSUE INTEGRITY - ADULT  Goal: Incision(s), wounds(s) or drain site(s) healing without S/S of infection  Description  INTERVENTIONS  - Assess and document dressing, incision, wound bed, drain sites and surrounding tissue  - Initiate Nutrition services consult and/or wound management as needed   Outcome: Progressing     Problem: MUSCULOSKELETAL - ADULT  Goal: Maintain or return mobility to safest level of function  Description  INTERVENTIONS:  - Assess range of motion  - Encourage maximum independence but intervene and supervise when necessary  - Assess for home care needs following discharge   - Provide patient education as appropriate   Outcome: Progressing  Goal: Maintain proper alignment of affected body part  Description  INTERVENTIONS:  - Support, maintain and protect limb and body alignment  - Provide pt/fam with appropriate education  Outcome: Progressing     Problem: INFECTION - ADULT  Goal: Absence or prevention of progression during hospitalization  Description  INTERVENTIONS:  - Assess and monitor for signs and symptoms of infection  - Monitor lab/diagnostic results  - Monitor all insertion sites, i e  indwelling lines, tubes, and drains  - Administer medications as ordered  - Instruct and encourage patient and family to use good hand hygiene technique   Outcome: Progressing     Problem: SAFETY ADULT  Goal: Patient will remain free of falls  Description  INTERVENTIONS:  - Assess patient frequently for physical needs  -  [place] fall precautions as indicated by assessment  Low fall risk   - Educate patient/family on patient safety including physical limitations  - Instruct patient to call for assistance with activity based on assessment  - Modify environment to reduce risk of injury   Outcome: Progressing  Goal: Maintain or return to baseline ADL function  Description  INTERVENTIONS:  -  Assess patient's ability to carry out ADLs; Independent  - Assess patient's mobility;  Independent  - Encourage maximum independence but intervene and supervise when necessary  ¯ Assess for home care needs following discharge   ¯ Request OT consult to assist with ADL evaluation and planning for discharge  ¯ Provide patient education as appropriate   Outcome: Progressing  Goal: Maintain or return mobility status to optimal level  Description  INTERVENTIONS:  - Assess patient's baseline mobility status- [address] fall precautions as indicated by assessment   Low fall risk   - Record patient progress and toleration of activity level on Mobility SBAR; progress patient to next Phase/Stage  - Instruct patient to call for assistance with activity based on assessment   Outcome: Progressing

## 2019-04-30 NOTE — CONSULTS
Orthopedics   Tim Spain 33 y o  male MRN: [id number]  Unit/Bed#: 885-70      Chief Complaint:   left hand and wrist pain    HPI:   28 y o male complaining of left hand erythema and pain  He notes since he has been in the hospital antibiotics the hand is feeling better  He states the symptoms started a few days ago after admitting using her when injecting self in the left hand  He notes his hand and wrist for more swollen and red  In the wrist splint his hand feeling better  He denies fevers or chills  He has a history of hepatitis-C  He denies numbness or tingling  Denies any other myalgias or arthralgias  Of note is that he left hospital ER AMA the day before admission but then returned  He had a CT of the arm which noted wrist effusion rule out septic joint      Review Of Systems:   · Skin: Normal  · Neuro: See HPI  · Musculoskeletal: See HPI  · 14 point review of systems negative except as stated above     Past Medical History:   Past Medical History:   Diagnosis Date    Allergic     Anemia 6/24/2018    Anxiety     from records    Benign essential hypertension 11/17/2016    Bipolar 1 disorder ([facility] )     from records    Chronic pain     Claustrophobia 3/15/2018    Community acquired pneumonia 6/24/2018    Depressed 7/14/2016    Depression     from records    GERD (gastroesophageal reflux disease)     Hepatitis C virus infection 8/14/2014    Heroin abuse ([facility] ) 7/24/2018    Obesity 10/12/2016    Obstructive sleep apnea     from records    Sleep disorder      Past Surgical History:   Past Surgical History:   Procedure Laterality Date    WISDOM TOOTH EXTRACTION       Family History:  Family history reviewed and non-contributory  Family History   Problem Relation Age of Onset    Colon cancer Father     Alzheimer's disease Maternal Grandmother     Depression Family     Diabetes Mother      Social History:  Social History     Socioeconomic History    Marital status: Single     Spouse name: None    Number of children: None    Years of education: None    Highest education level: None   Occupational History    None   Social Needs    Financial resource strain: None    Food insecurity:     Worry: None     Inability: None    Transportation needs:     Medical: None     Non-medical: None   Tobacco Use    Smoking status: Current Every Day Smoker     Packs/day: 1 00     Years: 13 00     Pack years: 13 00     Types: Cigarettes, E-Cigarettes     Last attempt to quit: 2016     Years since quittin 8    Smokeless tobacco: Never Used   Substance and Sexual Activity    Alcohol use: Not Currently     Alcohol/week: 0 0 oz    Drug use: No     Types: Heroin     Comment: 1-2 bags daily, last used a few hours ago    Sexual activity: Not Currently   Lifestyle    Physical activity:     Days per week: None     Minutes per session: None    Stress: None   Relationships    Social connections:     Talks on phone: None     Gets together: None     Attends [employer] service: None     Active member of club or organization: None     Attends meetings of clubs or organizations: None     Relationship status: None    Intimate partner violence:     Fear of current or ex partner: None     Emotionally abused: None     Physically abused: None     Forced sexual activity: None   Other Topics Concern    None   Social History Narrative    H/O intravenous drug use in remission    Hepatitis C virus Infection    Lives with parents    No caffeine use    Unemployed     Allergies:    Allergies   Allergen Reactions    Bee Venom      Labs:  0   Lab Value Date/Time    HCT 32 3 (L) 2019 0435    HCT 35 1 (L) 2019 1926    HCT 35 9 (L) 2019 2144    HCT 41 8 2015 1128    HCT 41 8 2014 1520    HGB 10 5 (L) 2019 0435    HGB 11 2 (L) 2019 1926    HGB 11 7 (L) 2019 2144    HGB 14 5 2015 1128    HGB 14 4 2014 1520    INR 1 01 2019 2144    WBC 5 45 2019 0435    WBC 5 54 04/29/2019 1926    WBC 7 57 04/28/2019 2144    WBC 8 33 04/01/2015 1128    WBC 5 38 08/14/2014 1520    ESR 25 (H) 10/14/2016 0813     Meds:    Current Facility-Administered Medications:     nicotine (NICODERM CQ) 21 mg/24 hr TD 24 hr patch 1 patch, 1 patch, Transdermal, Daily, Heide Y Swank, CRNP    saccharomyces boulardii (FLORASTOR) capsule 250 mg, 250 mg, Oral, BID, Heide Y Swank, CRNP    vancomycin (VANCOCIN) 2,000 mg in sodium chloride 0 9 % 500 mL IVPB, 15 mg/kg, Intravenous, Q12H, Heide Y Swank, CRNP    Blood Culture:   Lab Results   Component Value Date    BLOODCX No Growth After 5 Days  08/17/2018     Wound Culture:   No results found for: WOUNDCULT    Ins and Outs:  No intake/output data recorded  Physical Exam:   BP 92/57 (BP Location: Right arm)   Pulse 65   Temp 97 9 °F (36 6 °C) (Temporal)   Resp 18   Ht 5' 10" (1 778 m)   Wt 129 kg (283 lb 8 2 oz)   SpO2 94%   BMI 40 68 kg/m²   Gen: Alert and oriented to person, place, time  HEENT: EOMI, eyes clear, moist mucus membranes, hearing intact  Respiratory: Bilateral chest rise  No audible wheezing found  Cardiovascular: Regular Rate and Rhythm  Abdomen: soft nontender/nondistended  · Musculoskeletal: leftUpper Extremity  · Skin:  There is no erythema about the wrist forearm or hand at this point time  There is swelling of the dorsum of the hand and fingers  There is positive wrist effusion with pain with active motion of the wrist approximately 20° of flexion and extension  There is pain with flexion of the fingers  Radial pulses intact  Sensation is intact  · 4/5 motor to Radial, Ulna and Median    Radiology:   I personally reviewed the films  CT of left upper extremity noted wrist effusion    Assessment:  28 y o male with  left hand resolved erythema the continues with swelling and wrist fusion and pain, rule out septic joint  Plan:   · Cont   abx per primary team  · Nonweight bearing to left hand and maintain splint, work on finger range of motion  · Attending surgeon to see later today with likely wrist aspiration    We will keep patient NPO for now in case he needs I and D of wrist   · Analgesics for pain  · Dispo: Ortho will follow      Ofelia Adler PA-C

## 2019-04-30 NOTE — PROGRESS NOTES
Patient seen and examined  Swelling present dorsum of left wrist and distal forearm  Wrist joint has 30° of palmar flexion and dorsiflexion pain-free  No distal neurovascular deficits  All compartments soft non-tender  Review of CT, am not convinced there is a joint effusion  Plan  Discussed treatment the patient and the primary care team  Continue elevation icing  Will obtain an MRI to rule out the septic arthritis  If MRI shows sepsis in the joint or collection this will need to be drained or aspirated

## 2019-04-30 NOTE — TELEMEDICINE
Consultation - Infectious Disease   Marlo Client 33 y o  male MRN: [id number]  Unit/Bed#: 902-05 Encounter: [id number]      Inpatient consult to Infectious Diseases  Consult performed by: Darlene Castañeda MD  Consult ordered by: TC Jose            REQUIRED DOCUMENTATION:     1  This service was provided via Telemedicine  2  Provider located at Home  3  TeleMed provider: Darlene Castañeda MD   4  Identify all parties in room with patient during tele consult:  RN  5  After connecting through [employer], patient was identified by name and date of birth and assistant checked wristband  Patient was then informed that this was a Telemedicine visit and that the exam was being conducted confidentially over secure lines  My office door was closed  No one else was in the room  Patient acknowledged consent and understanding of privacy and security of the Telemedicine visit, and gave us permission to have the assistant stay in the room in order to assist with the history and to conduct the exam   I informed the patient that I have reviewed their record in Epic and presented the opportunity for them to ask any questions regarding the visit today  The patient agreed to participate  Assessment/Recommendations     33year old male with chronic Hep C, injection drug use presents with left forearm, wrist cellulitis, possible septic arthritis    1  Cellulitis of left forearm and wrist, possible septic arthritis   - Source of infection is likely injection drug use, less likely to be hematogenous spread given negative cultures  - Afebrile without leukocytosis  - CT forearm shows no discrete abscess but does note wrist effusion    · Continue Vancomycin  · Recommend wrist aspiration  If purulent/positive cultures recommend I and D  · Duration and choice of therapy to be determined by clinical course  · FU blood cultures    2   Injection drug use, chronic hepatitis C  - HIV negative    · Recommend social work evaluation for reviewing rehab options  · Recommend outpatient GI fu    Thank you for involving me in the care of your patient  Please call with questions, change in clinical status or if tests recommended above are abnormal      Discussed with the primary service  History     Reason for Consult: Wrist cellulitis  HPI: Marlo Keane is a 33y o  year old male with chronic hepatitis C, injection drug use  He reports daily iv heroin use injecting mainly into his antecrubital fossa and occasionally his left hand  Around 5-6 days ago he noted acute onset pain in his wrist with stiffness followed by diffuse swelling that first started around his wrist followed by left forearm and hand  He also noted pain with movement of his wrist and redness  Symptoms progressively worsened and he was seen in the ER on 4/28  He denied any preceding trauma, no recent hand injections, no open wounds or cuts  In the ER, vitals were stable  Labs were unremarkable  XR forearm showed soft tissue swelling  LUE duplex showed no DVT  CT of the forearm confirmed soft tissue swelling and wrist effusion and a broken needle fragment in distal upper arm  Patient left AMA from the ER but returned yesterday with worsening pain and swelling  He was admitted and started on iv Vancomycin  He currently notes some improvement in pain  His hand has been splinted  Denies nausea, vomiting, diarrhea or rash and is tolerating antibiotics well  Infectious disease is being consulted for diagnostic work up and antibiotic management  Review of Systems  A [id number] point system-based review of systems is otherwise negative      PAST MEDICAL HISTORY:  Past Medical History:   Diagnosis Date    Allergic     Anemia 6/24/2018    Anxiety     from records    Benign essential hypertension 11/17/2016    [facility]ipolar [facility] disorder [facility] )     from records    Chronic pain     Claustrophobia 3/15/2018    Community acquired pneumonia 6/24/2018    Depressed 2016    Depression     from records    GERD (gastroesophageal reflux disease)     Hepatitis C virus infection 2014    Heroin [facility] ) 2018    Obesity 10/12/2016    Obstructive sleep apnea     from records    Sleep disorder      Past Surgical History:   Procedure Laterality Date    WISDOM TOOTH EXTRACTION         FAMILY HISTORY:  Non-contributory    SOCIAL HISTORY:  Social History   Single  Social History     Substance and Sexual Activity   Alcohol Use Not Currently    Alcohol/week: 0 0 oz     Social History     Substance and Sexual Activity   Drug Use No    Types: Heroin    Comment: 1-2 bags daily, last used a few hours ago     Social History     Tobacco Use   Smoking Status Current Every Day Smoker    Packs/day: 1 00    Years:     Pack years:     Types: Cigarettes, E-Cigarettes    Last attempt to quit: 2016    Years since quittin 8   Smokeless Tobacco Never Used       ALLERGIES:  Allergies   Allergen Reactions    Bee Venom        MEDICATIONS:  All current active medications have been reviewed      Physical Exam     Temp:  [97 9 °F (36 6 °C)-98 3 °F (36 8 °C)] 97 9 °F (36 6 °C)  HR:  [65-69] 65  Resp:  [18] 18  BP: ()/(57-62) 92/57  SpO2:  [94 %-96 %] 94 %  Temp (24hrs), Av 1 °F (36 7 °C), Min:97 9 °F (36 6 °C), Max:98 3 °F (36 8 °C)  Current: Temperature: 97 9 °F (36 6 °C)    Intake/Output Summary (Last 24 hours) at 2019 9201  Last data filed at 2019 0344  Gross per 24 hour   Intake 120 ml   Output    Net 120 ml       Physical exam findings reported by bedside and primary medical team staff    General Appearance:  Appearing well, nontoxic, and in no distress, appears stated age   Head:  Normocephalic, without obvious abnormality, atraumatic   Eyes:  PERRL, conjunctiva pink and sclera anicteric, both eyes   Nose: Nares normal, mucosa normal, no drainage   Throat: Oropharynx moist without lesions; lips, mucosa, and tongue normal; teeth and gums normal   Neck: Supple, symmetrical, trachea midline, no adenopathy, no tenderness/mass/nodules   Back:   Symmetric, no curvature, ROM normal, no CVA tenderness   Lungs:   Clear to auscultation bilaterally, no audible wheezes, rhonchi and rales, respirations unlabored   Chest Wall:  No tenderness or deformity   Heart:  Regular rate and rhythm, S1, S2 normal, no murmur, rub or gallop   Abdomen:   Soft, non-tender, non-distended, positive bowel sounds, no masses, no organomegaly    No CVA tenderness   Extremities: Edema noted over dorsum of hand and fingers  Wrist effusion noted with painful active extension and flexion  Skin: No erythema over left forearm, no open wounds   Lymph nodes: Cervical, supraclavicular, and axillary nodes normal   Neurologic: Alert and oriented times 3, extremity strength LUE limited assessment due to pain 4/5       Invasive Devices:   Peripheral IV 04/29/19 Right Antecubital (Active)   Site Assessment Clean;Dry; Intact 4/30/2019 12:05 AM   Dressing Type Transparent;Securing device 4/30/2019 12:05 AM   Line Status Flushed;Saline locked 4/30/2019 12:05 AM   Dressing Status Clean;Dry; Intact 4/30/2019 12:05 AM       Labs, Imaging, & Other Studies     Lab Results:    I have personally reviewed pertinent labs  Results from last 7 days   Lab Units 04/30/19 0435 04/29/19 1926 04/28/19 2144   WBC Thousand/uL 5 45 5 54 7 57   HEMOGLOBIN g/dL 10 5* 11 2* 11 7*   PLATELETS Thousands/uL 196 208 237     Results from last 7 days   Lab Units 04/30/19 0435 04/29/19 1926 04/28/19 2144   POTASSIUM mmol/L 4 2 4 1 4 4   CHLORIDE mmol/L 105 104 98*   CO2 mmol/L 31 32 29   BUN mg/dL 11 12 10   CREATININE mg/dL 0 69 0 84 0 86   EGFR ml/min/1 73sq m 128 118 117   CALCIUM mg/dL 8 7 8 9 8 9   AST U/L  --  19 30   ALT U/L  --  40 42   ALK PHOS U/L  --  63 57     Results from last 7 days   Lab Units 04/28/19 2147 04/28/19 2144   BLOOD CULTURE  No Growth at 24 hrs  No Growth at 24 hrs         Imaging Studies:   I have personally reviewed pertinent imaging study reports and images in PACS  EKG, Pathology, and Other Studies:   I have personally reviewed pertinent reports  Counseling/Coordination of care: Total 70 minutes communication with the patient via telehealth  Labs, medical tests and imaging studies were independently reviewed by me as noted above in HPI and old records were obtained and summarized as noted above in HPI

## 2019-04-30 NOTE — PROGRESS NOTES
Vancomycin Assessment    Kacey Jones is a 33 y o  male who is currently receiving vancomycin 2000mg Q12h for skin-soft tissue infection     Relevant clinical data and objective history reviewed:  Creatinine   Date Value Ref Range Status   04/30/2019 0 69 0 60 - 1 30 mg/dL Final     Comment:     Standardized to IDMS reference method   04/29/2019 0 84 0 60 - 1 30 mg/dL Final     Comment:     Standardized to IDMS reference method   04/28/2019 0 86 0 60 - 1 30 mg/dL Final     Comment:     Standardized to IDMS reference method   04/01/2015 0 70 0 60 - 1 30 mg/dL Final     Comment:     Standardized to IDMS reference method   08/14/2014 0 64 0 60 - 1 30 mg/dL Final     Comment:     Standardized to IDMS reference method     BP 92/57 (BP Location: Right arm)   Pulse 65   Temp 97 9 °F (36 6 °C) (Temporal)   Resp 18   Ht 5' 10" (1 778 m)   Wt 129 kg (283 lb 8 2 oz)   SpO2 94%   BMI 40 68 kg/m²   No intake/output data recorded  Lab Results   Component Value Date/Time    BUN 11 04/30/2019 04:35 AM    BUN 16 04/01/2015 11:28 AM    WBC 5 45 04/30/2019 04:35 AM    WBC 8 33 04/01/2015 11:28 AM    HGB 10 5 (L) 04/30/2019 04:35 AM    HGB 14 5 04/01/2015 11:28 AM    HCT 32 3 (L) 04/30/2019 04:35 AM    HCT 41 8 04/01/2015 11:28 AM    MCV 79 (L) 04/30/2019 04:35 AM    MCV 78 (L) 04/01/2015 11:28 AM     04/30/2019 04:35 AM     04/01/2015 11:28 AM     Temp Readings from Last 3 Encounters:   04/30/19 97 9 °F (36 6 °C) (Temporal)   04/28/19 98 9 °F (37 2 °C) (Temporal)   01/28/19 98 2 °F (36 8 °C) (Tympanic)     Vancomycin Days of Therapy: 2    Assessment/Plan  The patient is currently on vancomycin utilizing scheduled dosing based on adjusted body weight (due to obesity)  The patient is currently receiving 2000mg Q12h and is clinically appropriate and dose will be continued  Pharmacy will also follow closely for s/sx of nephrotoxicity, infusion reactions and appropriateness of therapy    BMP and CBC will be ordered per protocol  Plan for trough as patient approaches steady state, prior to the 4th  dose at approximately 0730 on 5/1/19  Due to infection severity, will target a trough of 10-15 (mild infection/cellulitis)   Pharmacy will continue to follow the patients culture results and clinical progress daily      Evgeny Britt, Pharmacist

## 2019-04-30 NOTE — UTILIZATION REVIEW
Initial Clinical Review    Admission: Date/Time/Statement:   Admission Orders (From admission, onward)    Ordered        04/29/19 2128  Place in Observation  Once             Orders Placed This Encounter   Procedures    Place in Observation     Standing Status:   Standing     Number of Occurrences:   1     Order Specific Question:   Admitting Physician     Answer:   Brianna Sanchez     Order Specific Question:   Level of Care     Answer:   Med Surg [16]     ED: Date/Time/Mode of Arrival:   ED Arrival Information     Expected Arrival Acuity Means of Arrival Escorted By Service Admission Type    - 4/29/2019 18:25 Urgent Walk-In Self General Medicine Urgent    Arrival Complaint    -        Chief Complaint:   Chief Complaint   Patient presents with    Hand Swelling     pt seen here yesterday for hand swelling and returned today to be admitted     Assessment/Plan:     ED Vital Signs:   ED Triage Vitals [04/29/19 1837]   Temperature Pulse Respirations Blood Pressure SpO2   98 3 °F (36 8 °C) 67 18 113/59 95 %      Temp Source Heart Rate Source Patient Position - Orthostatic VS BP Location FiO2 (%)   Temporal Right Sitting Right arm --      Pain Score       6        Wt Readings from Last 1 Encounters:   04/29/19 129 kg (283 lb 8 2 oz)     Vital Signs (abnormal):   Pertinent Labs/Diagnostic Test Results:   ANION GAP 2 HGB 10 5   CT LFA=There is a broken needle fragment identified within the anterior muscle group of the distal aspect of the upper arm best seen on series 4 image 145 measuring just over 1 cm in length  No surrounding abscess or hematoma  Within the forearm there is edema and stranding within the subcutaneous fat posterior to the ulna beginning at the level of the elbow and extending to the wrist   Findings suggest cellulitis  The edema extends into the dorsum of the wrist and hand overlying the carpal and metacarpal bones without a discrete abscess    There is fluid identified surrounding the proximal carpal row and within the distal radioulnar joint suggesting joint effusions  The possibility of septic arthritis not excluded  Consider joint aspiration  ED Treatment:   Medication Administration from 04/29/2019 1825 to 04/29/2019 2343       Date/Time Order Dose Route Action Action by Comments     04/29/2019 2000 vancomycin (VANCOCIN) 2,000 mg in sodium chloride 0 9 % 500 mL IVPB 2,000 mg Intravenous New Bag Missy Dallas RN      04/29/2019 1941 iohexol (OMNIPAQUE) 350 MG/ML injection (SINGLE-DOSE) 100 mL 100 mL Intravenous Given Shamine Zhang      04/29/2019 2051 ketorolac (TORADOL) injection 15 mg 15 mg Intravenous Given Naveen Hdz RN         Past Medical/Surgical History:    Active Ambulatory Problems     Diagnosis Date Noted    Acid reflux 06/29/2017    Allergic rhinitis 08/14/2014    Essential hypertension 11/17/2016    Bipolar disorder ([place] [address] ) 04/03/2015    Generalized anxiety disorder 10/19/2016    Insomnia 08/14/2014    Obesity 10/12/2016    Opioid dependence in remission ([place] [address] ) 08/14/2014    Claustrophobia 03/15/2018    Restless leg syndrome 03/15/2018    Morbid obesity with BMI of 45 0-49 9, adult ([place] [address] ) 03/15/2018    Chronic pain disorder 03/15/2018    Anxiety 07/14/2016    Chronic hepatitis C without hepatic coma ([place] [address] ) 02/21/2016    Depressed 07/14/2016    Obesity, Class III, BMI 40-49 9 (morbid obesity) ([place] [address] ) 07/14/2016    Obstructive sleep apnea 06/24/2018    Drug dependence ([place] [address] ) 01/22/2019    Medication therapy continued 01/22/2019    Encounter for monitoring Suboxone maintenance therapy 01/22/2019    IV drug abuse ([place] [address] ) 01/22/2019    Heroin addiction ([place] [address] ) 01/22/2019     Resolved Ambulatory Problems     Diagnosis Date Noted    Hepatitis C virus infection 08/14/2014    Sleep-related hypoventilation 08/02/2017    Mood disturbance 03/15/2018    [facility]adone maintenance therapy patient ([facility] ) 07/14/2016    Severe sepsis ([place] [address] ) 06/24/2018    Community acquired pneumonia 06/24/2018    Lactic acidosis 06/24/2018    Heroin overdose, accidental or unintentional, initial encounter ([facility] ) 06/24/2018    Hypokalemia 06/24/2018    Acute respiratory failure with hypoxia and hypercapnia ([facility] ) 06/24/2018    Anemia 06/24/2018    Diarrhea 06/24/2018    Bacteremia due to Gram-negative bacteria 06/25/2018    Withdrawal from opioids ([place] [address] ) 06/28/2018    Heroin abuse ([place] [address] ) 07/24/2018     Past Medical History:   Diagnosis Date    Allergic     Benign essential hypertension 11/17/2016    Bipolar 1 disorder (HCC)     Chronic pain     Depression     GERD (gastroesophageal reflux disease)     Sleep disorder      Admitting Diagnosis: Arm pain [M79 603]  Cellulitis of left arm [L03 114]  Cellulitis of left hand [L03 114]  Age/Sex: 33 y o  male  Admission Orders:  MED SURG  CONSULT ORTHO  CONSULT ID  Scheduled Meds:   Current Facility-Administered Medications:  nicotine 1 patch Transdermal Daily Heide Mccormick, CHAPINCITONP   saccharomyces boulardii 250 mg Oral BID Heide Mccormick, CHAPINCITONP   vancomycin 20 mg/kg (Adjusted) Intravenous Q12H Leanne Christiansen MD     Continuous Infusions:    PRN Meds:

## 2019-04-30 NOTE — PLAN OF CARE
Problem: PAIN - ADULT  Goal: Verbalizes/displays adequate comfort level or baseline comfort level  Description  Interventions:  - Encourage patient to monitor pain and request assistance  - Assess pain using appropriate pain scale  - Administer analgesics based on type and severity of pain and evaluate response  - Implement non-pharmacological measures as appropriate and evaluate response  - Consider cultural and social influences on pain and pain management  - Notify physician/advanced practitioner if interventions unsuccessful or patient reports new pain  Outcome: Progressing     Problem: DISCHARGE PLANNING  Goal: Discharge to home or other facility with appropriate resources  Description  INTERVENTIONS:  - Identify barriers to discharge w/patient and caregiver  - Arrange for needed discharge resources and transportation as appropriate  - Identify discharge learning needs (meds, wound care, etc )  - Refer to Case Management Department for coordinating discharge planning if the patient needs post-hospital services based on physician/advanced practitioner order or complex needs related to functional status, cognitive ability, or social support system   Outcome: Progressing     Problem: Knowledge Deficit  Goal: Patient/family/caregiver demonstrates understanding of disease process, treatment plan, medications, and discharge instructions  Description  Complete learning assessment and assess knowledge base    Interventions:  - Provide teaching at level of understanding  - Provide teaching via preferred learning methods  Outcome: Progressing     Problem: SKIN/TISSUE INTEGRITY - ADULT  Goal: Incision(s), wounds(s) or drain site(s) healing without S/S of infection  Description  INTERVENTIONS  - Assess and document dressing, incision, wound bed, drain sites and surrounding tissue  - Initiate Nutrition services consult and/or wound management as needed   Outcome: Progressing     Problem: MUSCULOSKELETAL - ADULT  Goal: Maintain or return mobility to safest level of function  Description  INTERVENTIONS:  - Assess range of motion  - Encourage maximum independence but intervene and supervise when necessary  - Assess for home care needs following discharge   - Provide patient education as appropriate   Outcome: Progressing  Goal: Maintain proper alignment of affected body part  Description  INTERVENTIONS:  - Support, maintain and protect limb and body alignment  - Provide pt/fam with appropriate education  Outcome: Progressing

## 2019-04-30 NOTE — PROGRESS NOTES
Progress Note - Jewels Lowery 1990, 33 y o  male MRN: [id number]    Unit/Bed#: 407-01 Encounter: [id number]    Primary Care Provider: David Diallo PA-C   Date and time admitted to hospital: 4/29/2019  6:34 PM        * Cellulitis of left upper extremity  Assessment & Plan  Need to r/o septic arthritis   Continue Vancomycin, follow trough  ID and Orthos advising, appreciate input - potential wrist joint aspiration and/or I&D   Blood cultures pending  Patient without systemic signs of infection, continue to monitor CBC/VS, procalcitonin        IV drug abuse (HCC)  Assessment & Plan  IV drug use-heroin, uses medical marijuana to help control drug use  Case management consult for potential rehab referral    Class 3 severe obesity due to excess calories with serious comorbidity and body mass index (BMI) of 40 0 to 44 9 in adult [facility])  Assessment & Plan  Would benefit from diet modifications     Bipolar disorder [facility])  Assessment & Plan  Provide supportive care      VTE Pharmacologic Prophylaxis:   Pharmacologic: none, low risk  Mechanical VTE Prophylaxis in Place: Yes    Patient Centered Rounds: I have performed bedside rounds with nursing staff today  Discussions with Specialists or Other Care Team Provider: reviewed ID and Ortho documentation    Education and Discussions with Family / Patient: Patient    Time Spent for Care: 30 minutes  More than 50% of total time spent on counseling and coordination of care as described above  Current Length of Stay: 0 day(s)    Current Patient Status: Observation   Certification Statement: The patient will continue to require additional inpatient hospital stay due to need for IV abx and infectious workup    Discharge Plan: TBD    Code Status: Level 1 - Full Code      Subjective:   Patient seen and examined  He reports feeling well overall except for some moderate pain and left wrist   Afebrile    Denies nausea or vomiting, denies malaise or lightheadedness  The patient does admit to daily heroin use  Objective:     Vitals:   Temp (24hrs), Av 1 °F (36 7 °C), Min:97 9 °F (36 6 °C), Max:98 3 °F (36 8 °C)    Temp:  [97 9 °F (36 6 °C)-98 3 °F (36 8 °C)] 97 9 °F (36 6 °C)  HR:  [65-69] 65  Resp:  [18] 18  BP: ()/(57-62) 92/57  SpO2:  [94 %-96 %] 94 %  Body mass index is 40 68 kg/m²  Input and Output Summary (last 24 hours): Intake/Output Summary (Last 24 hours) at 2019 1134  Last data filed at 2019 7258  Gross per 24 hour   Intake 120 ml   Output    Net 120 ml       Physical Exam:     Physical Exam   Constitutional: He is oriented to person, place, and time  No distress  HENT:   Head: Normocephalic and atraumatic  Eyes: Conjunctivae are normal    Neck: No JVD present  Cardiovascular: Normal rate and regular rhythm  No murmur heard  Pulmonary/Chest: Effort normal  No respiratory distress  He has no wheezes  He has no rales  Abdominal: Soft  He exhibits no mass  There is no guarding  Musculoskeletal: He exhibits no edema  Left arm wrist, fingers normal ROM   Neurological: He is alert and oriented to person, place, and time  Skin: Skin is warm and dry  Psychiatric: He has a normal mood and affect          Additional Data:     Labs:    Results from last 7 days   Lab Units 19  0435 196   WBC Thousand/uL 5 45 5 54   HEMOGLOBIN g/dL 10 5* 11 2*   HEMATOCRIT % 32 3* 35 1*   PLATELETS Thousands/uL 196 208   NEUTROS PCT %  --  64   LYMPHS PCT %  --  26   MONOS PCT %  --  7   EOS PCT %  --  3     Results from last 7 days   Lab Units 19  0435 19  1926   SODIUM mmol/L 138 139   POTASSIUM mmol/L 4 2 4 1   CHLORIDE mmol/L 105 104   CO2 mmol/L 31 32   BUN mg/dL 11 12   CREATININE mg/dL 0 69 0 84   ANION GAP mmol/L 2* 3*   CALCIUM mg/dL 8 7 8 9   ALBUMIN g/dL  --  3 1*   TOTAL BILIRUBIN mg/dL  --  0 20   ALK PHOS U/L  --  63   ALT U/L  --  40   AST U/L  --  19   GLUCOSE RANDOM mg/dL 87 91 Results from last 7 days   Lab Units 04/28/19  2144   INR  1 01             Results from last 7 days   Lab Units 04/28/19  2144   LACTIC ACID mmol/L 1 8           * I Have Reviewed All Lab Data Listed Above  * Additional Pertinent Lab Tests Reviewed: [place] [facility] Admission Reviewed    Imaging:    Imaging Reports Reviewed Today Include: CT humerus left with contrast      Recent Cultures (last 7 days):     Results from last 7 days   Lab Units 04/28/19 2147 04/28/19 2144   BLOOD CULTURE  No Growth at 24 hrs  No Growth at 24 hrs  Last 24 Hours Medication List:     Current Facility-Administered Medications:  acetaminophen 650 mg Oral Q6H PRN Carlin Dick MD   nicotine 1 patch Transdermal Daily TC Hays   oxyCODONE 10 mg Oral Q4H PRN Carlin Dick MD   oxyCODONE 5 mg Oral Q4H PRN Carlin Dick MD   saccharomyces boulardii 250 mg Oral BID Heide Y Jace, TC   vancomycin 15 mg/kg (Adjusted) Intravenous Q8H TC Richardson        Today, Patient Was Seen By: Paolo Alaniz MD    ** Please Note: Dictation voice to text software may have been used in the creation of this document   **

## 2019-04-30 NOTE — ASSESSMENT & PLAN NOTE
Need to r/o septic arthritis   Continue Vancomycin, follow trough  ID and Orthos advising, appreciate input - potential wrist joint aspiration and/or I&D   Blood cultures pending  Patient without systemic signs of infection, continue to monitor CBC/VS, procalcitonin

## 2019-04-30 NOTE — SOCIAL WORK
Met with pt to discuss role as  in helping pt to develop discharge plan and to help pt carry out their plan  Pt lives in a house with his parents  Pt has 13 steps on the inside  Pt has bedroom and bathroom on the 2nd floor  Pt has no DME  Pt is independent with ADL's  Pt is independent with Driving   Pt has been in drug rehab in the past  Pt's PCP is Zaid Villanueva  Pt uses the [facility] in [place]  Pt was given D/c checklist and meds to beds paper  Both reviewed with a good understanding  Pt might be possibly agreeable to going to drug rehab on discharge  Will continue to follow for any Case Management needs

## 2019-04-30 NOTE — PROGRESS NOTES
Vancomycin Assessment    Florentin Rodriguez is a 33 y o  male who is currently receiving vancomycin 2000mg q12h for skin-soft tissue infection     Relevant clinical data and objective history reviewed:  Creatinine   Date Value Ref Range Status   04/30/2019 0 69 0 60 - 1 30 mg/dL Final     Comment:     Standardized to IDMS reference method   04/29/2019 0 84 0 60 - 1 30 mg/dL Final     Comment:     Standardized to IDMS reference method   04/28/2019 0 86 0 60 - 1 30 mg/dL Final     Comment:     Standardized to IDMS reference method   04/01/2015 0 70 0 60 - 1 30 mg/dL Final     Comment:     Standardized to IDMS reference method   08/14/2014 0 64 0 60 - 1 30 mg/dL Final     Comment:     Standardized to IDMS reference method     BP 92/57 (BP Location: Right arm)   Pulse 65   Temp 97 9 °F (36 6 °C) (Temporal)   Resp 18   Ht 5' 10" (1 778 m)   Wt 129 kg (283 lb 8 2 oz)   SpO2 94%   BMI 40 68 kg/m²   No intake/output data recorded  Lab Results   Component Value Date/Time    BUN 11 04/30/2019 04:35 AM    BUN 16 04/01/2015 11:28 AM    WBC 5 45 04/30/2019 04:35 AM    WBC 8 33 04/01/2015 11:28 AM    HGB 10 5 (L) 04/30/2019 04:35 AM    HGB 14 5 04/01/2015 11:28 AM    HCT 32 3 (L) 04/30/2019 04:35 AM    HCT 41 8 04/01/2015 11:28 AM    MCV 79 (L) 04/30/2019 04:35 AM    MCV 78 (L) 04/01/2015 11:28 AM     04/30/2019 04:35 AM     04/01/2015 11:28 AM     Temp Readings from Last 3 Encounters:   04/30/19 97 9 °F (36 6 °C) (Temporal)   04/28/19 98 9 °F (37 2 °C) (Temporal)   01/28/19 98 2 °F (36 8 °C) (Tympanic)     Vancomycin Days of Therapy: 2    Assessment/Plan  The patient is currently on vancomycin utilizing scheduled dosing  Baseline risks associated with therapy include: dehydration    The patient is receiving 2000mg q12h with the most recent vancomycin level being not drawn yet due to improved renal function  and sub-therapeutic based on a goal of 15-20 (appropriate for most indications) ; therefore, after clinical evaluation will be changed to 1500mg q8h   Pharmacy will continue to follow closely for s/sx of nephrotoxicity, infusion reactions and appropriateness of therapy  BMP and CBC will be ordered per protocol  Plan for trough as patient approaches steady state, prior to the 3rd  dose at approximately 1130 on 5/1/19  Pharmacy will continue to follow the patients culture results and clinical progress daily      Elis Smith, Pharmacist

## 2019-04-30 NOTE — ASSESSMENT & PLAN NOTE
Ortho consulted  Infectious Disease consulted  Blood cultures pending  Procalcitonin ordered  Vancomycin q 12 hours  Admit to med surge  Monitor per protocol

## 2019-05-01 ENCOUNTER — APPOINTMENT (OUTPATIENT)
Dept: MRI IMAGING | Facility: HOSPITAL | Age: 29
DRG: 988 | End: 2019-05-01
Payer: COMMERCIAL

## 2019-05-01 LAB
ANION GAP SERPL CALCULATED.3IONS-SCNC: 5 MMOL/L (ref 4–13)
BASOPHILS # BLD AUTO: 0.01 THOUSANDS/ΜL (ref 0–0.1)
BASOPHILS NFR BLD AUTO: 0 % (ref 0–1)
BUN SERPL-MCNC: 8 MG/DL (ref 5–25)
CALCIUM SERPL-MCNC: 9 MG/DL (ref 8.3–10.1)
CHLORIDE SERPL-SCNC: 104 MMOL/L (ref 100–108)
CO2 SERPL-SCNC: 30 MMOL/L (ref 21–32)
CREAT SERPL-MCNC: 0.77 MG/DL (ref 0.6–1.3)
EOSINOPHIL # BLD AUTO: 0.2 THOUSAND/ΜL (ref 0–0.61)
EOSINOPHIL NFR BLD AUTO: 3 % (ref 0–6)
ERYTHROCYTE [DISTWIDTH] IN BLOOD BY AUTOMATED COUNT: 12.9 % (ref 11.6–15.1)
GFR SERPL CREATININE-BSD FRML MDRD: 123 ML/MIN/1.73SQ M
GLUCOSE SERPL-MCNC: 100 MG/DL (ref 65–140)
HCT VFR BLD AUTO: 36.1 % (ref 36.5–49.3)
HGB BLD-MCNC: 11.5 G/DL (ref 12–17)
IMM GRANULOCYTES # BLD AUTO: 0.01 THOUSAND/UL (ref 0–0.2)
IMM GRANULOCYTES NFR BLD AUTO: 0 % (ref 0–2)
LYMPHOCYTES # BLD AUTO: 2.25 THOUSANDS/ΜL (ref 0.6–4.47)
LYMPHOCYTES NFR BLD AUTO: 36 % (ref 14–44)
MCH RBC QN AUTO: 25.3 PG (ref 26.8–34.3)
MCHC RBC AUTO-ENTMCNC: 31.9 G/DL (ref 31.4–37.4)
MCV RBC AUTO: 80 FL (ref 82–98)
MONOCYTES # BLD AUTO: 0.44 THOUSAND/ΜL (ref 0.17–1.22)
MONOCYTES NFR BLD AUTO: 7 % (ref 4–12)
NEUTROPHILS # BLD AUTO: 3.43 THOUSANDS/ΜL (ref 1.85–7.62)
NEUTS SEG NFR BLD AUTO: 54 % (ref 43–75)
NRBC BLD AUTO-RTO: 0 /100 WBCS
PLATELET # BLD AUTO: 222 THOUSANDS/UL (ref 149–390)
PMV BLD AUTO: 9.4 FL (ref 8.9–12.7)
POTASSIUM SERPL-SCNC: 3.7 MMOL/L (ref 3.5–5.3)
PROCALCITONIN SERPL-MCNC: <0.05 NG/ML
RBC # BLD AUTO: 4.54 MILLION/UL (ref 3.88–5.62)
SODIUM SERPL-SCNC: 139 MMOL/L (ref 136–145)
VANCOMYCIN TROUGH SERPL-MCNC: 13 UG/ML (ref 10–20)
WBC # BLD AUTO: 6.34 THOUSAND/UL (ref 4.31–10.16)

## 2019-05-01 PROCEDURE — 73223 MRI JOINT UPR EXTR W/O&W/DYE: CPT

## 2019-05-01 PROCEDURE — 99225 PR SBSQ OBSERVATION CARE/DAY 25 MINUTES: CPT | Performed by: FAMILY MEDICINE

## 2019-05-01 PROCEDURE — ND001 PR NO DOCUMENTATION: Performed by: PHYSICIAN ASSISTANT

## 2019-05-01 PROCEDURE — 84145 PROCALCITONIN (PCT): CPT | Performed by: FAMILY MEDICINE

## 2019-05-01 PROCEDURE — 99213 OFFICE O/P EST LOW 20 MIN: CPT | Performed by: ORTHOPAEDIC SURGERY

## 2019-05-01 PROCEDURE — A9585 GADOBUTROL INJECTION: HCPCS | Performed by: FAMILY MEDICINE

## 2019-05-01 PROCEDURE — 85025 COMPLETE CBC W/AUTO DIFF WBC: CPT | Performed by: FAMILY MEDICINE

## 2019-05-01 PROCEDURE — 80202 ASSAY OF VANCOMYCIN: CPT | Performed by: NURSE PRACTITIONER

## 2019-05-01 PROCEDURE — 80048 BASIC METABOLIC PNL TOTAL CA: CPT | Performed by: FAMILY MEDICINE

## 2019-05-01 RX ORDER — LORAZEPAM 0.5 MG/1
0.5 TABLET ORAL EVERY 4 HOURS PRN
Status: DISCONTINUED | OUTPATIENT
Start: 2019-05-01 | End: 2019-05-02 | Stop reason: HOSPADM

## 2019-05-01 RX ADMIN — LORAZEPAM 0.5 MG: 0.5 TABLET ORAL at 17:26

## 2019-05-01 RX ADMIN — Medication 250 MG: at 09:44

## 2019-05-01 RX ADMIN — Medication 250 MG: at 17:26

## 2019-05-01 RX ADMIN — LORAZEPAM 1 MG: 2 INJECTION INTRAMUSCULAR; INTRAVENOUS at 07:38

## 2019-05-01 RX ADMIN — LORAZEPAM 0.5 MG: 0.5 TABLET ORAL at 21:31

## 2019-05-01 RX ADMIN — VANCOMYCIN HYDROCHLORIDE 1750 MG: 1 INJECTION, POWDER, LYOPHILIZED, FOR SOLUTION INTRAVENOUS at 23:10

## 2019-05-01 RX ADMIN — NICOTINE 1 PATCH: 21 PATCH TRANSDERMAL at 09:44

## 2019-05-01 RX ADMIN — VANCOMYCIN HYDROCHLORIDE 1500 MG: 1 INJECTION, POWDER, LYOPHILIZED, FOR SOLUTION INTRAVENOUS at 12:33

## 2019-05-01 RX ADMIN — GADOBUTROL 13 ML: 604.72 INJECTION INTRAVENOUS at 09:08

## 2019-05-01 RX ADMIN — VANCOMYCIN HYDROCHLORIDE 1500 MG: 1 INJECTION, POWDER, LYOPHILIZED, FOR SOLUTION INTRAVENOUS at 03:49

## 2019-05-01 NOTE — PROGRESS NOTES
Progress Note - Carline Kwong 1990, 33 y o  male MRN: [id number]    Unit/Bed#: 407-01 Encounter: [id number]    Primary Care Provider: Wyatt Garza PA-C   Date and time admitted to hospital: 4/29/2019  6:34 PM        * Cellulitis of left upper extremity  Assessment & Plan  Need to r/o septic arthritis   Continue Vancomycin, follow trough  ID and Ortho advising, appreciate input - MRI did reveal wrist effusion, patient to be taken to OR for possible washout tomorrow, NPO after midnight  Blood cultures pending, no growth at 24 hours  Patient without systemic signs of infection, continue to monitor CBC/VS, procalcitonin        IV drug abuse (HCC)  Assessment & Plan  IV drug use-heroin, uses medical marijuana to help control drug use  Case management consult for potential rehab referral  Will provide ativan PRN anxiety    Acute foreign body of left upper arm  Assessment & Plan  Possible remnant of needle  NPO after midnight for OR by Ortho tomorrow    Class 3 severe obesity due to excess calories with serious comorbidity and body mass index (BMI) of 40 0 to 44 9 in adult [facility])  Assessment & Plan  Would benefit from diet modifications     Bipolar disorder [facility])  Assessment & Plan  Provide supportive care      VTE Pharmacologic Prophylaxis:   Pharmacologic: none, low risk  Mechanical VTE Prophylaxis in Place: No    Patient Centered Rounds: I have performed bedside rounds with nursing staff today  Discussions with Specialists or Other Care Team Provider: ID, Ortho    Education and Discussions with Family / Patient: Patient    Time Spent for Care: 30 minutes  More than 50% of total time spent on counseling and coordination of care as described above      Current Length of Stay: 0 day(s)    Current Patient Status: Observation   Certification Statement: The patient will continue to require additional inpatient hospital stay due to requires longer than 2 midnight stay due to need for procedure, IV Abx    Discharge Plan: TBD    Code Status: Level 1 - Full Code      Subjective:   Patient seen and examined  He reports anxiety that has to do with having to stay in the hospital but agreeable to stay  Reports improvement in his wrist pain  Objective:     Vitals:   Temp (24hrs), Av 8 °F (36 6 °C), Min:97 3 °F (36 3 °C), Max:98 4 °F (36 9 °C)    Temp:  [97 3 °F (36 3 °C)-98 4 °F (36 9 °C)] 98 4 °F (36 9 °C)  HR:  [60-84] 84  Resp:  [18] 18  BP: (129-142)/(81-93) 142/93  SpO2:  [97 %-99 %] 99 %  Body mass index is 40 68 kg/m²  Input and Output Summary (last 24 hours): Intake/Output Summary (Last 24 hours) at 2019 1733  Last data filed at 2019 1220  Gross per 24 hour   Intake 480 ml   Output    Net 480 ml       Physical Exam:     Physical Exam   Constitutional: He is oriented to person, place, and time  No distress  HENT:   Head: Normocephalic and atraumatic  Eyes: Conjunctivae are normal    Neck: No JVD present  Cardiovascular: Normal rate and regular rhythm  No murmur heard  Pulmonary/Chest: Effort normal  No stridor  No respiratory distress  He has no wheezes  Abdominal: Soft  He exhibits no distension  There is no tenderness  There is no guarding  Musculoskeletal:   Left wrist wrapped   Neurological: He is alert and oriented to person, place, and time  Skin: Skin is warm and dry  Psychiatric: His mood appears anxious         Additional Data:     Labs:    Results from last 7 days   Lab Units 19   WBC Thousand/uL 6 34   HEMOGLOBIN g/dL 11 5*   HEMATOCRIT % 36 1*   PLATELETS Thousands/uL 222   NEUTROS PCT % 54   LYMPHS PCT % 36   MONOS PCT % 7   EOS PCT % 3     Results from last 7 days   Lab Units 19  1926   SODIUM mmol/L 139   < > 139   POTASSIUM mmol/L 3 7   < > 4 1   CHLORIDE mmol/L 104   < > 104   CO2 mmol/L 30   < > 32   BUN mg/dL 8   < > 12   CREATININE mg/dL 0 77   < > 0 84   ANION GAP mmol/L 5   < > 3*   CALCIUM mg/dL 9 0   < > 8 9 ALBUMIN g/dL  --   --  3 1*   TOTAL BILIRUBIN mg/dL  --   --  0 20   ALK PHOS U/L  --   --  63   ALT U/L  --   --  40   AST U/L  --   --  19   GLUCOSE RANDOM mg/dL 100   < > 91    < > = values in this interval not displayed  Results from last 7 days   Lab Units 04/28/19  2144   INR  1 01             Results from last 7 days   Lab Units 05/01/19  0427 04/30/19  0435 04/28/19  2144   LACTIC ACID mmol/L  --   --  1 8   PROCALCITONIN ng/ml <0 05 0 05  --            * I Have Reviewed All Lab Data Listed Above  * Additional Pertinent Lab Tests Reviewed: [place] [facility] Admission Reviewed    Imaging:    Imaging Reports Reviewed Today Include: MRI left wrist      Recent Cultures (last 7 days):     Results from last 7 days   Lab Units 04/29/19  1926 04/28/19  2147 04/28/19  2144   BLOOD CULTURE  No Growth at 24 hrs  No Growth at 24 hrs  No Growth at 48 hrs  No Growth at 48 hrs  Last 24 Hours Medication List:     Current Facility-Administered Medications:  acetaminophen 650 mg Oral Q6H PRN Oralia Pate MD   LORazepam 0 5 mg Oral Q4H PRN Harjinder Rodríguez MD   nicotine 1 patch Transdermal Daily TC Hays   oxyCODONE 10 mg Oral Q4H PRN Harjinder Rodríguez MD   oxyCODONE 5 mg Oral Q4H PRN Harjinder Rodríguez MD   saccharomyces boulardii 250 mg Oral BID TC Hays   vancomycin 1,750 mg Intravenous Karlee Garcia MD        Today, Patient Was Seen By: Luz Elena Jeffries MD    ** Please Note: Dictation voice to text software may have been used in the creation of this document   **

## 2019-05-01 NOTE — UTILIZATION REVIEW
Continued Stay Review: OBSERVATION    Date: 5/1/19  Vital Signs: /81 (BP Location: Right arm)   Pulse 60   Temp (!) 97 3 °F (36 3 °C) (Temporal)   Resp 18   Ht 5' 10" (1 778 m)   Wt 129 kg (283 lb 8 2 oz)   SpO2 99%   BMI 40 68 kg/m²   Assessment/Plan:   DOUBEL IVABT THERAPY IN PROGRESS FOR CELLULITIS LUE  PERSISTENT SWELLING NOTED  Medications:   Scheduled Meds:   Current Facility-Administered Medications:  acetaminophen 650 mg Oral Q6H PRN Rey Telles MD   nicotine 1 patch Transdermal Daily Heide Mccormick, [facility]   oxyCODONE 10 mg Oral Q4H PRN Rey Telles MD   oxyCODONE 5 mg Oral Q4H PRN Rey Telles MD   saccharomyces boulardii 250 mg Oral BID TC Hays   vancomycin 1,750 mg Intravenous Q8H Rey Telles MD     Continuous Infusions:    PRN Meds:   acetaminophen    oxyCODONE    oxyCODONE  Pertinent Labs/Diagnostic Results:   HGB 11 5   MRI L WRIST=  Prominent radiocarpal, distal carpal row and distal radial ulnar joint effusions noted with dorsal hand nonspecific subcutaneous edema indicating cellulitis  No abscess collection  No kya evidence of osteomyelitis  Septic arthropathy   difficult to exclude    Age/Sex: 33 y o  male   Discharge Plan: TBD          ]

## 2019-05-01 NOTE — ASSESSMENT & PLAN NOTE
Need to r/o septic arthritis   Continue Vancomycin, follow trough  ID and Ortho advising, appreciate input - MRI did reveal wrist effusion, patient to be taken to OR for possible washout tomorrow, NPO after midnight  Blood cultures pending, no growth at 24 hours  Patient without systemic signs of infection, continue to monitor CBC/VS, procalcitonin

## 2019-05-01 NOTE — PROGRESS NOTES
Attempted to see patient but he is not in his room and his apparently the MRI suite getting the MRI of his left wrist   His vital signs are stable and he is afebrile by computer documentation  Will await MRI results and evaluation by attending physician to follow up based on MRI results and exam later today  Patient may need I and D of the left wrist if MRI results are positive for wrist fusion  Otherwise continue antibiotic treatment per Infectious Disease recommendations

## 2019-05-01 NOTE — ASSESSMENT & PLAN NOTE
IV drug use-heroin, uses medical marijuana to help control drug use  Case management consult for potential rehab referral  Will provide ativan PRN anxiety

## 2019-05-01 NOTE — PROGRESS NOTES
Vancomycin Assessment    Agustina Cardozo is a 33 y o  male who is currently receiving vancomycin 1500mg q8h for skin-soft tissue infection     Relevant clinical data and objective history reviewed:  Creatinine   Date Value Ref Range Status   05/01/2019 0 77 0 60 - 1 30 mg/dL Final     Comment:     Standardized to IDMS reference method   04/30/2019 0 69 0 60 - 1 30 mg/dL Final     Comment:     Standardized to IDMS reference method   04/29/2019 0 84 0 60 - 1 30 mg/dL Final     Comment:     Standardized to IDMS reference method   04/01/2015 0 70 0 60 - 1 30 mg/dL Final     Comment:     Standardized to IDMS reference method   08/14/2014 0 64 0 60 - 1 30 mg/dL Final     Comment:     Standardized to IDMS reference method     /81 (BP Location: Right arm)   Pulse 60   Temp (!) 97 3 °F (36 3 °C) (Temporal)   Resp 18   Ht 5' 10" (1 778 m)   Wt 129 kg (283 lb 8 2 oz)   SpO2 99%   BMI 40 68 kg/m²   I/O last 3 completed shifts: In: 1460 [P O :960; IV Piggyback:500]  Out: -   Lab Results   Component Value Date/Time    BUN 8 05/01/2019 04:27 AM    BUN 16 04/01/2015 11:28 AM    WBC 6 34 05/01/2019 04:27 AM    WBC 8 33 04/01/2015 11:28 AM    HGB 11 5 (L) 05/01/2019 04:27 AM    HGB 14 5 04/01/2015 11:28 AM    HCT 36 1 (L) 05/01/2019 04:27 AM    HCT 41 8 04/01/2015 11:28 AM    MCV 80 (L) 05/01/2019 04:27 AM    MCV 78 (L) 04/01/2015 11:28 AM     05/01/2019 04:27 AM     04/01/2015 11:28 AM     Temp Readings from Last 3 Encounters:   05/01/19 (!) 97 3 °F (36 3 °C) (Temporal)   04/28/19 98 9 °F (37 2 °C) (Temporal)   01/28/19 98 2 °F (36 8 °C) (Tympanic)     Vancomycin Days of Therapy: 3    Assessment/Plan  The patient is currently on vancomycin utilizing scheduled dosing   The patient is receiving 1500mg q8h with the most recent vancomycin level being at steady-state and sub-therapeutic based on a goal of 15-20 (appropriate for most indications) ; therefore, after clinical evaluation will be changed to 1750mg q8h   Pharmacy will continue to follow closely for s/sx of nephrotoxicity, infusion reactions and appropriateness of therapy  BMP and CBC will be ordered per protocol  Plan for trough as patient approaches steady state, prior to the 6th dose at approximately 12:00 on 05/03/2019  Pharmacy will continue to follow the patients culture results and clinical progress daily      Mary Jane Dela Cruz, Pharmacist

## 2019-05-01 NOTE — PROGRESS NOTES
Patient seen and examined today  Left wrist and hand swelling decreased significantly  Pain decreased significantly    On examination of the left hand and upper extremity  Mild swelling present over the dorsum of the wrist and forearm  Pain-free finger movements with no distal deficits  Compartments soft non-tender  Flexor and extensor tendons intact with no evidence of infection    MRI of the left wrist motion artifact but report not available yet    Plan  Continue antibiotics  Will await final MRI report  Patient could potentially be discharged on p o   Antibiotics after Infectious Disease input

## 2019-05-02 ENCOUNTER — ANESTHESIA (INPATIENT)
Dept: PERIOP | Facility: HOSPITAL | Age: 29
DRG: 988 | End: 2019-05-02
Payer: COMMERCIAL

## 2019-05-02 ENCOUNTER — ANESTHESIA EVENT (INPATIENT)
Dept: PERIOP | Facility: HOSPITAL | Age: 29
DRG: 988 | End: 2019-05-02
Payer: COMMERCIAL

## 2019-05-02 ENCOUNTER — APPOINTMENT (INPATIENT)
Dept: RADIOLOGY | Facility: HOSPITAL | Age: 29
DRG: 988 | End: 2019-05-02
Payer: COMMERCIAL

## 2019-05-02 VITALS
WEIGHT: 283.51 LBS | OXYGEN SATURATION: 93 % | HEIGHT: 70 IN | RESPIRATION RATE: 18 BRPM | TEMPERATURE: 97.9 F | HEART RATE: 84 BPM | BODY MASS INDEX: 40.59 KG/M2 | SYSTOLIC BLOOD PRESSURE: 126 MMHG | DIASTOLIC BLOOD PRESSURE: 74 MMHG

## 2019-05-02 PROBLEM — L03.114 CELLULITIS OF LEFT HAND: Status: ACTIVE | Noted: 2019-04-29

## 2019-05-02 PROBLEM — L03.114 CELLULITIS OF LEFT ARM: Status: ACTIVE | Noted: 2019-04-29

## 2019-05-02 LAB
ANION GAP SERPL CALCULATED.3IONS-SCNC: 9 MMOL/L (ref 4–13)
BASOPHILS # BLD AUTO: 0.02 THOUSANDS/ΜL (ref 0–0.1)
BASOPHILS NFR BLD AUTO: 0 % (ref 0–1)
BUN SERPL-MCNC: 9 MG/DL (ref 5–25)
CALCIUM SERPL-MCNC: 9.2 MG/DL (ref 8.3–10.1)
CHLORIDE SERPL-SCNC: 103 MMOL/L (ref 100–108)
CO2 SERPL-SCNC: 27 MMOL/L (ref 21–32)
CREAT SERPL-MCNC: 0.66 MG/DL (ref 0.6–1.3)
EOSINOPHIL # BLD AUTO: 0.17 THOUSAND/ΜL (ref 0–0.61)
EOSINOPHIL NFR BLD AUTO: 2 % (ref 0–6)
ERYTHROCYTE [DISTWIDTH] IN BLOOD BY AUTOMATED COUNT: 12.9 % (ref 11.6–15.1)
GFR SERPL CREATININE-BSD FRML MDRD: 131 ML/MIN/1.73SQ M
GLUCOSE SERPL-MCNC: 87 MG/DL (ref 65–140)
HCT VFR BLD AUTO: 37.8 % (ref 36.5–49.3)
HGB BLD-MCNC: 12.2 G/DL (ref 12–17)
IMM GRANULOCYTES # BLD AUTO: 0.01 THOUSAND/UL (ref 0–0.2)
IMM GRANULOCYTES NFR BLD AUTO: 0 % (ref 0–2)
LYMPHOCYTES # BLD AUTO: 2.2 THOUSANDS/ΜL (ref 0.6–4.47)
LYMPHOCYTES NFR BLD AUTO: 31 % (ref 14–44)
MCH RBC QN AUTO: 25.4 PG (ref 26.8–34.3)
MCHC RBC AUTO-ENTMCNC: 32.3 G/DL (ref 31.4–37.4)
MCV RBC AUTO: 79 FL (ref 82–98)
MONOCYTES # BLD AUTO: 0.46 THOUSAND/ΜL (ref 0.17–1.22)
MONOCYTES NFR BLD AUTO: 7 % (ref 4–12)
NEUTROPHILS # BLD AUTO: 4.2 THOUSANDS/ΜL (ref 1.85–7.62)
NEUTS SEG NFR BLD AUTO: 60 % (ref 43–75)
NRBC BLD AUTO-RTO: 0 /100 WBCS
PLATELET # BLD AUTO: 226 THOUSANDS/UL (ref 149–390)
PMV BLD AUTO: 9.1 FL (ref 8.9–12.7)
POTASSIUM SERPL-SCNC: 3.6 MMOL/L (ref 3.5–5.3)
RBC # BLD AUTO: 4.81 MILLION/UL (ref 3.88–5.62)
SODIUM SERPL-SCNC: 139 MMOL/L (ref 136–145)
WBC # BLD AUTO: 7.06 THOUSAND/UL (ref 4.31–10.16)

## 2019-05-02 PROCEDURE — 87070 CULTURE OTHR SPECIMN AEROBIC: CPT | Performed by: ORTHOPAEDIC SURGERY

## 2019-05-02 PROCEDURE — 87205 SMEAR GRAM STAIN: CPT | Performed by: ORTHOPAEDIC SURGERY

## 2019-05-02 PROCEDURE — 0H9GXZZ DRAINAGE OF LEFT HAND SKIN, EXTERNAL APPROACH: ICD-10-PCS | Performed by: ORTHOPAEDIC SURGERY

## 2019-05-02 PROCEDURE — 73100 X-RAY EXAM OF WRIST: CPT

## 2019-05-02 PROCEDURE — 99024 POSTOP FOLLOW-UP VISIT: CPT | Performed by: ORTHOPAEDIC SURGERY

## 2019-05-02 PROCEDURE — 0R9P3ZX DRAINAGE OF LEFT WRIST JOINT, PERCUTANEOUS APPROACH, DIAGNOSTIC: ICD-10-PCS | Performed by: ORTHOPAEDIC SURGERY

## 2019-05-02 PROCEDURE — 25118 EXCISE WRIST TENDON SHEATH: CPT | Performed by: PHYSICIAN ASSISTANT

## 2019-05-02 PROCEDURE — 88305 TISSUE EXAM BY PATHOLOGIST: CPT | Performed by: PATHOLOGY

## 2019-05-02 PROCEDURE — 25118 EXCISE WRIST TENDON SHEATH: CPT | Performed by: ORTHOPAEDIC SURGERY

## 2019-05-02 PROCEDURE — 87075 CULTR BACTERIA EXCEPT BLOOD: CPT | Performed by: ORTHOPAEDIC SURGERY

## 2019-05-02 PROCEDURE — 85025 COMPLETE CBC W/AUTO DIFF WBC: CPT | Performed by: FAMILY MEDICINE

## 2019-05-02 PROCEDURE — 0RBP0ZZ EXCISION OF LEFT WRIST JOINT, OPEN APPROACH: ICD-10-PCS | Performed by: ORTHOPAEDIC SURGERY

## 2019-05-02 PROCEDURE — 99217 PR OBSERVATION CARE DISCHARGE MANAGEMENT: CPT | Performed by: FAMILY MEDICINE

## 2019-05-02 PROCEDURE — 80048 BASIC METABOLIC PNL TOTAL CA: CPT | Performed by: FAMILY MEDICINE

## 2019-05-02 RX ORDER — BUPIVACAINE HYDROCHLORIDE 5 MG/ML
INJECTION, SOLUTION PERINEURAL
Status: DISCONTINUED | OUTPATIENT
Start: 2019-05-02 | End: 2019-05-02 | Stop reason: SURG

## 2019-05-02 RX ORDER — SODIUM CHLORIDE, SODIUM LACTATE, POTASSIUM CHLORIDE, CALCIUM CHLORIDE 600; 310; 30; 20 MG/100ML; MG/100ML; MG/100ML; MG/100ML
INJECTION, SOLUTION INTRAVENOUS CONTINUOUS PRN
Status: DISCONTINUED | OUTPATIENT
Start: 2019-05-02 | End: 2019-05-02 | Stop reason: SURG

## 2019-05-02 RX ORDER — NICOTINE 21 MG/24HR
1 PATCH, TRANSDERMAL 24 HOURS TRANSDERMAL DAILY
Qty: 28 PATCH | Refills: 0 | Status: SHIPPED | OUTPATIENT
Start: 2019-05-03 | End: 2020-05-05

## 2019-05-02 RX ORDER — MIDAZOLAM HYDROCHLORIDE 1 MG/ML
INJECTION INTRAMUSCULAR; INTRAVENOUS AS NEEDED
Status: DISCONTINUED | OUTPATIENT
Start: 2019-05-02 | End: 2019-05-02 | Stop reason: SURG

## 2019-05-02 RX ORDER — MAGNESIUM HYDROXIDE 1200 MG/15ML
LIQUID ORAL AS NEEDED
Status: DISCONTINUED | OUTPATIENT
Start: 2019-05-02 | End: 2019-05-02 | Stop reason: HOSPADM

## 2019-05-02 RX ORDER — DIPHENHYDRAMINE HYDROCHLORIDE 50 MG/ML
12.5 INJECTION INTRAMUSCULAR; INTRAVENOUS ONCE AS NEEDED
Status: DISCONTINUED | OUTPATIENT
Start: 2019-05-02 | End: 2019-05-02 | Stop reason: HOSPADM

## 2019-05-02 RX ORDER — CLINDAMYCIN HYDROCHLORIDE 150 MG/1
450 CAPSULE ORAL EVERY 8 HOURS SCHEDULED
Qty: 90 CAPSULE | Refills: 0 | Status: SHIPPED | OUTPATIENT
Start: 2019-05-02 | End: 2019-05-07

## 2019-05-02 RX ORDER — CHLORHEXIDINE GLUCONATE 4 G/100ML
SOLUTION TOPICAL DAILY PRN
Status: DISCONTINUED | OUTPATIENT
Start: 2019-05-02 | End: 2019-05-02

## 2019-05-02 RX ORDER — LIDOCAINE HYDROCHLORIDE 10 MG/ML
INJECTION, SOLUTION INFILTRATION; PERINEURAL AS NEEDED
Status: DISCONTINUED | OUTPATIENT
Start: 2019-05-02 | End: 2019-05-02 | Stop reason: SURG

## 2019-05-02 RX ORDER — PROPOFOL 10 MG/ML
INJECTION, EMULSION INTRAVENOUS AS NEEDED
Status: DISCONTINUED | OUTPATIENT
Start: 2019-05-02 | End: 2019-05-02 | Stop reason: SURG

## 2019-05-02 RX ORDER — KETAMINE HYDROCHLORIDE 50 MG/ML
INJECTION, SOLUTION, CONCENTRATE INTRAMUSCULAR; INTRAVENOUS AS NEEDED
Status: DISCONTINUED | OUTPATIENT
Start: 2019-05-02 | End: 2019-05-02 | Stop reason: SURG

## 2019-05-02 RX ORDER — ACETAMINOPHEN 325 MG/1
650 TABLET ORAL EVERY 6 HOURS PRN
Qty: 30 TABLET | Refills: 0 | Status: SHIPPED | OUTPATIENT
Start: 2019-05-02 | End: 2020-05-18

## 2019-05-02 RX ORDER — FENTANYL CITRATE 50 UG/ML
INJECTION, SOLUTION INTRAMUSCULAR; INTRAVENOUS AS NEEDED
Status: DISCONTINUED | OUTPATIENT
Start: 2019-05-02 | End: 2019-05-02 | Stop reason: SURG

## 2019-05-02 RX ORDER — DEXAMETHASONE SODIUM PHOSPHATE 10 MG/ML
INJECTION, SOLUTION INTRAMUSCULAR; INTRAVENOUS AS NEEDED
Status: DISCONTINUED | OUTPATIENT
Start: 2019-05-02 | End: 2019-05-02 | Stop reason: SURG

## 2019-05-02 RX ADMIN — MIDAZOLAM HYDROCHLORIDE 2 MG: 1 INJECTION, SOLUTION INTRAMUSCULAR; INTRAVENOUS at 13:29

## 2019-05-02 RX ADMIN — KETAMINE HYDROCHLORIDE 50 MG: 50 INJECTION, SOLUTION INTRAMUSCULAR; INTRAVENOUS at 13:30

## 2019-05-02 RX ADMIN — PROPOFOL 100 MG: 10 INJECTION, EMULSION INTRAVENOUS at 13:39

## 2019-05-02 RX ADMIN — Medication 250 MG: at 17:17

## 2019-05-02 RX ADMIN — VANCOMYCIN HYDROCHLORIDE 1750 MG: 1 INJECTION, POWDER, LYOPHILIZED, FOR SOLUTION INTRAVENOUS at 15:34

## 2019-05-02 RX ADMIN — FENTANYL CITRATE 100 MCG: 50 INJECTION, SOLUTION INTRAMUSCULAR; INTRAVENOUS at 13:55

## 2019-05-02 RX ADMIN — SODIUM CHLORIDE, SODIUM LACTATE, POTASSIUM CHLORIDE, AND CALCIUM CHLORIDE: .6; .31; .03; .02 INJECTION, SOLUTION INTRAVENOUS at 13:17

## 2019-05-02 RX ADMIN — DEXAMETHASONE SODIUM PHOSPHATE 5 MG: 10 INJECTION, SOLUTION INTRAMUSCULAR; INTRAVENOUS at 13:34

## 2019-05-02 RX ADMIN — Medication 3000 MG: at 13:34

## 2019-05-02 RX ADMIN — FENTANYL CITRATE 100 MCG: 50 INJECTION, SOLUTION INTRAMUSCULAR; INTRAVENOUS at 13:44

## 2019-05-02 RX ADMIN — PROPOFOL 200 MG: 10 INJECTION, EMULSION INTRAVENOUS at 13:29

## 2019-05-02 RX ADMIN — LORAZEPAM 0.5 MG: 0.5 TABLET ORAL at 10:53

## 2019-05-02 RX ADMIN — BUPIVACAINE HYDROCHLORIDE 30 ML: 5 INJECTION, SOLUTION PERINEURAL at 14:35

## 2019-05-02 RX ADMIN — VANCOMYCIN HYDROCHLORIDE 1750 MG: 1 INJECTION, POWDER, LYOPHILIZED, FOR SOLUTION INTRAVENOUS at 07:19

## 2019-05-02 RX ADMIN — LIDOCAINE HYDROCHLORIDE 50 MG: 10 INJECTION, SOLUTION INFILTRATION; PERINEURAL at 13:29

## 2019-05-02 RX ADMIN — NICOTINE 1 PATCH: 21 PATCH TRANSDERMAL at 08:30

## 2019-05-02 NOTE — PROGRESS NOTES
Vancomycin IV Pharmacy-to-Dose Consultation    Carla Elias is a 33 y o  male who is currently receiving Vancomycin IV with management by the Pharmacy Consult service  Assessment/Plan:  The patient was reviewed  Renal function is stable and no signs or symptoms of nephrotoxicity and/or infusion reactions were documented in the chart  Based on todays assessment, continue current vancomycin (day # 4) dosing of 1750mg q12h, with a plan for trough to be drawn at 0700 on 5/3/19  We will continue to follow the patients culture results and clinical progress daily      Renee Brown, Pharmacist

## 2019-05-02 NOTE — SOCIAL WORK
Pt is being discharged today  Follow up appointments reviewed with a good understanding  Pt not interested in going to a drug rehab facility  Case Management reviewed discharge planning process including the following: identifying help that is needed at home, pt's preference for discharge needs and Meds at [facility]  Reviewed with Pt that any member of the healthcare team can answer questions regarding : medications, jmportance of recognizing  Signs and symptoms of any  medical problems  Case Management also encouraged pt to follow up with all recommended appointments after discharge

## 2019-05-02 NOTE — PROGRESS NOTES
Loss of IV access at 1700 d/t IV leaking everywhere  Attempted to get access multiple times  ER was called to get an US guided IV

## 2019-05-02 NOTE — PHYSICIAN ADVISOR
Current patient class: Observation  The patient is currently on Hospital Day: 3 at [address]      This patient was originally admitted to the hospital under observation status  After admission the patient was reevaluated and determined to require further hospitalization  The patient is now documented to require at least a 2nd midnight in the hospital  As such the patient is now expected to satisfy the 2 midnight benchmark and is therefore eligible for inpatient admission  After review of the relevant documentation, labs, vital signs and test results, the patient is appropriate for INPATIENT ADMISSION  Admission to the hospital as an inpatient is a complex decision making process which requires the practitioner to consider the patients presenting complaint, history and physical examination and all relevant testing  With this in mind, in this case, the patient was deemed appropriate for INPATIENT ADMISSION  After review of the documentation and testing available at the time of the admission I concur with this clinical determination of medical necessity  Rationale is as follows: The patient is a 33 yrs Male who presented to the ED at 4/29/2019  6:34 PM with a chief complaint of Hand Swelling (pt seen here yesterday for hand swelling and returned today to be admitted)  Pt has been determined to have cellulitis and has been on IV vancomycin  Orthopedic consultation obtained and concern about septic joint was raised  MRI showing effusion and IV antibiotics are to be continued  Anticipated additional hospitalization and possible need to explore joint surgically  Also seen on imaging studies was suggestion of a metal object in the upper left arm concerning for a needle fragment  This may also need to be addressed at time of surgery if determined to be necessary  Because of need for IV abx and need for surgery, Inpatient status is appropriate      Tiger text for change from obv to IP status sent and received by dr Leigh Ann Dixon   @905pm      The patients vitals on arrival were ED Triage Vitals [04/29/19 1837]   Temperature Pulse Respirations Blood Pressure SpO2   98 3 °F (36 8 °C) 67 18 113/59 95 %      Temp Source Heart Rate Source Patient Position - Orthostatic VS BP Location FiO2 (%)   Temporal Right Sitting Right arm --      Pain Score       6           Past Medical History:   Diagnosis Date    Allergic     Anemia 6/24/2018    Anxiety     from records    Benign essential hypertension 11/17/2016    Bipolar 1 disorder ([facility] [address] )     from records    Chronic pain     Claustrophobia 3/15/2018    Community acquired pneumonia 6/24/2018    Depressed 7/14/2016    Depression     from records    GERD (gastroesophageal reflux disease)     Hepatitis C virus infection 8/14/2014    Heroin abuse ([facility] [address] ) 7/24/2018    Obesity 10/12/2016    Obstructive sleep apnea     from records    Sleep disorder      Past Surgical History:   Procedure Laterality Date    WISDOM TOOTH EXTRACTION         The patient was admitted to the hospital at N/A on N/A for the following diagnosis:  Arm pain [M79 603]  Cellulitis of left arm [L03 114]  Cellulitis of left hand [L03 114]    Consults have been placed to:   IP CONSULT TO INFECTIOUS DISEASES  IP CONSULT TO CASE MANAGEMENT  IP CONSULT TO ORTHOPEDIC SURGERY  IP CONSULT TO CASE MANAGEMENT  IP CONSULT TO PHARMACY    Vitals:    04/30/19 1505 04/30/19 2318 05/01/19 0725 05/01/19 1533   BP: 116/58 136/87 129/81 142/93   BP Location: Right arm Right arm Right arm Right arm   Pulse: 57 80 60 84   Resp: 16 18 18 18   Temp: 97 5 °F (36 4 °C) 97 7 °F (36 5 °C) (!) 97 3 °F (36 3 °C) 98 4 °F (36 9 °C)   TempSrc: Temporal Temporal Temporal Temporal   SpO2: 97% 97% 99% 99%   Weight:       Height:           Most recent labs:    Recent Labs     04/28/19  2144 04/29/19  1926  05/01/19  0427   WBC 7 57 5 54   < > 6 34   HGB 11 7* 11 2*   < > 11 5*   HCT 35 9* 35 1*   < > 36 1*    208 < > 222   K 4 4 4 1   < > 3 7   CALCIUM 8 9 8 9   < > 9 0   BUN 10 12   < > 8   CREATININE 0 86 0 84   < > 0 77   INR 1 01  --   --   --    CKTOTAL 66 53  --   --    AST 30 19  --   --    ALT 42 40  --   --    ALKPHOS 57 63  --   --     < > = values in this interval not displayed         Scheduled Meds:  Current Facility-Administered Medications:  acetaminophen 650 mg Oral Q6H PRN Bing Malhotra MD   LORazepam 0 5 mg Oral Q4H PRN Bing Malhotra MD   nicotine 1 patch Transdermal Daily TC [place]   oxyCODONE 10 mg Oral Q4H PRN Bing Malhotra MD   oxyCODONE 5 mg Oral Q4H PRN Bing Malhotra MD   saccharomyces boulardii 250 mg Oral BID TC [place]   vancomycin 1,750 mg Intravenous Q8H Oralia Pate MD     Continuous Infusions:   PRN Meds:   acetaminophen    LORazepam    oxyCODONE    oxyCODONE    Surgical procedures (if appropriate):

## 2019-05-02 NOTE — PROGRESS NOTES
Progress Note - Viviana Mcfarland 1990, 33 y o  male MRN: [id number]    Unit/Bed#: [facility] [place] Encounter: [id number]    Primary Care Provider: Litzy Stoddard PA-C   Date and time admitted to hospital: 4/29/2019  6:34 PM        * Cellulitis of left arm  Assessment & Plan  In a patient with IV drug use  S/p I&D by Ortho Dr Camelia Card 5/2  Culture sent  Continue IV Vancomycin  Will plan to d/w ID    Cellulitis of left hand  Assessment & Plan  S/p I&D by Ortho 5/2/19  F/u culture results  Continue IV Vancomycin  Pain control      IV drug abuse ([facility] [address] )  Assessment & Plan  IV drug use-heroin, uses medical marijuana to help control drug use  Case management consult for potential rehab referral  Will provide ativan PRN anxiety    Acute foreign body of left upper arm  Assessment & Plan  Patient s/p OR by Ortho today    Class 3 severe obesity due to excess calories with serious comorbidity and body mass index (BMI) of 40 0 to 44 9 in adult [facility])  Assessment & Plan  Would benefit from diet modifications     Bipolar disorder [facility])  Assessment & Plan  Provide supportive care      VTE Pharmacologic Prophylaxis:   Pharmacologic: Enoxaparin (Lovenox)  Mechanical VTE Prophylaxis in Place: Yes    Patient Centered Rounds: I have performed bedside rounds with nursing staff today  Discussions with Specialists or Other Care Team Provider: Orthopedic surgery    Education and Discussions with Family / Patient: Patient    Time Spent for Care: 30 minutes  More than 50% of total time spent on counseling and coordination of care as described above  Current Length of Stay: 0 day(s)    Current Patient Status: Inpatient   Certification Statement: The patient will continue to require additional inpatient hospital stay due to needed surgery - left wrist I&D, post op care, IV Abx    Discharge Plan: TBD    Code Status: Level 1 - Full Code      Subjective:   Patient seen and examined prior to surgery  He states that his pain is controlled  No overnight events  He does report feeling anxious about having to stay in the hospital     Objective:     Vitals:   Temp (24hrs), Av 7 °F (36 5 °C), Min:96 8 °F (36 °C), Max:98 4 °F (36 9 °C)    Temp:  [96 8 °F (36 °C)-98 4 °F (36 9 °C)] 98 °F (36 7 °C)  HR:  [] 99  Resp:  [12-18] 16  BP: (106-161)/(61-93) 145/84  SpO2:  [94 %-100 %] 94 %  Body mass index is 40 68 kg/m²  Input and Output Summary (last 24 hours): Intake/Output Summary (Last 24 hours) at 2019 1448  Last data filed at 2019 1406  Gross per 24 hour   Intake 400 ml   Output    Net 400 ml       Physical Exam:     Physical Exam   Constitutional: He is oriented to person, place, and time  No distress  HENT:   Head: Normocephalic and atraumatic  Eyes: Conjunctivae are normal    Neck: No JVD present  Cardiovascular: Normal rate and regular rhythm  No murmur heard  Pulmonary/Chest: Effort normal  No respiratory distress  He has no wheezes  He has no rales  Abdominal: Soft  He exhibits no mass  There is no guarding  Musculoskeletal:   Left arm dressed   Neurological: He is alert and oriented to person, place, and time  Skin: Skin is warm and dry  Psychiatric: He has a normal mood and affect         Additional Data:     Labs:    Results from last 7 days   Lab Units 19  0611   WBC Thousand/uL 7 06   HEMOGLOBIN g/dL 12 2   HEMATOCRIT % 37 8   PLATELETS Thousands/uL 226   NEUTROS PCT % 60   LYMPHS PCT % 31   MONOS PCT % 7   EOS PCT % 2     Results from last 7 days   Lab Units 19  0611  19  1926   SODIUM mmol/L 139   < > 139   POTASSIUM mmol/L 3 6   < > 4 1   CHLORIDE mmol/L 103   < > 104   CO2 mmol/L 27   < > 32   BUN mg/dL 9   < > 12   CREATININE mg/dL 0 66   < > 0 84   ANION GAP mmol/L 9   < > 3*   CALCIUM mg/dL 9 2   < > 8 9   ALBUMIN g/dL  --   --  3 1*   TOTAL BILIRUBIN mg/dL  --   --  0 20   ALK PHOS U/L  --   --  63   ALT U/L  --   --  40   AST U/L  --   --  19   GLUCOSE RANDOM mg/dL 87   < > 91    < > = values in this interval not displayed  Results from last 7 days   Lab Units 04/28/19  2144   INR  1 01             Results from last 7 days   Lab Units 05/01/19  0427 04/30/19  0435 04/28/19 2144   LACTIC ACID mmol/L  --   --  1 8   PROCALCITONIN ng/ml <0 05 0 05  --            * I Have Reviewed All Lab Data Listed Above  * Additional Pertinent Lab Tests Reviewed: [place] [address] Admission Reviewed    Imaging:    Imaging Reports Reviewed Today Include: no new      Recent Cultures (last 7 days):     Results from last 7 days   Lab Units 04/29/19  1926 04/28/19 2147 04/28/19 2144   BLOOD CULTURE  No Growth at 48 hrs  No Growth at 48 hrs  No Growth at 72 hrs  No Growth at 72 hrs  Last 24 Hours Medication List:     Current Facility-Administered Medications:  acetaminophen 650 mg Oral Q6H PRN Fidel Patino MD    diphenhydrAMINE 12 5 mg Intravenous Once PRN Harpreet Steve CRNA    LORazepam 0 5 mg Oral Q4H PRN Fidel Patino MD    nicotine 1 patch Transdermal Daily Fidel Patino MD    oxyCODONE 10 mg Oral Q4H PRN Fidel Patino MD    saccharomyces boulardii 250 mg Oral BID Fidel Patino MD    vancomycin 1,750 mg Intravenous Grace Ray MD Last Rate: Stopped (05/02/19 1000)        Today, Patient Was Seen By: Mee Otoole MD    ** Please Note: Dictation voice to text software may have been used in the creation of this document   **

## 2019-05-02 NOTE — ASSESSMENT & PLAN NOTE
In a patient with IV drug use  S/p I&D by Ortho Dr Alan Pedraza 5/2  Culture sent  Continue IV Vancomycin  Will plan to d/w ID

## 2019-05-02 NOTE — PLAN OF CARE
Problem: PAIN - ADULT  Goal: Verbalizes/displays adequate comfort level or baseline comfort level  Description  Interventions:  - Encourage patient to monitor pain and request assistance  - Assess pain using appropriate pain scale; 0-10 pain scale  - Administer analgesics based on type and severity of pain and evaluate response  - Implement non-pharmacological measures as appropriate and evaluate response  - Consider cultural and social influences on pain and pain management  - Notify physician/advanced practitioner if interventions unsuccessful or patient reports new pain   Outcome: Progressing     Problem: DISCHARGE PLANNING  Goal: Discharge to home or other facility with appropriate resources  Description  INTERVENTIONS:  - Identify barriers to discharge w/patient and caregiver  - Arrange for needed discharge resources and transportation as appropriate  - Identify discharge learning needs (meds, wound care, etc )  - Refer to Case Management Department for coordinating discharge planning if the patient needs post-hospital services based on physician/advanced practitioner order or complex needs related to functional status, cognitive ability, or social support system   Outcome: Progressing     Problem: Knowledge Deficit  Goal: Patient/family/caregiver demonstrates understanding of disease process, treatment plan, medications, and discharge instructions  Description  Complete learning assessment and assess knowledge base    Interventions:  - Provide teaching at level of understanding  - Provide teaching via preferred learning methods  Outcome: Progressing     Problem: SKIN/TISSUE INTEGRITY - ADULT  Goal: Incision(s), wounds(s) or drain site(s) healing without S/S of infection  Description  INTERVENTIONS  - Assess and document dressing, incision, wound bed, drain sites and surrounding tissue  - Initiate Nutrition services consult and/or wound management as needed   Outcome: Progressing     Problem: MUSCULOSKELETAL - ADULT  Goal: Maintain or return mobility to safest level of function  Description  INTERVENTIONS:  - Assess range of motion  - Encourage maximum independence but intervene and supervise when necessary  - Assess for home care needs following discharge   - Provide patient education as appropriate   Outcome: Progressing  Goal: Maintain proper alignment of affected body part  Description  INTERVENTIONS:  - Support, maintain and protect limb and body alignment  - Provide pt/fam with appropriate education  Outcome: Progressing     Problem: INFECTION - ADULT  Goal: Absence or prevention of progression during hospitalization  Description  INTERVENTIONS:  - Assess and monitor for signs and symptoms of infection  - Monitor lab/diagnostic results  - Monitor all insertion sites, i e  indwelling lines, tubes, and drains  - Administer medications as ordered  - Instruct and encourage patient and family to use good hand hygiene technique   Outcome: Progressing     Problem: SAFETY ADULT  Goal: Patient will remain free of falls  Description  INTERVENTIONS:  - Assess patient frequently for physical needs  -  [place] fall precautions as indicated by assessment  Low fall risk   - Educate patient/family on patient safety including physical limitations  - Instruct patient to call for assistance with activity based on assessment  - Modify environment to reduce risk of injury   Outcome: Progressing  Goal: Maintain or return to baseline ADL function  Description  INTERVENTIONS:  -  Assess patient's ability to carry out ADLs; Independent  - Assess patient's mobility;  Independent  - Encourage maximum independence but intervene and supervise when necessary  ¯ Assess for home care needs following discharge   ¯ Request OT consult to assist with ADL evaluation and planning for discharge  ¯ Provide patient education as appropriate   Outcome: Progressing  Goal: Maintain or return mobility status to optimal level  Description  INTERVENTIONS:  - Assess patient's baseline mobility status- [address] fall precautions as indicated by assessment  Low fall risk   - Record patient progress and toleration of activity level on Mobility SBAR; progress patient to next Phase/Stage  - Instruct patient to call for assistance with activity based on assessment   Outcome: Progressing     Problem: DISCHARGE PLANNING - CARE MANAGEMENT  Goal: Discharge to post-acute care or home with appropriate resources  Description  INTERVENTIONS:  - Conduct assessment to determine patient/family and health care team treatment goals, and need for post-acute services based on payer coverage, community resources, and patient preferences, and barriers to discharge  - Address psychosocial, clinical, and financial barriers to discharge as identified in assessment in conjunction with the patient/family and health care team  - Arrange appropriate level of post-acute services according to patient's   needs and preference and payer coverage in collaboration with the physician and health care team  - Communicate with and update the patient/family, physician, and health care team regarding progress on the discharge plan  - Arrange appropriate transportation to post-acute venues  Pt might be agreeable to drug rehab on discharge      Outcome: Progressing

## 2019-05-02 NOTE — DISCHARGE INSTRUCTIONS
Please keep splint till seen in the office    Follow-up with Dr Edin Smith next week in the office call [phone] appointment  Follow-up with Dr Johansen infectious disease next week  Antibiotics per the Infectious Disease team

## 2019-05-02 NOTE — NURSING NOTE
AVS reviewed with patient at the bedside  Questions were encouraged and answered accordingly  Patient verbalized an understanding of all discharge instructions  Patient was administered antibiotic education on Cleocin and smoking cessation material was administered  Patient was discharged to home, accompanied by family, in no acute distress

## 2019-05-02 NOTE — OP NOTE
OPERATIVE REPORT  PATIENT NAME: Altagracia Botello    :  1990  MRN: [id number]  Pt Location: [facility] OR ROOM 02    SURGERY DATE: 2019    Surgeon(s) and Role:     * Philippe Barton MD - Primary     * Mer Oleary PA-C - Assisting no qualified resident available    Preop Diagnosis:  Cellulitis of left hand [L03 114]  Cellulitis of left arm [L03 114]    Post-Op Diagnosis Codes:     * Cellulitis of left hand [L03 114]     * Cellulitis of left arm [L03 114]  Abscess/hematoma left wrist joint    Procedure(s) (LRB):  INCISION AND DRAINAGE (I&D) EXTREMITY (Left)    Specimen(s):  ID Type Source Tests Collected by Time Destination   1 : synovium Tissue Joint, Left Wrist TISSUE EXAM Philippe Barton MD 2019 1400    2 :  Synovial Fluid Joint, Left Wrist SYNOVIAL FLUID WHITE CELL COUNT W/ DIFF Philippe Barton MD 2019 1403    A :  Synovial Fluid Joint, Left Wrist ANAEROBIC CULTURE AND GRAM STAIN, BODY FLUID CULTURE AND GRAM STAIN Philippe Barton MD 2019 1401        Estimated Blood Loss:   Minimal    Drains:  * No LDAs found *    Anesthesia Type:   General    Operative Indications:  Cellulitis of left hand [L03 114]  Cellulitis of left arm [L03 114]  Abscess left wrist joint    Operative Findings:  5 cc of bloody fluid with some turbidity aspirated from the left wrist joint the aid of fluoroscopy  Left wrist then prepared and draped and a formal incision drainage of the left joint performed with aid of fluoroscopy  Synovitis present synovectomy performed and the capsule left open  Cultures obtained tissue sent for pathology and culture    Complications:   None    Procedure and Technique: All treatment options were discussed with the patient including non-operative and operative treatment options  Patient has failed non-perative treatment and has opted for surgical intervention  Risks, complications and benefits of all treatment options were discussed in detail    The risks of surgical intervention including infection, injury to vessels and nerves  risk of failure to achieve desired results, risk of need for further procedures, potential risk of loss of life and limb were discussed with the patient  Informed consent was obtained from the patient  The operative site was marked and signed  A timeout was performed prior to the procedure  The patient was re-identified ,including name, date of birth, procedure, consent form reviewed, site and laterality  Appropriate antibiotics were administered preoperatively    The Physician Assistant was present for the entire case and provided essential assistance with patient positioning, prep and draping, wound closure, sterile dressing and splint application, all under my direct supervision(there was no resident available to assist with this case)  Left wrist was initially aspirated  5 cc of bloody fluid with some stability expressed from the wrist under aseptic precaution fluoroscopic control    We then proceeded to prep and drape the left hand have formal incision drainage of the left wrist through the 4th compartment floor    Extensor tendons were protected capsule was incised longitudinally a small hole was created in the wrist capsule was checked on fluoroscopy to make sure the wrist frequently and synovectomy performed copious irrigation carried out skin closed with sterile dressing splint applied no complications     I was present for the entire procedure    Patient Disposition:  PACU     SIGNATURE: Ingrid Miles MD  DATE: May 2, 2019  TIME: 2:15 PM

## 2019-05-02 NOTE — PROGRESS NOTES
Left wrist patient feels better  MRI very suspicious for possible septic arthritis with fluid  Discussed treatment options with the patient  Patient scheduled for needle aspiration of the left wrist under fluoroscopy in the operating room  If the fluid was purulent this will require formal incision drainage  Consent obtained patient aware of what it entails  NPO

## 2019-05-02 NOTE — UTILIZATION REVIEW
OBSERVATION 4/29/19 @ 6887, CONVERTED TO INPATIENT ADMISSION 5/2/19 @1206 FOR CONTINUED CARE & TX FOR CELLULITIS AFTER FAILING OBSERVATION PERIOD ON IVABT THERAPY, NOW REQUIRING SURGICAL INTERVENTION WITH IVABT CONTINUED  TO OR FOR NEEDLE ASPIRATION & POSSIBLE I&D/WASHOUT IF DRAINAGE PURULENT    05/02/19 1206 Release Yue Avila MD (auto-released) From Order: [id number]   05/02/19 1206 Complete Yue Avila MD    Inpatient Admission   Admitting Physician Marston Baumgarten    Level of Care Med Surg    Estimated length of stay More than 2 Midnights    Certification I certify that inpatient services are medically necessary for this patient for a duration of greater than two midnights  See H&P and MD Progress Notes for additional information about the patient's course of treatment    VITALS:  /61 (BP Location: Right arm)   Pulse 68   Temp (!) 96 8 °F (36 °C) (Temporal)   Resp 16   Ht 5' 10" (1 778 m)   Wt 129 kg (283 lb 8 2 oz)   SpO2 96%   BMI 40 68 kg/m²  LABS/TESTING:  MRI L WRIST=  Prominent radiocarpal, distal carpal row and distal radial ulnar joint effusions noted with dorsal hand nonspecific subcutaneous edema indicating cellulitis  No abscess collection  No kya evidence of osteomyelitis  Septic arthropathy difficult to exclude  Scheduled Meds:  Current Facility-Administered Medications:  acetaminophen 650 mg Oral Q6H PRN   chlorhexidine  Topical Daily PRN   LORazepam 0 5 mg Oral Q4H PRN   nicotine 1 patch Transdermal Daily   oxyCODONE 10 mg Oral Q4H PRN   oxyCODONE 5 mg Oral Q4H PRN   saccharomyces boulardii 250 mg Oral BID   vancomycin 1,750 mg Intravenous Q8H     Network Utilization Review Department  Phone: [phone]; Fax [phone]  [email or web address]  ATTENTION: Please call with any questions or concerns to [phone] and carefully listen to the prompts so that you are directed to the right person     Send all requests for admission clinical reviews, approved or denied determinations and any other requests to fax [phone]   All voicemails are confidential

## 2019-05-03 NOTE — DISCHARGE SUMMARY
Discharge- Wiliam Najera 1990, 33 y o  male MRN: [id number]    Unit/Bed#: 403-01 Encounter: [id number]    Primary Care Provider: Johnny Butts PA-C   Date and time admitted to hospital: 4/29/2019  6:34 PM        Cellulitis of left upper extremity  Assessment & Plan  Patient s/p I&D of left wrist 5/2  He demanded to be discharged after procedure, otherwise threatened to leave AMA  Blood cultures pending, no growth at 24 hours  Patient remained without systemic signs of infection, discharged on 5 days of Clindamycin  He understands risks of early discharge but did not agree to stay hospitalized  He will f/u with Ortho, appointment arranged for        IV drug abuse [facility])  Assessment & Plan  IV drug use-heroin, uses medical marijuana to help control drug use  Cessation counseling  Patient demanded to go home        Discharging Physician / Practitioner: Paolo Alaniz MD  PCP: Johnny Butts PA-C  Admission Date:   Admission Orders (From admission, onward)    Ordered        05/02/19 1206  Inpatient Admission  Once         04/29/19 2128  Place in Observation  Once             Discharge Date: 05/03/19    Resolved Problems  Date Reviewed: 5/3/2019    None          Consultations During Hospital Stay:  · Orthopedic surgery, ID    Procedures Performed:   INCISION AND DRAINAGE (I&D) EXTREMITY (Left) by Dr Seth Chamorro 5/2/19    Significant Findings / Test Results:   XR wrist 2 vw left   Final Result by Thompson Duke MD (05/02 8874)      Fluoroscopic guidance provided for surgical procedure  Please refer to the separate procedure notes for additional details  Workstation performed: [id number]         MRI wrist left w wo contrast   Final Result by Almaz Gold MD (05/01 1256)   Prominent radiocarpal, distal carpal row and distal radial ulnar joint effusions noted with dorsal hand nonspecific subcutaneous edema indicating cellulitis  No abscess collection  No kya evidence of osteomyelitis  Septic arthropathy    difficult to exclude  The study was marked in [facility] for immediate notification  Workstation performed: [id number]         CT humerus left w contrast   Final Result by Christine Zazueta DO (04/30 1038)      There is a broken needle fragment identified within the anterior muscle group of the distal aspect of the upper arm best seen on series 4 image 145 measuring just over 1 cm in length  No surrounding abscess or hematoma  Within the forearm there is edema and stranding within the subcutaneous fat posterior to the ulna beginning at the level of the elbow and extending to the wrist   Findings suggest cellulitis  The edema extends into the dorsum of the wrist and hand    overlying the carpal and metacarpal bones without a discrete abscess  There is fluid identified surrounding the proximal carpal row and within the distal radioulnar joint suggesting joint effusions  The possibility of septic arthritis not excluded  Consider joint aspiration  The study was marked in [facility] for immediate notification  Workstation performed: [id number]         CT forearm left w contrast   Final Result by Christine Zazueta DO (04/29 2028)      There is a broken needle fragment identified within the anterior muscle group of the distal aspect of the upper arm best seen on series 4 image 145 measuring just over 1 cm in length  No surrounding abscess or hematoma  Within the forearm there is edema and stranding within the subcutaneous fat posterior to the ulna beginning at the level of the elbow and extending to the wrist   Findings suggest cellulitis  The edema extends into the dorsum of the wrist and hand    overlying the carpal and metacarpal bones without a discrete abscess  There is fluid identified surrounding the proximal carpal row and within the distal radioulnar joint suggesting joint effusions  The possibility of septic arthritis not excluded    Consider joint aspiration  The study was marked in [facility] for immediate notification  Workstation performed: [id number]           Results from last 7 days   Lab Units 05/02/19  0611   WBC Thousand/uL 7 06   HEMOGLOBIN g/dL 12 2   HEMATOCRIT % 37 8   PLATELETS Thousands/uL 226     Results from last 7 days   Lab Units 05/02/19  0611   SODIUM mmol/L 139   POTASSIUM mmol/L 3 6   CHLORIDE mmol/L 103   CO2 mmol/L 27   BUN mg/dL 9   CREATININE mg/dL 0 66   CALCIUM mg/dL 9 2       Incidental Findings:   · None     Test Results Pending at Discharge (will require follow up):   · OR cultures     Outpatient Tests Requested:  · none    Complications:  none    Reason for Admission: left wrist pain    Hospital Course:     Ilir Tarango is a 33 y o  male patient with history of IVDU who originally presented to the hospital on 4/29/2019 due to left arm pain  The patient was found to have left arm/wrist cellulitis associated with joint effusion as was noted on MRI of the wrist  ID and Orthopedic surgery following patient  The patient was maintained on IV Vancomycin and remained hemodynamically stable without systemic signs of infection  The patient went to OR 5/2 for I&D and cultures were obtained  The patient was advised to remain hospitalized after the procedure, however, he demanded to be discharged or else would leave AMA  The patient was subsequently discharged on 5 day course of clindamycin and to f/u with Orthopedic surgery, appointment was provided  The patient verbalized understanding  Please see above list of diagnoses and related plan for additional information  Condition at Discharge: stable     Discharge Day Visit / Exam:     Subjective:  Patient seen and examined  He was seen before and after surgery  He reported improvement of his left wrist pain  He stated that he will be leaving after procedure no matter what, if not discharged he would be signing out [facility]       Vitals: Blood Pressure: 126/74 (05/02/19 1532)  Pulse: 84 (05/02/19 1532)  Temperature: 97 9 °F (36 6 °C) (05/02/19 1532)  Temp Source: Temporal (05/02/19 1532)  Respirations: 18 (05/02/19 1532)  Height: 5' 10" (177 8 cm) (04/29/19 2346)  Weight - Scale: 129 kg (283 lb 8 2 oz) (04/29/19 2346)  SpO2: 93 % (05/02/19 1532)    Exam:     Physical Exam   Constitutional: He is oriented to person, place, and time  No distress  HENT:   Head: Normocephalic and atraumatic  Eyes: Conjunctivae are normal    Neck: No JVD present  Cardiovascular: Normal rate and regular rhythm  No murmur heard  Pulmonary/Chest: Effort normal  No respiratory distress  He has no wheezes  He has no rales  Abdominal: Soft  He exhibits no distension and no mass  There is no guarding  Musculoskeletal:   Left wrist wrapped   Neurological: He is alert and oriented to person, place, and time  Skin: Skin is warm and dry  Psychiatric: He has a normal mood and affect  Discussion with Family: Patient    Discharge instructions/Information to patient and family:   See after visit summary for information provided to patient and family  Provisions for Follow-Up Care:  See after visit summary for information related to follow-up care and any pertinent home health orders  Disposition:     Home    For Discharges to [facility] SNF:   · Not Applicable to this Patient - Not Applicable to this Patient    Planned Readmission: No     Discharge Statement:  I spent 35 minutes discharging the patient  This time was spent on the day of discharge  I had direct contact with the patient on the day of discharge  Greater than 50% of the total time was spent examining patient, answering all patient questions, arranging and discussing plan of care with patient as well as directly providing post-discharge instructions  Additional time then spent on discharge activities      Discharge Medications:  See after visit summary for reconciled discharge medications provided to patient and family        ** Please Note: This note has been constructed using a voice recognition system **

## 2019-05-03 NOTE — UTILIZATION REVIEW
Notification of Discharge  This is a Notification of Discharge from our facility [address]  Please be advised that this patient has been discharge from our facility  Below you will find the admission and discharge date and time including the patients disposition  PRESENTATION DATE: 4/29/2019  6:34 PM  IP ADMISSION DATE: 5/2/19 1206  DISCHARGE DATE: 5/2/2019  6:45 PM  DISPOSITION: [facility] Utilization Review Department  Phone: [phone]; Fax [phone]  [email or web address]  ATTENTION: Please call with any questions or concerns to [phone]  and carefully listen to the prompts so that you are directed to the right person  Send all requests for admission clinical reviews, approved or denied determinations and any other requests to fax [phone]   All voicemails are confidential

## 2019-05-03 NOTE — ASSESSMENT & PLAN NOTE
IV drug use-heroin, uses medical marijuana to help control drug use  Cessation counseling  Patient demanded to go home

## 2019-05-03 NOTE — ASSESSMENT & PLAN NOTE
Patient s/p I&D of left wrist 5/2  He demanded to be discharged after procedure, otherwise threatened to leave AMA  Blood cultures pending, no growth at 24 hours  Patient remained without systemic signs of infection, discharged on 5 days of Clindamycin  He understands risks of early discharge but did not agree to stay hospitalized  He will f/u with Ortho, appointment arranged for

## 2019-05-04 LAB
BACTERIA BLD CULT: NORMAL
BACTERIA BLD CULT: NORMAL

## 2019-05-05 LAB
BACTERIA BLD CULT: NORMAL
BACTERIA BLD CULT: NORMAL
BACTERIA SPEC BFLD CULT: NO GROWTH
GRAM STN SPEC: NORMAL
GRAM STN SPEC: NORMAL

## 2019-05-06 LAB — BACTERIA SPEC ANAEROBE CULT: NO GROWTH

## 2019-05-08 ENCOUNTER — OFFICE VISIT (OUTPATIENT)
Dept: OBGYN CLINIC | Facility: CLINIC | Age: 29
End: 2019-05-08

## 2019-05-08 VITALS
DIASTOLIC BLOOD PRESSURE: 80 MMHG | HEIGHT: 70 IN | BODY MASS INDEX: 38.65 KG/M2 | WEIGHT: 270 LBS | SYSTOLIC BLOOD PRESSURE: 130 MMHG

## 2019-05-08 DIAGNOSIS — Z09 POSTOP CHECK: Primary | ICD-10-CM

## 2019-05-08 PROCEDURE — 1111F DSCHRG MED/CURRENT MED MERGE: CPT | Performed by: ORTHOPAEDIC SURGERY

## 2019-05-08 PROCEDURE — 99024 POSTOP FOLLOW-UP VISIT: CPT | Performed by: ORTHOPAEDIC SURGERY

## 2019-05-08 NOTE — ED PROVIDER NOTES
History  Chief Complaint   Patient presents with    Hand Swelling     pt seen here yesterday for hand swelling and returned today to be admitted     Patient: Sangeeta Garnett  28 y o /male  YOB: 1990  MRN: [id number]  PCP: Carlos Kincaid PA-C  Date of evaluation: 4/29/2019    (N B   Nolene Dinning may have been used in the preparation of this document  Occasional wrong word or "sound-alike" substitutions may have occurred due to the inherent limitations of voice recognition software  Interpretation should be guided by context )    chief complaint left upper extremity swelling   he was here 1 day prior for the same symptoms  He was urged to be admitted but he was unable to do so at   That time  he is now here to be admitted and have further evaluation and treatment  Left forearm, left wrist, left hand swelling over the past days  Positive history intravenous drug abuse  Does inject into left hand and left antecubital   Tetanus up-to-date      History provided by:  Patient and medical records  Arm Pain   Location:  Left forearm to left hand  Severity:  Severe  Onset quality:  Gradual  Duration:  5 days  Timing:  Constant  Progression:  Worsening  Chronicity:  New  Relieved by:  Nothing  Associated symptoms: no abdominal pain, no chest pain, no fever, no nausea, no shortness of breath and no vomiting    Risk factors:  IVDU      Prior to Admission Medications   Prescriptions Last Dose Informant Patient Reported? Taking?    EPINEPHrine (EPIPEN 2-ALEXI) 0 3 mg/0 3 mL SOAJ   Yes No   Sig: Inject as directed   clindamycin (CLEOCIN) 150 mg capsule Not Taking at Unknown time  No No   Sig: Take 3 capsules (450 mg total) by mouth every 8 (eight) hours for 10 days   Patient not taking: Reported on 4/30/2019   doxycycline (ADOXA) 100 MG tablet Not Taking at Unknown time  No No   Sig: Take 1 tablet (100 mg total) by mouth 2 (two) times a day for 10 days   Patient not taking: Reported on 2019   saccharomyces boulardii (FLORASTOR) 250 mg capsule Not Taking at Unknown time  No No   Sig: Take 1 capsule (250 mg total) by mouth 2 (two) times a day for 30 doses   Patient not taking: Reported on 2019      Facility-Administered Medications: None       Past Medical History:   Diagnosis Date    Allergic     Anemia 2018    Anxiety     from records    Benign essential hypertension 2016    Bipolar 1 disorder ([facility] )     from records    Chronic pain     Claustrophobia 3/15/2018    Community acquired pneumonia 2018    Depressed 2016    Depression     from records    GERD (gastroesophageal reflux disease)     Hepatitis C virus infection 2014    Heroin abuse ([facility] [address] ) 2018    Obesity 10/12/2016    Obstructive sleep apnea     from records    Sleep disorder        Past Surgical History:   Procedure Laterality Date    RI DRAIN SKIN ABSCESS COMPLIC Left     Procedure: INCISION AND DRAINAGE (I&D) EXTREMITY;  Surgeon: Bertrand Solorzano MD;  Location: [facility];  Service: Orthopedics    WISDOM TOOTH EXTRACTION         Family History   Problem Relation Age of Onset    Colon cancer Father     Alzheimer's disease Maternal Grandmother     Depression Family     Diabetes Mother      I have reviewed and agree with the history as documented  Social History     Tobacco Use    Smoking status: Current Every Day Smoker     Packs/day: 1 00     Years: 13 00     Pack years: 13      Types: Cigarettes, E-Cigarettes     Last attempt to quit: 2016     Years since quittin 8    Smokeless tobacco: Never Used   Substance Use Topics    Alcohol use: Not Currently     Alcohol/week: 0 0 oz    Drug use: No     Types: Heroin     Comment: 1-2 bags daily, last used a few hours ago        Review of Systems   Constitutional: Negative for fever  Respiratory: Negative for shortness of breath  Cardiovascular: Negative for chest pain     Gastrointestinal: Negative for abdominal pain, nausea and vomiting  Physical Exam  Physical Exam    Vital Signs  ED Triage Vitals [04/29/19 1837]   Temperature Pulse Respirations Blood Pressure SpO2   98 3 °F (36 8 °C) 67 18 113/59 95 %      Temp Source Heart Rate Source Patient Position - Orthostatic VS BP Location FiO2 (%)   Temporal Right Sitting Right arm --      Pain Score       6           Vitals:    05/02/19 1455 05/02/19 1500 05/02/19 1515 05/02/19 1532   BP: 154/77 157/79 154/68 126/74   Pulse: 99 (!) 106 96 84   Patient Position - Orthostatic VS:    Lying         Visual Acuity      ED Medications  Medications   vancomycin (VANCOCIN) 2,000 mg in sodium chloride 0 9 % 500 mL IVPB (0 mg Intravenous Stopped 4/29/19 2200)   iohexol (OMNIPAQUE) 350 MG/ML injection (SINGLE-DOSE) 100 mL (100 mL Intravenous Given 4/29/19 1941)   ketorolac (TORADOL) injection 15 mg (15 mg Intravenous Given 4/29/19 2051)   LORazepam (ATIVAN) 2 mg/mL injection 1 mg (1 mg Intravenous Given 5/1/19 0738)   Gadobutrol injection (SINGLE-DOSE) SOLN 13 mL (13 mL Intravenous Given 5/1/19 0908)       Diagnostic Studies  Results Reviewed     Procedure Component Value Units Date/Time    Blood culture #2 [[id number]] Collected:  04/29/19 1926    Lab Status:  Final result Specimen:  Blood from Hand, Right Updated:  05/05/19 0902     Blood Culture No Growth After 5 Days  Blood culture #1 [[id number]] Collected:  04/29/19 1926    Lab Status:  Final result Specimen:  Blood from Arm, Right Updated:  05/05/19 0902     Blood Culture No Growth After 5 Days      Comprehensive metabolic panel [[id number]]  (Abnormal) Collected:  04/29/19 1926    Lab Status:  Final result Specimen:  Blood from Arm, Right Updated:  04/29/19 1954     Sodium 139 mmol/L      Potassium 4 1 mmol/L      Chloride 104 mmol/L      CO2 32 mmol/L      ANION GAP 3 mmol/L      BUN 12 mg/dL      Creatinine 0 84 mg/dL      Glucose 91 mg/dL      Calcium 8 9 mg/dL      AST 19 U/L      ALT 40 U/L      Alkaline Phosphatase 63 U/L      Total Protein 7 5 g/dL      Albumin 3 1 g/dL      Total Bilirubin 0 20 mg/dL      eGFR 118 ml/min/1 73sq m     Narrative:       [place] guidelines for Chronic Kidney Disease (CKD):     Stage 1 with normal or high GFR (GFR > 90 mL/min/1 73 square meters)    Stage 2 Mild CKD (GFR = 60-89 mL/min/1 73 square meters)    Stage 3A Moderate CKD (GFR = 45-59 mL/min/1 73 square meters)    Stage 3B Moderate CKD (GFR = 30-44 mL/min/1 73 square meters)    Stage 4 Severe CKD (GFR = 15-29 mL/min/1 73 square meters)    Stage 5 End Stage CKD (GFR <15 mL/min/1 73 square meters)  Note: GFR calculation is accurate only with a steady state creatinine    CK (with reflex to MB) [[id number]]  (Normal) Collected:  04/29/19 1926    Lab Status:  Final result Specimen:  Blood from Arm, Right Updated:  04/29/19 1953     Total CK 53 U/L     Magnesium [[id number]]  (Normal) Collected:  04/29/19 1926    Lab Status:  Final result Specimen:  Blood from Arm, Right Updated:  04/29/19 1948     Magnesium 1 9 mg/dL     CBC and differential [[id number]]  (Abnormal) Collected:  04/29/19 1926    Lab Status:  Final result Specimen:  Blood from Arm, Right Updated:  04/29/19 1934     WBC 5 54 Thousand/uL      RBC 4 38 Million/uL      Hemoglobin 11 2 g/dL      Hematocrit 35 1 %      MCV 80 fL      MCH 25 6 pg      MCHC 31 9 g/dL      RDW 13 0 %      MPV 9 2 fL      Platelets 661 Thousands/uL      nRBC 0 /100 WBCs      Neutrophils Relative 64 %      Immat GRANS % 0 %      Lymphocytes Relative 26 %      Monocytes Relative 7 %      Eosinophils Relative 3 %      Basophils Relative 0 %      Neutrophils Absolute 3 49 Thousands/µL      Immature Grans Absolute 0 01 Thousand/uL      Lymphocytes Absolute 1 46 Thousands/µL      Monocytes Absolute 0 41 Thousand/µL      Eosinophils Absolute 0 15 Thousand/µL      Basophils Absolute 0 02 Thousands/µL                  XR wrist 2 vw left   Final Result by Christian Brantley MD (05/02 1533)      Fluoroscopic guidance provided for surgical procedure  Please refer to the separate procedure notes for additional details  Workstation performed: [id number]         MRI wrist left w wo contrast   Final Result by Marcela Deluca MD (05/01 1256)   Prominent radiocarpal, distal carpal row and distal radial ulnar joint effusions noted with dorsal hand nonspecific subcutaneous edema indicating cellulitis  No abscess collection  No kya evidence of osteomyelitis  Septic arthropathy    difficult to exclude  The study was marked in [facility] for immediate notification  Workstation performed: [id number]         CT humerus left w contrast   Final Result by Sixto Oreilly DO (04/30 1038)      There is a broken needle fragment identified within the anterior muscle group of the distal aspect of the upper arm best seen on series 4 image 145 measuring just over 1 cm in length  No surrounding abscess or hematoma  Within the forearm there is edema and stranding within the subcutaneous fat posterior to the ulna beginning at the level of the elbow and extending to the wrist   Findings suggest cellulitis  The edema extends into the dorsum of the wrist and hand    overlying the carpal and metacarpal bones without a discrete abscess  There is fluid identified surrounding the proximal carpal row and within the distal radioulnar joint suggesting joint effusions  The possibility of septic arthritis not excluded  Consider joint aspiration  The study was marked in [facility] for immediate notification  Workstation performed: [id number]         CT forearm left w contrast   Final Result by Sixto Oreilly DO (04/29 2028)      There is a broken needle fragment identified within the anterior muscle group of the distal aspect of the upper arm best seen on series 4 image 145 measuring just over 1 cm in length  No surrounding abscess or hematoma        Within the forearm there is edema and stranding within the subcutaneous fat posterior to the ulna beginning at the level of the elbow and extending to the wrist   Findings suggest cellulitis  The edema extends into the dorsum of the wrist and hand    overlying the carpal and metacarpal bones without a discrete abscess  There is fluid identified surrounding the proximal carpal row and within the distal radioulnar joint suggesting joint effusions  The possibility of septic arthritis not excluded  Consider joint aspiration  The study was marked in [facility] for immediate notification  Workstation performed: [id number]                    Procedures  Procedures       Phone Contacts  ED Phone Contact    ED Course  ED Course as of May 08 0300   Mon Apr 29, 2019   2139 Discussed with Dr Aishwarya Wilson  He feels that the patient can be admitted here  He requests that we admit to Medicine, place consult to him and to infectious disease, splint the area, do intravenous antibiotics, and have medicine pursue probable MRI tomorrow                                    MDM  Number of Diagnoses or Management Options  Cellulitis of left arm:   Cellulitis of left hand:      Amount and/or Complexity of Data Reviewed  Tests in the radiology section of CPT®: ordered and reviewed  Tests in the medicine section of CPT®: ordered and reviewed  Discuss the patient with other providers: yes    Risk of Complications, Morbidity, and/or Mortality  Presenting problems: high  Diagnostic procedures: high    Patient Progress  Patient progress: improved      Disposition  Final diagnoses:   Cellulitis of left hand   Cellulitis of left arm     Time reflects when diagnosis was documented in both MDM as applicable and the Disposition within this note     Time User Action Codes Description Comment    4/29/2019  9:28 PM Fernando Blood Add [L03 114] Cellulitis of left hand     4/29/2019  9:28 PM Noe Rodriguez Add [L03 114] Cellulitis of left arm     4/29/2019  9:45 PM Heide Mccormick Add [F19 10] IV drug abuse ([facility] )     4/29/2019  9:45 PM Brenton Marcuss Add [F11 20] Heroin addiction ([facility] )     4/29/2019  9:45 PM Larry Mccormick Remove [F11 20] Heroin addiction ([facility] )     4/29/2019  9:45 PM Larry Mccormick Remove [F19 10] IV drug abuse ([place] [address] )     5/2/2019  2:00 PM Vera Del Angel Modify [L03 114] Cellulitis of left hand     5/2/2019  2:00 PM Vera Del Angel Modify [L03 114] Cellulitis of left arm     5/2/2019  4:05 PM [place], [place] Modify [L03 114] Cellulitis of left hand     5/2/2019  4:05 PM [place], [place] Modify [L03 114] Cellulitis of left arm     5/2/2019  4:05 PM [place] Add [S78 264] Cellulitis of left arm h/o IVDA    5/2/2019  4:05 PM [place], [place] Modify [L03 114] Cellulitis of left arm h/o IVDA    5/2/2019  4:06 PM [place], [place] Modify [L03 114] Cellulitis of left arm h/o IVDA    5/2/2019  4:06 PM [place], [place] Add [Z72 0] Tobacco use     5/2/2019  4:06 PM [place], [place] Add [L03 114] Cellulitis of left upper extremity     5/2/2019  4:06 PM Ava Hopper Modify [L03 114] Cellulitis of left arm h/o IVDA      ED Disposition     ED Disposition Condition Date/Time Comment    Admit Stable Mon Apr 29, 2019  9:28 PM Case was discussed with TC Calderon, for SLIM   and the patient's admission status was agreed to be Admission Status: observation status to the service of Dr Erin Azevedo           Follow-up Information     Follow up With Specialties Details Why Contact Info    Katia Nelson MD Orthopedic Surgery Follow up on 5/8/2019 1 pm appointment [address] [place] [address] Shashi Covington PA-C Family Medicine, Internal Medicine, Physician Assistant Schedule an appointment as soon as possible for a visit in 1 week(s)  [address] [facility] [address] [facility] Joseluis Leesaima  [phone]            Discharge Medication List as of 5/2/2019  4:13 PM      START taking these medications    Details   acetaminophen (TYLENOL) 325 mg tablet Take 2 tablets (650 mg total) by mouth every 6 (six) hours as needed for mild pain, Starting Thu 5/2/2019, Normal      nicotine (NICODERM CQ) 21 mg/24 hr TD 24 hr patch Place 1 patch on the skin daily, Starting Fri 5/3/2019, Normal         CONTINUE these medications which have CHANGED    Details   clindamycin (CLEOCIN) 150 mg capsule Take 3 capsules (450 mg total) by mouth every 8 (eight) hours for 5 days, Starting Thu 5/2/2019, Until Tue 5/7/2019, Print         CONTINUE these medications which have NOT CHANGED    Details   saccharomyces boulardii (FLORASTOR) 250 mg capsule Take 1 capsule (250 mg total) by mouth 2 (two) times a day for 30 doses, Starting Sun 4/28/2019, Until Mon 5/13/2019, Print         STOP taking these medications       doxycycline (ADOXA) 100 MG tablet Comments:   Reason for Stopping:         EPINEPHrine (EPIPEN 2-ALEXI) 0 3 mg/0 3 mL SOAJ Comments:   Reason for Stopping:             Outpatient Discharge Orders   Discharge Diet     Call provider for:  persistent nausea or vomiting     Call provider for:  severe uncontrolled pain     Call provider for:  redness, tenderness, or signs of infection (pain, swelling, redness, odor or green/yellow discharge around incision site)     Call provider for: active or persistent bleeding     Call provider for:  difficulty breathing, headache or visual disturbances     Call provider for:  hives     Call provider for:  persistent dizziness or light-headedness     Call provider for:  extreme fatigue       ED Provider  Electronically Signed by           Ricco Barber MD  05/08/19 7045

## 2019-05-15 ENCOUNTER — OFFICE VISIT (OUTPATIENT)
Dept: OBGYN CLINIC | Facility: CLINIC | Age: 29
End: 2019-05-15

## 2019-05-15 VITALS
HEART RATE: 114 BPM | SYSTOLIC BLOOD PRESSURE: 135 MMHG | WEIGHT: 261 LBS | DIASTOLIC BLOOD PRESSURE: 80 MMHG | HEIGHT: 70 IN | BODY MASS INDEX: 37.37 KG/M2

## 2019-05-15 DIAGNOSIS — Z09 POSTOP CHECK: Primary | ICD-10-CM

## 2019-05-15 PROCEDURE — 99024 POSTOP FOLLOW-UP VISIT: CPT | Performed by: ORTHOPAEDIC SURGERY

## 2020-02-01 ENCOUNTER — APPOINTMENT (EMERGENCY)
Dept: CT IMAGING | Facility: HOSPITAL | Age: 30
End: 2020-02-01
Payer: COMMERCIAL

## 2020-02-01 ENCOUNTER — HOSPITAL ENCOUNTER (EMERGENCY)
Facility: HOSPITAL | Age: 30
Discharge: HOME | End: 2020-02-01
Attending: EMERGENCY MEDICINE | Admitting: EMERGENCY MEDICINE
Payer: COMMERCIAL

## 2020-02-01 VITALS
HEIGHT: 70 IN | OXYGEN SATURATION: 96 % | DIASTOLIC BLOOD PRESSURE: 74 MMHG | RESPIRATION RATE: 18 BRPM | HEART RATE: 80 BPM | SYSTOLIC BLOOD PRESSURE: 126 MMHG | BODY MASS INDEX: 18.28 KG/M2 | WEIGHT: 127.7 LBS | TEMPERATURE: 98.8 F

## 2020-02-01 DIAGNOSIS — R31.29 MICROSCOPIC HEMATURIA: ICD-10-CM

## 2020-02-01 DIAGNOSIS — S39.012A STRAIN OF LUMBAR REGION, INITIAL ENCOUNTER: ICD-10-CM

## 2020-02-01 DIAGNOSIS — M54.9 MUSCULOSKELETAL BACK PAIN: Primary | ICD-10-CM

## 2020-02-01 DIAGNOSIS — K63.9 LESION OF COLON: ICD-10-CM

## 2020-02-01 LAB
AMPHETAMINES SERPL QL SCN: NEGATIVE
ANION GAP SERPL CALCULATED.3IONS-SCNC: 5 MMOL/L (ref 4–13)
BACTERIA UR QL AUTO: ABNORMAL /HPF
BARBITURATES UR QL: NEGATIVE
BENZODIAZ UR QL: NEGATIVE
BILIRUB UR QL STRIP: NEGATIVE
BUN SERPL-MCNC: 13 MG/DL (ref 5–25)
CALCIUM SERPL-MCNC: 9.3 MG/DL (ref 8.3–10.1)
CHLORIDE SERPL-SCNC: 98 MMOL/L (ref 100–108)
CLARITY UR: CLEAR
CO2 SERPL-SCNC: 32 MMOL/L (ref 21–32)
COCAINE UR QL: NEGATIVE
COLOR UR: YELLOW
CREAT SERPL-MCNC: 0.68 MG/DL (ref 0.6–1.3)
GFR SERPL CREATININE-BSD FRML MDRD: 128 ML/MIN/1.73SQ M
GLUCOSE SERPL-MCNC: 93 MG/DL (ref 65–140)
GLUCOSE UR STRIP-MCNC: NEGATIVE MG/DL
HGB UR QL STRIP.AUTO: ABNORMAL
KETONES UR STRIP-MCNC: NEGATIVE MG/DL
LEUKOCYTE ESTERASE UR QL STRIP: NEGATIVE
METHADONE UR QL: POSITIVE
NITRITE UR QL STRIP: NEGATIVE
NON-SQ EPI CELLS URNS QL MICRO: ABNORMAL /HPF
OPIATES UR QL SCN: POSITIVE
PCP UR QL: NEGATIVE
PH UR STRIP.AUTO: 6 [PH]
POTASSIUM SERPL-SCNC: 3.6 MMOL/L (ref 3.5–5.3)
PROT UR STRIP-MCNC: NEGATIVE MG/DL
RBC #/AREA URNS AUTO: ABNORMAL /HPF
SODIUM SERPL-SCNC: 135 MMOL/L (ref 136–145)
SP GR UR STRIP.AUTO: <=1.005 (ref 1–1.03)
THC UR QL: NEGATIVE
UROBILINOGEN UR QL STRIP.AUTO: 0.2 E.U./DL
WBC #/AREA URNS AUTO: ABNORMAL /HPF

## 2020-02-01 PROCEDURE — 36415 COLL VENOUS BLD VENIPUNCTURE: CPT | Performed by: EMERGENCY MEDICINE

## 2020-02-01 PROCEDURE — 80048 BASIC METABOLIC PNL TOTAL CA: CPT | Performed by: EMERGENCY MEDICINE

## 2020-02-01 PROCEDURE — 96374 THER/PROPH/DIAG INJ IV PUSH: CPT

## 2020-02-01 PROCEDURE — 81001 URINALYSIS AUTO W/SCOPE: CPT | Performed by: EMERGENCY MEDICINE

## 2020-02-01 PROCEDURE — 96361 HYDRATE IV INFUSION ADD-ON: CPT

## 2020-02-01 PROCEDURE — 80307 DRUG TEST PRSMV CHEM ANLYZR: CPT | Performed by: EMERGENCY MEDICINE

## 2020-02-01 PROCEDURE — 99284 EMERGENCY DEPT VISIT MOD MDM: CPT | Performed by: EMERGENCY MEDICINE

## 2020-02-01 PROCEDURE — 74177 CT ABD & PELVIS W/CONTRAST: CPT

## 2020-02-01 PROCEDURE — 51798 US URINE CAPACITY MEASURE: CPT

## 2020-02-01 PROCEDURE — 99284 EMERGENCY DEPT VISIT MOD MDM: CPT

## 2020-02-01 PROCEDURE — 71260 CT THORAX DX C+: CPT

## 2020-02-01 RX ORDER — LIDOCAINE 50 MG/G
1 PATCH TOPICAL ONCE
Status: DISCONTINUED | OUTPATIENT
Start: 2020-02-01 | End: 2020-02-01 | Stop reason: HOSPADM

## 2020-02-01 RX ORDER — CYCLOBENZAPRINE HCL 10 MG
10 TABLET ORAL 3 TIMES DAILY PRN
Qty: 12 TABLET | Refills: 0 | Status: SHIPPED | OUTPATIENT
Start: 2020-02-01 | End: 2020-05-05

## 2020-02-01 RX ORDER — CYCLOBENZAPRINE HCL 10 MG
10 TABLET ORAL ONCE
Status: COMPLETED | OUTPATIENT
Start: 2020-02-01 | End: 2020-02-01

## 2020-02-01 RX ORDER — LIDOCAINE 50 MG/G
1 PATCH TOPICAL DAILY
Qty: 5 PATCH | Refills: 0 | Status: SHIPPED | OUTPATIENT
Start: 2020-02-01 | End: 2020-05-05

## 2020-02-01 RX ORDER — KETOROLAC TROMETHAMINE 30 MG/ML
30 INJECTION, SOLUTION INTRAMUSCULAR; INTRAVENOUS ONCE
Status: COMPLETED | OUTPATIENT
Start: 2020-02-01 | End: 2020-02-01

## 2020-02-01 RX ADMIN — SODIUM CHLORIDE 1000 ML: 0.9 INJECTION, SOLUTION INTRAVENOUS at 13:40

## 2020-02-01 RX ADMIN — CYCLOBENZAPRINE HYDROCHLORIDE 10 MG: 10 TABLET, FILM COATED ORAL at 13:37

## 2020-02-01 RX ADMIN — KETOROLAC TROMETHAMINE 30 MG: 30 INJECTION, SOLUTION INTRAMUSCULAR at 13:37

## 2020-02-01 RX ADMIN — IOHEXOL 100 ML: 350 INJECTION, SOLUTION INTRAVENOUS at 14:23

## 2020-02-01 RX ADMIN — LIDOCAINE 1 PATCH: 50 PATCH TOPICAL at 13:37

## 2020-02-01 NOTE — DISCHARGE INSTRUCTIONS
AS DISCUSSED, YOU HAVE A LESIONS/MASS IN THE LOWER DESCENDING COLON  THIS IS BEEN THERE SINCE 2015  HOWEVER IT IS IMPORTANT THAT YOU CALL YOUR FAMILY DOCTOR AND GASTROENTEROLOGIST   YOU NEED A COLONOSCOPY TO HAVE THIS BIOPSIED  Do not take any Motrin, ibuprofen or NSAIDs into you are clear    BACK EXERCISES  Back exercises help treat and prevent back injuries  The goal of back exercises is to increase the strength of your abdominal and back muscles and the flexibility of your back  These exercises should be started when you no longer have back pain  Back exercises include:    Pelvic Tilt  Lie on your back with your knees bent  Tilt your pelvis until the lower part of your back is against the floor  Hold this position 5 to 10 sec and repeat 5 to 10 times  Knee to Chest  Pull first 1 knee up against your chest and hold for 20 to 30 seconds, repeat this with the other knee, and then both knees  This may be done with the other leg straight or bent, whichever feels better  Sit-Ups or Curl-Ups  Bend your knees 90 degrees  Start with tilting your pelvis, and do a partial, slow sit-up, lifting your trunk only 30 to 45 degrees off the floor  Take at least 2 to 3 seconds for each sit-up  Do not do sit-ups with your knees out straight  If partial sit-ups are difficult, simply do the above but with only tightening your abdominal muscles and holding it as directed  Hip-Lift  Lie on your back with your knees flexed 90 degrees  Push down with your feet and shoulders as you raise your hips a couple inches off the floor; hold for 10 seconds, repeat 5 to 10 times  Back arches  Lie on your stomach, propping yourself up on bent elbows  Slowly press on your hands, causing an arch in your low back  Repeat 3 to 5 times  Any initial stiffness and discomfort should lessen with repetition over time  Shoulder-Lifts  Lie face down with arms beside your body   Keep hips and torso pressed to floor as you slowly lift your head and shoulders off the floor  Do not overdo your exercises, especially in the beginning  Exercises may cause you some mild back discomfort which lasts for a few minutes; however, if the pain is more severe, or lasts for more than 15 minutes, do not continue exercises until you see your caregiver  Improvement with exercise therapy for back problems is slow   See your caregivers for assistance with developing a proper back exercise program

## 2020-02-01 NOTE — ED PROVIDER NOTES
History  Chief Complaint   Patient presents with    Back Pain     back pain since tuesday, upper mid to lower back  Pt stated that he fell down 3 stairs on Sunday  denies hitting head, no LOC      Patient is a 68-year-old male with a history of bipolar disorder, anxiety, GERD, essential hypertension, obstructive sleep apnea, heroin abuse, depression, hepatitis-C coming in today after a fall at home  Patient states he fell approximately 1 week ago outside on icy steps  He landed on his buttocks  He reports that ever since then he has been having worsening pain on the right side of his back up into his midthoracic area  When he fell, he did not hit his head or lose consciousness  He did not hit his mid back  Reports the pain extends from his low back and mid back up into his right trapezius area as well as wrapping around into his abdomen  He has no fevers, chills, urinary tension  He denies any paresthesias throughout the bilateral lower extremities  He denies any weakness throughout the bilateral lower extremities  He has only taken Tylenol for the pain        History provided by:  Patient   used: No    Fall   Mechanism of injury: fall    Injury location:  Torso  Torso injury location:  Back  Incident location:  Home  Time since incident:  7 days  Arrived directly from scene: no    Fall:     Height of fall:  2-3 steps    Impact surface:  Ice    Point of impact:  Back and buttocks    Entrapped after fall: no    Protective equipment: none    Suspicion of alcohol use: no    Suspicion of drug use: no    Tetanus status:  Unknown  Prior to arrival data:     Bystander interventions:  None    Patient ambulatory at scene: yes      Blood loss:  None    Responsiveness at scene:  Alert    Orientation at scene:  Person, place, situation and time    Loss of consciousness: no      Amnesic to event: no      Airway interventions:  None    Breathing interventions:  None    IV access status:  None    IO access:  None    Fluids administered:  None    Cardiac interventions:  None    Medications administered:  None    Immobilization:  None    Airway condition since incident:  Stable    Breathing condition since incident:  Stable    Circulation condition since incident:  Stable    Mental status condition since incident:  Stable    Disability condition since incident:  Stable  Associated symptoms: back pain    Associated symptoms: no abdominal pain, no blindness, no chest pain, no difficulty breathing, no headaches, no hearing loss, no loss of consciousness, no nausea, no neck pain, no seizures and no vomiting    Risk factors: no AICD, no anticoagulation therapy, no asthma, no beta blocker therapy, no CABG, no CAD, no CHF, no COPD, no diabetes, no dialysis, no hemophilia, no kidney disease, no pacemaker, no past MI and no steroid use        Prior to Admission Medications   Prescriptions Last Dose Informant Patient Reported?  Taking?   acetaminophen (TYLENOL) 325 mg tablet   No No   Sig: Take 2 tablets (650 mg total) by mouth every 6 (six) hours as needed for mild pain   Patient not taking: Reported on 5/8/2019   nicotine (NICODERM CQ) 21 mg/24 hr TD 24 hr patch   No No   Sig: Place 1 patch on the skin daily   Patient not taking: Reported on 5/8/2019      Facility-Administered Medications: None       Past Medical History:   Diagnosis Date    Allergic     Anemia 6/24/2018    Anxiety     from records    Benign essential hypertension 11/17/2016    Bipolar 1 disorder ([facility] )     from records    Chronic pain     Claustrophobia 3/15/2018    Community acquired pneumonia 6/24/2018    Depressed 7/14/2016    Depression     from records    GERD (gastroesophageal reflux disease)     Hepatitis C virus infection 8/14/2014    Heroin abuse ([facility] ) 7/24/2018    Obesity 10/12/2016    Obstructive sleep apnea     from records    Sleep disorder        Past Surgical History:   Procedure Laterality Date    CO DRAIN SKIN ABSCESS COMPLIC Left 8/7/1581    Procedure: INCISION AND DRAINAGE (I&D) EXTREMITY;  Surgeon: Edgar Sewell MD;  Location: [facility];  Service: Orthopedics    WISDOM TOOTH EXTRACTION         Family History   Problem Relation Age of Onset    Colon cancer Father     Alzheimer's disease Maternal Grandmother     Depression Family     Diabetes Mother      I have reviewed and agree with the history as documented  Social History     Tobacco Use    Smoking status: Current Every Day Smoker     Packs/day: 1 00     Years: 13 00     Pack years: 13 00     Types: Cigarettes, E-Cigarettes     Last attempt to quit: 6/24/2016     Years since quitting: 3 6    Smokeless tobacco: Never Used   Substance Use Topics    Alcohol use: Not Currently     Alcohol/week: 0 0 standard drinks    Drug use: Yes     Types: Heroin, Marijuana, Prescription     Comment: 1-2 bags daily, last used a few hours ago, on methadone        Review of Systems   Constitutional: Negative  Negative for diaphoresis and fever  HENT: Negative  Negative for ear pain, hearing loss and sore throat  Eyes: Negative  Negative for blindness and visual disturbance  Respiratory: Negative  Negative for chest tightness and shortness of breath  Cardiovascular: Negative  Negative for chest pain and palpitations  Gastrointestinal: Negative  Negative for abdominal pain, nausea and vomiting  Genitourinary: Negative  Negative for difficulty urinating and dysuria  Musculoskeletal: Positive for back pain  Negative for neck pain  Skin: Negative for rash  Neurological: Negative  Negative for seizures, loss of consciousness, weakness and headaches  Hematological: Negative  Psychiatric/Behavioral: Negative  Negative for confusion  All other systems reviewed and are negative  Physical Exam  Physical Exam   Constitutional: He is oriented to person, place, and time  He appears well-developed and well-nourished  No distress     Patient appears older than stated age   HENT:   Head: Normocephalic and atraumatic  Mouth/Throat: Oropharynx is clear and moist    Patient maintaining airway maintaining secretions  Uvula midline without edema  Eyes: Pupils are equal, round, and reactive to light  Conjunctivae and EOM are normal    Neck: Normal range of motion  Neck supple  Cardiovascular: Normal rate, regular rhythm, normal heart sounds and intact distal pulses  No murmur heard  Pulmonary/Chest: Effort normal and breath sounds normal  No stridor  No respiratory distress  Abdominal: Soft  Bowel sounds are normal  He exhibits no distension  There is no tenderness  Musculoskeletal: Normal range of motion  He exhibits no edema  Cervical back: Normal         Thoracic back: Normal         Lumbar back: Normal         Back:    Patient has full active range of motion of the bilateral upper extremities and lower extremities and moves them independently of each other pain-free  There is no evidence of external trauma to the thoracolumbar spine anterior and posteriorly  Neurological: He is alert and oriented to person, place, and time  No cranial nerve deficit  GCS eye subscore is 4  GCS verbal subscore is 5  GCS motor subscore is 6  No slurred speech  No facial asymmetry   Skin: Skin is warm  Capillary refill takes less than 2 seconds  He is not diaphoretic  Psychiatric: He has a normal mood and affect  Nursing note and vitals reviewed        Vital Signs  ED Triage Vitals [02/01/20 1252]   Temperature Pulse Respirations Blood Pressure SpO2   98 8 °F (37 1 °C) 86 20 132/84 96 %      Temp Source Heart Rate Source Patient Position - Orthostatic VS BP Location FiO2 (%)   Temporal Monitor Lying Left arm --      Pain Score       8           Vitals:    02/01/20 1252 02/01/20 1545 02/01/20 1558   BP: 132/84 112/68 126/74   Pulse: 86 73 80   Patient Position - Orthostatic VS: Lying Lying Lying         Visual Acuity      ED Medications  Medications   lidocaine (LIDODERM) 5 % patch 1 patch (1 patch Topical Medication Applied 2/1/20 1337)   sodium chloride 0 9 % bolus 1,000 mL (0 mL Intravenous Stopped 2/1/20 1542)   ketorolac (TORADOL) injection 30 mg (30 mg Intravenous Given 2/1/20 1337)   cyclobenzaprine (FLEXERIL) tablet 10 mg (10 mg Oral Given 2/1/20 1337)   iohexol (OMNIPAQUE) 350 MG/ML injection (SINGLE-DOSE) 100 mL (100 mL Intravenous Given 2/1/20 1423)       Diagnostic Studies  Results Reviewed     Procedure Component Value Units Date/Time    Rapid drug screen, urine [[id number]]  (Abnormal) Collected:  02/01/20 1527    Lab Status:  Final result Specimen:  Urine, Clean Catch Updated:  02/01/20 1550     Amph/Meth UR Negative     Barbiturate Ur Negative     Benzodiazepine Urine Negative     Cocaine Urine Negative     Methadone Urine Positive     Opiate Urine Positive     PCP Ur Negative     THC Urine Negative    Narrative:       Presumptive report  If requested, specimen will be sent to reference lab for confirmation  FOR MEDICAL PURPOSES ONLY  IF CONFIRMATION NEEDED PLEASE CONTACT THE LAB WITHIN 5 DAYS      Drug Screen Cutoff Levels:  AMPHETAMINE/METHAMPHETAMINES  1000 ng/mL  BARBITURATES     200 ng/mL  BENZODIAZEPINES     200 ng/mL  COCAINE      300 ng/mL  METHADONE      300 ng/mL  OPIATES      300 ng/mL  PHENCYCLIDINE     25 ng/mL  THC       50 ng/mL      Urine Microscopic [[id number]]  (Abnormal) Collected:  02/01/20 1527    Lab Status:  Final result Specimen:  Urine, Clean Catch Updated:  02/01/20 1549     RBC, UA 0-1 /hpf      WBC, UA None Seen /hpf      Epithelial Cells None Seen /hpf      Bacteria, UA Occasional /hpf     UA (URINE) with reflex to Scope [[id number]]  (Abnormal) Collected:  02/01/20 1527    Lab Status:  Final result Specimen:  Urine, Clean Catch Updated:  02/01/20 1537     Color, UA Yellow     Clarity, UA Clear     Specific Gravity, UA <=1 005     pH, UA 6 0     Leukocytes, UA Negative     Nitrite, UA Negative     Protein, UA Negative mg/dl      Glucose, UA Negative mg/dl      Ketones, UA Negative mg/dl      Urobilinogen, UA 0 2 E U /dl      Bilirubin, UA Negative     Blood, UA Trace-Intact    Basic metabolic panel [[id number]]  (Abnormal) Collected:  02/01/20 1340    Lab Status:  Final result Specimen:  Blood from Arm, Left Updated:  02/01/20 1359     Sodium 135 mmol/L      Potassium 3 6 mmol/L      Chloride 98 mmol/L      CO2 32 mmol/L      ANION GAP 5 mmol/L      BUN 13 mg/dL      Creatinine 0 68 mg/dL      Glucose 93 mg/dL      Calcium 9 3 mg/dL      eGFR 128 ml/min/1 73sq m     Narrative:       [place] guidelines for Chronic Kidney Disease (CKD):     Stage 1 with normal or high GFR (GFR > 90 mL/min/1 73 square meters)    Stage 2 Mild CKD (GFR = 60-89 mL/min/1 73 square meters)    Stage 3A Moderate CKD (GFR = 45-59 mL/min/1 73 square meters)    Stage 3B Moderate CKD (GFR = 30-44 mL/min/1 73 square meters)    Stage 4 Severe CKD (GFR = 15-29 mL/min/1 73 square meters)    Stage 5 End Stage CKD (GFR <15 mL/min/1 73 square meters)  Note: GFR calculation is accurate only with a steady state creatinine                 CT recon only thoracolumbar   Final Result by Kemi Espinoza MD (02/01 1525)      No acute compression collapse of vertebra seen               Workstation performed: [id number]         CT chest abdomen pelvis w contrast   Final Result by Kemi Espinoza MD (02/01 7695)      No solid visceral injury seen   No acute consolidation   2 3 x 2 6 cm submucosal mass with calcifications in the lower descending colon  , Stable  This is also unchanged since previous study from June 4, 2015   May be a vascular lesion may be a gastrointestinal stromal tumor  ,  Can be characterized with the colonoscopy   Trace bilateral effusion   The study was marked in EPIC for significant notification           Workstation performed: [id number]                    Procedures  Procedures ED Course  ED Course as of Feb 01 1613   Sat Feb 01, 2020   156 Is a 79-year-old male coming in today with complaints of back pain that started approximately 1 week ago  On exam he appears in pain however hemodynamically stable  Will obtain CTs rule out acute fracture as well as give IV fluids and pain control with Toradol, Lidoderm patch as well as Flexeril      Portions of the record may have been created with voice recognition software  Occasional wrong word or "sound a like" substitutions may have occurred due to the inherent limitations of voice recognition software  Read the chart carefully and recognize, using context, where substitutions have occurred  1404 Patient's BMP stable  Clear for CT      1420 Patient at CT      1534 No acute pathology of thoracolumbar spine  CTs do reveal a submucosal mass in the lower descending colon similar to previous study in 2018  May be a vascular lesion may be a stromal tumor and will need colonoscopy  There is trace bilateral pleural effusions pending urine  Patient did void 250 cc of urine        1536 On read exam patient appears more comfortable  He is alert oriented x3  He states he feels better  He was updated on this lesion in his lower intestine and how he will need referral to Gastroenterology  Will obtain PVR, give report of CT scan as well as refer to GI       1538 Patient does have trace urine  Pending drug screen and microscopic urine      1548 Given patient's new lesion will avoid NSAIDs for discharge      1551 Microscopic reveals RBCs  He has also positive for methadone and opioids  Will plan for DC home  It is noted PVR is 0                                  MDM  Number of Diagnoses or Management Options  Diagnosis management comments:   Differential diagnosis includes but not limited to:  Rib fracture, musculoskeletal injury, splenic injury, kidney injury, vertebral fracture, fracture dislocation, contusion         Amount and/or Complexity of Data Reviewed  Clinical lab tests: ordered and reviewed  Tests in the radiology section of CPT®: ordered and reviewed  Tests in the medicine section of CPT®: ordered and reviewed  Independent visualization of images, tracings, or specimens: yes          Disposition  Final diagnoses:   Musculoskeletal back pain   Strain of lumbar region, initial encounter   Lesion of colon   Microscopic hematuria     Time reflects when diagnosis was documented in both MDM as applicable and the Disposition within this note     Time User Action Codes Description Comment    2/1/2020  3:45 PM Melissa Sivakumar Add [M54 9] Musculoskeletal back pain     2/1/2020  3:45 PM Aby Do Add [S39 012A] Strain of lumbar region, initial encounter     2/1/2020  3:45 PM BendAby fulton Slicker Add [K63 9] Lesion of colon     2/1/2020  3:50 PM Melissa Sivakumar Add [R31 29] Microscopic hematuria       ED Disposition     ED Disposition Condition Date/Time Comment    Discharge Stable Sat Feb 1, 2020  3:41 PM Kindra Cali discharge to home/self care              Follow-up Information     Follow up With Specialties Details Why Contact Info Additional Information    Angie Elkins PA-C Family Medicine, Internal Medicine, Physician Assistant Schedule an appointment as soon as possible for a visit in 1 week  [address] [facility] [address]       Angie Elkins PA-C Family Medicine, Internal Medicine, Physician Assistant   [address] [facility] [address]  [phone]       [facility]ATED WITH [facility] Gastroenterology Schedule an appointment as soon as possible for a visit in 2 days  [address] [facility] [address]  [address] [facility] Gastroenterology   66 [facility] [address] [place] [id number] 0686 [facility], [address] [place] [address], [facility] [place], [place], [id number], [address] [place] [address] Mike Pacheco DO Gastroenterology   [address] [facility] [address]  [address], [address]   [address]  [place] [address]  [phone]             Discharge Medication List as of 2/1/2020  3:51 PM      START taking these medications    Details   cyclobenzaprine (FLEXERIL) 10 mg tablet Take 1 tablet (10 mg total) by mouth 3 (three) times a day as needed for muscle spasms for up to 4 days, Starting Sat 2/1/2020, Until Wed 2/5/2020, Normal      lidocaine (LIDODERM) 5 % Apply 1 patch topically daily for 5 days Remove & Discard patch within 12 hours or as directed by MD, Starting Sat 2/1/2020, Until Thu 2/6/2020, Print         CONTINUE these medications which have NOT CHANGED    Details   acetaminophen (TYLENOL) 325 mg tablet Take 2 tablets (650 mg total) by mouth every 6 (six) hours as needed for mild pain, Starting Thu 5/2/2019, Normal      nicotine (NICODERM CQ) 21 mg/24 hr TD 24 hr patch Place 1 patch on the skin daily, Starting Fri 5/3/2019, Normal           No discharge procedures on file      ED Provider  Electronically Signed by           Jacquelyn Florez DO  02/01/20 0715

## 2020-02-07 LAB — HCV AB SER-ACNC: POSITIVE

## 2020-02-12 ENCOUNTER — ANESTHESIA EVENT (OUTPATIENT)
Dept: GASTROENTEROLOGY | Facility: HOSPITAL | Age: 30
End: 2020-02-12

## 2020-02-12 NOTE — ANESTHESIA PREPROCEDURE EVALUATION
Review of Systems/Medical History  Patient summary reviewed        Cardiovascular  Hypertension ,    Pulmonary  Pneumonia, Sleep apnea ,        GI/Hepatic    GERD , Liver disease , Hepatitis C,             Endo/Other     GYN       Hematology   Musculoskeletal       Neurology   Psychology   Anxiety, Depression ,   Chronic opioid dependence   Comment: Heroin, marijuana              Anesthesia Plan  ASA Score- 3     Anesthesia Type- IV sedation with anesthesia with ASA Monitors  Additional Monitors:   Airway Plan:         Plan Factors-    Induction- intravenous  Postoperative Plan-     Informed Consent- Anesthetic plan and risks discussed with patient  I personally reviewed this patient with the CRNA  Discussed and agreed on the Anesthesia Plan with the CRNA  Dayronk Jackson         4 LMA without problem recent admission

## 2020-02-13 ENCOUNTER — HOSPITAL ENCOUNTER (OUTPATIENT)
Dept: GASTROENTEROLOGY | Facility: HOSPITAL | Age: 30
Setting detail: OUTPATIENT SURGERY
Discharge: HOME | End: 2020-02-13
Attending: INTERNAL MEDICINE | Admitting: INTERNAL MEDICINE
Payer: COMMERCIAL

## 2020-02-13 ENCOUNTER — ANESTHESIA (OUTPATIENT)
Dept: GASTROENTEROLOGY | Facility: HOSPITAL | Age: 30
End: 2020-02-13

## 2020-02-13 VITALS
TEMPERATURE: 97.5 F | WEIGHT: 280 LBS | RESPIRATION RATE: 18 BRPM | SYSTOLIC BLOOD PRESSURE: 115 MMHG | HEART RATE: 88 BPM | BODY MASS INDEX: 40.09 KG/M2 | HEIGHT: 70 IN | OXYGEN SATURATION: 94 % | DIASTOLIC BLOOD PRESSURE: 73 MMHG

## 2020-02-13 DIAGNOSIS — R93.3 ABNORMAL FINDINGS ON DIAGNOSTIC IMAGING OF OTHER PARTS OF DIGESTIVE TRACT: ICD-10-CM

## 2020-02-13 PROCEDURE — 88305 TISSUE EXAM BY PATHOLOGIST: CPT | Performed by: PATHOLOGY

## 2020-02-13 RX ORDER — SODIUM CHLORIDE, SODIUM LACTATE, POTASSIUM CHLORIDE, CALCIUM CHLORIDE 600; 310; 30; 20 MG/100ML; MG/100ML; MG/100ML; MG/100ML
75 INJECTION, SOLUTION INTRAVENOUS CONTINUOUS
Status: DISCONTINUED | OUTPATIENT
Start: 2020-02-13 | End: 2020-02-17 | Stop reason: HOSPADM

## 2020-02-13 RX ORDER — LIDOCAINE HYDROCHLORIDE 20 MG/ML
INJECTION, SOLUTION EPIDURAL; INFILTRATION; INTRACAUDAL; PERINEURAL AS NEEDED
Status: DISCONTINUED | OUTPATIENT
Start: 2020-02-13 | End: 2020-02-13 | Stop reason: SURG

## 2020-02-13 RX ORDER — METHADONE HYDROCHLORIDE 10 MG/5ML
100 SOLUTION ORAL DAILY
COMMUNITY
End: 2020-05-18

## 2020-02-13 RX ORDER — PROPOFOL 10 MG/ML
INJECTION, EMULSION INTRAVENOUS AS NEEDED
Status: DISCONTINUED | OUTPATIENT
Start: 2020-02-13 | End: 2020-02-13 | Stop reason: SURG

## 2020-02-13 RX ADMIN — PROPOFOL 30 MG: 10 INJECTION, EMULSION INTRAVENOUS at 11:20

## 2020-02-13 RX ADMIN — LIDOCAINE HYDROCHLORIDE 100 MG: 20 INJECTION, SOLUTION EPIDURAL; INFILTRATION; INTRACAUDAL; PERINEURAL at 11:12

## 2020-02-13 RX ADMIN — SODIUM CHLORIDE, SODIUM LACTATE, POTASSIUM CHLORIDE, AND CALCIUM CHLORIDE: .6; .31; .03; .02 INJECTION, SOLUTION INTRAVENOUS at 11:04

## 2020-02-13 RX ADMIN — PROPOFOL 30 MG: 10 INJECTION, EMULSION INTRAVENOUS at 11:21

## 2020-02-13 RX ADMIN — PROPOFOL 30 MG: 10 INJECTION, EMULSION INTRAVENOUS at 11:19

## 2020-02-13 RX ADMIN — PROPOFOL 50 MG: 10 INJECTION, EMULSION INTRAVENOUS at 11:38

## 2020-02-13 RX ADMIN — PROPOFOL 30 MG: 10 INJECTION, EMULSION INTRAVENOUS at 11:13

## 2020-02-13 RX ADMIN — PROPOFOL 30 MG: 10 INJECTION, EMULSION INTRAVENOUS at 11:18

## 2020-02-13 RX ADMIN — PROPOFOL 30 MG: 10 INJECTION, EMULSION INTRAVENOUS at 11:16

## 2020-02-13 RX ADMIN — PROPOFOL 30 MG: 10 INJECTION, EMULSION INTRAVENOUS at 11:15

## 2020-02-13 RX ADMIN — PROPOFOL 30 MG: 10 INJECTION, EMULSION INTRAVENOUS at 11:17

## 2020-02-13 RX ADMIN — PROPOFOL 30 MG: 10 INJECTION, EMULSION INTRAVENOUS at 11:14

## 2020-02-13 RX ADMIN — PROPOFOL 50 MG: 10 INJECTION, EMULSION INTRAVENOUS at 11:26

## 2020-02-13 RX ADMIN — PROPOFOL 130 MG: 10 INJECTION, EMULSION INTRAVENOUS at 11:12

## 2020-02-13 NOTE — INTERVAL H&P NOTE
H&P reviewed  After examining the patient I find no changes in the patients condition since the H&P had been written      Vitals:    02/13/20 1003   BP: 111/73   Pulse: 100   Resp: 16   Temp: (!) 96 5 °F (35 8 °C)   SpO2: 96%

## 2020-02-13 NOTE — DISCHARGE INSTRUCTIONS
Colonoscopy   WHAT YOU NEED TO KNOW:   A colonoscopy is a procedure to examine the inside of your colon (intestine) with a scope  Polyps or tissue growths may have been removed during your colonoscopy  It is normal to feel bloated and to have some abdominal discomfort  You should be passing gas  If you have hemorrhoids or you had polyps removed, you may have a small amount of bleeding  DISCHARGE INSTRUCTIONS:   Seek care immediately if:   · You have a large amount of bright red blood in your bowel movements  · Your abdomen is hard and firm and you have severe pain  · You have sudden trouble breathing  Contact your healthcare provider if:   · You develop a rash or hives  · You have a fever within 24 hours of your procedure  · You have not had a bowel movement for 3 days after your procedure  · You have questions or concerns about your condition or care  Activity:   · Do not lift, strain, or run  for 3 days after your procedure  · Rest after your procedure  You have been given medicine to relax you  Do not  drive or make important decisions until the day after your procedure  Return to your normal activity as directed  · Relieve gas and discomfort from bloating  by lying on your right side with a heating pad on your abdomen  You may need to take short walks to help the gas move out  Eat small meals until bloating is relieved  If you had polyps removed: For 7 days after your procedure:  · Do not  take aspirin  · Do not  go on long car rides  Help prevent constipation:   · Eat a variety of healthy foods  Healthy foods include fruit, vegetables, whole-grain breads, low-fat dairy products, beans, lean meat, and fish  Ask if you need to be on a special diet  Your healthcare provider may recommend that you eat high-fiber foods such as cooked beans  Fiber helps you have regular bowel movements  · Drink liquids as directed    Adults should drink between 9 and 13 eight-ounce cups of liquid every day  Ask what amount is best for you  For most people, good liquids to drink are water, juice, and milk  · Exercise as directed  Talk to your healthcare provider about the best exercise plan for you  Exercise can help prevent constipation, decrease your blood pressure and improve your health  Follow up with your healthcare provider as directed:  Write down your questions so you remember to ask them during your visits  © 2017 [address] [employer] Information is for End User's use only and may not be sold, redistributed or otherwise used for commercial purposes  All illustrations and images included in CareNotes® are the copyrighted property of [employer]  or Yovani Hernandez  The above information is an  only  It is not intended as medical advice for individual conditions or treatments  Talk to your doctor, nurse or pharmacist before following any medical regimen to see if it is safe and effective for you

## 2020-02-20 ENCOUNTER — TELEPHONE (OUTPATIENT)
Dept: INTERVENTIONAL RADIOLOGY/VASCULAR | Facility: HOSPITAL | Age: 30
End: 2020-02-20

## 2020-02-27 ENCOUNTER — HOSPITAL ENCOUNTER (OUTPATIENT)
Dept: CT IMAGING | Facility: HOSPITAL | Age: 30
Discharge: HOME | End: 2020-02-27
Attending: RADIOLOGY
Payer: COMMERCIAL

## 2020-02-27 DIAGNOSIS — T14.8XXA HEMATOMA: ICD-10-CM

## 2020-02-27 PROCEDURE — 74174 CTA ABD&PLVS W/CONTRAST: CPT

## 2020-02-27 RX ADMIN — IOHEXOL 100 ML: 350 INJECTION, SOLUTION INTRAVENOUS at 08:08

## 2020-04-02 ENCOUNTER — TELEPHONE (OUTPATIENT)
Dept: GASTROENTEROLOGY | Facility: CLINIC | Age: 30
End: 2020-04-02

## 2020-04-14 ENCOUNTER — TELEPHONE (OUTPATIENT)
Dept: GASTROENTEROLOGY | Facility: CLINIC | Age: 30
End: 2020-04-14

## 2020-05-04 ENCOUNTER — ANESTHESIA EVENT (OUTPATIENT)
Dept: GASTROENTEROLOGY | Facility: HOSPITAL | Age: 30
End: 2020-05-04

## 2020-05-04 DIAGNOSIS — R93.3 ABNORMAL FINDINGS ON DIAGNOSTIC IMAGING OF OTHER PARTS OF DIGESTIVE TRACT: ICD-10-CM

## 2020-05-04 RX ORDER — SODIUM, POTASSIUM,MAG SULFATES 17.5-3.13G
1 SOLUTION, RECONSTITUTED, ORAL ORAL ONCE
Qty: 2 BOTTLE | Refills: 0 | Status: SHIPPED | OUTPATIENT
Start: 2020-05-04 | End: 2020-05-23 | Stop reason: HOSPADM

## 2020-05-05 ENCOUNTER — HOSPITAL ENCOUNTER (OUTPATIENT)
Dept: GASTROENTEROLOGY | Facility: HOSPITAL | Age: 30
Setting detail: OUTPATIENT SURGERY
Discharge: HOME | End: 2020-05-05
Attending: INTERNAL MEDICINE | Admitting: INTERNAL MEDICINE
Payer: COMMERCIAL

## 2020-05-05 ENCOUNTER — ANESTHESIA (OUTPATIENT)
Dept: GASTROENTEROLOGY | Facility: HOSPITAL | Age: 30
End: 2020-05-05

## 2020-05-05 VITALS
DIASTOLIC BLOOD PRESSURE: 56 MMHG | HEIGHT: 70 IN | WEIGHT: 290 LBS | OXYGEN SATURATION: 97 % | HEART RATE: 92 BPM | TEMPERATURE: 98.2 F | SYSTOLIC BLOOD PRESSURE: 118 MMHG | RESPIRATION RATE: 16 BRPM | BODY MASS INDEX: 41.52 KG/M2

## 2020-05-05 DIAGNOSIS — R93.3 ABNORMAL FINDINGS ON DIAGNOSTIC IMAGING OF OTHER PARTS OF DIGESTIVE TRACT: ICD-10-CM

## 2020-05-05 PROCEDURE — 45381 COLONOSCOPY SUBMUCOUS NJX: CPT | Performed by: INTERNAL MEDICINE

## 2020-05-05 PROCEDURE — 45391 COLONOSCOPY W/ENDOSCOPE US: CPT | Performed by: INTERNAL MEDICINE

## 2020-05-05 RX ORDER — SODIUM CHLORIDE 9 MG/ML
125 INJECTION, SOLUTION INTRAVENOUS CONTINUOUS
Status: CANCELLED | OUTPATIENT
Start: 2020-05-05

## 2020-05-05 RX ORDER — PROPOFOL 10 MG/ML
INJECTION, EMULSION INTRAVENOUS CONTINUOUS PRN
Status: DISCONTINUED | OUTPATIENT
Start: 2020-05-05 | End: 2020-05-05 | Stop reason: SURG

## 2020-05-05 RX ORDER — PROPOFOL 10 MG/ML
INJECTION, EMULSION INTRAVENOUS AS NEEDED
Status: DISCONTINUED | OUTPATIENT
Start: 2020-05-05 | End: 2020-05-05 | Stop reason: SURG

## 2020-05-05 RX ORDER — GLYCOPYRROLATE 0.2 MG/ML
INJECTION INTRAMUSCULAR; INTRAVENOUS AS NEEDED
Status: DISCONTINUED | OUTPATIENT
Start: 2020-05-05 | End: 2020-05-05 | Stop reason: SURG

## 2020-05-05 RX ORDER — SODIUM CHLORIDE 9 MG/ML
INJECTION, SOLUTION INTRAVENOUS CONTINUOUS PRN
Status: DISCONTINUED | OUTPATIENT
Start: 2020-05-05 | End: 2020-05-05 | Stop reason: SURG

## 2020-05-05 RX ADMIN — PROPOFOL 130 MCG/KG/MIN: 10 INJECTION, EMULSION INTRAVENOUS at 08:08

## 2020-05-05 RX ADMIN — LIDOCAINE HYDROCHLORIDE 50 MG: 20 INJECTION, SOLUTION INTRAVENOUS at 08:03

## 2020-05-05 RX ADMIN — SODIUM CHLORIDE: 0.9 INJECTION, SOLUTION INTRAVENOUS at 07:59

## 2020-05-05 RX ADMIN — PHENYLEPHRINE HYDROCHLORIDE 100 MCG: 10 INJECTION INTRAVENOUS at 08:23

## 2020-05-05 RX ADMIN — PHENYLEPHRINE HYDROCHLORIDE 200 MCG: 10 INJECTION INTRAVENOUS at 08:26

## 2020-05-05 RX ADMIN — PHENYLEPHRINE HYDROCHLORIDE 100 MCG: 10 INJECTION INTRAVENOUS at 08:17

## 2020-05-05 RX ADMIN — LIDOCAINE HYDROCHLORIDE 50 MG: 20 INJECTION, SOLUTION INTRAVENOUS at 08:06

## 2020-05-05 RX ADMIN — PROPOFOL 50 MG: 10 INJECTION, EMULSION INTRAVENOUS at 08:12

## 2020-05-05 RX ADMIN — PHENYLEPHRINE HYDROCHLORIDE 200 MCG: 10 INJECTION INTRAVENOUS at 08:50

## 2020-05-05 RX ADMIN — PROPOFOL 120 MG: 10 INJECTION, EMULSION INTRAVENOUS at 08:08

## 2020-05-05 RX ADMIN — GLYCOPYRROLATE 0.1 MG: 0.2 INJECTION, SOLUTION INTRAMUSCULAR; INTRAVENOUS at 08:08

## 2020-05-05 RX ADMIN — PROPOFOL 50 MG: 10 INJECTION, EMULSION INTRAVENOUS at 08:16

## 2020-05-08 ENCOUNTER — TELEPHONE (OUTPATIENT)
Dept: GASTROENTEROLOGY | Facility: AMBULARY SURGERY CENTER | Age: 30
End: 2020-05-08

## 2020-05-15 DIAGNOSIS — Z11.59 SCREENING FOR VIRAL DISEASE: ICD-10-CM

## 2020-05-15 PROCEDURE — U0003 INFECTIOUS AGENT DETECTION BY NUCLEIC ACID (DNA OR RNA); SEVERE ACUTE RESPIRATORY SYNDROME CORONAVIRUS 2 (SARS-COV-2) (CORONAVIRUS DISEASE [COVID-19]), AMPLIFIED PROBE TECHNIQUE, MAKING USE OF HIGH THROUGHPUT TECHNOLOGIES AS DESCRIBED BY CMS-2020-01-R: HCPCS

## 2020-05-18 ENCOUNTER — HOSPITAL ENCOUNTER (OUTPATIENT)
Dept: RADIOLOGY | Facility: HOSPITAL | Age: 30
Discharge: HOME | End: 2020-05-18
Payer: COMMERCIAL

## 2020-05-18 ENCOUNTER — HOSPITAL ENCOUNTER (OUTPATIENT)
Dept: NON INVASIVE DIAGNOSTICS | Facility: HOSPITAL | Age: 30
Discharge: HOME | End: 2020-05-18
Payer: COMMERCIAL

## 2020-05-18 ENCOUNTER — APPOINTMENT (OUTPATIENT)
Dept: LAB | Facility: HOSPITAL | Age: 30
End: 2020-05-18
Payer: COMMERCIAL

## 2020-05-18 ENCOUNTER — LAB REQUISITION (OUTPATIENT)
Dept: LAB | Facility: HOSPITAL | Age: 30
End: 2020-05-18
Payer: COMMERCIAL

## 2020-05-18 ENCOUNTER — TRANSCRIBE ORDERS (OUTPATIENT)
Dept: ADMINISTRATIVE | Facility: HOSPITAL | Age: 30
End: 2020-05-18

## 2020-05-18 DIAGNOSIS — D12.5 BENIGN NEOPLASM OF SIGMOID COLON: ICD-10-CM

## 2020-05-18 DIAGNOSIS — D12.5 BENIGN NEOPLASM OF SIGMOID COLON: Primary | ICD-10-CM

## 2020-05-18 LAB
ABO GROUP BLD: NORMAL
ALBUMIN SERPL BCP-MCNC: 4 G/DL (ref 3.5–5)
ALP SERPL-CCNC: 81 U/L (ref 46–116)
ALT SERPL W P-5'-P-CCNC: 50 U/L (ref 12–78)
ANION GAP SERPL CALCULATED.3IONS-SCNC: 7 MMOL/L (ref 4–13)
AST SERPL W P-5'-P-CCNC: 35 U/L (ref 5–45)
BACTERIA UR QL AUTO: ABNORMAL /HPF
BASOPHILS # BLD AUTO: 0.04 THOUSANDS/ΜL (ref 0–0.1)
BASOPHILS NFR BLD AUTO: 1 % (ref 0–1)
BILIRUB SERPL-MCNC: 0.6 MG/DL (ref 0.2–1)
BILIRUB UR QL STRIP: NEGATIVE
BLD GP AB SCN SERPL QL: NEGATIVE
BUN SERPL-MCNC: 14 MG/DL (ref 5–25)
CALCIUM SERPL-MCNC: 9.4 MG/DL (ref 8.3–10.1)
CHLORIDE SERPL-SCNC: 101 MMOL/L (ref 100–108)
CLARITY UR: ABNORMAL
CO2 SERPL-SCNC: 28 MMOL/L (ref 21–32)
COLOR UR: YELLOW
CREAT SERPL-MCNC: 0.75 MG/DL (ref 0.6–1.3)
EOSINOPHIL # BLD AUTO: 0.16 THOUSAND/ΜL (ref 0–0.61)
EOSINOPHIL NFR BLD AUTO: 3 % (ref 0–6)
ERYTHROCYTE [DISTWIDTH] IN BLOOD BY AUTOMATED COUNT: 13.5 % (ref 11.6–15.1)
GFR SERPL CREATININE-BSD FRML MDRD: 123 ML/MIN/1.73SQ M
GLUCOSE SERPL-MCNC: 89 MG/DL (ref 65–140)
GLUCOSE UR STRIP-MCNC: NEGATIVE MG/DL
HCT VFR BLD AUTO: 44.9 % (ref 36.5–49.3)
HGB BLD-MCNC: 14.5 G/DL (ref 12–17)
HGB UR QL STRIP.AUTO: NEGATIVE
IMM GRANULOCYTES # BLD AUTO: 0.02 THOUSAND/UL (ref 0–0.2)
IMM GRANULOCYTES NFR BLD AUTO: 0 % (ref 0–2)
KETONES UR STRIP-MCNC: NEGATIVE MG/DL
LEUKOCYTE ESTERASE UR QL STRIP: NEGATIVE
LYMPHOCYTES # BLD AUTO: 1.63 THOUSANDS/ΜL (ref 0.6–4.47)
LYMPHOCYTES NFR BLD AUTO: 28 % (ref 14–44)
MCH RBC QN AUTO: 25.8 PG (ref 26.8–34.3)
MCHC RBC AUTO-ENTMCNC: 32.3 G/DL (ref 31.4–37.4)
MCV RBC AUTO: 80 FL (ref 82–98)
MONOCYTES # BLD AUTO: 0.4 THOUSAND/ΜL (ref 0.17–1.22)
MONOCYTES NFR BLD AUTO: 7 % (ref 4–12)
MUCOUS THREADS UR QL AUTO: ABNORMAL
NEUTROPHILS # BLD AUTO: 3.54 THOUSANDS/ΜL (ref 1.85–7.62)
NEUTS SEG NFR BLD AUTO: 61 % (ref 43–75)
NITRITE UR QL STRIP: NEGATIVE
NON-SQ EPI CELLS URNS QL MICRO: ABNORMAL /HPF
NRBC BLD AUTO-RTO: 0 /100 WBCS
OTHER STN SPEC: ABNORMAL
PH UR STRIP.AUTO: 5.5 [PH]
PLATELET # BLD AUTO: 227 THOUSANDS/UL (ref 149–390)
PMV BLD AUTO: 8.8 FL (ref 8.9–12.7)
POTASSIUM SERPL-SCNC: 3.8 MMOL/L (ref 3.5–5.3)
PROT SERPL-MCNC: 8.6 G/DL (ref 6.4–8.2)
PROT UR STRIP-MCNC: ABNORMAL MG/DL
RBC # BLD AUTO: 5.63 MILLION/UL (ref 3.88–5.62)
RBC #/AREA URNS AUTO: ABNORMAL /HPF
RH BLD: POSITIVE
SODIUM SERPL-SCNC: 136 MMOL/L (ref 136–145)
SP GR UR STRIP.AUTO: >=1.03 (ref 1–1.03)
SPECIMEN EXPIRATION DATE: NORMAL
UROBILINOGEN UR QL STRIP.AUTO: 0.2 E.U./DL
WBC # BLD AUTO: 5.79 THOUSAND/UL (ref 4.31–10.16)
WBC #/AREA URNS AUTO: ABNORMAL /HPF

## 2020-05-18 PROCEDURE — 80053 COMPREHEN METABOLIC PANEL: CPT

## 2020-05-18 PROCEDURE — 85025 COMPLETE CBC W/AUTO DIFF WBC: CPT

## 2020-05-18 PROCEDURE — 86900 BLOOD TYPING SEROLOGIC ABO: CPT | Performed by: SURGERY

## 2020-05-18 PROCEDURE — 86901 BLOOD TYPING SEROLOGIC RH(D): CPT | Performed by: SURGERY

## 2020-05-18 PROCEDURE — 86850 RBC ANTIBODY SCREEN: CPT | Performed by: SURGERY

## 2020-05-18 PROCEDURE — 81001 URINALYSIS AUTO W/SCOPE: CPT | Performed by: SURGERY

## 2020-05-18 PROCEDURE — 71046 X-RAY EXAM CHEST 2 VIEWS: CPT

## 2020-05-18 PROCEDURE — 36415 COLL VENOUS BLD VENIPUNCTURE: CPT

## 2020-05-19 LAB — SARS-COV-2 RNA SPEC QL NAA+PROBE: NOT DETECTED

## 2020-05-20 ENCOUNTER — ANESTHESIA EVENT (OUTPATIENT)
Dept: PERIOP | Facility: HOSPITAL | Age: 30
DRG: 330 | End: 2020-05-20
Payer: COMMERCIAL

## 2020-05-20 LAB
ABO GROUP BLD: NORMAL
RH BLD: POSITIVE

## 2020-05-21 ENCOUNTER — ANESTHESIA (OUTPATIENT)
Dept: PERIOP | Facility: HOSPITAL | Age: 30
DRG: 330 | End: 2020-05-21
Payer: COMMERCIAL

## 2020-05-21 ENCOUNTER — HOSPITAL ENCOUNTER (INPATIENT)
Facility: HOSPITAL | Age: 30
LOS: 1 days | Discharge: HOME | DRG: 330 | End: 2020-05-23
Attending: SURGERY | Admitting: SURGERY
Payer: COMMERCIAL

## 2020-05-21 DIAGNOSIS — Z11.59 SCREENING FOR VIRAL DISEASE: Primary | ICD-10-CM

## 2020-05-21 DIAGNOSIS — D12.5 BENIGN NEOPLASM OF SIGMOID COLON: ICD-10-CM

## 2020-05-21 LAB
ANION GAP SERPL CALCULATED.3IONS-SCNC: 8 MMOL/L (ref 4–13)
BASOPHILS # BLD AUTO: 0.01 THOUSANDS/ΜL (ref 0–0.1)
BASOPHILS NFR BLD AUTO: 0 % (ref 0–1)
BUN SERPL-MCNC: 6 MG/DL (ref 5–25)
CALCIUM SERPL-MCNC: 8.2 MG/DL (ref 8.3–10.1)
CHLORIDE SERPL-SCNC: 110 MMOL/L (ref 100–108)
CO2 SERPL-SCNC: 20 MMOL/L (ref 21–32)
CREAT SERPL-MCNC: 0.73 MG/DL (ref 0.6–1.3)
EOSINOPHIL # BLD AUTO: 0.01 THOUSAND/ΜL (ref 0–0.61)
EOSINOPHIL NFR BLD AUTO: 0 % (ref 0–6)
ERYTHROCYTE [DISTWIDTH] IN BLOOD BY AUTOMATED COUNT: 14.1 % (ref 11.6–15.1)
GFR SERPL CREATININE-BSD FRML MDRD: 124 ML/MIN/1.73SQ M
GLUCOSE P FAST SERPL-MCNC: 150 MG/DL (ref 65–99)
GLUCOSE SERPL-MCNC: 150 MG/DL (ref 65–140)
HCT VFR BLD AUTO: 33.8 % (ref 36.5–49.3)
HGB BLD-MCNC: 10.8 G/DL (ref 12–17)
IMM GRANULOCYTES # BLD AUTO: 0.06 THOUSAND/UL (ref 0–0.2)
IMM GRANULOCYTES NFR BLD AUTO: 1 % (ref 0–2)
LYMPHOCYTES # BLD AUTO: 0.51 THOUSANDS/ΜL (ref 0.6–4.47)
LYMPHOCYTES NFR BLD AUTO: 5 % (ref 14–44)
MCH RBC QN AUTO: 26.2 PG (ref 26.8–34.3)
MCHC RBC AUTO-ENTMCNC: 32 G/DL (ref 31.4–37.4)
MCV RBC AUTO: 82 FL (ref 82–98)
MONOCYTES # BLD AUTO: 0.18 THOUSAND/ΜL (ref 0.17–1.22)
MONOCYTES NFR BLD AUTO: 2 % (ref 4–12)
NEUTROPHILS # BLD AUTO: 10.22 THOUSANDS/ΜL (ref 1.85–7.62)
NEUTS SEG NFR BLD AUTO: 92 % (ref 43–75)
NRBC BLD AUTO-RTO: 0 /100 WBCS
PLATELET # BLD AUTO: 82 THOUSANDS/UL (ref 149–390)
PMV BLD AUTO: 11.3 FL (ref 8.9–12.7)
POTASSIUM SERPL-SCNC: 3.8 MMOL/L (ref 3.5–5.3)
RBC # BLD AUTO: 4.12 MILLION/UL (ref 3.88–5.62)
SODIUM SERPL-SCNC: 138 MMOL/L (ref 136–145)
WBC # BLD AUTO: 10.99 THOUSAND/UL (ref 4.31–10.16)

## 2020-05-21 PROCEDURE — 88309 TISSUE EXAM BY PATHOLOGIST: CPT | Performed by: PATHOLOGY

## 2020-05-21 PROCEDURE — 88313 SPECIAL STAINS GROUP 2: CPT | Performed by: PATHOLOGY

## 2020-05-21 PROCEDURE — 80048 BASIC METABOLIC PNL TOTAL CA: CPT | Performed by: STUDENT IN AN ORGANIZED HEALTH CARE EDUCATION/TRAINING PROGRAM

## 2020-05-21 PROCEDURE — 0DTG4ZZ RESECTION OF LEFT LARGE INTESTINE, PERCUTANEOUS ENDOSCOPIC APPROACH: ICD-10-PCS | Performed by: SURGERY

## 2020-05-21 PROCEDURE — 85025 COMPLETE CBC W/AUTO DIFF WBC: CPT | Performed by: STUDENT IN AN ORGANIZED HEALTH CARE EDUCATION/TRAINING PROGRAM

## 2020-05-21 PROCEDURE — 88342 IMHCHEM/IMCYTCHM 1ST ANTB: CPT | Performed by: PATHOLOGY

## 2020-05-21 RX ORDER — LABETALOL 20 MG/4 ML (5 MG/ML) INTRAVENOUS SYRINGE
AS NEEDED
Status: DISCONTINUED | OUTPATIENT
Start: 2020-05-21 | End: 2020-05-21 | Stop reason: SURG

## 2020-05-21 RX ORDER — LIDOCAINE HYDROCHLORIDE 10 MG/ML
INJECTION, SOLUTION EPIDURAL; INFILTRATION; INTRACAUDAL; PERINEURAL AS NEEDED
Status: DISCONTINUED | OUTPATIENT
Start: 2020-05-21 | End: 2020-05-21 | Stop reason: SURG

## 2020-05-21 RX ORDER — HYDROMORPHONE HCL/PF 1 MG/ML
0.2 SYRINGE (ML) INJECTION
Status: COMPLETED | OUTPATIENT
Start: 2020-05-21 | End: 2020-05-21

## 2020-05-21 RX ORDER — MAGNESIUM HYDROXIDE 1200 MG/15ML
LIQUID ORAL AS NEEDED
Status: DISCONTINUED | OUTPATIENT
Start: 2020-05-21 | End: 2020-05-21 | Stop reason: HOSPADM

## 2020-05-21 RX ORDER — ONDANSETRON 2 MG/ML
4 INJECTION INTRAMUSCULAR; INTRAVENOUS EVERY 6 HOURS PRN
Status: DISCONTINUED | OUTPATIENT
Start: 2020-05-21 | End: 2020-05-23 | Stop reason: HOSPADM

## 2020-05-21 RX ORDER — POTASSIUM CHLORIDE 20 MEQ/1
20 TABLET, EXTENDED RELEASE ORAL ONCE
Status: COMPLETED | OUTPATIENT
Start: 2020-05-21 | End: 2020-05-21

## 2020-05-21 RX ORDER — ROCURONIUM BROMIDE 10 MG/ML
INJECTION, SOLUTION INTRAVENOUS AS NEEDED
Status: DISCONTINUED | OUTPATIENT
Start: 2020-05-21 | End: 2020-05-21 | Stop reason: SURG

## 2020-05-21 RX ORDER — SODIUM CHLORIDE, SODIUM LACTATE, POTASSIUM CHLORIDE, CALCIUM CHLORIDE 600; 310; 30; 20 MG/100ML; MG/100ML; MG/100ML; MG/100ML
125 INJECTION, SOLUTION INTRAVENOUS CONTINUOUS
Status: DISCONTINUED | OUTPATIENT
Start: 2020-05-21 | End: 2020-05-22

## 2020-05-21 RX ORDER — FENTANYL CITRATE/PF 50 MCG/ML
50 SYRINGE (ML) INJECTION
Status: COMPLETED | OUTPATIENT
Start: 2020-05-21 | End: 2020-05-21

## 2020-05-21 RX ORDER — OXYCODONE HYDROCHLORIDE 10 MG/1
10 TABLET ORAL EVERY 4 HOURS PRN
Status: DISCONTINUED | OUTPATIENT
Start: 2020-05-21 | End: 2020-05-23 | Stop reason: HOSPADM

## 2020-05-21 RX ORDER — HEPARIN SODIUM 5000 [USP'U]/ML
7500 INJECTION, SOLUTION INTRAVENOUS; SUBCUTANEOUS EVERY 8 HOURS SCHEDULED
Status: DISCONTINUED | OUTPATIENT
Start: 2020-05-21 | End: 2020-05-23 | Stop reason: HOSPADM

## 2020-05-21 RX ORDER — MIDAZOLAM HYDROCHLORIDE 2 MG/2ML
INJECTION, SOLUTION INTRAMUSCULAR; INTRAVENOUS AS NEEDED
Status: DISCONTINUED | OUTPATIENT
Start: 2020-05-21 | End: 2020-05-21 | Stop reason: SURG

## 2020-05-21 RX ORDER — SODIUM CHLORIDE, SODIUM LACTATE, POTASSIUM CHLORIDE, CALCIUM CHLORIDE 600; 310; 30; 20 MG/100ML; MG/100ML; MG/100ML; MG/100ML
125 INJECTION, SOLUTION INTRAVENOUS CONTINUOUS
Status: DISCONTINUED | OUTPATIENT
Start: 2020-05-21 | End: 2020-05-21

## 2020-05-21 RX ORDER — DEXAMETHASONE SODIUM PHOSPHATE 10 MG/ML
INJECTION, SOLUTION INTRAMUSCULAR; INTRAVENOUS AS NEEDED
Status: DISCONTINUED | OUTPATIENT
Start: 2020-05-21 | End: 2020-05-21 | Stop reason: SURG

## 2020-05-21 RX ORDER — OXYCODONE HYDROCHLORIDE 5 MG/1
5 TABLET ORAL EVERY 4 HOURS PRN
Status: DISCONTINUED | OUTPATIENT
Start: 2020-05-21 | End: 2020-05-23 | Stop reason: HOSPADM

## 2020-05-21 RX ORDER — CEFAZOLIN SODIUM 2 G/50ML
2000 SOLUTION INTRAVENOUS ONCE
Status: COMPLETED | OUTPATIENT
Start: 2020-05-21 | End: 2020-05-21

## 2020-05-21 RX ORDER — HYDROMORPHONE HCL/PF 1 MG/ML
SYRINGE (ML) INJECTION AS NEEDED
Status: DISCONTINUED | OUTPATIENT
Start: 2020-05-21 | End: 2020-05-21 | Stop reason: SURG

## 2020-05-21 RX ORDER — ONDANSETRON 2 MG/ML
4 INJECTION INTRAMUSCULAR; INTRAVENOUS ONCE AS NEEDED
Status: DISCONTINUED | OUTPATIENT
Start: 2020-05-21 | End: 2020-05-21 | Stop reason: HOSPADM

## 2020-05-21 RX ORDER — HEPARIN SODIUM 5000 [USP'U]/ML
5000 INJECTION, SOLUTION INTRAVENOUS; SUBCUTANEOUS ONCE
Status: COMPLETED | OUTPATIENT
Start: 2020-05-21 | End: 2020-05-21

## 2020-05-21 RX ORDER — PROPOFOL 10 MG/ML
INJECTION, EMULSION INTRAVENOUS AS NEEDED
Status: DISCONTINUED | OUTPATIENT
Start: 2020-05-21 | End: 2020-05-21 | Stop reason: SURG

## 2020-05-21 RX ORDER — ONDANSETRON 2 MG/ML
INJECTION INTRAMUSCULAR; INTRAVENOUS AS NEEDED
Status: DISCONTINUED | OUTPATIENT
Start: 2020-05-21 | End: 2020-05-21 | Stop reason: SURG

## 2020-05-21 RX ORDER — ALBUTEROL SULFATE 2.5 MG/3ML
2.5 SOLUTION RESPIRATORY (INHALATION) ONCE AS NEEDED
Status: DISCONTINUED | OUTPATIENT
Start: 2020-05-21 | End: 2020-05-21 | Stop reason: HOSPADM

## 2020-05-21 RX ORDER — ALBUMIN, HUMAN INJ 5% 5 %
SOLUTION INTRAVENOUS CONTINUOUS PRN
Status: DISCONTINUED | OUTPATIENT
Start: 2020-05-21 | End: 2020-05-21 | Stop reason: SURG

## 2020-05-21 RX ORDER — METOCLOPRAMIDE HYDROCHLORIDE 5 MG/ML
10 INJECTION INTRAMUSCULAR; INTRAVENOUS ONCE AS NEEDED
Status: DISCONTINUED | OUTPATIENT
Start: 2020-05-21 | End: 2020-05-21 | Stop reason: HOSPADM

## 2020-05-21 RX ORDER — KETAMINE HCL IN NACL, ISO-OSM 100MG/10ML
SYRINGE (ML) INJECTION AS NEEDED
Status: DISCONTINUED | OUTPATIENT
Start: 2020-05-21 | End: 2020-05-21 | Stop reason: SURG

## 2020-05-21 RX ORDER — SODIUM CHLORIDE 9 MG/ML
INJECTION, SOLUTION INTRAVENOUS CONTINUOUS PRN
Status: DISCONTINUED | OUTPATIENT
Start: 2020-05-21 | End: 2020-05-21 | Stop reason: SURG

## 2020-05-21 RX ORDER — SUCCINYLCHOLINE/SOD CL,ISO/PF 100 MG/5ML
SYRINGE (ML) INTRAVENOUS AS NEEDED
Status: DISCONTINUED | OUTPATIENT
Start: 2020-05-21 | End: 2020-05-21 | Stop reason: SURG

## 2020-05-21 RX ORDER — HYDROMORPHONE HCL/PF 1 MG/ML
0.5 SYRINGE (ML) INJECTION
Status: DISCONTINUED | OUTPATIENT
Start: 2020-05-21 | End: 2020-05-23 | Stop reason: HOSPADM

## 2020-05-21 RX ORDER — FENTANYL CITRATE 50 UG/ML
INJECTION, SOLUTION INTRAMUSCULAR; INTRAVENOUS AS NEEDED
Status: DISCONTINUED | OUTPATIENT
Start: 2020-05-21 | End: 2020-05-21 | Stop reason: SURG

## 2020-05-21 RX ORDER — ACETAMINOPHEN 325 MG/1
975 TABLET ORAL EVERY 8 HOURS SCHEDULED
Status: DISCONTINUED | OUTPATIENT
Start: 2020-05-21 | End: 2020-05-23 | Stop reason: HOSPADM

## 2020-05-21 RX ORDER — METHADONE HYDROCHLORIDE 10 MG/1
110 TABLET ORAL DAILY
Status: DISCONTINUED | OUTPATIENT
Start: 2020-05-22 | End: 2020-05-23 | Stop reason: HOSPADM

## 2020-05-21 RX ADMIN — HYDROMORPHONE HYDROCHLORIDE 0.5 MG: 1 INJECTION, SOLUTION INTRAMUSCULAR; INTRAVENOUS; SUBCUTANEOUS at 22:46

## 2020-05-21 RX ADMIN — ROCURONIUM BROMIDE 50 MG: 10 INJECTION, SOLUTION INTRAVENOUS at 11:53

## 2020-05-21 RX ADMIN — ACETAMINOPHEN 975 MG: 325 TABLET ORAL at 21:24

## 2020-05-21 RX ADMIN — SODIUM CHLORIDE, SODIUM LACTATE, POTASSIUM CHLORIDE, AND CALCIUM CHLORIDE 125 ML/HR: .6; .31; .03; .02 INJECTION, SOLUTION INTRAVENOUS at 10:00

## 2020-05-21 RX ADMIN — Medication 50 MG: at 11:51

## 2020-05-21 RX ADMIN — SODIUM CHLORIDE: 0.9 INJECTION, SOLUTION INTRAVENOUS at 11:59

## 2020-05-21 RX ADMIN — HEPARIN SODIUM 7500 UNITS: 5000 INJECTION INTRAVENOUS; SUBCUTANEOUS at 21:24

## 2020-05-21 RX ADMIN — HYDROMORPHONE HYDROCHLORIDE 0.2 MG: 1 INJECTION, SOLUTION INTRAMUSCULAR; INTRAVENOUS; SUBCUTANEOUS at 17:05

## 2020-05-21 RX ADMIN — PHENYLEPHRINE HYDROCHLORIDE 20 MCG/MIN: 10 INJECTION INTRAVENOUS at 15:20

## 2020-05-21 RX ADMIN — CEFAZOLIN SODIUM 3000 MG: 2 SOLUTION INTRAVENOUS at 12:08

## 2020-05-21 RX ADMIN — PROPOFOL 100 MG: 10 INJECTION, EMULSION INTRAVENOUS at 11:53

## 2020-05-21 RX ADMIN — HEPARIN SODIUM 5000 UNITS: 5000 INJECTION INTRAVENOUS; SUBCUTANEOUS at 11:36

## 2020-05-21 RX ADMIN — Medication 100 MG: at 11:51

## 2020-05-21 RX ADMIN — OXYCODONE HYDROCHLORIDE 10 MG: 10 TABLET ORAL at 21:24

## 2020-05-21 RX ADMIN — LABETALOL 20 MG/4 ML (5 MG/ML) INTRAVENOUS SYRINGE 5 MG: at 16:15

## 2020-05-21 RX ADMIN — PHENYLEPHRINE HYDROCHLORIDE 100 MCG: 10 INJECTION INTRAVENOUS at 13:55

## 2020-05-21 RX ADMIN — KETAMINE HYDROCHLORIDE 0.2 MG/KG/HR: 50 INJECTION, SOLUTION INTRAMUSCULAR; INTRAVENOUS at 12:00

## 2020-05-21 RX ADMIN — HYDROMORPHONE HYDROCHLORIDE 0.2 MG: 1 INJECTION, SOLUTION INTRAMUSCULAR; INTRAVENOUS; SUBCUTANEOUS at 17:00

## 2020-05-21 RX ADMIN — ROCURONIUM BROMIDE 10 MG: 10 INJECTION, SOLUTION INTRAVENOUS at 13:32

## 2020-05-21 RX ADMIN — ALBUMIN (HUMAN): 12.5 SOLUTION INTRAVENOUS at 15:06

## 2020-05-21 RX ADMIN — HEPARIN SODIUM 5000 UNITS: 5000 INJECTION INTRAVENOUS; SUBCUTANEOUS at 18:54

## 2020-05-21 RX ADMIN — HYDROMORPHONE HYDROCHLORIDE 0.5 MG: 1 INJECTION, SOLUTION INTRAMUSCULAR; INTRAVENOUS; SUBCUTANEOUS at 13:30

## 2020-05-21 RX ADMIN — POTASSIUM CHLORIDE 20 MEQ: 1500 TABLET, EXTENDED RELEASE ORAL at 21:24

## 2020-05-21 RX ADMIN — HYDROMORPHONE HYDROCHLORIDE 0.2 MG: 1 INJECTION, SOLUTION INTRAMUSCULAR; INTRAVENOUS; SUBCUTANEOUS at 17:10

## 2020-05-21 RX ADMIN — ROCURONIUM BROMIDE 10 MG: 10 INJECTION, SOLUTION INTRAVENOUS at 15:02

## 2020-05-21 RX ADMIN — SUGAMMADEX 250 MG: 100 INJECTION, SOLUTION INTRAVENOUS at 16:04

## 2020-05-21 RX ADMIN — HYDROMORPHONE HYDROCHLORIDE 0.5 MG: 1 INJECTION, SOLUTION INTRAMUSCULAR; INTRAVENOUS; SUBCUTANEOUS at 14:28

## 2020-05-21 RX ADMIN — ROCURONIUM BROMIDE 10 MG: 10 INJECTION, SOLUTION INTRAVENOUS at 14:30

## 2020-05-21 RX ADMIN — ALBUMIN (HUMAN): 12.5 SOLUTION INTRAVENOUS at 14:49

## 2020-05-21 RX ADMIN — HYDROMORPHONE HYDROCHLORIDE 0.2 MG: 1 INJECTION, SOLUTION INTRAMUSCULAR; INTRAVENOUS; SUBCUTANEOUS at 16:54

## 2020-05-21 RX ADMIN — FENTANYL CITRATE 50 MCG: 50 INJECTION INTRAMUSCULAR; INTRAVENOUS at 16:50

## 2020-05-21 RX ADMIN — PROPOFOL 100 MG: 10 INJECTION, EMULSION INTRAVENOUS at 11:52

## 2020-05-21 RX ADMIN — CEFAZOLIN SODIUM 2000 MG: 2 SOLUTION INTRAVENOUS at 16:00

## 2020-05-21 RX ADMIN — SODIUM CHLORIDE, SODIUM LACTATE, POTASSIUM CHLORIDE, AND CALCIUM CHLORIDE: .6; .31; .03; .02 INJECTION, SOLUTION INTRAVENOUS at 13:35

## 2020-05-21 RX ADMIN — LIDOCAINE HYDROCHLORIDE 50 MG: 10 INJECTION, SOLUTION EPIDURAL; INFILTRATION; INTRACAUDAL; PERINEURAL at 11:51

## 2020-05-21 RX ADMIN — ROCURONIUM BROMIDE 10 MG: 10 INJECTION, SOLUTION INTRAVENOUS at 14:01

## 2020-05-21 RX ADMIN — PROPOFOL 200 MG: 10 INJECTION, EMULSION INTRAVENOUS at 11:51

## 2020-05-21 RX ADMIN — ROCURONIUM BROMIDE 20 MG: 10 INJECTION, SOLUTION INTRAVENOUS at 13:00

## 2020-05-21 RX ADMIN — ONDANSETRON 4 MG: 2 INJECTION INTRAMUSCULAR; INTRAVENOUS at 16:00

## 2020-05-21 RX ADMIN — HYDROMORPHONE HYDROCHLORIDE 1 MG: 1 INJECTION, SOLUTION INTRAMUSCULAR; INTRAVENOUS; SUBCUTANEOUS at 12:58

## 2020-05-21 RX ADMIN — MIDAZOLAM 2 MG: 1 INJECTION INTRAMUSCULAR; INTRAVENOUS at 11:45

## 2020-05-21 RX ADMIN — FENTANYL CITRATE 50 MCG: 50 INJECTION INTRAMUSCULAR; INTRAVENOUS at 16:35

## 2020-05-21 RX ADMIN — ONDANSETRON 4 MG: 2 INJECTION INTRAMUSCULAR; INTRAVENOUS at 12:14

## 2020-05-21 RX ADMIN — METRONIDAZOLE 500 MG: 500 INJECTION, SOLUTION INTRAVENOUS at 12:13

## 2020-05-21 RX ADMIN — FENTANYL CITRATE 100 MCG: 50 INJECTION, SOLUTION INTRAMUSCULAR; INTRAVENOUS at 11:51

## 2020-05-21 RX ADMIN — OXYCODONE HYDROCHLORIDE 10 MG: 10 TABLET ORAL at 17:30

## 2020-05-21 RX ADMIN — DEXAMETHASONE SODIUM PHOSPHATE 10 MG: 10 INJECTION, SOLUTION INTRAMUSCULAR; INTRAVENOUS at 12:14

## 2020-05-21 RX ADMIN — HYDROMORPHONE HYDROCHLORIDE 0.2 MG: 1 INJECTION, SOLUTION INTRAMUSCULAR; INTRAVENOUS; SUBCUTANEOUS at 17:15

## 2020-05-21 RX ADMIN — SODIUM CHLORIDE, SODIUM LACTATE, POTASSIUM CHLORIDE, AND CALCIUM CHLORIDE 125 ML/HR: .6; .31; .03; .02 INJECTION, SOLUTION INTRAVENOUS at 17:19

## 2020-05-21 RX ADMIN — FENTANYL CITRATE 50 MCG: 50 INJECTION, SOLUTION INTRAMUSCULAR; INTRAVENOUS at 16:21

## 2020-05-22 LAB
ANION GAP SERPL CALCULATED.3IONS-SCNC: 4 MMOL/L (ref 4–13)
BASOPHILS # BLD AUTO: 0.02 THOUSANDS/ΜL (ref 0–0.1)
BASOPHILS NFR BLD AUTO: 0 % (ref 0–1)
BUN SERPL-MCNC: 9 MG/DL (ref 5–25)
CALCIUM SERPL-MCNC: 9.4 MG/DL (ref 8.3–10.1)
CHLORIDE SERPL-SCNC: 107 MMOL/L (ref 100–108)
CO2 SERPL-SCNC: 28 MMOL/L (ref 21–32)
CREAT SERPL-MCNC: 0.69 MG/DL (ref 0.6–1.3)
EOSINOPHIL # BLD AUTO: 0.01 THOUSAND/ΜL (ref 0–0.61)
EOSINOPHIL NFR BLD AUTO: 0 % (ref 0–6)
ERYTHROCYTE [DISTWIDTH] IN BLOOD BY AUTOMATED COUNT: 14.1 % (ref 11.6–15.1)
GFR SERPL CREATININE-BSD FRML MDRD: 127 ML/MIN/1.73SQ M
GLUCOSE SERPL-MCNC: 116 MG/DL (ref 65–140)
HCT VFR BLD AUTO: 34.4 % (ref 36.5–49.3)
HGB BLD-MCNC: 11.4 G/DL (ref 12–17)
IMM GRANULOCYTES # BLD AUTO: 0.02 THOUSAND/UL (ref 0–0.2)
IMM GRANULOCYTES NFR BLD AUTO: 0 % (ref 0–2)
LYMPHOCYTES # BLD AUTO: 2.16 THOUSANDS/ΜL (ref 0.6–4.47)
LYMPHOCYTES NFR BLD AUTO: 22 % (ref 14–44)
MAGNESIUM SERPL-MCNC: 2.1 MG/DL (ref 1.6–2.6)
MCH RBC QN AUTO: 26.2 PG (ref 26.8–34.3)
MCHC RBC AUTO-ENTMCNC: 33.1 G/DL (ref 31.4–37.4)
MCV RBC AUTO: 79 FL (ref 82–98)
MONOCYTES # BLD AUTO: 0.58 THOUSAND/ΜL (ref 0.17–1.22)
MONOCYTES NFR BLD AUTO: 6 % (ref 4–12)
NEUTROPHILS # BLD AUTO: 7.03 THOUSANDS/ΜL (ref 1.85–7.62)
NEUTS SEG NFR BLD AUTO: 72 % (ref 43–75)
NRBC BLD AUTO-RTO: 0 /100 WBCS
PLATELET # BLD AUTO: 180 THOUSANDS/UL (ref 149–390)
PMV BLD AUTO: 9.3 FL (ref 8.9–12.7)
POTASSIUM SERPL-SCNC: 3.6 MMOL/L (ref 3.5–5.3)
RBC # BLD AUTO: 4.35 MILLION/UL (ref 3.88–5.62)
SODIUM SERPL-SCNC: 139 MMOL/L (ref 136–145)
WBC # BLD AUTO: 9.82 THOUSAND/UL (ref 4.31–10.16)

## 2020-05-22 PROCEDURE — 97163 PT EVAL HIGH COMPLEX 45 MIN: CPT

## 2020-05-22 PROCEDURE — 80048 BASIC METABOLIC PNL TOTAL CA: CPT | Performed by: STUDENT IN AN ORGANIZED HEALTH CARE EDUCATION/TRAINING PROGRAM

## 2020-05-22 PROCEDURE — 97166 OT EVAL MOD COMPLEX 45 MIN: CPT

## 2020-05-22 PROCEDURE — 83735 ASSAY OF MAGNESIUM: CPT | Performed by: STUDENT IN AN ORGANIZED HEALTH CARE EDUCATION/TRAINING PROGRAM

## 2020-05-22 PROCEDURE — 85025 COMPLETE CBC W/AUTO DIFF WBC: CPT | Performed by: STUDENT IN AN ORGANIZED HEALTH CARE EDUCATION/TRAINING PROGRAM

## 2020-05-22 RX ORDER — DEXTROSE, SODIUM CHLORIDE, AND POTASSIUM CHLORIDE 5; .45; .15 G/100ML; G/100ML; G/100ML
75 INJECTION INTRAVENOUS CONTINUOUS
Status: DISCONTINUED | OUTPATIENT
Start: 2020-05-22 | End: 2020-05-23

## 2020-05-22 RX ADMIN — OXYCODONE HYDROCHLORIDE 5 MG: 5 TABLET ORAL at 02:49

## 2020-05-22 RX ADMIN — HEPARIN SODIUM 7500 UNITS: 5000 INJECTION INTRAVENOUS; SUBCUTANEOUS at 21:01

## 2020-05-22 RX ADMIN — DEXTROSE, SODIUM CHLORIDE, AND POTASSIUM CHLORIDE 75 ML/HR: 5; .45; .15 INJECTION INTRAVENOUS at 23:21

## 2020-05-22 RX ADMIN — ACETAMINOPHEN 975 MG: 325 TABLET ORAL at 21:01

## 2020-05-22 RX ADMIN — ACETAMINOPHEN 975 MG: 325 TABLET ORAL at 13:50

## 2020-05-22 RX ADMIN — HEPARIN SODIUM 7500 UNITS: 5000 INJECTION INTRAVENOUS; SUBCUTANEOUS at 05:34

## 2020-05-22 RX ADMIN — DEXTROSE, SODIUM CHLORIDE, AND POTASSIUM CHLORIDE 75 ML/HR: 5; .45; .15 INJECTION INTRAVENOUS at 09:52

## 2020-05-22 RX ADMIN — SODIUM CHLORIDE, SODIUM LACTATE, POTASSIUM CHLORIDE, AND CALCIUM CHLORIDE 125 ML/HR: .6; .31; .03; .02 INJECTION, SOLUTION INTRAVENOUS at 02:34

## 2020-05-22 RX ADMIN — HEPARIN SODIUM 7500 UNITS: 5000 INJECTION INTRAVENOUS; SUBCUTANEOUS at 13:50

## 2020-05-22 RX ADMIN — ACETAMINOPHEN 975 MG: 325 TABLET ORAL at 05:32

## 2020-05-22 RX ADMIN — METHADONE HYDROCHLORIDE 110 MG: 10 TABLET ORAL at 09:49

## 2020-05-23 VITALS
OXYGEN SATURATION: 93 % | RESPIRATION RATE: 16 BRPM | DIASTOLIC BLOOD PRESSURE: 68 MMHG | TEMPERATURE: 98.6 F | SYSTOLIC BLOOD PRESSURE: 129 MMHG | HEART RATE: 76 BPM | BODY MASS INDEX: 41.52 KG/M2 | WEIGHT: 290 LBS | HEIGHT: 70 IN

## 2020-05-23 PROBLEM — K63.89 COLONIC MASS: Status: ACTIVE | Noted: 2020-05-23

## 2020-05-23 LAB
ANION GAP SERPL CALCULATED.3IONS-SCNC: 4 MMOL/L (ref 4–13)
BASOPHILS # BLD AUTO: 0.02 THOUSANDS/ΜL (ref 0–0.1)
BASOPHILS NFR BLD AUTO: 0 % (ref 0–1)
BUN SERPL-MCNC: 8 MG/DL (ref 5–25)
CALCIUM SERPL-MCNC: 9.3 MG/DL (ref 8.3–10.1)
CHLORIDE SERPL-SCNC: 108 MMOL/L (ref 100–108)
CO2 SERPL-SCNC: 28 MMOL/L (ref 21–32)
CREAT SERPL-MCNC: 0.59 MG/DL (ref 0.6–1.3)
EOSINOPHIL # BLD AUTO: 0.07 THOUSAND/ΜL (ref 0–0.61)
EOSINOPHIL NFR BLD AUTO: 1 % (ref 0–6)
ERYTHROCYTE [DISTWIDTH] IN BLOOD BY AUTOMATED COUNT: 14.2 % (ref 11.6–15.1)
GFR SERPL CREATININE-BSD FRML MDRD: 136 ML/MIN/1.73SQ M
GLUCOSE SERPL-MCNC: 84 MG/DL (ref 65–140)
HCT VFR BLD AUTO: 35.7 % (ref 36.5–49.3)
HGB BLD-MCNC: 11.4 G/DL (ref 12–17)
IMM GRANULOCYTES # BLD AUTO: 0.03 THOUSAND/UL (ref 0–0.2)
IMM GRANULOCYTES NFR BLD AUTO: 0 % (ref 0–2)
LYMPHOCYTES # BLD AUTO: 2.52 THOUSANDS/ΜL (ref 0.6–4.47)
LYMPHOCYTES NFR BLD AUTO: 32 % (ref 14–44)
MCH RBC QN AUTO: 25.9 PG (ref 26.8–34.3)
MCHC RBC AUTO-ENTMCNC: 31.9 G/DL (ref 31.4–37.4)
MCV RBC AUTO: 81 FL (ref 82–98)
MONOCYTES # BLD AUTO: 0.47 THOUSAND/ΜL (ref 0.17–1.22)
MONOCYTES NFR BLD AUTO: 6 % (ref 4–12)
NEUTROPHILS # BLD AUTO: 4.82 THOUSANDS/ΜL (ref 1.85–7.62)
NEUTS SEG NFR BLD AUTO: 61 % (ref 43–75)
NRBC BLD AUTO-RTO: 0 /100 WBCS
PLATELET # BLD AUTO: 166 THOUSANDS/UL (ref 149–390)
PMV BLD AUTO: 9.4 FL (ref 8.9–12.7)
POTASSIUM SERPL-SCNC: 3.5 MMOL/L (ref 3.5–5.3)
RBC # BLD AUTO: 4.41 MILLION/UL (ref 3.88–5.62)
SODIUM SERPL-SCNC: 140 MMOL/L (ref 136–145)
WBC # BLD AUTO: 7.93 THOUSAND/UL (ref 4.31–10.16)

## 2020-05-23 PROCEDURE — 85025 COMPLETE CBC W/AUTO DIFF WBC: CPT | Performed by: STUDENT IN AN ORGANIZED HEALTH CARE EDUCATION/TRAINING PROGRAM

## 2020-05-23 PROCEDURE — 80048 BASIC METABOLIC PNL TOTAL CA: CPT | Performed by: STUDENT IN AN ORGANIZED HEALTH CARE EDUCATION/TRAINING PROGRAM

## 2020-05-23 RX ADMIN — ACETAMINOPHEN 975 MG: 325 TABLET ORAL at 07:36

## 2020-05-23 RX ADMIN — METHADONE HYDROCHLORIDE 110 MG: 10 TABLET ORAL at 07:36

## 2020-05-23 RX ADMIN — ACETAMINOPHEN 975 MG: 325 TABLET ORAL at 13:01

## 2020-05-23 RX ADMIN — HEPARIN SODIUM 7500 UNITS: 5000 INJECTION INTRAVENOUS; SUBCUTANEOUS at 13:01

## 2020-05-23 RX ADMIN — HEPARIN SODIUM 7500 UNITS: 5000 INJECTION INTRAVENOUS; SUBCUTANEOUS at 07:37

## 2020-05-26 ENCOUNTER — TRANSITIONAL CARE MANAGEMENT (OUTPATIENT)
Dept: INTERNAL MEDICINE CLINIC | Facility: CLINIC | Age: 30
End: 2020-05-26

## 2020-08-31 ENCOUNTER — APPOINTMENT (OUTPATIENT)
Dept: LAB | Facility: HOSPITAL | Age: 30
End: 2020-08-31
Payer: COMMERCIAL

## 2020-08-31 ENCOUNTER — TRANSCRIBE ORDERS (OUTPATIENT)
Dept: ADMINISTRATIVE | Facility: HOSPITAL | Age: 30
End: 2020-08-31

## 2020-08-31 DIAGNOSIS — B18.2 CHRONIC HEPATITIS C WITH HEPATIC COMA (HCC): Primary | ICD-10-CM

## 2020-08-31 DIAGNOSIS — B18.2 CHRONIC HEPATITIS C WITH HEPATIC COMA (HCC): ICD-10-CM

## 2020-08-31 LAB
ALBUMIN SERPL BCP-MCNC: 3.4 G/DL (ref 3.5–5)
ALP SERPL-CCNC: 84 U/L (ref 46–116)
ALT SERPL W P-5'-P-CCNC: 42 U/L (ref 12–78)
AST SERPL W P-5'-P-CCNC: 23 U/L (ref 5–45)
BASOPHILS # BLD AUTO: 0.03 THOUSANDS/ΜL (ref 0–0.1)
BASOPHILS NFR BLD AUTO: 0 % (ref 0–1)
BILIRUB DIRECT SERPL-MCNC: 0.05 MG/DL (ref 0–0.2)
BILIRUB SERPL-MCNC: 0.2 MG/DL (ref 0.2–1)
EOSINOPHIL # BLD AUTO: 0.31 THOUSAND/ΜL (ref 0–0.61)
EOSINOPHIL NFR BLD AUTO: 4 % (ref 0–6)
ERYTHROCYTE [DISTWIDTH] IN BLOOD BY AUTOMATED COUNT: 13.5 % (ref 11.6–15.1)
HCT VFR BLD AUTO: 39.9 % (ref 36.5–49.3)
HGB BLD-MCNC: 13.1 G/DL (ref 12–17)
IMM GRANULOCYTES # BLD AUTO: 0.03 THOUSAND/UL (ref 0–0.2)
IMM GRANULOCYTES NFR BLD AUTO: 0 % (ref 0–2)
LYMPHOCYTES # BLD AUTO: 2.56 THOUSANDS/ΜL (ref 0.6–4.47)
LYMPHOCYTES NFR BLD AUTO: 30 % (ref 14–44)
MCH RBC QN AUTO: 26.1 PG (ref 26.8–34.3)
MCHC RBC AUTO-ENTMCNC: 32.8 G/DL (ref 31.4–37.4)
MCV RBC AUTO: 80 FL (ref 82–98)
MONOCYTES # BLD AUTO: 0.53 THOUSAND/ΜL (ref 0.17–1.22)
MONOCYTES NFR BLD AUTO: 6 % (ref 4–12)
NEUTROPHILS # BLD AUTO: 4.95 THOUSANDS/ΜL (ref 1.85–7.62)
NEUTS SEG NFR BLD AUTO: 60 % (ref 43–75)
NRBC BLD AUTO-RTO: 0 /100 WBCS
PLATELET # BLD AUTO: 227 THOUSANDS/UL (ref 149–390)
PMV BLD AUTO: 9.1 FL (ref 8.9–12.7)
PROT SERPL-MCNC: 7.7 G/DL (ref 6.4–8.2)
RBC # BLD AUTO: 5.01 MILLION/UL (ref 3.88–5.62)
WBC # BLD AUTO: 8.41 THOUSAND/UL (ref 4.31–10.16)

## 2020-08-31 PROCEDURE — 87522 HEPATITIS C REVRS TRNSCRPJ: CPT

## 2020-08-31 PROCEDURE — 36415 COLL VENOUS BLD VENIPUNCTURE: CPT

## 2020-08-31 PROCEDURE — 85025 COMPLETE CBC W/AUTO DIFF WBC: CPT

## 2020-08-31 PROCEDURE — 80076 HEPATIC FUNCTION PANEL: CPT

## 2020-09-02 LAB
HCV RNA SERPL NAA+PROBE-ACNC: NORMAL IU/ML
TEST INFORMATION: NORMAL

## 2020-10-14 RX ORDER — VELPATASVIR AND SOFOSBUVIR 100; 400 MG/1; MG/1
TABLET, FILM COATED ORAL
COMMUNITY
Start: 2020-07-27 | End: 2022-03-15

## 2020-10-16 ENCOUNTER — OFFICE VISIT (OUTPATIENT)
Dept: INTERNAL MEDICINE CLINIC | Facility: CLINIC | Age: 30
End: 2020-10-16
Payer: COMMERCIAL

## 2020-10-16 VITALS
RESPIRATION RATE: 16 BRPM | BODY MASS INDEX: 45.1 KG/M2 | TEMPERATURE: 96.9 F | SYSTOLIC BLOOD PRESSURE: 118 MMHG | WEIGHT: 315 LBS | HEIGHT: 70 IN | DIASTOLIC BLOOD PRESSURE: 82 MMHG | HEART RATE: 65 BPM | OXYGEN SATURATION: 98 %

## 2020-10-16 DIAGNOSIS — Z13.0 SCREENING FOR DEFICIENCY ANEMIA: ICD-10-CM

## 2020-10-16 DIAGNOSIS — Z13.1 SCREENING FOR DIABETES MELLITUS: ICD-10-CM

## 2020-10-16 DIAGNOSIS — F41.1 GENERALIZED ANXIETY DISORDER: ICD-10-CM

## 2020-10-16 DIAGNOSIS — Z13.220 SCREENING, LIPID: ICD-10-CM

## 2020-10-16 DIAGNOSIS — Z13.29 SCREENING FOR THYROID DISORDER: ICD-10-CM

## 2020-10-16 DIAGNOSIS — Z23 NEED FOR INFLUENZA VACCINATION: ICD-10-CM

## 2020-10-16 DIAGNOSIS — M25.50 ARTHRALGIA, UNSPECIFIED JOINT: Primary | ICD-10-CM

## 2020-10-16 DIAGNOSIS — E55.9 VITAMIN D DEFICIENCY: ICD-10-CM

## 2020-10-16 DIAGNOSIS — I10 ESSENTIAL HYPERTENSION: ICD-10-CM

## 2020-10-16 PROCEDURE — 90686 IIV4 VACC NO PRSV 0.5 ML IM: CPT | Performed by: PHYSICIAN ASSISTANT

## 2020-10-16 PROCEDURE — 90471 IMMUNIZATION ADMIN: CPT | Performed by: PHYSICIAN ASSISTANT

## 2020-10-16 PROCEDURE — 1036F TOBACCO NON-USER: CPT | Performed by: PHYSICIAN ASSISTANT

## 2020-10-16 PROCEDURE — 3725F SCREEN DEPRESSION PERFORMED: CPT | Performed by: PHYSICIAN ASSISTANT

## 2020-10-16 PROCEDURE — 99214 OFFICE O/P EST MOD 30 MIN: CPT | Performed by: PHYSICIAN ASSISTANT

## 2020-10-16 PROCEDURE — 3079F DIAST BP 80-89 MM HG: CPT | Performed by: PHYSICIAN ASSISTANT

## 2020-10-16 PROCEDURE — 3074F SYST BP LT 130 MM HG: CPT | Performed by: PHYSICIAN ASSISTANT

## 2020-10-17 PROBLEM — M25.50 ARTHRALGIA: Status: ACTIVE | Noted: 2020-10-17

## 2020-10-17 PROBLEM — L03.114 CELLULITIS OF LEFT ARM: Status: RESOLVED | Noted: 2019-04-29 | Resolved: 2020-10-17

## 2020-10-17 PROBLEM — L03.114 CELLULITIS OF LEFT HAND: Status: RESOLVED | Noted: 2019-04-29 | Resolved: 2020-10-17

## 2020-10-17 PROBLEM — S40.852A: Status: RESOLVED | Noted: 2019-04-30 | Resolved: 2020-10-17

## 2020-10-17 PROBLEM — L03.114 CELLULITIS OF LEFT UPPER EXTREMITY: Status: RESOLVED | Noted: 2019-04-29 | Resolved: 2020-10-17

## 2020-10-19 ENCOUNTER — TRANSCRIBE ORDERS (OUTPATIENT)
Dept: LAB | Facility: CLINIC | Age: 30
End: 2020-10-19

## 2020-10-19 ENCOUNTER — LAB (OUTPATIENT)
Dept: LAB | Facility: CLINIC | Age: 30
End: 2020-10-19
Payer: COMMERCIAL

## 2020-10-19 DIAGNOSIS — Z13.29 SCREENING FOR THYROID DISORDER: ICD-10-CM

## 2020-10-19 DIAGNOSIS — F11.20 OPIATE DEPENDENCE, CONTINUOUS (HCC): ICD-10-CM

## 2020-10-19 DIAGNOSIS — M25.50 ARTHRALGIA, UNSPECIFIED JOINT: ICD-10-CM

## 2020-10-19 DIAGNOSIS — Z02.9 ENCOUNTERS FOR ADMINISTRATIVE PURPOSE: ICD-10-CM

## 2020-10-19 DIAGNOSIS — I10 ESSENTIAL HYPERTENSION: ICD-10-CM

## 2020-10-19 DIAGNOSIS — Z13.0 SCREENING FOR DEFICIENCY ANEMIA: ICD-10-CM

## 2020-10-19 DIAGNOSIS — Z13.1 SCREENING FOR DIABETES MELLITUS: ICD-10-CM

## 2020-10-19 DIAGNOSIS — Z79.891 ENCOUNTER FOR LONG-TERM METHADONE USE: ICD-10-CM

## 2020-10-19 DIAGNOSIS — B18.2 CHRONIC HEPATITIS C WITH HEPATIC COMA (HCC): ICD-10-CM

## 2020-10-19 DIAGNOSIS — Z11.8 SCREENING FOR VIRAL AND CHLAMYDIAL DISEASES: ICD-10-CM

## 2020-10-19 DIAGNOSIS — Z11.59 SCREENING FOR VIRAL AND CHLAMYDIAL DISEASES: ICD-10-CM

## 2020-10-19 DIAGNOSIS — Z13.220 SCREENING, LIPID: ICD-10-CM

## 2020-10-19 DIAGNOSIS — E55.9 VITAMIN D DEFICIENCY: ICD-10-CM

## 2020-10-19 DIAGNOSIS — K63.5 POLYP OF COLON, UNSPECIFIED PART OF COLON, UNSPECIFIED TYPE: Primary | ICD-10-CM

## 2020-10-19 LAB
25(OH)D3 SERPL-MCNC: 14.2 NG/ML (ref 30–100)
ALBUMIN SERPL BCP-MCNC: 3.9 G/DL (ref 3.5–5)
ALP SERPL-CCNC: 85 U/L (ref 46–116)
ALT SERPL W P-5'-P-CCNC: 40 U/L (ref 12–78)
ANION GAP SERPL CALCULATED.3IONS-SCNC: 5 MMOL/L (ref 4–13)
AST SERPL W P-5'-P-CCNC: 22 U/L (ref 5–45)
BASOPHILS # BLD AUTO: 0.04 THOUSANDS/ΜL (ref 0–0.1)
BASOPHILS NFR BLD AUTO: 0 % (ref 0–1)
BILIRUB SERPL-MCNC: 0.41 MG/DL (ref 0.2–1)
BUN SERPL-MCNC: 14 MG/DL (ref 5–25)
CALCIUM SERPL-MCNC: 8.5 MG/DL (ref 8.3–10.1)
CHLORIDE SERPL-SCNC: 100 MMOL/L (ref 100–108)
CHOLEST SERPL-MCNC: 177 MG/DL (ref 50–200)
CO2 SERPL-SCNC: 30 MMOL/L (ref 21–32)
CREAT SERPL-MCNC: 0.8 MG/DL (ref 0.6–1.3)
EOSINOPHIL # BLD AUTO: 0.29 THOUSAND/ΜL (ref 0–0.61)
EOSINOPHIL NFR BLD AUTO: 3 % (ref 0–6)
ERYTHROCYTE [DISTWIDTH] IN BLOOD BY AUTOMATED COUNT: 14 % (ref 11.6–15.1)
EST. AVERAGE GLUCOSE BLD GHB EST-MCNC: 108 MG/DL
GFR SERPL CREATININE-BSD FRML MDRD: 120 ML/MIN/1.73SQ M
GLUCOSE P FAST SERPL-MCNC: 86 MG/DL (ref 65–99)
HBA1C MFR BLD: 5.4 %
HCT VFR BLD AUTO: 41.3 % (ref 36.5–49.3)
HDLC SERPL-MCNC: 47 MG/DL
HGB BLD-MCNC: 13.8 G/DL (ref 12–17)
IMM GRANULOCYTES # BLD AUTO: 0.02 THOUSAND/UL (ref 0–0.2)
IMM GRANULOCYTES NFR BLD AUTO: 0 % (ref 0–2)
LDLC SERPL CALC-MCNC: 110 MG/DL (ref 0–100)
LYMPHOCYTES # BLD AUTO: 2.61 THOUSANDS/ΜL (ref 0.6–4.47)
LYMPHOCYTES NFR BLD AUTO: 29 % (ref 14–44)
MCH RBC QN AUTO: 26.4 PG (ref 26.8–34.3)
MCHC RBC AUTO-ENTMCNC: 33.4 G/DL (ref 31.4–37.4)
MCV RBC AUTO: 79 FL (ref 82–98)
MONOCYTES # BLD AUTO: 0.76 THOUSAND/ΜL (ref 0.17–1.22)
MONOCYTES NFR BLD AUTO: 8 % (ref 4–12)
NEUTROPHILS # BLD AUTO: 5.39 THOUSANDS/ΜL (ref 1.85–7.62)
NEUTS SEG NFR BLD AUTO: 60 % (ref 43–75)
NONHDLC SERPL-MCNC: 130 MG/DL
NRBC BLD AUTO-RTO: 0 /100 WBCS
PLATELET # BLD AUTO: 215 THOUSANDS/UL (ref 149–390)
PMV BLD AUTO: 9.7 FL (ref 8.9–12.7)
POTASSIUM SERPL-SCNC: 3.8 MMOL/L (ref 3.5–5.3)
PROT SERPL-MCNC: 8.3 G/DL (ref 6.4–8.2)
RBC # BLD AUTO: 5.22 MILLION/UL (ref 3.88–5.62)
SODIUM SERPL-SCNC: 135 MMOL/L (ref 136–145)
T4 FREE SERPL-MCNC: 0.89 NG/DL (ref 0.76–1.46)
TRIGL SERPL-MCNC: 98 MG/DL
TSH SERPL DL<=0.05 MIU/L-ACNC: 4.07 UIU/ML (ref 0.36–3.74)
WBC # BLD AUTO: 9.11 THOUSAND/UL (ref 4.31–10.16)

## 2020-10-19 PROCEDURE — 83036 HEMOGLOBIN GLYCOSYLATED A1C: CPT

## 2020-10-19 PROCEDURE — 80053 COMPREHEN METABOLIC PANEL: CPT

## 2020-10-19 PROCEDURE — 36415 COLL VENOUS BLD VENIPUNCTURE: CPT

## 2020-10-19 PROCEDURE — 80061 LIPID PANEL: CPT

## 2020-10-19 PROCEDURE — 82306 VITAMIN D 25 HYDROXY: CPT

## 2020-10-19 PROCEDURE — 86038 ANTINUCLEAR ANTIBODIES: CPT

## 2020-10-19 PROCEDURE — 86430 RHEUMATOID FACTOR TEST QUAL: CPT

## 2020-10-19 PROCEDURE — 84439 ASSAY OF FREE THYROXINE: CPT

## 2020-10-19 PROCEDURE — 86592 SYPHILIS TEST NON-TREP QUAL: CPT

## 2020-10-19 PROCEDURE — 85025 COMPLETE CBC W/AUTO DIFF WBC: CPT

## 2020-10-19 PROCEDURE — 86618 LYME DISEASE ANTIBODY: CPT

## 2020-10-19 PROCEDURE — 84443 ASSAY THYROID STIM HORMONE: CPT

## 2020-10-20 LAB
B BURGDOR IGG+IGM SER-ACNC: 52
RHEUMATOID FACT SER QL LA: NEGATIVE
RPR SER QL: NORMAL

## 2020-10-21 DIAGNOSIS — E55.9 VITAMIN D DEFICIENCY: Primary | ICD-10-CM

## 2020-10-21 DIAGNOSIS — R79.9 ABNORMAL BLOOD CHEMISTRY LEVEL: Primary | ICD-10-CM

## 2020-10-21 DIAGNOSIS — E03.9 ACQUIRED HYPOTHYROIDISM: ICD-10-CM

## 2020-10-21 LAB — RYE IGE QN: NEGATIVE

## 2020-10-21 RX ORDER — ERGOCALCIFEROL 1.25 MG/1
50000 CAPSULE ORAL WEEKLY
Qty: 4 CAPSULE | Refills: 3 | Status: SHIPPED | OUTPATIENT
Start: 2020-10-21 | End: 2021-02-05

## 2020-10-21 RX ORDER — LEVOTHYROXINE SODIUM 0.03 MG/1
25 TABLET ORAL
Qty: 30 TABLET | Refills: 5 | Status: SHIPPED | OUTPATIENT
Start: 2020-10-21 | End: 2021-12-16

## 2020-11-06 ENCOUNTER — LAB (OUTPATIENT)
Dept: LAB | Facility: MEDICAL CENTER | Age: 30
End: 2020-11-06
Payer: COMMERCIAL

## 2020-11-06 DIAGNOSIS — B18.2 CHRONIC HEPATITIS C WITH HEPATIC COMA (HCC): ICD-10-CM

## 2020-11-06 DIAGNOSIS — K63.5 POLYP OF COLON, UNSPECIFIED PART OF COLON, UNSPECIFIED TYPE: ICD-10-CM

## 2020-11-06 LAB
ALBUMIN SERPL BCP-MCNC: 3.5 G/DL (ref 3.5–5)
ALP SERPL-CCNC: 83 U/L (ref 46–116)
ALT SERPL W P-5'-P-CCNC: 41 U/L (ref 12–78)
AST SERPL W P-5'-P-CCNC: 27 U/L (ref 5–45)
BASOPHILS # BLD AUTO: 0.04 THOUSANDS/ΜL (ref 0–0.1)
BASOPHILS NFR BLD AUTO: 1 % (ref 0–1)
BILIRUB DIRECT SERPL-MCNC: 0.11 MG/DL (ref 0–0.2)
BILIRUB SERPL-MCNC: 0.31 MG/DL (ref 0.2–1)
EOSINOPHIL # BLD AUTO: 0.26 THOUSAND/ΜL (ref 0–0.61)
EOSINOPHIL NFR BLD AUTO: 4 % (ref 0–6)
ERYTHROCYTE [DISTWIDTH] IN BLOOD BY AUTOMATED COUNT: 14.1 % (ref 11.6–15.1)
HCT VFR BLD AUTO: 38.1 % (ref 36.5–49.3)
HGB BLD-MCNC: 12.3 G/DL (ref 12–17)
IMM GRANULOCYTES # BLD AUTO: 0.01 THOUSAND/UL (ref 0–0.2)
IMM GRANULOCYTES NFR BLD AUTO: 0 % (ref 0–2)
LYMPHOCYTES # BLD AUTO: 2.34 THOUSANDS/ΜL (ref 0.6–4.47)
LYMPHOCYTES NFR BLD AUTO: 39 % (ref 14–44)
MCH RBC QN AUTO: 26.2 PG (ref 26.8–34.3)
MCHC RBC AUTO-ENTMCNC: 32.3 G/DL (ref 31.4–37.4)
MCV RBC AUTO: 81 FL (ref 82–98)
MONOCYTES # BLD AUTO: 0.54 THOUSAND/ΜL (ref 0.17–1.22)
MONOCYTES NFR BLD AUTO: 9 % (ref 4–12)
NEUTROPHILS # BLD AUTO: 2.82 THOUSANDS/ΜL (ref 1.85–7.62)
NEUTS SEG NFR BLD AUTO: 47 % (ref 43–75)
NRBC BLD AUTO-RTO: 0 /100 WBCS
PLATELET # BLD AUTO: 217 THOUSANDS/UL (ref 149–390)
PMV BLD AUTO: 10 FL (ref 8.9–12.7)
PROT SERPL-MCNC: 7.6 G/DL (ref 6.4–8.2)
RBC # BLD AUTO: 4.7 MILLION/UL (ref 3.88–5.62)
WBC # BLD AUTO: 6.01 THOUSAND/UL (ref 4.31–10.16)

## 2020-11-06 PROCEDURE — 87522 HEPATITIS C REVRS TRNSCRPJ: CPT

## 2020-11-06 PROCEDURE — 85025 COMPLETE CBC W/AUTO DIFF WBC: CPT

## 2020-11-06 PROCEDURE — 36415 COLL VENOUS BLD VENIPUNCTURE: CPT

## 2020-11-06 PROCEDURE — 80076 HEPATIC FUNCTION PANEL: CPT

## 2020-11-08 LAB
HCV RNA SERPL NAA+PROBE-ACNC: NORMAL IU/ML
TEST INFORMATION: NORMAL

## 2020-11-11 ENCOUNTER — ANESTHESIA EVENT (OUTPATIENT)
Dept: GASTROENTEROLOGY | Facility: HOSPITAL | Age: 30
End: 2020-11-11

## 2020-11-12 ENCOUNTER — HOSPITAL ENCOUNTER (OUTPATIENT)
Dept: GASTROENTEROLOGY | Facility: HOSPITAL | Age: 30
Setting detail: OUTPATIENT SURGERY
Discharge: HOME | End: 2020-11-12
Attending: INTERNAL MEDICINE | Admitting: INTERNAL MEDICINE
Payer: COMMERCIAL

## 2020-11-12 ENCOUNTER — ANESTHESIA (OUTPATIENT)
Dept: GASTROENTEROLOGY | Facility: HOSPITAL | Age: 30
End: 2020-11-12

## 2020-11-12 VITALS
OXYGEN SATURATION: 95 % | WEIGHT: 315 LBS | HEART RATE: 76 BPM | BODY MASS INDEX: 45.1 KG/M2 | HEIGHT: 70 IN | TEMPERATURE: 97.2 F | DIASTOLIC BLOOD PRESSURE: 68 MMHG | RESPIRATION RATE: 18 BRPM | SYSTOLIC BLOOD PRESSURE: 121 MMHG

## 2020-11-12 VITALS — HEART RATE: 69 BPM

## 2020-11-12 DIAGNOSIS — R11.0 NAUSEA: ICD-10-CM

## 2020-11-12 DIAGNOSIS — R12 HEARTBURN: ICD-10-CM

## 2020-11-12 PROCEDURE — 88305 TISSUE EXAM BY PATHOLOGIST: CPT | Performed by: PATHOLOGY

## 2020-11-12 PROCEDURE — 88341 IMHCHEM/IMCYTCHM EA ADD ANTB: CPT | Performed by: PATHOLOGY

## 2020-11-12 PROCEDURE — 88342 IMHCHEM/IMCYTCHM 1ST ANTB: CPT | Performed by: PATHOLOGY

## 2020-11-12 RX ORDER — SODIUM CHLORIDE, SODIUM LACTATE, POTASSIUM CHLORIDE, CALCIUM CHLORIDE 600; 310; 30; 20 MG/100ML; MG/100ML; MG/100ML; MG/100ML
100 INJECTION, SOLUTION INTRAVENOUS CONTINUOUS
Status: DISCONTINUED | OUTPATIENT
Start: 2020-11-12 | End: 2020-11-16 | Stop reason: HOSPADM

## 2020-11-12 RX ORDER — PROPOFOL 10 MG/ML
INJECTION, EMULSION INTRAVENOUS AS NEEDED
Status: DISCONTINUED | OUTPATIENT
Start: 2020-11-12 | End: 2020-11-12

## 2020-11-12 RX ADMIN — PROPOFOL 50 MG: 10 INJECTION, EMULSION INTRAVENOUS at 10:25

## 2020-11-12 RX ADMIN — PROPOFOL 60 MG: 10 INJECTION, EMULSION INTRAVENOUS at 10:27

## 2020-11-12 RX ADMIN — SODIUM CHLORIDE, SODIUM LACTATE, POTASSIUM CHLORIDE, AND CALCIUM CHLORIDE 100 ML/HR: .6; .31; .03; .02 INJECTION, SOLUTION INTRAVENOUS at 09:34

## 2020-11-12 RX ADMIN — PROPOFOL 50 MG: 10 INJECTION, EMULSION INTRAVENOUS at 10:23

## 2020-11-12 RX ADMIN — PROPOFOL 50 MG: 10 INJECTION, EMULSION INTRAVENOUS at 10:22

## 2020-12-15 ENCOUNTER — APPOINTMENT (OUTPATIENT)
Dept: RADIOLOGY | Facility: CLINIC | Age: 30
End: 2020-12-15
Payer: COMMERCIAL

## 2020-12-15 ENCOUNTER — OFFICE VISIT (OUTPATIENT)
Dept: URGENT CARE | Facility: CLINIC | Age: 30
End: 2020-12-15
Payer: COMMERCIAL

## 2020-12-15 VITALS
OXYGEN SATURATION: 97 % | BODY MASS INDEX: 45.1 KG/M2 | HEIGHT: 70 IN | DIASTOLIC BLOOD PRESSURE: 72 MMHG | TEMPERATURE: 97.9 F | SYSTOLIC BLOOD PRESSURE: 144 MMHG | WEIGHT: 315 LBS | HEART RATE: 77 BPM | RESPIRATION RATE: 18 BRPM

## 2020-12-15 DIAGNOSIS — M25.561 ACUTE PAIN OF RIGHT KNEE: Primary | ICD-10-CM

## 2020-12-15 DIAGNOSIS — M25.561 ACUTE PAIN OF RIGHT KNEE: ICD-10-CM

## 2020-12-15 PROCEDURE — G0382 LEV 3 HOSP TYPE B ED VISIT: HCPCS | Performed by: PHYSICIAN ASSISTANT

## 2020-12-15 PROCEDURE — 73564 X-RAY EXAM KNEE 4 OR MORE: CPT

## 2020-12-15 RX ORDER — MELOXICAM 15 MG/1
15 TABLET ORAL DAILY PRN
Qty: 20 TABLET | Refills: 0 | Status: SHIPPED | OUTPATIENT
Start: 2020-12-15 | End: 2022-03-15

## 2020-12-15 RX ORDER — FAMOTIDINE 20 MG/1
TABLET, FILM COATED ORAL
COMMUNITY
Start: 2020-11-18 | End: 2021-12-16

## 2020-12-29 ENCOUNTER — OFFICE VISIT (OUTPATIENT)
Dept: OBGYN CLINIC | Facility: CLINIC | Age: 30
End: 2020-12-29
Payer: COMMERCIAL

## 2020-12-29 VITALS
WEIGHT: 315 LBS | DIASTOLIC BLOOD PRESSURE: 73 MMHG | HEIGHT: 70 IN | SYSTOLIC BLOOD PRESSURE: 114 MMHG | HEART RATE: 70 BPM | BODY MASS INDEX: 45.1 KG/M2

## 2020-12-29 DIAGNOSIS — S86.911A KNEE STRAIN, RIGHT, INITIAL ENCOUNTER: Primary | ICD-10-CM

## 2020-12-29 DIAGNOSIS — M25.561 ACUTE PAIN OF RIGHT KNEE: ICD-10-CM

## 2020-12-29 PROCEDURE — 3078F DIAST BP <80 MM HG: CPT | Performed by: ORTHOPAEDIC SURGERY

## 2020-12-29 PROCEDURE — 3074F SYST BP LT 130 MM HG: CPT | Performed by: ORTHOPAEDIC SURGERY

## 2020-12-29 PROCEDURE — 99213 OFFICE O/P EST LOW 20 MIN: CPT | Performed by: ORTHOPAEDIC SURGERY

## 2020-12-29 PROCEDURE — 1036F TOBACCO NON-USER: CPT | Performed by: ORTHOPAEDIC SURGERY

## 2020-12-29 PROCEDURE — 3008F BODY MASS INDEX DOCD: CPT | Performed by: ORTHOPAEDIC SURGERY

## 2021-02-05 DIAGNOSIS — E55.9 VITAMIN D DEFICIENCY: ICD-10-CM

## 2021-02-05 RX ORDER — ERGOCALCIFEROL 1.25 MG/1
CAPSULE ORAL
Qty: 4 CAPSULE | Refills: 3 | Status: SHIPPED | OUTPATIENT
Start: 2021-02-05 | End: 2022-07-20

## 2021-03-30 DIAGNOSIS — Z23 ENCOUNTER FOR IMMUNIZATION: ICD-10-CM

## 2021-04-02 ENCOUNTER — IMMUNIZATIONS (OUTPATIENT)
Dept: FAMILY MEDICINE CLINIC | Facility: HOSPITAL | Age: 31
End: 2021-04-02

## 2021-04-02 DIAGNOSIS — Z23 ENCOUNTER FOR IMMUNIZATION: Primary | ICD-10-CM

## 2021-04-02 PROCEDURE — 0011A SARS-COV-2 / COVID-19 MRNA VACCINE (MODERNA) 100 MCG: CPT

## 2021-04-02 PROCEDURE — 91301 SARS-COV-2 / COVID-19 MRNA VACCINE (MODERNA) 100 MCG: CPT

## 2021-05-04 ENCOUNTER — IMMUNIZATIONS (OUTPATIENT)
Dept: FAMILY MEDICINE CLINIC | Facility: HOSPITAL | Age: 31
End: 2021-05-04

## 2021-05-04 DIAGNOSIS — Z23 ENCOUNTER FOR IMMUNIZATION: Primary | ICD-10-CM

## 2021-05-04 PROCEDURE — 91301 SARS-COV-2 / COVID-19 MRNA VACCINE (MODERNA) 100 MCG: CPT

## 2021-05-04 PROCEDURE — 0012A SARS-COV-2 / COVID-19 MRNA VACCINE (MODERNA) 100 MCG: CPT

## 2021-06-18 ENCOUNTER — HOSPITAL ENCOUNTER (OUTPATIENT)
Dept: NON INVASIVE DIAGNOSTICS | Facility: HOSPITAL | Age: 31
Discharge: HOME | End: 2021-06-18
Payer: COMMERCIAL

## 2021-06-18 DIAGNOSIS — R94.31 NONSPECIFIC ABNORMAL ELECTROCARDIOGRAM (ECG) (EKG): ICD-10-CM

## 2021-06-18 PROCEDURE — 93005 ELECTROCARDIOGRAM TRACING: CPT

## 2021-06-21 LAB
ATRIAL RATE: 92 BPM
P AXIS: 59 DEGREES
PR INTERVAL: 172 MS
QRS AXIS: 79 DEGREES
QRSD INTERVAL: 100 MS
QT INTERVAL: 382 MS
QTC INTERVAL: 472 MS
T WAVE AXIS: 38 DEGREES
VENTRICULAR RATE: 92 BPM

## 2021-06-21 PROCEDURE — 93010 ELECTROCARDIOGRAM REPORT: CPT | Performed by: INTERNAL MEDICINE

## 2021-09-07 ENCOUNTER — APPOINTMENT (OUTPATIENT)
Dept: LAB | Facility: MEDICAL CENTER | Age: 31
End: 2021-09-07
Payer: COMMERCIAL

## 2021-09-07 DIAGNOSIS — R11.0 NAUSEA: ICD-10-CM

## 2021-09-07 DIAGNOSIS — K22.10 ULCER OF ESOPHAGUS WITHOUT BLEEDING: ICD-10-CM

## 2021-09-07 DIAGNOSIS — R79.9 ABNORMAL BLOOD CHEMISTRY LEVEL: ICD-10-CM

## 2021-09-07 DIAGNOSIS — B18.2 CHRONIC HEPATITIS C WITHOUT HEPATIC COMA (HCC): ICD-10-CM

## 2021-09-07 DIAGNOSIS — K21.00 GASTROESOPHAGEAL REFLUX DISEASE WITH ESOPHAGITIS, UNSPECIFIED WHETHER HEMORRHAGE: ICD-10-CM

## 2021-09-07 DIAGNOSIS — R68.81 EARLY SATIETY: ICD-10-CM

## 2021-09-07 LAB — TSH SERPL DL<=0.05 MIU/L-ACNC: 1.58 UIU/ML (ref 0.36–3.74)

## 2021-09-07 PROCEDURE — 84443 ASSAY THYROID STIM HORMONE: CPT

## 2021-09-15 ENCOUNTER — TELEPHONE (OUTPATIENT)
Dept: SURGERY | Facility: HOSPITAL | Age: 31
End: 2021-09-15

## 2021-09-16 ENCOUNTER — HOSPITAL ENCOUNTER (OUTPATIENT)
Dept: GASTROENTEROLOGY | Facility: HOSPITAL | Age: 31
Setting detail: OUTPATIENT SURGERY
Discharge: HOME | End: 2021-09-16
Attending: INTERNAL MEDICINE | Admitting: INTERNAL MEDICINE
Payer: COMMERCIAL

## 2021-09-16 ENCOUNTER — ANESTHESIA (OUTPATIENT)
Dept: GASTROENTEROLOGY | Facility: HOSPITAL | Age: 31
End: 2021-09-16

## 2021-09-16 ENCOUNTER — ANESTHESIA EVENT (OUTPATIENT)
Dept: GASTROENTEROLOGY | Facility: HOSPITAL | Age: 31
End: 2021-09-16

## 2021-09-16 VITALS
BODY MASS INDEX: 45.1 KG/M2 | OXYGEN SATURATION: 95 % | WEIGHT: 315 LBS | HEART RATE: 76 BPM | DIASTOLIC BLOOD PRESSURE: 84 MMHG | RESPIRATION RATE: 18 BRPM | SYSTOLIC BLOOD PRESSURE: 132 MMHG | HEIGHT: 70 IN | TEMPERATURE: 97.7 F

## 2021-09-16 DIAGNOSIS — R11.0 NAUSEA: ICD-10-CM

## 2021-09-16 DIAGNOSIS — R68.81 EARLY SATIETY: ICD-10-CM

## 2021-09-16 DIAGNOSIS — K22.10 ULCER OF ESOPHAGUS WITHOUT BLEEDING: ICD-10-CM

## 2021-09-16 DIAGNOSIS — K21.00 GASTRO-ESOPHAGEAL REFLUX DISEASE WITH ESOPHAGITIS, WITHOUT BLEEDING: ICD-10-CM

## 2021-09-16 PROCEDURE — 88305 TISSUE EXAM BY PATHOLOGIST: CPT | Performed by: PATHOLOGY

## 2021-09-16 RX ORDER — SODIUM CHLORIDE, SODIUM LACTATE, POTASSIUM CHLORIDE, CALCIUM CHLORIDE 600; 310; 30; 20 MG/100ML; MG/100ML; MG/100ML; MG/100ML
50 INJECTION, SOLUTION INTRAVENOUS CONTINUOUS
Status: DISCONTINUED | OUTPATIENT
Start: 2021-09-16 | End: 2021-09-20 | Stop reason: HOSPADM

## 2021-09-16 RX ORDER — LIDOCAINE HYDROCHLORIDE 10 MG/ML
0.5 INJECTION, SOLUTION EPIDURAL; INFILTRATION; INTRACAUDAL; PERINEURAL ONCE AS NEEDED
Status: DISCONTINUED | OUTPATIENT
Start: 2021-09-16 | End: 2021-09-20 | Stop reason: HOSPADM

## 2021-09-16 RX ORDER — PROPOFOL 10 MG/ML
INJECTION, EMULSION INTRAVENOUS AS NEEDED
Status: DISCONTINUED | OUTPATIENT
Start: 2021-09-16 | End: 2021-09-16

## 2021-09-16 RX ORDER — KETAMINE HYDROCHLORIDE 50 MG/ML
INJECTION, SOLUTION, CONCENTRATE INTRAMUSCULAR; INTRAVENOUS AS NEEDED
Status: DISCONTINUED | OUTPATIENT
Start: 2021-09-16 | End: 2021-09-16

## 2021-09-16 RX ORDER — LIDOCAINE HYDROCHLORIDE 10 MG/ML
INJECTION, SOLUTION EPIDURAL; INFILTRATION; INTRACAUDAL; PERINEURAL AS NEEDED
Status: DISCONTINUED | OUTPATIENT
Start: 2021-09-16 | End: 2021-09-16

## 2021-09-16 RX ADMIN — LIDOCAINE HYDROCHLORIDE 50 MG: 10 INJECTION, SOLUTION EPIDURAL; INFILTRATION; INTRACAUDAL; PERINEURAL at 11:58

## 2021-09-16 RX ADMIN — PROPOFOL 40 MG: 10 INJECTION, EMULSION INTRAVENOUS at 12:04

## 2021-09-16 RX ADMIN — PROPOFOL 20 MG: 10 INJECTION, EMULSION INTRAVENOUS at 12:00

## 2021-09-16 RX ADMIN — PROPOFOL 70 MG: 10 INJECTION, EMULSION INTRAVENOUS at 11:58

## 2021-09-16 RX ADMIN — SODIUM CHLORIDE, SODIUM LACTATE, POTASSIUM CHLORIDE, AND CALCIUM CHLORIDE 50 ML/HR: .6; .31; .03; .02 INJECTION, SOLUTION INTRAVENOUS at 11:22

## 2021-09-16 RX ADMIN — SODIUM CHLORIDE, SODIUM LACTATE, POTASSIUM CHLORIDE, AND CALCIUM CHLORIDE: .6; .31; .03; .02 INJECTION, SOLUTION INTRAVENOUS at 11:52

## 2021-09-16 RX ADMIN — KETAMINE HYDROCHLORIDE 20 MG: 50 INJECTION, SOLUTION INTRAMUSCULAR; INTRAVENOUS at 12:00

## 2021-09-16 RX ADMIN — PROPOFOL 20 MG: 10 INJECTION, EMULSION INTRAVENOUS at 12:02

## 2021-09-16 RX ADMIN — KETAMINE HYDROCHLORIDE 30 MG: 50 INJECTION, SOLUTION INTRAMUSCULAR; INTRAVENOUS at 11:58

## 2021-09-16 NOTE — DISCHARGE INSTRUCTIONS
Upper Endoscopy   WHAT YOU NEED TO KNOW:   An upper endoscopy is also called an upper gastrointestinal (GI) endoscopy, or an esophagogastroduodenoscopy (EGD)  It is a procedure to examine the inside of your esophagus, stomach, and duodenum (first part of the small intestine) with a scope  You may feel bloated, gassy, or have some abdominal discomfort after your procedure  Your throat may be sore for 24 to 36 hours  You may burp or pass gas from air that is still inside your body  DISCHARGE INSTRUCTIONS:   Seek care immediately if:    You have sudden, severe abdominal pain   You have problems swallowing   You have a large amount of black, sticky bowel movements or blood in your bowel movements   You have sudden trouble breathing   You feel weak, lightheaded, or faint or your heart beats faster than normal for you  Contact your healthcare provider if:    You have a fever and chills   You have nausea or are vomiting   Your abdomen is bloated or feels full and hard   You have abdominal pain   You have black, sticky bowel movements or blood in your bowel movements   You have not had a bowel movement for 3 days after your procedure   You have rash or hives   You have questions or concerns about your procedure  Activity:    Do not lift, strain, or run for 24 hours after your procedure   Rest after your procedure  You have been given medicine to relax you  Do not drive or make important decisions until the day after your procedure  Return to your normal activity as directed   Relieve gas and discomfort from bloating by lying on your right side with a heating pad on your abdomen  You may need to take short walks to help the gas move out  Eat small meals until bloating is relieved  Follow up with your healthcare provider as directed: Write down your questions so you remember to ask them during your visits       If you take a blood thinner, please review the specific instructions from your endoscopist about when you should resume it  These can be found in the Recommendation and Your Medication list sections of this After Visit Summary  GERD (Gastroesophageal Reflux Disease)   WHAT YOU NEED TO KNOW:   Gastroesophageal reflux disease (GERD) is reflux that occurs more than twice a week for a few weeks  Reflux means acid and food in the stomach back up into the esophagus  It usually causes heartburn and other symptoms  GERD can cause other health problems over time if it is not treated  DISCHARGE INSTRUCTIONS:   Call your local emergency number (911 in the [address] [facility]) if:   · You have severe chest pain and sudden trouble breathing  Seek care immediately if:   · You have trouble breathing after you vomit  · You have trouble swallowing, or pain with swallowing  · Your bowel movements are black, bloody, or tarry-looking  · Your vomit looks like coffee grounds or has blood in it  Call your doctor or gastroenterologist if:   · You feel full and cannot burp or vomit  · You vomit large amounts, or you vomit often  · You are losing weight without trying  · Your symptoms get worse or do not improve with treatment  · You have questions or concerns about your condition or care  Medicines:   · Medicines  are used to decrease stomach acid  Medicine may also be used to help your lower esophageal sphincter and stomach contract (tighten) more  · Take your medicine as directed  Contact your healthcare provider if you think your medicine is not helping or if you have side effects  Tell him or her if you are allergic to any medicine  Keep a list of the medicines, vitamins, and herbs you take  Include the amounts, and when and why you take them  Bring the list or the pill bottles to follow-up visits  Carry your medicine list with you in case of an emergency  Manage GERD:       · Do not have foods or drinks that may increase heartburn  These include chocolate, peppermint, fried or fatty foods, drinks that contain caffeine, or carbonated drinks (soda)  Other foods include spicy foods, onions, tomatoes, and tomato-based foods  Do not have foods or drinks that can irritate your esophagus, such as citrus fruits, juices, and alcohol  · Do not eat large meals  When you eat a lot of food at one time, your stomach needs more acid to digest it  Eat 6 small meals each day instead of 3 large ones, and eat slowly  Do not eat meals 2 to 3 hours before bedtime  · Elevate the head of your bed  Place 6-inch blocks under the head of your bed frame  You may also use more than one pillow under your head and shoulders while you sleep  · Maintain a healthy weight  If you are overweight, weight loss may help relieve symptoms of GERD  · Do not smoke  Smoking weakens the lower esophageal sphincter and increases the risk of GERD  Ask your healthcare provider for information if you currently smoke and need help to quit  E-cigarettes or smokeless tobacco still contain nicotine  Talk to your healthcare provider before you use these products  · Do not wear clothing that is tight around your waist   Tight clothing can put pressure on your stomach and cause or worsen GERD symptoms  Follow up with your doctor or gastroenterologist as directed:  Write down your questions so you remember to ask them during your visits  © Copyright [address] 2021 Information is for End User's use only and may not be sold, redistributed or otherwise used for commercial purposes  All illustrations and images included in CareNotes® are the copyrighted property of [employer]  or [employer]  The above information is an  only  It is not intended as medical advice for individual conditions or treatments  Talk to your doctor, nurse or pharmacist before following any medical regimen to see if it is safe and effective for you

## 2021-09-16 NOTE — ANESTHESIA PREPROCEDURE EVALUATION
Procedure:  EGD    Relevant Problems   CARDIO   (+) Essential hypertension      GI/HEPATIC   (+) Acid reflux   (+) Chronic hepatitis C without hepatic coma (HCC) (treated)      NEURO/PSYCH   (+) Anxiety   (+) Chronic pain disorder   (+) Claustrophobia   (+) Depressed   (+) Generalized anxiety disorder      PULMONARY   (+) Obstructive sleep apnea (non-compliant with CPAP)      Other   (+) Morbid obesity with BMI of 50 0-59 9, adult (HCC)   Colonic mass status post left colectomy 5/21/20    Physical Exam    Airway    Mallampati score: II  TM Distance: >3 FB  Neck ROM: full     Dental   No notable dental hx     Cardiovascular      Pulmonary      Other Findings        6/25/18 TTE:  LEFT VENTRICLE:  Systolic function was normal  Ejection fraction was estimated to be 60 %  There were no regional wall motion abnormalities  Wall thickness was mildly increased  There was mild concentric hypertrophy      MITRAL VALVE:  There was trace regurgitation  Anesthesia Plan  ASA Score- 3     Anesthesia Type- IV sedation with anesthesia with ASA Monitors  Additional Monitors:   Airway Plan:     Comment: I discussed the risks and benefits of IV sedation anesthesia including the possibility of the need to convert to general anesthesia and the potential risk of awareness  Plan Factors-Exercise tolerance (METS): >4 METS  Chart reviewed  EKG reviewed  Existing labs reviewed  Patient summary reviewed  Patient is a current smoker (Vapes)  Induction- intravenous  Postoperative Plan-     Informed Consent- Anesthetic plan and risks discussed with patient

## 2021-09-16 NOTE — INTERVAL H&P NOTE
H&P reviewed  After examining the patient I find no changes in the patients condition since the H&P had been written      Vitals:    09/16/21 1050   BP: 140/96   Pulse: 76   Resp: 18   Temp: 97 7 °F (36 5 °C)   SpO2: 95%

## 2021-10-28 ENCOUNTER — HOSPITAL ENCOUNTER (OUTPATIENT)
Dept: NON INVASIVE DIAGNOSTICS | Facility: HOSPITAL | Age: 31
Discharge: HOME | End: 2021-10-28
Payer: COMMERCIAL

## 2021-10-28 DIAGNOSIS — R94.31 NONSPECIFIC ABNORMAL ELECTROCARDIOGRAM (ECG) (EKG): ICD-10-CM

## 2021-10-28 PROCEDURE — 93005 ELECTROCARDIOGRAM TRACING: CPT

## 2021-10-29 LAB
ATRIAL RATE: 87 BPM
P AXIS: 78 DEGREES
PR INTERVAL: 166 MS
QRS AXIS: 94 DEGREES
QRSD INTERVAL: 98 MS
QT INTERVAL: 392 MS
QTC INTERVAL: 471 MS
T WAVE AXIS: 68 DEGREES
VENTRICULAR RATE: 87 BPM

## 2021-10-29 PROCEDURE — 93010 ELECTROCARDIOGRAM REPORT: CPT | Performed by: INTERNAL MEDICINE

## 2021-12-16 ENCOUNTER — OFFICE VISIT (OUTPATIENT)
Dept: INTERNAL MEDICINE CLINIC | Facility: CLINIC | Age: 31
End: 2021-12-16
Payer: COMMERCIAL

## 2021-12-16 VITALS
HEART RATE: 102 BPM | BODY MASS INDEX: 45.1 KG/M2 | WEIGHT: 315 LBS | HEIGHT: 70 IN | OXYGEN SATURATION: 97 % | TEMPERATURE: 98.6 F | SYSTOLIC BLOOD PRESSURE: 130 MMHG | DIASTOLIC BLOOD PRESSURE: 74 MMHG

## 2021-12-16 DIAGNOSIS — M79.10 MUSCLE ACHE: ICD-10-CM

## 2021-12-16 DIAGNOSIS — L30.9 ECZEMA, UNSPECIFIED TYPE: ICD-10-CM

## 2021-12-16 DIAGNOSIS — H10.13 ALLERGIC CONJUNCTIVITIS OF BOTH EYES: ICD-10-CM

## 2021-12-16 DIAGNOSIS — R10.84 GENERALIZED ABDOMINAL PAIN: Primary | ICD-10-CM

## 2021-12-16 PROCEDURE — 99214 OFFICE O/P EST MOD 30 MIN: CPT | Performed by: PHYSICIAN ASSISTANT

## 2021-12-16 RX ORDER — OLOPATADINE HYDROCHLORIDE 1 MG/ML
1 SOLUTION/ DROPS OPHTHALMIC 2 TIMES DAILY
Qty: 5 ML | Refills: 1 | Status: SHIPPED | OUTPATIENT
Start: 2021-12-16 | End: 2022-05-19

## 2021-12-16 RX ORDER — OLOPATADINE HYDROCHLORIDE 1 MG/ML
1 SOLUTION/ DROPS OPHTHALMIC 2 TIMES DAILY
Qty: 5 ML | Refills: 1 | Status: SHIPPED | OUTPATIENT
Start: 2021-12-16 | End: 2021-12-16 | Stop reason: SDUPTHER

## 2021-12-16 RX ORDER — TRIAMCINOLONE ACETONIDE 1 MG/G
CREAM TOPICAL 2 TIMES DAILY
Qty: 30 G | Refills: 0 | Status: SHIPPED | OUTPATIENT
Start: 2021-12-16 | End: 2021-12-16 | Stop reason: SDUPTHER

## 2021-12-16 RX ORDER — TRIAMCINOLONE ACETONIDE 1 MG/G
CREAM TOPICAL 2 TIMES DAILY
Qty: 30 G | Refills: 0 | Status: SHIPPED | OUTPATIENT
Start: 2021-12-16 | End: 2022-02-03 | Stop reason: SDUPTHER

## 2021-12-17 PROBLEM — H10.13 ALLERGIC CONJUNCTIVITIS OF BOTH EYES: Status: ACTIVE | Noted: 2021-12-17

## 2021-12-17 PROBLEM — M79.10 MUSCLE ACHE: Status: ACTIVE | Noted: 2021-12-17

## 2021-12-17 PROBLEM — R10.84 GENERALIZED ABDOMINAL PAIN: Status: ACTIVE | Noted: 2021-12-17

## 2021-12-17 PROBLEM — L30.9 ECZEMA: Status: ACTIVE | Noted: 2021-12-17

## 2021-12-18 ENCOUNTER — APPOINTMENT (OUTPATIENT)
Dept: LAB | Facility: MEDICAL CENTER | Age: 31
End: 2021-12-18
Payer: COMMERCIAL

## 2021-12-18 DIAGNOSIS — K21.9 GASTROESOPHAGEAL REFLUX DISEASE WITHOUT ESOPHAGITIS: ICD-10-CM

## 2021-12-18 DIAGNOSIS — K29.50 CHRONIC GASTRITIS WITHOUT BLEEDING, UNSPECIFIED GASTRITIS TYPE: Primary | ICD-10-CM

## 2021-12-18 DIAGNOSIS — R11.0 NAUSEA: ICD-10-CM

## 2021-12-18 DIAGNOSIS — R10.84 GENERALIZED ABDOMINAL PAIN: ICD-10-CM

## 2021-12-18 DIAGNOSIS — M79.10 MUSCLE ACHE: ICD-10-CM

## 2021-12-18 LAB
IGA SERPL-MCNC: 313 MG/DL (ref 70–400)
MAGNESIUM SERPL-MCNC: 2.2 MG/DL (ref 1.6–2.6)
VIT B12 SERPL-MCNC: 318 PG/ML (ref 100–900)

## 2021-12-18 PROCEDURE — 82784 ASSAY IGA/IGD/IGG/IGM EACH: CPT

## 2021-12-18 PROCEDURE — 82785 ASSAY OF IGE: CPT

## 2021-12-18 PROCEDURE — 83735 ASSAY OF MAGNESIUM: CPT

## 2021-12-18 PROCEDURE — 83516 IMMUNOASSAY NONANTIBODY: CPT

## 2021-12-18 PROCEDURE — 86008 ALLG SPEC IGE RECOMB EA: CPT

## 2021-12-18 PROCEDURE — 82607 VITAMIN B-12: CPT

## 2021-12-18 PROCEDURE — 86003 ALLG SPEC IGE CRUDE XTRC EA: CPT

## 2021-12-18 PROCEDURE — 36415 COLL VENOUS BLD VENIPUNCTURE: CPT

## 2021-12-18 PROCEDURE — 86255 FLUORESCENT ANTIBODY SCREEN: CPT

## 2021-12-20 LAB
ENDOMYSIUM IGA SER QL: NEGATIVE
GLIADIN PEPTIDE IGA SER-ACNC: 13 UNITS (ref 0–19)
GLIADIN PEPTIDE IGG SER-ACNC: <1 UNITS (ref 0–19)
IGA SERPL-MCNC: 339 MG/DL (ref 90–386)
TTG IGA SER-ACNC: <2 U/ML (ref 0–3)
TTG IGA SER-ACNC: <2 U/ML (ref 0–3)
TTG IGG SER-ACNC: 2 U/ML (ref 0–5)
TTG IGG SER-ACNC: <2 U/ML (ref 0–5)

## 2021-12-21 LAB
A-LACTALB IGE QN: <0.1 KAU/I
ALLERGEN COMMENT: ABNORMAL
ALMOND IGE QN: <0.1 KUA/I
B-LACTOGLOB IGE QN: <0.1 KAU/I
CASEIN IGE QN: <0.1 KAU/I
CASHEW NUT IGE QN: <0.1 KUA/I
CODFISH IGE QN: <0.1 KUA/I
EGG WHITE IGE QN: <0.1 KUA/I
GLUTEN IGE QN: <0.1 KUA/I
HAZELNUT IGE QN: <0.1 KUA/L
MILK IGE QN: 0.11 KUA/I
PEANUT IGE QN: <0.1 KUA/I
SALMON IGE QN: <0.1 KUA/I
SCALLOP IGE QN: <0.1 KUA/L
SESAME SEED IGE QN: 0.12 KUA/I
SHRIMP IGE QN: <0.1 KUA/L
SOYBEAN IGE QN: <0.1 KUA/I
TOTAL IGE SMQN RAST: 93.4 KU/L (ref 0–113)
TUNA IGE QN: <0.1 KUA/I
WALNUT IGE QN: <0.1 KUA/I
WHEAT IGE QN: <0.1 KUA/I

## 2021-12-27 ENCOUNTER — HOSPITAL ENCOUNTER (OUTPATIENT)
Dept: ULTRASOUND IMAGING | Facility: HOSPITAL | Age: 31
Discharge: HOME | End: 2021-12-27
Payer: COMMERCIAL

## 2021-12-27 DIAGNOSIS — R11.0 NAUSEA: ICD-10-CM

## 2021-12-27 PROCEDURE — 76705 ECHO EXAM OF ABDOMEN: CPT

## 2022-02-03 DIAGNOSIS — L30.9 ECZEMA, UNSPECIFIED TYPE: ICD-10-CM

## 2022-02-03 RX ORDER — TRIAMCINOLONE ACETONIDE 1 MG/G
CREAM TOPICAL 2 TIMES DAILY
Qty: 30 G | Refills: 0 | Status: SHIPPED | OUTPATIENT
Start: 2022-02-03 | End: 2022-04-19 | Stop reason: SDUPTHER

## 2022-03-15 ENCOUNTER — OFFICE VISIT (OUTPATIENT)
Dept: INTERNAL MEDICINE CLINIC | Facility: CLINIC | Age: 32
End: 2022-03-15
Payer: COMMERCIAL

## 2022-03-15 VITALS
TEMPERATURE: 98 F | OXYGEN SATURATION: 94 % | HEART RATE: 81 BPM | SYSTOLIC BLOOD PRESSURE: 132 MMHG | HEIGHT: 70 IN | BODY MASS INDEX: 45.1 KG/M2 | WEIGHT: 315 LBS | DIASTOLIC BLOOD PRESSURE: 80 MMHG

## 2022-03-15 DIAGNOSIS — Z13.1 SCREENING FOR DIABETES MELLITUS: ICD-10-CM

## 2022-03-15 DIAGNOSIS — F41.9 ANXIETY: ICD-10-CM

## 2022-03-15 DIAGNOSIS — M25.50 ARTHRALGIA, UNSPECIFIED JOINT: Primary | ICD-10-CM

## 2022-03-15 DIAGNOSIS — E66.01 CLASS 3 SEVERE OBESITY DUE TO EXCESS CALORIES WITH SERIOUS COMORBIDITY AND BODY MASS INDEX (BMI) OF 40.0 TO 44.9 IN ADULT (HCC): ICD-10-CM

## 2022-03-15 DIAGNOSIS — G89.4 CHRONIC PAIN DISORDER: ICD-10-CM

## 2022-03-15 DIAGNOSIS — F33.41 RECURRENT MAJOR DEPRESSIVE DISORDER, IN PARTIAL REMISSION (HCC): ICD-10-CM

## 2022-03-15 PROCEDURE — 99214 OFFICE O/P EST MOD 30 MIN: CPT | Performed by: PHYSICIAN ASSISTANT

## 2022-03-15 RX ORDER — OMEPRAZOLE 20 MG/1
20 CAPSULE, DELAYED RELEASE ORAL DAILY
COMMUNITY

## 2022-03-15 RX ORDER — DICLOFENAC SODIUM 75 MG/1
75 TABLET, DELAYED RELEASE ORAL 2 TIMES DAILY
Qty: 60 TABLET | Refills: 2 | Status: SHIPPED | OUTPATIENT
Start: 2022-03-15

## 2022-03-15 RX ORDER — BUPROPION HYDROCHLORIDE 150 MG/1
150 TABLET ORAL EVERY MORNING
Qty: 30 TABLET | Refills: 5 | Status: SHIPPED | OUTPATIENT
Start: 2022-03-15 | End: 2022-04-19

## 2022-03-15 NOTE — ASSESSMENT & PLAN NOTE
Weight loss efforts will help  Defers imaging at this time  Start diclofenac  Directions for use and possible side effects discussed and patient verbalized understanding of these

## 2022-03-15 NOTE — PROGRESS NOTES
Assessment/Plan:  Problem List Items Addressed This Visit        Other    Class 3 severe obesity due to excess calories with serious comorbidity and body mass index (BMI) of 40 0 to 44 9 in adult [facility])     Nutrition referral provided  Start wellbutrin to assist with efforts  Directions for use and possible side effects discussed and patient verbalized understanding of these  Relevant Medications    buPROPion (Wellbutrin XL) 150 mg 24 hr tablet    Other Relevant Orders    Ambulatory Referral to Nutrition Services    Chronic pain disorder    Relevant Medications    diclofenac (VOLTAREN) 75 mg EC tablet    Anxiety     Start wellbutrin  Directions for use and possible side effects discussed and patient verbalized understanding of these  Relevant Orders    TSH, 3rd generation with Free T4 reflex    Depressed    Relevant Medications    buPROPion (Wellbutrin XL) 150 mg 24 hr tablet    Arthralgia - Primary     Weight loss efforts will help  Defers imaging at this time  Start diclofenac  Directions for use and possible side effects discussed and patient verbalized understanding of these  Relevant Orders    Lyme Antibody Profile with reflex to WB    DAVID Screen w/ Reflex to Titer/Pattern    Uric acid    C-reactive protein    RF Screen w/ Reflex to Titer      Other Visit Diagnoses     Screening for diabetes mellitus        Relevant Orders    Hemoglobin A1C           Diagnoses and all orders for this visit:    Arthralgia, unspecified joint  -     Lyme Antibody Profile with reflex to WB; Future  -     DAVID Screen w/ Reflex to Titer/Pattern; Future  -     Uric acid; Future  -     C-reactive protein; Future  -     RF Screen w/ Reflex to Titer; Future    Anxiety  -     TSH, 3rd generation with Free T4 reflex;  Future    Screening for diabetes mellitus  -     Hemoglobin A1C; Future    Class 3 severe obesity due to excess calories with serious comorbidity and body mass index (BMI) of 40 0 to 44 9 in adult [facility])  -     Ambulatory Referral to Nutrition Services; Future  -     buPROPion (Wellbutrin XL) 150 mg 24 hr tablet; Take 1 tablet (150 mg total) by mouth every morning    Recurrent major depressive disorder, in partial remission (HCC)  -     buPROPion (Wellbutrin XL) 150 mg 24 hr tablet; Take 1 tablet (150 mg total) by mouth every morning    Chronic pain disorder  -     diclofenac (VOLTAREN) 75 mg EC tablet; Take 1 tablet (75 mg total) by mouth 2 (two) times a day    Other orders  -     omeprazole (PriLOSEC) 20 mg delayed release capsule; Take 20 mg by mouth daily        Anxiety  Start wellbutrin  Directions for use and possible side effects discussed and patient verbalized understanding of these  Arthralgia  Weight loss efforts will help  Defers imaging at this time  Start diclofenac  Directions for use and possible side effects discussed and patient verbalized understanding of these  Class 3 severe obesity due to excess calories with serious comorbidity and body mass index (BMI) of 40 0 to 44 9 in adult [facility])  Nutrition referral provided  Start wellbutrin to assist with efforts  Directions for use and possible side effects discussed and patient verbalized understanding of these  Subjective:      Patient ID: Roxann Dixon is a 27 y o  male  Pt presents for routine visit  He notes is abdominal pain has improved  He relates issues with bilateral knee and back pain x years without injury  He has been seeing a chiropractor but continues with pain  He also relates ongoing anxiety and loss of interest in things  He has been having  ahard time getting into psych due to his insurance and lack of resources  Lastly, he relates issues with his weight and desires labs to evaluate         The following portions of the patient's history were reviewed and updated as appropriate:   He has a past medical history of Allergic, Anemia (6/24/2018), Anxiety, Benign essential hypertension (11/17/2016), Bipolar 1 disorder [facility]), Chronic pain, Claustrophobia (3/15/2018), Community acquired pneumonia (6/24/2018), Depressed (7/14/2016), Depression, GERD (gastroesophageal reflux disease), Hepatitis C virus infection (8/14/2014), [facility] ) (7/24/2018), Infectious viral hepatitis, Obesity (10/12/2016), Obstructive sleep apnea, Sleep disorder, and Substance abuse ([place] [address] )  ,  does not have any pertinent problems on file  ,   has a past surgical history that includes [place] tooth extraction; pr drain skin abscess complic (Left, 0/8/4480); and pr lap,surg,colectomy, partial, w/anast (Left, 5/21/2020)  ,  family history includes Alzheimer's disease in his maternal grandmother; Colon cancer in his father; Depression in his family; Diabetes in his mother  ,   reports that he has quit smoking  His smoking use included e-cigarettes  He has a 13 00 pack-year smoking history  He has never used smokeless tobacco  He reports previous alcohol use  He reports previous drug use  Drugs: Heroin, Marijuana, and Prescription  ,  is allergic to bee venom     Current Outpatient Medications   Medication Sig Dispense Refill    METHADONE HCL PO Take 110 mg by mouth daily 150mg daily       olopatadine (PATANOL) 0 1 % ophthalmic solution Administer 1 drop to both eyes 2 (two) times a day 5 mL 1    omeprazole (PriLOSEC) 20 mg delayed release capsule Take 20 mg by mouth daily      triamcinolone (KENALOG) 0 1 % cream Apply topically 2 (two) times a day 30 g 0    buPROPion (Wellbutrin XL) 150 mg 24 hr tablet Take 1 tablet (150 mg total) by mouth every morning 30 tablet 5    diclofenac (VOLTAREN) 75 mg EC tablet Take 1 tablet (75 mg total) by mouth 2 (two) times a day 60 tablet 2    ergocalciferol (VITAMIN D2) 50,000 units take 1 capsule by mouth every week (Patient not taking: Reported on 12/16/2021) 4 capsule 3     No current facility-administered medications for this visit  Review of Systems   Constitutional: Negative for chills and fever  HENT: Negative for congestion, ear pain, hearing loss, postnasal drip, rhinorrhea, sinus pressure, sinus pain, sore throat and trouble swallowing  Eyes: Negative for pain and visual disturbance  Respiratory: Negative for cough, chest tightness, shortness of breath and wheezing  Cardiovascular: Negative  Negative for chest pain, palpitations and leg swelling  Gastrointestinal: Negative for abdominal pain, blood in stool, constipation, diarrhea, nausea and vomiting  Endocrine: Negative for cold intolerance, heat intolerance, polydipsia, polyphagia and polyuria  Genitourinary: Negative for difficulty urinating, dysuria, flank pain and urgency  Musculoskeletal: Positive for arthralgias and back pain  Negative for gait problem and myalgias  Skin: Negative for rash  Allergic/Immunologic: Negative  Neurological: Negative for dizziness, weakness, light-headedness and headaches  Hematological: Negative  Psychiatric/Behavioral: Positive for dysphoric mood  Negative for behavioral problems and sleep disturbance  The patient is nervous/anxious  PHQ-2/9 Depression Screening            Objective:  Vitals:    03/15/22 1059   BP: 132/80   Pulse: 81   Temp: 98 °F (36 7 °C)   SpO2: 94%   Weight: (!) 159 kg (350 lb 8 oz)   Height: 5' 10" (1 778 m)     Body mass index is 50 29 kg/m²  Physical Exam  Constitutional:       General: He is not in acute distress  Appearance: He is well-developed  He is obese  He is not diaphoretic  HENT:      Head: Normocephalic and atraumatic  Right Ear: External ear normal       Left Ear: External ear normal       Nose: Nose normal       Mouth/Throat:      Pharynx: No oropharyngeal exudate  Eyes:      General: No scleral icterus  Right eye: No discharge  Left eye: No discharge  Conjunctiva/sclera: Conjunctivae normal       Pupils: Pupils are equal, round, and reactive to light  Neck:      Thyroid: No thyromegaly  Cardiovascular:      Rate and Rhythm: Normal rate and regular rhythm  Heart sounds: Normal heart sounds  No murmur heard  No friction rub  No gallop  Pulmonary:      Effort: Pulmonary effort is normal  No respiratory distress  Breath sounds: Normal breath sounds  No wheezing or rales  Abdominal:      General: Bowel sounds are normal  There is no distension  Palpations: Abdomen is soft  Tenderness: There is no abdominal tenderness  Musculoskeletal:         General: No tenderness or deformity  Normal range of motion  Cervical back: Normal range of motion and neck supple  Skin:     General: Skin is warm and dry  Neurological:      Mental Status: He is alert and oriented to person, place, and time  Cranial Nerves: No cranial nerve deficit  Psychiatric:         Behavior: Behavior normal          Thought Content: Thought content normal          Judgment: Judgment normal        BMI Counseling: Body mass index is 50 29 kg/m²  The BMI is above normal  Nutrition recommendations include decreasing portion sizes, consuming healthier snacks and limiting drinks that contain sugar  Rationale for BMI follow-up plan is due to patient being overweight or obese

## 2022-03-15 NOTE — ASSESSMENT & PLAN NOTE
Start wellbutrin  Directions for use and possible side effects discussed and patient verbalized understanding of these

## 2022-03-15 NOTE — ASSESSMENT & PLAN NOTE
Nutrition referral provided  Start wellbutrin to assist with efforts  Directions for use and possible side effects discussed and patient verbalized understanding of these

## 2022-03-26 ENCOUNTER — APPOINTMENT (OUTPATIENT)
Dept: LAB | Facility: MEDICAL CENTER | Age: 32
End: 2022-03-26
Payer: COMMERCIAL

## 2022-03-26 DIAGNOSIS — F41.9 ANXIETY: ICD-10-CM

## 2022-03-26 DIAGNOSIS — Z13.1 SCREENING FOR DIABETES MELLITUS: ICD-10-CM

## 2022-03-26 DIAGNOSIS — M25.50 ARTHRALGIA, UNSPECIFIED JOINT: ICD-10-CM

## 2022-03-26 LAB
CRP SERPL QL: 24.5 MG/L
EST. AVERAGE GLUCOSE BLD GHB EST-MCNC: 114 MG/DL
HBA1C MFR BLD: 5.6 %
TSH SERPL DL<=0.05 MIU/L-ACNC: 1.58 UIU/ML (ref 0.36–3.74)
URATE SERPL-MCNC: 4.6 MG/DL (ref 4.2–8)

## 2022-03-26 PROCEDURE — 86430 RHEUMATOID FACTOR TEST QUAL: CPT

## 2022-03-26 PROCEDURE — 86038 ANTINUCLEAR ANTIBODIES: CPT

## 2022-03-26 PROCEDURE — 86618 LYME DISEASE ANTIBODY: CPT

## 2022-03-26 PROCEDURE — 36415 COLL VENOUS BLD VENIPUNCTURE: CPT

## 2022-03-26 PROCEDURE — 84550 ASSAY OF BLOOD/URIC ACID: CPT

## 2022-03-26 PROCEDURE — 84443 ASSAY THYROID STIM HORMONE: CPT

## 2022-03-26 PROCEDURE — 83036 HEMOGLOBIN GLYCOSYLATED A1C: CPT

## 2022-03-26 PROCEDURE — 86140 C-REACTIVE PROTEIN: CPT

## 2022-03-28 LAB
RHEUMATOID FACT SER QL LA: NEGATIVE
RYE IGE QN: NEGATIVE

## 2022-03-29 LAB — B BURGDOR IGG+IGM SER-ACNC: 22

## 2022-04-09 ENCOUNTER — OFFICE VISIT (OUTPATIENT)
Dept: URGENT CARE | Facility: MEDICAL CENTER | Age: 32
End: 2022-04-09
Payer: COMMERCIAL

## 2022-04-09 VITALS
RESPIRATION RATE: 18 BRPM | HEART RATE: 111 BPM | SYSTOLIC BLOOD PRESSURE: 146 MMHG | OXYGEN SATURATION: 96 % | BODY MASS INDEX: 45.1 KG/M2 | TEMPERATURE: 98.6 F | DIASTOLIC BLOOD PRESSURE: 90 MMHG | WEIGHT: 315 LBS | HEIGHT: 70 IN

## 2022-04-09 DIAGNOSIS — K04.7 DENTAL ABSCESS: Primary | ICD-10-CM

## 2022-04-09 PROCEDURE — 99212 OFFICE O/P EST SF 10 MIN: CPT | Performed by: PHYSICIAN ASSISTANT

## 2022-04-09 RX ORDER — AMOXICILLIN 875 MG/1
875 TABLET, COATED ORAL 2 TIMES DAILY
Qty: 14 TABLET | Refills: 1 | Status: SHIPPED | OUTPATIENT
Start: 2022-04-09 | End: 2022-04-16

## 2022-04-09 NOTE — PATIENT INSTRUCTIONS
Dental Abscess   AMBULATORY CARE:   A dental abscess  is a collection of pus in or around a tooth  A dental abscess is caused by bacteria  The bacteria can enter the tooth when the enamel (outer part of the tooth) is damaged by tooth decay  Bacteria can also enter the tooth through a chip in the tooth or a cut in the gum  Food particles that are stuck between the teeth for a long time may also lead to an abscess  Common signs and symptoms:   · Toothache, a loose tooth, or a tooth that is very sensitive to pressure or temperature    · Bad breath, unpleasant taste, and drooling    · Fever    · Pain, redness, and swelling of the gums, or swelling of your face and neck    · Pain when you open or close your mouth    · Trouble opening your mouth    Seek care immediately if:   · You have severe pain in your tooth or jaw  · You have trouble breathing because of pain or swelling  Call your doctor if:   · Your symptoms get worse, even after treatment  · Your mouth is bleeding  · You cannot eat or drink because of pain or swelling  · Your abscess returns  · You have an injury that causes a crack in your tooth  · You have questions or concerns about your condition or care  Treatment:  You may  need any of the following:  · Medicines  may be given to treat a bacterial infection and decrease pain  · Incision and drainage  is a cut in the abscess to allow the pus to drain  A sample of fluid may be collected from your abscess  The fluid is sent to a lab and tested for bacteria  Ask your healthcare provider for more information  · A root canal  is a procedure to remove the bacteria and prevent more infection  It is usually done after an incision and drainage  A filling or crown will be placed over the tooth after you have healed from your root canal      · Tooth removal  may be needed if the infection affects deeper tissues  This is usually done after an incision and drainage      Self-care: · Rinse your mouth every 2 hours with salt water  This will help keep the area clean  · Gently brush your teeth twice a day with a soft tooth brush  This will help keep the area clean  · Eat soft foods as directed  Soft foods may cause less pain  Examples include applesauce, yogurt, and cooked pasta  Ask your healthcare provider how long to follow this instruction  · Apply a warm compress to your tooth or gum  Use a cotton ball or gauze soaked in warm water  Remove the compress in 10 minutes or when it becomes cool  Repeat 3 times a day  Prevent another abscess:   · Brush your teeth at least 2 times a day  with fluoride toothpaste  · Use dental floss at least once a day  to clean between your teeth  · Rinse your mouth with water or mouthwash  after meals and snacks  Chew sugarless gum  · Avoid sugary and starchy food that can stick between your teeth  Limit drinks high in sugar, such as soda or fruit juice  · See your dentist every 6 months  for dental cleanings and oral exams  Follow up with your doctor or dentist as directed: Your healthcare provider will need to check your teeth and gums  Write down your questions so you remember to ask them during your visits  © Copyright [employer] 2022 Information is for End User's use only and may not be sold, redistributed or otherwise used for commercial purposes  All illustrations and images included in CareNotes® are the copyrighted property of [employer]  or [employer]  The above information is an  only  It is not intended as medical advice for individual conditions or treatments  Talk to your doctor, nurse or pharmacist before following any medical regimen to see if it is safe and effective for you

## 2022-04-09 NOTE — PROGRESS NOTES
[address] [facility]        NAME: Corbin Fuentes is a 27 y o  male  : 1990    MRN: [id number]  DATE: 2022  TIME: 8:50 AM    Assessment and Plan   Dental abscess [K04 7]  1  Dental abscess  amoxicillin (AMOXIL) 875 mg tablet         Patient Instructions       Follow up with PCP in 3-5 days  Proceed to  ER if symptoms worsen  Chief Complaint     Chief Complaint   Patient presents with    Dental Pain     left lower jaw has cracked tooth         History of Present Illness       Patient has a fractured left lower molar  Had seen a dentist and was referred to Oral surgery for extraction  Cannot obtain an appointment until October  Two days ago, he began with left lower dental pain  This morning when he woke up his left jaw started with swelling  Review of Systems   Review of Systems   Constitutional: Negative for fever  HENT: Positive for dental problem  Hematological: Negative for adenopathy           Current Medications       Current Outpatient Medications:     diclofenac (VOLTAREN) 75 mg EC tablet, Take 1 tablet (75 mg total) by mouth 2 (two) times a day, Disp: 60 tablet, Rfl: 2    ergocalciferol (VITAMIN D2) 50,000 units, take 1 capsule by mouth every week, Disp: 4 capsule, Rfl: 3    METHADONE HCL PO, Take 110 mg by mouth daily 150mg daily , Disp: , Rfl:     olopatadine (PATANOL) 0 1 % ophthalmic solution, Administer 1 drop to both eyes 2 (two) times a day, Disp: 5 mL, Rfl: 1    omeprazole (PriLOSEC) 20 mg delayed release capsule, Take 20 mg by mouth daily, Disp: , Rfl:     triamcinolone (KENALOG) 0 1 % cream, Apply topically 2 (two) times a day, Disp: 30 g, Rfl: 0    amoxicillin (AMOXIL) 875 mg tablet, Take 1 tablet (875 mg total) by mouth 2 (two) times a day for 7 days, Disp: 14 tablet, Rfl: 1    buPROPion (Wellbutrin XL) 150 mg 24 hr tablet, Take 1 tablet (150 mg total) by mouth every morning (Patient not taking: Reported on 2022 ), Disp: 30 tablet, Rfl: 5    Current Allergies     Allergies as of 04/09/2022 - Reviewed 04/09/2022   Allergen Reaction Noted    Bee venom Angioedema 05/20/2015            The following portions of the patient's history were reviewed and updated as appropriate: allergies, current medications, past family history, past medical history, past social history, past surgical history and problem list      Past Medical History:   Diagnosis Date    Allergic     Anemia 6/24/2018    Anxiety     from records    Benign essential hypertension 11/17/2016    Bipolar 1 disorder ([facility] )     from records    Chronic pain     Claustrophobia 3/15/2018    Community acquired pneumonia 6/24/2018    Depressed 7/14/2016    Depression     from records    GERD (gastroesophageal reflux disease)     Hepatitis C virus infection 8/14/2014    Hero[facility] abuse ([facility] ) 7/24/2018    Infectious viral hepatitis     Obesity 10/12/2016    Obstructive sleep apnea     from records    Sleep disorder     Substance abuse [facility])        Past Surgical History:   Procedure Laterality Date    MI DRAIN SKIN ABSCESS COMPLIC Left 4/2/6493    Procedure: INCISION AND DRAINAGE (I&D) EXTREMITY;  Surgeon: Lolis Salvador MD;  Location: [facility] MAIN OR;  Service: Orthopedics    MI LAP,SURG,COLECTOMY, PARTIAL, W/ANAST Left 5/21/2020    Procedure: LAPAROSCOPIC LEFT HEMICOLECTOMY TAKEDOWN SPLENIC FLECTURE, COLORECTAL ANASTOMOSIS ;  Surgeon: Cally Asher MD;  Location:  MAIN OR;  Service: Colorectal    WISDOM TOOTH EXTRACTION         Family History   Problem Relation Age of Onset    Colon cancer Father     Alzheimer's disease Maternal Grandmother     Depression Family     Diabetes Mother          Medications have been verified  Objective   /90   Pulse (!) 111   Temp 98 6 °F (37 °C)   Resp 18   Ht 5' 10" (1 778 m)   Wt (!) 159 kg (350 lb)   SpO2 96%   BMI 50 22 kg/m²   No LMP for male patient  Physical Exam     Physical Exam  Vitals and nursing note reviewed  Constitutional:       Appearance: Normal appearance  HENT:      Head: Normocephalic  Comments: Left jaw with mild swelling  Mouth/Throat:      Mouth: Mucous membranes are moist      Cardiovascular:      Rate and Rhythm: Normal rate and regular rhythm  Pulmonary:      Effort: Pulmonary effort is normal    Skin:     General: Skin is warm  Neurological:      Mental Status: He is alert

## 2022-04-19 ENCOUNTER — OFFICE VISIT (OUTPATIENT)
Dept: INTERNAL MEDICINE CLINIC | Facility: CLINIC | Age: 32
End: 2022-04-19
Payer: COMMERCIAL

## 2022-04-19 ENCOUNTER — TELEPHONE (OUTPATIENT)
Dept: OTOLARYNGOLOGY | Facility: CLINIC | Age: 32
End: 2022-04-19

## 2022-04-19 VITALS
DIASTOLIC BLOOD PRESSURE: 62 MMHG | WEIGHT: 315 LBS | HEIGHT: 70 IN | BODY MASS INDEX: 45.1 KG/M2 | HEART RATE: 86 BPM | OXYGEN SATURATION: 95 % | SYSTOLIC BLOOD PRESSURE: 126 MMHG | TEMPERATURE: 98.1 F

## 2022-04-19 DIAGNOSIS — J30.1 SEASONAL ALLERGIC RHINITIS DUE TO POLLEN: ICD-10-CM

## 2022-04-19 DIAGNOSIS — J30.2 SEASONAL ALLERGIES: ICD-10-CM

## 2022-04-19 DIAGNOSIS — M25.562 CHRONIC PAIN OF BOTH KNEES: Primary | ICD-10-CM

## 2022-04-19 DIAGNOSIS — G89.29 CHRONIC PAIN OF BOTH KNEES: Primary | ICD-10-CM

## 2022-04-19 DIAGNOSIS — M54.50 CHRONIC LOW BACK PAIN WITHOUT SCIATICA, UNSPECIFIED BACK PAIN LATERALITY: ICD-10-CM

## 2022-04-19 DIAGNOSIS — M25.561 CHRONIC PAIN OF BOTH KNEES: Primary | ICD-10-CM

## 2022-04-19 DIAGNOSIS — L30.9 ECZEMA, UNSPECIFIED TYPE: ICD-10-CM

## 2022-04-19 DIAGNOSIS — G89.29 CHRONIC LOW BACK PAIN WITHOUT SCIATICA, UNSPECIFIED BACK PAIN LATERALITY: ICD-10-CM

## 2022-04-19 DIAGNOSIS — F41.1 GENERALIZED ANXIETY DISORDER: ICD-10-CM

## 2022-04-19 DIAGNOSIS — G47.33 OBSTRUCTIVE SLEEP APNEA: ICD-10-CM

## 2022-04-19 PROCEDURE — 99214 OFFICE O/P EST MOD 30 MIN: CPT | Performed by: PHYSICIAN ASSISTANT

## 2022-04-19 RX ORDER — TRIAMCINOLONE ACETONIDE 1 MG/G
CREAM TOPICAL 2 TIMES DAILY
Qty: 30 G | Refills: 0 | Status: SHIPPED | OUTPATIENT
Start: 2022-04-19 | End: 2022-07-20 | Stop reason: SDUPTHER

## 2022-04-19 RX ORDER — FLUOXETINE 20 MG/1
20 TABLET, FILM COATED ORAL DAILY
Qty: 30 TABLET | Refills: 5 | Status: SHIPPED | OUTPATIENT
Start: 2022-04-19 | End: 2022-07-20

## 2022-04-19 NOTE — ASSESSMENT & PLAN NOTE
Defers imaging at this time  Continue diclofenac  Pain management referral provided as he may do well with injections

## 2022-04-19 NOTE — ASSESSMENT & PLAN NOTE
Start prozac/ Directions for use and possible side effects discussed and patient verbalized understanding of these

## 2022-04-19 NOTE — PROGRESS NOTES
Assessment/Plan:  Problem List Items Addressed This Visit        Respiratory    Allergic rhinitis     ENt referral provided per pt request         Obstructive sleep apnea     Previously diagnosed and pt struggling with CPAP compliance  Will refer to sleep medicine to see if he is an [facility] candidate         Relevant Orders    Ambulatory Referral to Sleep Medicine       Musculoskeletal and Integument    Eczema    Relevant Medications    triamcinolone (KENALOG) 0 1 % cream       Other    Generalized anxiety disorder     Start prozac/ Directions for use and possible side effects discussed and patient verbalized understanding of these  Relevant Medications    FLUoxetine (PROzac) 20 MG tablet    Chronic pain of both knees - Primary     Defers imaging at this time  Continue diclofenac  Pain management referral provided as he may do well with injections  Relevant Orders    Ambulatory referral to Pain Management      Other Visit Diagnoses     Chronic low back pain without sciatica, unspecified back pain laterality        Relevant Orders    Ambulatory referral to Pain Management    Seasonal allergies        Relevant Orders    Ambulatory Referral to Otolaryngology           Diagnoses and all orders for this visit:    Chronic pain of both knees  -     Ambulatory referral to Pain Management; Future    Eczema, unspecified type  -     triamcinolone (KENALOG) 0 1 % cream; Apply topically 2 (two) times a day    Chronic low back pain without sciatica, unspecified back pain laterality  -     Ambulatory referral to Pain Management; Future    Generalized anxiety disorder  -     FLUoxetine (PROzac) 20 MG tablet; Take 1 tablet (20 mg total) by mouth daily    Obstructive sleep apnea  -     Ambulatory Referral to Sleep Medicine; Future    Seasonal allergies  -     Ambulatory Referral to Otolaryngology;  Future    Seasonal allergic rhinitis due to pollen        Allergic rhinitis  ENt referral provided per pt request    Obstructive sleep apnea  Previously diagnosed and pt struggling with CPAP compliance  Will refer to sleep medicine to see if he is an [facility] candidate    Generalized anxiety disorder  Start prozac/ Directions for use and possible side effects discussed and patient verbalized understanding of these  Chronic pain of both knees  Defers imaging at this time  Continue diclofenac  Pain management referral provided as he may do well with injections  Subjective:      Patient ID: Kindra Cali is a 27 y o  male  Pt presents for routine visit  He is doing well overall  He is up to date with labs  He relates that he was unable to tolerate wellbutrin as it was activating in negative ways and also caused irritability  He desires trying an alternative  He also was unable to see the nutritionist due to cost  He is in the process of establishing with psychiatry  He remains involved in the methadone clinic and is doing well with his sobriety  He notes ongoing knee and back pain  Voltaren has helped but not enough  He is interested in seeing pain management for possible injections  He also follows with a chiropractor  He also desires sleep medicine evaluation  He has known [facility] and has trouble being compliant with CPAP and is interested in the Fowler procedure  Lastly he desires ENT evaluation due to his progressively worsening seasonal allergy symptoms        The following portions of the patient's history were reviewed and updated as appropriate:   He has a past medical history of Allergic, Anemia (6/24/2018), Anxiety, Benign essential hypertension (11/17/2016), Bipolar 1 disorder ([facility] ), Chronic pain, Claustrophobia (3/15/2018), Community acquired pneumonia (6/24/2018), Depressed (7/14/2016), Depression, GERD (gastroesophageal reflux disease), Hepatitis C virus infection (8/14/2014), Heroin abuse ([facility] ) (7/24/2018), Infectious viral hepatitis, Obesity (10/12/2016), Obstructive sleep apnea, Sleep disorder, and Substance abuse ([facility] )  ,  does not have any pertinent problems on file  ,   has a past surgical history that includes [place] tooth extraction; pr drain skin abscess complic (Left, 5/1/6890); and pr lap,surg,colectomy, partial, w/anast (Left, 5/21/2020)  ,  family history includes Alzheimer's disease in his maternal grandmother; Colon cancer in his father; Depression in his family; Diabetes in his mother  ,   reports that he has quit smoking  His smoking use included e-cigarettes  He has a 13 00 pack-year smoking history  He has never used smokeless tobacco  He reports previous alcohol use  He reports previous drug use  Drugs: Heroin, Marijuana, and Prescription  ,  is allergic to bee venom     Current Outpatient Medications   Medication Sig Dispense Refill    diclofenac (VOLTAREN) 75 mg EC tablet Take 1 tablet (75 mg total) by mouth 2 (two) times a day 60 tablet 2    METHADONE HCL PO Take 110 mg by mouth daily 150mg daily       olopatadine (PATANOL) 0 1 % ophthalmic solution Administer 1 drop to both eyes 2 (two) times a day 5 mL 1    omeprazole (PriLOSEC) 20 mg delayed release capsule Take 20 mg by mouth daily      triamcinolone (KENALOG) 0 1 % cream Apply topically 2 (two) times a day 30 g 0    ergocalciferol (VITAMIN D2) 50,000 units take 1 capsule by mouth every week (Patient not taking: Reported on 4/19/2022) 4 capsule 3    FLUoxetine (PROzac) 20 MG tablet Take 1 tablet (20 mg total) by mouth daily 30 tablet 5     No current facility-administered medications for this visit  Review of Systems   Constitutional: Positive for fatigue  Negative for chills and fever  HENT: Positive for congestion and rhinorrhea  Negative for ear pain, hearing loss, postnasal drip, sinus pressure, sinus pain, sore throat and trouble swallowing  Eyes: Positive for discharge and itching  Negative for pain and visual disturbance  Respiratory: Negative for cough, chest tightness, shortness of breath and wheezing  Cardiovascular: Negative  Negative for chest pain, palpitations and leg swelling  Gastrointestinal: Negative for abdominal pain, blood in stool, constipation, diarrhea, nausea and vomiting  Endocrine: Negative for cold intolerance, heat intolerance, polydipsia, polyphagia and polyuria  Genitourinary: Negative for difficulty urinating, dysuria, flank pain and urgency  Musculoskeletal: Positive for arthralgias and back pain  Negative for gait problem and myalgias  Skin: Negative for rash  Allergic/Immunologic: Negative  Neurological: Negative for dizziness, weakness, light-headedness and headaches  Hematological: Negative  Psychiatric/Behavioral: Positive for dysphoric mood  Negative for behavioral problems and sleep disturbance  The patient is nervous/anxious  PHQ-2/9 Depression Screening            Objective:  Vitals:    04/19/22 0933   BP: 126/62   BP Location: Left arm   Patient Position: Sitting   Pulse: 86   Temp: 98 1 °F (36 7 °C)   SpO2: 95%   Weight: (!) 157 kg (346 lb 6 oz)   Height: 5' 10" (1 778 m)     Body mass index is 49 7 kg/m²  Physical Exam  Constitutional:       General: He is not in acute distress  Appearance: He is well-developed  He is obese  He is not diaphoretic  HENT:      Head: Normocephalic and atraumatic  Right Ear: External ear normal       Left Ear: External ear normal       Nose: Nose normal       Mouth/Throat:      Pharynx: No oropharyngeal exudate  Eyes:      General: No scleral icterus  Right eye: No discharge  Left eye: No discharge  Conjunctiva/sclera: Conjunctivae normal       Pupils: Pupils are equal, round, and reactive to light  Neck:      Thyroid: No thyromegaly  Cardiovascular:      Rate and Rhythm: Normal rate and regular rhythm  Heart sounds: Normal heart sounds  No murmur heard  No friction rub  No gallop  Pulmonary:      Effort: Pulmonary effort is normal  No respiratory distress        Breath sounds: Normal breath sounds  No wheezing or rales  Abdominal:      General: Bowel sounds are normal  There is no distension  Palpations: Abdomen is soft  Tenderness: There is no abdominal tenderness  Musculoskeletal:         General: No deformity  Cervical back: Normal range of motion and neck supple  Lumbar back: Spasms present  Decreased range of motion  Right knee: Tenderness present  Left knee: Tenderness present  Skin:     General: Skin is warm and dry  Neurological:      Mental Status: He is alert and oriented to person, place, and time  Cranial Nerves: No cranial nerve deficit  Psychiatric:         Mood and Affect: Mood is anxious and depressed  Behavior: Behavior normal          Thought Content:  Thought content normal          Judgment: Judgment normal

## 2022-04-19 NOTE — ASSESSMENT & PLAN NOTE
Previously diagnosed and pt struggling with CPAP compliance   Will refer to sleep medicine to see if he is an [place]

## 2022-04-21 ENCOUNTER — TELEPHONE (OUTPATIENT)
Dept: OTOLARYNGOLOGY | Facility: CLINIC | Age: 32
End: 2022-04-21

## 2022-04-21 NOTE — TELEPHONE ENCOUNTER
I called and left message for the second time for patient to call the office back regarding an ENT consult

## 2022-05-19 DIAGNOSIS — H10.13 ALLERGIC CONJUNCTIVITIS OF BOTH EYES: ICD-10-CM

## 2022-05-19 RX ORDER — OLOPATADINE HYDROCHLORIDE 1 MG/ML
1 SOLUTION/ DROPS OPHTHALMIC 2 TIMES DAILY
Qty: 5 ML | Refills: 0 | Status: SHIPPED | OUTPATIENT
Start: 2022-05-19

## 2022-06-08 ENCOUNTER — APPOINTMENT (OUTPATIENT)
Dept: RADIOLOGY | Facility: MEDICAL CENTER | Age: 32
End: 2022-06-08
Payer: COMMERCIAL

## 2022-06-08 ENCOUNTER — CONSULT (OUTPATIENT)
Dept: PAIN MEDICINE | Facility: CLINIC | Age: 32
End: 2022-06-08
Payer: COMMERCIAL

## 2022-06-08 VITALS
WEIGHT: 315 LBS | HEIGHT: 70 IN | BODY MASS INDEX: 45.1 KG/M2 | SYSTOLIC BLOOD PRESSURE: 145 MMHG | HEART RATE: 78 BPM | DIASTOLIC BLOOD PRESSURE: 87 MMHG

## 2022-06-08 DIAGNOSIS — M54.12 CERVICAL RADICULOPATHY: ICD-10-CM

## 2022-06-08 DIAGNOSIS — M54.2 NECK PAIN: Primary | ICD-10-CM

## 2022-06-08 DIAGNOSIS — M54.16 LUMBAR RADICULOPATHY: ICD-10-CM

## 2022-06-08 DIAGNOSIS — G89.29 CHRONIC PAIN OF BOTH KNEES: ICD-10-CM

## 2022-06-08 DIAGNOSIS — M54.2 NECK PAIN: ICD-10-CM

## 2022-06-08 DIAGNOSIS — M25.562 CHRONIC PAIN OF BOTH KNEES: ICD-10-CM

## 2022-06-08 DIAGNOSIS — M54.50 CHRONIC LOW BACK PAIN WITHOUT SCIATICA, UNSPECIFIED BACK PAIN LATERALITY: ICD-10-CM

## 2022-06-08 DIAGNOSIS — G89.29 CHRONIC LOW BACK PAIN WITHOUT SCIATICA, UNSPECIFIED BACK PAIN LATERALITY: ICD-10-CM

## 2022-06-08 DIAGNOSIS — M25.561 CHRONIC PAIN OF BOTH KNEES: ICD-10-CM

## 2022-06-08 PROCEDURE — 99244 OFF/OP CNSLTJ NEW/EST MOD 40: CPT | Performed by: ANESTHESIOLOGY

## 2022-06-08 PROCEDURE — 72050 X-RAY EXAM NECK SPINE 4/5VWS: CPT

## 2022-06-08 PROCEDURE — 72114 X-RAY EXAM L-S SPINE BENDING: CPT

## 2022-06-08 RX ORDER — LINACLOTIDE 145 UG/1
CAPSULE, GELATIN COATED ORAL
COMMUNITY
Start: 2022-06-02 | End: 2022-07-20

## 2022-06-08 RX ORDER — ONDANSETRON 4 MG/1
TABLET, ORALLY DISINTEGRATING ORAL
COMMUNITY
Start: 2022-05-23

## 2022-06-08 NOTE — PROGRESS NOTES
Assessment:  1  Neck pain    2  Chronic pain of both knees    3  Chronic low back pain without sciatica, unspecified back pain laterality    4  Cervical radiculopathy    5  Lumbar radiculopathy        Plan:  Patient is a 25-year-old male complaints of neck pain, arm pain, hand pain, hand weakness, low back pain, bilateral leg weakness presents to office for initial consultation  CT abdomen pelvis did show some disc bulges at multiple levels however unable to discern any significant stenosis  Patient does report restless legs with sleeping at nighttime and difficulty standing for long periods of time secondary to leg weakness  Patient struggles to go up and down stairs secondary to weakness in the legs often feeling like he has to concentrate significantly to put extra force when taking a step  At this time we will need to exhaust conservative therapy obtain more diagnostic imaging to better assess prognosis and treatment for patient  At this time we will forego any pharmaceutical management  1   We will order EMG for bilateral upper lower extremities to rule out any radiculopathy versus peripheral neuropathy will cause patient's weakness  2  We will order an x-ray of the cervical spine to better assess the degenerative changes that correlate patient's current presentation  3  We will order physical therapy for cervical and lumbar strengthening exercises  4  Follow-up in 6 weeks to review diagnostic imaging in order MRI of the cervical lumbar spine to better assess the discogenic pathology behind patient's current presentation  [place] Prescription Drug Monitoring Program report was reviewed and was appropriate     History of Present Illness: The patient is a 27 y o  male who presents for consultation in regards to Back Pain, Leg Pain, Hand Pain, Knee Pain, Foot Pain, and Hip Pain  Symptoms have been present for several years  Symptoms began without any precipitating injury or trauma   Pain is reported to be 5 on the numeric rating scale  Symptoms are felt nearly constantly and worst in the no typical pattern  Symptoms are characterized as cramping, dull/aching, numbing, tingling, pressure-like and throbbing  Symptoms are associated with bilateral arm weakness and bilateral leg weakness  Aggravating factors include standing, bending, leaning forward, leaning bckward, sitting, walking, exercise and bowel movements  Relieving factors include nothing  No change in symptoms with kneeling, lying down, relaxation and coughing/sneezing  Treatments that have been helpful include chiropractic manipulation  heat/ice have provided no relief  Medications to relieve symptoms include none  Review of Systems:    Review of Systems   Constitutional: Positive for unexpected weight change  HENT: Positive for nosebleeds and sore throat  Eyes: Positive for visual disturbance  Respiratory: Positive for shortness of breath  Endocrine: Positive for polyphagia and polyuria  Musculoskeletal: Positive for arthralgias and myalgias  Neurological: Positive for numbness  Psychiatric/Behavioral: Positive for decreased concentration  The patient is nervous/anxious  Depression     All other systems reviewed and are negative          Past Medical History:   Diagnosis Date    Allergic     Anemia 6/24/2018    Anxiety     from records    Benign essential hypertension 11/17/2016    Bipolar 1 disorder (HCC)     from records    Chronic pain     Claustrophobia 3/15/2018    Community acquired pneumonia 6/24/2018    Depressed 7/14/2016    Depression     from records    GERD (gastroesophageal reflux disease)     Hepatitis C virus infection 8/14/2014    [facility]in abuse ([facility] ) 7/24/2018    Infectious viral hepatitis     Obesity 10/12/2016    Obstructive sleep apnea     from records    Sleep disorder     Substance abuse ([facility] )        Past Surgical History:   Procedure Laterality Date    RI DRAIN SKIN ABSCESS COMPLIC Left 4/9/4418    Procedure: INCISION AND DRAINAGE (I&D) EXTREMITY;  Surgeon: Magi Pak MD;  Location: [facility] MA[facility] OR;  Service: Orthopedics    MI LAP,SURG,COLECTOMY, PARTIAL, W/ANAST Left 5/21/2020    Procedure: LAPAROSCOPIC LEFT HEMICOLECTOMY TAKEDOWN SPLENIC FLECTURE, COLORECTAL ANASTOMOSIS ;  Surgeon: Chavez Edmonds MD;  Location: [facility] MAIN OR;  Service: Colorectal    WISDOM TOOTH EXTRACTION         Family History   Problem Relation Age of Onset    Colon cancer Father     Alzheimer's disease Maternal Grandmother     Depression Family     Diabetes Mother        Social History     Occupational History    Not on file   Tobacco Use    Smoking status: Former Smoker     Packs/day: 1 00     Years: 13 00     Pack years: 13 00     Types: E-Cigarettes    Smokeless tobacco: Never Used    Tobacco comment: vapes   Vaping Use    Vaping Use: Every day    Substances: Nicotine   Substance and Sexual Activity    Alcohol use: Not Currently    Drug use: Not Currently     Types: Heroin, Marijuana, Prescription     Comment: on Methadone    Sexual activity: Not Currently         Current Outpatient Medications:     diclofenac (VOLTAREN) 75 mg EC tablet, Take 1 tablet (75 mg total) by mouth 2 (two) times a day, Disp: 60 tablet, Rfl: 2    ergocalciferol (VITAMIN D2) 50,000 units, take 1 capsule by mouth every week (Patient not taking: Reported on 4/19/2022), Disp: 4 capsule, Rfl: 3    FLUoxetine (PROzac) 20 MG tablet, Take 1 tablet (20 mg total) by mouth daily, Disp: 30 tablet, Rfl: 5    Linzess 145 MCG CAPS, , Disp: , Rfl:     METHADONE HCL PO, Take 110 mg by mouth daily 150mg daily , Disp: , Rfl:     olopatadine (PATANOL) 0 1 % ophthalmic solution, Administer 1 drop to both eyes 2 (two) times a day, Disp: 5 mL, Rfl: 0    omeprazole (PriLOSEC) 20 mg delayed release capsule, Take 20 mg by mouth daily, Disp: , Rfl:     ondansetron (ZOFRAN-ODT) 4 mg disintegrating tablet, , Disp: , Rfl:    triamcinolone (KENALOG) 0 1 % cream, Apply topically 2 (two) times a day, Disp: 30 g, Rfl: 0    Allergies   Allergen Reactions    Bee Venom Angioedema       Physical Exam:    /87   Pulse 78   Ht 5' 10" (1 778 m)   Wt (!) 154 kg (339 lb)   BMI 48 64 kg/m²     Constitutional: normal, well developed, well nourished, alert, in no distress and non-toxic and no overt pain behavior  and obese  Eyes: anicteric  HEENT: grossly intact  Neck: supple, symmetric, trachea midline and no masses   Pulmonary:even and unlabored  Cardiovascular:No edema or pitting edema present  Skin:Normal without rashes or lesions and well hydrated  Psychiatric:Mood and affect appropriate  Neurologic:Cranial Nerves II-XII grossly intact  Musculoskeletal:antalgic     Cervical Spine examination demonstrates  Decreased ROM secondary to pain with lateral rotation to the left/right and bending to the left/right, in addition to neck flexion  4/5 upper extremity strength in all muscle groups bilaterally  Negative Spurling's maneuver to the b/l Ue, sensitivity to light touch intact b/l Ue  Lumbar/Sacral Spine examination demonstrates  Decreased range of motion lumbar spine with pain upon: flexion, lateral rotation to the left/right, and bending to the left/right  Bilateral lumbar paraspinals tender to palpation  Muscle spasms noted in the lumbar area bilaterally  3/5 lower extremity strength in all muscle groups bilaterally  Positive seated straight leg raise for bilateral lower extremities  Sensitivity to light touch intact bilateral lower extremities  2+ reflexes in the patella and Achilles  No ankle clonus     Imaging  No orders to display       No orders of the defined types were placed in this encounter

## 2022-06-08 NOTE — PATIENT INSTRUCTIONS
Core Strengthening Exercises   WHAT YOU NEED TO KNOW:   What do I need to know about core strengthening exercises? Your core includes the muscles of your lower back, hip, pelvis, and abdomen  Core strengthening exercises help heal and strengthen these muscles  This helps prevent another injury, and keeps your pelvis, spine, and hips in the correct position  What do I need to know about exercise safety? Talk to your healthcare provider before you start an exercise program  A physical therapist can teach you how to do core strengthening exercises safely  Do the exercises on a mat or firm surface  A firm surface will support your spine and prevent low back pain  Do not do these exercises on a bed  Move slowly and smoothly  Avoid fast or jerky motions  Stop if you feel pain  Core exercises should not be painful  Stop if you feel pain  Breathe normally during core exercises  Do not hold your breath  This may cause an increase in blood pressure and prevent muscle strengthening  Your healthcare provider will tell you when to inhale and exhale during the exercise  Begin all of your exercises with abdominal bracing  Abdominal bracing helps warm up your core muscles  You can also practice abdominal bracing throughout the day  Lie on your back with your knees bent and feet flat on the floor  Place your arms in a relaxed position beside your body  Tighten your abdominal muscles  Pull your belly button in and up toward your spine  Hold for 5 seconds  Relax your muscles  Repeat 10 times  How do I perform core strengthening exercises? Your healthcare provider will tell you how often to do these exercises  The provider will also tell you how many repetitions of each exercise you should do  Hold each exercise for 5 seconds or as directed  As you get stronger, increase your hold to 10 to 15 seconds  You can do some of these exercises on a stability ball, or with a weight   Ask your healthcare provider how to use a stability ball or weight for these exercises:  Bridging:  Lie on your back with your knees bent and feet flat on the floor  Rest your arms at your side  Tighten your buttocks, and then lift your hips 1 inch off the floor  Hold for 5 seconds  When you can do this exercise without pain for 10 seconds, increase the distance you lift your hips  A good goal is to be able to lift your hips so that your shoulders, hips, and knees are in a straight line  Dead bug:  Lie on your back with your knees bent and feet flat on the floor  Place your arms in a relaxed position beside your body  Begin with abdominal bracing  Next, raise one leg, keeping your knee bent  Hold for 5 seconds  Repeat with the other leg  When you can do this exercise without pain for 10 to 15 seconds, you may raise one straight leg and hold  Repeat with the other leg  Quadruped:  Place your hands and knees on the floor  Keep your wrists directly below your shoulders and your knees directly below your hips  Pull your belly button in toward your spine  Do not flatten or arch your back  Tighten your abdominal muscles below your belly button  Hold for 5 seconds  When you can do this exercise without pain for 10 to 15 seconds, you may extend one arm and hold  Repeat on the other side  Side bridge exercises:      Standing side bridge:  Stand next to a wall and extend one arm toward the wall  Place your palm flat on the wall with your fingers pointing upward  Begin with abdominal bracing  Next, without moving your feet, slowly bend your arm to 90 degrees  Hold for 5 seconds  Repeat on the other side  When you can do this exercise without pain for 10 to 15 seconds, you may do the bent leg side bridge on the floor  Bent leg side bridge:  Lie on one side with your legs, hips, and shoulders in a straight line  Prop yourself up onto your forearm so your elbow is directly below your shoulder   Bend your knees back to 90 degrees  Begin with abdominal bracing  Next, lift your hips and balance yourself on your forearm and knees  Hold for 5 seconds  Repeat on the other side  When you can do this exercise without pain for 10 to 15 seconds, you may do the straight leg side bridge on the floor  Straight leg side bridge:  Lie on one side with your legs, hips, and shoulders in a straight line  Prop yourself up onto your forearm so your elbow is directly below your shoulder  Begin with abdominal bracing  Lift your hips off the floor and balance yourself on your forearm and the outside of your flexed foot  Do not let your ankle bend sideways  Hold for 5 seconds  Repeat on the other side  When you can do this exercise without pain for 10 to 15 seconds, ask your healthcare provider for more advanced exercises  Superman:  Lie on your stomach  Extend your arms forward on the floor  Tighten your abdominal muscles and lift your right hand and left leg off the floor  Hold this position  Slowly return to the starting position  Tighten your abdominal muscles and lift your left hand and right leg off the floor  Hold this position  Slowly return to the starting position  Clam:  Lie on your side with your knees bent  Put your bottom arm under your head to keep your neck in line  Put your top hand on your hip to keep your pelvis from moving  Put your heels together, and keep them together during this exercise  Slowly raise your top knee toward the ceiling  Then lower your leg so your knees are together  Repeat this exercise 10 times  Then switch sides and do the exercise 10 times with the other leg  Curl up:  Lie on your back with your knees bent and feet flat on the floor  Place your hands, palms down, underneath your lower back  Next, with your elbows on the floor, lift your shoulders and chest 2 to 3 inches off the floor  Keep your head in line with your shoulders  Hold this position   Slowly return to the starting position  Straight leg raises:  Lie on your back with one leg straight  Bend the other knee and place your foot flat on the floor  Tighten your abdominal muscles  Keep your leg straight and slowly lift it straight up 6 to 12 inches off the floor  Hold this position  Lower your leg slowly  Do as many repetitions as directed on this side  Repeat with the other leg  Plank:  Lie on your stomach  Bend your elbows and place your forearms flat on the floor  Lift your chest, stomach, and knees off the floor  Make sure your elbows are below your shoulders  Your body should be in a straight line  Do not let your hips or lower back sink to the ground  Squeeze your abdominal muscles together and hold for 15 seconds  To make this exercise harder, hold for 30 seconds or lift 1 leg at a time  Bicycles:  Lie on your back  Bend both knees and bring them toward your chest  Your calves should be parallel to the floor  Place the palms of your hands on the back of your head  Straighten your right leg and keep it lifted 2 inches off the floor  Raise your head and shoulders off the floor and twist towards your left  Keep your head and shoulders lifted  Bend your right knee while you straighten your left leg  Keep your left leg 2 inches off the floor  Twist your head and chest towards the left leg  Continue to straighten 1 leg at a time and twist        When should I contact my healthcare provider? You have sharp or worsening pain during exercise or at rest     You have questions or concerns about your condition, care, or exercise program     CARE AGREEMENT:   You have the right to help plan your care  Learn about your health condition and how it may be treated  Discuss treatment options with your healthcare providers to decide what care you want to receive  You always have the right to refuse treatment  The above information is an  only   It is not intended as medical advice for individual conditions or treatments  Talk to your doctor, nurse or pharmacist before following any medical regimen to see if it is safe and effective for you  © Copyright [facility] 2022 Information is for End User's use only and may not be sold, redistributed or otherwise used for commercial purposes   All illustrations and images included in CareNotes® are the copyrighted property of A D A M , Inc  or [address] [place] [address]

## 2022-06-24 ENCOUNTER — HOSPITAL ENCOUNTER (OUTPATIENT)
Dept: SLEEP CENTER | Facility: CLINIC | Age: 32
Discharge: HOME | End: 2022-06-24
Payer: COMMERCIAL

## 2022-06-24 ENCOUNTER — OFFICE VISIT (OUTPATIENT)
Dept: SLEEP CENTER | Facility: CLINIC | Age: 32
End: 2022-06-24
Payer: COMMERCIAL

## 2022-06-24 VITALS
HEART RATE: 71 BPM | WEIGHT: 315 LBS | HEIGHT: 70 IN | SYSTOLIC BLOOD PRESSURE: 155 MMHG | BODY MASS INDEX: 45.1 KG/M2 | DIASTOLIC BLOOD PRESSURE: 89 MMHG

## 2022-06-24 DIAGNOSIS — G47.33 OBSTRUCTIVE SLEEP APNEA: Primary | ICD-10-CM

## 2022-06-24 DIAGNOSIS — G25.81 RESTLESS LEGS SYNDROME (RLS): ICD-10-CM

## 2022-06-24 DIAGNOSIS — E66.01 CLASS 3 SEVERE OBESITY DUE TO EXCESS CALORIES WITH SERIOUS COMORBIDITY AND BODY MASS INDEX (BMI) OF 45.0 TO 49.9 IN ADULT (HCC): ICD-10-CM

## 2022-06-24 DIAGNOSIS — G25.81 RESTLESS LEG SYNDROME: ICD-10-CM

## 2022-06-24 DIAGNOSIS — G47.33 OBSTRUCTIVE SLEEP APNEA: ICD-10-CM

## 2022-06-24 DIAGNOSIS — Z91.89 AT RISK FOR CENTRAL SLEEP APNEA: ICD-10-CM

## 2022-06-24 PROCEDURE — 99244 OFF/OP CNSLTJ NEW/EST MOD 40: CPT | Performed by: PSYCHIATRY & NEUROLOGY

## 2022-06-24 PROCEDURE — 95811 POLYSOM 6/>YRS CPAP 4/> PARM: CPT

## 2022-06-24 PROCEDURE — 95811 POLYSOM 6/>YRS CPAP 4/> PARM: CPT | Performed by: PSYCHIATRY & NEUROLOGY

## 2022-06-24 RX ORDER — FLUOXETINE HYDROCHLORIDE 20 MG/1
CAPSULE ORAL
COMMUNITY
Start: 2022-06-21 | End: 2022-07-20

## 2022-06-24 NOTE — PATIENT INSTRUCTIONS
Sleep Apnea   AMBULATORY CARE:   Sleep apnea  is a condition that causes you to stop breathing often during sleep  Types of sleep apnea:   Obstructive sleep apnea (CATA)  is the most common kind  The muscles and tissues around your throat relax and block air from passing through  Obesity, use of alcohol or cigarettes, or a family history are common causes  CATA may increase your risk for complications after surgery  Central sleep apnea (CSA)  means your brain does not send signals to the muscles that control breathing  You do not take a breath even though your airway is open  Common causes include medical conditions such as heart failure, being older than 40, or use of opioids  Complex (or mixed) sleep apnea  means you have both obstructive and central sleep apnea  Common signs and symptoms:   Loud snoring or long pauses in breathing    Feeling sleepy, slow, and tired during the day    Snorting, gasping, or choking while you sleep, and waking up suddenly because of these    Feeling irritable during the day    Dry mouth or a headache in the mornings    Heavy night sweating    A hard time thinking, remembering things, or focusing on your tasks the following day    Call your local emergency number (911 in the [address] [facility]) if:   You have chest pain or trouble breathing  Call your doctor if:   You have new or worsening signs or symptoms  You have questions or concerns about your condition or care  Treatment  depends on the kind of apnea you have  A mouth device  may be needed if you have mild sleep apnea  These are designed to keep your throat open  Ask your dentist or healthcare provider about the best mouth device for you  A machine  may be used to help you get more air during sleep  A mask may be placed over your nose and mouth, or just your nose  The mask is hooked to the machine  You will get air through the mask      A continuous positive airway pressure (CPAP) machine  is used to keep your airway open during sleep  The machine blows a gentle stream of air into the mask when you breathe  This helps keep your airway open so you can breathe more regularly  Extra oxygen may be given through the machine  A bilevel positive airway pressure (BiPAP) machine  gives air but lowers the pressure when you breathe out  An adaptive servo-ventilator (ASV)  is a machine that learns your usual breathing pattern  Then, it uses pressure to give you air and prevent stops in your breathing  Surgery  to expand your airway or remove extra tissues may be needed  Surgery is usually only considered if other treatments do not work  Manage or prevent sleep apnea:   Reach and maintain a healthy weight  Ask your healthcare provider what a healthy weight is for you  Ask him or her to help you create a safe weight loss plan if you are overweight  Even a small goal of a 10% weight loss can improve your symptoms  Do not smoke  Nicotine and other chemicals in cigarettes and cigars can cause lung damage  Ask your healthcare provider for information if you currently smoke and need help to quit  E-cigarettes or smokeless tobacco still contain nicotine  Talk to your healthcare provider before you use these products  Do not drink alcohol or take sedative medicine before you go to sleep  Alcohol and sedatives can relax the muscles and tissues around your throat  This can block the airflow to your lungs  Sleep on your side or use pillows designed to prevent sleep apnea  This prevents your tongue or other tissues from blocking your throat  You can also raise the head of your bed  Follow up with your doctor or specialist as directed: You may need to have blood tests during your follow-up visits  Work with your provider to find the right breathing support equipment and settings for you  Write down your questions so you remember to ask them during your visits    © Copyright [employer] 2022 Information is for End User's use only and may not be sold, redistributed or otherwise used for commercial purposes  All illustrations and images included in CareNotes® are the copyrighted property of A [employer] M [employer] Inc  or [employer]  The above information is an  only  It is not intended as medical advice for individual conditions or treatments  Talk to your doctor, nurse or pharmacist before following any medical regimen to see if it is safe and effective for you  Nursing Support:  When: Monday through Friday 7A-5PM except holidays  Where: Our direct line is [phone]  If you are having a true emergency please call 911  In the event that the line is busy or it is after hours please leave a voice message and we will return your call  Please speak clearly, leaving your full name, birth date, best number to reach you and the reason for your call  Medication refills: We will need the name of the medication, the dosage, the ordering provider, whether you get a 30 or 90 day refill, and the pharmacy name and address  Medications will be ordered by the provider only  Nurses cannot call in prescriptions  Please allow 7 days for medication refills  Physician requested updates: If your provider requested that you call with an update after starting medication, please be ready to provide us the medication and dosage, what time you take your medication, the time you attempt to fall asleep, time you fall asleep, when you wake up, and what time you get out of bed  Sleep Study Results: We will contact you with sleep study results and/or next steps after the physician has reviewed your testing  It is very important to avoid driving while drowsy, this can be very dangerous or even cause serious injury or death  If sleepy, it is not safe to get behind the wheel  If you are driving and feels sleepy, it is very important to pull over right away  Even losing control of the car for a split second can be deadly  If you feel you cannot control when sleepiness occurs and cannot prevented, it is important to not drive at all until this improves  Please let me know if you experience this as it is very important

## 2022-06-24 NOTE — PROGRESS NOTES
Assessment/Plan:    1  Obstructive sleep apnea  Assessment & Plan:  He has a very severe case of obstructive sleep apnea,  on his previous sleep study, he is presently not treated  We discussed that his disease severity and symptoms are very concerning and it is extremely important we reassess this urgently  He understands the importance of this as he is quite symptomatic during the day  We reviewed how severe obstructive sleep apnea relates to overall health and the importance of treating that standpoint  In the past he did not have an optimal result with CPAP or BiPAP  It is conceivable he may require treatment with BiPAP S/T or alternatively adaptive servo-ventilation  Fortunately, there is a cancellation on the schedule so he was able to schedule his sleep study for this evening  Most likely he will require several sleep tests to determine optimal settings on a machine  Orders:  -     Ambulatory Referral to Sleep Medicine  -     Split Study; Future; Expected date: 06/24/2022    2  At risk for central sleep apnea  Assessment & Plan:  As he is on methadone maintenance at a relatively high dose, he is at risk for central sleep apnea  We discussed that use of methadone in someone with severe untreated sleep apnea is risky and therefore this again is another reason to urgently reassess his sleep apnea and treat him  He had some degree of central sleep apnea on his last test, he was not on methadone maintenance at that time  Orders:  -     Split Study; Future; Expected date: 06/24/2022    3  Restless legs syndrome (RLS)  -     Iron Panel (Includes Ferritin, Iron Sat%, Iron, and TIBC); Future    4  Class 3 severe obesity due to excess calories with serious comorbidity and body mass index (BMI) of 45 0 to 49 9 in adult [facility])  Assessment & Plan:  His weight is stable since his last visit    We discussed that his body mass index would rule out a hypoglossal nerve stimulator, something that he initially expressed interest in at the beginning of our visit  On his previous sleep test, he had sustained hypoxemia and in looking at his blood work, he has a serum carbon dioxide level of 30 millimoles per L-it is conceivable he could have obesity hypoventilation syndrome contributing to the clinical picture  5  Restless leg syndrome  Assessment & Plan:  He was diagnosed with restless leg syndrome and describe symptoms that with fit this  Methadone is use sometimes in refractory cases but despite this he continues to have symptoms  I would like to check iron studies to rule out iron deficiency as a cause  This could potentially improve if sleep apnea is treated his case  Subjective:      Patient ID: Gifty Salas is a 27 y o  male  HPI    Mr Lashanda Reyes presents in consultation-he is accompanied by his mother who assists in the history  he has a history of severe obstructive sleep apnea, previous , weight 335 lb at the time of his previous sleep study  He was last seen in 2018 at which time he was doing well with CPAP treatment but had a high residual AHI with his CPAP machine which was set at 16 cm H2O  At present he does not have a CPAP machine which he had to return to the DME  He returns as he "sleeps horribly"  He is restless  He gasps, snores, stops breathing  His mother who accompanies him, is concerned    Has sleep eating the few years, occurs 2-3 days a week, has food in the room when he is eating, he is not sure if he leaves the room to eat while he is sleeping  He notes a history of opioid abuse and addiction in the past, is now on a methadone maintenance program, to currently taking 150 mg a day  He was not on methadone around the time of his previous sleep study but notes he was abusing heroin around that time  The patient indicates preference to explore a hypoglossal nerve stimulator    He works as a  as a labor, 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  He typically has a variable bedtime  He lays down to watch TV around 9 pm, wakes hourly, wakes up around 530-6 a m  He has frequent awakenings during the night  Wakes with a feeling he can't breathe      Has restless leg syndrome  Has a feeling he has to move his legs often  He thinks it is not as bad on methadone  Has had RLS for 10 years  His mother has RLS  Tries to avoid caffeine     He is sleepy during the day  He tries to nap at 11 am- 12 pm   Despite this, he still dozes after eating or when inactive  No drowsy driving, no close calls or MVA from sleepiness    Has anxiety  Has occasional depression   Was diagnosed with bipolar disorder and MAXIMO in the past      The following portions of the patient's history were reviewed and updated as appropriate: allergies, current medications, past family history, past medical history, past social history, past surgical history and problem list     Review of Systems    Genitourinary need to urinate more than twice a night and difficulty with erection   Cardiology none   Gastrointestinal none   Neurology need to move extremities, muscle weakness, numbness/tingling of an extremity, forgetfulness, poor concentration or confusion,  and difficulty with memory   Constitutional claustrophobia, fatigue, excessive sweating at night and weight change   Integumentary rash or dry skin   Psychiatry anxiety, depression, aggressiveness or irritability and mood change   Musculoskeletal joint pain, muscle aches, back pain, legs twitching/jerking, sciatica and leg cramps   Pulmonary shortness of breath with activity, snoring and difficulty breathing when lying flat    ENT throat clearing and ringing in ears   Endocrine excessive thirst and frequent urination   Hematological none       Objective:      /89 (BP Location: Left arm, Patient Position: Sitting, Cuff Size: Large)   Pulse 71   Ht 5' 10" (1 778 m)   Wt (!) 152 kg (335 lb) BMI 48 07 kg/m²          Physical Exam  Constitutional:       Appearance: He is obese  HENT:      Head: Normocephalic and atraumatic  Mouth/Throat:      Mouth: Mucous membranes are moist       Comments: Mallampati 4 airway, poor dentition, no tonsillar hypertrophy  Eyes:      Extraocular Movements: Extraocular movements intact  Pupils: Pupils are equal, round, and reactive to light  Cardiovascular:      Rate and Rhythm: Normal rate  Pulses: Normal pulses  Heart sounds: Normal heart sounds  Pulmonary:      Effort: Pulmonary effort is normal       Breath sounds: Normal breath sounds  Musculoskeletal:      Right lower leg: No edema  Left lower leg: No edema  Neurological:      Mental Status: He is alert  Psychiatric:         Mood and Affect: Mood normal          Behavior: Behavior normal          Thought Content:  Thought content normal          Judgment: Judgment normal        data reviewed-notes from Primary Care and Pain Medicine to correlate with a history as well as blood work including serum comprehensive metabolic panel to assess serum carbon dioxide level

## 2022-06-24 NOTE — ASSESSMENT & PLAN NOTE
He was diagnosed with restless leg syndrome and describe symptoms that with fit this  Methadone is use sometimes in refractory cases but despite this he continues to have symptoms  I would like to check iron studies to rule out iron deficiency as a cause  This could potentially improve if sleep apnea is treated his case

## 2022-06-24 NOTE — ASSESSMENT & PLAN NOTE
His weight is stable since his last visit  We discussed that his body mass index would rule out a hypoglossal nerve stimulator, something that he initially expressed interest in at the beginning of our visit  On his previous sleep test, he had sustained hypoxemia and in looking at his blood work, he has a serum carbon dioxide level of 30 millimoles per L-it is conceivable he could have obesity hypoventilation syndrome contributing to the clinical picture

## 2022-06-24 NOTE — ASSESSMENT & PLAN NOTE
As he is on methadone maintenance at a relatively high dose, he is at risk for central sleep apnea  We discussed that use of methadone in someone with severe untreated sleep apnea is risky and therefore this again is another reason to urgently reassess his sleep apnea and treat him  He had some degree of central sleep apnea on his last test, he was not on methadone maintenance at that time

## 2022-06-24 NOTE — ASSESSMENT & PLAN NOTE
He has a very severe case of obstructive sleep apnea,  on his previous sleep study, he is presently not treated  We discussed that his disease severity and symptoms are very concerning and it is extremely important we reassess this urgently  He understands the importance of this as he is quite symptomatic during the day  We reviewed how severe obstructive sleep apnea relates to overall health and the importance of treating that standpoint  In the past he did not have an optimal result with CPAP or BiPAP  It is conceivable he may require treatment with BiPAP S/T or alternatively adaptive servo-ventilation  Fortunately, there is a cancellation on the schedule so he was able to schedule his sleep study for this evening  Most likely he will require several sleep tests to determine optimal settings on a machine

## 2022-06-24 NOTE — PROGRESS NOTES
Review of Systems      Genitourinary need to urinate more than twice a night and difficulty with erection   Cardiology none   Gastrointestinal none   Neurology need to move extremities, muscle weakness, numbness/tingling of an extremity, forgetfulness, poor concentration or confusion,  and difficulty with memory   Constitutional claustrophobia, fatigue, excessive sweating at night and weight change   Integumentary rash or dry skin   Psychiatry anxiety, depression, aggressiveness or irritability and mood change   Musculoskeletal joint pain, muscle aches, back pain, legs twitching/jerking, sciatica and leg cramps   Pulmonary shortness of breath with activity, snoring and difficulty breathing when lying flat    ENT throat clearing and ringing in ears   Endocrine excessive thirst and frequent urination   Hematological none

## 2022-06-25 NOTE — PROGRESS NOTES
Sleep Study Documentation    Pre-Sleep Study       Sleep testing procedure explained to patient:YES    Patient napped prior to study:YES- less than 30 minutes  Napped after 2PM: yes    Caffeine:Dayshift worker after 12PM   Caffeine use:YES- tea  6 to 18 ounces  Soda, 12 ounces  Alcohol:Dayshift workers after 5PM: Alcohol use:NO    Typical day for patient:NO       Study Documentation    Sleep Study Indications:  Snoring, nocturnal choking, BMI>30, chronic or AM headache  Sleep Study: Split Optimal PAP pressure:  23/19 cm H20  Leak:Large  Snore:Not eliminated  REM Obtained:yes  Supplemental O2: no    Minimum SaO2  81 %  Baseline SaO2  93 2 %  PAP mask tried (list all) Medium ResMed AirFit F20 Full Face Mask with no added humidity  PAP mask choice (final) Same  PAP mask type:full face  PAP pressure at which snoring was eliminated  23/19 cm H2O  Minimum SaO2 at final PAP pressure  83 %  Mode of Therapy:CPAP  ETCO2:No  CPAP changed to BiPAP:Yes  If yes why Pressure in excess of 15 cm H2O  Mode of Therapy:BiPAP    EKG abnormalities: no     EEG abnormalities: yes:  ECG artifact throughout study  Averaged Ms  Sleep Study Recorded < 2 hours: N/A    Sleep Study Recorded > 2 hours but incomplete study: N/A    Sleep Study Recorded 6 hours but no sleep obtained: NO          Post-Sleep Study    Medication used at bedtime or during sleep study:YES other prescription medications    Patient reports time it took to fall asleep:greater than 60 minutes    Patient reports waking up during study:3 or more times  Patient reports returning to sleep in 10 to 30 minutes  Patient reports sleeping 2 to 4 hours with dreaming  Patient reports sleep during study:better than usual    Patient rated sleepiness: Somewhat sleepy or tired    PAP treatment:yes: Post PAP treatment patient reports feeling better and  would wear PAP mask at home

## 2022-06-27 DIAGNOSIS — F11.90 CENTRAL SLEEP APNEA COMORBID WITH PRESCRIBED OPIOID USE: Primary | ICD-10-CM

## 2022-06-27 DIAGNOSIS — G47.37 CENTRAL SLEEP APNEA COMORBID WITH PRESCRIBED OPIOID USE: Primary | ICD-10-CM

## 2022-06-27 DIAGNOSIS — G47.33 OSA (OBSTRUCTIVE SLEEP APNEA): ICD-10-CM

## 2022-06-28 ENCOUNTER — TELEPHONE (OUTPATIENT)
Dept: SLEEP CENTER | Facility: CLINIC | Age: 32
End: 2022-06-28

## 2022-06-28 ENCOUNTER — OFFICE VISIT (OUTPATIENT)
Dept: OTOLARYNGOLOGY | Facility: CLINIC | Age: 32
End: 2022-06-28
Payer: COMMERCIAL

## 2022-06-28 ENCOUNTER — APPOINTMENT (OUTPATIENT)
Dept: LAB | Facility: MEDICAL CENTER | Age: 32
End: 2022-06-28
Payer: COMMERCIAL

## 2022-06-28 VITALS
WEIGHT: 315 LBS | SYSTOLIC BLOOD PRESSURE: 118 MMHG | TEMPERATURE: 97.1 F | HEIGHT: 70 IN | OXYGEN SATURATION: 93 % | HEART RATE: 95 BPM | BODY MASS INDEX: 45.1 KG/M2 | DIASTOLIC BLOOD PRESSURE: 82 MMHG

## 2022-06-28 DIAGNOSIS — J30.89 NON-SEASONAL ALLERGIC RHINITIS, UNSPECIFIED TRIGGER: ICD-10-CM

## 2022-06-28 DIAGNOSIS — G25.81 RESTLESS LEGS SYNDROME (RLS): ICD-10-CM

## 2022-06-28 DIAGNOSIS — J34.89 NASAL OBSTRUCTION: ICD-10-CM

## 2022-06-28 DIAGNOSIS — J30.2 SEASONAL ALLERGIES: Primary | ICD-10-CM

## 2022-06-28 LAB
FERRITIN SERPL-MCNC: 129 NG/ML (ref 8–388)
IRON SATN MFR SERPL: 24 % (ref 20–50)
IRON SERPL-MCNC: 57 UG/DL (ref 65–175)
TIBC SERPL-MCNC: 236 UG/DL (ref 250–450)

## 2022-06-28 PROCEDURE — 36415 COLL VENOUS BLD VENIPUNCTURE: CPT

## 2022-06-28 PROCEDURE — 83540 ASSAY OF IRON: CPT

## 2022-06-28 PROCEDURE — 83550 IRON BINDING TEST: CPT

## 2022-06-28 PROCEDURE — 82728 ASSAY OF FERRITIN: CPT

## 2022-06-28 PROCEDURE — 99203 OFFICE O/P NEW LOW 30 MIN: CPT | Performed by: OTOLARYNGOLOGY

## 2022-06-28 RX ORDER — LEVOCETIRIZINE DIHYDROCHLORIDE 5 MG/1
5 TABLET, FILM COATED ORAL EVERY EVENING
Qty: 30 TABLET | Refills: 11 | Status: SHIPPED | OUTPATIENT
Start: 2022-06-28

## 2022-06-28 RX ORDER — FLUTICASONE PROPIONATE 50 MCG
1 SPRAY, SUSPENSION (ML) NASAL 2 TIMES DAILY
Qty: 16 G | Refills: 11 | Status: SHIPPED | OUTPATIENT
Start: 2022-06-28

## 2022-06-28 NOTE — PROGRESS NOTES
Otolaryngology Clinic Visit  Name:  Lena Craven  MRN:  [id number]  Date:  6/28/2022 12:23 PM  ________________________________________________________________________       CHIEF COMPLAINT:   Allergies      HPI:  Lena Craven is a 27 y o  male with PMH as below who is here today for evaluation of allergies  He reports longstanding seasonal allergies  They are worse in the spring and the summer there is a winter aspect to them as well  He does have a dog at home  No history of asthma there is not a strong family history either  He is not tried any recent medications  He has been on oral antihistamines before and Flonase many years ago  He does report nasal dryness with occasional bleeding  He is tried NeilMed irrigations with moderate success  With no facial pressure or pain, no issues with smell, no ear issues  No issues eating drinking breathing or swallowing    No other ENT questions or concerns    PMHx:  Past Medical History:   Diagnosis Date    Allergic     Anemia 6/24/2018    Anxiety     from records    Benign essential hypertension 11/17/2016    Bipolar 1 disorder ([place] [address] )     from records    Chronic pain     Claustrophobia 3/15/2018    Community acquired pneumonia 6/24/2018    Depressed 7/14/2016    Depression     from records    GERD (gastroesophageal reflux disease)     Hepatitis C virus infection 8/14/2014    Heroin abuse ([place] [address] ) 7/24/2018    Infectious viral hepatitis     Obesity 10/12/2016    Obstructive sleep apnea     from records    Sleep disorder     Substance abuse ([place] [address] )        PSHx:  Past Surgical History:   Procedure Laterality Date    MD DRAIN SKIN ABSCESS COMPLIC Left 3/1/0517    Procedure: INCISION AND DRAINAGE (I&D) EXTREMITY;  Surgeon: Martha Colon MD;  Location: [facility];  Service: Orthopedics    MD LAP,SURG,COLECTOMY, PARTIAL, W/ANAST Left 5/21/2020    Procedure: LAPAROSCOPIC LEFT HEMICOLECTOMY TAKEDOWN SPLENIC FLECTURE, COLORECTAL ANASTOMOSIS ; Surgeon: Mariya Pederson MD;  Location: [facility];  Service: Colorectal    WISDOM TOOTH EXTRACTION         FAMHx:  Family History   Problem Relation Age of Onset    Diabetes Mother     Restless legs syndrome Mother     Sleep apnea Mother     Colon cancer Father     Alzheimer's disease Maternal Grandmother     Depression Family        SOCHx:  Social History     Socioeconomic History    Marital status: Single     Spouse name: None    Number of children: None    Years of education: None    Highest education level: None   Occupational History    None   Tobacco Use    Smoking status: Former Smoker     Packs/day: 1 00     Years: 13 00     Pack years: 13 00     Types: E-Cigarettes    Smokeless tobacco: Never Used    Tobacco comment: vapes   Vaping Use    Vaping Use: Every day    Substances: Nicotine   Substance and Sexual Activity    Alcohol use: Not Currently    Drug use: Not Currently     Types: Heroin, Marijuana, Prescription     Comment: on Methadone    Sexual activity: Not Currently   Other Topics Concern    None   Social History Narrative    H/O intravenous drug use in remission    Hepatitis C virus Infection    Lives with parents    No caffeine use    Unemployed     Social Determinants of Health     Financial Resource Strain: Not on file   Food Insecurity: Not on file   Transportation Needs: Not on file   Physical Activity: Not on file   Stress: Not on file   Social Connections: Not on file   Intimate Partner Violence: Not on file   Housing Stability: Not on file       Allergies:   Allergies   Allergen Reactions    Bee Venom Angioedema        MEDS:  Reviewed    ROS:  As above otherwise:   See ROS in encounter by MA       PHYSICAL EXAM:  /82 (BP Location: Left arm, Cuff Size: Large)   Pulse 95   Temp (!) 97 1 °F (36 2 °C) (Temporal)   Ht 5' 10" (1 778 m)   Wt (!) 151 kg (333 lb)   SpO2 93%   BMI 47 78 kg/m²   General: NAD, AOx4  Eyes:  EOMI  Ears:  Right: ear canal normal, TM normal apperance, no fluid  Left: ear canal normal, TM normal apperance, no fluid  Nose:  Left septal deviation, moderate inferior turbinate hypertrophy, no mass/lesions  Oral cavity:  No trismus, no mass/lesions, tonsils 1+  Neck: Trachea is midline; no thyroid nodules, Salivary glands symmetrical, no masses/abnormality on palpation  Lymph:  No cervical lymphadenopathy  Skin:  No obvious facial lesions  Neuro: Face symmetrical, no obvious cranial nerve palsy  No focal deficits   Lungs:  Normal work of breathing, symmetrical chest expansion  Vascular: Well perfused    Procedures:  None     Medical Data Reviewed:  Records reviewed and summarized as in [facility]    Radiology:  None    Labs:  None     Patient Active Problem List   Diagnosis    Acid reflux    Allergic rhinitis    Essential hypertension    B[facility] disorder [facility] [facility]    Generalized anxiety disorder    Insomnia    Class 3 severe obesity due to excess calories with serious comorbidity and body mass index (BMI) of 45 0 to 49 9 in adult ([facility] [address] )    Opioid dependence in remission ([facility] [address] [facility] )    Claustrophobia    Restless leg syndrome    Morbid obesity with BMI of 50 0-59 9, adult (HCC)    Chronic pain disorder    Anxiety    Chronic hepatitis C without hepatic coma ([facility] [address] [facility]    Depressed    Obesity, Class III, BMI 40-49 9 (morbid obesity) ([address]    Obstructive sleep apnea    Drug dependence ([address]    Medication therapy continued    Encounter for monitoring Suboxone maintenance therapy    IV drug abuse ([facility] [address] [facility]    Heroin addiction ([facility] [address] [facility]    Colonic mass    Arthralgia    Eczema    Generalized abdominal pain    Allergic conjunctivitis of both eyes    Muscle ache    Chronic pain of both knees    At risk for central sleep apnea       ASSESSMENT/PLAN:  Alicja Taylor is a 27 y o  male with acute and chronic problems as above who presents with:    1  Seasonal allergies    2  Non-seasonal allergic rhinitis, unspecified trigger    3   Nasal obstruction 50-year-old here today for further evaluation of seasonal allergies  He has signs and symptoms on exam today of allergies  We will start him on an allergy regimen with Xyzal and nighttime and Flonase twice a day  We will see how he improves with us  We will also order RAST testing for further avoidance counseling  He is not a good candidate for immuno testing or immunotherapy given his other medications    -RAST  -Xyzal, Flonase Rx sent    RTC 2 mo        Alexis Aquino MD MPH  Otolaryngology--Head and Neck Surgery  [facility]  6/28/2022 12:23 PM

## 2022-06-28 NOTE — TELEPHONE ENCOUNTER
Called patient and advised of sleep study results  Provided patient with phone number to central scheduling to schedule echocardiogram     Scheduled ASV study for 10/31/22 in [facility]    Added to wait list

## 2022-06-28 NOTE — PROGRESS NOTES
Review of Systems   Constitutional: Negative  HENT: Positive for congestion, ear pain, postnasal drip, rhinorrhea, sinus pressure, sinus pain and sore throat  Eyes: Positive for redness  Respiratory: Positive for cough and shortness of breath  Cardiovascular: Negative  Gastrointestinal: Negative  Endocrine: Negative  Genitourinary: Negative  Musculoskeletal: Negative  Skin: Negative  Allergic/Immunologic: Positive for environmental allergies  Neurological: Positive for headaches  Hematological: Negative  Psychiatric/Behavioral: Negative

## 2022-06-28 NOTE — TELEPHONE ENCOUNTER
----- Message from Fernanda Bautista MD sent at 6/27/2022  7:26 PM EDT -----  Very severe sleep apnea, has a central component  He needs an echocardiogram and an urgent ASV study, both ordered

## 2022-07-06 ENCOUNTER — EVALUATION (OUTPATIENT)
Dept: PHYSICAL THERAPY | Facility: CLINIC | Age: 32
End: 2022-07-06
Payer: COMMERCIAL

## 2022-07-06 DIAGNOSIS — M25.561 CHRONIC PAIN OF BOTH KNEES: ICD-10-CM

## 2022-07-06 DIAGNOSIS — M25.562 CHRONIC PAIN OF BOTH KNEES: ICD-10-CM

## 2022-07-06 DIAGNOSIS — G89.29 CHRONIC LOW BACK PAIN WITHOUT SCIATICA, UNSPECIFIED BACK PAIN LATERALITY: ICD-10-CM

## 2022-07-06 DIAGNOSIS — G89.29 CHRONIC PAIN OF BOTH KNEES: ICD-10-CM

## 2022-07-06 DIAGNOSIS — M54.16 LUMBAR RADICULOPATHY: ICD-10-CM

## 2022-07-06 DIAGNOSIS — M54.12 CERVICAL RADICULOPATHY: ICD-10-CM

## 2022-07-06 DIAGNOSIS — M54.2 NECK PAIN: ICD-10-CM

## 2022-07-06 DIAGNOSIS — M54.50 CHRONIC LOW BACK PAIN WITHOUT SCIATICA, UNSPECIFIED BACK PAIN LATERALITY: ICD-10-CM

## 2022-07-06 PROCEDURE — 97110 THERAPEUTIC EXERCISES: CPT | Performed by: PHYSICAL THERAPIST

## 2022-07-06 PROCEDURE — 97162 PT EVAL MOD COMPLEX 30 MIN: CPT | Performed by: PHYSICAL THERAPIST

## 2022-07-06 NOTE — PROGRESS NOTES
PT Evaluation     Today's date: 2022  Patient name: Reina Kauffman  : 1990  MRN: [id number]  Referring provider: Asher Mallory MD  Dx:   Encounter Diagnosis     ICD-10-CM    1  Chronic pain of both knees  M25 561 Ambulatory referral to Physical Therapy    M25 562     G89 29    2  Chronic low back pain without sciatica, unspecified back pain laterality  M54 50 Ambulatory referral to Physical Therapy    G89 29    3  Neck pain  M54 2 Ambulatory referral to Physical Therapy   4  Cervical radiculopathy  M54 12 Ambulatory referral to Physical Therapy   5  Lumbar radiculopathy  M54 16 Ambulatory referral to Physical Therapy                  Assessment  Assessment details: Pt presents to PT with complaints of low back and knee pain  R worse for both but left knee also bothers at time  He agrees with concentrating on back first  There are no signs of acute discal findings at this time  He has general discomfort almost constantly and is deconditioned  Poor trunk strength, hinging at lumbar spine with extension, and pain with right side bend all present  Skilled PT warranted to address findings and progress towards outlined goals  Pt in agreement with current POC    Impairments: abnormal gait, abnormal or restricted ROM, activity intolerance, impaired physical strength, pain with function, weight-bearing intolerance and poor posture   Functional limitations: transfers are difficult, walking, any physical activity, stairs, sleeping  Symptom irritability: moderateUnderstanding of Dx/Px/POC: good   Prognosis: fair    Goals  Stg: 3-4 weeks  Improve walking tolerance to 5 blocks prior to needing break  Complete transfers without extra time required and 50% reduction in pain  Reduce pain with sleeping by 30%  Assess knee pain and address accordingly    LT-6 weeks  Independent with trunk and leg strength program  Improving walking to 0 25 miles prior to rest and pain  complete trunk flex hold test for 20 seconds  Complete bird dog with good control  Complete stairs reciprocally and minimal pain in low back    Plan  Plan details: TE, NMR, TA, MT, self education, and modalities as needed in order to progress through skilled PT focused on  ROM, strength, balance, coordination   Patient would benefit from: skilled physical therapy  Planned modality interventions: cryotherapy and thermotherapy: hydrocollator packs  Planned therapy interventions: manual therapy, neuromuscular re-education, self care, therapeutic activities, therapeutic exercise and home exercise program  Frequency: 2x week  Duration in weeks: 6  Treatment plan discussed with: patient        Subjective Evaluation    History of Present Illness  Mechanism of injury: 27year old male presenting to PT with long term low back and knee pain  Pain is generally R side low back and iliac crest area  Worst with being active and walking  Walking is limited to approx 1 block as it then starts to make him limp  He has difficulty transitioning on and off cardio equipment at gym and also getting up and down from floor  Saw pain management and they recommended PT  Has to take stairs one at a time as he feels his right knee will give out on him  Did injure it a while ago getting out of car (approx 2 years) and wore a brace for a while  Has seen chiropractor for low back but is not helping like it used too  Currently not working  When he tries to do anything active it takes him a few days to recover      Even sleeping is limited as back bothers him  Pain  At best pain rating: 3  At worst pain ratin    Patient Goals  Patient goals for therapy: decreased pain, increased motion, increased strength, independence with ADLs/IADLs, return to sport/leisure activities and return to work          Objective     General Comments:      Lumbar Comments  Observations - transfers are labored      Multi-seg movement patterns  Flex to ankles, pain in low back  Ext: hinge at mid lumbar  SB: distal third tibia b/l - pain reproduced with R SB into R low back      Lumbar  Pain with pa through mid and lower lumbar    Reduction in pain noted with left sidelying propped      Hip screen  Symmetrical hip mobility, no pain reproduction      Strength/myotome  ankle strength is good  Quad and hamstring 4+/5 b/l    Passive SLR: hamstring limitation (55 deg) but no pain reproduction    Core flex test: 5 sec with pain and difficulty  Bird dog: weight shift noted b/l             Precautions: chronic low back pain, knee pain    Manuals 7-6       Manual traction        Joint work        flexibility        ST        Neuro Re-Ed        TA        TA+march                                                Ther Ex        NuStep/TM        clamshells        Hip ext        Leg press        Ham curls        Knee ext        Ankle strength - band        Standing HR        Lumbar ext        Lumbar flex        Prone alt ue/le 2x8       bridge x20       Bird dog x10       Supine hand to knee x12 5" ea               SKTC        LTR x5 ea       Prone ext        S/l propped L with pain reduction, education for home       Ther Activity                        Gait Training                        Modalities                            Access Code: [email or web address]  URL: [email or web address]  Date: 07/06/2022  Prepared by: Marianna Singletary    Exercises  · Supine Bridge - 1 x daily - 7 x weekly - 3 sets - 10 reps  · Bird Dog - 1 x daily - 7 x weekly - 3 sets - 10 reps  · Full Superman on Table - 1 x daily - 7 x weekly - 3 sets - 10 reps  · Supine Alternating Knee Taps with Hands - 1 x daily - 7 x weekly - 3 sets - 10 reps  · Supine Lower Trunk Rotation - 1 x daily - 7 x weekly - 3 sets - 10 reps

## 2022-07-08 ENCOUNTER — TELEPHONE (OUTPATIENT)
Dept: SLEEP CENTER | Facility: CLINIC | Age: 32
End: 2022-07-08

## 2022-07-08 NOTE — TELEPHONE ENCOUNTER
----- Message from Janneth Navarro MD sent at 7/7/2022  9:01 AM EDT -----  If anything, results are improved over the past 4 years  Given normal ferritin and iron saturation, I would not start an iron supplement   He can review with his PCP to see if the have any further guidance

## 2022-07-11 ENCOUNTER — OFFICE VISIT (OUTPATIENT)
Dept: PHYSICAL THERAPY | Facility: CLINIC | Age: 32
End: 2022-07-11
Payer: COMMERCIAL

## 2022-07-11 DIAGNOSIS — G89.29 CHRONIC PAIN OF BOTH KNEES: Primary | ICD-10-CM

## 2022-07-11 DIAGNOSIS — M54.16 LUMBAR RADICULOPATHY: ICD-10-CM

## 2022-07-11 DIAGNOSIS — M54.2 NECK PAIN: ICD-10-CM

## 2022-07-11 DIAGNOSIS — M54.12 CERVICAL RADICULOPATHY: ICD-10-CM

## 2022-07-11 DIAGNOSIS — M54.50 CHRONIC LOW BACK PAIN WITHOUT SCIATICA, UNSPECIFIED BACK PAIN LATERALITY: ICD-10-CM

## 2022-07-11 DIAGNOSIS — M25.561 CHRONIC PAIN OF BOTH KNEES: Primary | ICD-10-CM

## 2022-07-11 DIAGNOSIS — G89.29 CHRONIC LOW BACK PAIN WITHOUT SCIATICA, UNSPECIFIED BACK PAIN LATERALITY: ICD-10-CM

## 2022-07-11 DIAGNOSIS — M25.562 CHRONIC PAIN OF BOTH KNEES: Primary | ICD-10-CM

## 2022-07-11 PROCEDURE — 97110 THERAPEUTIC EXERCISES: CPT

## 2022-07-11 NOTE — TELEPHONE ENCOUNTER
Returned patient's call and advised of Dr Greer Chua recommendations in detail  Advised to call nurse line with any questions

## 2022-07-11 NOTE — PROGRESS NOTES
Daily Note     Today's date: 2022  Patient name: Claudia Ventura  : 1990  MRN: [id number]  Referring provider: Augusto Greer MD  Dx:   Encounter Diagnosis     ICD-10-CM    1  Chronic pain of both knees  M25 561     M25 562     G89 29    2  Chronic low back pain without sciatica, unspecified back pain laterality  M54 50     G89 29    3  Neck pain  M54 2    4  Cervical radiculopathy  M54 12    5  Lumbar radiculopathy  M54 16        Start Time: 1500  Stop Time: 1600  Total time in clinic (min): 60 minutes    Subjective: Patient reports no pain, just stiffness  Objective: See treatment diary below      Assessment: Tolerated treatment well  Arnaldo Brasher participated in skilled PT session focused on strengthening, stretching, and ROM  Patient focused on trunk ROM exercises for stretching and improving ROM and on core stability  Patient would continue to benefit from skilled PT interventions to address strengthening, stretching, and ROM  Patient demonstrated fatigue post treatment      Plan: Continue per plan of care  Precautions: chronic low back pain, knee pain    Manuals 7-6       Manual traction        Joint work        flexibility        ST        Neuro Re-Ed        TA  5" 2x10      TA+march  2x10                                              Ther Ex        NuStep/TM  TM  2 0 Mph x 10 min      clamshells        Hip ext        Leg press        Ham curls        Knee ext        Ankle strength - band        Standing HR        Lumbar ext        Lumbar flex        Prone alt ue/le 2x8 2x8      bridge x20 X 20      Bird dog x10 x10      Supine hand to knee x12 5" ea 5"x 12 ea              SKTC  10"x10 ea      LTR x5 ea 10"x5 ea      S/l propped L with pain reduction, education for home       Ther Activity                        Gait Training                        Modalities                            Access Code: [email or web address]  URL: [email or web address]  Date: 07/06/2022  Prepared by: Jori Gaytan    Exercises  · Supine Bridge - 1 x daily - 7 x weekly - 3 sets - 10 reps  · Bird Dog - 1 x daily - 7 x weekly - 3 sets - 10 reps  · Full Superman on Table - 1 x daily - 7 x weekly - 3 sets - 10 reps  · Supine Alternating Knee Taps with Hands - 1 x daily - 7 x weekly - 3 sets - 10 reps  · Supine Lower Trunk Rotation - 1 x daily - 7 x weekly - 3 sets - 10 reps

## 2022-07-13 ENCOUNTER — OFFICE VISIT (OUTPATIENT)
Dept: PHYSICAL THERAPY | Facility: CLINIC | Age: 32
End: 2022-07-13
Payer: COMMERCIAL

## 2022-07-13 DIAGNOSIS — M54.50 CHRONIC LOW BACK PAIN WITHOUT SCIATICA, UNSPECIFIED BACK PAIN LATERALITY: ICD-10-CM

## 2022-07-13 DIAGNOSIS — M54.16 LUMBAR RADICULOPATHY: ICD-10-CM

## 2022-07-13 DIAGNOSIS — M54.12 CERVICAL RADICULOPATHY: ICD-10-CM

## 2022-07-13 DIAGNOSIS — G89.29 CHRONIC LOW BACK PAIN WITHOUT SCIATICA, UNSPECIFIED BACK PAIN LATERALITY: ICD-10-CM

## 2022-07-13 DIAGNOSIS — M25.562 CHRONIC PAIN OF BOTH KNEES: Primary | ICD-10-CM

## 2022-07-13 DIAGNOSIS — M54.2 NECK PAIN: ICD-10-CM

## 2022-07-13 DIAGNOSIS — G89.29 CHRONIC PAIN OF BOTH KNEES: Primary | ICD-10-CM

## 2022-07-13 DIAGNOSIS — M25.561 CHRONIC PAIN OF BOTH KNEES: Primary | ICD-10-CM

## 2022-07-13 PROCEDURE — 97110 THERAPEUTIC EXERCISES: CPT | Performed by: PHYSICAL THERAPIST

## 2022-07-13 PROCEDURE — 97140 MANUAL THERAPY 1/> REGIONS: CPT | Performed by: PHYSICAL THERAPIST

## 2022-07-13 NOTE — PROGRESS NOTES
Daily Note     Today's date: 2022  Patient name: Surekha Ma  : 1990  MRN: [id number]  Referring provider: Toni Cesar MD  Dx:   Encounter Diagnosis     ICD-10-CM    1  Chronic pain of both knees  M25 561     M25 562     G89 29    2  Chronic low back pain without sciatica, unspecified back pain laterality  M54 50     G89 29    3  Neck pain  M54 2    4  Cervical radiculopathy  M54 12    5  Lumbar radiculopathy  M54 16                   Subjective: was helping someone at work yesterday climbing a ladder and having to step up on first level of scaffolding  Knee is very sore today  Difficulty walking      Objective: knee mobility - flex szymanski slightly with fullness/discomfort  Good ext, symmetrical    Pain with resisted knee ext in all positions along inf patella    Unable to get good end-feel on Lachman's b/l  Ant/post drawer (-)  Baldomero's (+)    Palpation: tender through lateral patella    Hamstring mobility: 50 deg b/l        Assessment: pt had increased knee pain since working yesterday  Unable to get good feel either side with Lachmans but no MONY suggesting anything acute  There is some swelling  Potential internal derangement cannot be ruled out but due to lack of MONY and other findings - will approach more patellar femoral  Added knee focused treatment into session today and discussed combining both low back and knee with further treatments  He is in agreement        Plan: lumbar and trunk stability, PFPS directed work at knee     Precautions: chronic low back pain, knee pain    Manuals 7-6  7-13     Manual traction        Joint work        flexibility           assessment     ST        Neuro Re-Ed        TA  5" 2x10 x10     +march  2x10 x15                                             Ther Ex        NuStep/TM  TM  2 0 Mph x 10 min NS 8'      clamshells   3x10 s/l ea     Hip ext        Leg press        Ham curls        Knee ext        SLR   2x10 flex             Lumbar ext Lumbar flex        Prone alt ue/le 2x8 2x8 2x10     bridge x20 X 20 x20     Bird dog x10 x10      Supine hand to knee x12 5" ea 5"x 12 ea x15 5" ea             SKTC  10"x10 ea      LTR x5 ea 10"x5 ea      S/l propped L with pain reduction, education for home       Ther Activity                        Gait Training                        Modalities                            Access Code: [email or web address]  URL: [email or web address]  Date: 07/06/2022  Prepared by: Tony Preston    Exercises  · Supine Bridge - 1 x daily - 7 x weekly - 3 sets - 10 reps  · Bird Dog - 1 x daily - 7 x weekly - 3 sets - 10 reps  · Full Superman on Table - 1 x daily - 7 x weekly - 3 sets - 10 reps  · Supine Alternating Knee Taps with Hands - 1 x daily - 7 x weekly - 3 sets - 10 reps  · Supine Lower Trunk Rotation - 1 x daily - 7 x weekly - 3 sets - 10 reps

## 2022-07-18 ENCOUNTER — OFFICE VISIT (OUTPATIENT)
Dept: DENTISTRY | Facility: CLINIC | Age: 32
End: 2022-07-18
Payer: COMMERCIAL

## 2022-07-18 ENCOUNTER — APPOINTMENT (OUTPATIENT)
Dept: PHYSICAL THERAPY | Facility: CLINIC | Age: 32
End: 2022-07-18
Payer: COMMERCIAL

## 2022-07-18 DIAGNOSIS — K02.9 DENTAL CARIES: Primary | ICD-10-CM

## 2022-07-18 PROCEDURE — D0150 COMPREHENSIVE ORAL EVALUATION - NEW OR ESTABLISHED PATIENT: HCPCS | Performed by: STUDENT IN AN ORGANIZED HEALTH CARE EDUCATION/TRAINING PROGRAM

## 2022-07-18 PROCEDURE — D0210 INTRAORAL - COMPLETE SERIES OF RADIOGRAPHIC IMAGES: HCPCS | Performed by: STUDENT IN AN ORGANIZED HEALTH CARE EDUCATION/TRAINING PROGRAM

## 2022-07-18 NOTE — PROGRESS NOTES
Comprehensive Exam    Yosi Merrill presents for a comprehensive exam  Verbal consent for treatment given in addition to the forms  Reviewed health history - Patient is ASA    Consents signed: Yes    Perio: Gingivitis  Pain Scale: 0  Caries Assessment: high  Radiographs: [facility]    Oral Hygiene instruction reviewed and given  Hygiene recall visits recommended to the patient    Pain on #9 and #5  #5 painful to percussion, deep existing resin  #4 recurrent decay may be beyond restoration  #7 should be crowned due to esthetics  #8 an 9 should be crowned due to rct  #9 retreat RCT  #10 [place] due to esthetics    #12 and 13 heavily restored (advised to crown preventatively to reduce chance of fracture)    Talked about replacing 4 and 5 with bridge extending from 3 to 6  Treatment Plan:  1  Infection control: probable ext of 5 and 4  2  Periodontal therapy: Prophy every 6 m  3  Caries control: resins, nutritional counseling, rx prevident    Pt referred to [facility] for a treatment plan  Pt given financial sliding fee form  Prognosis is Good    Referrals needed: No  Next visit: Resins

## 2022-07-20 ENCOUNTER — OFFICE VISIT (OUTPATIENT)
Dept: PHYSICAL THERAPY | Facility: CLINIC | Age: 32
End: 2022-07-20
Payer: COMMERCIAL

## 2022-07-20 ENCOUNTER — OFFICE VISIT (OUTPATIENT)
Dept: INTERNAL MEDICINE CLINIC | Facility: CLINIC | Age: 32
End: 2022-07-20
Payer: COMMERCIAL

## 2022-07-20 VITALS
DIASTOLIC BLOOD PRESSURE: 76 MMHG | WEIGHT: 315 LBS | TEMPERATURE: 97.8 F | BODY MASS INDEX: 45.1 KG/M2 | OXYGEN SATURATION: 98 % | HEIGHT: 70 IN | SYSTOLIC BLOOD PRESSURE: 120 MMHG | HEART RATE: 76 BPM

## 2022-07-20 DIAGNOSIS — F41.9 ANXIETY: Primary | ICD-10-CM

## 2022-07-20 DIAGNOSIS — M54.50 CHRONIC LOW BACK PAIN WITHOUT SCIATICA, UNSPECIFIED BACK PAIN LATERALITY: ICD-10-CM

## 2022-07-20 DIAGNOSIS — M54.2 NECK PAIN: ICD-10-CM

## 2022-07-20 DIAGNOSIS — L30.9 ECZEMA, UNSPECIFIED TYPE: ICD-10-CM

## 2022-07-20 DIAGNOSIS — F41.1 GENERALIZED ANXIETY DISORDER: ICD-10-CM

## 2022-07-20 DIAGNOSIS — G89.29 CHRONIC PAIN OF BOTH KNEES: Primary | ICD-10-CM

## 2022-07-20 DIAGNOSIS — M54.12 CERVICAL RADICULOPATHY: ICD-10-CM

## 2022-07-20 DIAGNOSIS — G89.29 CHRONIC LOW BACK PAIN WITHOUT SCIATICA, UNSPECIFIED BACK PAIN LATERALITY: ICD-10-CM

## 2022-07-20 DIAGNOSIS — K59.09 CHRONIC CONSTIPATION: ICD-10-CM

## 2022-07-20 DIAGNOSIS — M54.16 LUMBAR RADICULOPATHY: ICD-10-CM

## 2022-07-20 DIAGNOSIS — M25.562 CHRONIC PAIN OF BOTH KNEES: Primary | ICD-10-CM

## 2022-07-20 DIAGNOSIS — M25.561 CHRONIC PAIN OF BOTH KNEES: Primary | ICD-10-CM

## 2022-07-20 PROCEDURE — 99213 OFFICE O/P EST LOW 20 MIN: CPT | Performed by: PHYSICIAN ASSISTANT

## 2022-07-20 PROCEDURE — 97110 THERAPEUTIC EXERCISES: CPT | Performed by: PHYSICAL THERAPIST

## 2022-07-20 RX ORDER — TRIAMCINOLONE ACETONIDE 1 MG/G
CREAM TOPICAL 2 TIMES DAILY
Qty: 30 G | Refills: 0 | Status: SHIPPED | OUTPATIENT
Start: 2022-07-20

## 2022-07-20 RX ORDER — LINACLOTIDE 72 UG/1
1 CAPSULE, GELATIN COATED ORAL DAILY
Qty: 30 CAPSULE | Refills: 2 | Status: SHIPPED | OUTPATIENT
Start: 2022-07-20

## 2022-07-20 RX ORDER — FLUOXETINE HYDROCHLORIDE 40 MG/1
40 CAPSULE ORAL DAILY
Qty: 30 CAPSULE | Refills: 2 | Status: SHIPPED | OUTPATIENT
Start: 2022-07-20 | End: 2022-09-08

## 2022-07-20 NOTE — PROGRESS NOTES
Daily Note     Today's date: 2022  Patient name: Kody Maldonado  : 1990  MRN: [id number]  Referring provider: Migue Wall MD  Dx:   Encounter Diagnosis     ICD-10-CM    1  Chronic pain of both knees  M25 561     M25 562     G89 29    2  Chronic low back pain without sciatica, unspecified back pain laterality  M54 50     G89 29    3  Neck pain  M54 2    4  Cervical radiculopathy  M54 12    5  Lumbar radiculopathy  M54 16                   Subjective: knee is feeling pretty good today  Saw chiropractor earlier today who adjusted his back  Has not been doing much, including HEP, b/c of being sick  Objective: See treatment diary below      Assessment: added quadruped LTR to incorporate more side bending work  During session he had pain free multi-seg ext, SB b/l  Flex had small pull but no sharp pain  Good response with spinal mobility TE and low level trunk strength  Pt fatigued at end of session         Plan:  Continue current POC, return to leg strength     Precautions: chronic low back pain, knee pain    Manuals 7-6  7-13 7-20    Manual traction        Joint work        flexibility           assessment     ST        Neuro Re-Ed        TA  5" 2x10 x10 x10    TA+march  2x10 x15 x15                                            Ther Ex        NuStep/TM  TM  2 0 Mph x 10 min NS 8'  Bike interval 8'     clamshells   3x10 s/l ea     Hip ext        Leg press        Ham curls        Knee ext        SLR   2x10 flex             Lumbar ext        Lumbar flex        Prone alt ue/le 2x8 2x8 2x10     bridge x20 X 20 x20 2x10    Bird dog x10 x10  2x8 ea    Supine hand to knee x12 5" ea 5"x 12 ea x15 5" ea 2x10 5" ea    quadruped    LTR x12 ea    Hand heel rock    x6    SKTC  10"x10 ea      LTR x5 ea 10"x5 ea      S/l propped L with pain reduction, education for home       Ther Activity                        Gait Training                        Modalities                            Access Code: [email or web address]  URL: [email or web address]  Date: 07/06/2022  Prepared by: Kathy Gates    Exercises  · Supine Bridge - 1 x daily - 7 x weekly - 3 sets - 10 reps  · Bird Dog - 1 x daily - 7 x weekly - 3 sets - 10 reps  · Full Superman on Table - 1 x daily - 7 x weekly - 3 sets - 10 reps  · Supine Alternating Knee Taps with Hands - 1 x daily - 7 x weekly - 3 sets - 10 reps  · Supine Lower Trunk Rotation - 1 x daily - 7 x weekly - 3 sets - 10 reps

## 2022-07-20 NOTE — PROGRESS NOTES
Assessment/Plan:  Problem List Items Addressed This Visit        Digestive    Chronic constipation     Continue linzess but at 72mcg to avoid diarrhea         Relevant Medications    linaCLOtide (Linzess) 72 MCG CAPS       Musculoskeletal and Integument    Eczema    Relevant Medications    triamcinolone (KENALOG) 0 1 % cream       Other    Generalized anxiety disorder     Continue prozac but increase dose to 40mg daily to further improve symptoms  Relevant Medications    FLUoxetine (PROzac) 40 MG capsule    Anxiety - Primary    Relevant Medications    FLUoxetine (PROzac) 40 MG capsule           Diagnoses and all orders for this visit:    Anxiety  -     FLUoxetine (PROzac) 40 MG capsule; Take 1 capsule (40 mg total) by mouth daily    Eczema, unspecified type  -     triamcinolone (KENALOG) 0 1 % cream; Apply topically 2 (two) times a day    Generalized anxiety disorder    Chronic constipation  -     linaCLOtide (Linzess) 72 MCG CAPS; Take 1 capsule by mouth in the morning      Generalized anxiety disorder  Continue prozac but increase dose to 40mg daily to further improve symptoms  Chronic constipation  Continue linzess but at 72mcg to avoid diarrhea      Subjective:      Patient ID: Telly Stock is a 27 y o  male  Pt presents for routine visit  He is doing well overall  He has been following with pain management and Pt with good results  He had a sleep study which confirmed significant CATA and has titration study scheduled  He is noting that prozac is tolerated well but he does feel that he could benefit from a higher dose  He was also placed on linzess for his chronic constipation which has been helpful but he feels he could use a lesser dose as he has been having a lot of loose stools now         The following portions of the patient's history were reviewed and updated as appropriate:   He has a past medical history of Allergic, Anemia (6/24/2018), Anxiety, Benign essential hypertension (11/17/2016), Bipolar 1 disorder ([place] [address] ), Chronic pain, Claustrophobia (3/15/2018), Community acquired pneumonia (6/24/2018), Depressed (7/14/2016), Depression, GERD (gastroesophageal reflux disease), Hepatitis C virus infection (8/14/2014), Heroin abuse ([place] [address] ) (7/24/2018), Infectious viral hepatitis, Obesity (10/12/2016), Obstructive sleep apnea, Sleep disorder, and Substance abuse ([place] [address] )  ,  does not have any pertinent problems on file  ,   has a past surgical history that includes [place] tooth extraction; pr drain skin abscess complic (Left, 2/6/0971); and pr lap,surg,colectomy, partial, w/anast (Left, 5/21/2020)  ,  family history includes Alzheimer's disease in his maternal grandmother; Colon cancer in his father; Depression in his family; Diabetes in his mother; Restless legs syndrome in his mother; Sleep apnea in his mother  ,   reports that he has quit smoking  His smoking use included e-cigarettes  He has a 13 00 pack-year smoking history  He has never used smokeless tobacco  He reports previous alcohol use  He reports previous drug use  Drugs: Heroin, Marijuana, and Prescription  ,  is allergic to bee venom     Current Outpatient Medications   Medication Sig Dispense Refill    diclofenac (VOLTAREN) 75 mg EC tablet Take 1 tablet (75 mg total) by mouth 2 (two) times a day 60 tablet 2    FLUoxetine (PROzac) 40 MG capsule Take 1 capsule (40 mg total) by mouth daily 30 capsule 2    fluticasone (FLONASE) 50 mcg/act nasal spray 1 spray into each nostril 2 (two) times a day 16 g 11    levocetirizine (XYZAL) 5 MG tablet Take 1 tablet (5 mg total) by mouth every evening 30 tablet 11    linaCLOtide (Linzess) 72 MCG CAPS Take 1 capsule by mouth in the morning 30 capsule 2    METHADONE HCL PO Take 110 mg by mouth daily 150mg daily       olopatadine (PATANOL) 0 1 % ophthalmic solution Administer 1 drop to both eyes 2 (two) times a day 5 mL 0    omeprazole (PriLOSEC) 20 mg delayed release capsule Take 20 mg by mouth daily  ondansetron (ZOFRAN-ODT) 4 mg disintegrating tablet       Sod Fluoride-Potassium Nitrate 1 1-5 % PSTE Apply 1 Squirt to teeth daily at bedtime Brush teeth daily at bedtime  Spit out, do not rinse  Nothing to eat or drink after  112 g 0    triamcinolone (KENALOG) 0 1 % cream Apply topically 2 (two) times a day 30 g 0     No current facility-administered medications for this visit  Review of Systems   Constitutional: Negative for chills and fever  HENT: Negative for congestion, ear pain, hearing loss, postnasal drip, rhinorrhea, sinus pressure, sinus pain, sore throat and trouble swallowing  Eyes: Negative for pain and visual disturbance  Respiratory: Negative for cough, chest tightness, shortness of breath and wheezing  Cardiovascular: Negative  Negative for chest pain, palpitations and leg swelling  Gastrointestinal: Negative for abdominal pain, blood in stool, constipation, diarrhea, nausea and vomiting  Endocrine: Negative for cold intolerance, heat intolerance, polydipsia, polyphagia and polyuria  Genitourinary: Negative for difficulty urinating, dysuria, flank pain and urgency  Musculoskeletal: Positive for back pain  Negative for arthralgias, gait problem and myalgias  Skin: Negative for rash  Allergic/Immunologic: Negative  Neurological: Negative for dizziness, weakness, light-headedness and headaches  Hematological: Negative  Psychiatric/Behavioral: Positive for dysphoric mood  Negative for behavioral problems and sleep disturbance  The patient is not nervous/anxious            PHQ-2/9 Depression Screening    Little interest or pleasure in doing things: 3 - nearly every day  Feeling down, depressed, or hopeless: 3 - nearly every day  Trouble falling or staying asleep, or sleeping too much: 3 - nearly every day  Feeling tired or having little energy: 3 - nearly every day  Poor appetite or overeating: 3 - nearly every day  Feeling bad about yourself - or that you are a failure or have let yourself or your family down: 1 - several days  Trouble concentrating on things, such as reading the newspaper or watching television: 3 - nearly every day  Moving or speaking so slowly that other people could have noticed  Or the opposite - being so fidgety or restless that you have been moving around a lot more than usual: 3 - nearly every day  Thoughts that you would be better off dead, or of hurting yourself in some way: 0 - not at all  PHQ-9 Score: 22   PHQ-9 Interpretation: Severe depression           Objective:  Vitals:    07/20/22 0945   BP: 120/76   Pulse: 76   Temp: 97 8 °F (36 6 °C)   SpO2: 98%   Weight: (!) 153 kg (337 lb 2 oz)   Height: 5' 10" (1 778 m)     Body mass index is 48 37 kg/m²  Physical Exam  Constitutional:       Appearance: He is well-developed  He is obese  HENT:      Head: Normocephalic and atraumatic  Nose: Nose normal    Eyes:      Conjunctiva/sclera: Conjunctivae normal       Pupils: Pupils are equal, round, and reactive to light  Pulmonary:      Effort: Pulmonary effort is normal    Musculoskeletal:         General: Normal range of motion  Cervical back: Normal range of motion  Neurological:      Mental Status: He is alert  Psychiatric:         Behavior: Behavior normal          Thought Content:  Thought content normal          Judgment: Judgment normal

## 2022-07-25 ENCOUNTER — OFFICE VISIT (OUTPATIENT)
Dept: PHYSICAL THERAPY | Facility: CLINIC | Age: 32
End: 2022-07-25
Payer: COMMERCIAL

## 2022-07-25 DIAGNOSIS — G89.29 CHRONIC LOW BACK PAIN WITHOUT SCIATICA, UNSPECIFIED BACK PAIN LATERALITY: ICD-10-CM

## 2022-07-25 DIAGNOSIS — M25.561 CHRONIC PAIN OF BOTH KNEES: Primary | ICD-10-CM

## 2022-07-25 DIAGNOSIS — M25.562 CHRONIC PAIN OF BOTH KNEES: Primary | ICD-10-CM

## 2022-07-25 DIAGNOSIS — G89.29 CHRONIC PAIN OF BOTH KNEES: Primary | ICD-10-CM

## 2022-07-25 DIAGNOSIS — M54.12 CERVICAL RADICULOPATHY: ICD-10-CM

## 2022-07-25 DIAGNOSIS — M54.2 NECK PAIN: ICD-10-CM

## 2022-07-25 DIAGNOSIS — M54.50 CHRONIC LOW BACK PAIN WITHOUT SCIATICA, UNSPECIFIED BACK PAIN LATERALITY: ICD-10-CM

## 2022-07-25 DIAGNOSIS — M54.16 LUMBAR RADICULOPATHY: ICD-10-CM

## 2022-07-25 PROCEDURE — 97110 THERAPEUTIC EXERCISES: CPT

## 2022-07-25 NOTE — PROGRESS NOTES
Daily Note     Today's date: 2022  Patient name: Kody Maldonado  : 1990  MRN: [id number]  Referring provider: Migue Wall MD  Dx:   Encounter Diagnosis     ICD-10-CM    1  Chronic pain of both knees  M25 561     M25 562     G89 29    2  Chronic low back pain without sciatica, unspecified back pain laterality  M54 50     G89 29    3  Neck pain  M54 2    4  Cervical radiculopathy  M54 12    5  Lumbar radiculopathy  M54 16        Start Time: 1500  Stop Time: 1600  Total time in clinic (min): 60 minutes    Subjective: Patient states that he had a rough weekend: mowing, grass, lifting boxes, and a water leak in the basement, his back to stiff and sore  Objective: See treatment diary below      Assessment: Tolerated treatment well  Andriette Presume participated in skilled PT session focused on strengthening, stretching, and ROM  Patient continues to be challenged with trunk mobility and focused on core stabilization exercises  Patient would continue to benefit from skilled PT interventions to address strengthening, stretching, and ROM  Patient demonstrated fatigue post treatment      Plan: Continue per plan of care        Precautions: chronic low back pain, knee pain    Manuals 7-6  7-13 7-   Manual traction        Joint work        flexibility           assessment     ST        Neuro Re-Ed        TA  5" 2x10 x10 x10 2x10   TA+march  2x10 x15 x15 X 15 ea                                           Ther Ex        NuStep/TM  TM  2 0 Mph x 10 min NS 8'  Bike interval 8'  Bike Interval x 8 min   clamshells   3x10 s/l ea     Hip ext        Leg press        Ham curls        Knee ext        SLR   2x10 flex             Lumbar ext        Lumbar flex        Prone alt ue/le 2x8 2x8 2x10  2 x 8 ea   bridge x20 X 20 x20 2x10 2x10   Bird dog x10 x10  2x8 ea X 12 ea   Supine hand to knee x12 5" ea 5"x 12 ea x15 5" ea 2x10 5" ea 2x10 5" ea   quadruped    LTR x12 ea LTR x 12 ea   Hand heel rock    x6 X 8 SKTC  10"x10 ea      LTR x5 ea 10"x5 ea      S/l propped L with pain reduction, education for home    CC Paloff press  P4 x 10 ea     Ther Activity                        Gait Training                        Modalities                            Access Code: [email or web address]  URL: [email or web address]  Date: 07/06/2022  Prepared by: Tara Louise    Exercises  · Supine Bridge - 1 x daily - 7 x weekly - 3 sets - 10 reps  · Bird Dog - 1 x daily - 7 x weekly - 3 sets - 10 reps  · Full Superman on Table - 1 x daily - 7 x weekly - 3 sets - 10 reps  · Supine Alternating Knee Taps with Hands - 1 x daily - 7 x weekly - 3 sets - 10 reps  · Supine Lower Trunk Rotation - 1 x daily - 7 x weekly - 3 sets - 10 reps

## 2022-07-27 ENCOUNTER — OFFICE VISIT (OUTPATIENT)
Dept: PHYSICAL THERAPY | Facility: CLINIC | Age: 32
End: 2022-07-27
Payer: COMMERCIAL

## 2022-07-27 DIAGNOSIS — M25.561 CHRONIC PAIN OF BOTH KNEES: Primary | ICD-10-CM

## 2022-07-27 DIAGNOSIS — M25.562 CHRONIC PAIN OF BOTH KNEES: Primary | ICD-10-CM

## 2022-07-27 DIAGNOSIS — M54.16 LUMBAR RADICULOPATHY: ICD-10-CM

## 2022-07-27 DIAGNOSIS — G89.29 CHRONIC LOW BACK PAIN WITHOUT SCIATICA, UNSPECIFIED BACK PAIN LATERALITY: ICD-10-CM

## 2022-07-27 DIAGNOSIS — M54.2 NECK PAIN: ICD-10-CM

## 2022-07-27 DIAGNOSIS — M54.50 CHRONIC LOW BACK PAIN WITHOUT SCIATICA, UNSPECIFIED BACK PAIN LATERALITY: ICD-10-CM

## 2022-07-27 DIAGNOSIS — G89.29 CHRONIC PAIN OF BOTH KNEES: Primary | ICD-10-CM

## 2022-07-27 DIAGNOSIS — M54.12 CERVICAL RADICULOPATHY: ICD-10-CM

## 2022-07-27 PROCEDURE — 97110 THERAPEUTIC EXERCISES: CPT | Performed by: PHYSICAL THERAPIST

## 2022-07-27 NOTE — PROGRESS NOTES
Daily Note     Today's date: 2022  Patient name: Ghanshyam Lawson  : 1990  MRN: [id number]  Referring provider: Kierra Norris MD  Dx:   Encounter Diagnosis     ICD-10-CM    1  Chronic pain of both knees  M25 561     M25 562     G89 29    2  Chronic low back pain without sciatica, unspecified back pain laterality  M54 50     G89 29    3  Neck pain  M54 2    4  Cervical radiculopathy  M54 12    5  Lumbar radiculopathy  M54 16                   Subjective: only having back soreness while cleaning up in the basement and bending at awkward angles for a while  Does not last long periods of time  Doing pretty well  Just feels mostly if he does too much of something he gets sore      Objective: See treatment diary below      Assessment: screen was good for multi-directional again today  No pain reproduced  Feel pt on right track at this time  Good control with bird dog and less wt shifting noted  Added additional lumbar/trunk strength work today         Plan: add Paloff press     Precautions: chronic low back pain, knee pain    Manuals  7- 7-   Manual traction        Joint work        flexibility           assessment     ST        Neuro Re-Ed        TA  5" 2x10 x10 x10 2x10   TA+march  2x10 x15 x15 X 15 ea                                           Ther Ex        NuStep/TM Bike interval 10' TM  2 0 Mph x 10 min NS 8'  Bike interval 8'  Bike Interval x 8 min   clamshells   3x10 s/l ea     Hip ext        Leg press        Ham curls        Knee ext        SLR 2x10 flex ea  2x10 flex     CC LAE P6 -8 2x10       Lumbar ext 70# 3x10       Lumbar flex        Prone alt ue/le 2x10 ea 2x8 2x10  2 x 8 ea   bridge x20 X 20 x20 2x10 2x10   Bird dog 2x8 ea x10  2x8 ea X 12 ea   Supine hand to knee 2x10 5" ea 5"x 12 ea x15 5" ea 2x10 5" ea 2x10 5" ea   quadruped LTR x12 ea   LTR x12 ea LTR x 12 ea   Hand heel rock 10x   x6 X 8    SKTC  10"x10 ea      LTR  10"x5 ea      S/l propped     CC Paloff press  P4 x 10 ea     Ther Activity                        Gait Training                        Modalities                            Access Code: [email or web address]  URL: [email or web address]  Date: 07/06/2022  Prepared by: Stephanie Plaza    Exercises  · Supine Bridge - 1 x daily - 7 x weekly - 3 sets - 10 reps  · Bird Dog - 1 x daily - 7 x weekly - 3 sets - 10 reps  · Full Superman on Table - 1 x daily - 7 x weekly - 3 sets - 10 reps  · Supine Alternating Knee Taps with Hands - 1 x daily - 7 x weekly - 3 sets - 10 reps  · Supine Lower Trunk Rotation - 1 x daily - 7 x weekly - 3 sets - 10 reps

## 2022-07-29 ENCOUNTER — HOSPITAL ENCOUNTER (OUTPATIENT)
Dept: NON INVASIVE DIAGNOSTICS | Facility: CLINIC | Age: 32
Discharge: HOME | End: 2022-07-29
Payer: COMMERCIAL

## 2022-07-29 VITALS
BODY MASS INDEX: 45.1 KG/M2 | DIASTOLIC BLOOD PRESSURE: 84 MMHG | HEIGHT: 70 IN | SYSTOLIC BLOOD PRESSURE: 140 MMHG | WEIGHT: 315 LBS | HEART RATE: 82 BPM

## 2022-07-29 DIAGNOSIS — G47.37 CENTRAL SLEEP APNEA COMORBID WITH PRESCRIBED OPIOID USE: ICD-10-CM

## 2022-07-29 DIAGNOSIS — F11.90 CENTRAL SLEEP APNEA COMORBID WITH PRESCRIBED OPIOID USE: ICD-10-CM

## 2022-07-29 LAB
AORTIC ROOT: 3.6 CM
APICAL FOUR CHAMBER EJECTION FRACTION: 56 %
AV LVOT MEAN GRADIENT: 3 MMHG
AV LVOT PEAK GRADIENT: 4 MMHG
DOP CALC LVOT PEAK VEL VTI: 23.29 CM
DOP CALC LVOT PEAK VEL: 1.04 M/S
E WAVE DECELERATION TIME: 188 MS
E/A RATIO: 1.34
FRACTIONAL SHORTENING: 25 (ref 28–44)
INTERVENTRICULAR SEPTUM IN DIASTOLE (PARASTERNAL SHORT AXIS VIEW): 0.9 CM
INTERVENTRICULAR SEPTUM: 0.9 CM (ref 0.6–1.1)
LAAS-AP4: 21.9 CM2
LEFT ATRIUM SIZE: 4 CM
LEFT ATRIUM VOLUME INDEX (MOD BIPLANE): 15.3
LEFT INTERNAL DIMENSION IN SYSTOLE: 3.8 CM (ref 2.1–4)
LEFT VENTRICULAR INTERNAL DIMENSION IN DIASTOLE: 5.1 CM (ref 3.5–6)
LEFT VENTRICULAR POSTERIOR WALL IN END DIASTOLE: 1 CM
LEFT VENTRICULAR STROKE VOLUME: 62 ML
LVSV (TEICH): 62 ML
MV E'TISSUE VEL-SEP: 12 CM/S
MV PEAK A VEL: 0.61 M/S
MV PEAK E VEL: 82 CM/S
MV STENOSIS PRESSURE HALF TIME: 55 MS
MV VALVE AREA P 1/2 METHOD: 4
RIGHT VENTRICLE ID DIMENSION: 3.1 CM
SL CV LV EF: 60
SL CV PED ECHO LEFT VENTRICLE DIASTOLIC VOLUME (MOD BIPLANE) 2D: 123 ML
SL CV PED ECHO LEFT VENTRICLE SYSTOLIC VOLUME (MOD BIPLANE) 2D: 61 ML

## 2022-07-29 PROCEDURE — 93306 TTE W/DOPPLER COMPLETE: CPT | Performed by: INTERNAL MEDICINE

## 2022-07-29 PROCEDURE — 93306 TTE W/DOPPLER COMPLETE: CPT

## 2022-08-02 ENCOUNTER — TELEPHONE (OUTPATIENT)
Dept: SLEEP CENTER | Facility: CLINIC | Age: 32
End: 2022-08-02

## 2022-08-02 NOTE — TELEPHONE ENCOUNTER
----- Message from Domingo Partida MD sent at 7/29/2022 12:13 PM EDT -----  Results are reassuring, normal EF, no cardiac cause for central sleep apnea seen on this test

## 2022-08-03 ENCOUNTER — OFFICE VISIT (OUTPATIENT)
Dept: PHYSICAL THERAPY | Facility: CLINIC | Age: 32
End: 2022-08-03
Payer: COMMERCIAL

## 2022-08-03 DIAGNOSIS — M54.50 CHRONIC LOW BACK PAIN WITHOUT SCIATICA, UNSPECIFIED BACK PAIN LATERALITY: ICD-10-CM

## 2022-08-03 DIAGNOSIS — M25.562 CHRONIC PAIN OF BOTH KNEES: Primary | ICD-10-CM

## 2022-08-03 DIAGNOSIS — M54.12 CERVICAL RADICULOPATHY: ICD-10-CM

## 2022-08-03 DIAGNOSIS — G89.29 CHRONIC LOW BACK PAIN WITHOUT SCIATICA, UNSPECIFIED BACK PAIN LATERALITY: ICD-10-CM

## 2022-08-03 DIAGNOSIS — G89.29 CHRONIC PAIN OF BOTH KNEES: Primary | ICD-10-CM

## 2022-08-03 DIAGNOSIS — M54.16 LUMBAR RADICULOPATHY: ICD-10-CM

## 2022-08-03 DIAGNOSIS — M25.561 CHRONIC PAIN OF BOTH KNEES: Primary | ICD-10-CM

## 2022-08-03 DIAGNOSIS — M54.2 NECK PAIN: ICD-10-CM

## 2022-08-03 PROCEDURE — 97110 THERAPEUTIC EXERCISES: CPT

## 2022-08-03 PROCEDURE — 97112 NEUROMUSCULAR REEDUCATION: CPT

## 2022-08-03 NOTE — PROGRESS NOTES
Daily Note     Today's date: 8/3/2022  Patient name: Margarito Bowling  : 1990  MRN: [id number]  Referring provider: Rei Redman MD  Dx:   Encounter Diagnosis     ICD-10-CM    1  Chronic pain of both knees  M25 561     M25 562     G89 29    2  Chronic low back pain without sciatica, unspecified back pain laterality  M54 50     G89 29    3  Neck pain  M54 2    4  Cervical radiculopathy  M54 12    5  Lumbar radiculopathy  M54 16        Start Time: 1500  Stop Time: 1600  Total time in clinic (min): 60 minutes    Subjective: Patient reports feeling sore today  Objective: See treatment diary below      Assessment: Tolerated treatment well  Nery Sanchez participated in skilled PT session focused on strengthening, stretching, core stability, and trunk ROM  Patient able to complete all exercises with no increase in pain  Patient experienced some mild knee pain while performing supine SLR with RLE  Patient demonstrates improved trunk mobility this session with exercises  Patient would continue to benefit from skilled PT interventions to address strengthening, stretching, core stability, and ROM  Patient demonstrated fatigue post treatment       Plan: Continue per plan of care        Precautions: chronic low back pain, knee pain    Manuals 7-27 8/3 7- 7-   Manual traction        Joint work        flexibility           assessment     ST        Neuro Re-Ed        TA   x10 x10 2x10   TA+march   x15 x15 X 15 ea                                           Ther Ex        NuStep/TM Bike interval 10' Bike Interval x 10' NS 8'  Bike interval 8'  Bike Interval x 8 min   clamshells   3x10 s/l ea     Hip ext        Leg press        Ham curls        Knee ext        SLR 2x10 flex ea 2x10 flex ea 2x10 flex     CC LAE P6 -8 2x10 P6 3x10      Lumbar ext 70# 3x10 70# 3x10      Lumbar flex        Prone alt ue/le 2x10 ea 2x10 ea 2x10  2 x 8 ea   bridge x20 X 20 x20 2x10 2x10   Bird dog 2x8 ea 2x10 ea  2x8 ea X 12 ea Supine hand to knee 2x10 5" ea 2x10 5" ea x15 5" ea 2x10 5" ea 2x10 5" ea   quadruped LTR x12 ea LTR windshield wiper x 15 ea  LTR x12 ea LTR x 12 ea   Hand heel rock 10x x15  x6 X 8    SKTC        LTR        S/l propped     CC Paloff press  P4 x 10 ea     Ther Activity                        Gait Training                        Modalities                            Access Code: [email or web address]  URL: [email or web address]  Date: 07/06/2022  Prepared by: Ruth Alejandra    Exercises  · Supine Bridge - 1 x daily - 7 x weekly - 3 sets - 10 reps  · Bird Dog - 1 x daily - 7 x weekly - 3 sets - 10 reps  · Full Superman on Table - 1 x daily - 7 x weekly - 3 sets - 10 reps  · Supine Alternating Knee Taps with Hands - 1 x daily - 7 x weekly - 3 sets - 10 reps  · Supine Lower Trunk Rotation - 1 x daily - 7 x weekly - 3 sets - 10 reps

## 2022-08-09 ENCOUNTER — OFFICE VISIT (OUTPATIENT)
Dept: PHYSICAL THERAPY | Facility: CLINIC | Age: 32
End: 2022-08-09
Payer: COMMERCIAL

## 2022-08-09 DIAGNOSIS — M54.2 NECK PAIN: ICD-10-CM

## 2022-08-09 DIAGNOSIS — M25.561 CHRONIC PAIN OF BOTH KNEES: Primary | ICD-10-CM

## 2022-08-09 DIAGNOSIS — G89.29 CHRONIC PAIN OF BOTH KNEES: Primary | ICD-10-CM

## 2022-08-09 DIAGNOSIS — M54.50 CHRONIC LOW BACK PAIN WITHOUT SCIATICA, UNSPECIFIED BACK PAIN LATERALITY: ICD-10-CM

## 2022-08-09 DIAGNOSIS — G89.29 CHRONIC LOW BACK PAIN WITHOUT SCIATICA, UNSPECIFIED BACK PAIN LATERALITY: ICD-10-CM

## 2022-08-09 DIAGNOSIS — M25.562 CHRONIC PAIN OF BOTH KNEES: Primary | ICD-10-CM

## 2022-08-09 DIAGNOSIS — M54.12 CERVICAL RADICULOPATHY: ICD-10-CM

## 2022-08-09 DIAGNOSIS — M54.16 LUMBAR RADICULOPATHY: ICD-10-CM

## 2022-08-09 PROCEDURE — 97110 THERAPEUTIC EXERCISES: CPT

## 2022-08-09 PROCEDURE — 97112 NEUROMUSCULAR REEDUCATION: CPT

## 2022-08-09 NOTE — PROGRESS NOTES
Daily Note     Today's date: 2022  Patient name: Clide Moritz  : 1990  MRN: [id number]  Referring provider: Diego Handley MD  Dx:   Encounter Diagnosis     ICD-10-CM    1  Chronic pain of both knees  M25 561     M25 562     G89 29    2  Chronic low back pain without sciatica, unspecified back pain laterality  M54 50     G89 29    3  Neck pain  M54 2    4  Cervical radiculopathy  M54 12    5  Lumbar radiculopathy  M54 16                   Subjective: Patient presents to clinic with reports of 3-4/10 low back pain  He says the back is not too bad, but he is sore from bending over to clean items up after a leak he had in the house  This has also made him very tired lately  Objective: See treatment diary below       Assessment: Tolerated treatment well with no increased low back pain  R knee pain noted when performing quadruped LTR windshield wipers today- provided break for rest and this was tolerated well when reinitiated  Moderate fatigue noted throughout and post session  Patient would benefit from continued PT to increase trunk strength and mobility for improved function in ADLs  Plan: Continue per plan of care        Precautions: chronic low back pain, knee pain    Manuals -27 8/3 8/9 7-   Manual traction        Joint work        flexibility                ST        Neuro Re-Ed        TA    x10 2x10   TA+march    x15 X 15 ea                                           Ther Ex        NuStep/TM Bike interval 10' Bike Interval x 10' Bike Interval x 10' Bike interval 8'  Bike Interval x 8 min   clamshells        Hip ext        Leg press        Ham curls        Knee ext        SLR 2x10 flex ea 2x10 flex ea 2x10 flex ea     CC LAE P6 -8 2x10 P6 3x10 P6 3x10     Lumbar ext 70# 3x10 70# 3x10 70# 3x10     Lumbar flex        Prone alt ue/le 2x10 ea 2x10 ea 2x10 ea  2 x 8 ea   bridge x20 X 20 x20 2x10 2x10   Bird dog 2x8 ea 2x10 ea 2x10 ea 2x8 ea X 12 ea   Supine hand to knee 2x10 5" ea 2x10 5" ea 2x10 5" ea 2x10 5" ea 2x10 5" ea   quadruped LTR x12 ea LTR windshield wiper x 15 ea LTR windshield wiper x 15 ea LTR x12 ea LTR x 12 ea   Hand heel rock 10x x15 x15 x6 X 8    SKTC        LTR        S/l propped     CC Paloff press  P4 x 10 ea     Ther Activity                        Gait Training                        Modalities                            Access Code: [email or web address]  URL: [email or web address]  Date: 07/06/2022  Prepared by: Edson Comment    Exercises  · Supine Bridge - 1 x daily - 7 x weekly - 3 sets - 10 reps  · Bird Dog - 1 x daily - 7 x weekly - 3 sets - 10 reps  · Full Superman on Table - 1 x daily - 7 x weekly - 3 sets - 10 reps  · Supine Alternating Knee Taps with Hands - 1 x daily - 7 x weekly - 3 sets - 10 reps  · Supine Lower Trunk Rotation - 1 x daily - 7 x weekly - 3 sets - 10 reps

## 2022-08-11 ENCOUNTER — OFFICE VISIT (OUTPATIENT)
Dept: PHYSICAL THERAPY | Facility: CLINIC | Age: 32
End: 2022-08-11
Payer: COMMERCIAL

## 2022-08-11 DIAGNOSIS — M54.12 CERVICAL RADICULOPATHY: ICD-10-CM

## 2022-08-11 DIAGNOSIS — M25.562 CHRONIC PAIN OF BOTH KNEES: Primary | ICD-10-CM

## 2022-08-11 DIAGNOSIS — M54.16 LUMBAR RADICULOPATHY: ICD-10-CM

## 2022-08-11 DIAGNOSIS — M54.50 CHRONIC LOW BACK PAIN WITHOUT SCIATICA, UNSPECIFIED BACK PAIN LATERALITY: ICD-10-CM

## 2022-08-11 DIAGNOSIS — G89.29 CHRONIC PAIN OF BOTH KNEES: Primary | ICD-10-CM

## 2022-08-11 DIAGNOSIS — M54.2 NECK PAIN: ICD-10-CM

## 2022-08-11 DIAGNOSIS — M25.561 CHRONIC PAIN OF BOTH KNEES: Primary | ICD-10-CM

## 2022-08-11 DIAGNOSIS — G89.29 CHRONIC LOW BACK PAIN WITHOUT SCIATICA, UNSPECIFIED BACK PAIN LATERALITY: ICD-10-CM

## 2022-08-11 PROCEDURE — 97140 MANUAL THERAPY 1/> REGIONS: CPT | Performed by: PHYSICAL THERAPIST

## 2022-08-11 PROCEDURE — 97110 THERAPEUTIC EXERCISES: CPT | Performed by: PHYSICAL THERAPIST

## 2022-08-11 NOTE — PROGRESS NOTES
PT Re-Evaluation     Today's date: 2022  Patient name: Myles Gerardo  : 1990  MRN: [id number]  Referring provider: Phyllis Magallon MD  Dx:   Encounter Diagnosis     ICD-10-CM    1  Chronic pain of both knees  M25 561     M25 562     G89 29    2  Chronic low back pain without sciatica, unspecified back pain laterality  M54 50     G89 29    3  Neck pain  M54 2    4  Cervical radiculopathy  M54 12    5  Lumbar radiculopathy  M54 16                   Assessment  Assessment details: Pt presents to PT with complaints of low back and knee pain  R worse for both but left knee also bothers at time  He agrees with concentrating on back first      He has shown objective progress in both mobility and trunk strength  This has correlated with subjective improvements as noted previously in note  Limitations remain with trunk endurance/strength and motor control  He would benefit from continued skilled PT focused on continued strength/endurance for both low back and knee pain  He is in agreement    Impairments: abnormal gait, abnormal or restricted ROM, activity intolerance, impaired physical strength, pain with function, weight-bearing intolerance and poor posture   Functional limitations: walking, any physical activity, stairs, sleeping  Symptom irritability: moderateUnderstanding of Dx/Px/POC: good   Prognosis: fair    Goals  Stg: 3-4 weeks  Improve walking tolerance to 5 blocks prior to needing break - met  Complete transfers without extra time required and 50% reduction in pain - progressing  Reduce pain with sleeping by 30% - progressing  Assess knee pain and address accordingly - progressing    LT-6 weeks - progressing  Independent with trunk and leg strength program  Improving walking to 0 25 miles prior to rest and pain  complete trunk flex hold test for 20 seconds  Complete bird dog with good control  Complete stairs reciprocally and minimal pain in low back    Plan  Plan details: Pt will benefit from continued PT to address noted limitations  Treatment may include TE, NMR, MT, TA, or modalities as appropriate  Patient would benefit from: skilled physical therapy  Planned modality interventions: cryotherapy and thermotherapy: hydrocollator packs  Planned therapy interventions: manual therapy, neuromuscular re-education, self care, therapeutic activities, therapeutic exercise and home exercise program  Frequency: 2x week  Duration in weeks: 4  Treatment plan discussed with: patient        Subjective Evaluation    History of Present Illness  Mechanism of injury: 27year old male presenting to PT with long term low back and knee pain  Pain is generally R side low back and iliac crest area  Update : pt notes improvement with less back pain during typical daily activities  Gets some soreness when very active or bending forward a bit  Also with walking he gets discomfort but is now able to walk 8-9 blocks compared to just 1 or so at start of treatment prior to pain    Pain  At best pain ratin  At worst pain ratin    Patient Goals  Patient goals for therapy: decreased pain, increased motion, increased strength, independence with ADLs/IADLs, return to sport/leisure activities and return to work          Objective     General Comments:      Lumbar Comments    Multi-seg movement patterns  Flex to ankles, some stiffness low back  Ext: hinge at mid lumbar, slight shift to R  SB: distal third tibia b/l - slight discomfort R lateral thigh with R sb      Lumbar  Pain with pa through mid and lower lumbar    Reduction in pain noted with left sidelying propped      Hip screen  Symmetrical hip mobility, no pain reproduction          Passive SLR: hamstring limitation (55 deg) but no pain reproduction      Strength/motor control  Core flex test: 15 sec   Bird dog: weight shift noted more so L WB, R WB had minimal             Precautions: chronic low back pain, knee pain        Manuals 7-27 8/3 8/9 8-11 7/25 Manual traction             Joint work             flexibility                      assessment     ST             Neuro Re-Ed             TA        2x10   TA+march       x15 X 15 ea                                                                         Ther Ex             NuStep/TM Bike interval 10' Bike Interval x 10' Bike Interval x 10' Bike interval 8'  Bike Interval x 8 min   clamshells             Hip ext             Leg press             Ham curls             Knee ext             SLR 2x10 flex ea 2x10 flex ea 2x10 flex ea       CC LAE P6 -8 2x10 P6 3x10 P6 3x10  P6 3x10     Lumbar ext 70# 3x10 70# 3x10 70# 3x10  70# 3x10     Lumbar flex             Prone alt ue/le 2x10 ea 2x10 ea 2x10 ea  3x10 2 x 8 ea   bridge x20 X 20 x20 2x10 2x10   Bird dog 2x8 ea 2x10 ea 2x10 ea 2x10 X 12 ea   Supine hand to knee 2x10 5" ea 2x10 5" ea 2x10 5" ea 2x10 5" ea 2x10 5" ea   quadruped LTR x12 ea LTR windshield wiper x 15 ea LTR windshield wiper x 15 ea LTR windshield wiper x10 ea LTR x 12 ea   Hand heel rock 10x x15 x15 x10 X 8    Cat camel       x10     LTR             paloff press       CC p3 2x8 ea CC Paloff press  P4 x 10 ea      Ther Activity                                         Gait Training                                         Modalities                                                Access Code: Courtney Guillermo  URL: [email or web address]  Date: 07/06/2022  Prepared by: Ruth Alejandra    Exercises  · Supine Bridge - 1 x daily - 7 x weekly - 3 sets - 10 reps  · Bird Dog - 1 x daily - 7 x weekly - 3 sets - 10 reps  · Full Superman on Table - 1 x daily - 7 x weekly - 3 sets - 10 reps  · Supine Alternating Knee Taps with Hands - 1 x daily - 7 x weekly - 3 sets - 10 reps  · Supine Lower Trunk Rotation - 1 x daily - 7 x weekly - 3 sets - 10 reps

## 2022-08-12 ENCOUNTER — HOSPITAL ENCOUNTER (OUTPATIENT)
Dept: SLEEP CENTER | Facility: CLINIC | Age: 32
Discharge: HOME | End: 2022-08-12
Payer: COMMERCIAL

## 2022-08-12 ENCOUNTER — APPOINTMENT (OUTPATIENT)
Dept: PHYSICAL THERAPY | Facility: CLINIC | Age: 32
End: 2022-08-12
Payer: COMMERCIAL

## 2022-08-12 DIAGNOSIS — G47.33 OSA (OBSTRUCTIVE SLEEP APNEA): ICD-10-CM

## 2022-08-12 DIAGNOSIS — G47.37 CENTRAL SLEEP APNEA COMORBID WITH PRESCRIBED OPIOID USE: ICD-10-CM

## 2022-08-12 DIAGNOSIS — F11.90 CENTRAL SLEEP APNEA COMORBID WITH PRESCRIBED OPIOID USE: ICD-10-CM

## 2022-08-12 PROCEDURE — 95811 POLYSOM 6/>YRS CPAP 4/> PARM: CPT | Performed by: PSYCHIATRY & NEUROLOGY

## 2022-08-12 PROCEDURE — 95811 POLYSOM 6/>YRS CPAP 4/> PARM: CPT

## 2022-08-14 DIAGNOSIS — F11.90 CENTRAL SLEEP APNEA COMORBID WITH PRESCRIBED OPIOID USE: Primary | ICD-10-CM

## 2022-08-14 DIAGNOSIS — G47.37 CENTRAL SLEEP APNEA COMORBID WITH PRESCRIBED OPIOID USE: Primary | ICD-10-CM

## 2022-08-16 ENCOUNTER — OFFICE VISIT (OUTPATIENT)
Dept: PHYSICAL THERAPY | Facility: CLINIC | Age: 32
End: 2022-08-16
Payer: COMMERCIAL

## 2022-08-16 ENCOUNTER — OFFICE VISIT (OUTPATIENT)
Dept: PAIN MEDICINE | Facility: CLINIC | Age: 32
End: 2022-08-16
Payer: COMMERCIAL

## 2022-08-16 ENCOUNTER — TELEPHONE (OUTPATIENT)
Dept: SLEEP CENTER | Facility: CLINIC | Age: 32
End: 2022-08-16

## 2022-08-16 VITALS
HEIGHT: 70 IN | WEIGHT: 315 LBS | DIASTOLIC BLOOD PRESSURE: 78 MMHG | HEART RATE: 92 BPM | SYSTOLIC BLOOD PRESSURE: 110 MMHG | BODY MASS INDEX: 45.1 KG/M2

## 2022-08-16 DIAGNOSIS — M54.50 CHRONIC BILATERAL LOW BACK PAIN WITHOUT SCIATICA: ICD-10-CM

## 2022-08-16 DIAGNOSIS — M54.12 CERVICAL RADICULOPATHY: ICD-10-CM

## 2022-08-16 DIAGNOSIS — F19.10 IV DRUG ABUSE (HCC): ICD-10-CM

## 2022-08-16 DIAGNOSIS — M54.16 LUMBAR RADICULOPATHY: Primary | ICD-10-CM

## 2022-08-16 DIAGNOSIS — G89.29 CHRONIC BILATERAL LOW BACK PAIN WITHOUT SCIATICA: ICD-10-CM

## 2022-08-16 DIAGNOSIS — M25.562 CHRONIC PAIN OF BOTH KNEES: ICD-10-CM

## 2022-08-16 DIAGNOSIS — M54.16 LUMBAR RADICULOPATHY: ICD-10-CM

## 2022-08-16 DIAGNOSIS — G89.29 CHRONIC LOW BACK PAIN WITHOUT SCIATICA, UNSPECIFIED BACK PAIN LATERALITY: ICD-10-CM

## 2022-08-16 DIAGNOSIS — M54.50 CHRONIC LOW BACK PAIN WITHOUT SCIATICA, UNSPECIFIED BACK PAIN LATERALITY: ICD-10-CM

## 2022-08-16 DIAGNOSIS — F11.21 OPIOID DEPENDENCE IN REMISSION (HCC): ICD-10-CM

## 2022-08-16 DIAGNOSIS — M25.561 CHRONIC PAIN OF BOTH KNEES: ICD-10-CM

## 2022-08-16 DIAGNOSIS — G89.29 CHRONIC PAIN OF BOTH KNEES: ICD-10-CM

## 2022-08-16 DIAGNOSIS — G89.4 CHRONIC PAIN SYNDROME: Primary | ICD-10-CM

## 2022-08-16 PROCEDURE — 97110 THERAPEUTIC EXERCISES: CPT

## 2022-08-16 PROCEDURE — 99214 OFFICE O/P EST MOD 30 MIN: CPT | Performed by: NURSE PRACTITIONER

## 2022-08-16 NOTE — TELEPHONE ENCOUNTER
Called patient  Advised ASV study is resulted and Dr Nataly Butler has placed an order for ASV  DME scheduled 8/30/22 in the [facility] office  Per Dr Nataly Butler, patient to keep follow-up appointment 9/2/22  Compliance follow-up scheduled 12/8/22 with Dr Nataly Butler  (31-90 day compliance window 9/30-11/28)    Rx for ASV and clinicals sent to [address] [place] [address] via [facility]

## 2022-08-16 NOTE — PROGRESS NOTES
Daily Note     Today's date: 2022  Patient name: Susan Braswell  : 1990  MRN: [id number]  Referring provider: Héctor Massey MD  Dx:   Encounter Diagnosis     ICD-10-CM    1  Lumbar radiculopathy  M54 16    2  Chronic pain of both knees  M25 561     M25 562     G89 29    3  Chronic low back pain without sciatica, unspecified back pain laterality  M54 50     G89 29        Start Time: 1547  Stop Time: 1637  Total time in clinic (min): 50 minutes    Subjective: Patient denies l/s pain but states that his stomach is bothering him prior to start of session  He states he feels his stomach pain is 2* to diet over weekend  Objective: See treatment diary below  LP added this visit for LE strengthening  Assessment: 2* to stomach pain some activities held this session  Appropriate level of fatigue demonstrated post session  Patient would benefit from continued PT to increase endurance, core and pelvic stability  Plan: Continue per plan of care  Progress treatment as tolerated         Precautions: chronic low back pain, knee pain        Manuals 7-27 8/3 8/9 8 816   Manual traction            Joint work            flexibility                     assessment    ST            Neuro Re-Ed            TA           TA+march       x15 HELD, NV                                                                    Ther Ex            NuStep/TM Bike interval 10' Bike Interval x 10' Bike Interval x 10' Bike interval 8'  Bike interval 10'    clamshells            Hip ext            Leg press         P4 2x10 pillow at back   Ham curls            Knee ext            SLR 2x10 flex ea 2x10 flex ea 2x10 flex ea      CC LAE P6 -8 2x10 P6 3x10 P6 3x10  P6 3x10 P6 3x12   Lumbar ext 70# 3x10 70# 3x10 70# 3x10  70# 3x10 70# 3x12   Lumbar flex            Prone alt ue/le 2x10 ea 2x10 ea 2x10 ea  3x10 HELD   bridge x20 X 20 x20 2x10 HELD   Bird dog 2x8 ea 2x10 ea 2x10 ea 2x10 HELD   Supine hand to knee 2x10 5" ea 2x10 5" ea 2x10 5" ea 2x10 5" ea HELD   quadruped LTR x12 ea LTR windshield wiper x 15 ea LTR windshield wiper x 15 ea LTR windshield wiper x10 ea LTR windshield wiper x10 ea   Hand heel rock 10x x15 x15 x10 x20   Cat camel       x10 HELD   LTR            paloff press       CC p3 2x8 ea CC p4 2x8 ea   Ther Activity                                      Gait Training                                      Modalities                                             Access Code: [email or web address]  URL: [email or web address]  Date: 07/06/2022  Prepared by: Dipak Esqueda    Exercises  · Supine Bridge - 1 x daily - 7 x weekly - 3 sets - 10 reps  · Bird Dog - 1 x daily - 7 x weekly - 3 sets - 10 reps  · Full Superman on Table - 1 x daily - 7 x weekly - 3 sets - 10 reps  · Supine Alternating Knee Taps with Hands - 1 x daily - 7 x weekly - 3 sets - 10 reps  · Supine Lower Trunk Rotation - 1 x daily - 7 x weekly - 3 sets - 10 reps

## 2022-08-16 NOTE — TELEPHONE ENCOUNTER
----- Message from Jaqueline Ray MD sent at 8/14/2022  5:46 PM EDT -----  ASV worked well, machine ordered   He can keep his followup as scheduled

## 2022-08-16 NOTE — PROGRESS NOTES
Assessment:  1  Chronic pain syndrome    2  Chronic bilateral low back pain without sciatica    3  Lumbar radiculopathy    4  Cervical radiculopathy    5  IV drug abuse ([address] )    6  Opioid dependence in remission [facility])        Plan:  While the patient was in the office today, I did have a thorough conversation regarding their chronic pain syndrome, medication management, and treatment plan options  Patient is being seen for follow-up visit  He was initially seen here for consultation on 06/08/2022  EMG of both upper and lower extremities were ordered  EMG is are scheduled  He had an x-ray of his cervical and lumbar spine  Cervical spine x-ray reveals mild arthritic change  X-ray of the lumbar spine was normal   X-ray results were reviewed with patient during today's visit  Patient has been attending physical therapy on a regular basis since 07/06/2022  He reports good, but temporary relief after each session  At this point, we will plan to order an MRI of his cervical spine and an MRI of his lumbar spine to determine if there is intraspinal pathology contributing to his current symptoms  Patient understands that he will be called with the MRI results as soon as they are available to review  The patient will follow-up in 4 weeks  reevaluation  The patient was advised to contact the office should their symptoms worsen in the interim  The patient was agreeable and verbalized an understanding  History of Present Illness: The patient is a 27 y o  male who presents for a follow up office visit in regards to Back Pain, Leg Pain, Hip Pain, Knee Pain, and Hand Pain  The patients current symptoms include complaints of low back pain that radiates to the right hip and right leg  Reports pain in his left wrist   He reports numbness and tingling in both upper extremities to his hands  He reports numbness and tingling in both lower extremities to his feet  Current pain level is a 4/10    Quality pain is described as pressure-like and numb  Current pain medications includes:  None  Patient is on methadone  I have personally reviewed and/or updated the patient's past medical history, past surgical history, family history, social history, current medications, allergies, and vital signs today  Review of Systems  Review of Systems   Constitutional: Negative for chills and fever  HENT: Negative for ear pain and sore throat  Eyes: Negative for pain and visual disturbance  Respiratory: Negative for cough and shortness of breath  Cardiovascular: Negative for chest pain and palpitations  Gastrointestinal: Positive for constipation, nausea and vomiting  Negative for abdominal pain  Genitourinary: Negative for dysuria and hematuria  Musculoskeletal: Positive for gait problem  Negative for arthralgias and back pain  Decreased range of motion  Joint stiffness  Pain in extremity- left wrist and right knee      Skin: Negative for color change and rash  Neurological: Negative for seizures and syncope  All other systems reviewed and are negative          Past Medical History:   Diagnosis Date    Allergic     Anemia 6/24/2018    Anxiety     from records    Benign essential hypertension 11/17/2016    Bipolar 1 disorder (HCC)     from records    Chronic pain     Claustrophobia 3/15/2018    Community acquired pneumonia 6/24/2018    Depressed 7/14/2016    Depression     from records    GERD (gastroesophageal reflux disease)     Hepatitis C virus infection 8/14/2014    Heroin abuse ([facility] [address] ) 7/24/2018    Infectious viral hepatitis     Obesity 10/12/2016    Obstructive sleep apnea     from records    Sleep disorder     Substance abuse [facility])        Past Surgical History:   Procedure Laterality Date    TN DRAIN SKIN ABSCESS COMPLIC Left 7/3/5920    Procedure: INCISION AND DRAINAGE (I&D) EXTREMITY;  Surgeon: Luna Jules MD;  Location: [facility];  Service: Orthopedics    TN LAP,SURG,COLECTOMY, PARTIAL, W/ANAST Left 5/21/2020    Procedure: LAPAROSCOPIC LEFT HEMICOLECTOMY TAKEDOWN SPLENIC FLECTURE, COLORECTAL ANASTOMOSIS ;  Surgeon: Sherley Lynn MD;  Location: [facility];  Service: Colorectal    WISDOM TOOTH EXTRACTION         Family History   Problem Relation Age of Onset    Diabetes Mother     Restless legs syndrome Mother     Sleep apnea Mother     Colon cancer Father     Alzheimer's disease Maternal Grandmother     Depression Family        Social History     Occupational History    Not on file   Tobacco Use    Smoking status: Former Smoker     Packs/day: 1 00     Years: 13 00     Pack years: 13 00     Types: E-Cigarettes    Smokeless tobacco: Never Used    Tobacco comment: vapes   Vaping Use    Vaping Use: Every day    Substances: Nicotine   Substance and Sexual Activity    Alcohol use: Not Currently    Drug use: Not Currently     Types: Heroin, Marijuana, Prescription     Comment: on Methadone    Sexual activity: Not Currently         Current Outpatient Medications:     diclofenac (VOLTAREN) 75 mg EC tablet, Take 1 tablet (75 mg total) by mouth 2 (two) times a day, Disp: 60 tablet, Rfl: 2    FLUoxetine (PROzac) 40 MG capsule, Take 1 capsule (40 mg total) by mouth daily, Disp: 30 capsule, Rfl: 2    fluticasone (FLONASE) 50 mcg/act nasal spray, 1 spray into each nostril 2 (two) times a day, Disp: 16 g, Rfl: 11    levocetirizine (XYZAL) 5 MG tablet, Take 1 tablet (5 mg total) by mouth every evening, Disp: 30 tablet, Rfl: 11    linaCLOtide (Linzess) 72 MCG CAPS, Take 1 capsule by mouth in the morning, Disp: 30 capsule, Rfl: 2    METHADONE HCL PO, Take 110 mg by mouth daily 150mg daily , Disp: , Rfl:     olopatadine (PATANOL) 0 1 % ophthalmic solution, Administer 1 drop to both eyes 2 (two) times a day, Disp: 5 mL, Rfl: 0    omeprazole (PriLOSEC) 20 mg delayed release capsule, Take 20 mg by mouth daily, Disp: , Rfl:     ondansetron (ZOFRAN-ODT) 4 mg disintegrating tablet, , Disp: , Rfl:     Sod Fluoride-Potassium Nitrate 1 1-5 % PSTE, Apply 1 Squirt to teeth daily at bedtime Brush teeth daily at bedtime  Spit out, do not rinse  Nothing to eat or drink after , Disp: 112 g, Rfl: 0    triamcinolone (KENALOG) 0 1 % cream, Apply topically 2 (two) times a day, Disp: 30 g, Rfl: 0    Allergies   Allergen Reactions    Bee Venom Angioedema       Physical Exam:    /78   Pulse 92   Ht 5' 10" (1 778 m)   Wt (!) 152 kg (336 lb)   BMI 48 21 kg/m²     Constitutional:normal, well developed, well nourished, alert, in no distress and non-toxic and no overt pain behavior   and obese  Eyes:anicteric  HEENT:grossly intact  Neck:supple, symmetric, trachea midline and no masses   Pulmonary:even and unlabored  Cardiovascular:No edema or pitting edema present  Skin:Normal without rashes or lesions and well hydrated  Psychiatric:Mood and affect appropriate  Neurologic:Cranial Nerves II-XII grossly intact  Musculoskeletal:normal    Imaging  MRI lumbar spine without contrast    (Results Pending)   MRI cervical spine without contrast    (Results Pending)       Orders Placed This Encounter   Procedures    MRI lumbar spine without contrast    MRI cervical spine without contrast

## 2022-08-18 ENCOUNTER — APPOINTMENT (OUTPATIENT)
Dept: PHYSICAL THERAPY | Facility: CLINIC | Age: 32
End: 2022-08-18
Payer: COMMERCIAL

## 2022-08-22 ENCOUNTER — OFFICE VISIT (OUTPATIENT)
Dept: PHYSICAL THERAPY | Facility: CLINIC | Age: 32
End: 2022-08-22
Payer: COMMERCIAL

## 2022-08-22 DIAGNOSIS — M54.50 CHRONIC LOW BACK PAIN WITHOUT SCIATICA, UNSPECIFIED BACK PAIN LATERALITY: ICD-10-CM

## 2022-08-22 DIAGNOSIS — M54.2 NECK PAIN: ICD-10-CM

## 2022-08-22 DIAGNOSIS — G89.29 CHRONIC LOW BACK PAIN WITHOUT SCIATICA, UNSPECIFIED BACK PAIN LATERALITY: ICD-10-CM

## 2022-08-22 DIAGNOSIS — G89.29 CHRONIC PAIN OF BOTH KNEES: ICD-10-CM

## 2022-08-22 DIAGNOSIS — M25.561 CHRONIC PAIN OF BOTH KNEES: ICD-10-CM

## 2022-08-22 DIAGNOSIS — M54.12 CERVICAL RADICULOPATHY: ICD-10-CM

## 2022-08-22 DIAGNOSIS — M54.16 LUMBAR RADICULOPATHY: Primary | ICD-10-CM

## 2022-08-22 DIAGNOSIS — M25.562 CHRONIC PAIN OF BOTH KNEES: ICD-10-CM

## 2022-08-22 PROCEDURE — 97112 NEUROMUSCULAR REEDUCATION: CPT

## 2022-08-22 PROCEDURE — 97110 THERAPEUTIC EXERCISES: CPT

## 2022-08-22 NOTE — PROGRESS NOTES
Daily Note     Today's date: 2022  Patient name: Jer Van  : 1990  MRN: [id number]  Referring provider: Tulio Mclain MD  Dx:   Encounter Diagnosis     ICD-10-CM    1  Lumbar radiculopathy  M54 16    2  Chronic pain of both knees  M25 561     M25 562     G89 29    3  Chronic low back pain without sciatica, unspecified back pain laterality  M54 50     G89 29    4  Neck pain  M54 2    5  Cervical radiculopathy  M54 12        Start Time: 1600  Stop Time: 1700  Total time in clinic (min): 60 minutes    Subjective: Patient reports not being at therapy for a week due stomach issues with a lump sticking out on L side of his abdominal   Patient states he just doesn't feel right  Objective: See treatment diary below      Assessment: Tolerated treatment well  [place] participated in skilled PT session focused on strengthening, stretching, and ROM  Patient able to tolerate exercises this session  Held prone exercises due to stomach issues  Patient focused on strengthening and stretching exercises this session  Patient would continue to benefit from skilled PT interventions to address strengthening, stretching, and ROM  Patient demonstrated fatigue post treatment      Plan: Continue per plan of care        Precautions: chronic low back pain, knee pain        Manuals 8/22 8/3 8 8-11 8-16   Manual traction            Joint work            flexibility                     assessment    ST            Neuro Re-Ed            TA           TA+march       x15 HELD, NV                                                                    Ther Ex            NuStep/TM Bike interval 10' Bike Interval x 10' Bike Interval x 10' Bike interval 8'  Bike interval 10'    clamshells  gtb 2x10          Hip ext            Leg press  P4 2x10   Pillow at back       P4 2x10 pillow at back   Ham curls            Knee ext            SLR 3x10 R side only 2x10 flex ea 2x10 flex ea      CC LAE P6 3x12 P6 3x10 P6 3x10  P6 3x10 P6 3x12   Lumbar ext 70# 3x12 70# 3x10 70# 3x10  70# 3x10 70# 3x12   Lumbar flex            Prone alt ue/le Held 2x10 ea 2x10 ea  3x10 HELD   bridge 3x10 X 20 x20 2x10 HELD   Bird dog X 10 ea 2x10 ea 2x10 ea 2x10 HELD   Supine hand to knee  2x10 5" ea 2x10 5" ea 2x10 5" ea HELD   quadruped  LTR wind shield wiper 3x10 LTR windshield wiper x 15 ea LTR windshield wiper x 15 ea LTR windshield wiper x10 ea LTR windshield wiper x10 ea   Hand heel rock x10 x15 x15 x10 x20   Cat camel       x10 HELD   LTR            paloff press  CC P4 2x10 ea     CC p3 2x8 ea CC p4 2x8 ea   Ther Activity                                      Gait Training                                      Modalities                                             Access Code: [email or web address]  URL: [email or web address]  Date: 07/06/2022  Prepared by: Louisa Camposri    Exercises  · Supine Bridge - 1 x daily - 7 x weekly - 3 sets - 10 reps  · Bird Dog - 1 x daily - 7 x weekly - 3 sets - 10 reps  · Full Superman on Table - 1 x daily - 7 x weekly - 3 sets - 10 reps  · Supine Alternating Knee Taps with Hands - 1 x daily - 7 x weekly - 3 sets - 10 reps  · Supine Lower Trunk Rotation - 1 x daily - 7 x weekly - 3 sets - 10 reps

## 2022-08-23 ENCOUNTER — TELEPHONE (OUTPATIENT)
Dept: SURGERY | Facility: CLINIC | Age: 32
End: 2022-08-23

## 2022-08-23 ENCOUNTER — APPOINTMENT (EMERGENCY)
Dept: CT IMAGING | Facility: HOSPITAL | Age: 32
End: 2022-08-23
Payer: COMMERCIAL

## 2022-08-23 ENCOUNTER — HOSPITAL ENCOUNTER (EMERGENCY)
Facility: HOSPITAL | Age: 32
Discharge: HOME | End: 2022-08-23
Attending: EMERGENCY MEDICINE
Payer: COMMERCIAL

## 2022-08-23 VITALS
WEIGHT: 315 LBS | HEART RATE: 76 BPM | TEMPERATURE: 97.9 F | SYSTOLIC BLOOD PRESSURE: 152 MMHG | DIASTOLIC BLOOD PRESSURE: 85 MMHG | BODY MASS INDEX: 45.1 KG/M2 | OXYGEN SATURATION: 93 % | RESPIRATION RATE: 16 BRPM | HEIGHT: 70 IN

## 2022-08-23 DIAGNOSIS — K43.9 VENTRAL HERNIA WITHOUT OBSTRUCTION OR GANGRENE: Primary | ICD-10-CM

## 2022-08-23 LAB
ALBUMIN SERPL BCP-MCNC: 3.5 G/DL (ref 3.5–5)
ALP SERPL-CCNC: 86 U/L (ref 46–116)
ALT SERPL W P-5'-P-CCNC: 42 U/L (ref 12–78)
ANION GAP SERPL CALCULATED.3IONS-SCNC: 7 MMOL/L (ref 4–13)
AST SERPL W P-5'-P-CCNC: 31 U/L (ref 5–45)
BASOPHILS # BLD AUTO: 0.02 THOUSANDS/ΜL (ref 0–0.1)
BASOPHILS NFR BLD AUTO: 0 % (ref 0–1)
BILIRUB SERPL-MCNC: 0.41 MG/DL (ref 0.2–1)
BUN SERPL-MCNC: 11 MG/DL (ref 5–25)
CALCIUM SERPL-MCNC: 8.9 MG/DL (ref 8.3–10.1)
CHLORIDE SERPL-SCNC: 100 MMOL/L (ref 96–108)
CO2 SERPL-SCNC: 31 MMOL/L (ref 21–32)
CREAT SERPL-MCNC: 0.8 MG/DL (ref 0.6–1.3)
DME PARACHUTE DELIVERY DATE REQUESTED: NORMAL
DME PARACHUTE DELIVERY NOTE: NORMAL
DME PARACHUTE ITEM DESCRIPTION: NORMAL
DME PARACHUTE ORDER STATUS: NORMAL
DME PARACHUTE SUPPLIER NAME: NORMAL
DME PARACHUTE SUPPLIER PHONE: NORMAL
EOSINOPHIL # BLD AUTO: 0.19 THOUSAND/ΜL (ref 0–0.61)
EOSINOPHIL NFR BLD AUTO: 3 % (ref 0–6)
ERYTHROCYTE [DISTWIDTH] IN BLOOD BY AUTOMATED COUNT: 13.7 % (ref 11.6–15.1)
GFR SERPL CREATININE-BSD FRML MDRD: 118 ML/MIN/1.73SQ M
GLUCOSE SERPL-MCNC: 126 MG/DL (ref 65–140)
HCT VFR BLD AUTO: 38.3 % (ref 36.5–49.3)
HGB BLD-MCNC: 12.6 G/DL (ref 12–17)
IMM GRANULOCYTES # BLD AUTO: 0.01 THOUSAND/UL (ref 0–0.2)
IMM GRANULOCYTES NFR BLD AUTO: 0 % (ref 0–2)
LACTATE SERPL-SCNC: 1.6 MMOL/L (ref 0.5–2)
LIPASE SERPL-CCNC: 35 U/L (ref 73–393)
LYMPHOCYTES # BLD AUTO: 1.62 THOUSANDS/ΜL (ref 0.6–4.47)
LYMPHOCYTES NFR BLD AUTO: 28 % (ref 14–44)
MCH RBC QN AUTO: 26.4 PG (ref 26.8–34.3)
MCHC RBC AUTO-ENTMCNC: 32.9 G/DL (ref 31.4–37.4)
MCV RBC AUTO: 80 FL (ref 82–98)
MONOCYTES # BLD AUTO: 0.34 THOUSAND/ΜL (ref 0.17–1.22)
MONOCYTES NFR BLD AUTO: 6 % (ref 4–12)
NEUTROPHILS # BLD AUTO: 3.66 THOUSANDS/ΜL (ref 1.85–7.62)
NEUTS SEG NFR BLD AUTO: 63 % (ref 43–75)
NRBC BLD AUTO-RTO: 0 /100 WBCS
PLATELET # BLD AUTO: 193 THOUSANDS/UL (ref 149–390)
PMV BLD AUTO: 9.3 FL (ref 8.9–12.7)
POTASSIUM SERPL-SCNC: 3.6 MMOL/L (ref 3.5–5.3)
PROT SERPL-MCNC: 7.8 G/DL (ref 6.4–8.4)
RBC # BLD AUTO: 4.77 MILLION/UL (ref 3.88–5.62)
SODIUM SERPL-SCNC: 138 MMOL/L (ref 135–147)
WBC # BLD AUTO: 5.84 THOUSAND/UL (ref 4.31–10.16)

## 2022-08-23 PROCEDURE — 99284 EMERGENCY DEPT VISIT MOD MDM: CPT | Performed by: PHYSICIAN ASSISTANT

## 2022-08-23 PROCEDURE — 74177 CT ABD & PELVIS W/CONTRAST: CPT

## 2022-08-23 PROCEDURE — 36415 COLL VENOUS BLD VENIPUNCTURE: CPT | Performed by: PHYSICIAN ASSISTANT

## 2022-08-23 PROCEDURE — 80053 COMPREHEN METABOLIC PANEL: CPT | Performed by: PHYSICIAN ASSISTANT

## 2022-08-23 PROCEDURE — 83605 ASSAY OF LACTIC ACID: CPT | Performed by: PHYSICIAN ASSISTANT

## 2022-08-23 PROCEDURE — 83690 ASSAY OF LIPASE: CPT | Performed by: PHYSICIAN ASSISTANT

## 2022-08-23 PROCEDURE — 99284 EMERGENCY DEPT VISIT MOD MDM: CPT

## 2022-08-23 PROCEDURE — 85025 COMPLETE CBC W/AUTO DIFF WBC: CPT | Performed by: PHYSICIAN ASSISTANT

## 2022-08-23 RX ADMIN — IOHEXOL 70 ML: 350 INJECTION, SOLUTION INTRAVENOUS at 11:01

## 2022-08-23 NOTE — TELEPHONE ENCOUNTER
Placed call to patient and left a message to return our call, referral in chart to make an appointment with general surgery

## 2022-08-23 NOTE — Clinical Note
Sherlyn Morejon was seen and treated in our emergency department on 8/23/2022  Diagnosis:     Celio Rivera  may return to work on return date  He may return on this date: 08/25/2022         If you have any questions or concerns, please don't hesitate to call        Willa Streeter PA-C    ______________________________           _______________          _______________  Hospital Representative                              Date                                Time

## 2022-08-23 NOTE — ED PROVIDER NOTES
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 Taking? FLUoxetine (PROzac) 40 MG capsule   No No   Sig: Take 1 capsule (40 mg total) by mouth daily   METHADONE HCL PO  Self Yes No   Sig: Take 110 mg by mouth daily 150mg daily    Sod Fluoride-Potassium Nitrate 1 1-5 % PSTE   No No   Sig: Apply 1 Squirt to teeth daily at bedtime Brush teeth daily at bedtime  Spit out, do not rinse  Nothing to eat or drink after     diclofenac (VOLTAREN) 75 mg EC tablet  Self No No   Sig: Take 1 tablet (75 mg total) by mouth 2 (two) times a day   fluticasone (FLONASE) 50 mcg/act nasal spray   No No   Si spray into each nostril 2 (two) times a day   levocetirizine (XYZAL) 5 MG tablet   No No   Sig: Take 1 tablet (5 mg total) by mouth every evening   linaCLOtide (Linzess) 72 MCG CAPS   No No   Sig: Take 1 capsule by mouth in the morning   olopatadine (PATANOL) 0 1 % ophthalmic solution  Self No No   Sig: Administer 1 drop to both eyes 2 (two) times a day   omeprazole (PriLOSEC) 20 mg delayed release capsule  Self Yes No   Sig: Take 20 mg by mouth daily   ondansetron (ZOFRAN-ODT) 4 mg disintegrating tablet  Self Yes No   triamcinolone (KENALOG) 0 1 % cream   No No   Sig: Apply topically 2 (two) times a day      Facility-Administered Medications: None       Past Medical History:   Diagnosis Date    Allergic     Anemia 6/24/2018    Anxiety     from records    Benign essential hypertension 11/17/2016    Bipolar 1 disorder ([facility] [address] )     from records    Chronic pain     Claustrophobia 3/15/2018    Community acquired pneumonia 6/24/2018    Depressed 7/14/2016    Depression     from records    GERD (gastroesophageal reflux disease)     Hepatitis C virus infection 8/14/2014    Heroin abuse ([facility] ) 7/24/2018    Infectious viral hepatitis     Obesity 10/12/2016    Obstructive sleep apnea     from records    Sleep disorder     Substance abuse [facility])        Past Surgical History:   Procedure Laterality Date    [place] DRAIN SKIN ABSCESS COMPLIC Left 5/1/6586    Procedure: INCISION AND DRAINAGE (I&D) EXTREMITY;  Surgeon: Venkatesh Finley MD;  Location: [facility];  Service: Orthopedics    [place] LAP,SURG,COLECTOMY, PARTIAL, W/ANAST Left 5/21/2020    Procedure: LAPAROSCOPIC LEFT HEMICOLECTOMY TAKEDOWN SPLENIC FLECTURE, COLORECTAL ANASTOMOSIS ;  Surgeon: Clifford Najera MD;  Location:  MAIN OR;  Service: Colorectal    WISDOM TOOTH EXTRACTION         Family History   Problem Relation Age of Onset    Diabetes Mother     Restless legs syndrome Mother     Sleep apnea Mother     Colon cancer Father     Alzheimer's disease Maternal Grandmother     Depression Family      I have reviewed and agree with the history as documented      E-Cigarette/Vaping    E-Cigarette Use Current Every Day User     Cartridges/Day 1      E-Cigarette/Vaping Substances    Nicotine Yes     THC No     CBD No     Flavoring No     Other No     Unknown No      Social History     Tobacco Use    Smoking status: Former Smoker     Packs/day: 1 00     Years: 13 00     Pack years: 13 00     Types: E-Cigarettes    Smokeless tobacco: Never Used    Tobacco comment: vapes   Vaping Use    Vaping Use: Every day    Substances: Nicotine   Substance Use Topics    Alcohol use: Not Currently    Drug use: Not Currently     Types: Heroin, Marijuana, Prescription     Comment: on Methadone       Review of Systems   Constitutional: Negative for chills, diaphoresis, fatigue and fever  HENT: Negative for congestion, ear pain, mouth sores, rhinorrhea, sinus pain, sore throat and trouble swallowing  Eyes: Negative for photophobia and visual disturbance  Respiratory: Negative for cough, chest tightness, shortness of breath and wheezing  Cardiovascular: Negative for chest pain, palpitations and leg swelling  Gastrointestinal: Positive for abdominal distention, abdominal pain, diarrhea and nausea  Negative for blood in stool, constipation and vomiting  Genitourinary: Negative for dysuria, flank pain, frequency, hematuria and urgency  Musculoskeletal: Negative for arthralgias, back pain, gait problem, joint swelling, myalgias and neck pain  Skin: Negative for color change, pallor and rash  Neurological: Negative for dizziness, syncope, speech difficulty, weakness, light-headedness, numbness and headaches  Psychiatric/Behavioral: Negative for confusion and sleep disturbance  All other systems reviewed and are negative  Physical Exam  Physical Exam  Vitals and nursing note reviewed  Constitutional:       General: He is awake  He is not in acute distress  Appearance: Normal appearance  He is well-developed  He is obese  He is not ill-appearing or diaphoretic  HENT:      Head: Normocephalic and atraumatic        Right Ear: External ear normal       Left Ear: External ear normal       Nose: Nose normal       Mouth/Throat:      Lips: Pink       Mouth: Mucous membranes are moist    Eyes:      General: Lids are normal  No scleral icterus  Conjunctiva/sclera: Conjunctivae normal       Pupils: Pupils are equal, round, and reactive to light  Cardiovascular:      Rate and Rhythm: Normal rate and regular rhythm  Pulses: Normal pulses  Radial pulses are 2+ on the right side and 2+ on the left side  Heart sounds: Normal heart sounds, S1 normal and S2 normal    Pulmonary:      Effort: Pulmonary effort is normal  No accessory muscle usage  Breath sounds: Normal breath sounds  No stridor  No decreased breath sounds, wheezing, rhonchi or rales  Abdominal:      General: Abdomen is flat  Bowel sounds are normal  There is no distension  Palpations: Abdomen is soft  Tenderness: There is no abdominal tenderness  There is no right CVA tenderness, left CVA tenderness, guarding or rebound  Hernia: A hernia is present  Hernia is present in the ventral area (left lower)  Comments: Visible/palpable hernia over the left lower quadrant when standing or bearing down  Hernia is reducible and improves with laying flat  No evidence of strangulation/incarceration  Mild tenderness over the left lower quadrant  No rebound tenderness, guarding or rigidity  Musculoskeletal:      Cervical back: Full passive range of motion without pain, normal range of motion and neck supple  No signs of trauma  No pain with movement  Normal range of motion  Right lower leg: No edema  Left lower leg: No edema  Lymphadenopathy:      Cervical: No cervical adenopathy  Skin:     General: Skin is warm and dry  Capillary Refill: Capillary refill takes less than 2 seconds  Coloration: Skin is not cyanotic, jaundiced or pale  Neurological:      Mental Status: He is alert and oriented to person, place, and time  GCS: GCS eye subscore is 4  GCS verbal subscore is 5  GCS motor subscore is 6  Cranial Nerves:  No dysarthria or facial asymmetry  Gait: Gait normal    Psychiatric:         Attention and Perception: Attention normal          Mood and Affect: Mood normal          Speech: Speech normal          Behavior: Behavior normal  Behavior is cooperative           Vital Signs  ED Triage Vitals [08/23/22 0911]   Temperature Pulse Respirations Blood Pressure SpO2   97 9 °F (36 6 °C) 90 18 156/88 94 %      Temp Source Heart Rate Source Patient Position - Orthostatic VS BP Location FiO2 (%)   Temporal Monitor Lying Right arm --      Pain Score       2           Vitals:    08/23/22 0911 08/23/22 1030   BP: 156/88 152/85   Pulse: 90 76   Patient Position - Orthostatic VS: Lying Lying         Visual Acuity      ED Medications  Medications   iohexol (OMNIPAQUE) 350 MG/ML injection (MULTI-DOSE) 70 mL (70 mL Intravenous Given 8/23/22 1101)       Diagnostic Studies  Results Reviewed     Procedure Component Value Units Date/Time    CBC and differential [[id number]]  (Abnormal) Collected: 08/23/22 0955    Lab Status: Final result Specimen: Blood from Arm, Right Updated: 08/23/22 1038     WBC 5 84 Thousand/uL      RBC 4 77 Million/uL      Hemoglobin 12 6 g/dL      Hematocrit 38 3 %      MCV 80 fL      MCH 26 4 pg      MCHC 32 9 g/dL      RDW 13 7 %      MPV 9 3 fL      Platelets 728 Thousands/uL      nRBC 0 /100 WBCs      Neutrophils Relative 63 %      Immat GRANS % 0 %      Lymphocytes Relative 28 %      Monocytes Relative 6 %      Eosinophils Relative 3 %      Basophils Relative 0 %      Neutrophils Absolute 3 66 Thousands/µL      Immature Grans Absolute 0 01 Thousand/uL      Lymphocytes Absolute 1 62 Thousands/µL      Monocytes Absolute 0 34 Thousand/µL      Eosinophils Absolute 0 19 Thousand/µL      Basophils Absolute 0 02 Thousands/µL     Lactic acid [[id number]]  (Normal) Collected: 08/23/22 0955    Lab Status: Final result Specimen: Blood from Arm, Right Updated: 08/23/22 1025     LACTIC ACID 1 6 mmol/L     Narrative:      Result may be elevated if tourniquet was used during collection  Comprehensive metabolic panel [[id number]] Collected: 08/23/22 0955    Lab Status: Final result Specimen: Blood from Arm, Right Updated: 08/23/22 1020     Sodium 138 mmol/L      Potassium 3 6 mmol/L      Chloride 100 mmol/L      CO2 31 mmol/L      ANION GAP 7 mmol/L      BUN 11 mg/dL      Creatinine 0 80 mg/dL      Glucose 126 mg/dL      Calcium 8 9 mg/dL      AST 31 U/L      ALT 42 U/L      Alkaline Phosphatase 86 U/L      Total Protein 7 8 g/dL      Albumin 3 5 g/dL      Total Bilirubin 0 41 mg/dL      eGFR 118 ml/min/1 73sq m     Narrative:      [place] guidelines for Chronic Kidney Disease (CKD):     Stage 1 with normal or high GFR (GFR > 90 mL/min/1 73 square meters)    Stage 2 Mild CKD (GFR = 60-89 mL/min/1 73 square meters)    Stage 3A Moderate CKD (GFR = 45-59 mL/min/1 73 square meters)    Stage 3B Moderate CKD (GFR = 30-44 mL/min/1 73 square meters)    Stage 4 Severe CKD (GFR = 15-29 mL/min/1 73 square meters)    Stage 5 End Stage CKD (GFR <15 mL/min/1 73 square meters)  Note: GFR calculation is accurate only with a steady state creatinine    Lipase [[id number]]  (Abnormal) Collected: 08/23/22 0955    Lab Status: Final result Specimen: Blood from Arm, Right Updated: 08/23/22 1020     Lipase 35 u/L                  CT abdomen pelvis with contrast   Final Result by Terrance Cortez DO (08/23 1137)      Left lower quadrant small bowel containing ventral abdominal wall hernia without evidence for obstruction  There is some edema/inflammation in the overlying subcutaneous superficial soft tissues  Hepatomegaly and hepatic steatosis  Additional findings as above              Workstation performed: [id number]                    Procedures  Procedures         ED Course                               SBIRT 22yo+    Flowsheet Row Most Recent Value   SBIRT (23 yo +)    In order to provide better care to our patients, we are screening all of our patients for alcohol and drug use  Would it be okay to ask you these screening questions? Yes Filed at: 08/23/2022 1008   Initial Alcohol Screen: US AUDIT-C     1  How often do you have a drink containing alcohol? 0 Filed at: 08/23/2022 1008   2  How many drinks containing alcohol do you have on a typical day you are drinking? 0 Filed at: 08/23/2022 1008   3a  Male UNDER 65: How often do you have five or more drinks on one occasion? 0 Filed at: 08/23/2022 1008   Audit-C Score 0 Filed at: 08/23/2022 1008   CHARLINE: How many times in the past year have you    Used an illegal drug or used a prescription medication for non-medical reasons? Never Filed at: 08/23/2022 1008                    MDM  Number of Diagnoses or Management Options  Ventral hernia without obstruction or gangrene  Diagnosis management comments: Patient is 76-year-old with a PMHx of anxiety, bipolar disorder, hep C, CATA and substance abuse, presenting to the ED for evaluation of a bulge in his left lower abdominal wall x3 months  CT showed a left lower quadrant small bowel containing ventral abdominal wall hernia without evidence for obstruction  There is no evidence of strangulation/incarceration on exam   Discussed with General surgery who will follow-up with patient within the next week  Patient was given the number to the general surgery office and instructed to call to schedule his appointment  Discussed the signs and symptoms of strangulated/incarcerated hernia for which to return to the ED immediately  The management plan was discussed in detail with the patient at bedside and all questions were answered  Strict ED return instructions were discussed at bedside  Prior to discharge, both verbal and written instructions were provided  We discussed the signs and symptoms that should prompt the patient to return to the ED   All questions were answered and the patient was comfortable with the plan of care and discharged home  The patient agrees to return to the Emergency Department for concerns and/or progression of illness  Amount and/or Complexity of Data Reviewed  Clinical lab tests: ordered and reviewed  Tests in the radiology section of CPT®: ordered and reviewed    Patient Progress  Patient progress: stable      Disposition  Final diagnoses:   Ventral hernia without obstruction or gangrene     Time reflects when diagnosis was documented in both MDM as applicable and the Disposition within this note     Time User Action Codes Description Comment    8/23/2022 11:54 AM Luster Sacks Add [K43 9] Ventral hernia without obstruction or gangrene       ED Disposition     ED Disposition   Discharge    Condition   Stable    Date/Time   Tue Aug 23, 2022 12:06 PM    [place] 27 discharge to home/self care                 Follow-up Information     Follow up With Specialties Details Why Contact Info Additional Information    [facility] General Surgery [facility] Surgery Schedule an appointment as soon as possible for a visit   [address] [address]  [address], [address] 87 [facility] Emergency Department Emergency Medicine  If symptoms worsen [place] [phone]  70 [facility] Emergency Department, [address], [address]          Discharge Medication List as of 8/23/2022 12:09 PM      CONTINUE these medications which have NOT CHANGED    Details   diclofenac (VOLTAREN) 75 mg EC tablet Take 1 tablet (75 mg total) by mouth 2 (two) times a day, Starting Tue 3/15/2022, Normal      FLUoxetine (PROzac) 40 MG capsule Take 1 capsule (40 mg total) by mouth daily, Starting Wed 7/20/2022, Normal      fluticasone (FLONASE) 50 mcg/act nasal spray 1 spray into each nostril 2 (two) times a day, Starting Tue 6/28/2022, Normal      levocetirizine (XYZAL) 5 MG tablet Take 1 tablet (5 mg total) by mouth every evening, Starting Tue 6/28/2022, Normal      linaCLOtide (Linzess) 72 MCG CAPS Take 1 capsule by mouth in the morning, Starting Wed 7/20/2022, Normal      METHADONE HCL PO Take 110 mg by mouth daily 150mg daily , Historical Med      olopatadine (PATANOL) 0 1 % ophthalmic solution Administer 1 drop to both eyes 2 (two) times a day, Starting Thu 5/19/2022, Normal      omeprazole (PriLOSEC) 20 mg delayed release capsule Take 20 mg by mouth daily, Historical Med      ondansetron (ZOFRAN-ODT) 4 mg disintegrating tablet Starting Mon 5/23/2022, Historical Med      Sod Fluoride-Potassium Nitrate 1 1-5 % PSTE Apply 1 Squirt to teeth daily at bedtime Brush teeth daily at bedtime  Spit out, do not rinse   Nothing to eat or drink after , Starting Mon 7/18/2022, Normal      triamcinolone (KENALOG) 0 1 % cream Apply topically 2 (two) times a day, Starting Wed 7/20/2022, Normal                 PDMP Review       Value Time User    PDMP Reviewed  Yes 6/8/2022 11:04 AM Brittaney Larkin MD          ED Provider  Electronically Signed by           Nkechi Bond PA-C  08/23/22 2281 English

## 2022-08-23 NOTE — DISCHARGE INSTRUCTIONS
Call [phone] to schedule an appointment with general surgery  Return to the ED immediately if you have increased pain or if you are unable to reduce your hernia

## 2022-08-24 ENCOUNTER — OFFICE VISIT (OUTPATIENT)
Dept: PHYSICAL THERAPY | Facility: CLINIC | Age: 32
End: 2022-08-24
Payer: COMMERCIAL

## 2022-08-24 DIAGNOSIS — M54.12 CERVICAL RADICULOPATHY: ICD-10-CM

## 2022-08-24 DIAGNOSIS — M25.561 CHRONIC PAIN OF BOTH KNEES: ICD-10-CM

## 2022-08-24 DIAGNOSIS — M54.50 CHRONIC LOW BACK PAIN WITHOUT SCIATICA, UNSPECIFIED BACK PAIN LATERALITY: ICD-10-CM

## 2022-08-24 DIAGNOSIS — M25.562 CHRONIC PAIN OF BOTH KNEES: ICD-10-CM

## 2022-08-24 DIAGNOSIS — M54.16 LUMBAR RADICULOPATHY: Primary | ICD-10-CM

## 2022-08-24 DIAGNOSIS — M54.2 NECK PAIN: ICD-10-CM

## 2022-08-24 DIAGNOSIS — G89.29 CHRONIC PAIN OF BOTH KNEES: ICD-10-CM

## 2022-08-24 DIAGNOSIS — G89.29 CHRONIC LOW BACK PAIN WITHOUT SCIATICA, UNSPECIFIED BACK PAIN LATERALITY: ICD-10-CM

## 2022-08-24 PROCEDURE — 97110 THERAPEUTIC EXERCISES: CPT

## 2022-08-24 NOTE — PROGRESS NOTES
Daily Note     Today's date: 2022  Patient name: Claudia Ventura  : 1990  MRN: [id number]  Referring provider: Augusto Greer MD  Dx:   Encounter Diagnosis     ICD-10-CM    1  Lumbar radiculopathy  M54 16    2  Chronic pain of both knees  M25 561     M25 562     G89 29    3  Chronic low back pain without sciatica, unspecified back pain laterality  M54 50     G89 29    4  Neck pain  M54 2    5  Cervical radiculopathy  M54 12        Start Time: 1600  Stop Time: 1655  Total time in clinic (min): 55 minutes    Subjective: Patient reports he went to ER yesterday for lump on left side of his abdomin  Patient states it is a hernia  Objective: See treatment diary below  Held machine exercises due to patient's request       Assessment: Tolerated treatment well  Patient participated in skilled PT session focused on strengthening, stretching, and ROM  Session modified due to patient's request finding out he has a hernia  Patient focused on core and LE strengthening  Patient would continue to benefit from skilled PT interventions to address strengthening, stretching, and ROM  Patient demonstrated fatigue post treatment      Plan: Continue per plan of care        Precautions: chronic low back pain, knee pain        Manuals  8-11 8-16   Manual traction            Joint work            flexibility                     assessment    ST            Neuro Re-Ed            TA           TA+march       x15 HELD, NV                                                                    Ther Ex            NuStep/TM Bike interval 10' Bike Interval x 10' Bike Interval x 10' Bike interval 8'  Bike interval 10'    clamshells  gtb 2x10  gtb 3x10        Hip ext    Hip Abd/Ext 2x10 ea B/L        Leg press  P4 2x10   Pillow at back  Held     P4 2x10 pillow at back   Ham curls            Knee ext            SLR 3x10 R side only 2x10 flex ea 2x10 flex ea      CC LAE P6 3x12 Held P6 3x10  P6 3x10 P6 3x12 Lumbar ext 70# 3x12 Held 70# 3x10  70# 3x10 70# 3x12   Lumbar flex            Prone alt ue/le Held Held 2x10 ea  3x10 HELD   bridge 3x10 3x10 x20 2x10 HELD   Bird dog X 10 ea 2x10 ea 2x10 ea 2x10 HELD   Supine hand to knee   2x10 5" ea 2x10 5" ea HELD   quadruped  LTR wind shield wiper 3x10 LTR windshield wiper 3x10 ea LTR windshield wiper x 15 ea LTR windshield wiper x10 ea LTR windshield wiper x10 ea   Hand heel rock x10 X 20 x15 x10 x20   Cat camel       x10 HELD   LTR            paloff press  CC P4 2x10 ea     CC p3 2x8 ea CC p4 2x8 ea   Ther Activity                                      Gait Training                                      Modalities                                             Access Code: [email or web address]  URL: [email or web address]  Date: 07/06/2022  Prepared by: Shaq Mejía    Exercises  · Supine Bridge - 1 x daily - 7 x weekly - 3 sets - 10 reps  · Bird Dog - 1 x daily - 7 x weekly - 3 sets - 10 reps  · Full Superman on Table - 1 x daily - 7 x weekly - 3 sets - 10 reps  · Supine Alternating Knee Taps with Hands - 1 x daily - 7 x weekly - 3 sets - 10 reps  · Supine Lower Trunk Rotation - 1 x daily - 7 x weekly - 3 sets - 10 reps

## 2022-08-26 ENCOUNTER — OFFICE VISIT (OUTPATIENT)
Dept: OTOLARYNGOLOGY | Facility: CLINIC | Age: 32
End: 2022-08-26
Payer: COMMERCIAL

## 2022-08-26 VITALS
BODY MASS INDEX: 45.1 KG/M2 | OXYGEN SATURATION: 95 % | HEIGHT: 70 IN | HEART RATE: 92 BPM | DIASTOLIC BLOOD PRESSURE: 80 MMHG | SYSTOLIC BLOOD PRESSURE: 110 MMHG | WEIGHT: 315 LBS | TEMPERATURE: 96.4 F

## 2022-08-26 DIAGNOSIS — J34.2 DNS (DEVIATED NASAL SEPTUM): ICD-10-CM

## 2022-08-26 DIAGNOSIS — J30.89 NON-SEASONAL ALLERGIC RHINITIS, UNSPECIFIED TRIGGER: Primary | ICD-10-CM

## 2022-08-26 DIAGNOSIS — J34.89 NASAL OBSTRUCTION: ICD-10-CM

## 2022-08-26 PROCEDURE — 99214 OFFICE O/P EST MOD 30 MIN: CPT | Performed by: OTOLARYNGOLOGY

## 2022-08-26 NOTE — PROGRESS NOTES
Otolaryngology Clinic Visit  Name:  Reina Kauffman  MRN:  [id number]  Date:  8/26/2022 12:14 PM  ________________________________________________________________________       CHIEF COMPLAINT:   Allergies      HPI:  Reina Kauffman is a 27 y o  male with PMH as below who is here today for evaluation of allergies  He reports longstanding seasonal allergies  They are worse in the spring and the summer there is a winter aspect to them as well  He does have a dog at home  No history of asthma there is not a strong family history either  He is not tried any recent medications  He has been on oral antihistamines before and Flonase many years ago  He does report nasal dryness with occasional bleeding  He is tried [employer] irrigations with moderate success  With no facial pressure or pain, no issues with smell, no ear issues  No issues eating drinking breathing or swallowing  No other ENT questions or concerns    Interval HPI:  Patient has been using Flonase with some mild improvement  No other issues or changes      PMHx:  Past Medical History:   Diagnosis Date    Allergic     Anemia 6/24/2018    Anxiety     from records    Benign essential hypertension 11/17/2016    Bipolar 1 disorder (HCC)     from records    Chronic pain     Claustrophobia 3/15/2018    Community acquired pneumonia 6/24/2018    Depressed 7/14/2016    Depression     from records    GERD (gastroesophageal reflux disease)     Hepatitis C virus infection 8/14/2014    Heroin abuse ([facility] ) 7/24/2018    Infectious viral hepatitis     Obesity 10/12/2016    Obstructive sleep apnea     from records    Sleep disorder     Substance abuse ([facility] )        PSHx:  Past Surgical History:   Procedure Laterality Date    [place] DRAIN SKIN ABSCESS COMPLIC Left 3/8/1301    Procedure: INCISION AND DRAINAGE (I&D) EXTREMITY;  Surgeon: Kahlil Lundberg MD;  Location: [facility];  Service: Orthopedics    [place] LAP,SURG,COLECTOMY, PARTIAL, W/ANAST Left 5/21/2020 Procedure: LAPAROSCOPIC LEFT HEMICOLECTOMY TAKEDOWN SPLENIC FLECTURE, COLORECTAL ANASTOMOSIS ;  Surgeon: Navin Lynn MD;  [facility];  Service: Colorectal    WISDOM TOOTH EXTRACTION         FAMHx:  Family History   Problem Relation Age of Onset    Diabetes Mother     Restless legs syndrome Mother     Sleep apnea Mother     Colon cancer Father     Alzheimer's disease Maternal Grandmother     Depression Family        SOCHx:  Social History     Socioeconomic History    Marital status: Single     Spouse name: None    Number of children: None    Years of education: None    Highest education level: None   Occupational History    None   Tobacco Use    Smoking status: Former Smoker     Packs/day: 1 00     Years: 13 00     Pack years: 13 00     Types: E-Cigarettes    Smokeless tobacco: Never Used    Tobacco comment: vapes   Vaping Use    Vaping Use: Every day    Substances: Nicotine   Substance and Sexual Activity    Alcohol use: Not Currently    Drug use: Not Currently     Types: Heroin, Marijuana, Prescription     Comment: on Methadone    Sexual activity: Not Currently   Other Topics Concern    None   Social History Narrative    H/O intravenous drug use in remission    Hepatitis C virus Infection    Lives with parents    No caffeine use    Unemployed     Social Determinants of Health     Financial Resource Strain: Not on file   Food Insecurity: Not on file   Transportation Needs: Not on file   Physical Activity: Not on file   Stress: Not on file   Social Connections: Not on file   Intimate Partner Violence: Not on file   Housing Stability: Not on file       Allergies:   Allergies   Allergen Reactions    Bee Venom Angioedema        MEDS:  Reviewed    ROS:  As above otherwise:   See ROS in encounter by MA       PHYSICAL EXAM:  /80 (BP Location: Left arm, Cuff Size: Large)   Pulse 92   Temp (!) 96 4 °F (35 8 °C) (Temporal)   Ht 5' 10" (1 778 m)   Wt (!) 152 kg (334 lb 6 4 oz) SpO2 95%   BMI 47 98 kg/m²   General: NAD, AOx4  Eyes:  EOMI  Ears:  Right: ear canal normal, TM normal apperance, no fluid  Left: ear canal normal, TM normal apperance, no fluid  Nose:  Left septal deviation, moderate inferior turbinate hypertrophy, no mass/lesions  Oral cavity:  No trismus, no mass/lesions, tonsils 1+  Neck: Trachea is midline; no thyroid nodules, Salivary glands symmetrical, no masses/abnormality on palpation  Lymph:  No cervical lymphadenopathy  Skin:  No obvious facial lesions  Neuro: Face symmetrical, no obvious cranial nerve palsy   No focal deficits   Lungs:  Normal work of breathing, symmetrical chest expansion  Vascular: Well perfused    Procedures:  None     Medical Data Reviewed:  Records reviewed and summarized as in EPIC    Radiology:  None    Labs:  None     Patient Active Problem List   Diagnosis    Acid reflux    Allergic rhinitis    Essential hypertension    [facility] disorder [facility] )    Generalized anxiety disorder    Insomnia    Class 3 severe obesity due to excess calories with serious comorbidity and body mass index (BMI) of 45 0 to 49 9 in adult ([facility] )    Opioid dependence in remission ([facility] )    Claustrophobia    Restless leg syndrome    Morbid obesity with BMI of 50 0-59 9, adult (HCC)    Chronic pain syndrome    Anxiety    Chronic hepatitis C without hepatic coma (HCC)    Depressed    Obesity, Class III, BMI 40-49 9 (morbid obesity) ([address] )    CATA (obstructive sleep apnea)    Drug dependence ([address] )    Medication therapy continued    Encounter for monitoring Suboxone maintenance therapy    IV drug abuse ([facility] )    Heroin addiction ([facility] )    Colonic mass    Arthralgia    Eczema    Generalized abdominal pain    Allergic conjunctivitis of both eyes    Muscle ache    Chronic pain of both knees    At risk for central sleep apnea    Central sleep apnea comorbid with prescribed opioid use    Chronic constipation    Chronic bilateral low back pain without sciatica       ASSESSMENT/PLAN:  Martin Pagan is a 27 y o  male with acute and chronic problems as above who presents with:    1  Non-seasonal allergic rhinitis, unspecified trigger    2  Nasal obstruction    3  DNS (deviated nasal septum)      27year old here for further evaluation of nasal issues  He has had some mild improvement with Flonase R>L but continues to have issues on the left  His exam shows a large left septal deviation  We reviewed this and that at this time he has failed medical mgmt  We discussed the next steps and he is interested in surgery  Will plan for septoplasty with turbinate reduction The risks, benefits, indications, and alternatives to surgery were discussed at length today and informed consent was obtained   Particular risks discussed today include, pain, bleeding, need for additional procedures including cautery, revision surgery, septal perforation, failure to improve snoring/breathing, and palatal numbness   -Septoturbs as outpatient    RTC 1 week post op        Timbo Gilliland MD MPH  Otolaryngology--Head and Neck Surgery  [facility]  8/26/2022 12:14 PM

## 2022-08-26 NOTE — PROGRESS NOTES
Review of Systems   Constitutional: Negative  HENT: Positive for congestion  Eyes: Negative  Respiratory: Negative  Cardiovascular: Negative  Gastrointestinal: Negative  Endocrine: Negative  Genitourinary: Negative  Musculoskeletal: Negative  Skin: Negative  Allergic/Immunologic: Positive for environmental allergies  Neurological: Negative  Hematological: Negative  Psychiatric/Behavioral: Negative

## 2022-08-30 ENCOUNTER — APPOINTMENT (OUTPATIENT)
Dept: PHYSICAL THERAPY | Facility: CLINIC | Age: 32
End: 2022-08-30
Payer: COMMERCIAL

## 2022-08-30 ENCOUNTER — TELEPHONE (OUTPATIENT)
Dept: SLEEP CENTER | Facility: CLINIC | Age: 32
End: 2022-08-30

## 2022-08-30 LAB

## 2022-08-31 ENCOUNTER — CONSULT (OUTPATIENT)
Dept: SURGERY | Facility: CLINIC | Age: 32
End: 2022-08-31
Payer: COMMERCIAL

## 2022-08-31 ENCOUNTER — HOSPITAL ENCOUNTER (OUTPATIENT)
Dept: MRI IMAGING | Facility: HOSPITAL | Age: 32
Discharge: HOME | End: 2022-08-31

## 2022-08-31 VITALS
SYSTOLIC BLOOD PRESSURE: 120 MMHG | DIASTOLIC BLOOD PRESSURE: 82 MMHG | WEIGHT: 315 LBS | HEART RATE: 98 BPM | TEMPERATURE: 98.3 F | HEIGHT: 70 IN | RESPIRATION RATE: 16 BRPM | BODY MASS INDEX: 45.1 KG/M2 | OXYGEN SATURATION: 98 %

## 2022-08-31 DIAGNOSIS — K59.00 CONSTIPATION: Primary | ICD-10-CM

## 2022-08-31 DIAGNOSIS — K21.9 GERD (GASTROESOPHAGEAL REFLUX DISEASE): ICD-10-CM

## 2022-08-31 DIAGNOSIS — F31.9 BIPOLAR 1 DISORDER (HCC): ICD-10-CM

## 2022-08-31 DIAGNOSIS — F11.90 METHADONE USE: ICD-10-CM

## 2022-08-31 DIAGNOSIS — K43.9 VENTRAL HERNIA WITHOUT OBSTRUCTION OR GANGRENE: ICD-10-CM

## 2022-08-31 DIAGNOSIS — M54.12 CERVICAL RADICULOPATHY: ICD-10-CM

## 2022-08-31 DIAGNOSIS — M54.16 LUMBAR RADICULOPATHY: ICD-10-CM

## 2022-08-31 DIAGNOSIS — E66.01 MORBID OBESITY (HCC): ICD-10-CM

## 2022-08-31 PROCEDURE — 99243 OFF/OP CNSLTJ NEW/EST LOW 30: CPT | Performed by: SURGERY

## 2022-09-02 ENCOUNTER — OFFICE VISIT (OUTPATIENT)
Dept: SLEEP CENTER | Facility: CLINIC | Age: 32
End: 2022-09-02
Payer: COMMERCIAL

## 2022-09-02 ENCOUNTER — HOSPITAL ENCOUNTER (OUTPATIENT)
Dept: SLEEP CENTER | Facility: CLINIC | Age: 32
Discharge: HOME | End: 2022-09-02
Payer: COMMERCIAL

## 2022-09-02 VITALS
SYSTOLIC BLOOD PRESSURE: 116 MMHG | BODY MASS INDEX: 45.1 KG/M2 | HEIGHT: 70 IN | WEIGHT: 315 LBS | DIASTOLIC BLOOD PRESSURE: 71 MMHG | HEART RATE: 71 BPM

## 2022-09-02 DIAGNOSIS — F11.90 CENTRAL SLEEP APNEA COMORBID WITH PRESCRIBED OPIOID USE: ICD-10-CM

## 2022-09-02 DIAGNOSIS — G47.34 CHRONIC INTERMITTENT HYPOXIA WITH OBSTRUCTIVE SLEEP APNEA: ICD-10-CM

## 2022-09-02 DIAGNOSIS — G47.37 CENTRAL SLEEP APNEA COMORBID WITH PRESCRIBED OPIOID USE: ICD-10-CM

## 2022-09-02 DIAGNOSIS — G47.34 CHRONIC INTERMITTENT HYPOXIA WITH OBSTRUCTIVE SLEEP APNEA: Primary | ICD-10-CM

## 2022-09-02 DIAGNOSIS — G47.33 CHRONIC INTERMITTENT HYPOXIA WITH OBSTRUCTIVE SLEEP APNEA: Primary | ICD-10-CM

## 2022-09-02 DIAGNOSIS — G47.33 CHRONIC INTERMITTENT HYPOXIA WITH OBSTRUCTIVE SLEEP APNEA: ICD-10-CM

## 2022-09-02 DIAGNOSIS — F11.20 METHADONE MAINTENANCE THERAPY PATIENT (HCC): ICD-10-CM

## 2022-09-02 PROBLEM — Z91.89 AT RISK FOR CENTRAL SLEEP APNEA: Status: RESOLVED | Noted: 2022-06-24 | Resolved: 2022-09-02

## 2022-09-02 PROBLEM — K43.9 VENTRAL HERNIA WITHOUT OBSTRUCTION OR GANGRENE: Status: ACTIVE | Noted: 2022-09-02

## 2022-09-02 PROCEDURE — 95811 POLYSOM 6/>YRS CPAP 4/> PARM: CPT

## 2022-09-02 PROCEDURE — 99214 OFFICE O/P EST MOD 30 MIN: CPT | Performed by: PSYCHIATRY & NEUROLOGY

## 2022-09-02 PROCEDURE — 95811 POLYSOM 6/>YRS CPAP 4/> PARM: CPT | Performed by: PSYCHIATRY & NEUROLOGY

## 2022-09-02 NOTE — PROGRESS NOTES
Consult - General Surgery   Juan Mancuso 27 y o  male MRN: [id number]  Encounter: [id number]    Assessment/Plan     31M with chronic constipation and prior IVDA on methadone and linzess, morbid obesity BMI 48 status post prior left colectomy for AVM in 2020, now with a mildly symptomatic reducible left sided ventral hernia, likely incisional related to his extraction port in the left lower abdomen  Hernia contains small bowel on recent CT imaging in the ED that was reviewed directly by me, no clinical obstructive symptoms  Does have some constipation at baseline, on linzess, recently updated his colonoscopy with Dr Deborah Segura  Due to see the GI office again with an WALTER in the coming days  We discussed the big picture approach to the management of hernias and the indications for elective, urgent, and emergent repair and the different approaches that can be taken  He understands that we try to optimize patients whenever possible prior to pursuing elective repair in order to decrease the risk of hernia recurrence and perioperative complications  I would like to improve the management of his constipation and also have him pursue weight loss prior to elective repair and he is in agreement  Referrals placed to GI and to weight management  He is considering transitioning his GI care to [address]  We are going to request his GI records  He would like us to reach out to him next week after his upcoming GI appointment to check in on things and assist with appointment scheduling  He also asked for help with behavioral health scheduling as this has been a challenge for him  I placed a referral for that as well  He would likely want to pursue operative repair of the hernia once we can optimize his risk factors  We will be in touch with him next week      History of Present Illness   Chief Complaint   Patient presents with    Hernia     Recent ED visit to determine what the discomfort and bulging in his left lower abdomen has been  Discovered to have a small bowel containing incisional hernia related to his prior colectomy extraction port site  OR in 2020 with Dr Keyla Ryder and found to have an AVM on final pathology  Follows with Dr Miya Worley for GI  Prior hepatitis C, underwent treatment  Known constipation on linzess, on methadone for prior IVDA, clean x 1 5 years  Vapes regularly  Rarely coughs  Does strain for bowel movements at times  Considering transitioning his GI care  Had a recent colonoscopy with Dr Miya Worley, patient unsure of findings, thinks possible polyp but nothing else urgent  Has bipolar disorder and significant prior substance abuse and is having a hard time establishing behavioral health care, asking for help with this as well  Review of Systems   Constitutional: Negative for activity change, appetite change, chills, fatigue and fever  Gastrointestinal: Positive for abdominal pain and constipation  Negative for abdominal distention, diarrhea, nausea and vomiting  Incisional hernia without obstruction   All other systems reviewed and are negative        Historical Information   Past Medical History:   Diagnosis Date    Allergic     Anemia 6/24/2018    Anxiety     from records    Benign essential hypertension 11/17/2016    Bipolar 1 disorder (HCC)     from records    Chronic pain     Claustrophobia 3/15/2018    Community acquired pneumonia 6/24/2018    Depressed 7/14/2016    Depression     from records    GERD (gastroesophageal reflux disease)     Hepatitis C virus infection 8/14/2014    [facility] ) 7/24/2018    Infectious viral hepatitis     Obesity 10/12/2016    Obstructive sleep apnea     from records    Sleep disorder     Substance abuse [facility])      Past Surgical History:   Procedure Laterality Date    [place] DRAIN SKIN ABSCESS COMPLIC Left 8/3/9356    Procedure: INCISION AND DRAINAGE (I&D) EXTREMITY;  Surgeon: Tiara Navas MD;  [facility]: [facility];  Service: Orthopedics    [place] LAP,SURG,COLECTOMY, PARTIAL, W/ANAST Left 5/21/2020    Procedure: LAPAROSCOPIC LEFT HEMICOLECTOMY TAKEDOWN SPLENIC FLECTURE, COLORECTAL ANASTOMOSIS ;  Surgeon: Laxmi Cano MD;  [facility];  Service: Colorectal    WISDOM TOOTH EXTRACTION       Social History   Social History     Substance and Sexual Activity   Alcohol Use Not Currently     Social History     Substance and Sexual Activity   Drug Use Not Currently    Types: Heroin, Marijuana, Prescription    Comment: on Methadone     Social History     Tobacco Use   Smoking Status Former Smoker    Packs/day: 1 00    Years: 13 00    Pack years: 13 00    Types: E-Cigarettes   Smokeless Tobacco Never Used   Tobacco Comment    vapes     Family History   Problem Relation Age of Onset    Diabetes Mother     Restless legs syndrome Mother     Sleep apnea Mother     Colon cancer Father     Alzheimer's disease Maternal Grandmother     Depression Family        Meds/Allergies     Current Outpatient Medications:     fluticasone (FLONASE) 50 mcg/act nasal spray, 1 spray into each nostril 2 (two) times a day, Disp: 16 g, Rfl: 11    levocetirizine (XYZAL) 5 MG tablet, Take 1 tablet (5 mg total) by mouth every evening, Disp: 30 tablet, Rfl: 11    linaCLOtide (Linzess) 72 MCG CAPS, Take 1 capsule by mouth in the morning, Disp: 30 capsule, Rfl: 2    METHADONE HCL PO, Take 110 mg by mouth daily 150mg daily , Disp: , Rfl:     olopatadine (PATANOL) 0 1 % ophthalmic solution, Administer 1 drop to both eyes 2 (two) times a day, Disp: 5 mL, Rfl: 0    omeprazole (PriLOSEC) 20 mg delayed release capsule, Take 20 mg by mouth daily, Disp: , Rfl:     ondansetron (ZOFRAN-ODT) 4 mg disintegrating tablet, , Disp: , Rfl:     Sod Fluoride-Potassium Nitrate 1 1-5 % PSTE, Apply 1 Squirt to teeth daily at bedtime Brush teeth daily at bedtime  Spit out, do not rinse   Nothing to eat or drink after , Disp: 112 g, Rfl: 0    triamcinolone (KENALOG) 0 1 % cream, Apply topically 2 (two) times a day, Disp: 30 g, Rfl: 0    diclofenac (VOLTAREN) 75 mg EC tablet, Take 1 tablet (75 mg total) by mouth 2 (two) times a day (Patient not taking: No sig reported), Disp: 60 tablet, Rfl: 2    FLUoxetine (PROzac) 40 MG capsule, Take 1 capsule (40 mg total) by mouth daily (Patient not taking: No sig reported), Disp: 30 capsule, Rfl: 2  Allergies   Allergen Reactions    Bee Venom Angioedema       The following portions of the patient's history were reviewed and updated as appropriate: allergies, current medications, past family history, past medical history, past social history, past surgical history and problem list     Objective   Current Vitals:   Blood pressure 120/82, pulse 98, temperature 98 3 °F (36 8 °C), temperature source Temporal, resp  rate 16, height 5' 10" (1 778 m), weight (!) 153 kg (337 lb 6 4 oz), SpO2 98 %  Physical Exam  Vitals reviewed  Constitutional:       General: He is not in acute distress  Appearance: He is obese  HENT:      Head: Normocephalic and atraumatic  Mouth/Throat:      Mouth: Mucous membranes are moist    Eyes:      Pupils: Pupils are equal, round, and reactive to light  Cardiovascular:      Rate and Rhythm: Normal rate  Pulses: Normal pulses  Heart sounds:     No gallop  Pulmonary:      Effort: Pulmonary effort is normal  No respiratory distress  Abdominal:      Comments: Soft obese nondistended nontender, well healed port sites and left lower quadrant transverse incision extraction port site, reducible small bowel containing incisional/ventral hernia at the left lower abdomen, more prominent with valsalva and position change, soft and nontender there   Musculoskeletal:         General: Normal range of motion  Cervical back: Normal range of motion  Skin:     General: Skin is warm and dry  Capillary Refill: Capillary refill takes less than 2 seconds  Coloration: Skin is not jaundiced     Neurological: General: No focal deficit present  Mental Status: He is alert  Psychiatric:         Mood and Affect: Mood normal          Signature:   Dang Singer MD  Date: 9/2/2022 Time: 8:39 AM

## 2022-09-02 NOTE — PROGRESS NOTES
Assessment/Plan:      1  Chronic intermittent hypoxia with obstructive sleep apnea  Assessment & Plan:  He had significant hypoxia on both sleep studies, certainly severe sleep apnea contributes  I would like him to have a follow-up overnight oximetry test on therapy at home to verify that his oxygen levels have corrected  I would also like him to have a morning arterial blood gas he assure a normal CO2 level and no sign of CO2 retention  Lastly, I will refer him for pulmonary function testing    Orders:  -     Blood gas, arterial; Future  -     Sleep Auto-SV Study; Future; Expected date: 09/02/2022  -     Complete PFT with post bronchodilator; Future; Expected date: 09/06/2022    2  Central sleep apnea comorbid with prescribed opioid use  Assessment & Plan:  Mr Joao Flower has very severe central sleep apnea, he had a good response to adaptive servo-ventilation at the end of this test with a near normal AHI, markedly improved from baseline  In addition hypoxia result as well at optimal settings  It seems in his limited trial thus far treatment is beneficial and effective  I do not have compliance data available at the time of this dictation, I will follow-up with the medical supply company to see if they can provide this  Given that the AHI remained above 5 in lower settings were very inadequate, I would like him to also have a repeat titration study  On this test I will sampled BiPAP S/T as well as ASV at is recommended setting, possibly with a change in therapy if BiPAP is superior  He has nasal surgery in the near future that is scheduled  I would like to assess PAP data 1st to make sure his settings are adequate  He would be considered a high-risk candidate given the severe nature his sleep apnea and hypoxia  I will discuss with Otolaryngology    Orders:  -     Blood gas, arterial; Future  -     Sleep Auto-SV Study;  Future; Expected date: 09/02/2022  -     Complete PFT with post bronchodilator; Future; Expected date: 09/06/2022    3  Methadone maintenance therapy patient [facility])  Assessment & Plan: It is quite likely methadone maintenance is contributing to central sleep apnea  We discussed a dose reduction would likely improve his sleep disordered breathing  He will follow up with the prescriber of this but notes that at present he feels this dose is needed as he has a history of overdose  Orders:  -     Blood gas, arterial; Future           Subjective:      Patient ID: Susan Braswell is a 27 y o  male  Mr David Padilla returns in returns in follow-up, to summarize his history he has very severe central sleep apnea with an AHI of 124  An ASV titration in August showed significant improvement with an EPAP of 12 cm H2O  At this setting, oxygenation normalized and his AHI was improved at 8  He just received a machine at home and this is his 1st follow-up after few days of treatment  Used his machine much of Tuesday night, felt very good and did not nap the next day  On Wednesday night, did not use the machine and felt more sleepy  In general in the days of use, he feels better on waking    He has a dry mouth  Has difficulty with nasal breathing  No nose bleeds  He has been going to bed at 9-10 p m  and is awake at 5:00 a m  He has been sleeping 4-8 hours per night  [place] Sleepiness Scale score is 17 as noted below  Sometimes is dyspneic going up steps  He thinks this is from deconditioning  Does not get headaches  [place] Sleepiness Scale:     Sitting and reading: High chance of dozing  Watching TV: High chance of dozing  Sitting, inactive in a public place (e g  a theatre or a meeting): Slight chance of dozing  As a passenger in a car for an hour without a break:  Moderate chance of dozing  Lying down to rest in the afternoon when circumstances permit: High chance of dozing  Sitting and talking to someone: Slight chance of dozing  Sitting quietly after a lunch without alcohol: High chance of dozing  In a car, while stopped for a few minutes in traffic: Slight chance of dozing  Total score: 17     The following portions of the patient's history were reviewed and updated as appropriate: allergies, current medications, past family history, past medical history, past social history, past surgical history, and problem list     Review of Systems      Objective:        /71 (BP Location: Left arm, Patient Position: Sitting, Cuff Size: Large)   Pulse 71   Ht 5' 10" (1 778 m)   Wt (!) 154 kg (339 lb)   BMI 48 64 kg/m²     Physical Exam   Review of Systems      Genitourinary need to urinate more than twice a night and difficulty with erection   Cardiology none   Gastrointestinal none   Neurology frequent headaches, awaken with headache, need to move extremities, muscle weakness, numbness/tingling of an extremity, forgetfulness, poor concentration or confusion, , difficulty with memory and balance problems   Constitutional claustrophobia, fatigue and excessive sweating at night   Integumentary rash or dry skin and itching   Psychiatry anxiety, depression, aggressiveness or irritability and mood change   Musculoskeletal joint pain, muscle aches, back pain and legs twitching/jerking   Pulmonary shortness of breath with activity and snoring   ENT none   Endocrine excessive thirst and frequent urination   Hematological none

## 2022-09-02 NOTE — ASSESSMENT & PLAN NOTE
Mr Suleiman Blair has very severe central sleep apnea, he had a good response to adaptive servo-ventilation at the end of this test with a near normal AHI, markedly improved from baseline  In addition hypoxia result as well at optimal settings  It seems in his limited trial thus far treatment is beneficial and effective  I do not have compliance data available at the time of this dictation, I will follow-up with the medical supply company to see if they can provide this  Given that the AHI remained above 5 in lower settings were very inadequate, I would like him to also have a repeat titration study  On this test I will sampled BiPAP S/T as well as ASV at is recommended setting, possibly with a change in therapy if BiPAP is superior  He has nasal surgery in the near future that is scheduled  I would like to assess PAP data 1st to make sure his settings are adequate  He would be considered a high-risk candidate given the severe nature his sleep apnea and hypoxia    I will discuss with Otolaryngology

## 2022-09-02 NOTE — ASSESSMENT & PLAN NOTE
It is quite likely methadone maintenance is contributing to central sleep apnea  We discussed a dose reduction would likely improve his sleep disordered breathing  He will follow up with the prescriber of this but notes that at present he feels this dose is needed as he has a history of overdose

## 2022-09-02 NOTE — PATIENT INSTRUCTIONS
It is very important to avoid driving while drowsy, this can be very dangerous or even cause serious injury or death  If sleepy, it is not safe to get behind the wheel  If you are driving and feels sleepy, it is very important to pull over right away  Even losing control of the car for a split second can be deadly  If you feel you cannot control when sleepiness occurs and cannot prevented, it is important to not drive at all until this improves  Please let me know if you experience this as it is very important  Nursing Support:  When: Monday through Friday 7A-5PM except holidays  Where: Our direct line is [phone]  If you are having a true emergency please call 911  In the event that the line is busy or it is after hours please leave a voice message and we will return your call  Please speak clearly, leaving your full name, birth date, best number to reach you and the reason for your call  Medication refills: We will need the name of the medication, the dosage, the ordering provider, whether you get a 30 or 90 day refill, and the pharmacy name and address  Medications will be ordered by the provider only  Nurses cannot call in prescriptions  Please allow 7 days for medication refills  Physician requested updates: If your provider requested that you call with an update after starting medication, please be ready to provide us the medication and dosage, what time you take your medication, the time you attempt to fall asleep, time you fall asleep, when you wake up, and what time you get out of bed  Sleep Study Results: We will contact you with sleep study results and/or next steps after the physician has reviewed your testing

## 2022-09-02 NOTE — ASSESSMENT & PLAN NOTE
He had significant hypoxia on both sleep studies, certainly severe sleep apnea contributes  I would like him to have a follow-up overnight oximetry test on therapy at home to verify that his oxygen levels have corrected  I would also like him to have a morning arterial blood gas he assure a normal CO2 level and no sign of CO2 retention    Lastly, I will refer him for pulmonary function testing

## 2022-09-03 NOTE — PROGRESS NOTES
Sleep Study Documentation    Pre-Sleep Study       Sleep testing procedure explained to patient:YES    Patient napped prior to study:YES- more than 30 minutes  Napped after 2PM: no    Caffeine:Dayshift worker after 12PM   Caffeine use:YES- ice tea  12 to 26 ounces    Alcohol:Dayshift workers after 5PM: Alcohol use:NO    Typical day for patient:YES       Study Documentation    Sleep Study Indications: csa, hypoxia, CAAT, hypertension, acid reflux, bipolar, anxiety, insomnia, class 3 severe obesity, claustrophobia, opioid dependence, RLS, chronic pain syndrome, depressed,     Sleep Study: Treatment   Optimal PAP pressure: EPAP 15CM, MAX PS 10CM, MIN PS 4CM  Leak:Small  Snore:Eliminated  REM Obtained:yes  Supplemental O2: no    Minimum SaO2 85%  Baseline SaO2 94%  PAP mask tried (list all) medium resmed airfit F20  PAP mask choice (final) medium resmed airfit F20  PAP mask type:full face  PAP pressure at which snoring was eliminated AT FINAL SETTINGS  Minimum SaO2 at final PAP pressure 89%  Mode of Therapy:ASV max EPAP:n/a  min EPAP:12cm  max pressure support:13cm  min pressure support:4cm  rate setting:auto  rise time:auto  Flex:n/a  time inspired:n/a  max pressure:n/a     ETCO2:No  CPAP changed to BiPAP: No    Mode of Therapy:ASV max EPAP:n/a  min EPAP:15cm  max pressure support:10cm  min pressure support:4cm  rate setting:auto  rise time:auto  Flex:n/a  time inspired:n/a  max pressure:n/a       EKG abnormalities: no     EEG abnormalities: no    Sleep Study Recorded < 2 hours: N/A    Sleep Study Recorded > 2 hours but incomplete study: N/A    Sleep Study Recorded 6 hours but no sleep obtained: NO    Patient classification: unemployed       Post-Sleep Study    Medication used at bedtime or during sleep study:NO    Patient reports time it took to fall asleep:greater than 60 minutes    Patient reports waking up during study:3 or more times  Patient reports returning to sleep in 10 to 30 minutes      Patient reports sleeping 2 to 4 hours with dreaming  Patient reports sleep during study:typical    Patient rated sleepiness: Not sleepy or tired    PAP treatment:yes: Post PAP treatment patient reports feeling unsure if a change is noted and  would wear PAP mask at home

## 2022-09-05 DIAGNOSIS — G47.37 CENTRAL SLEEP APNEA COMORBID WITH PRESCRIBED OPIOID USE: Primary | ICD-10-CM

## 2022-09-05 DIAGNOSIS — F11.90 CENTRAL SLEEP APNEA COMORBID WITH PRESCRIBED OPIOID USE: Primary | ICD-10-CM

## 2022-09-06 ENCOUNTER — TELEPHONE (OUTPATIENT)
Dept: PAIN MEDICINE | Facility: CLINIC | Age: 32
End: 2022-09-06

## 2022-09-06 ENCOUNTER — TELEPHONE (OUTPATIENT)
Dept: SLEEP CENTER | Facility: CLINIC | Age: 32
End: 2022-09-06

## 2022-09-06 LAB
DME PARACHUTE DELIVERY DATE REQUESTED: NORMAL
DME PARACHUTE ITEM DESCRIPTION: NORMAL
DME PARACHUTE ORDER STATUS: NORMAL
DME PARACHUTE SUPPLIER NAME: NORMAL
DME PARACHUTE SUPPLIER PHONE: NORMAL

## 2022-09-06 NOTE — TELEPHONE ENCOUNTER
Pt called in he went to the MRI and he was unable to fit in to the machine  So he will need a script to [facility] in [address]  Please be advised thank you    Pt can be reached @ [phone]

## 2022-09-06 NOTE — TELEPHONE ENCOUNTER
He should not need a different prescription for an open MRI  He should be able to schedule the MRI with the prescription that was given to him

## 2022-09-06 NOTE — TELEPHONE ENCOUNTER
ASV titration study resulted and pressure change ordered  [facility] [place]  Will need to notify patient that he will not need to complete HATTIE  Should still complete PFTs and ABGs    Called and left call back message with patient's mother Lily Burt

## 2022-09-06 NOTE — TELEPHONE ENCOUNTER
----- Message from Elyse Schlatter, MD sent at 9/5/2022  1:35 PM EDT -----  Test indicated effective treatment of sleep apnea , will write for pressure change  Also does not need overnight oximetry,will cancel that    Should still do other testing though

## 2022-09-07 ENCOUNTER — OFFICE VISIT (OUTPATIENT)
Dept: PHYSICAL THERAPY | Facility: CLINIC | Age: 32
End: 2022-09-07
Payer: COMMERCIAL

## 2022-09-07 DIAGNOSIS — G89.29 CHRONIC LOW BACK PAIN WITHOUT SCIATICA, UNSPECIFIED BACK PAIN LATERALITY: ICD-10-CM

## 2022-09-07 DIAGNOSIS — M54.2 NECK PAIN: ICD-10-CM

## 2022-09-07 DIAGNOSIS — M25.561 CHRONIC PAIN OF BOTH KNEES: ICD-10-CM

## 2022-09-07 DIAGNOSIS — M54.50 CHRONIC LOW BACK PAIN WITHOUT SCIATICA, UNSPECIFIED BACK PAIN LATERALITY: ICD-10-CM

## 2022-09-07 DIAGNOSIS — G89.29 CHRONIC PAIN OF BOTH KNEES: ICD-10-CM

## 2022-09-07 DIAGNOSIS — M25.562 CHRONIC PAIN OF BOTH KNEES: ICD-10-CM

## 2022-09-07 DIAGNOSIS — M54.16 LUMBAR RADICULOPATHY: Primary | ICD-10-CM

## 2022-09-07 DIAGNOSIS — M54.12 CERVICAL RADICULOPATHY: ICD-10-CM

## 2022-09-07 PROCEDURE — 97110 THERAPEUTIC EXERCISES: CPT

## 2022-09-07 NOTE — PROGRESS NOTES
Daily Note     Today's date: 2022  Patient name: Susan Braswell  : 1990  MRN: [id number]  Referring provider: Héctor Massey MD  Dx:   Encounter Diagnosis     ICD-10-CM    1  Lumbar radiculopathy  M54 16    2  Chronic pain of both knees  M25 561     M25 562     G89 29    3  Chronic low back pain without sciatica, unspecified back pain laterality  M54 50     G89 29    4  Neck pain  M54 2    5  Cervical radiculopathy  M54 12                   Subjective: Patient reports his low back is stiff today  He states it does not help that he was building a work table and bench and fell from the bench when working on it  He states he has been to the doctor for the hernia and the doctor states his only precaution is no heavy lifting  He says he would like to try to add in the exercises he held on last session if he can  Objective: See treatment diary below      Assessment: Tolerated treatment fair  He noted some L ankle pain when performing R hip 3-way series  Patient demonstrated moderate fatigue throughout session and required frequent breaks for rest  Patient would benefit from continued PT to increase trunk mobility and strength for improved function in ADLs  Plan: Continue per plan of care         Precautions: chronic low back pain, knee pain        Manuals  8- 8-16   Manual traction           Joint work           flexibility                   assessment    ST           Neuro Re-Ed           TA          TA+march      x15 HELD, NV                                                               Ther Ex           NuStep/TM Bike interval 10' Bike Interval x 10' Bike Interval x 10' Bike interval 8'  Bike interval 10'    clamshells  gtb 2x10  gtb 3x10 btb 3x10      Hip ext    Hip Abd/Ext 2x10 ea B/L  hip 3 way 15x ea B/L      Leg press  P4 2x10   Pillow at back  Held held   P4 2x10 pillow at back   Ham curls           Knee ext           SLR 3x10 R side only 2x10 flex ea 2x10 flex ea   CC LAE P6 3x12 Held held  P6 3x10 P6 3x12   Lumbar ext 70# 3x12 Held 70# 3x12  70# 3x10 70# 3x12   Lumbar flex           Prone alt ue/le Held Held 2x10 ea  3x10 HELD   bridge 3x10 3x10 3x10 2x10 HELD   Bird dog X 10 ea 2x10 ea 2x10 ea 2x10 HELD   Supine hand to knee    2x10 5" ea HELD   quadruped  LTR wind shield wiper 3x10 LTR windshield wiper 3x10 ea LTR windshield wiper 3x10 ea LTR windshield wiper x10 ea LTR windshield wiper x10 ea   Hand heel rock x10 X 20 Standing x20 x10 x20   Cat camel      x10 HELD   LTR           paloff press  CC P4 2x10 ea   held CC p3 2x8 ea CC p4 2x8 ea   Ther Activity                                   Gait Training                                   Modalities                                            Access Code: [email or web address]  URL: [email or web address]  Date: 07/06/2022  Prepared by: Noralee Medicus    Exercises  · Supine Bridge - 1 x daily - 7 x weekly - 3 sets - 10 reps  · Bird Dog - 1 x daily - 7 x weekly - 3 sets - 10 reps  · Full Superman on Table - 1 x daily - 7 x weekly - 3 sets - 10 reps  · Supine Alternating Knee Taps with Hands - 1 x daily - 7 x weekly - 3 sets - 10 reps  · Supine Lower Trunk Rotation - 1 x daily - 7 x weekly - 3 sets - 10 reps

## 2022-09-07 NOTE — TELEPHONE ENCOUNTER
Returned call from patient  Spoke with mother Bettye Tellez (communication consent in media)  Advised that ASV study is resulted and based on results Dr Samantha Ayala has placed an order for a pressure change  Advised that pressure change can take 24-48 hours to take effect  Also advised the per Dr Samantha Ayala: Thedora Fierro can be cancelled, however patient should schedule PFTs and ABGs  Provided number for central scheduling

## 2022-09-08 ENCOUNTER — OFFICE VISIT (OUTPATIENT)
Dept: PHYSICAL THERAPY | Facility: CLINIC | Age: 32
End: 2022-09-08
Payer: COMMERCIAL

## 2022-09-08 ENCOUNTER — OFFICE VISIT (OUTPATIENT)
Dept: INTERNAL MEDICINE CLINIC | Facility: CLINIC | Age: 32
End: 2022-09-08
Payer: COMMERCIAL

## 2022-09-08 VITALS
HEIGHT: 70 IN | HEART RATE: 85 BPM | SYSTOLIC BLOOD PRESSURE: 138 MMHG | BODY MASS INDEX: 45.1 KG/M2 | OXYGEN SATURATION: 96 % | WEIGHT: 315 LBS | DIASTOLIC BLOOD PRESSURE: 90 MMHG | TEMPERATURE: 98 F

## 2022-09-08 DIAGNOSIS — M54.2 NECK PAIN: ICD-10-CM

## 2022-09-08 DIAGNOSIS — G89.29 CHRONIC PAIN OF BOTH KNEES: ICD-10-CM

## 2022-09-08 DIAGNOSIS — M25.562 CHRONIC PAIN OF BOTH KNEES: ICD-10-CM

## 2022-09-08 DIAGNOSIS — K43.9 HERNIA OF ABDOMINAL WALL: ICD-10-CM

## 2022-09-08 DIAGNOSIS — M54.16 LUMBAR RADICULOPATHY: Primary | ICD-10-CM

## 2022-09-08 DIAGNOSIS — K59.09 CHRONIC CONSTIPATION: ICD-10-CM

## 2022-09-08 DIAGNOSIS — M54.50 CHRONIC LOW BACK PAIN WITHOUT SCIATICA, UNSPECIFIED BACK PAIN LATERALITY: ICD-10-CM

## 2022-09-08 DIAGNOSIS — R10.84 GENERALIZED ABDOMINAL PAIN: Primary | ICD-10-CM

## 2022-09-08 DIAGNOSIS — M25.561 CHRONIC PAIN OF BOTH KNEES: ICD-10-CM

## 2022-09-08 DIAGNOSIS — G89.29 CHRONIC LOW BACK PAIN WITHOUT SCIATICA, UNSPECIFIED BACK PAIN LATERALITY: ICD-10-CM

## 2022-09-08 DIAGNOSIS — F11.20 METHADONE MAINTENANCE THERAPY PATIENT (HCC): ICD-10-CM

## 2022-09-08 DIAGNOSIS — Z98.890 HISTORY OF COLONOSCOPY: ICD-10-CM

## 2022-09-08 DIAGNOSIS — M54.12 CERVICAL RADICULOPATHY: ICD-10-CM

## 2022-09-08 PROCEDURE — 97110 THERAPEUTIC EXERCISES: CPT

## 2022-09-08 PROCEDURE — 99214 OFFICE O/P EST MOD 30 MIN: CPT | Performed by: NURSE PRACTITIONER

## 2022-09-08 PROCEDURE — 97112 NEUROMUSCULAR REEDUCATION: CPT

## 2022-09-08 RX ORDER — SUCRALFATE 1 G/1
1 TABLET ORAL 4 TIMES DAILY
Qty: 120 TABLET | Refills: 1 | Status: SHIPPED | OUTPATIENT
Start: 2022-09-08

## 2022-09-08 NOTE — PROGRESS NOTES
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  Over the past 3 months, he has noticed a bulge near the incision site his prior surgery  This has been causing him increased pain and discomfort recently  He says that the bulge is worse with standing or bearing down to have bowel movements  He admits to occasional intermittent nonbloody/nonmelanotic diarrhea he also reports intermittent nausea and a decreased appetite  He denies any fevers, chills, vomiting, chest pain, shortness of breath, constipation, melena, hematochezia or urinary symptoms  8/31/2022 General Surgery Visit  Assessment/Plan      31M with chronic constipation and prior IVDA on methadone and linzess, morbid obesity BMI 48 status post prior left colectomy for AVM in 2020, now with a mildly symptomatic reducible left sided ventral hernia, likely incisional related to his extraction port in the left lower abdomen  Hernia contains small bowel on recent CT imaging in the ED that was reviewed directly by me, no clinical obstructive symptoms  Does have some constipation at baseline, on linzess, recently updated his colonoscopy with Dr Tangela Chance  Due to see the GI office again with an WALTER in the coming days  We discussed the big picture approach to the management of hernias and the indications for elective, urgent, and emergent repair and the different approaches that can be taken   He understands that we try to optimize patients whenever possible prior to pursuing elective repair in order to decrease the risk of hernia recurrence and perioperative complications  I would like to improve the management of his constipation and also have him pursue weight loss prior to elective repair and he is in agreement  Referrals placed to GI and to weight management  He is considering transitioning his GI care to [address]  We are going to request his GI records  He would like us to reach out to him next week after his upcoming GI appointment to check in on things and assist with appointment scheduling  He also asked for help with behavioral health scheduling as this has been a challenge for him  I placed a referral for that as well  He would likely want to pursue operative repair of the hernia once we can optimize his risk factors  We will be in touch with him next week      History of Present Illness             Chief Complaint   Patient presents with    Hernia      Recent ED visit to determine what the discomfort and bulging in his left lower abdomen has been  Discovered to have a small bowel containing incisional hernia related to his prior colectomy extraction port site  OR in 2020 with Dr Roberto Banks and found to have an AVM on final pathology  Follows with Dr Kathryn Carranza for GI  Prior hepatitis C, underwent treatment  Known constipation on linzess, on methadone for prior IVDA, clean x 1 5 years  Vapes regularly  Rarely coughs  Does strain for bowel movements at times  Considering transitioning his GI care  Had a recent colonoscopy with Dr Kathryn Carranza, patient unsure of findings, thinks possible polyp but nothing else urgent  Has bipolar disorder and significant prior substance abuse and is having a hard time establishing behavioral health care, asking for help with this as well         Review of Systems   Constitutional: Negative for activity change, appetite change, chills, fatigue and fever     Gastrointestinal: Positive for abdominal pain and constipation  Negative for abdominal distention, diarrhea, nausea and vomiting  Incisional hernia without obstruction   All other systems reviewed and are negative  Other    Methadone maintenance therapy patient [facility])     Patient with continued abdominal pains  Patient states he has a Red Dye allergy  This may be causing him issues as he states his methadone liquid is red colored  Generalized abdominal pain - Primary     8/23/2022 ED  Abdominal Pain        Intermittent abdominal pain on left side, believes it is a hernia, present for 2 months      Patient is 18-year-old with a PMHx of anxiety, bipolar disorder, hep C, CATA and substance abuse, presenting to the ED for evaluation of a bulge in his left lower abdominal wall x3 months  Of note, patient underwent a left colectomy on 05/21/2020 (with Dr Jairo Dorman) for 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  Over the past 3 months, he has noticed a bulge near the incision site his prior surgery  This has been causing him increased pain and discomfort recently  He says that the bulge is worse with standing or bearing down to have bowel movements  He admits to occasional intermittent nonbloody/nonmelanotic diarrhea he also reports intermittent nausea and a decreased appetite  He denies any fevers, chills, vomiting, chest pain, shortness of breath, constipation, melena, hematochezia or urinary symptoms  9/1/2022  Saw Estuardo Albaardo from GI  9/6/2022  Got his new omeprazole prescription  9/8/2022  Does have severe stress/anxiety  Will start the patient on carafate 1 tablet 4 times a day at least 30 minutes before meals and at bedtime            Relevant Medications    sucralfate (CARAFATE) 1 g tablet    Hernia of abdominal wall     8/23/2022 ED Visit   Abdominal Pain        Intermittent abdominal pain on left side, believes it is a hernia, present for 2 months      Patient is 18-year-old with a PMHx of anxiety, bipolar disorder, hep C, CATA and substance abuse, presenting to the ED for evaluation of a bulge in his left lower abdominal wall x3 months  Of note, patient underwent a left colectomy on 05/21/2020 (with Dr Fabby Root) for 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  Over the past 3 months, he has noticed a bulge near the incision site his prior surgery  This has been causing him increased pain and discomfort recently  He says that the bulge is worse with standing or bearing down to have bowel movements  He admits to occasional intermittent nonbloody/nonmelanotic diarrhea he also reports intermittent nausea and a decreased appetite  He denies any fevers, chills, vomiting, chest pain, shortness of breath, constipation, melena, hematochezia or urinary symptoms  8/31/2022 General surgery Visit  Assessment/Plan      31M with chronic constipation and prior IVDA on methadone and linzess, morbid obesity BMI 48 status post prior left colectomy for AVM in 2020, now with a mildly symptomatic reducible left sided ventral hernia, likely incisional related to his extraction port in the left lower abdomen  Hernia contains small bowel on recent CT imaging in the ED that was reviewed directly by me, no clinical obstructive symptoms  Does have some constipation at baseline, on linzess, recently updated his colonoscopy with Dr Tangela Chance  Due to see the GI office again with an WALTER in the coming days  We discussed the big picture approach to the management of hernias and the indications for elective, urgent, and emergent repair and the different approaches that can be taken  He understands that we try to optimize patients whenever possible prior to pursuing elective repair in order to decrease the risk of hernia recurrence and perioperative complications  I would like to improve the management of his constipation and also have him pursue weight loss prior to elective repair and he is in agreement   Referrals placed to GI and to weight management  He is considering transitioning his GI care to [address]  We are going to request his GI records  He would like us to reach out to him next week after his upcoming GI appointment to check in on things and assist with appointment scheduling  He also asked for help with behavioral health scheduling as this has been a challenge for him  I placed a referral for that as well  He would likely want to pursue operative repair of the hernia once we can optimize his risk factors  We will be in touch with him next week      History of Present Illness             Chief Complaint   Patient presents with    Hernia      Recent ED visit to determine what the discomfort and bulging in his left lower abdomen has been  Discovered to have a small bowel containing incisional hernia related to his prior colectomy extraction port site  OR in 2020 with Dr Saba Ramirez and found to have an AVM on final pathology  Follows with Dr Deo Hanks for GI  Prior hepatitis C, underwent treatment  Known constipation on linzess, on methadone for prior IVDA, clean x 1 5 years  Vapes regularly  Rarely coughs  Does strain for bowel movements at times  Considering transitioning his GI care  Had a recent colonoscopy with Dr Deo Hanks, patient unsure of findings, thinks possible polyp but nothing else urgent  Has bipolar disorder and significant prior substance abuse and is having a hard time establishing behavioral health care, asking for help with this as well         Review of Systems   Constitutional: Negative for activity change, appetite change, chills, fatigue and fever  Gastrointestinal: Positive for abdominal pain and constipation  Negative for abdominal distention, diarrhea, nausea and vomiting  Incisional hernia without obstruction   All other systems reviewed and are negative  History of colonoscopy     8/1/2022 at [facility]               Subjective:      Patient ID: Raul Watson is a 27 y o  male      The patient is here today to discuss his chronic abdominal pain  Please continue to the [facility] section of the note for details of today's visit  The following portions of the patient's history were reviewed and updated as appropriate:     Past Medical History:  He has a past medical history of Allergic, Anemia (6/24/2018), Anxiety, Benign essential hypertension (11/17/2016), Bipolar 1 disorder ([facility] ), Chronic pain, Claustrophobia (3/15/2018), Community acquired pneumonia (6/24/2018), Depressed (7/14/2016), Depression, GERD (gastroesophageal reflux disease), Hepatitis C virus infection (8/14/2014), Heroin abuse ([place] [address] ) (7/24/2018), Infectious viral hepatitis, Obesity (10/12/2016), Obstructive sleep apnea, Sleep disorder, and Substance abuse ([place] [address] )  ,  _______________________________________________________________________  Medical Problems:  does not have any pertinent problems on file ,  _______________________________________________________________________  Past Surgical History:   has a past surgical history that includes [place] tooth extraction; pr drain skin abscess complic (Left, 6/7/1144); and pr lap,surg,colectomy, partial, w/anast (Left, 5/21/2020)  ,  _______________________________________________________________________  Family History:  family history includes Alzheimer's disease in his maternal grandmother; Colon cancer in his father; Depression in his family; Diabetes in his mother; Restless legs syndrome in his mother; Sleep apnea in his mother ,  _______________________________________________________________________  Social History:   reports that he has quit smoking  His smoking use included e-cigarettes  He has a 13 00 pack-year smoking history  He has never used smokeless tobacco  He reports previous alcohol use  He reports previous drug use  Drugs: Heroin, Marijuana, and Prescription  ,  _______________________________________________________________________  Allergies:  is allergic to red dye - food allergy and bee venom     _______________________________________________________________________  Current Outpatient Medications   Medication Sig Dispense Refill    fluticasone (FLONASE) 50 mcg/act nasal spray 1 spray into each nostril 2 (two) times a day 16 g 11    levocetirizine (XYZAL) 5 MG tablet Take 1 tablet (5 mg total) by mouth every evening 30 tablet 11    linaCLOtide (Linzess) 72 MCG CAPS Take 1 capsule by mouth in the morning 30 capsule 2    METHADONE HCL PO Take 110 mg by mouth daily 150mg daily       olopatadine (PATANOL) 0 1 % ophthalmic solution Administer 1 drop to both eyes 2 (two) times a day 5 mL 0    omeprazole (PriLOSEC) 20 mg delayed release capsule Take 20 mg by mouth 2 (two) times a day       ondansetron (ZOFRAN-ODT) 4 mg disintegrating tablet       Sod Fluoride-Potassium Nitrate 1 1-5 % PSTE Apply 1 Squirt to teeth daily at bedtime Brush teeth daily at bedtime  Spit out, do not rinse  Nothing to eat or drink after  112 g 0    sucralfate (CARAFATE) 1 g tablet Take 1 tablet (1 g total) by mouth 4 (four) times a day 120 tablet 1    triamcinolone (KENALOG) 0 1 % cream Apply topically 2 (two) times a day 30 g 0     No current facility-administered medications for this visit      _______________________________________________________________________      Review of Systems   Gastrointestinal: Positive for abdominal pain  Objective:  Vitals:    09/08/22 1513   BP: 138/90   BP Location: Left arm   Patient Position: Sitting   Cuff Size: Standard   Pulse: 85   Temp: 98 °F (36 7 °C)   SpO2: 96%   Weight: (!) 155 kg (341 lb 9 6 oz)   Height: 5' 10" (1 778 m)     Body mass index is 49 01 kg/m²  Physical Exam  Abdominal:      Tenderness: There is generalized abdominal tenderness

## 2022-09-08 NOTE — PROGRESS NOTES
Daily Note     Today's date: 2022  Patient name: Claudia Ventura  : 1990  MRN: [id number]  Referring provider: Augusto Greer MD  Dx:   Encounter Diagnosis     ICD-10-CM    1  Lumbar radiculopathy  M54 16    2  Chronic low back pain without sciatica, unspecified back pain laterality  M54 50     G89 29    3  Neck pain  M54 2    4  Chronic pain of both knees  M25 561     M25 562     G89 29    5  Cervical radiculopathy  M54 12                   Subjective: Patient reports he is not feeling the best today  He is sweating more today, but he thinks its from the medication he is on  Patient reports that his R side LBP is mostly in the morning  He has pain with flexion and rotation movements  He has some soreness in hernia area from being prone last session  Objective: See treatment diary below  Incorporated a modified dead bug today and held prone position today  Assessment: Tolerated treatment well  Patient demonstrated fatigue post treatment and would benefit from continued PT to improve core stability to improve lumbar spine stability  Core strength is limited at this time  Will plan to progress core stability when able and to tolerance  Minimize flexion and rotation at this time due to pain  Will eventually incorporate hip hinging  Plan to incorporate side stepping and resume a TB paloff press to tolerance into program  Instructed patient to take TE as a pace he could tolerate  Plan: Continue per plan of care        Precautions: chronic low back pain, knee pain        Manuals  8-16   Manual traction          Joint work          flexibility                     ST          Neuro Re-Ed          TA          TA+march       HELD, NV    Dead Bug       Modified with 1 TA w/ UE only leg at 90 10x ea     Side Stepping      NV                                     Ther Ex          NuStep/TM Bike interval 10' Bike Interval x 10' Bike Interval x 10' Bike Interval x 10 Bike interval 10'    clamshells  gtb 2x10  gtb 3x10 btb 3x10 btb 3x10    Hip ext    Hip Abd/Ext 2x10 ea B/L  hip 3 way 15x ea B/L     Leg press  P4 2x10   Pillow at back  Held held  P4 2x10 pillow at back   Ham curls      NV    Knee ext      NV    SLR 3x10 R side only 2x10 flex ea 2x10 flex ea 2x10 flex ea    CC LAE P6 3x12 Held held  P6 3x12   Lumbar ext 70# 3x12 Held 70# 3x12 80# 3x12 70# 3x12   Lumbar flex          Prone alt ue/le Held Held 2x10 ea Held hernia HELD   bridge 3x10 3x10 3x10 3x10 ea HELD   Bird dog X 10 ea 2x10 ea 2x10 ea 2x10 eea HELD   Supine hand to knee     HELD   quadruped  LTR wind shield wiper 3x10 LTR windshield wiper 3x10 ea LTR windshield wiper 3x10 ea  LTR windshield wiper x10 ea   Hand heel rock x10 X 20 Standing x20  x20   Cat camel       HELD   LTR          paloff press  CC P4 2x10 ea   held TB CC p4 2x8 ea   Ther Activity                                  Gait Training                                   Modalities

## 2022-09-08 NOTE — ASSESSMENT & PLAN NOTE
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  Over the past 3 months, he has noticed a bulge near the incision site his prior surgery  This has been causing him increased pain and discomfort recently  He says that the bulge is worse with standing or bearing down to have bowel movements  He admits to occasional intermittent nonbloody/nonmelanotic diarrhea he also reports intermittent nausea and a decreased appetite  He denies any fevers, chills, vomiting, chest pain, shortness of breath, constipation, melena, hematochezia or urinary symptoms  · 9/1/2022  · Saw Steven Benitez from GI  · 9/6/2022  · Got his new omeprazole prescription  · 9/8/2022  · Does have severe stress/anxiety  · Will start the patient on carafate 1 tablet 4 times a day at least 30 minutes before meals and at bedtime

## 2022-09-08 NOTE — PATIENT INSTRUCTIONS
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  Over the past 3 months, he has noticed a bulge near the incision site his prior surgery  This has been causing him increased pain and discomfort recently  He says that the bulge is worse with standing or bearing down to have bowel movements  He admits to occasional intermittent nonbloody/nonmelanotic diarrhea he also reports intermittent nausea and a decreased appetite  He denies any fevers, chills, vomiting, chest pain, shortness of breath, constipation, melena, hematochezia or urinary symptoms  8/31/2022 General Surgery Visit  Assessment/Plan      31M with chronic constipation and prior IVDA on methadone and linzess, morbid obesity BMI 48 status post prior left colectomy for AVM in 2020, now with a mildly symptomatic reducible left sided ventral hernia, likely incisional related to his extraction port in the left lower abdomen  Hernia contains small bowel on recent CT imaging in the ED that was reviewed directly by me, no clinical obstructive symptoms  Does have some constipation at baseline, on linzess, recently updated his colonoscopy with Dr Ellie Esteban  Due to see the GI office again with an WALTER in the coming days  We discussed the big picture approach to the management of hernias and the indications for elective, urgent, and emergent repair and the different approaches that can be taken   He understands that we try to optimize patients whenever possible prior to pursuing elective repair in order to decrease the risk of hernia recurrence and perioperative complications  I would like to improve the management of his constipation and also have him pursue weight loss prior to elective repair and he is in agreement  Referrals placed to GI and to weight management  He is considering transitioning his GI care to [address]  We are going to request his GI records  He would like us to reach out to him next week after his upcoming GI appointment to check in on things and assist with appointment scheduling  He also asked for help with behavioral health scheduling as this has been a challenge for him  I placed a referral for that as well  He would likely want to pursue operative repair of the hernia once we can optimize his risk factors  We will be in touch with him next week  History of Present Illness             Chief Complaint   Patient presents with    Hernia      Recent ED visit to determine what the discomfort and bulging in his left lower abdomen has been  Discovered to have a small bowel containing incisional hernia related to his prior colectomy extraction port site  OR in 2020 with Dr Luisito Head and found to have an AVM on final pathology  Follows with Dr Ky Velazquez for GI  Prior hepatitis C, underwent treatment  Known constipation on linzess, on methadone for prior IVDA, clean x 1 5 years  Vapes regularly  Rarely coughs  Does strain for bowel movements at times  Considering transitioning his GI care  Had a recent colonoscopy with Dr Ky Velazquez, patient unsure of findings, thinks possible polyp but nothing else urgent  Has bipolar disorder and significant prior substance abuse and is having a hard time establishing behavioral health care, asking for help with this as well  Review of Systems   Constitutional: Negative for activity change, appetite change, chills, fatigue and fever  Gastrointestinal: Positive for abdominal pain and constipation   Negative for abdominal distention, diarrhea, nausea and vomiting  Incisional hernia without obstruction   All other systems reviewed and are negative  Other    Methadone maintenance therapy patient [facility])     Patient with continued abdominal pains  Patient states he has a Red Dye allergy  This may be causing him issues as he states his methadone liquid is red colored  Generalized abdominal pain - Primary     8/23/2022 ED  Abdominal Pain        Intermittent abdominal pain on left side, believes it is a hernia, present for 2 months      Patient is 27-year-old with a PMHx of anxiety, bipolar disorder, hep C, CATA and substance abuse, presenting to the ED for evaluation of a bulge in his left lower abdominal wall x3 months  Of note, patient underwent a left colectomy on 05/21/2020 (with Dr David Zayas) for 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  Over the past 3 months, he has noticed a bulge near the incision site his prior surgery  This has been causing him increased pain and discomfort recently  He says that the bulge is worse with standing or bearing down to have bowel movements  He admits to occasional intermittent nonbloody/nonmelanotic diarrhea he also reports intermittent nausea and a decreased appetite  He denies any fevers, chills, vomiting, chest pain, shortness of breath, constipation, melena, hematochezia or urinary symptoms  9/1/2022  Saw Shantanu Mccann from GI  9/6/2022  Got his new omeprazole prescription  9/8/2022  Does have severe stress/anxiety  Will start the patient on carafate 1 tablet 4 times a day at least 30 minutes before meals and at bedtime            Relevant Medications    sucralfate (CARAFATE) 1 g tablet    Hernia of abdominal wall     8/23/2022 ED Visit   Abdominal Pain        Intermittent abdominal pain on left side, believes it is a hernia, present for 2 months      Patient is 27-year-old with a PMHx of anxiety, bipolar disorder, hep C, CATA and substance abuse, presenting to the ED for evaluation of a bulge in his left lower abdominal wall x3 months  Of note, patient underwent a left colectomy on 05/21/2020 (with Dr Kassandra Solares) for 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  Over the past 3 months, he has noticed a bulge near the incision site his prior surgery  This has been causing him increased pain and discomfort recently  He says that the bulge is worse with standing or bearing down to have bowel movements  He admits to occasional intermittent nonbloody/nonmelanotic diarrhea he also reports intermittent nausea and a decreased appetite  He denies any fevers, chills, vomiting, chest pain, shortness of breath, constipation, melena, hematochezia or urinary symptoms  8/31/2022 General surgery Visit  Assessment/Plan      31M with chronic constipation and prior IVDA on methadone and linzess, morbid obesity BMI 48 status post prior left colectomy for AVM in 2020, now with a mildly symptomatic reducible left sided ventral hernia, likely incisional related to his extraction port in the left lower abdomen  Hernia contains small bowel on recent CT imaging in the ED that was reviewed directly by me, no clinical obstructive symptoms  Does have some constipation at baseline, on linzess, recently updated his colonoscopy with Dr Natty Montiel  Due to see the GI office again with an WALTER in the coming days  We discussed the big picture approach to the management of hernias and the indications for elective, urgent, and emergent repair and the different approaches that can be taken  He understands that we try to optimize patients whenever possible prior to pursuing elective repair in order to decrease the risk of hernia recurrence and perioperative complications  I would like to improve the management of his constipation and also have him pursue weight loss prior to elective repair and he is in agreement   Referrals placed to GI and to weight management  He is considering transitioning his GI care to [address]  We are going to request his GI records  He would like us to reach out to him next week after his upcoming GI appointment to check in on things and assist with appointment scheduling  He also asked for help with behavioral health scheduling as this has been a challenge for him  I placed a referral for that as well  He would likely want to pursue operative repair of the hernia once we can optimize his risk factors  We will be in touch with him next week  History of Present Illness             Chief Complaint   Patient presents with    Hernia      Recent ED visit to determine what the discomfort and bulging in his left lower abdomen has been  Discovered to have a small bowel containing incisional hernia related to his prior colectomy extraction port site  OR in 2020 with Dr Divine Valencia and found to have an AVM on final pathology  Follows with Dr Viji Perdomo for GI  Prior hepatitis C, underwent treatment  Known constipation on linzess, on methadone for prior IVDA, clean x 1 5 years  Vapes regularly  Rarely coughs  Does strain for bowel movements at times  Considering transitioning his GI care  Had a recent colonoscopy with Dr Viji Perdomo, patient unsure of findings, thinks possible polyp but nothing else urgent  Has bipolar disorder and significant prior substance abuse and is having a hard time establishing behavioral health care, asking for help with this as well  Review of Systems   Constitutional: Negative for activity change, appetite change, chills, fatigue and fever  Gastrointestinal: Positive for abdominal pain and constipation  Negative for abdominal distention, diarrhea, nausea and vomiting  Incisional hernia without obstruction   All other systems reviewed and are negative             History of colonoscopy     8/1/2022 at [facility]

## 2022-09-08 NOTE — ASSESSMENT & PLAN NOTE
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  Over the past 3 months, he has noticed a bulge near the incision site his prior surgery  This has been causing him increased pain and discomfort recently  He says that the bulge is worse with standing or bearing down to have bowel movements  He admits to occasional intermittent nonbloody/nonmelanotic diarrhea he also reports intermittent nausea and a decreased appetite  He denies any fevers, chills, vomiting, chest pain, shortness of breath, constipation, melena, hematochezia or urinary symptoms  ·   · 8/31/2022 General Surgery Visit  Assessment/Plan      31M with chronic constipation and prior IVDA on methadone and linzess, morbid obesity BMI 48 status post prior left colectomy for AVM in 2020, now with a mildly symptomatic reducible left sided ventral hernia, likely incisional related to his extraction port in the left lower abdomen  Hernia contains small bowel on recent CT imaging in the ED that was reviewed directly by me, no clinical obstructive symptoms  Does have some constipation at baseline, on linzess, recently updated his colonoscopy with Dr Ravi Villar  Due to see the GI office again with an WALTER in the coming days  We discussed the big picture approach to the management of hernias and the indications for elective, urgent, and emergent repair and the different approaches that can be taken   He understands that we try to optimize patients whenever possible prior to pursuing elective repair in order to decrease the risk of hernia recurrence and perioperative complications  I would like to improve the management of his constipation and also have him pursue weight loss prior to elective repair and he is in agreement  Referrals placed to GI and to weight management  He is considering transitioning his GI care to [address]  We are going to request his GI records  He would like us to reach out to him next week after his upcoming GI appointment to check in on things and assist with appointment scheduling  He also asked for help with behavioral health scheduling as this has been a challenge for him  I placed a referral for that as well  He would likely want to pursue operative repair of the hernia once we can optimize his risk factors  We will be in touch with him next week      History of Present Illness             Chief Complaint   Patient presents with    Hernia      Recent ED visit to determine what the discomfort and bulging in his left lower abdomen has been  Discovered to have a small bowel containing incisional hernia related to his prior colectomy extraction port site  OR in 2020 with Dr Chika Gongora and found to have an AVM on final pathology  Follows with Dr Colton Maldonado for GI  Prior hepatitis C, underwent treatment  Known constipation on linzess, on methadone for prior IVDA, clean x 1 5 years  Vapes regularly  Rarely coughs  Does strain for bowel movements at times  Considering transitioning his GI care  Had a recent colonoscopy with Dr Colton Maldonado, patient unsure of findings, thinks possible polyp but nothing else urgent  Has bipolar disorder and significant prior substance abuse and is having a hard time establishing behavioral health care, asking for help with this as well         Review of Systems   Constitutional: Negative for activity change, appetite change, chills, fatigue and fever  Gastrointestinal: Positive for abdominal pain and constipation  Negative for abdominal distention, diarrhea, nausea and vomiting          Incisional hernia without obstruction   All other systems reviewed and are negative    ·

## 2022-09-08 NOTE — ASSESSMENT & PLAN NOTE
· Patient with continued abdominal pains  · Patient states he has a Red Dye allergy  · This may be causing him issues as he states his methadone liquid is red colored

## 2022-09-08 NOTE — ASSESSMENT & PLAN NOTE
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  Over the past 3 months, he has noticed a bulge near the incision site his prior surgery  This has been causing him increased pain and discomfort recently  He says that the bulge is worse with standing or bearing down to have bowel movements  He admits to occasional intermittent nonbloody/nonmelanotic diarrhea he also reports intermittent nausea and a decreased appetite  He denies any fevers, chills, vomiting, chest pain, shortness of breath, constipation, melena, hematochezia or urinary symptoms  ·   · 8/31/2022 General surgery Visit  Assessment/Plan      31M with chronic constipation and prior IVDA on methadone and linzess, morbid obesity BMI 48 status post prior left colectomy for AVM in 2020, now with a mildly symptomatic reducible left sided ventral hernia, likely incisional related to his extraction port in the left lower abdomen  Hernia contains small bowel on recent CT imaging in the ED that was reviewed directly by me, no clinical obstructive symptoms  Does have some constipation at baseline, on linzess, recently updated his colonoscopy with Dr Miya Worley  Due to see the GI office again with an WALTER in the coming days  We discussed the big picture approach to the management of hernias and the indications for elective, urgent, and emergent repair and the different approaches that can be taken   He understands that we try to optimize patients whenever possible prior to pursuing elective repair in order to decrease the risk of hernia recurrence and perioperative complications  I would like to improve the management of his constipation and also have him pursue weight loss prior to elective repair and he is in agreement  Referrals placed to GI and to weight management  He is considering transitioning his GI care to [address]  We are going to request his GI records  He would like us to reach out to him next week after his upcoming GI appointment to check in on things and assist with appointment scheduling  He also asked for help with behavioral health scheduling as this has been a challenge for him  I placed a referral for that as well  He would likely want to pursue operative repair of the hernia once we can optimize his risk factors  We will be in touch with him next week      History of Present Illness             Chief Complaint   Patient presents with    Hernia      Recent ED visit to determine what the discomfort and bulging in his left lower abdomen has been  Discovered to have a small bowel containing incisional hernia related to his prior colectomy extraction port site  OR in 2020 with Dr Albin Bustamante and found to have an AVM on final pathology  Follows with Dr Darren White for GI  Prior hepatitis C, underwent treatment  Known constipation on linzess, on methadone for prior IVDA, clean x 1 5 years  Vapes regularly  Rarely coughs  Does strain for bowel movements at times  Considering transitioning his GI care  Had a recent colonoscopy with Dr Darren White, patient unsure of findings, thinks possible polyp but nothing else urgent  Has bipolar disorder and significant prior substance abuse and is having a hard time establishing behavioral health care, asking for help with this as well         Review of Systems   Constitutional: Negative for activity change, appetite change, chills, fatigue and fever  Gastrointestinal: Positive for abdominal pain and constipation  Negative for abdominal distention, diarrhea, nausea and vomiting          Incisional hernia without obstruction   All other systems reviewed and are negative

## 2022-09-12 NOTE — TELEPHONE ENCOUNTER
S/W pt  He stated the precert needs their tax ID on it so his insurance will pay for it  Advised pt will get the message to the   Pt appreciative

## 2022-09-12 NOTE — TELEPHONE ENCOUNTER
Pt called in he went to the MRI and he was unable to fit in to the machine  So he will need a script to [facility] ROSA MARIA NPI [id number]       Please advise     Pt can be reached @ [phone]  ty

## 2022-09-12 NOTE — PROGRESS NOTES
PT Re-Evaluation     Today's date: 2022  Patient name: Mitul Hanna  : 1990  MRN: [id number]  Referring provider: George Lindsay MD  Dx:   Encounter Diagnosis     ICD-10-CM    1  Lumbar radiculopathy  M54 16    2  Chronic low back pain without sciatica, unspecified back pain laterality  M54 50     G89 29    3  Neck pain  M54 2    4  Chronic pain of both knees  M25 561     M25 562     G89 29    5  Cervical radiculopathy  M54 12                   Assessment  Assessment details: The patient has been seen in PT for a total of 15 visits since [facility]  He has made improvements since his last re-eval and is making progress towards his goals  He has complaints of pain though it has been decreasing in intensity  He demonstrates deficits with decreased ROM and strength, decreased core strength, difficulty sleeping and pain with completing his ADLs and tasks at home  The patient would benefit from continued PT to address deficits and improve function  Tx to include ROM, stretching, strengthening, modalities, HEP, pt education, postural ed, lifting/body mechanics, neuro re-ed, balance/proprioception Te, MT and equipment  Plan to continue with PT until his next doctor appointment and to await any recommendations  Will progress as able in upcoming visits with ROM, stretching, strengthening, flexibility, postural awareness and HEP            Impairments: abnormal gait, abnormal or restricted ROM, activity intolerance, impaired physical strength, pain with function, weight-bearing intolerance and poor posture   Functional limitations: walking, any physical activity, stairs, sleeping  Symptom irritability: moderateUnderstanding of Dx/Px/POC: good   Prognosis: fair    Goals  Stg: 3-4 weeks  Improve walking tolerance to 5 blocks prior to needing break - met  Complete transfers without extra time required and 50% reduction in pain - progressing  Reduce pain with sleeping by 30% - progressing  Assess knee pain and address accordingly - progressing    LT-6 weeks - progressing  Independent with trunk and leg strength program  Improving walking to 0 25 miles prior to rest and pain  complete trunk flex hold test for 20 seconds  Complete bird dog with good control  Complete stairs reciprocally and minimal pain in low back    Plan  Plan details: Pt will benefit from continued PT to address noted limitations  Treatment may include TE, NMR, MT, TA, or modalities as appropriate  He is to see the doctor next Tuesday and to await any recommendations  Patient would benefit from: skilled physical therapy  Planned modality interventions: cryotherapy and thermotherapy: hydrocollator packs  Planned therapy interventions: manual therapy, neuromuscular re-education, self care, therapeutic activities, therapeutic exercise, home exercise program, abdominal trunk stabilization, body mechanics training, patient education, postural training, strengthening, stretching and flexibility  Frequency: 2x week  Duration in weeks: 4  Plan of Care beginning date: 2022  Plan of Care expiration date: 10/11/2022  Treatment plan discussed with: patient        Subjective Evaluation    History of Present Illness  Mechanism of injury: 27year old male presenting to PT with long term low back and knee pain  Pain is generally R side low back and iliac crest area  Update : pt notes improvement with less back pain during typical daily activities  Gets some soreness when very active or bending forward a bit  Also with walking he gets discomfort but is now able to walk 8-9 blocks compared to just 1 or so at start of treatment prior to pain  UPDATE 22: The patient states that he feels therapy has been helping and he feels like he has been moving better  He notes that he has more pain with increased activity, feels better at rest       The patient states that he will be getting his MRI on Thursday for his neck and lower back      He will be going back to see the doctor next Tuesday for her follow up appointment      Pain  At best pain ratin  At worst pain ratin    Patient Goals  Patient goals for therapy: decreased pain, increased motion, increased strength, independence with ADLs/IADLs, return to sport/leisure activities and return to work          Objective     General Comments:      Lumbar Comments    Multi-seg movement patterns  Flex to ankles, some stiffness low back - persists  Ext: hinge at mid lumbar, slight shift to R  SB: distal third tibia b/l - slight discomfort R lateral thigh with R sb      Lumbar  Pain with pa through mid and lower lumbar      Hip screen  Symmetrical hip mobility, no pain reproduction          Passive SLR: hamstring limitation (55 deg) but no pain reproduction      Strength/motor control  Core flex test: 15 sec                Precautions: chronic low back pain, knee pain        Manuals    Manual traction          Joint work          flexibility                     ST          Neuro Re-Ed          TA          TA+march           Dead Bug       Modified with 1 TA w/ UE only leg at 90 10x ea Modified with 1 TA w/UE only leg at 90  10x ea    Side Stepping      NV                                     Ther Ex          NuStep/TM Bike interval 10' Bike Interval x 10' Bike Interval x 10' Bike Interval x 10 Bike interval 10'    clamshells  gtb 2x10  gtb 3x10 btb 3x10 btb 3x10 Btb 3 x 10   Hip ext    Hip Abd/Ext 2x10 ea B/L  hip 3 way 15x ea B/L     Leg press  P4 2x10   Pillow at back  Held held     Ham curls      NV    Knee ext      NV    SLR 3x10 R side only 2x10 flex ea 2x10 flex ea 2x10 flex ea 2x10 Flex ea   CC LAE P6 3x12 Held held     Lumbar ext 70# 3x12 Held 70# 3x12 80# 3x12 80# 3x12   Lumbar flex          Prone alt ue/le Held Held 2x10 ea Held hernia NP   Bridge 3x10 3x10 3x10 3x10 ea 3 x 10 ea   Bird dog X 10 ea 2x10 ea 2x10 ea 2x10 eea 2 x 10 ea   Supine hand to knee        quadruped  LTR wind shield wiper 3x10 LTR windshield wiper 3x10 ea LTR windshield wiper 3x10 ea  LTR windshield wipers 3x10 ea    Hand heel rock x10 X 20 Standing x20     Cat camel          LTR          paloff press  CC P4 2x10 ea   held TB    Ther Activity                                  Gait Training                                   Modalities

## 2022-09-13 ENCOUNTER — EVALUATION (OUTPATIENT)
Dept: PHYSICAL THERAPY | Facility: CLINIC | Age: 32
End: 2022-09-13
Payer: COMMERCIAL

## 2022-09-13 DIAGNOSIS — M54.12 CERVICAL RADICULOPATHY: ICD-10-CM

## 2022-09-13 DIAGNOSIS — M54.16 LUMBAR RADICULOPATHY: Primary | ICD-10-CM

## 2022-09-13 DIAGNOSIS — M54.50 CHRONIC LOW BACK PAIN WITHOUT SCIATICA, UNSPECIFIED BACK PAIN LATERALITY: ICD-10-CM

## 2022-09-13 DIAGNOSIS — M25.562 CHRONIC PAIN OF BOTH KNEES: ICD-10-CM

## 2022-09-13 DIAGNOSIS — G89.29 CHRONIC PAIN OF BOTH KNEES: ICD-10-CM

## 2022-09-13 DIAGNOSIS — M25.561 CHRONIC PAIN OF BOTH KNEES: ICD-10-CM

## 2022-09-13 DIAGNOSIS — M54.2 NECK PAIN: ICD-10-CM

## 2022-09-13 DIAGNOSIS — G89.29 CHRONIC LOW BACK PAIN WITHOUT SCIATICA, UNSPECIFIED BACK PAIN LATERALITY: ICD-10-CM

## 2022-09-13 PROCEDURE — 97112 NEUROMUSCULAR REEDUCATION: CPT | Performed by: PHYSICAL THERAPIST

## 2022-09-13 PROCEDURE — 97110 THERAPEUTIC EXERCISES: CPT | Performed by: PHYSICAL THERAPIST

## 2022-09-13 NOTE — TELEPHONE ENCOUNTER
Mili Zimmerman from [address] [place] [address] called asking for [facility] to fax pts MRI scripts of the cervical and lumbar to [phone]      pts appmt is Thursday morning

## 2022-09-15 ENCOUNTER — OFFICE VISIT (OUTPATIENT)
Dept: PHYSICAL THERAPY | Facility: CLINIC | Age: 32
End: 2022-09-15
Payer: COMMERCIAL

## 2022-09-15 DIAGNOSIS — M54.12 CERVICAL RADICULOPATHY: Primary | ICD-10-CM

## 2022-09-15 DIAGNOSIS — M54.16 LUMBAR RADICULOPATHY: ICD-10-CM

## 2022-09-15 DIAGNOSIS — G89.29 CHRONIC LOW BACK PAIN WITHOUT SCIATICA, UNSPECIFIED BACK PAIN LATERALITY: ICD-10-CM

## 2022-09-15 DIAGNOSIS — M54.50 CHRONIC LOW BACK PAIN WITHOUT SCIATICA, UNSPECIFIED BACK PAIN LATERALITY: ICD-10-CM

## 2022-09-15 DIAGNOSIS — M54.2 NECK PAIN: ICD-10-CM

## 2022-09-15 DIAGNOSIS — M25.562 CHRONIC PAIN OF BOTH KNEES: ICD-10-CM

## 2022-09-15 DIAGNOSIS — G89.29 CHRONIC PAIN OF BOTH KNEES: ICD-10-CM

## 2022-09-15 DIAGNOSIS — M25.561 CHRONIC PAIN OF BOTH KNEES: ICD-10-CM

## 2022-09-15 PROCEDURE — 97110 THERAPEUTIC EXERCISES: CPT

## 2022-09-15 PROCEDURE — 97112 NEUROMUSCULAR REEDUCATION: CPT

## 2022-09-15 NOTE — PROGRESS NOTES
Daily Note     Today's date: 9/15/2022  Patient name: Stephen Almeida  : 1990  MRN: [id number]  Referring provider: Licha Bañuelos MD  Dx:   Encounter Diagnosis     ICD-10-CM    1  Cervical radiculopathy  M54 12    2  Lumbar radiculopathy  M54 16    3  Chronic low back pain without sciatica, unspecified back pain laterality  M54 50     G89 29    4  Neck pain  M54 2    5  Chronic pain of both knees  M25 561     M25 562     G89 29                   Subjective: Patient reports today isn't "too bad of a day " He does go to the gym and tries to do what he can  Objective: See treatment diary below  Incorporated Leg ext and machine hs curls into program today  Assessment: Tolerated treatment well  Patient demonstrated fatigue post treatment and would benefit from continued PT to improve core strength and stability  Core strength is improving due to being able to progress dead bug today  Plan to incorporate side stepping into program due to presented hip weakness  Plan: Continue per plan of care        Precautions: chronic low back pain, knee pain        Manuals 9/15  9/7 9/8 9/13   Manual traction         Joint work         flexibility                   ST         Neuro Re-Ed         TA         TA+march          Dead Bug  Modified with TA w/ UE only LE at 90  2x10   Modified with 1 TA w/ UE only leg at 90 10x ea Modified with 1 TA w/UE only leg at 90  10x ea    Side Stepping  nv   NV                                  Ther Ex         NuStep/TM Bike interval 10'  Bike Interval x 10' Bike Interval x 10 Bike interval 10'    clamshells  btb 2x10  btb 3x10 btb 3x10 Btb 3 x 10   Hip ext     hip 3 way 15x ea B/L     Leg press Hold hernia  held     Ham curls  P6 3x10 P7  NV    Knee ext  P3 3x10   NV    SLR   2x10 flex ea 2x10 flex ea 2x10 Flex ea   CC LAE P6 3x12  held     Lumbar ext 80# 3x12  70# 3x12 80# 3x12 80# 3x12   Lumbar flex         Prone alt ue/le   2x10 ea Held hernia NP   Bridge 3x10 ea  3x10 3x10 ea 3 x 10 ea   Bird dog 2x10 ea  2x10 ea 2x10 eea 2 x 10 ea   Supine hand to knee        quadruped  LTR wind shield wiper 3x10  LTR windshield wiper 3x10 ea  LTR windshield wipers 3x10 ea    Hand heel rock   Standing x20     Cat camel         LTR         paloff press   held TB    Ther Activity                               Gait Training                                Modalities

## 2022-09-19 ENCOUNTER — OFFICE VISIT (OUTPATIENT)
Dept: BARIATRICS | Facility: CLINIC | Age: 32
End: 2022-09-19
Payer: COMMERCIAL

## 2022-09-19 ENCOUNTER — TELEPHONE (OUTPATIENT)
Dept: BARIATRICS | Facility: CLINIC | Age: 32
End: 2022-09-19

## 2022-09-19 VITALS
OXYGEN SATURATION: 94 % | HEIGHT: 70 IN | HEART RATE: 85 BPM | DIASTOLIC BLOOD PRESSURE: 80 MMHG | TEMPERATURE: 97.2 F | BODY MASS INDEX: 45.1 KG/M2 | WEIGHT: 315 LBS | SYSTOLIC BLOOD PRESSURE: 110 MMHG

## 2022-09-19 DIAGNOSIS — E66.01 MORBID OBESITY WITH BMI OF 45.0-49.9, ADULT (HCC): ICD-10-CM

## 2022-09-19 DIAGNOSIS — K59.00 CONSTIPATION: ICD-10-CM

## 2022-09-19 DIAGNOSIS — K43.9 VENTRAL HERNIA WITHOUT OBSTRUCTION OR GANGRENE: ICD-10-CM

## 2022-09-19 DIAGNOSIS — K21.9 GERD (GASTROESOPHAGEAL REFLUX DISEASE): ICD-10-CM

## 2022-09-19 PROCEDURE — 99244 OFF/OP CNSLTJ NEW/EST MOD 40: CPT | Performed by: PHYSICIAN ASSISTANT

## 2022-09-19 NOTE — PROGRESS NOTES
Assessment/Plan: Morbid obesity with BMI of 45 0-49 9, adult ([facility] [address] )  -Discussed options of HealthyCORE-Intensive Lifestyle Intervention Program, Very Low Calorie Diet-VLCD, Conservative Program, Carlita-En-Y Gastric Bypass and Vertical Sleeve Gastrectomy and the role of weight loss medications   -Initial weight loss goal of 5-10% weight loss for improved health  -On Methadone, would need to be completed from this for bariatric surgery  Prefers to start with lifestyle changes  -AVOID phentermine  -Screening labs: A1c and TSH reviewed from 3/26/22, CMP reviewed from 8/234/22  -Does not have coverage for AOM, has hyperglycemia; can consider metformin  -Patient is interested in pursuing Conservative Program   -Financial constraints   -Father does cooking  -Calorie goals, sample menu, portion size guidelines, and food logging reviewed with the patient  Start with cutting back/eliminating sugary beverages    Follow up in approximately 2 months with Non-Surgical Physician/Advanced Practitioner  Goals:  Food log (ie ) www myfitnesspal com,sparkpeople  com,loseit com,calorieking  com,etc  baritastic  No sugary beverages  At least 64oz of water daily  Increase physical activity by 10 minutes daily   Gradually increase physical activity to a goal of 5 days per week for 30 minutes of MODERATE intensity PLUS 2 days per week of FULL BODY resistance training  5-10 servings of fruits and vegetables per day and 25-35 grams of dietary fiber per day, gradually increasing  Try having toast for breakfast  Measure all portions: creamers, sugars, cooking oils/butters, condiments, dressings, etc and log calories   If choosing starch pick whole grain/complex carbs and keep to 1/2-3/4 cup: oatmeal, brown rice, quinoa, whole wheat pasta, potatoes, sweet potato, chickpea noodles, red lentil noodles  0642-1399 calories  Try protein shake + fruit as a meal replacement    Diagnoses and all orders for this visit:    Morbid obesity with BMI of 45 0-49 9, adult [facility])  -     Ambulatory Referral to Weight Management    Ventral hernia without obstruction or gangrene  Comments:  - referred as surgeon expects sx may improve with weight loss  Orders:  -     Ambulatory Referral to Weight Management    Constipation  -     Ambulatory Referral to Weight Management    GERD (gastroesophageal reflux disease)  Comments:  -discusse dimportance of eliminating carbonated/sugary beverages  Orders:  -     Ambulatory Referral to Weight Management        Subjective:   Chief Complaint   Patient presents with    Consult     Patient already uses sleep machine  Patient ID: Alessandra Sevilla  is a 27 y o  male with excess weight/obesity here to pursue weight management    Past Medical History:   Diagnosis Date    Allergic     Anemia 6/24/2018    Anxiety     from records    Benign essential hypertension 11/17/2016    Bipolar 1 disorder (HCC)     from records    Chronic pain     Claustrophobia 3/15/2018    Community acquired pneumonia 6/24/2018    Depressed 7/14/2016    Depression     from records    GERD (gastroesophageal reflux disease)     Hepatitis C virus infection 8/14/2014    Heroin abuse ([address] ) 7/24/2018    Infectious viral hepatitis     Obesity 10/12/2016    Obstructive sleep apnea     from records    Sleep disorder     Substance abuse (HCC)      HPI:  Obesity/Excess Weight:  Severity: Severe  Onset:  Entire life, worsened at end of substance use and after becoming clean    Modifiers: Diet and Exercise   In the last few months tried intermittent fasting and had some success, but plateau'd  Contributing factors: See above, food choices  Associated symptoms: increased joint pain and decreased self esteem    Goals: 280 lbs  Hydration: water varies- 2-10 bottles/day; recently drinking more tea and soda (feels this is better for his GI sx, water worsens this)   Recently restarted ice tea  Alcohol: denies  Exercise: 2x/week goes to the gym  Occupation: not currently employed (depression/pain/anxiety)     Has trouble sleeping  Wakes at 6 am  B: often feels sick, so does not eat; toast and/or applesauce  S: not usually  L: tuna sandwich   D: skips or protein + starch + pretzels  Snacks on pretzels    Pizza once per week  Occasional taco bell    The following portions of the patient's history were reviewed and updated as appropriate: allergies, current medications, past family history, past medical history, past social history, past surgical history and problem list     Review of Systems   Constitutional: Negative for chills and fever  HENT: Negative for sore throat  Respiratory: Positive for shortness of breath (managed by pcp)  Negative for cough  Cardiovascular: Negative for chest pain and palpitations  Gastrointestinal: Positive for constipation  Negative for diarrhea         +reflux   Genitourinary: Negative for dysuria  Musculoskeletal: Positive for arthralgias  Skin: Negative for rash  Neurological: Negative for headaches  Psychiatric/Behavioral:        +depression  Waiting to hear back from office to be scheduled for mental health  Denies SI/HI- seek [facility] if they do occur     Objective:    /80 (BP Location: Left arm, Cuff Size: Large)   Pulse 85   Temp (!) 97 2 °F (36 2 °C) (Temporal)   Ht 5' 10" (1 778 m)   Wt (!) 152 kg (336 lb)   SpO2 94%   BMI 48 21 kg/m²     Physical Exam  Vitals and nursing note reviewed  Constitutional   General appearance: Abnormal   well developed and morbidly obese  Eyes No conjunctival pallor  Ears, Nose, Mouth, and Throat Oral mucosa moist    Pulmonary   Respiratory effort: No increased work of breathing or signs of respiratory distress  Auscultation of lungs: Clear to auscultation, equal breath sounds bilaterally, no wheezes, no rales, no rhonci  Cardiovascular   Auscultation of heart: Normal rate and rhythm, normal S1 and S2, without murmurs      Examination of extremities for edema and/or varicosities: Normal   no edema  Abdomen   Abdomen: Abnormal   The abdomen was obese  Bowel sounds were normal  The abdomen was soft and nontender     Musculoskeletal   Gait and station: Normal     Psychiatric   Orientation to person, place and time: Normal     Affect: appropriate

## 2022-09-19 NOTE — TELEPHONE ENCOUNTER
Just spoke to Cydney Antonio and he is wondering why his surgery was canceled  He in not sure if he needs to make another appointment with you

## 2022-09-19 NOTE — TELEPHONE ENCOUNTER
Spoke to [place] and explained that his surgery was canceled because his sleep apnea  I did ask if he wanted to be scheduled for about a year to see how his sleep apnea was doing, he states that he would call back later to reschedule

## 2022-09-19 NOTE — ASSESSMENT & PLAN NOTE
-Discussed options of HealthyCORE-Intensive Lifestyle Intervention Program, Very Low Calorie Diet-VLCD, Conservative Program, Carlita-En-Y Gastric Bypass and Vertical Sleeve Gastrectomy and the role of weight loss medications   -Initial weight loss goal of 5-10% weight loss for improved health  -On Methadone, would need to be completed from this for bariatric surgery  Prefers to start with lifestyle changes  -AVOID phentermine  -Screening labs: A1c and TSH reviewed from 3/26/22, CMP reviewed from 8/234/22  -Does not have coverage for AOM, has hyperglycemia; can consider metformin  -Patient is interested in pursuing Conservative Program   -Financial constraints   -Father does cooking  -Calorie goals, sample menu, portion size guidelines, and food logging reviewed with the patient   Start with cutting back/eliminating sugary beverages

## 2022-09-19 NOTE — PATIENT INSTRUCTIONS
Goals: Food log (ie ) www myfitnesspal com,sparkpeople  com,loseit com,calorieking  com,etc  baritastic  No sugary beverages  At least 64oz of water daily  Increase physical activity by 10 minutes daily  Gradually increase physical activity to a goal of 5 days per week for 30 minutes of MODERATE intensity PLUS 2 days per week of FULL BODY resistance training  5-10 servings of fruits and vegetables per day and 25-35 grams of dietary fiber per day, gradually increasing  Try having toast for breakfast  Measure all portions: creamers, sugars, cooking oils/butters, condiments, dressings, etc and log calories   If choosing starch pick whole grain/complex carbs and keep to 1/2-3/4 cup: oatmeal, brown rice, quinoa, whole wheat pasta, potatoes, sweet potato, chickpea noodles, red lentil noodles  9743-0819 calories  Goals:  Food log (ie ) www myfitnesspal com,sparkpeople  com,loseit com,calorieking  com,etc  baritastic  No sugary beverages  At least 64oz of water daily  Increase physical activity by 10 minutes daily   Gradually increase physical activity to a goal of 5 days per week for 30 minutes of MODERATE intensity PLUS 2 days per week of FULL BODY resistance training  5-10 servings of fruits and vegetables per day and 25-35 grams of dietary fiber per day, gradually increasing  Try having toast for breakfast  Measure all portions: creamers, sugars, cooking oils/butters, condiments, dressings, etc and log calories   If choosing starch pick whole grain/complex carbs and keep to 1/2-3/4 cup: oatmeal, brown rice, quinoa, whole wheat pasta, potatoes, sweet potato, chickpea noodles, red lentil noodles  1486-3188 calories

## 2022-09-20 ENCOUNTER — OFFICE VISIT (OUTPATIENT)
Dept: PHYSICAL THERAPY | Facility: CLINIC | Age: 32
End: 2022-09-20
Payer: COMMERCIAL

## 2022-09-20 ENCOUNTER — OFFICE VISIT (OUTPATIENT)
Dept: PAIN MEDICINE | Facility: CLINIC | Age: 32
End: 2022-09-20
Payer: COMMERCIAL

## 2022-09-20 ENCOUNTER — APPOINTMENT (OUTPATIENT)
Dept: RADIOLOGY | Facility: MEDICAL CENTER | Age: 32
End: 2022-09-20
Payer: COMMERCIAL

## 2022-09-20 VITALS
BODY MASS INDEX: 45.1 KG/M2 | SYSTOLIC BLOOD PRESSURE: 115 MMHG | HEART RATE: 81 BPM | DIASTOLIC BLOOD PRESSURE: 80 MMHG | WEIGHT: 315 LBS | HEIGHT: 70 IN

## 2022-09-20 DIAGNOSIS — M54.9 UPPER BACK PAIN: ICD-10-CM

## 2022-09-20 DIAGNOSIS — M54.16 LUMBAR RADICULOPATHY: ICD-10-CM

## 2022-09-20 DIAGNOSIS — M54.50 CHRONIC BILATERAL LOW BACK PAIN WITHOUT SCIATICA: ICD-10-CM

## 2022-09-20 DIAGNOSIS — G89.29 CHRONIC BILATERAL LOW BACK PAIN WITHOUT SCIATICA: ICD-10-CM

## 2022-09-20 DIAGNOSIS — M54.2 NECK PAIN: ICD-10-CM

## 2022-09-20 DIAGNOSIS — G89.4 CHRONIC PAIN SYNDROME: Primary | ICD-10-CM

## 2022-09-20 DIAGNOSIS — M54.12 CERVICAL RADICULOPATHY: Primary | ICD-10-CM

## 2022-09-20 DIAGNOSIS — G89.4 CHRONIC PAIN SYNDROME: ICD-10-CM

## 2022-09-20 PROCEDURE — 99214 OFFICE O/P EST MOD 30 MIN: CPT | Performed by: NURSE PRACTITIONER

## 2022-09-20 PROCEDURE — 97112 NEUROMUSCULAR REEDUCATION: CPT

## 2022-09-20 PROCEDURE — 72072 X-RAY EXAM THORAC SPINE 3VWS: CPT

## 2022-09-20 PROCEDURE — 97110 THERAPEUTIC EXERCISES: CPT

## 2022-09-20 NOTE — PROGRESS NOTES
Assessment:  1  Chronic pain syndrome    2  Chronic bilateral low back pain without sciatica    3  Lumbar radiculopathy    4  Upper back pain    5  Neck pain        Plan:  While the patient was in the office today, I did have a thorough conversation regarding their chronic pain syndrome, medication management, and treatment plan options  Patient is being seen for follow-up visit  He was last seen here on 08/16/2022 at which time MRI of his cervical and lumbar spine were ordered  MRI of the cervical spine reveals a left central disc herniation with caudal migration leading to mild to moderate left proximal neural foraminal stenosis C5-6  MRI of the lumbar spine reveals multilevel degenerative changes at L4-5 there is a right-sided central disc herniation with annular disruption leading to mild right lateral recess stenosis  L5-S1 there is a shallow disc herniation leading to flattening of the ventral thecal sac and mild bilateral neural foraminal stenosis  MRI results were reviewed with patient and his mother  Patient will be scheduled for right-sided L4-5 and L5-S1 transforaminal epidural steroid injection in the near future  Complete risks and benefits including bleeding, infection, tissue reaction, nerve injury and allergic reaction were discussed  The approach was demonstrated using models and literature was provided  Verbal and written consent was obtained  Continue full therapy will add thoracic/upper back pain as well  Will order an x-ray of his thoracic spine due to complaints of upper back pain  Follow-up 1 month after the injection  History of Present Illness: The patient is a 27 y o  male who presents for a follow up office visit in regards to Back Pain, Wrist Pain, Groin Pain, Buttocks Pain, Leg Pain, Hip Pain, Knee Pain, and Chest Pain  The patients current symptoms include complaints of upper back pain low back pain that radiates down the lateral aspect of his right leg  Current pain level is a 4/10  Quality pain is described as dull, aching, cramping, pressure-like, numb, pins and needles  Current pain medications includes:  None  I have personally reviewed and/or updated the patient's past medical history, past surgical history, family history, social history, current medications, allergies, and vital signs today  Review of Systems  Review of Systems   Respiratory: Positive for shortness of breath  Cardiovascular: Negative for chest pain  Gastrointestinal: Positive for constipation  Negative for diarrhea, nausea and vomiting  Musculoskeletal: Positive for gait problem  Negative for arthralgias, joint swelling and myalgias  Decreased range of motion  Joint stiffness  Pain in extremity    Neurological: Negative for dizziness, seizures and weakness  All other systems reviewed and are negative          Past Medical History:   Diagnosis Date    Allergic     Anemia 6/24/2018    Anxiety     from records    Benign essential hypertension 11/17/2016    Bipolar 1 disorder (HCC)     from records    Chronic pain     Claustrophobia 3/15/2018    Community acquired pneumonia 6/24/2018    Depressed 7/14/2016    Depression     from records    GERD (gastroesophageal reflux disease)     Hepatitis C virus infection 8/14/2014    Heroin abuse ([facility] [address] ) 7/24/2018    Infectious viral hepatitis     Obesity 10/12/2016    Obstructive sleep apnea     from records    Sleep disorder     Substance abuse [facility])        Past Surgical History:   Procedure Laterality Date    OK DRAIN SKIN ABSCESS COMPLIC Left 8/6/3148    Procedure: INCISION AND DRAINAGE (I&D) EXTREMITY;  Surgeon: Ligia Cross MD;  Location: [facility];  Service: Orthopedics    OK LAP,SURG,COLECTOMY, PARTIAL, W/ANAST Left 5/21/2020    Procedure: LAPAROSCOPIC LEFT HEMICOLECTOMY TAKEDOWN SPLENIC FLECTURE, COLORECTAL ANASTOMOSIS ;  Surgeon: Yulissa Palomares MD;  Location:  MAIN OR;  Service: Colorectal  WISDOM TOOTH EXTRACTION         Family History   Problem Relation Age of Onset    Diabetes Mother     Restless legs syndrome Mother     Sleep apnea Mother     Colon cancer Father     Alzheimer's disease Maternal Grandmother     Depression Family        Social History     Occupational History    Not on file   Tobacco Use    Smoking status: Former Smoker     Packs/day: 1 00     Years: 13 00     Pack years: 13 00     Types: E-Cigarettes    Smokeless tobacco: Never Used    Tobacco comment: vapes   Vaping Use    Vaping Use: Every day    Substances: Nicotine   Substance and Sexual Activity    Alcohol use: Not Currently    Drug use: Not Currently     Types: Heroin, Marijuana, Prescription     Comment: on Methadone    Sexual activity: Not Currently         Current Outpatient Medications:     fluticasone (FLONASE) 50 mcg/act nasal spray, 1 spray into each nostril 2 (two) times a day, Disp: 16 g, Rfl: 11    levocetirizine (XYZAL) 5 MG tablet, Take 1 tablet (5 mg total) by mouth every evening, Disp: 30 tablet, Rfl: 11    linaCLOtide (Linzess) 72 MCG CAPS, Take 1 capsule by mouth in the morning, Disp: 30 capsule, Rfl: 2    METHADONE HCL PO, Take 110 mg by mouth daily 150mg daily , Disp: , Rfl:     olopatadine (PATANOL) 0 1 % ophthalmic solution, Administer 1 drop to both eyes 2 (two) times a day, Disp: 5 mL, Rfl: 0    omeprazole (PriLOSEC) 20 mg delayed release capsule, Take 20 mg by mouth 2 (two) times a day , Disp: , Rfl:     ondansetron (ZOFRAN-ODT) 4 mg disintegrating tablet, , Disp: , Rfl:     sucralfate (CARAFATE) 1 g tablet, Take 1 tablet (1 g total) by mouth 4 (four) times a day, Disp: 120 tablet, Rfl: 1    triamcinolone (KENALOG) 0 1 % cream, Apply topically 2 (two) times a day, Disp: 30 g, Rfl: 0    Sod Fluoride-Potassium Nitrate 1 1-5 % PSTE, Apply 1 Squirt to teeth daily at bedtime Brush teeth daily at bedtime  Spit out, do not rinse  Nothing to eat or drink after   (Patient not taking: No sig reported), Disp: 112 g, Rfl: 0    Allergies   Allergen Reactions    Red Dye - Food Allergy Diarrhea, Nausea Only and Abdominal Pain    Bee Venom Angioedema       Physical Exam:    /80 (BP Location: Left arm, Patient Position: Sitting, Cuff Size: Large)   Pulse 81   Ht 5' 10" (1 778 m)   Wt (!) 154 kg (339 lb)   BMI 48 64 kg/m²     Constitutional:normal, well developed, well nourished, alert, in no distress and non-toxic and no overt pain behavior   and obese  Eyes:anicteric  HEENT:grossly intact  Neck:supple, symmetric, trachea midline and no masses   Pulmonary:even and unlabored  Cardiovascular:No edema or pitting edema present  Skin:Normal without rashes or lesions and well hydrated  Psychiatric:Mood and affect appropriate  Neurologic:Cranial Nerves II-XII grossly intact  Musculoskeletal:normal    Imaging  X-ray thoracic spine 3 views    (Results Pending)       Orders Placed This Encounter   Procedures    X-ray thoracic spine 3 views    Ambulatory Referral to Physical Therapy

## 2022-09-20 NOTE — PROGRESS NOTES
Daily Note     Today's date: 2022  Patient name: Luis A Molina  : 1990  MRN: [id number]  Referring provider: Eitan Rashid MD  Dx:   Encounter Diagnosis     ICD-10-CM    1  Cervical radiculopathy  M54 12    2  Chronic pain syndrome  G89 4 Ambulatory Referral to Physical Therapy   3  Chronic bilateral low back pain without sciatica  M54 50 Ambulatory Referral to Physical Therapy    G89 29    4  Lumbar radiculopathy  M54 16 Ambulatory Referral to Physical Therapy   5  Neck pain  M54 2 Ambulatory Referral to Physical Therapy                  Subjective: Patient reports that he got a new script for upper back pain form his PCP  He is feeling "off " He has some R knee pain from washing car  Objective: See treatment diary below  Incorporated thoracic mobility into program to help reduce cervical pain  Progressing HEP and reviewed TE  Assessment: Tolerated treatment well  Patient demonstrated fatigue post treatment and would benefit from continued PT to improve strength and reduce pain in areas that has pain  Patient presents with core weakness and B LE weakness at this time  His T' Spine has limited mobility and was able to improve this with foam roll and open books  Will continue to progress strength  Plan: Continue per plan of care        Precautions: chronic low back pain, knee pain        Manuals 9/15 9/20 9/7 9/8 9/13   Manual traction         Joint work         flexibility                   ST         Neuro Re-Ed         TA         TA+march          Dead Bug  Modified with TA w/ UE only LE at 90  2x10 Reviewed progression to try at home  Modified with 1 TA w/ UE only leg at 90 10x ea Modified with 1 TA w/UE only leg at 90  10x ea    Side Stepping  nv   NV     TA                              Ther Ex         NuStep/TM Bike interval 10' Bike 10' Bike Interval x 10' Bike Interval x 10 Bike interval 10'    clamshells  btb 2x10 HEP btb 3x10 btb 3x10 Btb 3 x 10   Hip ext     hip 3 way 15x ea B/L     Leg press Hold hernia  held     Ham curls  P6 3x10 P7 3x10  NV    Knee ext  P3 3x10 held  NV    SLR   2x10 flex ea 2x10 flex ea 2x10 Flex ea   CC LAE P6 3x12 P6 3x12 held     Lumbar ext 80# 3x12 90# 3x12 70# 3x12 80# 3x12 80# 3x12   Lumbar flex         Prone alt ue/le   2x10 ea Held hernia NP   Bridge 3x10 ea HEP 3x10 3x10 ea 3 x 10 ea   Bird dog 2x10 ea HEP 2x10 ea 2x10 eea 2 x 10 ea   Supine hand to knee        quadruped  LTR wind shield wiper 3x10 HEP LTR windshield wiper 3x10 ea  LTR windshield wipers 3x10 ea    Hand heel rock   Standing x20     Cat camel         Machine Rows  P3 3x10      Lat Pull down   P3 3x10      Foam rolling thoracic   4'      Foam roll angels/Side to side   4'      Thoracic open books  10x ea       Ther Activity                               Gait Training                                Modalities

## 2022-09-22 ENCOUNTER — OFFICE VISIT (OUTPATIENT)
Dept: PHYSICAL THERAPY | Facility: CLINIC | Age: 32
End: 2022-09-22
Payer: COMMERCIAL

## 2022-09-22 DIAGNOSIS — M25.561 CHRONIC PAIN OF BOTH KNEES: ICD-10-CM

## 2022-09-22 DIAGNOSIS — M25.562 CHRONIC PAIN OF BOTH KNEES: ICD-10-CM

## 2022-09-22 DIAGNOSIS — M54.16 LUMBAR RADICULOPATHY: ICD-10-CM

## 2022-09-22 DIAGNOSIS — G89.4 CHRONIC PAIN SYNDROME: Primary | ICD-10-CM

## 2022-09-22 DIAGNOSIS — G89.29 CHRONIC BILATERAL LOW BACK PAIN WITHOUT SCIATICA: ICD-10-CM

## 2022-09-22 DIAGNOSIS — G89.29 CHRONIC LOW BACK PAIN WITHOUT SCIATICA, UNSPECIFIED BACK PAIN LATERALITY: ICD-10-CM

## 2022-09-22 DIAGNOSIS — M54.2 NECK PAIN: ICD-10-CM

## 2022-09-22 DIAGNOSIS — M54.12 CERVICAL RADICULOPATHY: ICD-10-CM

## 2022-09-22 DIAGNOSIS — M54.50 CHRONIC BILATERAL LOW BACK PAIN WITHOUT SCIATICA: ICD-10-CM

## 2022-09-22 DIAGNOSIS — G89.29 CHRONIC PAIN OF BOTH KNEES: ICD-10-CM

## 2022-09-22 DIAGNOSIS — M54.50 CHRONIC LOW BACK PAIN WITHOUT SCIATICA, UNSPECIFIED BACK PAIN LATERALITY: ICD-10-CM

## 2022-09-22 PROCEDURE — 97112 NEUROMUSCULAR REEDUCATION: CPT

## 2022-09-22 PROCEDURE — 97110 THERAPEUTIC EXERCISES: CPT

## 2022-09-22 NOTE — PROGRESS NOTES
Daily Note     Today's date: 2022  Patient name: Arvind Boggs  : 1990  MRN: [id number]  Referring provider: Kassandra Clark MD  Dx:   Encounter Diagnosis     ICD-10-CM    1  Chronic pain syndrome  G89 4    2  Chronic bilateral low back pain without sciatica  M54 50     G89 29    3  Lumbar radiculopathy  M54 16    4  Neck pain  M54 2    5  Chronic pain of both knees  M25 561     M25 562     G89 29    6  Chronic low back pain without sciatica, unspecified back pain laterality  M54 50     G89 29    7  Cervical radiculopathy  M54 12                   Subjective: Patient reports he got soreness in in his lower back about half way through painting yesterday  His pain is not as often but still gets it  He says its not pain its more pressure  He is getting injection Oct 28th  Objective: See treatment diary below  Assessment: Tolerated treatment well  Patient demonstrated fatigue post treatment and would benefit from continued PT to improve strength and mobility to decrease pain  He is doing well with strength progression, still experiences some pain but not as sharp  Will progress as able and to tolerance  Plan: Continue per plan of care        Precautions: chronic low back pain, knee pain        Manuals 9/15 9/20 9/22 9/8 9/13   Manual traction         Joint work         flexibility                   ST         Neuro Re-Ed         TA         TA+march          Dead Bug  Modified with TA w/ UE only LE at 90  2x10 Reviewed progression to try at home  Modified with 1 TA w/ UE only leg at 90 10x ea Modified with 1 TA w/UE only leg at 90  10x ea    Side Stepping  nv   NV     TA                              Ther Ex         NuStep/TM Bike interval 10' Bike 10' Bike 10' Bike Interval x 10 Bike interval 10'    clamshells  btb 2x10 HEP  btb 3x10 Btb 3 x 10   Hip ext         Leg press Hold hernia       Ham curls  P6 3x10 P7 3x10 P7 3x10 NV    Knee ext  P3 3x10 held P2 90-45 2x10 NV    SLR 2x10 flex ea 2x10 Flex ea   CC LAE P6 3x12 P6 3x12 P7 3x10     Lumbar ext 80# 3x12 90# 3x12 90# 3x12 80# 3x12 80# 3x12   Lumbar flex         Prone alt ue/le    Held hernia NP   Bridge 3x10 ea HEP  3x10 ea 3 x 10 ea   Bird dog 2x10 ea HEP  2x10 eea 2 x 10 ea   Supine hand to knee        quadruped  LTR wind shield wiper 3x10 HEP   LTR windshield wipers 3x10 ea    Hand heel rock        Cat camel         Machine Rows  P3 3x10 P3 3x10     Lat Pull down   P3 3x10 P3 3x10     Foam rolling thoracic   4' 4'      Foam roll angels/Side to side   4' 4'      Thoracic open books  10x ea  10x ea     Ther Activity                               Gait Training                                Modalities

## 2022-09-26 ENCOUNTER — TELEPHONE (OUTPATIENT)
Dept: INTERNAL MEDICINE CLINIC | Facility: CLINIC | Age: 32
End: 2022-09-26

## 2022-09-26 ENCOUNTER — TELEPHONE (OUTPATIENT)
Dept: PAIN MEDICINE | Facility: CLINIC | Age: 32
End: 2022-09-26

## 2022-09-26 DIAGNOSIS — M54.50 CHRONIC LOW BACK PAIN, UNSPECIFIED BACK PAIN LATERALITY, UNSPECIFIED WHETHER SCIATICA PRESENT: ICD-10-CM

## 2022-09-26 DIAGNOSIS — G89.29 CHRONIC LOW BACK PAIN, UNSPECIFIED BACK PAIN LATERALITY, UNSPECIFIED WHETHER SCIATICA PRESENT: ICD-10-CM

## 2022-09-26 DIAGNOSIS — M54.50 LOW BACK PAIN, UNSPECIFIED BACK PAIN LATERALITY, UNSPECIFIED CHRONICITY, UNSPECIFIED WHETHER SCIATICA PRESENT: Primary | ICD-10-CM

## 2022-09-26 NOTE — TELEPHONE ENCOUNTER
Rosibel Guerrero from neurology called   Pt needs an order for a EMG  Please  Its scheduled for 10/3  States the order needs to come from the PCP  with the  DX Codes: M54 50, G89 29, M54 16    Thank you!

## 2022-09-26 NOTE — TELEPHONE ENCOUNTER
LDMOM advising of same as per TERRI and to cont w/ Nov schd appt  TC [facility] [place] [facility]  Please call patient and let him know that the x-ray of his thoracic spine was unremarkable

## 2022-09-27 ENCOUNTER — OFFICE VISIT (OUTPATIENT)
Dept: DENTISTRY | Facility: CLINIC | Age: 32
End: 2022-09-27

## 2022-09-27 ENCOUNTER — APPOINTMENT (OUTPATIENT)
Dept: PHYSICAL THERAPY | Facility: CLINIC | Age: 32
End: 2022-09-27
Payer: COMMERCIAL

## 2022-09-27 VITALS — SYSTOLIC BLOOD PRESSURE: 116 MMHG | HEART RATE: 101 BPM | TEMPERATURE: 97.3 F | DIASTOLIC BLOOD PRESSURE: 77 MMHG

## 2022-09-27 DIAGNOSIS — Z01.20 ENCOUNTER FOR DENTAL EXAMINATION: Primary | ICD-10-CM

## 2022-09-27 PROCEDURE — D0191 ASSESSMENT OF A PATIENT: HCPCS | Performed by: DENTIST

## 2022-09-27 NOTE — PROGRESS NOTES
[facility]    Sherryle Rutherford Donnamarie Hawk is a 26 yo  male presenting to the dental clinic for treatment planning  PMHR  Significant findings include:  · Hep C (RX: Harvoni / Virus undetectable as of April 2018)  · HTN  · Bipolar  · Sleep apnea  · Depression / Anxiety / Bipolar  · Hx narcotic wubstance abuse     Pt has #9 pain on chewing  Appears RCT was obturated and a post was prep was attempted and never placed  Pt stated that dental office never f/u after last visit in March for next appointment  Pt does not prioritize replacing #30 or #18 with implant  Cost seemed to influence decision  #19 has D fracture    limited probing around #8 and #9  WNL    Cold, percussion, mobility all tested in the following teeth due to teeth being symptomatic:      Cold   Percussion  #4 1/10  1 sec        (-)  #5 1/10 2sec        (++) 8/10 pain  #7 6/10 2sec        (-)  #8 NR         NR  #9 3/10 2sec        (+) 5/10 pain  #10 5/10 2sec        (-)  #12 5/10 5sec        (-)  #13 5/10 1sec        (-)  #15 2/10 1sec        (++) 8/10 on B cusp  #19 7/10 1sec        (+) 4/10 on M cusps      This is an extensive TrxP  Pt was asked to come back for a diagnostic appointment  Pt dismissed stable and ambulatory        NV: impression alginate U/L --> models for TxP, probing depths all teeth (record in Epic) w/ Dr Jake Wilkes    *make sure to use case difficulty criteria as a guide w/ case*

## 2022-09-28 DIAGNOSIS — M54.2 CERVICAL PAIN: ICD-10-CM

## 2022-09-28 DIAGNOSIS — M54.50 CHRONIC LOW BACK PAIN, UNSPECIFIED BACK PAIN LATERALITY, UNSPECIFIED WHETHER SCIATICA PRESENT: Primary | ICD-10-CM

## 2022-09-28 DIAGNOSIS — G89.29 CHRONIC LOW BACK PAIN, UNSPECIFIED BACK PAIN LATERALITY, UNSPECIFIED WHETHER SCIATICA PRESENT: Primary | ICD-10-CM

## 2022-09-29 ENCOUNTER — OFFICE VISIT (OUTPATIENT)
Dept: PHYSICAL THERAPY | Facility: CLINIC | Age: 32
End: 2022-09-29
Payer: COMMERCIAL

## 2022-09-29 DIAGNOSIS — M25.562 CHRONIC PAIN OF BOTH KNEES: ICD-10-CM

## 2022-09-29 DIAGNOSIS — G89.29 CHRONIC PAIN OF BOTH KNEES: ICD-10-CM

## 2022-09-29 DIAGNOSIS — M25.561 CHRONIC PAIN OF BOTH KNEES: ICD-10-CM

## 2022-09-29 DIAGNOSIS — M54.12 CERVICAL RADICULOPATHY: ICD-10-CM

## 2022-09-29 DIAGNOSIS — G89.29 CHRONIC LOW BACK PAIN WITHOUT SCIATICA, UNSPECIFIED BACK PAIN LATERALITY: ICD-10-CM

## 2022-09-29 DIAGNOSIS — G89.4 CHRONIC PAIN SYNDROME: Primary | ICD-10-CM

## 2022-09-29 DIAGNOSIS — M54.2 NECK PAIN: ICD-10-CM

## 2022-09-29 DIAGNOSIS — M54.16 LUMBAR RADICULOPATHY: ICD-10-CM

## 2022-09-29 DIAGNOSIS — G89.29 CHRONIC BILATERAL LOW BACK PAIN WITHOUT SCIATICA: ICD-10-CM

## 2022-09-29 DIAGNOSIS — M54.50 CHRONIC BILATERAL LOW BACK PAIN WITHOUT SCIATICA: ICD-10-CM

## 2022-09-29 DIAGNOSIS — M54.50 CHRONIC LOW BACK PAIN WITHOUT SCIATICA, UNSPECIFIED BACK PAIN LATERALITY: ICD-10-CM

## 2022-09-29 PROCEDURE — 97110 THERAPEUTIC EXERCISES: CPT | Performed by: PHYSICAL THERAPIST

## 2022-09-29 NOTE — PROGRESS NOTES
Daily Note     Today's date: 2022  Patient name: Luis A Molina  : 1990  MRN: [id number]  Referring provider: Eitan Rashid MD  Dx:   Encounter Diagnosis     ICD-10-CM    1  Chronic pain syndrome  G89 4    2  Chronic bilateral low back pain without sciatica  M54 50     G89 29    3  Lumbar radiculopathy  M54 16    4  Neck pain  M54 2    5  Chronic pain of both knees  M25 561     M25 562     G89 29    6  Chronic low back pain without sciatica, unspecified back pain laterality  M54 50     G89 29    7  Cervical radiculopathy  M54 12                   Subjective: The patient states that he was sore after his last session for a few days from the foam rolling  Objective: See treatment diary below      Assessment: Tolerated treatment well  He requested to hold the foam roller today as he was more sore from this after his last visit  He does have a foam roller at home and will attempt over the weekend  Good tolerance to all other TE  Patient would benefit from continued PT  Plan: Continue per plan of care        Precautions: chronic low back pain, knee pain        Manuals 9/15 9/20 9/22 9/29 9/13   Manual traction         Joint work         flexibility                   ST         Neuro Re-Ed         TA         TA+march          Dead Bug  Modified with TA w/ UE only LE at 90  2x10 Reviewed progression to try at home   Modified with 1 TA w/UE only leg at 90  10x ea    Side Stepping  nv        TA                              Ther Ex         NuStep/TM Bike interval 10' Bike 10' Bike 10' Bike 10' Bike interval 10'    clamshells  btb 2x10 HEP   Btb 3 x 10   Hip ext         Leg press Hold hernia       Ham curls  P6 3x10 P7 3x10 P7 3x10 P7 3 x 10    Knee ext  P3 3x10 held P2 90-45 2x10 P2 3 x 10    SLR     2x10 Flex ea   CC LAE P6 3x12 P6 3x12 P7 3x10 P7 3x10    Lumbar ext 80# 3x12 90# 3x12 90# 3x12 90# 3x12 80# 3x12   Lumbar flex         Prone alt ue/le     NP   Bridge 3x10 ea HEP   3 x 10 ea   Bird dog 2x10 ea HEP   2 x 10 ea   Supine hand to knee        quadruped  LTR wind shield wiper 3x10 HEP   LTR windshield wipers 3x10 ea    Hand heel rock        Cat camel         Machine Rows  P3 3x10 P3 3x10 P3 3x10    Lat Pull down   P3 3x10 P3 3x10 P3 3x10    Foam rolling thoracic   4' 4'  NP    Foam roll angels/Side to side   4' 4'  NP    Thoracic open books  10x ea  10x ea 10x ea    Ther Activity                               Gait Training                                Modalities

## 2022-09-29 NOTE — PROGRESS NOTES
Daily Note     Today's date: 2022  Patient name: Yosi Merrill  : 1990  MRN: [id number]  Referring provider: Laverne Souza MD  Dx:   Encounter Diagnosis     ICD-10-CM    1  Chronic pain syndrome  G89 4    2  Chronic bilateral low back pain without sciatica  M54 50     G89 29    3  Lumbar radiculopathy  M54 16    4  Neck pain  M54 2    5  Chronic pain of both knees  M25 561     M25 562     G89 29    6  Chronic low back pain without sciatica, unspecified back pain laterality  M54 50     G89 29    7  Cervical radiculopathy  M54 12                   Subjective:       Objective: See treatment diary below      Assessment: Tolerated treatment {Tolerated treatment :[id number]}  Patient {assessment:[id number]}      Plan: Continue per plan of care        Precautions: chronic low back pain, knee pain        Manuals 9/15 9/20 9/22 9/29 9/13   Manual traction         Joint work         flexibility                   ST         Neuro Re-Ed         TA         TA+march          Dead Bug  Modified with TA w/ UE only LE at 90  2x10 Reviewed progression to try at home   Modified with 1 TA w/UE only leg at 90  10x ea    Side Stepping  nv        TA                              Ther Ex         NuStep/TM Bike interval 10' Bike 10' Bike 10' Bike 10' Bike interval 10'    clamshells  btb 2x10 HEP   Btb 3 x 10   Hip ext         Leg press Hold hernia       Ham curls  P6 3x10 P7 3x10 P7 3x10 P7 3x10    Knee ext  P3 3x10 held P2 90-45 2x10 P2 90-45  2x10    SLR     2x10 Flex ea   CC LAE P6 3x12 P6 3x12 P7 3x10 P7 3x10    Lumbar ext 80# 3x12 90# 3x12 90# 3x12 90# 3x12 80# 3x12   Lumbar flex         Prone alt ue/le     NP   Bridge 3x10 ea HEP   3 x 10 ea   Bird dog 2x10 ea HEP   2 x 10 ea   Supine hand to knee        quadruped  LTR wind shield wiper 3x10 HEP   LTR windshield wipers 3x10 ea    Hand heel rock        Cat camel         Machine Rows  P3 3x10 P3 3x10 P3 3x10    Lat Pull down   P3 3x10 P3 3x10 P3 3x10    Foam rolling thoracic   4' 4'  4'    Foam roll angels/Side to side   4' 4'  4'    Thoracic open books  10x ea  10x ea 10x ea    Ther Activity                               Gait Training                                Modalities

## 2022-10-03 ENCOUNTER — PROCEDURE VISIT (OUTPATIENT)
Dept: NEUROLOGY | Facility: CLINIC | Age: 32
End: 2022-10-03
Payer: COMMERCIAL

## 2022-10-03 DIAGNOSIS — M54.50 CHRONIC LOW BACK PAIN, UNSPECIFIED BACK PAIN LATERALITY, UNSPECIFIED WHETHER SCIATICA PRESENT: ICD-10-CM

## 2022-10-03 DIAGNOSIS — G89.29 CHRONIC LOW BACK PAIN WITHOUT SCIATICA, UNSPECIFIED BACK PAIN LATERALITY: ICD-10-CM

## 2022-10-03 DIAGNOSIS — M54.50 LOW BACK PAIN, UNSPECIFIED BACK PAIN LATERALITY, UNSPECIFIED CHRONICITY, UNSPECIFIED WHETHER SCIATICA PRESENT: ICD-10-CM

## 2022-10-03 DIAGNOSIS — M54.16 LUMBAR RADICULOPATHY: ICD-10-CM

## 2022-10-03 DIAGNOSIS — M54.50 CHRONIC LOW BACK PAIN WITHOUT SCIATICA, UNSPECIFIED BACK PAIN LATERALITY: ICD-10-CM

## 2022-10-03 DIAGNOSIS — G89.29 CHRONIC LOW BACK PAIN, UNSPECIFIED BACK PAIN LATERALITY, UNSPECIFIED WHETHER SCIATICA PRESENT: ICD-10-CM

## 2022-10-03 PROCEDURE — 95886 MUSC TEST DONE W/N TEST COMP: CPT | Performed by: PHYSICAL MEDICINE & REHABILITATION

## 2022-10-03 PROCEDURE — 95911 NRV CNDJ TEST 9-10 STUDIES: CPT | Performed by: PHYSICAL MEDICINE & REHABILITATION

## 2022-10-03 NOTE — PROGRESS NOTES
EMG 2 limb lower extremity     Date/Time 10/3/2022 1:24 PM     Performed by  Jeevan Chávez MD     Authorized by Estephania Velazquez MD                [facility] AT [address]  [phone]    Electromyography & Nerve Conduction Studies Report          Full Name: Samual Goodpasture Gender: Male  MRN: [id number] YOB: 1990      Visit Date: 10/3/2022 1:02 PM  Age: 27 Years  Examining Physician: Jeevan Chávez MD   Referring Physician: DR Coco Solorzano History: 79-year-old obese male with chronic back pain presents with complaints of bilateral lower extremity pain associated with numbness on the lateral side of both thighs  Symptoms have been progressively worsening over the last few months  Patient is being evaluated for a lumbosacral radiculopathy  TEMP 32     Sensory Nerve Conduction Study       Nerve / Sites Rec  Site Onset Lat Peak Lat  Amp Segments Distance Velocity     ms ms µV  cm m/s   R Sural - (Antidromic)      Calf Ankle 1 6 1 9 5 0 Calf - Ankle 14 90      Ref  ?4 4 ? 6 0 Ref  ?40   L Sural - (Antidromic)      Calf Ankle 2 3 3 1 9 2 Calf - Ankle 14 61      Ref  ?4 4 ? 6 0 Ref  ?40   R Superficial peroneal - (Antidromic)      Lat leg Ankle 3 0 3 7 11 3 Lat leg - Ankle 14 46      Ref  ?4 4 ? 6 0 Ref  ?40   L Superficial peroneal - (Antidromic)      Lat leg Ankle 3 3 4 1 14 0 Lat leg - Ankle 14 43      Ref  ?4 4 ? 6 0 Ref  ?40       Motor Nerve Conduction Study       Nerve / Sites Muscle Latency Ref  Amplitude Ref  Segments Distance Lat Diff Velocity Ref  ms ms mV mV  cm ms m/s m/s   R Peroneal - EDB      Ankle EDB 4 7 ?6 5 7 5 ?2 0 Ankle - EDB 9         B  Fib Head EDB 12 5  6 9  B  Fib Head - Ankle 34 7 79 44 ?44      A  Fib Head EDB 13 6  6 9  A  Fib Head - B  Fib Head 10 1 15 87 ?44   L Peroneal - EDB      Ankle EDB 4 6 ?6 5 9 7 ?2 0 Ankle - EDB 9         B  Fib Head EDB 11 4  9 3  B   Fib Head - Ankle 34 6 81 50 ?44      A  Fib Head EDB 13 3  8 7  A  Fib Head - B  Fib Head 10 1 88 53 ?44   R Tibial - AH      Ankle AH 4 6 ?5 8 12 8 ?4 0 Ankle - AH 9         Knee AH 14 6  9 6  Knee - Ankle 41 10 00 41 ?41   L Tibial - AH      Ankle AH 4 0 ?5 8 16 9 ?4 0 Ankle - AH 9         Knee AH 13 2  13 6  Knee - Ankle 42 9 21 46 ?41       F Waves       Nerve F Latency Ref  ms ms   R Peroneal - EDB 52 3 ?56 0   R Tibial - AH 52 3 ?56 0   L Peroneal - EDB 55 3 ?56 0   L Tibial - AH 54 0 ?56 0       H Reflex       Nerve H Latency    ms   R Tibial - Soleus 29 1   L Tibial - Soleus 27 8       EMG Summary Table     Spontaneous MUAP Recruitment   Muscle Nerve Roots IA Fib PSW Fasc H F  Dur  Amp PPP Config Pattern   L  Tibialis anterior Deep peroneal (Fibular) L4-L5 NL None None None None Sl  Incr  Gr  Incr  Many NL Reduced   R  Tibialis anterior Deep peroneal (Fibular) L4-L5 NL None None None None NL Sl  Incr  Few NL Reduced   L  Extensor digitorum brevis Tibial L5-S1 NL None None None None NL Gr  Incr  Many NL Reduced   R  Extensor digitorum brevis Tibial L5-S1 NL None None None None Gr  Incr   Ilan  Few NL Reduced   L  Lumbar paraspinals Spinal L1-L5 NL None None None None NL NL None NL NL   R  Lumbar paraspinals Spinal L1-L5 NL None None None None NL NL None NL NL   L  Gluteus medius Superior gluteal L4-S1 NL None None None None Gr  Incr   Ilan  None NL Reduced   R  Gluteus medius Superior gluteal L4-S1 NL None None None None NL NL Few NL Reduced   L  Quadriceps Femoral L2-L4 NL None None None None NL NL None NL NL   R  Quadriceps Femoral L2-L4 NL None None None None NL NL None NL NL   L  Gastrocnemius (Medial head) Tibial S1-S2 NL None None None None NL NL None NL NL   R  Gastrocnemius (Medial head) Tibial S1-S2 NL None None None None NL NL None NL NL                                   Summary        Motor and sensory conduction studies were performed on the bilateral peroneal, tibial and sural nerves   The distal motor latencies were normal  The motor action potential amplitudes were normal  Conduction velocities were normal including conduction of the peroneal nerve across the fibular head  Bilateral peroneal and tibial F waves were normal     Bilateral superficial peroneal and left sural distal sensory latencies were normal with normal sensory action potential amplitudes  Right sural sensory peak latency was normal with a low sensory action potential amplitude and normal conduction velocity across the ankle  H  reflexes were symmetrically normal     Concentric needle EMG was performed on various distal and proximal muscles of the lower extremities bilaterally including EDB, tibialis anterior, gastrocnemius medius, vastus lateralis, gluteus medius and the low lumbar paraspinal regions  There was no evidence of spontaneous activity seen  Mild to moderate decreased recruitment of giant and polyphasic motor units was noted in the bilateral gluteus medius, tibialis anterior and extensor digitorum brevis  The compound motor unit potentials were of normal configuration and interference patterns were full or full for effort in the remaining muscles tested  Impression:      Abnormal study  There is electrophysiologic evidence of a:    1  Mild chronic L5 radiculopathy bilaterally as evidenced by the decreased recruitment and chronic denervation changes in the above-mentioned muscles

## 2022-10-04 ENCOUNTER — OFFICE VISIT (OUTPATIENT)
Dept: PHYSICAL THERAPY | Facility: CLINIC | Age: 32
End: 2022-10-04
Payer: COMMERCIAL

## 2022-10-04 DIAGNOSIS — M25.562 CHRONIC PAIN OF BOTH KNEES: ICD-10-CM

## 2022-10-04 DIAGNOSIS — M54.2 NECK PAIN: ICD-10-CM

## 2022-10-04 DIAGNOSIS — G89.29 CHRONIC PAIN OF BOTH KNEES: ICD-10-CM

## 2022-10-04 DIAGNOSIS — G89.4 CHRONIC PAIN SYNDROME: Primary | ICD-10-CM

## 2022-10-04 DIAGNOSIS — M54.12 CERVICAL RADICULOPATHY: ICD-10-CM

## 2022-10-04 DIAGNOSIS — G89.29 CHRONIC BILATERAL LOW BACK PAIN WITHOUT SCIATICA: ICD-10-CM

## 2022-10-04 DIAGNOSIS — M54.16 LUMBAR RADICULOPATHY: ICD-10-CM

## 2022-10-04 DIAGNOSIS — M54.50 CHRONIC LOW BACK PAIN WITHOUT SCIATICA, UNSPECIFIED BACK PAIN LATERALITY: ICD-10-CM

## 2022-10-04 DIAGNOSIS — M54.50 CHRONIC BILATERAL LOW BACK PAIN WITHOUT SCIATICA: ICD-10-CM

## 2022-10-04 DIAGNOSIS — G89.29 CHRONIC LOW BACK PAIN WITHOUT SCIATICA, UNSPECIFIED BACK PAIN LATERALITY: ICD-10-CM

## 2022-10-04 DIAGNOSIS — M25.561 CHRONIC PAIN OF BOTH KNEES: ICD-10-CM

## 2022-10-04 PROCEDURE — 97110 THERAPEUTIC EXERCISES: CPT

## 2022-10-04 NOTE — PROGRESS NOTES
Daily Note     Today's date: 10/4/2022  Patient name: Haley Mccray  : 1990  MRN: [id number]  Referring provider: Catie Sewell MD  Dx:   Encounter Diagnosis     ICD-10-CM    1  Chronic pain syndrome  G89 4    2  Chronic bilateral low back pain without sciatica  M54 50     G89 29    3  Lumbar radiculopathy  M54 16    4  Neck pain  M54 2    5  Chronic pain of both knees  M25 561     M25 562     G89 29    6  Chronic low back pain without sciatica, unspecified back pain laterality  M54 50     G89 29    7  Cervical radiculopathy  M54 12                   Subjective: Patient reports that he has some soreness from foam roll  He had EMG done on B LE, and will get one on UE tomorrow  Objective: See treatment diary below  Modified to HEP foam roll as needed basis  Resumed anti rotation press today  Assessment: Tolerated treatment well  Patient demonstrated fatigue post treatment and would benefit from continued PT to improve strength  He is able to tolerate strength but has B LE quad weakness and may be due to findings on EMG  Will continue to progress as able and to tolerance  Plan: Continue per plan of care        Precautions: chronic low back pain, knee pain        Manuals 9/15 9/20 9/22 9/29 10/3   Manual traction         Joint work         flexibility                   ST         Neuro Re-Ed         TA         TA+march          Dead Bug  Modified with TA w/ UE only LE at 90  2x10 Reviewed progression to try at home       Side Stepping  nv        TA                              Ther Ex         NuStep/TM Bike interval 10' Bike 10' Bike 10' Bike 10' Bike 10'   clamshells  btb 2x10 HEP      Hip ext         Leg press Hold hernia       Ham curls  P6 3x10 P7 3x10 P7 3x10 P7 3 x 10 P7 3x10   Knee ext  P3 3x10 held P2 90-45 2x10 P2 3x10 P3 3x10   SLR        CC LAE P6 3x12 P6 3x12 P7 3x10 P7 3x10 P7 3x10   Lumbar ext 80# 3x12 90# 3x12 90# 3x12 90# 3x12 90# 3x12   Anti- Rotation Press      P2 2x10           Bridge 3x10 ea HEP      Bird dog 2x10 ea HEP      Supine hand to knee        quadruped  LTR wind shield wiper 3x10 HEP      Hand heel rock        Cat camel         Machine Rows  P3 3x10 P3 3x10 P3 3x10 P3 3x10   Lat Pull down   P3 3x10 P3 3x10 P3 3x10 P3 3x10   Foam rolling thoracic   4' 4'  NP    Foam roll angels/Side to side   4' 4'  NP    Thoracic open books  10x ea  10x ea 10x ea 15x ea   Ther Activity                               Gait Training                                Modalities

## 2022-10-05 ENCOUNTER — PROCEDURE VISIT (OUTPATIENT)
Dept: NEUROLOGY | Facility: CLINIC | Age: 32
End: 2022-10-05
Payer: COMMERCIAL

## 2022-10-05 DIAGNOSIS — M54.12 CERVICAL RADICULOPATHY: ICD-10-CM

## 2022-10-05 DIAGNOSIS — M54.2 CERVICAL PAIN: ICD-10-CM

## 2022-10-05 DIAGNOSIS — M54.2 NECK PAIN: ICD-10-CM

## 2022-10-05 PROCEDURE — 95911 NRV CNDJ TEST 9-10 STUDIES: CPT | Performed by: PHYSICAL MEDICINE & REHABILITATION

## 2022-10-05 PROCEDURE — 95886 MUSC TEST DONE W/N TEST COMP: CPT | Performed by: PHYSICAL MEDICINE & REHABILITATION

## 2022-10-05 NOTE — PROGRESS NOTES
EMG 2 Limb Upper Extremity     Date/Time 10/5/2022 1:08 PM     Performed by  Lyla Gitelman, MD     Authorized by Rolando Espinoza MD                [facility] AT [address]  [phone]    Electromyography & Nerve Conduction Studies Report          Full Name: Leatha Giraldo Gender: Male  MRN: [id number] YOB: 1990      Visit Date: 10/5/2022 12:58 PM  Age: 27 Years  Examining Physician: Lyla Gitelman, MD   Referring Physician: Janet Elena PA-C  Medical History: 50-year-old obese right-handed presents with numbness and tingling in all fingers  Denies any prior injury or trauma  Symptoms are constant and at times he feels weak in his hands  Patient is being evaluated for a cervical radiculopathy versus focal neuropathy  TEMP HARD TO OBTAIN 32       Sensory Nerve Conduction Study       Nerve / Sites Rec  Site Onset Lat Peak Lat  Amp Segments Distance Velocity     ms ms µV  cm m/s   R Median - Dig II (Antidromic)      Wrist Index 3 5 4 5 9 3 Wrist - Index 13 37      Ref  ?3 5 ? 20 0 Ref  ?50   L Median - Dig II (Antidromic)      Wrist Index 3 0 4 5 12 8 Wrist - Index 13 43      Ref  ?3 5 ? 20 0 Ref  ?50   R Ulnar - Dig V (Antidromic)      Wrist Dig V 2 3 3 1 24 9 Wrist - Dig V 11 48      Ref  ?3 1 ? 17 0 Ref  ?50   L Ulnar - Dig V (Antidromic)      Wrist Dig V 1 2 2 1 29 6 Wrist - Dig V 11 92      Ref  ?3 1 ? 17 0 Ref  ?50   R Radial - Superficial (Antidromic)      Forearm Wrist 1 7 2 3 14 2 Forearm - Wrist 10 60      Ref  ?2 9 ? 15 0 Ref  ?50   L Radial - Superficial (Antidromic)      Forearm Wrist 1 5 2 0 12 4 Forearm - Wrist 10 66      Ref  ?2 9 ? 15 0 Ref  ?50       Motor Nerve Conduction Study       Nerve / Sites Muscle Latency Ref  Amplitude Ref  Segments Distance Lat Diff Velocity Ref       ms ms mV mV  cm ms m/s m/s   R Median - APB      Wrist APB 4 0 ?4 4 10 2 ?4 0 Wrist - APB 7         Elbow APB 8 8  9 8 Elbow - Wrist 25 4 83 52 ?49   L Median - APB      Wrist APB 4 1 ?4 4 9 1 ?4 0 Wrist - APB 7         Elbow APB 8 8  8 7  Elbow - Wrist 24 5 4 71 52 ?49   R Ulnar - ADM      Wrist ADM 2 4 ?3 3 7 7 ?6 0 Wrist - ADM 7         B  Elbow ADM 6 4  7 6  B  Elbow - Wrist 23 5 3 96 59 ?49      A  Elbow ADM 8 1  7 4  A  Elbow - B  Elbow 10 1 75 57 ?49   L Ulnar - ADM      Wrist ADM 2 4 ?3 3 8 7 ?6 0 Wrist - ADM 7         B  Elbow ADM 6 3  8 6  B  Elbow - Wrist 24 3 94 61 ?49      A  Elbow ADM 7 9  7 8  A  Elbow - B  Elbow 10 1 56 64 ?49       F Waves       Nerve F Latency Ref  ms ms   R Median - APB 25 6 ?31 0   R Ulnar - ADM 28 4 ?32 0   L Median - APB 25 3 ?31 0   L Ulnar - ADM 27 9 ?32 0       EMG Summary Table     Spontaneous MUAP Recruitment   Muscle Nerve Roots IA Fib PSW Fasc H F  Dur  Amp PPP Config Pattern   L  First dorsal interosseous Ulnar C8-T1 NL None None None None NL NL None NL NL   L  Deltoid Axillary C5-C6 NL None None None None NL NL None NL NL   R  First dorsal interosseous Ulnar C8-T1 NL None None None None NL NL None NL NL   R  Deltoid Axillary C5-C6 NL None None None None NL NL None NL NL   L  Biceps brachii Musculocut  C5-C6 NL None None None None Sl  Incr  Shira Paradise  Few NL Reduced   R  Biceps brachii Musculocut  C5-C6 NL None None None None NL NL None NL NL   L  Triceps brachii Radial C6-C8 NL None None None None NL NL None NL NL   R  Triceps brachii Radial C6-C8 NL None None None None NL NL None NL NL   L  Pronator teres Median C6-C7 NL None None None None Sl  Incr  Shira Jesus  Few NL Reduced   R  Pronator teres Median C6-C7 NL None None None None NL NL None NL NL   L  Abductor pollicis brevis Median N7-Q8 NL None None None None NL NL None NL NL   R  Abductor pollicis brevis Median Q6-Y4 NL None None None None NL NL None NL NL   L  Cervical paraspinals (mid)  - NL None None None None NL NL None NL NL   R   Cervical paraspinals (mid)  - NL None None None None NL NL None NL NL Summary        The left and right median and ulnar motor conduction velocities and compound muscle action potentials were normal with normal distal latencies across the wrists  The left and right median sensory peak latency was prolonged with a low sensory action potential amplitude and a slow conduction velocity across the wrist   The left superficial radial sensory peak latency was normal with a low sensory action potential amplitude and normal conduction velocity across the wrist   The right superficial radial sensory peak latency was normal with a low sensory action potential amplitude and normal conduction velocity across the wrist   The left  ulnar sensory action potential was normal   The right ulnar sensory peak latency was normal with normal sensory action potential amplitude and a slow conduction velocity across the wrist     The left and right median and ulnar F wave latencies were within normal limits  Concentric needle EMG was performed on selected muscles of the bilateral upper extremities  There was no evidence of active denervation any of the muscles tested  Mild to moderate decreased recruitment of giant motor units was noted in the left biceps and pronator teres  Early recruited motor units appear normal with recruitment patterns being full or full for effort in the remaining muscles tested  Impression:          Abnormal study  There is electrophysiologic evidence of a:  1  Chronic C6 radiculopathy on the left as evidenced by the decreased recruitment and chronic denervation changes in the biceps and pronator teres  2  Bilateral mild median nerve compression neuropathy at the wrist with demyelinative changes, consistent with a diagnosis of carpal tunnel syndrome

## 2022-10-06 ENCOUNTER — OFFICE VISIT (OUTPATIENT)
Dept: PHYSICAL THERAPY | Facility: CLINIC | Age: 32
End: 2022-10-06
Payer: COMMERCIAL

## 2022-10-06 DIAGNOSIS — M25.562 CHRONIC PAIN OF BOTH KNEES: ICD-10-CM

## 2022-10-06 DIAGNOSIS — M54.2 NECK PAIN: ICD-10-CM

## 2022-10-06 DIAGNOSIS — G89.29 CHRONIC BILATERAL LOW BACK PAIN WITHOUT SCIATICA: ICD-10-CM

## 2022-10-06 DIAGNOSIS — G89.29 CHRONIC LOW BACK PAIN WITHOUT SCIATICA, UNSPECIFIED BACK PAIN LATERALITY: ICD-10-CM

## 2022-10-06 DIAGNOSIS — M54.50 CHRONIC BILATERAL LOW BACK PAIN WITHOUT SCIATICA: ICD-10-CM

## 2022-10-06 DIAGNOSIS — M54.12 CERVICAL RADICULOPATHY: ICD-10-CM

## 2022-10-06 DIAGNOSIS — M54.16 LUMBAR RADICULOPATHY: ICD-10-CM

## 2022-10-06 DIAGNOSIS — G89.29 CHRONIC PAIN OF BOTH KNEES: ICD-10-CM

## 2022-10-06 DIAGNOSIS — G89.4 CHRONIC PAIN SYNDROME: Primary | ICD-10-CM

## 2022-10-06 DIAGNOSIS — M54.50 CHRONIC LOW BACK PAIN WITHOUT SCIATICA, UNSPECIFIED BACK PAIN LATERALITY: ICD-10-CM

## 2022-10-06 DIAGNOSIS — M25.561 CHRONIC PAIN OF BOTH KNEES: ICD-10-CM

## 2022-10-06 PROCEDURE — 97110 THERAPEUTIC EXERCISES: CPT

## 2022-10-06 NOTE — PROGRESS NOTES
Sleep Study Documentation    Pre-Sleep Study       Sleep testing procedure explained to patient:YES    Patient napped prior to study:YES- less than 30 minutes  Napped after 2PM: yes    Caffeine:Dayshift worker after 12PM   Caffeine use:YES- tea  18 ounces or more    Alcohol:Dayshift workers after 5PM: Alcohol use:NO    Typical day for patient:NO       Study Documentation    Sleep Study Indications: CSA, CATA, EDS, HTN, acid reflux, insomnia, bipolar disorder, anxiety, BMI >40, RLS, depression    Sleep Study: Treatment   Optimal PAP pressure: ASV  Leak:Medium  Snore:Eliminated  REM Obtained:yes  Supplemental O2: no    Minimum SaO2 78%  Baseline SaO2 97%   PAP mask tried (list all) ResMed AirTouch F20 Medium FFM, ResMed AirFit F20 Medium FFM  PAP mask choice (final) ResMed AirFit F20 Medium FFM  PAP mask type:full face  PAP pressure at which snoring was eliminated EPAP 53ppP3H, Max PS 13, Min PS 4  Minimum SaO2 at final PAP pressure 86%    Mode of Therapy:ASV   EPAP:12  max pressure support:13  min pressure support:4   rate setting:Auto  rise time:N/A  Flex:N/A    ETCO2:No         EKG abnormalities: no     EEG abnormalities: no    Sleep Study Recorded < 2 hours: N/A    Sleep Study Recorded > 2 hours but incomplete study: N/A    Sleep Study Recorded 6 hours but no sleep obtained: NO    Patient classification: employed       Post-Sleep Study    Medication used at bedtime or during sleep study:NO    Patient reports time it took to fall asleep:greater than 60 minutes    Patient reports waking up during study:3 or more times  Patient reports returning to sleep in 10 to 30 minutes  Patient reports sleeping 2 to 4 hours with dreaming  Patient reports sleep during study:typical    Patient rated sleepiness: Somewhat sleepy or tired    PAP treatment:yes: Post PAP treatment patient reports feeling better and  would wear PAP mask at home  Hemostasis: Electrocautery

## 2022-10-06 NOTE — PROGRESS NOTES
Daily Note     Today's date: 10/6/2022  Patient name: Krystal Campos  : 1990  MRN: [id number]  Referring provider: Johnathon Edwards MD  Dx:   Encounter Diagnosis     ICD-10-CM    1  Chronic pain syndrome  G89 4    2  Chronic bilateral low back pain without sciatica  M54 50     G89 29    3  Lumbar radiculopathy  M54 16    4  Neck pain  M54 2    5  Chronic pain of both knees  M25 561     M25 562     G89 29    6  Chronic low back pain without sciatica, unspecified back pain laterality  M54 50     G89 29    7  Cervical radiculopathy  M54 12                   Subjective: Patient reports all EMG tests were done in LE/UE  He will follow up with MD about results  He feels his strength is limited when he tries to get up from the floor or squatting/bending hurts his lower back  Objective: See treatment diary below  Incorporated PB squats and HS stretching into program today  Assessment: Tolerated treatment well  Patient demonstrated fatigue post treatment and would benefit from continued PT to improve strength and core stability  Assessed squatting and required form correction  He has weakness in B LE and core at this time but are working to improve this  Will progress as able and to tolerance  Plan: Continue per plan of care        Precautions: chronic low back pain, knee pain        Manuals 10/6 9/20 9/22 9/29 10/3   Manual traction         Joint work         flexibility                   ST         Neuro Re-Ed         TA         TA+march          Dead Bug   Reviewed progression to try at home       Side Stepping         TA                              Ther Ex         NuStep/TM Bike 10' Bike 10' Bike 10' Bike 10' Bike 10'   clamshells  HEP      Leg press        Ham curls P7 3x10 P7 3x10 P7 3x10 P7 3 x 10 P7 3x10   Knee ext P3 3x10 held P2 90-45 2x10 P2 3x10 P3 3x10   CC LAE P7 3x10 P6 3x12 P7 3x10 P7 3x10 P7 3x10   Lumbar ext 90# 3x10 90# 3x12 90# 3x12 90# 3x12 90# 3x12   [employer] P2 3x10    P2 2x10   HS stretch with strap 30"x3 ea       Hand heel rock        PB squats 2x10        Machine Rows P4 3x10 P3 3x10 P3 3x10 P3 3x10 P3 3x10   Lat Pull down  P4 3x10 P3 3x10 P3 3x10 P3 3x10 P3 3x10   Foam rolling thoracic   4' 4'  NP    Foam roll angels/Side to side   4' 4'  NP    Thoracic open books  10x ea  10x ea 10x ea 15x ea   Ther Activity                            Gait Training                             Modalities

## 2022-10-10 ENCOUNTER — OFFICE VISIT (OUTPATIENT)
Dept: PHYSICAL THERAPY | Facility: CLINIC | Age: 32
End: 2022-10-10
Payer: COMMERCIAL

## 2022-10-10 DIAGNOSIS — M54.16 LUMBAR RADICULOPATHY: ICD-10-CM

## 2022-10-10 DIAGNOSIS — G89.29 CHRONIC PAIN OF BOTH KNEES: ICD-10-CM

## 2022-10-10 DIAGNOSIS — M25.561 CHRONIC PAIN OF BOTH KNEES: ICD-10-CM

## 2022-10-10 DIAGNOSIS — M25.562 CHRONIC PAIN OF BOTH KNEES: ICD-10-CM

## 2022-10-10 DIAGNOSIS — M54.50 CHRONIC LOW BACK PAIN WITHOUT SCIATICA, UNSPECIFIED BACK PAIN LATERALITY: ICD-10-CM

## 2022-10-10 DIAGNOSIS — M54.50 CHRONIC BILATERAL LOW BACK PAIN WITHOUT SCIATICA: ICD-10-CM

## 2022-10-10 DIAGNOSIS — G89.29 CHRONIC LOW BACK PAIN WITHOUT SCIATICA, UNSPECIFIED BACK PAIN LATERALITY: ICD-10-CM

## 2022-10-10 DIAGNOSIS — M54.2 NECK PAIN: ICD-10-CM

## 2022-10-10 DIAGNOSIS — G89.4 CHRONIC PAIN SYNDROME: Primary | ICD-10-CM

## 2022-10-10 DIAGNOSIS — G89.29 CHRONIC BILATERAL LOW BACK PAIN WITHOUT SCIATICA: ICD-10-CM

## 2022-10-10 DIAGNOSIS — M54.12 CERVICAL RADICULOPATHY: ICD-10-CM

## 2022-10-10 PROCEDURE — 97110 THERAPEUTIC EXERCISES: CPT

## 2022-10-10 NOTE — PROGRESS NOTES
Daily Note     Today's date: 10/10/2022  Patient name: Juan Mancuso  : 1990  MRN: [id number]  Referring provider: Namrata Jhaveri MD  Dx:   Encounter Diagnosis     ICD-10-CM    1  Chronic pain syndrome  G89 4    2  Chronic bilateral low back pain without sciatica  M54 50     G89 29    3  Lumbar radiculopathy  M54 16    4  Neck pain  M54 2    5  Chronic pain of both knees  M25 561     M25 562     G89 29    6  Chronic low back pain without sciatica, unspecified back pain laterality  M54 50     G89 29    7  Cervical radiculopathy  M54 12                   Subjective: Patient feels his strength is improving, only issue is bending forward properly due to LBP  Objective: See treatment diary below  Assessment: Tolerated treatment well  Patient demonstrated fatigue post treatment and would benefit from continued PT to improve strength and mobility for function  Plan to assess box lifts with patient next session  Patient is doing well with strengthening and has an improvement since initial evaluation  Patient has some lower extremity and core weakness that is preventing him from house work  He has radicular symptoms more so in R quad and hip area that is constant, but had this for a very long time, and is finally getting testing  Had EMG last week, and will discuss results w/ MD        Plan: Continue per plan of care        Precautions: chronic low back pain, knee pain        Manuals 10/6 10/10 9/22 9/29 10/3   Manual traction         Joint work         flexibility                   ST         Neuro Re-Ed         TA         TA+march          Dead Bug          Side Stepping         TA                              Ther Ex         NuStep/TM Bike 10' Bike 10' Bike 10' Bike 10' Bike 10'   clamshells        Leg press        Ham curls P7 3x10 P7 3x10 P7 3x10 P7 3 x 10 P7 3x10   Knee ext P3 3x10 P3 3x10 P2 90-45 2x10 P2 3x10 P3 3x10   CC LAE P7 3x10 P7 3x10 P7 3x10 P7 3x10 P7 3x10   Lumbar ext 90# 3x10 100# 3x10 90# 3x12 90# 3x12 90# 3x12   Anti- Rotation Press P2 3x10 P3 3x10   P2 2x10   HS stretch with strap 30"x3 ea 30"x3 ea      Hand heel rock        PB squats 2x10  2x10       Machine Rows P4 3x10 P4 3x10 P3 3x10 P3 3x10 P3 3x10   Lat Pull down  P4 3x10 P4 3x10 P3 3x10 P3 3x10 P3 3x10   Foam rolling thoracic   4'  NP    Foam roll angels/Side to side   4'  NP    Box lifts NV          10x ea 10x ea 15x ea   Ther Activity                            Gait Training                             Modalities

## 2022-10-11 ENCOUNTER — OFFICE VISIT (OUTPATIENT)
Dept: DENTISTRY | Facility: CLINIC | Age: 32
End: 2022-10-11

## 2022-10-11 VITALS — TEMPERATURE: 98.9 F | DIASTOLIC BLOOD PRESSURE: 56 MMHG | SYSTOLIC BLOOD PRESSURE: 128 MMHG

## 2022-10-11 DIAGNOSIS — K02.9 DECAY, TEETH: Primary | ICD-10-CM

## 2022-10-11 NOTE — PROGRESS NOTES
Patient Diagnostics (photos, impressions) and Perio probing    Jerone Pallas Charise Olds is a 34yo  male that presented to the dental clinic for photos, alginate impressions for in-house models and probing depths  PMHR reviewed, no changes  Significant findings include:  · Allergies: bee venom, red food dye  · GERD  · HTN  · Chronic HepC  · Sleep apnea  · Bipolar disorder, anxiety, depression  · IV drug abuse, hx heroin use  · BMI 45 0-49 9  · Colonic mass    ASA = III  Pain = none  BP = 128/56mmHg  Translation = none needed    Photos were uploaded to pt 'Media' and attached to note  Impressions were poured up and stored in clinic lab  Probing depths recorded in Epic    RX: Prevident prescribed upon pt request  Tere Grimes dentist prescribed it and it helped with sensitivity (told pt about Red dye warning d/t his allergy   Pt said Prevident was blue last time and there was no problem)     Note: Mr Sandy Peters is a [facility] referral pt    NV: Must review Dg casts first --> Schedule treatment presentation appt

## 2022-10-12 ENCOUNTER — HOSPITAL ENCOUNTER (OUTPATIENT)
Dept: PULMONOLOGY | Facility: HOSPITAL | Age: 32
Discharge: HOME | End: 2022-10-12
Attending: PSYCHIATRY & NEUROLOGY
Payer: COMMERCIAL

## 2022-10-12 ENCOUNTER — OFFICE VISIT (OUTPATIENT)
Dept: PHYSICAL THERAPY | Facility: CLINIC | Age: 32
End: 2022-10-12
Payer: COMMERCIAL

## 2022-10-12 DIAGNOSIS — F11.90 CENTRAL SLEEP APNEA COMORBID WITH PRESCRIBED OPIOID USE: ICD-10-CM

## 2022-10-12 DIAGNOSIS — G47.33 CHRONIC INTERMITTENT HYPOXIA WITH OBSTRUCTIVE SLEEP APNEA: ICD-10-CM

## 2022-10-12 DIAGNOSIS — M54.12 CERVICAL RADICULOPATHY: ICD-10-CM

## 2022-10-12 DIAGNOSIS — M54.16 LUMBAR RADICULOPATHY: ICD-10-CM

## 2022-10-12 DIAGNOSIS — G47.34 CHRONIC INTERMITTENT HYPOXIA WITH OBSTRUCTIVE SLEEP APNEA: ICD-10-CM

## 2022-10-12 DIAGNOSIS — M25.561 CHRONIC PAIN OF BOTH KNEES: ICD-10-CM

## 2022-10-12 DIAGNOSIS — G47.37 CENTRAL SLEEP APNEA COMORBID WITH PRESCRIBED OPIOID USE: ICD-10-CM

## 2022-10-12 DIAGNOSIS — F11.20 METHADONE MAINTENANCE THERAPY PATIENT (HCC): ICD-10-CM

## 2022-10-12 DIAGNOSIS — G89.29 CHRONIC BILATERAL LOW BACK PAIN WITHOUT SCIATICA: ICD-10-CM

## 2022-10-12 DIAGNOSIS — M54.2 NECK PAIN: ICD-10-CM

## 2022-10-12 DIAGNOSIS — G89.4 CHRONIC PAIN SYNDROME: Primary | ICD-10-CM

## 2022-10-12 DIAGNOSIS — M25.562 CHRONIC PAIN OF BOTH KNEES: ICD-10-CM

## 2022-10-12 DIAGNOSIS — M54.50 CHRONIC BILATERAL LOW BACK PAIN WITHOUT SCIATICA: ICD-10-CM

## 2022-10-12 DIAGNOSIS — M54.50 CHRONIC LOW BACK PAIN WITHOUT SCIATICA, UNSPECIFIED BACK PAIN LATERALITY: ICD-10-CM

## 2022-10-12 DIAGNOSIS — G89.29 CHRONIC PAIN OF BOTH KNEES: ICD-10-CM

## 2022-10-12 DIAGNOSIS — G89.29 CHRONIC LOW BACK PAIN WITHOUT SCIATICA, UNSPECIFIED BACK PAIN LATERALITY: ICD-10-CM

## 2022-10-12 PROBLEM — H10.13 ALLERGIC CONJUNCTIVITIS OF BOTH EYES: Status: RESOLVED | Noted: 2021-12-17 | Resolved: 2022-10-12

## 2022-10-12 LAB
BASE EXCESS BLDA CALC-SCNC: 1.4 MMOL/L
HCO3 BLDA-SCNC: 27.4 MMOL/L (ref 22–28)
NON VENT ROOM AIR: ABNORMAL %
O2 CT BLDA-SCNC: 22.8 ML/DL (ref 16–23)
OXYHGB MFR BLDA: 94.4 % (ref 94–97)
PCO2 BLDA: 47.3 MM HG (ref 36–44)
PH BLDA: 7.38 [PH] (ref 7.35–7.45)
PO2 BLDA: 76.2 MM HG (ref 75–129)
SPECIMEN SOURCE: ABNORMAL

## 2022-10-12 PROCEDURE — 97110 THERAPEUTIC EXERCISES: CPT

## 2022-10-12 PROCEDURE — 94726 PLETHYSMOGRAPHY LUNG VOLUMES: CPT | Performed by: INTERNAL MEDICINE

## 2022-10-12 PROCEDURE — 36600 WITHDRAWAL OF ARTERIAL BLOOD: CPT

## 2022-10-12 PROCEDURE — 94729 DIFFUSING CAPACITY: CPT

## 2022-10-12 PROCEDURE — 94760 N-INVAS EAR/PLS OXIMETRY 1: CPT

## 2022-10-12 PROCEDURE — 94060 EVALUATION OF WHEEZING: CPT

## 2022-10-12 PROCEDURE — 82805 BLOOD GASES W/O2 SATURATION: CPT

## 2022-10-12 PROCEDURE — 94729 DIFFUSING CAPACITY: CPT | Performed by: INTERNAL MEDICINE

## 2022-10-12 PROCEDURE — 94726 PLETHYSMOGRAPHY LUNG VOLUMES: CPT

## 2022-10-12 PROCEDURE — 94060 EVALUATION OF WHEEZING: CPT | Performed by: INTERNAL MEDICINE

## 2022-10-12 RX ORDER — ALBUTEROL SULFATE 2.5 MG/3ML
2.5 SOLUTION RESPIRATORY (INHALATION) ONCE AS NEEDED
Status: COMPLETED | OUTPATIENT
Start: 2022-10-12 | End: 2022-10-12

## 2022-10-12 RX ADMIN — ALBUTEROL SULFATE 2.5 MG: 2.5 SOLUTION RESPIRATORY (INHALATION) at 15:23

## 2022-10-12 NOTE — PROGRESS NOTES
Daily Note     Today's date: 10/12/2022  Patient name: Telly Stock  : 1990  MRN: [id number]  Referring provider: Vi Rangel MD  Dx:   Encounter Diagnosis     ICD-10-CM    1  Chronic pain syndrome  G89 4    2  Chronic bilateral low back pain without sciatica  M54 50     G89 29    3  Lumbar radiculopathy  M54 16    4  Neck pain  M54 2    5  Chronic pain of both knees  M25 561     M25 562     G89 29    6  Chronic low back pain without sciatica, unspecified back pain laterality  M54 50     G89 29    7  Cervical radiculopathy  M54 12                   Subjective: Patient reports that his strength is improving  Objective: See treatment diary below  Incorporated box lifting into program today  Assessment: Tolerated treatment well  Patient would benefit from continued PT to improve lifting form and posture for yard work and housework to limit lower back pain  Patient has some quad weakness and involuntary shaking with full leg extension after strengthening  Plan: Continue per plan of care        Precautions: chronic low back pain, knee pain        Manuals 10/6 10/10 10/12 9/29 10/3   Manual traction         Joint work         flexibility                   ST         Neuro Re-Ed         TA         TA+march          Dead Bug          Side Stepping         TA                              Ther Ex         NuStep/TM Bike 10' Bike 10' Bike 10' Bike 10' Bike 10'   clamshells        Leg press        Ham curls P7 3x10 P7 3x10 P7 3x10 P7 3 x 10 P7 3x10   Knee ext P3 3x10 P3 3x10 P3 3x10 P2 3x10 P3 3x10   CC LAE P7 3x10 P7 3x10 P7 3x10 P7 3x10 P7 3x10   Lumbar ext 90# 3x10 100# 3x10 100# 3x12 90# 3x12 90# 3x12   Anti- Rotation Press P2 3x10 P3 3x10 P4 3x10  P2 2x10   HS stretch with strap 30"x3 ea 30"x3 ea 30"x3 ea     Hand heel rock        PB squats 2x10  2x10       Machine Rows P4 3x10 P4 3x10 P4 3x10 P3 3x10 P3 3x10   Lat Pull down  P4 3x10 P4 3x10 P4 3x10 P3 3x10 P3 3x10   Foam rolling thoracic NP    Foam roll angels/Side to side    NP    Box lifts NV  12# crate lifts 10x floor to waist        10x ea 10x ea 15x ea   Ther Activity                            Gait Training                             Modalities

## 2022-10-17 DIAGNOSIS — G47.37 CENTRAL SLEEP APNEA COMORBID WITH PRESCRIBED OPIOID USE: ICD-10-CM

## 2022-10-17 DIAGNOSIS — G47.33 CHRONIC INTERMITTENT HYPOXIA WITH OBSTRUCTIVE SLEEP APNEA: ICD-10-CM

## 2022-10-17 DIAGNOSIS — G47.33 OSA (OBSTRUCTIVE SLEEP APNEA): Primary | ICD-10-CM

## 2022-10-17 DIAGNOSIS — G47.34 CHRONIC INTERMITTENT HYPOXIA WITH OBSTRUCTIVE SLEEP APNEA: ICD-10-CM

## 2022-10-17 DIAGNOSIS — F11.90 CENTRAL SLEEP APNEA COMORBID WITH PRESCRIBED OPIOID USE: ICD-10-CM

## 2022-10-19 ENCOUNTER — TELEPHONE (OUTPATIENT)
Dept: OTHER | Facility: OTHER | Age: 32
End: 2022-10-19

## 2022-10-19 ENCOUNTER — TELEPHONE (OUTPATIENT)
Dept: SURGERY | Facility: CLINIC | Age: 32
End: 2022-10-19

## 2022-10-19 NOTE — TELEPHONE ENCOUNTER
Can we reach out to the patient and follow up with him? Check my last note  He is following with an outside GI, let's request all of those records for review  He wanted to consider transitioning to our GI, so see where he stands on that and let's help get him set up if he would like  I also referred him to behavioral health and to weight management  Let's see where those appointments and referrals stand  He will need a lot of optimization before any surgical repair of his hernia so that is what all of this is working towards  I told him we would check in with him to see how things are progressing  Thanks so much

## 2022-10-19 NOTE — TELEPHONE ENCOUNTER
Pt's mom called, to inform pt will not be able to make it to appt   A call back was requested from office to reschedule appointment

## 2022-10-20 ENCOUNTER — APPOINTMENT (OUTPATIENT)
Dept: PHYSICAL THERAPY | Facility: CLINIC | Age: 32
End: 2022-10-20

## 2022-10-20 ENCOUNTER — TELEPHONE (OUTPATIENT)
Dept: SLEEP CENTER | Facility: CLINIC | Age: 32
End: 2022-10-20

## 2022-10-20 NOTE — TELEPHONE ENCOUNTER
----- Message from Latasha Taylor MD sent at 10/17/2022  1:05 PM EDT -----  Pt should see pulmonology on basis of abnormal PFTs, Pt should also be advised to use his PAP machine on a daily basis, all night     Latasha Taylor MD  P Sleep Medicine [facility] Clinical  Reviewed ABGs pt not using his PAP machine enough, he needs to use it all night, every night  Can move up followup with me  I referred him to pulm- could also see Dr Saravanan Pollock in pulm if he has an opening     ---------------------------------------------------    Spoke to patient  Advised PFTs and ABGs were abnormal and Dr Anthony Arizmendi has placed a referral to pulmonology  Provided patient with phone number for pulmonary office to schedule a consultation  Also advised patient that Dr Anthony Arizmendi is advising he uses his PAP machine every night, all night  Patient verbalized understanding

## 2022-10-24 ENCOUNTER — EVALUATION (OUTPATIENT)
Dept: PHYSICAL THERAPY | Facility: CLINIC | Age: 32
End: 2022-10-24
Payer: COMMERCIAL

## 2022-10-24 DIAGNOSIS — M54.2 NECK PAIN: ICD-10-CM

## 2022-10-24 DIAGNOSIS — M25.561 CHRONIC PAIN OF BOTH KNEES: ICD-10-CM

## 2022-10-24 DIAGNOSIS — G89.4 CHRONIC PAIN SYNDROME: Primary | ICD-10-CM

## 2022-10-24 DIAGNOSIS — M54.50 CHRONIC LOW BACK PAIN WITHOUT SCIATICA, UNSPECIFIED BACK PAIN LATERALITY: ICD-10-CM

## 2022-10-24 DIAGNOSIS — M54.12 CERVICAL RADICULOPATHY: ICD-10-CM

## 2022-10-24 DIAGNOSIS — G89.29 CHRONIC PAIN OF BOTH KNEES: ICD-10-CM

## 2022-10-24 DIAGNOSIS — M54.50 CHRONIC BILATERAL LOW BACK PAIN WITHOUT SCIATICA: ICD-10-CM

## 2022-10-24 DIAGNOSIS — M25.562 CHRONIC PAIN OF BOTH KNEES: ICD-10-CM

## 2022-10-24 DIAGNOSIS — G89.29 CHRONIC LOW BACK PAIN WITHOUT SCIATICA, UNSPECIFIED BACK PAIN LATERALITY: ICD-10-CM

## 2022-10-24 DIAGNOSIS — M54.16 LUMBAR RADICULOPATHY: ICD-10-CM

## 2022-10-24 DIAGNOSIS — G89.29 CHRONIC BILATERAL LOW BACK PAIN WITHOUT SCIATICA: ICD-10-CM

## 2022-10-24 PROCEDURE — 97110 THERAPEUTIC EXERCISES: CPT

## 2022-10-24 NOTE — PROGRESS NOTES
PT Re-Evaluation     Today's date: 10/24/2022  Patient name: Tova Carroll  : 1990  MRN: [id number]  Referring provider: Griselda Plata MD  Dx:   Encounter Diagnosis     ICD-10-CM    1  Chronic pain syndrome  G89 4    2  Chronic bilateral low back pain without sciatica  M54 50     G89 29    3  Lumbar radiculopathy  M54 16    4  Neck pain  M54 2    5  Chronic pain of both knees  M25 561     M25 562     G89 29    6  Chronic low back pain without sciatica, unspecified back pain laterality  M54 50     G89 29    7  Cervical radiculopathy  M54 12        Start Time:   Stop Time: 1402  Total time in clinic (min): 57 minutes    Assessment  Assessment details: The patient has been seen in PT for a total of 24 visits since [facility]  He has made improvements since his last re-eval and is making progress towards his goals  He has complaints of pain though it has been decreasing in intensity overall  Has had a recent new onset of sharp pain down his right lateral thigh which improves c/ slightly c/ extension  Did not try press-ups due to fatigue at ed of session, but will incorporate at nv to see if the symptoms continue to respond well to extension  He demonstrates deficits with decreased ROM and strength, decreased core strength, difficulty sleeping and pain with completing his ADLs and tasks at home  Patient's LTGs are progressing, but not fully met at this time  Patient will continue to benefit from skilled PT interventions to address remaining impairments and functional limitations  Will focus on developing strong HEP for patient to gradually transition into       Impairments: abnormal gait, abnormal or restricted ROM, activity intolerance, impaired physical strength, pain with function, weight-bearing intolerance and poor posture   Functional limitations: walking, any physical activity, stairs, sleeping  Symptom irritability: moderateUnderstanding of Dx/Px/POC: good   Prognosis: fair    Goals  Stg: 3-4 weeks  Improve walking tolerance to 5 blocks prior to needing break - met  Complete transfers without extra time required and 50% reduction in pain - progressing  Reduce pain with sleeping by 30% - progressing  Assess knee pain and address accordingly - progressing    LT-6 weeks - progressing  Independent with trunk and leg strength program  Improving walking to 0 25 miles prior to rest and pain  complete trunk flex hold test for 20 seconds  Complete bird dog with good control  Complete stairs reciprocally and minimal pain in low back    Plan  Plan details: Pt will benefit from continued PT to address noted limitations  TE, NMR, TA, MT, self-care, and modalities as needed in order to progress through skilled PT focused on ROM, strength, balance, motor control  Patient would benefit from: skilled physical therapy  Planned modality interventions: cryotherapy and thermotherapy: hydrocollator packs  Planned therapy interventions: manual therapy, neuromuscular re-education, self care, therapeutic activities, therapeutic exercise, home exercise program, abdominal trunk stabilization, body mechanics training, patient education, postural training, strengthening, stretching and flexibility  Frequency: 2x week  Duration in weeks: 6  Treatment plan discussed with: patient        Subjective Evaluation    History of Present Illness  Mechanism of injury: 27year old male presenting to PT with long term low back and knee pain  Pain is generally R side low back and iliac crest area  Update : pt notes improvement with less back pain during typical daily activities  Gets some soreness when very active or bending forward a bit  Also with walking he gets discomfort but is now able to walk 8-9 blocks compared to just 1 or so at start of treatment prior to pain  UPDATE 22: The patient states that he feels therapy has been helping and he feels like he has been moving better    He notes that he has more pain with increased activity, feels better at rest       The patient states that he will be getting his MRI on Thursday for his neck and lower back  He will be going back to see the doctor next Tuesday for her follow up appointment  UPDATE 10/24: Patient reports he continues to feel like the increased strengthening is helping him  He has started to have occasional sharp pains in right lateral thigh but "it's quick " Is scheduled for a back injection on Friday 10/28 and has follow-up with pain management end of November  Pain  At best pain ratin  At worst pain ratin    Patient Goals  Patient goals for therapy: decreased pain, increased motion, increased strength, independence with ADLs/IADLs, return to sport/leisure activities and return to work          Objective     General Comments:      Lumbar Comments  Multi-seg movement patterns  Flex to ankles, some stiffness low back - persists  Ext: hinges at mid lumbar, improved symptoms c/ lumbar extension    SB: distal third tibia b/l - slight discomfort R lateral thigh with R sb    Lumbar  Pain with PA through mid and lower lumbar      Hip screen  Symmetrical hip mobility, no pain reproduction          Passive SLR: hamstring limitation (60 deg) but no pain reproduction      Strength/motor control  Core flex test: 15 sec                Precautions: chronic low back pain, knee pain        Manuals 10/6 10/10 10/12 10/24 10/3   Manual traction         Joint work         flexibility                   ST         Neuro Re-Ed         TA         TA+march         Dead Bug         Side Stepping        TA          Bird-dog         PPU    nv              Ther Ex         NuStep/TM Bike 10' Bike 10' Bike 10' Bike 10' Bike 10'   clamshells        Leg press        Ham curls P7 3x10 P7 3x10 P7 3x10 P7 3x10 P7 3x10   Knee ext P3 3x10 P3 3x10 P3 3x10 P3 3x10 P3 3x10   CC LAE P7 3x10 P7 3x10 P7 3x10 P7 3x10 P7 3x10   Lumbar ext 90# 3x10 100# 3x10 100# 3x12 100# 3x12 90# 3x12   Anti- Rotation Press P2 3x10 P3 3x10 P4 3x10 P4 3x10 P2 2x10   HS stretch with strap 30"x3 ea 30"x3 ea 30"x3 ea     Hand heel rock        PB squats 2x10  2x10   10# 2x5    Machine Rows P4 3x10 P4 3x10 P4 3x10 P4 3x10 P3 3x10   Lat Pull down  P4 3x10 P4 3x10 P4 3x10 P4 3x10 P3 3x10   Foam rolling thoracic    NP    Foam roll angels/Side to side    NP    Box lifts NV  12# crate lifts 10x floor to waist Added wt to PB Squats this visit       10x ea 10x ea 15x ea   Ther Activity                            Gait Training                             Modalities

## 2022-10-26 ENCOUNTER — OFFICE VISIT (OUTPATIENT)
Dept: PHYSICAL THERAPY | Facility: CLINIC | Age: 32
End: 2022-10-26
Payer: COMMERCIAL

## 2022-10-26 DIAGNOSIS — M54.16 LUMBAR RADICULOPATHY: ICD-10-CM

## 2022-10-26 DIAGNOSIS — M54.50 CHRONIC BILATERAL LOW BACK PAIN WITHOUT SCIATICA: ICD-10-CM

## 2022-10-26 DIAGNOSIS — G89.29 CHRONIC PAIN OF BOTH KNEES: ICD-10-CM

## 2022-10-26 DIAGNOSIS — M25.561 CHRONIC PAIN OF BOTH KNEES: ICD-10-CM

## 2022-10-26 DIAGNOSIS — G89.29 CHRONIC LOW BACK PAIN WITHOUT SCIATICA, UNSPECIFIED BACK PAIN LATERALITY: ICD-10-CM

## 2022-10-26 DIAGNOSIS — M54.12 CERVICAL RADICULOPATHY: ICD-10-CM

## 2022-10-26 DIAGNOSIS — M54.50 CHRONIC LOW BACK PAIN WITHOUT SCIATICA, UNSPECIFIED BACK PAIN LATERALITY: ICD-10-CM

## 2022-10-26 DIAGNOSIS — M25.562 CHRONIC PAIN OF BOTH KNEES: ICD-10-CM

## 2022-10-26 DIAGNOSIS — G89.29 CHRONIC BILATERAL LOW BACK PAIN WITHOUT SCIATICA: ICD-10-CM

## 2022-10-26 DIAGNOSIS — G89.4 CHRONIC PAIN SYNDROME: Primary | ICD-10-CM

## 2022-10-26 DIAGNOSIS — M54.2 NECK PAIN: ICD-10-CM

## 2022-10-26 PROCEDURE — 97112 NEUROMUSCULAR REEDUCATION: CPT

## 2022-10-26 PROCEDURE — 97110 THERAPEUTIC EXERCISES: CPT

## 2022-10-26 NOTE — PROGRESS NOTES
Daily Note     Today's date: 10/26/2022  Patient name: Tova Carroll  : 1990  MRN: [id number]  Referring provider: Griselda Plata MD  Dx:   Encounter Diagnosis     ICD-10-CM    1  Chronic pain syndrome  G89 4    2  Chronic bilateral low back pain without sciatica  M54 50     G89 29    3  Lumbar radiculopathy  M54 16    4  Neck pain  M54 2    5  Chronic pain of both knees  M25 561     M25 562     G89 29    6  Chronic low back pain without sciatica, unspecified back pain laterality  M54 50     G89 29    7  Cervical radiculopathy  M54 12                   Subjective: Patient reports that his main complaint is his R LE nerve pain  He is seeing MD on Oct 28th  Objective: See treatment diary below  Incorporated extension PPU into program today  Assessment: Tolerated treatment well  Patient would benefit from continued PT to improve strength and mobility  Patient was instructed in extension based TE and helped alleviate some pain in his lower back  Discussed HEP to incorporate PPU  He is still experiencing intermittent R LE radicular a pain      Plan: Continue per plan of care        Precautions: chronic low back pain, knee pain        Manuals 10/6 10/10 10/12 10/24 10/26   Manual traction         Joint work         flexibility                   ST         Neuro Re-Ed         TA         TA+march         Dead Bug         Side Stepping        TA          Bird-dog         PPU    nv 10"x10              Ther Ex         NuStep/TM Bike 10' Bike 10' Bike 10' Bike 10' Bike 10'   clamshells        Leg press        Ham curls P7 3x10 P7 3x10 P7 3x10 P7 3x10 P7 3x10   Knee ext P3 3x10 P3 3x10 P3 3x10 P3 3x10 P3 3x10   CC LAE P7 3x10 P7 3x10 P7 3x10 P7 3x10 P8 3x10   Lumbar ext 90# 3x10 100# 3x10 100# 3x12 100# 3x12 110# 3x12   Anti- Rotation Press P2 3x10 P3 3x10 P4 3x10 P4 3x10 P4 2x10   HS stretch with strap 30"x3 ea 30"x3 ea 30"x3 ea     Hand heel rock        PB squats 2x10  2x10   10# 2x5 10# 15x Machine Rows P4 3x10 P4 3x10 P4 3x10 P4 3x10 P4 3x10   Lat Pull down  P4 3x10 P4 3x10 P4 3x10 P4 3x10 P4 3x10   Foam rolling thoracic    NP    Foam roll angels/Side to side    NP    Box lifts NV  12# crate lifts 10x floor to waist Added wt to PB Squats this visit       10x ea 10x ea    Ther Activity                            Gait Training                             Modalities

## 2022-10-28 ENCOUNTER — HOSPITAL ENCOUNTER (OUTPATIENT)
Facility: HOSPITAL | Age: 32
Setting detail: OUTPATIENT SURGERY
Discharge: HOME | End: 2022-10-28
Attending: ANESTHESIOLOGY | Admitting: ANESTHESIOLOGY

## 2022-10-28 ENCOUNTER — APPOINTMENT (OUTPATIENT)
Dept: RADIOLOGY | Facility: HOSPITAL | Age: 32
End: 2022-10-28

## 2022-10-28 VITALS
DIASTOLIC BLOOD PRESSURE: 81 MMHG | OXYGEN SATURATION: 99 % | WEIGHT: 315 LBS | TEMPERATURE: 97.5 F | SYSTOLIC BLOOD PRESSURE: 125 MMHG | HEIGHT: 70 IN | BODY MASS INDEX: 45.1 KG/M2 | RESPIRATION RATE: 18 BRPM | HEART RATE: 60 BPM

## 2022-10-28 DIAGNOSIS — G89.29 CHRONIC BILATERAL LOW BACK PAIN WITHOUT SCIATICA: ICD-10-CM

## 2022-10-28 DIAGNOSIS — M54.16 LUMBAR RADICULOPATHY: ICD-10-CM

## 2022-10-28 DIAGNOSIS — M54.50 CHRONIC BILATERAL LOW BACK PAIN WITHOUT SCIATICA: ICD-10-CM

## 2022-10-28 RX ORDER — DEXAMETHASONE SODIUM PHOSPHATE 10 MG/ML
INJECTION, SOLUTION INTRAMUSCULAR; INTRAVENOUS AS NEEDED
Status: DISCONTINUED | OUTPATIENT
Start: 2022-10-28 | End: 2022-10-28 | Stop reason: HOSPADM

## 2022-10-28 RX ORDER — LIDOCAINE HYDROCHLORIDE 10 MG/ML
INJECTION, SOLUTION EPIDURAL; INFILTRATION; INTRACAUDAL; PERINEURAL AS NEEDED
Status: DISCONTINUED | OUTPATIENT
Start: 2022-10-28 | End: 2022-10-28 | Stop reason: HOSPADM

## 2022-10-28 NOTE — OP NOTE
OPERATIVE REPORT  PATIENT NAME: Era Masterson    :  1990  MRN: [id number]  Pt Location: [facility] OR ROOM 01    SURGERY DATE: 10/28/2022    Surgeon(s) and Role:     * Ashli Thurston MD - Primary    Preop Diagnosis:  Chronic pain syndrome [G89 4]  Chronic bilateral low back pain without sciatica [M54 50, G89 29]  Lumbar radiculopathy [M54 16]    Post-Op Diagnosis Codes:     * Chronic pain syndrome [G89 4]     * Chronic bilateral low back pain without sciatica [M54 50, G89 29]     * Lumbar radiculopathy [M54 16]    Procedure(s) (LRB):  BLOCK / INJECTION EPIDURAL STEROID LUMBAR  right L4-5 and L5-S1 TFESI (Right)    Specimen(s):  * No specimens in log *    Estimated Blood Loss:   Minimal    Drains:  * No LDAs found *    Anesthesia Type:   Local    Operative Indications:  Chronic pain syndrome [G89 4]  Chronic bilateral low back pain without sciatica [M54 50, G89 29]  Lumbar radiculopathy [M54 16]      Operative Findings:  same    Complications:   None    Procedure and Technique:  Fluoroscopically-guided right L4-5 and L5-S1 transforaminal epidural steroid injection under fluoroscopy      After discussing the risks, benefits, and alternatives to the procedure, the patient expressed understanding and wished to proceed  The patient was brought to the fluoroscopy suite and placed in the prone position  A procedural pause was conducted to verify:  correct patient identity, procedure to be performed and as applicable, correct side and site, correct patient position, and availability of implants, special equipment and special requirements  After identifying the right L4 and L5 pedicles fluoroscopically with an oblique view, the skin was sterilely prepped and draped in the usual fashion using Chloraprep skin prep  The skin and subcutaneous tissue were anesthetized with 0 5% lidocaine    A 5 inch 22 gauge spinal needle was then advanced under fluoroscopic guidance to the posterior aspect of the right L4-5 and L5-S1 neural foramens  Appropriate foraminal depth was determined with a lateral fluoroscopic view, and AP visualization confirmed needle positioning at approximately the 6 o’clock position relative to the pedicles  After negative aspiration, 1 mL of Omnipaque 300 contrast was injected using live fluoroscopy/digital subtraction angiography, confirming appropriate transforaminal spread without evidence of intravascular or intrathecal uptake  Next, a local anesthetic test dose consisting of 1 mL of 2% lidocaine was injected through the needle at each level  After an appropriate period of observation, a directed neurological exam was performed which revealed no new neurologic deficits  Next, a 1 5 ml solution consisting of 7 5 mg of dexamethasone in sterile saline was injected slowly and incrementally into the epidural space at each level  Following the injection the needles were withdrawn slightly and flushed with lidocaine as they were fully extracted  The patient tolerated the procedure well and there were no apparent complications  The patient did not develop any new neurologic deficits  After appropriate observation, the patient was dismissed from the clinic in good condition under their own power  COMMENTS   The patient received a total steroid dose of 15 mg of dexamethasone     I was present for the entire procedure    Patient Disposition:  hemodynamically stable        SIGNATURE: Mayank Gamez MD  DATE: October 28, 2022  TIME: 1:17 PM

## 2022-10-28 NOTE — DISCHARGE INSTRUCTIONS
Epidural Steroid Injection   WHAT YOU NEED TO KNOW:   An epidural steroid injection (MAKENZIE) is a procedure to inject steroid medicine into the epidural space  The epidural space is between your spinal cord and vertebrae  Steroids reduce inflammation and fluid buildup in your spine that may be causing pain  You may be given pain medicine along with the steroids  ACTIVITY  Do not drive or operate machinery today  No strenuous activity today - bending, lifting, etc   You may resume normal activites starting tomorrow - start slowly and as tolerated  You may shower today, but no tub baths or hot tubs  You may have numbness for several hours from the local anesthetic  Please use caution and common sense, especially with weight-bearing activities  CARE OF THE INJECTION SITE  If you have soreness or pain, apply ice to the area today (20 minutes on/20 minutes off)  Starting tomorrow, you may use warm, moist heat or ice if needed  You may have an increase or change in your discomfort for 36-48 hours after your treatment  Apply ice and continue with any pain medication you have been prescribed  Notify the [facility] if you have any of the following: redness, drainage, swelling, headache, stiff neck or fever above 100°F     SPECIAL INSTRUCTIONS  Our office will contact you in approximately 7 days for a progress report  MEDICATIONS  Continue to take all routine medications  Our office may have instructed you to hold some medications  As no general anesthesia was used in today's procedure, you should not experience any side effects related to anesthesia  If you are diabetic, the steroids used in today's injection may temporarily increase your blood sugar levels after the first few days after your injection  Please keep a close eye on your sugars and alert the doctor who manages your diabetes if your sugars are significantly high from your baseline or you are symptomatic       If you have a problem specifically related to your procedure, please call our office at [phone]  Problems not related to your procedure should be directed to your primary care physician

## 2022-10-28 NOTE — H&P
Assessment:  1  Chronic bilateral low back pain without sciatica  FL guide & loc dx/ther proc    FL guide & loc dx/ther proc   2  Lumbar radiculopathy  FL guide & loc dx/ther proc    FL guide & loc dx/ther proc       Plan:  Gabby Bishop is a 34 y o  male with complaints of right leg pain presents to surgical center for procedure  1  We will perform a Procedure(s) (LRB):  2  BLOCK / INJECTION EPIDURAL STEROID LUMBAR  right L4-5 and L5-S1 TFESI (Right)   3  2  Follow-up 1 month after injection    Complete risks and benefits including bleeding, infection, tissue reaction, nerve injury and allergic reaction were discussed  The approach was demonstrated using models and literature was provided  Verbal and written consent was obtained  My impressions and treatment recommendations were discussed in detail with the patient who verbalized understanding and had no further questions  Discharge instructions were provided  I personally saw and examined the patient and I agree with the above discussed plan of care  Orders Placed This Encounter   Procedures   • FL guide & loc dx/ther proc     Standing Status:   Standing     Number of Occurrences:   1     Order Specific Question:   Reason for Exam:     Answer:   Procedure: BLOCK / INJECTION EPIDURAL STEROID LUMBAR  right L4-5 and L5-S1 TFESI (Right Spine Thoracic)     No orders of the defined types were placed in this encounter  History of Present Illness:  Gabby Bishop is a 34 y o  male who presents for a follow up office visit in regards to right leg pain  The patient’s current symptoms include 8/10 constant sharp, stabbing, throbbing pain that particular  I have personally reviewed and/or updated the patient's past medical history, past surgical history, family history, social history, current medications, allergies, and vital signs today  Review of Systems   Musculoskeletal: Positive for arthralgias and back pain     All other systems reviewed and are negative        Patient Active Problem List   Diagnosis   • Acid reflux   • Allergic rhinitis   • Essential hypertension   • Bipolar disorder ([place] [address] )   • Generalized anxiety disorder   • Insomnia   • Morbid obesity with BMI of 45 0-49 9, adult ([place] [address] )   • Opioid dependence in remission ([place] [address] )   • Claustrophobia   • Restless leg syndrome   • Morbid obesity with BMI of 50 0-59 9, adult (HCC)   • Chronic pain syndrome   • Anxiety   • Chronic hepatitis C without hepatic coma (HCC)   • Depressed   • Methadone maintenance therapy patient ([place] [address] )   • Obesity, Class III, BMI 40-49 9 (morbid obesity) (HCC)   • CATA (obstructive sleep apnea)   • Drug dependence (HCC)   • Medication therapy continued   • Encounter for monitoring Suboxone maintenance therapy   • IV drug abuse (HCC)   • Heroin addiction (HCC)   • Colonic mass   • Arthralgia   • Eczema   • Generalized abdominal pain   • Muscle ache   • Chronic pain of both knees   • Central sleep apnea comorbid with prescribed opioid use   • Chronic constipation   • Chronic bilateral low back pain without sciatica   • Ventral hernia without obstruction or gangrene   • Chronic intermittent hypoxia with obstructive sleep apnea   • Hernia of abdominal wall   • History of colonoscopy   • Upper back pain   • Neck pain   • Lumbar radiculopathy       Past Medical History:   Diagnosis Date   • Allergic    • Anemia 6/24/2018   • Anxiety     from records   • Benign essential hypertension 11/17/2016   • Bipolar 1 disorder (HCC)     from records   • Chronic pain    • Claustrophobia 3/15/2018   • Community acquired pneumonia 6/24/2018   • Depressed 7/14/2016   • Depression     from records   • GERD (gastroesophageal reflux disease)    • Hepatitis C virus infection 8/14/2014   • Heroin abuse ([place] [address] ) 7/24/2018   • Infectious viral hepatitis    • Obesity 10/12/2016   • Obstructive sleep apnea     from records   • Sleep disorder    • Substance abuse [facility])        Past Surgical History:   Procedure Laterality Date   • MN DRAIN SKIN ABSCESS COMPLIC Left 6/9/0792    Procedure: INCISION AND DRAINAGE (I&D) EXTREMITY;  Surgeon: Rebecca Brown MD;  Location: [facility] MA[facility];  Service: Orthopedics   • MN LAP,SURG,COLECTOMY, PARTIAL, W/ANAST Left 5/21/2020    Procedure: LAPAROSCOPIC LEFT HEMICOLECTOMY TAKEDOWN SPLENIC FLECTURE, COLORECTAL ANASTOMOSIS ;  Surgeon: Deo Chung MD;  Location: [facility];  Service: Colorectal   • WISDOM TOOTH EXTRACTION         Family History   Problem Relation Age of Onset   • Diabetes Mother    • Restless legs syndrome Mother    • Sleep apnea Mother    • Colon cancer Father    • Alzheimer's disease Maternal Grandmother    • Depression Family        Social History     Occupational History   • Not on file   Tobacco Use   • Smoking status: Former Smoker     Packs/day: 1 00     Years: 13 00     Pack years: 13 00     Types: E-Cigarettes   • Smokeless tobacco: Never Used   • Tobacco comment: vapes   Vaping Use   • Vaping Use: Every day   • Substances: Nicotine   Substance and Sexual Activity   • Alcohol use: Not Currently   • Drug use: Not Currently     Types: Heroin, Marijuana, Prescription     Comment: on Methadone   • Sexual activity: Not Currently       No current facility-administered medications on file prior to encounter       Current Outpatient Medications on File Prior to Encounter   Medication Sig   • fluticasone (FLONASE) 50 mcg/act nasal spray 1 spray into each nostril 2 (two) times a day   • levocetirizine (XYZAL) 5 MG tablet Take 1 tablet (5 mg total) by mouth every evening   • linaCLOtide (Linzess) 72 MCG CAPS Take 1 capsule by mouth in the morning   • METHADONE HCL PO Take 110 mg by mouth daily 150mg daily    • olopatadine (PATANOL) 0 1 % ophthalmic solution Administer 1 drop to both eyes 2 (two) times a day   • omeprazole (PriLOSEC) 20 mg delayed release capsule Take 20 mg by mouth 2 (two) times a day    • ondansetron (ZOFRAN-ODT) 4 mg disintegrating tablet    • sucralfate (CARAFATE) 1 g tablet Take 1 tablet (1 g total) by mouth 4 (four) times a day   • triamcinolone (KENALOG) 0 1 % cream Apply topically 2 (two) times a day   • [DISCONTINUED] Sod Fluoride-Potassium Nitrate 1 1-5 % PSTE Apply 1 Squirt to teeth daily at bedtime Brush teeth daily at bedtime  Spit out, do not rinse  Nothing to eat or drink after  (Patient not taking: No sig reported)       Allergies   Allergen Reactions   • Red Dye - Food Allergy Diarrhea, Nausea Only and Abdominal Pain   • Bee Venom Angioedema       Physical Exam:    /82   Pulse 86   Temp 97 5 °F (36 4 °C) (Tympanic)   Resp 18   Ht 5' 10" (1 778 m)   Wt (!) 154 kg (339 lb)   SpO2 98%   BMI 48 64 kg/m²     Constitutional:normal, well developed, well nourished, alert, in no distress and non-toxic and no overt pain behavior    Eyes:anicteric  HEENT:grossly intact  Neck:supple, symmetric, trachea midline and no masses   Pulmonary:even and unlabored  Cardiovascular:No edema or pitting edema present  Skin:Normal without rashes or lesions and well hydrated  Psychiatric:Mood and affect appropriate  Neurologic:Cranial Nerves II-XII grossly intact  Musculoskeletal:normal

## 2022-10-31 ENCOUNTER — OFFICE VISIT (OUTPATIENT)
Dept: PHYSICAL THERAPY | Facility: CLINIC | Age: 32
End: 2022-10-31

## 2022-10-31 ENCOUNTER — APPOINTMENT (OUTPATIENT)
Dept: PHYSICAL THERAPY | Facility: CLINIC | Age: 32
End: 2022-10-31

## 2022-10-31 DIAGNOSIS — G89.29 CHRONIC LOW BACK PAIN WITHOUT SCIATICA, UNSPECIFIED BACK PAIN LATERALITY: ICD-10-CM

## 2022-10-31 DIAGNOSIS — M54.2 NECK PAIN: ICD-10-CM

## 2022-10-31 DIAGNOSIS — G89.29 CHRONIC PAIN OF BOTH KNEES: ICD-10-CM

## 2022-10-31 DIAGNOSIS — M54.50 CHRONIC BILATERAL LOW BACK PAIN WITHOUT SCIATICA: ICD-10-CM

## 2022-10-31 DIAGNOSIS — M54.16 LUMBAR RADICULOPATHY: ICD-10-CM

## 2022-10-31 DIAGNOSIS — G89.4 CHRONIC PAIN SYNDROME: Primary | ICD-10-CM

## 2022-10-31 DIAGNOSIS — G89.29 CHRONIC BILATERAL LOW BACK PAIN WITHOUT SCIATICA: ICD-10-CM

## 2022-10-31 DIAGNOSIS — M25.562 CHRONIC PAIN OF BOTH KNEES: ICD-10-CM

## 2022-10-31 DIAGNOSIS — M54.12 CERVICAL RADICULOPATHY: ICD-10-CM

## 2022-10-31 DIAGNOSIS — M54.50 CHRONIC LOW BACK PAIN WITHOUT SCIATICA, UNSPECIFIED BACK PAIN LATERALITY: ICD-10-CM

## 2022-10-31 DIAGNOSIS — M25.561 CHRONIC PAIN OF BOTH KNEES: ICD-10-CM

## 2022-10-31 NOTE — PROGRESS NOTES
Daily Note     Today's date: 10/31/2022  Patient name: Nasim Frausto  : 1990  MRN: [id number]  Referring provider: Trenton Wooten MD  Dx:   Encounter Diagnosis     ICD-10-CM    1  Chronic pain syndrome  G89 4    2  Chronic bilateral low back pain without sciatica  M54 50     G89 29    3  Lumbar radiculopathy  M54 16    4  Neck pain  M54 2    5  Chronic pain of both knees  M25 561     M25 562     G89 29    6  Chronic low back pain without sciatica, unspecified back pain laterality  M54 50     G89 29    7  Cervical radiculopathy  M54 12                   Subjective: The patient reports that he got an injection on Friday  Notes that since then he has decreased numbness in his leg and also less pain  He states that he is doing okay today, just doesn't feel right and "isn't into it today "        Objective: See treatment diary below      Assessment: Tolerated treatment well with no increase in pain noted during session today  Strength continues to improve  Patient would benefit from continued PT  Plan: Continue per plan of care        Precautions: chronic low back pain, knee pain        Manuals 10/31 10/10 10/12 10/24 10/26   Manual traction         Joint work         flexibility                   ST         Neuro Re-Ed         TA         TA+march         Dead Bug         Side Stepping        TA          Bird-dog         PPU NP   nv 10"x10              Ther Ex         NuStep/TM Bike 10' Bike 10' Bike 10' Bike 10' Bike 10'   clamshells        Leg press        Ham curls P7 3x10 P7 3x10 P7 3x10 P7 3x10 P7 3x10   Knee ext P3 3x10 P3 3x10 P3 3x10 P3 3x10 P3 3x10   CC LAE P8 3x10 P7 3x10 P7 3x10 P7 3x10 P8 3x10   Lumbar ext 110# 3x12 100# 3x10 100# 3x12 100# 3x12 110# 3x12   Anti- Rotation Press P4 2x10 P3 3x10 P4 3x10 P4 3x10 P4 2x10   HS stretch with strap  30"x3 ea 30"x3 ea     Hand heel rock        PB squats 10# 15x 2x10   10# 2x5 10# 15x   Machine Rows P4 3x10 P4 3x10 P4 3x10 P4 3x10 P4 3x10 Lat Pull down  P4 3x10 P4 3x10 P4 3x10 P4 3x10 P4 3x10   Foam rolling thoracic    NP    Foam roll angels/Side to side    NP    Box lifts   12# crate lifts 10x floor to waist Added wt to PB Squats this visit       10x ea 10x ea    Ther Activity                            Gait Training                             Modalities

## 2022-11-02 ENCOUNTER — APPOINTMENT (OUTPATIENT)
Dept: PHYSICAL THERAPY | Facility: CLINIC | Age: 32
End: 2022-11-02

## 2022-11-02 ENCOUNTER — OFFICE VISIT (OUTPATIENT)
Dept: PHYSICAL THERAPY | Facility: CLINIC | Age: 32
End: 2022-11-02

## 2022-11-02 DIAGNOSIS — G89.29 CHRONIC BILATERAL LOW BACK PAIN WITHOUT SCIATICA: ICD-10-CM

## 2022-11-02 DIAGNOSIS — G89.29 CHRONIC LOW BACK PAIN WITHOUT SCIATICA, UNSPECIFIED BACK PAIN LATERALITY: ICD-10-CM

## 2022-11-02 DIAGNOSIS — M25.561 CHRONIC PAIN OF BOTH KNEES: ICD-10-CM

## 2022-11-02 DIAGNOSIS — G89.29 CHRONIC PAIN OF BOTH KNEES: ICD-10-CM

## 2022-11-02 DIAGNOSIS — M54.50 CHRONIC BILATERAL LOW BACK PAIN WITHOUT SCIATICA: ICD-10-CM

## 2022-11-02 DIAGNOSIS — G89.4 CHRONIC PAIN SYNDROME: Primary | ICD-10-CM

## 2022-11-02 DIAGNOSIS — M54.12 CERVICAL RADICULOPATHY: ICD-10-CM

## 2022-11-02 DIAGNOSIS — M25.562 CHRONIC PAIN OF BOTH KNEES: ICD-10-CM

## 2022-11-02 DIAGNOSIS — M54.50 CHRONIC LOW BACK PAIN WITHOUT SCIATICA, UNSPECIFIED BACK PAIN LATERALITY: ICD-10-CM

## 2022-11-02 DIAGNOSIS — M54.16 LUMBAR RADICULOPATHY: ICD-10-CM

## 2022-11-02 DIAGNOSIS — M54.2 NECK PAIN: ICD-10-CM

## 2022-11-02 NOTE — PROGRESS NOTES
Daily Note     Today's date: 2022  Patient name: Loyal Pallas  : 1990  MRN: [id number]  Referring provider: Anthony Mata MD  Dx:   Encounter Diagnosis     ICD-10-CM    1  Chronic pain syndrome  G89 4    2  Chronic bilateral low back pain without sciatica  M54 50     G89 29    3  Lumbar radiculopathy  M54 16    4  Neck pain  M54 2    5  Chronic pain of both knees  M25 561     M25 562     G89 29    6  Chronic low back pain without sciatica, unspecified back pain laterality  M54 50     G89 29    7  Cervical radiculopathy  M54 12                   Subjective: Patient reports injection lastes 2 days  He is still experiencing nerve pain and numbness in R LE which can sometimes radiate to toes  Strength is improving and he is trying to exercise and walk more on his own time  Objective: See treatment diary below  Incorporated piriformis, and figure 4 stretching  Piriformis release performed to R side  Assessment: Tolerated treatment well  Patient would benefit from continued PT to reduce nerve pain and monitor other symptoms  He does not have a positional preference at this time  Piriformis on R side has tightness  Gave patient lacrosse ball to perform self STM to piriformis and stretching to see if this reduces with radicular symptoms  Patient did has some relief post Rx  Plan: Continue per plan of care        Precautions: chronic low back pain, knee pain        Manuals 10/31 11/2 10/12 10/24 10/26   Manual traction         Joint work         flexibility          Piriformis release   CM      ST         Neuro Re-Ed         TA         TA+march         Dead Bug         Side Stepping        TA          Bird-dog         PPU NP   nv 10"x10              Ther Ex         NuStep/TM Bike 10' Bike 10' Bike 10' Bike 10' Bike 10'   clamshells        Leg press        Ham curls P7 3x10 held P7 3x10 P7 3x10 P7 3x10   Knee ext P3 3x10 held P3 3x10 P3 3x10 P3 3x10   CC LAE P8 3x10 P8 3x10 P7 3x10 P7 3x10 P8 3x10   Lumbar ext 110# 3x12 100# 3x10 100# 3x12 100# 3x12 110# 3x12   Anti- Rotation Press P4 2x10 P4 3x10 P4 3x10 P4 3x10 P4 2x10   HS stretch with strap   30"x3 ea     Piriformis & figure 4 stretching   30"x4 ea       PB squats 10# 15x held  10# 2x5 10# 15x   Machine Rows P4 3x10 P4 3x10 P4 3x10 P4 3x10 P4 3x10   Lat Pull down  P4 3x10 P4 3x10 P4 3x10 P4 3x10 P4 3x10   Foam rolling thoracic    NP    Foam roll angels/Side to side    NP    Box lifts   12# crate lifts 10x floor to waist Added wt to PB Squats this visit       10x ea 10x ea    Ther Activity                            Gait Training                             Modalities  P 10'

## 2022-11-04 ENCOUNTER — TELEPHONE (OUTPATIENT)
Dept: PAIN MEDICINE | Facility: CLINIC | Age: 32
End: 2022-11-04

## 2022-11-07 ENCOUNTER — OFFICE VISIT (OUTPATIENT)
Dept: PHYSICAL THERAPY | Facility: CLINIC | Age: 32
End: 2022-11-07

## 2022-11-07 ENCOUNTER — APPOINTMENT (OUTPATIENT)
Dept: PHYSICAL THERAPY | Facility: CLINIC | Age: 32
End: 2022-11-07

## 2022-11-07 DIAGNOSIS — M54.50 CHRONIC LOW BACK PAIN WITHOUT SCIATICA, UNSPECIFIED BACK PAIN LATERALITY: ICD-10-CM

## 2022-11-07 DIAGNOSIS — G89.29 CHRONIC PAIN OF BOTH KNEES: Primary | ICD-10-CM

## 2022-11-07 DIAGNOSIS — M54.50 CHRONIC BILATERAL LOW BACK PAIN WITHOUT SCIATICA: ICD-10-CM

## 2022-11-07 DIAGNOSIS — M25.561 CHRONIC PAIN OF BOTH KNEES: Primary | ICD-10-CM

## 2022-11-07 DIAGNOSIS — G89.29 CHRONIC BILATERAL LOW BACK PAIN WITHOUT SCIATICA: ICD-10-CM

## 2022-11-07 DIAGNOSIS — M54.2 NECK PAIN: ICD-10-CM

## 2022-11-07 DIAGNOSIS — G89.4 CHRONIC PAIN SYNDROME: ICD-10-CM

## 2022-11-07 DIAGNOSIS — G89.29 CHRONIC LOW BACK PAIN WITHOUT SCIATICA, UNSPECIFIED BACK PAIN LATERALITY: ICD-10-CM

## 2022-11-07 DIAGNOSIS — M54.16 LUMBAR RADICULOPATHY: ICD-10-CM

## 2022-11-07 DIAGNOSIS — M54.12 CERVICAL RADICULOPATHY: ICD-10-CM

## 2022-11-07 DIAGNOSIS — M25.562 CHRONIC PAIN OF BOTH KNEES: Primary | ICD-10-CM

## 2022-11-07 NOTE — PROGRESS NOTES
Daily Note     Today's date: 2022  Patient name: Lorie Singleton  : 1990  MRN: [id number]  Referring provider: Gina Rosales MD  Dx:   Encounter Diagnosis     ICD-10-CM    1  Chronic pain of both knees  M25 561     M25 562     G89 29    2  Chronic pain syndrome  G89 4    3  Chronic bilateral low back pain without sciatica  M54 50     G89 29    4  Lumbar radiculopathy  M54 16    5  Cervical radiculopathy  M54 12    6  Chronic low back pain without sciatica, unspecified back pain laterality  M54 50     G89 29    7  Neck pain  M54 2                   Subjective: Patient reports over the weekend he had an increase in nerve pain in his R LE hip and quad area  He did not get much sleep due to this  Objective: See treatment diary below  Assessed patient with piriformis stretching and release  Reduced nerve pain, still had numbness  Increased resistance on TE today  Assessment: Tolerated treatment well  Patient would benefit from continued PT to improve strength and reduce radicular symptoms  Symptoms post MT piriformis release were numbness but nerve pain did improve  He gets more symptoms the longer he sits  Plan: Continue per plan of care        Precautions: chronic low back pain, knee pain        Manuals 10/31 11/2 11/7 10/24 10/26   Manual traction         Joint work         flexibility          Piriformis release   CM CM     ST         Neuro Re-Ed         TA         TA+march         Dead Bug         Side Stepping        TA          Bird-dog         PPU NP   nv 10"x10              Ther Ex         NuStep/TM Bike 10' Bike 10' Bike 10' Bike 10' Bike 10'   Ham curls P7 3x10 held P7 3x10 P7 3x10 P7 3x10   Knee ext P3 3x10 held P4 3x10 P3 3x10 P3 3x10   CC LAE P8 3x10 P8 3x10 P8 3x10 P7 3x10 P8 3x10   Lumbar ext 110# 3x12 100# 3x10 100# 3x12 100# 3x12 110# 3x12   Anti- Rotation Press P4 2x10 P4 3x10 P4 3x10 P4 3x10 P4 2x10   HS stretch with strap   30"x3 ea     Piriformis & figure 4 stretching   30"x4 ea  30"x5 ea     PB squats 10# 15x held  10# 2x5 10# 15x   Machine Rows P4 3x10 P4 3x10 P5 3x10 P4 3x10 P4 3x10   Lat Pull down  P4 3x10 P4 3x10 P5 3x10 P4 3x10 P4 3x10   Foam rolling thoracic    NP    Foam roll angels/Side to side    NP    Box lifts    Added wt to PB Squats this visit        10x ea    Ther Activity                            Gait Training                             Modalities  [facility] 10'

## 2022-11-09 ENCOUNTER — OFFICE VISIT (OUTPATIENT)
Dept: PHYSICAL THERAPY | Facility: CLINIC | Age: 32
End: 2022-11-09

## 2022-11-09 ENCOUNTER — APPOINTMENT (OUTPATIENT)
Dept: PHYSICAL THERAPY | Facility: CLINIC | Age: 32
End: 2022-11-09

## 2022-11-09 DIAGNOSIS — M54.50 CHRONIC LOW BACK PAIN WITHOUT SCIATICA, UNSPECIFIED BACK PAIN LATERALITY: ICD-10-CM

## 2022-11-09 DIAGNOSIS — M54.50 CHRONIC BILATERAL LOW BACK PAIN WITHOUT SCIATICA: ICD-10-CM

## 2022-11-09 DIAGNOSIS — G89.29 CHRONIC LOW BACK PAIN WITHOUT SCIATICA, UNSPECIFIED BACK PAIN LATERALITY: ICD-10-CM

## 2022-11-09 DIAGNOSIS — G89.4 CHRONIC PAIN SYNDROME: ICD-10-CM

## 2022-11-09 DIAGNOSIS — M54.2 NECK PAIN: ICD-10-CM

## 2022-11-09 DIAGNOSIS — M25.562 CHRONIC PAIN OF BOTH KNEES: Primary | ICD-10-CM

## 2022-11-09 DIAGNOSIS — M25.561 CHRONIC PAIN OF BOTH KNEES: Primary | ICD-10-CM

## 2022-11-09 DIAGNOSIS — M54.12 CERVICAL RADICULOPATHY: ICD-10-CM

## 2022-11-09 DIAGNOSIS — G89.29 CHRONIC BILATERAL LOW BACK PAIN WITHOUT SCIATICA: ICD-10-CM

## 2022-11-09 DIAGNOSIS — G89.29 CHRONIC PAIN OF BOTH KNEES: Primary | ICD-10-CM

## 2022-11-09 DIAGNOSIS — M54.16 LUMBAR RADICULOPATHY: ICD-10-CM

## 2022-11-09 NOTE — PROGRESS NOTES
Daily Note     Today's date: 2022  Patient name: Karol Carnes  : 1990  MRN: [id number]  Referring provider: Coral Olson MD  Dx:   Encounter Diagnosis     ICD-10-CM    1  Chronic pain of both knees  M25 561     M25 562     G89 29    2  Chronic pain syndrome  G89 4    3  Chronic bilateral low back pain without sciatica  M54 50     G89 29    4  Lumbar radiculopathy  M54 16    5  Cervical radiculopathy  M54 12    6  Chronic low back pain without sciatica, unspecified back pain laterality  M54 50     G89 29    7  Neck pain  M54 2                   Subjective: Has been able to sleep for >1-2 hours consistently due to overall reduction in peripheralization of symptoms since completing piriformis stretch and STM regularly  Was digging out his basement over the past few days with no increase in pain, but requests modification of program due to using his back a lot  Objective: See treatment diary below      Assessment: Tolerated treatment well  Patient exhibited good technique with therapeutic exercises and would benefit from continued PT  Modified program per patient request to include AROM and stretching exercises  Good relief of tension in right hip c/ STM to right buttock region  Improved gait mechanics following intervention  Will plan to re-incorporate strengthening exercises at nv  Plan: Continue per plan of care        Precautions: chronic low back pain, knee pain        Manuals 10/31 11/2 11/7 11/9 10/26   Manual traction         Joint work         flexibility          Piriformis release   CM CM KS    ST         Neuro Re-Ed         TA         TA+march         Dead Bug         Side Stepping        TA          Bird-dog         PPU NP    10"x10              Ther Ex         NuStep/TM Bike 10' Bike 10' Bike 10' Bike 10' Bike 10'   Ham curls P7 3x10 held P7 3x10 nv P7 3x10   Knee ext P3 3x10 held P4 3x10 nv P3 3x10   CC LAE P8 3x10 P8 3x10 P8 3x10 nv P8 3x10   Lumbar ext 110# 3x12 100# 3x10 100# 3x12 nv 110# 3x12   Anti- Rotation Press P4 2x10 P4 3x10 P4 3x10 nv P4 2x10   HS stretch with strap   30"x3 ea 30"x4ea    Piriformis & figure 4 stretching   30"x4 ea  30"x5 ea 30"x7ea    PB squats 10# 15x held   10# 15x   Machine Rows P4 3x10 P4 3x10 P5 3x10 nv P4 3x10   Lat Pull down  P4 3x10 P4 3x10 P5 3x10 nv P4 3x10   Foam rolling thoracic        Foam roll angels/Side to side        Box lifts                Ther Activity                            Gait Training                             Modalities  [facility] 10'

## 2022-11-14 ENCOUNTER — OFFICE VISIT (OUTPATIENT)
Dept: PHYSICAL THERAPY | Facility: CLINIC | Age: 32
End: 2022-11-14

## 2022-11-14 ENCOUNTER — APPOINTMENT (OUTPATIENT)
Dept: PHYSICAL THERAPY | Facility: CLINIC | Age: 32
End: 2022-11-14

## 2022-11-14 DIAGNOSIS — G89.29 CHRONIC LOW BACK PAIN WITHOUT SCIATICA, UNSPECIFIED BACK PAIN LATERALITY: ICD-10-CM

## 2022-11-14 DIAGNOSIS — M54.12 CERVICAL RADICULOPATHY: ICD-10-CM

## 2022-11-14 DIAGNOSIS — G89.29 CHRONIC BILATERAL LOW BACK PAIN WITHOUT SCIATICA: ICD-10-CM

## 2022-11-14 DIAGNOSIS — M54.2 NECK PAIN: ICD-10-CM

## 2022-11-14 DIAGNOSIS — G89.4 CHRONIC PAIN SYNDROME: ICD-10-CM

## 2022-11-14 DIAGNOSIS — M25.562 CHRONIC PAIN OF BOTH KNEES: Primary | ICD-10-CM

## 2022-11-14 DIAGNOSIS — M54.50 CHRONIC BILATERAL LOW BACK PAIN WITHOUT SCIATICA: ICD-10-CM

## 2022-11-14 DIAGNOSIS — M54.50 CHRONIC LOW BACK PAIN WITHOUT SCIATICA, UNSPECIFIED BACK PAIN LATERALITY: ICD-10-CM

## 2022-11-14 DIAGNOSIS — M25.561 CHRONIC PAIN OF BOTH KNEES: Primary | ICD-10-CM

## 2022-11-14 DIAGNOSIS — G89.29 CHRONIC PAIN OF BOTH KNEES: Primary | ICD-10-CM

## 2022-11-14 DIAGNOSIS — M54.16 LUMBAR RADICULOPATHY: ICD-10-CM

## 2022-11-14 NOTE — PROGRESS NOTES
Daily Note     Today's date: 2022  Patient name: Claudia Ventura  : 1990  MRN: [id number]  Referring provider: Augusto Greer MD  Dx:   Encounter Diagnosis     ICD-10-CM    1  Chronic pain of both knees  M25 561     M25 562     G89 29    2  Chronic pain syndrome  G89 4    3  Chronic bilateral low back pain without sciatica  M54 50     G89 29    4  Lumbar radiculopathy  M54 16    5  Chronic low back pain without sciatica, unspecified back pain laterality  M54 50     G89 29    6  Neck pain  M54 2    7  Cervical radiculopathy  M54 12                   Subjective: Patient reports that his radicular symptoms in R LE have decreased  They are intermittent but not as intense  Compliant with stretching  Objective: See treatment diary below  Assessment: Tolerated treatment well  Patient exhibited good technique with therapeutic exercises and would benefit from continued PT to reduce R radicular symptoms  Symptoms improve with piriformis release and stretching  Plan: Continue per plan of care        Precautions: chronic low back pain, knee pain        Manuals 10/31 11/2 11/7 11/9 11/14   Manual traction         Joint work         flexibility          Piriformis release   CM CM KS    ST         Neuro Re-Ed         TA         TA+march         Dead Bug         Side Stepping        TA          Bird-dog         PPU NP                 Ther Ex         NuStep/TM Bike 10' Bike 10' Bike 10' Bike 10' Bike 10'   Ham curls P7 3x10 held P7 3x10 nv P7 3x10   Knee ext P3 3x10 held P4 3x10 nv P4 3x10   CC LAE P8 3x10 P8 3x10 P8 3x10 nv P8 3x10   Lumbar ext 110# 3x12 100# 3x10 100# 3x12 nv 100# 3x12   Anti- Rotation Press P4 2x10 P4 3x10 P4 3x10 nv P4 3x10   HS stretch with strap   30"x3 ea 30"x4ea    Piriformis & figure 4 stretching   30"x4 ea  30"x5 ea 30"x7ea    PB squats 10# 15x held      Machine Rows P4 3x10 P4 3x10 P5 3x10 nv P5 3x10   Lat Pull down  P4 3x10 P4 3x10 P5 3x10 nv P5 3x10   Foam rolling thoracic        Foam roll angels/Side to side        Box lifts                Ther Activity                            Gait Training                             Modalities  [facility] 10'

## 2022-11-16 ENCOUNTER — APPOINTMENT (OUTPATIENT)
Dept: PHYSICAL THERAPY | Facility: CLINIC | Age: 32
End: 2022-11-16

## 2022-11-18 ENCOUNTER — OFFICE VISIT (OUTPATIENT)
Dept: INTERNAL MEDICINE CLINIC | Facility: CLINIC | Age: 32
End: 2022-11-18

## 2022-11-18 VITALS
SYSTOLIC BLOOD PRESSURE: 150 MMHG | OXYGEN SATURATION: 97 % | WEIGHT: 315 LBS | HEART RATE: 72 BPM | DIASTOLIC BLOOD PRESSURE: 90 MMHG | TEMPERATURE: 97.7 F | BODY MASS INDEX: 49.19 KG/M2

## 2022-11-18 DIAGNOSIS — Z23 ENCOUNTER FOR VACCINATION: ICD-10-CM

## 2022-11-18 DIAGNOSIS — M54.16 LUMBAR RADICULOPATHY: Primary | ICD-10-CM

## 2022-11-18 DIAGNOSIS — Z13.29 SCREENING FOR THYROID DISORDER: ICD-10-CM

## 2022-11-18 DIAGNOSIS — R68.2 DRY MOUTH: ICD-10-CM

## 2022-11-18 DIAGNOSIS — Z13.220 SCREENING, LIPID: ICD-10-CM

## 2022-11-18 RX ORDER — PREGABALIN 75 MG/1
75 CAPSULE ORAL 2 TIMES DAILY
Qty: 60 CAPSULE | Refills: 2 | Status: SHIPPED | OUTPATIENT
Start: 2022-11-18

## 2022-11-19 ENCOUNTER — APPOINTMENT (OUTPATIENT)
Dept: LAB | Facility: MEDICAL CENTER | Age: 32
End: 2022-11-19

## 2022-11-19 DIAGNOSIS — R68.2 DRY MOUTH: ICD-10-CM

## 2022-11-19 DIAGNOSIS — J30.2 SEASONAL ALLERGIES: ICD-10-CM

## 2022-11-19 DIAGNOSIS — J30.89 NON-SEASONAL ALLERGIC RHINITIS, UNSPECIFIED TRIGGER: ICD-10-CM

## 2022-11-19 DIAGNOSIS — Z13.220 SCREENING, LIPID: ICD-10-CM

## 2022-11-19 DIAGNOSIS — Z13.29 SCREENING FOR THYROID DISORDER: ICD-10-CM

## 2022-11-19 LAB
CHOLEST SERPL-MCNC: 166 MG/DL
HDLC SERPL-MCNC: 41 MG/DL
LDLC SERPL CALC-MCNC: 104 MG/DL (ref 0–100)
TRIGL SERPL-MCNC: 105 MG/DL
TSH SERPL DL<=0.05 MIU/L-ACNC: 1.92 UIU/ML (ref 0.45–4.5)

## 2022-11-19 NOTE — PROGRESS NOTES
Name: Elba Maxwell      : 1990      MRN: [id number]  Encounter Provider: Jennifer Chan PA-C  Encounter Date: 2022   Encounter department: [address] [place] [address]  Lumbar radiculopathy  Assessment & Plan:  Start lyrica  Directions for use and possible side effects discussed and patient verbalized understanding of these  Orders:  -     pregabalin (LYRICA) 75 mg capsule; Take 1 capsule (75 mg total) by mouth 2 (two) times a day    2  Encounter for vaccination  -     influenza vaccine, quadrivalent, 0 5 mL, preservative-free, for adult and pediatric patients 6 mos+ (AFLURIA, FLUARIX, FLULAVAL, FLUZONE)    3  Dry mouth  -     Sjogren's Antibodies; Future    4  Screening, lipid  -     Lipid Panel with Direct LDL reflex; Future    5  Screening for thyroid disorder  -     TSH, 3rd generation with Free T4 reflex; Future           Subjective      Pt presents for routine visit  He is doing well overall  He desires flu vaccine today  He remains in the methadone program with good success  He is up to date with labs  He was diagnosed with sleep apnea and has been using his CPAP which tends to cause some dry mouth  He has also been following with pain management for his back pain  He notes a lot of numbness and tingling in his legs  He cannot tolerate gabapentin  Cymbalta is not an option due to being on another antidepressant from his psychiatrist  He would like to try lyrica     Review of Systems   Constitutional: Negative for chills and fever  HENT: Negative for congestion, ear pain, hearing loss, postnasal drip, rhinorrhea, sinus pressure, sinus pain, sore throat and trouble swallowing  Eyes: Negative for pain and visual disturbance  Respiratory: Negative for cough, chest tightness, shortness of breath and wheezing  Cardiovascular: Negative  Negative for chest pain, palpitations and leg swelling     Gastrointestinal: Negative for abdominal pain, blood in stool, constipation, diarrhea, nausea and vomiting  Endocrine: Negative for cold intolerance, heat intolerance, polydipsia, polyphagia and polyuria  Genitourinary: Negative for difficulty urinating, dysuria, flank pain and urgency  Musculoskeletal: Positive for back pain  Negative for arthralgias, gait problem and myalgias  Skin: Negative for rash  Allergic/Immunologic: Negative  Neurological: Negative for dizziness, weakness, light-headedness and headaches  Hematological: Negative  Psychiatric/Behavioral: Negative for behavioral problems, dysphoric mood and sleep disturbance  The patient is not nervous/anxious  Current Outpatient Medications on File Prior to Visit   Medication Sig   • fluticasone (FLONASE) 50 mcg/act nasal spray 1 spray into each nostril 2 (two) times a day   • levocetirizine (XYZAL) 5 MG tablet Take 1 tablet (5 mg total) by mouth every evening   • linaCLOtide (Linzess) 72 MCG CAPS Take 1 capsule by mouth in the morning   • METHADONE HCL PO Take 110 mg by mouth daily 150mg daily    • olopatadine (PATANOL) 0 1 % ophthalmic solution Administer 1 drop to both eyes 2 (two) times a day   • omeprazole (PriLOSEC) 20 mg delayed release capsule Take 20 mg by mouth 2 (two) times a day    • ondansetron (ZOFRAN-ODT) 4 mg disintegrating tablet    • sucralfate (CARAFATE) 1 g tablet Take 1 tablet (1 g total) by mouth 4 (four) times a day   • triamcinolone (KENALOG) 0 1 % cream Apply topically 2 (two) times a day       Objective     /90 (BP Location: Left arm, Patient Position: Sitting, Cuff Size: Standard)   Pulse 72   Temp 97 7 °F (36 5 °C)   Wt (!) 155 kg (342 lb 12 8 oz)   SpO2 97%   BMI 49 19 kg/m²     Physical Exam  Constitutional:       General: He is not in acute distress  Appearance: He is well-developed and well-nourished  He is not diaphoretic  HENT:      Head: Normocephalic and atraumatic        Right Ear: External ear normal       Left Ear: External ear normal  Nose: Nose normal       Mouth/Throat:      Mouth: Oropharynx is clear and moist       Pharynx: No oropharyngeal exudate  Eyes:      General: No scleral icterus  Right eye: No discharge  Left eye: No discharge  Extraocular Movements: EOM normal       Conjunctiva/sclera: Conjunctivae normal       Pupils: Pupils are equal, round, and reactive to light  Neck:      Thyroid: No thyromegaly  Cardiovascular:      Rate and Rhythm: Normal rate and regular rhythm  Heart sounds: Normal heart sounds  No murmur heard  No friction rub  No gallop  Pulmonary:      Effort: Pulmonary effort is normal  No respiratory distress  Breath sounds: Normal breath sounds  No wheezing or rales  Abdominal:      General: Bowel sounds are normal  There is no distension  Palpations: Abdomen is soft  Tenderness: There is no abdominal tenderness  Musculoskeletal:         General: No tenderness, deformity or edema  Normal range of motion  Cervical back: Normal range of motion and neck supple  Skin:     General: Skin is warm and dry  Neurological:      Mental Status: He is alert and oriented to person, place, and time  Cranial Nerves: No cranial nerve deficit  Psychiatric:         Mood and Affect: Mood and affect normal          Behavior: Behavior normal          Thought Content:  Thought content normal          Judgment: Judgment normal        Dalia Rodriguez PA-C

## 2022-11-21 ENCOUNTER — EVALUATION (OUTPATIENT)
Dept: PHYSICAL THERAPY | Facility: CLINIC | Age: 32
End: 2022-11-21

## 2022-11-21 DIAGNOSIS — M54.12 CERVICAL RADICULOPATHY: ICD-10-CM

## 2022-11-21 DIAGNOSIS — G89.29 CHRONIC PAIN OF BOTH KNEES: Primary | ICD-10-CM

## 2022-11-21 DIAGNOSIS — G89.4 CHRONIC PAIN SYNDROME: ICD-10-CM

## 2022-11-21 DIAGNOSIS — G89.29 CHRONIC BILATERAL LOW BACK PAIN WITHOUT SCIATICA: ICD-10-CM

## 2022-11-21 DIAGNOSIS — M25.562 CHRONIC PAIN OF BOTH KNEES: Primary | ICD-10-CM

## 2022-11-21 DIAGNOSIS — M54.50 CHRONIC LOW BACK PAIN WITHOUT SCIATICA, UNSPECIFIED BACK PAIN LATERALITY: ICD-10-CM

## 2022-11-21 DIAGNOSIS — M54.2 NECK PAIN: ICD-10-CM

## 2022-11-21 DIAGNOSIS — M54.16 LUMBAR RADICULOPATHY: ICD-10-CM

## 2022-11-21 DIAGNOSIS — G89.29 CHRONIC LOW BACK PAIN WITHOUT SCIATICA, UNSPECIFIED BACK PAIN LATERALITY: ICD-10-CM

## 2022-11-21 DIAGNOSIS — M25.561 CHRONIC PAIN OF BOTH KNEES: Primary | ICD-10-CM

## 2022-11-21 DIAGNOSIS — M54.50 CHRONIC BILATERAL LOW BACK PAIN WITHOUT SCIATICA: ICD-10-CM

## 2022-11-21 LAB
ENA SS-A AB SER-ACNC: <0.2 AI (ref 0–0.9)
ENA SS-B AB SER-ACNC: <0.2 AI (ref 0–0.9)

## 2022-11-21 NOTE — PROGRESS NOTES
Daily Note     Today's date: 2022  Patient name: Emma Akers  : 1990  MRN: [id number]  Referring provider: Jace Christopher MD  Dx:   Encounter Diagnosis     ICD-10-CM    1  Chronic pain of both knees  M25 561     M25 562     G89 29       2  Chronic pain syndrome  G89 4       3  Chronic low back pain without sciatica, unspecified back pain laterality  M54 50     G89 29       4  Neck pain  M54 2       5  Chronic bilateral low back pain without sciatica  M54 50     G89 29       6  Cervical radiculopathy  M54 12       7  Lumbar radiculopathy  M54 16                      Subjective: Patient reports that his R LE radicular symptoms have been improving  He is able to sleep better  Still has numbness  Objective: See treatment diary below  Assessment: Tolerated treatment well  Patient would benefit from continued PT to reduce radicular symptoms  Patient's piriformis pain is less and tightness seemed less  Plan: Continue per plan of care        Precautions: chronic low back pain, knee pain        Manuals    Manual traction         Joint work         flexibility          Piriformis release   CM CM KS    ST         Neuro Re-Ed         TA         TA+march         Dead Bug         Side Stepping        TA          Bird-dog         PPU NP                 Ther Ex         NuStep/TM Bike 10' Bike 10' Bike 10' Bike 10' Bike 10'   Ham curls P8 3x10 held P7 3x10 nv P7 3x10   Knee ext P4 3x10 held P4 3x10 nv P4 3x10   CC LAE nv P8 3x10 P8 3x10 nv P8 3x10   Lumbar ext 100# 3x12 100# 3x10 100# 3x12 nv 100# 3x12   Anti- Rotation Press nv P4 3x10 P4 3x10 nv P4 3x10   HS stretch with strap   30"x3 ea 30"x4ea    Piriformis & figure 4 stretching   30"x4 ea  30"x5 ea 30"x7ea    PB squats  held      Machine Rows P5 3x10 P4 3x10 P5 3x10 nv P5 3x10   Lat Pull down  P5 3x10 P4 3x10 P5 3x10 nv P5 3x10   Foam rolling thoracic        Foam roll angels/Side to side        Box lifts Ther Activity                            Gait Training                             Modalities

## 2022-11-22 LAB
A ALTERNATA IGE QN: 0.35 KUA/I
A FUMIGATUS IGE QN: <0.1 KUA/I
BERMUDA GRASS IGE QN: <0.1 KUA/I
BOXELDER IGE QN: <0.1 KUA/I
C HERBARUM IGE QN: <0.1 KUA/I
CAT DANDER IGE QN: <0.1 KUA/I
CMN PIGWEED IGE QN: <0.1 KUA/I
COMMON RAGWEED IGE QN: 0.2 KUA/I
COTTONWOOD IGE QN: <0.1 KUA/I
D FARINAE IGE QN: 0.11 KUA/I
D PTERONYSS IGE QN: 0.12 KUA/I
DOG DANDER IGE QN: <0.1 KUA/I
LONDON PLANE IGE QN: <0.1 KUA/I
MOUSE URINE PROT IGE QN: <0.1 KUA/I
MT JUNIPER IGE QN: <0.1 KUA/I
MUGWORT IGE QN: <0.1 KUA/I
P NOTATUM IGE QN: <0.1 KUA/I
ROACH IGE QN: 0.12 KUA/I
SHEEP SORREL IGE QN: <0.1 KUA/I
SILVER BIRCH IGE QN: <0.1 KUA/I
TIMOTHY IGE QN: <0.1 KUA/I
TOTAL IGE SMQN RAST: 75.6 KU/L (ref 0–113)
WALNUT IGE QN: 0.12 KUA/I
WHITE ASH IGE QN: 0.1 KUA/I
WHITE ELM IGE QN: <0.1 KUA/I
WHITE MULBERRY IGE QN: <0.1 KUA/I
WHITE OAK IGE QN: <0.1 KUA/I

## 2022-11-22 NOTE — PROGRESS NOTES
PT Re-Evaluation     Today's date: 2022  Patient name: Corbin Fuentes  : 1990  MRN: [id number]  Referring provider: Brian Badillo MD  Dx:   Encounter Diagnosis     ICD-10-CM    1  Chronic pain of both knees  M25 561     M25 562     G89 29       2  Chronic pain syndrome  G89 4       3  Chronic low back pain without sciatica, unspecified back pain laterality  M54 50     G89 29       4  Neck pain  M54 2       5  Chronic bilateral low back pain without sciatica  M54 50     G89 29       6  Cervical radiculopathy  M54 12       7  Lumbar radiculopathy  M54 16           Assessment  Assessment details: The patient has been seen in PT for a total of 31 visits since [facility]  He has made improvements since his last re-eval and is making progress towards his goals  Pain has significantly reduced in intensity since addition of piriformis stretch and work  Quality and fluidity of lumbar mobility has improved considerably c/ segments moving together more cohesively  Continues to be limited in hamstring and piriformis mobility, but improving  He is progressing towards independence c/ HEP and has been working out at a [employer] regularly for short periods to work towards this transition  Patient's LTGs are progressing, but not fully met at this time  Patient will continue to benefit from skilled PT interventions 1-2x/wk for 1-4 more weeks to address remaining impairments and functional limitations and transition to independent HEP   Will plan to trial a full week s/ PT visits and at home completion of HEP over the next week to progress towards this goal       Impairments: abnormal gait, abnormal or restricted ROM, activity intolerance, impaired physical strength, pain with function, weight-bearing intolerance and poor posture   Functional limitations: walking, any physical activity, stairs, sleeping  Symptom irritability: moderateUnderstanding of Dx/Px/POC: good   Prognosis: fair    Goals  Stg: 3-4 weeks  Improve walking tolerance to 5 blocks prior to needing break - met  Complete transfers without extra time required and 50% reduction in pain - met  Reduce pain with sleeping by 30% - progressing  Assess knee pain and address accordingly - progressing    LT-6 weeks - progressing  Independent with trunk and leg strength program  Improving walking to 0 25 miles prior to rest and pain  complete trunk flex hold test for 20 seconds  Complete bird dog with good control  Complete stairs reciprocally and minimal pain in low back    Plan  Plan details: Pt will benefit from continued PT to address noted limitations  TE, NMR, TA, MT, self-care, and modalities as needed in order to progress through skilled PT focused on ROM, strength, balance, motor control  Will plan to trial a full week s/ PT visits and at home completion of HEP over the next week to progress towards this goal     Patient would benefit from: skilled physical therapy  Planned modality interventions: cryotherapy and thermotherapy: hydrocollator packs  Planned therapy interventions: manual therapy, neuromuscular re-education, self care, therapeutic activities, therapeutic exercise, home exercise program, abdominal trunk stabilization, body mechanics training, patient education, postural training, strengthening, stretching and flexibility  Frequency: 2x week  Duration in weeks: 4  Treatment plan discussed with: patient        Subjective Evaluation    History of Present Illness  Mechanism of injury: 27year old male presenting to PT with long term low back and knee pain  Pain is generally R side low back and iliac crest area  Update : pt notes improvement with less back pain during typical daily activities  Gets some soreness when very active or bending forward a bit  Also with walking he gets discomfort but is now able to walk 8-9 blocks compared to just 1 or so at start of treatment prior to pain  UPDATE 22:   The patient states that he feels therapy has been helping and he feels like he has been moving better  He notes that he has more pain with increased activity, feels better at rest       The patient states that he will be getting his MRI on Thursday for his neck and lower back  He will be going back to see the doctor next Tuesday for her follow up appointment  UPDATE 10/24: Patient reports he continues to feel like the increased strengthening is helping him  He has started to have occasional sharp pains in right lateral thigh but "it's quick " Is scheduled for a back injection on Friday 10/28 and has follow-up with pain management end of November  UPDATE : Since incorporating piriformis stretch into regimen at home and utilizing lax ball for STM to area, patient has experienced considerable relief  He continues to experience numbness of right thigh and occasionally get tingling in same area; however, has had minimal pain across the low back and any other regions of his right leg  Has been digging and working around his house to fix the basement, and does not "feel like (he) needs to take a rest day after completing the work " Follows-up with pain management next week  Pain  At best pain ratin  At worst pain ratin    Patient Goals  Patient goals for therapy: decreased pain, increased motion, increased strength, independence with ADLs/IADLs, return to sport/leisure activities and return to work          Objective     General Comments:      Lumbar Comments  Multi-seg movement patterns  Flex to ankles, improved overall segmental mobility of lumbar spine during flexion forward  Ext: hinges slightly at mid lumbar, improved symptoms c/ lumbar extension    SB: distal third tibia b/l - slight discomfort R lateral thigh with R sb    Lumbar  Mild pain with PA through mid and lower lumbar    Hip screen  Symmetrical hip mobility, no pain reproduction    Passive SLR: hamstring limitation (60 deg) but no pain reproduction    Strength/motor control  Core flex test: 15 sec                Precautions: chronic low back pain, knee pain

## 2022-11-23 ENCOUNTER — APPOINTMENT (OUTPATIENT)
Dept: PHYSICAL THERAPY | Facility: CLINIC | Age: 32
End: 2022-11-23

## 2022-11-29 ENCOUNTER — OFFICE VISIT (OUTPATIENT)
Dept: PAIN MEDICINE | Facility: CLINIC | Age: 32
End: 2022-11-29

## 2022-11-29 VITALS — HEIGHT: 70 IN | WEIGHT: 315 LBS | BODY MASS INDEX: 45.1 KG/M2

## 2022-11-29 DIAGNOSIS — M54.16 LUMBAR RADICULOPATHY: ICD-10-CM

## 2022-11-29 DIAGNOSIS — M54.9 UPPER BACK PAIN: ICD-10-CM

## 2022-11-29 DIAGNOSIS — M54.2 NECK PAIN: ICD-10-CM

## 2022-11-29 DIAGNOSIS — G89.4 CHRONIC PAIN SYNDROME: Primary | ICD-10-CM

## 2022-11-29 DIAGNOSIS — M54.50 CHRONIC BILATERAL LOW BACK PAIN WITHOUT SCIATICA: ICD-10-CM

## 2022-11-29 DIAGNOSIS — G89.29 CHRONIC BILATERAL LOW BACK PAIN WITHOUT SCIATICA: ICD-10-CM

## 2022-11-29 NOTE — H&P (VIEW-ONLY)
Assessment:  1  Chronic pain syndrome    2  Chronic bilateral low back pain without sciatica    3  Lumbar radiculopathy    4  Upper back pain    5  Neck pain        Plan:  While the patient was in the office today, I did have a thorough conversation regarding their chronic pain syndrome, medication management, and treatment plan options  Patient is being seen for follow-up visit  He recently underwent a right-sided L4-5 and L5-S1 transforaminal epidural steroid injection on 10/28/2022  He reports that his pain was more than 50% improved for several days, but then returned  At this point, we will plan to repeat the right-sided L4-5 and L5-S1 transforaminal epidural steroid injection in the near future  Complete risks and benefits including bleeding, infection, tissue reaction, nerve injury and allergic reaction were discussed  The approach was demonstrated using models and literature was provided  Verbal and written consent was obtained  Patient was just started on Lyrica 75 mg twice daily by his PCP  Follow-up 1 month after the injection  History of Present Illness: The patient is a 34 y o  male who presents for a follow up office visit in regards to Back Pain and Leg Pain  The patient’s current symptoms include complaints of low back and right leg pain  Current pain level is a 7/10  Quality pain is described as burning, dull, aching, cramping, pressure-like, shooting, numb, pins and needles  Current pain medications includes:  Just started Lyrica 75 mg twice daily    The patient is reporting no side effects from this pain medication regimen  I have personally reviewed and/or updated the patient's past medical history, past surgical history, family history, social history, current medications, allergies, and vital signs today  Review of Systems  Review of Systems   Constitutional: Negative for fatigue and unexpected weight change     HENT: Negative for dental problem, ear pain, hearing loss and sneezing  Eyes: Negative for visual disturbance  Respiratory: Negative for cough and chest tightness  Cardiovascular: Negative for leg swelling  Gastrointestinal: Positive for nausea  Negative for anal bleeding  Endocrine: Negative for heat intolerance  Genitourinary: Negative for flank pain and genital sores  Musculoskeletal: Positive for arthralgias, gait problem and myalgias  Decreased range of motion  Joint stiffness   Skin: Negative for wound  Allergic/Immunologic: Negative for immunocompromised state  Neurological: Negative for speech difficulty and light-headedness  Hematological: Negative for adenopathy  Psychiatric/Behavioral: Negative for confusion  The patient is not hyperactive  All other systems reviewed and are negative          Past Medical History:   Diagnosis Date   • Allergic    • Anemia 6/24/2018   • Anxiety     from records   • Benign essential hypertension 11/17/2016   • Bipolar 1 disorder ([facility] [address] )     from records   • Chronic pain    • Claustrophobia 3/15/2018   • Community acquired pneumonia 6/24/2018   • Depressed 7/14/2016   • Depression     from records   • GERD (gastroesophageal reflux disease)    • Hepatitis C virus infection 8/14/2014   • Heroin abuse ([facility] [address] ) 7/24/2018   • Infectious viral hepatitis    • Obesity 10/12/2016   • Obstructive sleep apnea     from records   • Sleep disorder    • Substance abuse [facility])        Past Surgical History:   Procedure Laterality Date   • EPIDURAL BLOCK INJECTION Right 10/28/2022    Procedure: BLOCK / INJECTION EPIDURAL STEROID LUMBAR  right L4-5 and L5-S1 TFESI;  Surgeon: Kieran Knight MD;  [facility]: [facility];  Service: Pain Management    • MD DRAIN SKIN ABSCESS COMPLIC Left 3/0/7961    Procedure: INCISION AND DRAINAGE (I&D) EXTREMITY;  Surgeon: Manuel Rivero MD;  Location: [facility];  Service: Orthopedics   • MD LAP,SURG,COLECTOMY, PARTIAL, W/ANAST Left 5/21/2020    Procedure: LAPAROSCOPIC LEFT HEMICOLECTOMY TAKEDOWN SPLENIC FLECTURE, COLORECTAL ANASTOMOSIS ;  Surgeon: Prabhu Spence MD;  [facility]: [facility];  Service: Colorectal   • WISDOM TOOTH EXTRACTION         Family History   Problem Relation Age of Onset   • Diabetes Mother    • Restless legs syndrome Mother    • Sleep apnea Mother    • Colon cancer Father    • Alzheimer's disease Maternal Grandmother    • Depression Family        Social History     Occupational History   • Not on file   Tobacco Use   • Smoking status: Former     Packs/day: 1 00     Years: 13 00     Pack years: 13 00     Types: E-Cigarettes, Cigarettes   • Smokeless tobacco: Never   • Tobacco comments:     vapes   Vaping Use   • Vaping Use: Every day   • Substances: Nicotine   Substance and Sexual Activity   • Alcohol use: Not Currently   • Drug use: Not Currently     Types: Heroin, Marijuana, Prescription     Comment: on Methadone   • Sexual activity: Not Currently         Current Outpatient Medications:   •  fluticasone (FLONASE) 50 mcg/act nasal spray, 1 spray into each nostril 2 (two) times a day, Disp: 16 g, Rfl: 11  •  levocetirizine (XYZAL) 5 MG tablet, Take 1 tablet (5 mg total) by mouth every evening, Disp: 30 tablet, Rfl: 11  •  linaCLOtide (Linzess) 72 MCG CAPS, Take 1 capsule by mouth in the morning, Disp: 30 capsule, Rfl: 2  •  METHADONE HCL PO, Take 110 mg by mouth daily 150mg daily , Disp: , Rfl:   •  olopatadine (PATANOL) 0 1 % ophthalmic solution, Administer 1 drop to both eyes 2 (two) times a day, Disp: 5 mL, Rfl: 0  •  omeprazole (PriLOSEC) 20 mg delayed release capsule, Take 20 mg by mouth 2 (two) times a day , Disp: , Rfl:   •  ondansetron (ZOFRAN-ODT) 4 mg disintegrating tablet, , Disp: , Rfl:   •  pregabalin (LYRICA) 75 mg capsule, Take 1 capsule (75 mg total) by mouth 2 (two) times a day, Disp: 60 capsule, Rfl: 2  •  sucralfate (CARAFATE) 1 g tablet, Take 1 tablet (1 g total) by mouth 4 (four) times a day, Disp: 120 tablet, Rfl: 1  •  triamcinolone (KENALOG) 0 1 % cream, Apply topically 2 (two) times a day, Disp: 30 g, Rfl: 0    Allergies   Allergen Reactions   • Red Dye - Food Allergy Diarrhea, Nausea Only and Abdominal Pain   • Bee Venom Angioedema       Physical Exam:    Ht 5' 10" (1 778 m)   Wt (!) 156 kg (344 lb)   BMI 49 36 kg/m²     Constitutional:normal, well developed, well nourished, alert, in no distress and non-toxic and no overt pain behavior  Eyes:anicteric  HEENT:grossly intact  Neck:supple, symmetric, trachea midline and no masses   Pulmonary:even and unlabored  Cardiovascular:No edema or pitting edema present  Skin:Normal without rashes or lesions and well hydrated  Psychiatric:Mood and affect appropriate  Neurologic:Cranial Nerves II-XII grossly intact  Musculoskeletal:normal    Imaging  No orders to display       No orders of the defined types were placed in this encounter  Professional Component, Technical Component Or Both?: professional and technical component

## 2022-11-29 NOTE — PROGRESS NOTES
Assessment:  1  Chronic pain syndrome    2  Chronic bilateral low back pain without sciatica    3  Lumbar radiculopathy    4  Upper back pain    5  Neck pain        Plan:  While the patient was in the office today, I did have a thorough conversation regarding their chronic pain syndrome, medication management, and treatment plan options  Patient is being seen for follow-up visit  He recently underwent a right-sided L4-5 and L5-S1 transforaminal epidural steroid injection on 10/28/2022  He reports that his pain was more than 50% improved for several days, but then returned  At this point, we will plan to repeat the right-sided L4-5 and L5-S1 transforaminal epidural steroid injection in the near future  Complete risks and benefits including bleeding, infection, tissue reaction, nerve injury and allergic reaction were discussed  The approach was demonstrated using models and literature was provided  Verbal and written consent was obtained  Patient was just started on Lyrica 75 mg twice daily by his PCP  Follow-up 1 month after the injection  History of Present Illness: The patient is a 34 y o  male who presents for a follow up office visit in regards to Back Pain and Leg Pain  The patient’s current symptoms include complaints of low back and right leg pain  Current pain level is a 7/10  Quality pain is described as burning, dull, aching, cramping, pressure-like, shooting, numb, pins and needles  Current pain medications includes:  Just started Lyrica 75 mg twice daily    The patient is reporting no side effects from this pain medication regimen  I have personally reviewed and/or updated the patient's past medical history, past surgical history, family history, social history, current medications, allergies, and vital signs today  Review of Systems  Review of Systems   Constitutional: Negative for fatigue and unexpected weight change     HENT: Negative for dental problem, ear pain, hearing loss and sneezing  Eyes: Negative for visual disturbance  Respiratory: Negative for cough and chest tightness  Cardiovascular: Negative for leg swelling  Gastrointestinal: Positive for nausea  Negative for anal bleeding  Endocrine: Negative for heat intolerance  Genitourinary: Negative for flank pain and genital sores  Musculoskeletal: Positive for arthralgias, gait problem and myalgias  Decreased range of motion  Joint stiffness   Skin: Negative for wound  Allergic/Immunologic: Negative for immunocompromised state  Neurological: Negative for speech difficulty and light-headedness  Hematological: Negative for adenopathy  Psychiatric/Behavioral: Negative for confusion  The patient is not hyperactive  All other systems reviewed and are negative          Past Medical History:   Diagnosis Date   • Allergic    • Anemia 6/24/2018   • Anxiety     from records   • Benign essential hypertension 11/17/2016   • Bipolar 1 disorder ([facility] [address] )     from records   • Chronic pain    • Claustrophobia 3/15/2018   • Community acquired pneumonia 6/24/2018   • Depressed 7/14/2016   • Depression     from records   • GERD (gastroesophageal reflux disease)    • Hepatitis C virus infection 8/14/2014   • Heroin abuse ([facility] [address] ) 7/24/2018   • Infectious viral hepatitis    • Obesity 10/12/2016   • Obstructive sleep apnea     from records   • Sleep disorder    • Substance abuse [facility])        Past Surgical History:   Procedure Laterality Date   • EPIDURAL BLOCK INJECTION Right 10/28/2022    Procedure: BLOCK / INJECTION EPIDURAL STEROID LUMBAR  right L4-5 and L5-S1 TFESI;  Surgeon: Sylvia Bland MD;  [facility]: [facility];  Service: Pain Management    • NH DRAIN SKIN ABSCESS COMPLIC Left 4/0/4418    Procedure: INCISION AND DRAINAGE (I&D) EXTREMITY;  Surgeon: Bo Abdul MD;  [facility]: [facility];  Service: Orthopedics   • NH LAP,SURG,COLECTOMY, PARTIAL, W/ANAST Left 5/21/2020    Procedure: LAPAROSCOPIC LEFT HEMICOLECTOMY TAKEDOWN SPLENIC FLECTURE, COLORECTAL ANASTOMOSIS ;  Surgeon: Slick Marin MD;  [facility];  Service: Colorectal   • WISDOM TOOTH EXTRACTION         Family History   Problem Relation Age of Onset   • Diabetes Mother    • Restless legs syndrome Mother    • Sleep apnea Mother    • Colon cancer Father    • Alzheimer's disease Maternal Grandmother    • Depression Family        Social History     Occupational History   • Not on file   Tobacco Use   • Smoking status: Former     Packs/day: 1 00     Years: 13 00     Pack years: 13 00     Types: E-Cigarettes, Cigarettes   • Smokeless tobacco: Never   • Tobacco comments:     vapes   Vaping Use   • Vaping Use: Every day   • Substances: Nicotine   Substance and Sexual Activity   • Alcohol use: Not Currently   • Drug use: Not Currently     Types: Heroin, Marijuana, Prescription     Comment: on Methadone   • Sexual activity: Not Currently         Current Outpatient Medications:   •  fluticasone (FLONASE) 50 mcg/act nasal spray, 1 spray into each nostril 2 (two) times a day, Disp: 16 g, Rfl: 11  •  levocetirizine (XYZAL) 5 MG tablet, Take 1 tablet (5 mg total) by mouth every evening, Disp: 30 tablet, Rfl: 11  •  linaCLOtide (Linzess) 72 MCG CAPS, Take 1 capsule by mouth in the morning, Disp: 30 capsule, Rfl: 2  •  METHADONE HCL PO, Take 110 mg by mouth daily 150mg daily , Disp: , Rfl:   •  olopatadine (PATANOL) 0 1 % ophthalmic solution, Administer 1 drop to both eyes 2 (two) times a day, Disp: 5 mL, Rfl: 0  •  omeprazole (PriLOSEC) 20 mg delayed release capsule, Take 20 mg by mouth 2 (two) times a day , Disp: , Rfl:   •  ondansetron (ZOFRAN-ODT) 4 mg disintegrating tablet, , Disp: , Rfl:   •  pregabalin (LYRICA) 75 mg capsule, Take 1 capsule (75 mg total) by mouth 2 (two) times a day, Disp: 60 capsule, Rfl: 2  •  sucralfate (CARAFATE) 1 g tablet, Take 1 tablet (1 g total) by mouth 4 (four) times a day, Disp: 120 tablet, Rfl: 1  •  triamcinolone (KENALOG) 0 1 % cream, Apply topically 2 (two) times a day, Disp: 30 g, Rfl: 0    Allergies   Allergen Reactions   • Red Dye - Food Allergy Diarrhea, Nausea Only and Abdominal Pain   • Bee Venom Angioedema       Physical Exam:    Ht 5' 10" (1 778 m)   Wt (!) 156 kg (344 lb)   BMI 49 36 kg/m²     Constitutional:normal, well developed, well nourished, alert, in no distress and non-toxic and no overt pain behavior  Eyes:anicteric  HEENT:grossly intact  Neck:supple, symmetric, trachea midline and no masses   Pulmonary:even and unlabored  Cardiovascular:No edema or pitting edema present  Skin:Normal without rashes or lesions and well hydrated  Psychiatric:Mood and affect appropriate  Neurologic:Cranial Nerves II-XII grossly intact  Musculoskeletal:normal    Imaging  No orders to display       No orders of the defined types were placed in this encounter

## 2022-11-30 ENCOUNTER — OFFICE VISIT (OUTPATIENT)
Dept: PHYSICAL THERAPY | Facility: CLINIC | Age: 32
End: 2022-11-30

## 2022-11-30 DIAGNOSIS — M54.2 NECK PAIN: ICD-10-CM

## 2022-11-30 DIAGNOSIS — G89.4 CHRONIC PAIN SYNDROME: ICD-10-CM

## 2022-11-30 DIAGNOSIS — G89.29 CHRONIC PAIN OF BOTH KNEES: Primary | ICD-10-CM

## 2022-11-30 DIAGNOSIS — G89.29 CHRONIC BILATERAL LOW BACK PAIN WITHOUT SCIATICA: ICD-10-CM

## 2022-11-30 DIAGNOSIS — M54.50 CHRONIC BILATERAL LOW BACK PAIN WITHOUT SCIATICA: ICD-10-CM

## 2022-11-30 DIAGNOSIS — M25.562 CHRONIC PAIN OF BOTH KNEES: Primary | ICD-10-CM

## 2022-11-30 DIAGNOSIS — M25.561 CHRONIC PAIN OF BOTH KNEES: Primary | ICD-10-CM

## 2022-11-30 DIAGNOSIS — M54.50 CHRONIC LOW BACK PAIN WITHOUT SCIATICA, UNSPECIFIED BACK PAIN LATERALITY: ICD-10-CM

## 2022-11-30 DIAGNOSIS — G89.29 CHRONIC LOW BACK PAIN WITHOUT SCIATICA, UNSPECIFIED BACK PAIN LATERALITY: ICD-10-CM

## 2022-11-30 DIAGNOSIS — M54.12 CERVICAL RADICULOPATHY: ICD-10-CM

## 2022-11-30 NOTE — PROGRESS NOTES
Daily Note     Today's date: 2022  Patient name: Billie Hinkle  : 1990  MRN: [id number]  Referring provider: Lluvia Vela MD  Dx:   Encounter Diagnosis     ICD-10-CM    1  Chronic pain of both knees  M25 561     M25 562     G89 29       2  Chronic pain syndrome  G89 4       3  Chronic low back pain without sciatica, unspecified back pain laterality  M54 50     G89 29       4  Neck pain  M54 2       5  Chronic bilateral low back pain without sciatica  M54 50     G89 29       6  Cervical radiculopathy  M54 12                      Subjective: Patient reports waking up with stiffness in lower back, but he has been digging his coal bin out  Reports that R LE tingling has improved but numbness still present  He is scheduled for another injection in December  He is compliant with HEP stretches and piriformis STM  Objective: See treatment diary below  Increased resistance on leg extension  Assessment: Tolerated treatment well  Patient would benefit from continued PT to improve core and overall strength  Radicular symptoms and hip mobility improve with piriformis release  He will do 1x per week until he feels he can transition to HEP and manage radicular symptoms  Plan: Continue per plan of care        Precautions: chronic low back pain, knee pain         Manuals    Manual traction             Joint work             flexibility              Piriformis release   CM CM KS     ST             Neuro Re-Ed             TA             TA+march             Dead Bug              Side Stepping             TA              Bird-dog             PPU NP                         Ther Ex             NuStep/TM Bike 10' Bike 10' Bike 10' Bike 10' Bike 10'   Ham curls P8 3x10 P8 3x10 P7 3x10 nv P7 3x10   Knee ext P4 3x10 P5 3x10 P4 3x10 nv P4 3x10   CC LAE nv P8 3x10 P8 3x10 nv P8 3x10   Lumbar ext 100# 3x12 100# 3x10 100# 3x12 nv 100# 3x12   Anti- Rotation Press nv P4 3x10 P4 3x10 nv P4 3x10   HS stretch with strap    30"x3 ea 30"x4ea     Piriformis & figure 4 stretching     30"x5 ea 30"x7ea     PB squats            Machine Rows P5 3x10 P5 3x10 P5 3x10 nv P5 3x10   Lat Pull down  P5 3x10 P5 3x10 P5 3x10 nv P5 3x10   Foam rolling thoracic            Foam roll angels/Side to side            Box lifts                         Ther Activity                                      Gait Training                                      Modalities

## 2022-12-07 ENCOUNTER — OFFICE VISIT (OUTPATIENT)
Dept: PHYSICAL THERAPY | Facility: CLINIC | Age: 32
End: 2022-12-07

## 2022-12-07 DIAGNOSIS — M54.2 NECK PAIN: ICD-10-CM

## 2022-12-07 DIAGNOSIS — G89.29 CHRONIC BILATERAL LOW BACK PAIN WITHOUT SCIATICA: ICD-10-CM

## 2022-12-07 DIAGNOSIS — M54.12 CERVICAL RADICULOPATHY: ICD-10-CM

## 2022-12-07 DIAGNOSIS — G89.29 CHRONIC LOW BACK PAIN WITHOUT SCIATICA, UNSPECIFIED BACK PAIN LATERALITY: ICD-10-CM

## 2022-12-07 DIAGNOSIS — M54.50 CHRONIC LOW BACK PAIN WITHOUT SCIATICA, UNSPECIFIED BACK PAIN LATERALITY: ICD-10-CM

## 2022-12-07 DIAGNOSIS — M25.562 CHRONIC PAIN OF BOTH KNEES: Primary | ICD-10-CM

## 2022-12-07 DIAGNOSIS — G89.29 CHRONIC PAIN OF BOTH KNEES: Primary | ICD-10-CM

## 2022-12-07 DIAGNOSIS — M25.561 CHRONIC PAIN OF BOTH KNEES: Primary | ICD-10-CM

## 2022-12-07 DIAGNOSIS — M54.50 CHRONIC BILATERAL LOW BACK PAIN WITHOUT SCIATICA: ICD-10-CM

## 2022-12-07 DIAGNOSIS — G89.4 CHRONIC PAIN SYNDROME: ICD-10-CM

## 2022-12-07 NOTE — PROGRESS NOTES
Daily Note     Today's date: 2022  Patient name: Ghanshyam Lawson  : 1990  MRN: [id number]  Referring provider: Kierra Norris MD  Dx:   Encounter Diagnosis     ICD-10-CM    1  Chronic pain of both knees  M25 561     M25 562     G89 29       2  Chronic pain syndrome  G89 4       3  Chronic low back pain without sciatica, unspecified back pain laterality  M54 50     G89 29       4  Neck pain  M54 2       5  Chronic bilateral low back pain without sciatica  M54 50     G89 29       6  Cervical radiculopathy  M54 12                      Subjective: Patient reports that his R LE symptoms are improving  He has issues with "pressure" in hip when he is doing heavy household work like digging out his coal bin  Has to take 15-60 min breaks when this occurs  Objective: See treatment diary below  Increased resistance on anti rotation press  Assessment: Tolerated treatment well  Patient demonstrated fatigue post treatment and would benefit from continued PT to monitor R LE radicular symptoms and improve endurance and strength  Plan: Continue per plan of care        Precautions: chronic low back pain, knee pain         Manuals    Manual traction             Joint work             flexibility              Piriformis release   CM CM KS     ST            Neuro Re-Ed            TA            TA+march            Dead Bug             Side Stepping            TA             Bird-dog            PPU NP                       Ther Ex            NuStep/TM Bike 10' Bike 10' Bike 10' Bike 10' Bike 10'   Ham curls P8 3x10 P8 3x10 P8 3x10 nv P7 3x10   Knee ext P4 3x10 P5 3x10 P5 3x10 nv P4 3x10   CC LAE nv P8 3x10 P8 3x10 nv P8 3x10   Lumbar ext 100# 3x12 100# 3x10 110# 3x10 nv 100# 3x12   Anti- Rotation Press nv P4 3x10 P5 3x10 nv P4 3x10   HS stretch with strap     30"x4ea     Piriformis & figure 4 stretching      30"x7ea     PB squats           Machine Rows P5 3x10 P5 3x10 P5 3x10 nv P5 3x10   Lat Pull down  P5 3x10 P5 3x10 P5 3x10 nv P5 3x10   Foam rolling thoracic           Foam roll angels/Side to side           Box lifts                       Ther Activity                                   Gait Training                                   Modalities

## 2022-12-08 ENCOUNTER — OFFICE VISIT (OUTPATIENT)
Dept: SLEEP CENTER | Facility: CLINIC | Age: 32
End: 2022-12-08

## 2022-12-08 ENCOUNTER — OFFICE VISIT (OUTPATIENT)
Dept: DENTISTRY | Facility: CLINIC | Age: 32
End: 2022-12-08

## 2022-12-08 VITALS
HEIGHT: 70 IN | BODY MASS INDEX: 45.1 KG/M2 | HEART RATE: 83 BPM | WEIGHT: 315 LBS | SYSTOLIC BLOOD PRESSURE: 137 MMHG | DIASTOLIC BLOOD PRESSURE: 77 MMHG

## 2022-12-08 VITALS — DIASTOLIC BLOOD PRESSURE: 80 MMHG | HEART RATE: 70 BPM | SYSTOLIC BLOOD PRESSURE: 114 MMHG

## 2022-12-08 DIAGNOSIS — F11.20 METHADONE MAINTENANCE THERAPY PATIENT (HCC): ICD-10-CM

## 2022-12-08 DIAGNOSIS — G47.37 CENTRAL SLEEP APNEA COMORBID WITH PRESCRIBED OPIOID USE: Primary | ICD-10-CM

## 2022-12-08 DIAGNOSIS — G47.33 OSA (OBSTRUCTIVE SLEEP APNEA): ICD-10-CM

## 2022-12-08 DIAGNOSIS — G25.81 RESTLESS LEGS SYNDROME (RLS): ICD-10-CM

## 2022-12-08 DIAGNOSIS — K02.9 TOOTH DECAY: Primary | ICD-10-CM

## 2022-12-08 DIAGNOSIS — F11.90 CENTRAL SLEEP APNEA COMORBID WITH PRESCRIBED OPIOID USE: Primary | ICD-10-CM

## 2022-12-08 NOTE — PATIENT INSTRUCTIONS
It is very important to use your machine each night, every nights ly you have a high carbon dioxide level in your blood  Using the machine consistently should help reduce this  Ideally, the machine should be used all night every night given how bad your sleep apnea is  The pulmonologist you are seeing in a few weeks also does sleep medicine  I we will see back in 3 months unless he feels it would be best to follow-up with him  I would recommend talking to your addiction medicine specialist about reducing methadone if possible, do not do this on your own but reduction of this should help your sleep apnea  Weight loss is also important, keep working on that  It is very important to avoid driving while drowsy, this can be very dangerous or even cause serious injury or death  If sleepy, it is not safe to get behind the wheel  If you are driving and feels sleepy, it is very important to pull over right away  Even losing control of the car for a split second can be deadly  If you feel you cannot control when sleepiness occurs and cannot prevented, it is important to not drive at all until this improves  Please let me know if you experience this as it is very important  Nursing Support:  When: Monday through Friday 7A-5PM except holidays  Where: Our direct line is [phone]  If you are having a true emergency please call 911  In the event that the line is busy or it is after hours please leave a voice message and we will return your call  Please speak clearly, leaving your full name, birth date, best number to reach you and the reason for your call  Medication refills: We will need the name of the medication, the dosage, the ordering provider, whether you get a 30 or 90 day refill, and the pharmacy name and address  Medications will be ordered by the provider only  Nurses cannot call in prescriptions  Please allow 7 days for medication refills  Physician requested updates:  If your provider requested that you call with an update after starting medication, please be ready to provide us the medication and dosage, what time you take your medication, the time you attempt to fall asleep, time you fall asleep, when you wake up, and what time you get out of bed  Sleep Study Results: We will contact you with sleep study results and/or next steps after the physician has reviewed your testing

## 2022-12-08 NOTE — PROGRESS NOTES
Composite Filling    Shaunna Mason presents for composite filling  PMH reviewed, no changes  Discussed with patient need for RCT if pulp exposure occurs or in future if pulp is inflamed  Pt understands and consents  Applied topical benzocaine, administered 1 carps 4% articaine 1:100k epi via infiltration    Prepped tooth #2,3 with 556 carbide on high speed  Caries removed with round carbide on slow speed  Placed palodent matrix  Isolation with cotton rolls and dri-angles    #2 deep, gluma placed, followed by limelight, cured  Etch with 37% H2PO4, rinse, dry  Applied Adhese with 20 second scrub once, gentle air dry and light cured for 10s  Restored with Tetric bulk yoli shade A2 and light cured  Refined with finishing burs, polished with enhance point  Verified occlusion and contacts  Pt left satisfied        NV: Resins

## 2022-12-08 NOTE — PROGRESS NOTES
Review of Systems      Genitourinary difficulty with erection   Cardiology none   Gastrointestinal none   Neurology muscle weakness, numbness/tingling of an extremity, forgetfulness, poor concentration or confusion,  and difficulty with memory   Constitutional claustrophobia, fatigue, excessive sweating at night and weight change   Integumentary none   Psychiatry anxiety, depression and mood change   Musculoskeletal muscle aches, back pain and sciatica   Pulmonary shortness of breath with activity   ENT none   Endocrine excessive thirst   Hematological none

## 2022-12-08 NOTE — PROGRESS NOTES
Assessment/Plan:      1  Central sleep apnea comorbid with prescribed opioid use    2  Methadone maintenance therapy patient ([address] )    3  Restless legs syndrome (RLS)    4  CATA (obstructive sleep apnea)     Overall, Mr Richard Snyder is doing fairly well but still struggles greatly with compliance  This is a complicated situation as he has very significant nasal congestion, nasal surgery was recommended to him by Dr Jacob Rubio  ,  Due to the severity of his sleep apnea, I had concerned about discontinuing PAP treatment while he heals from nasal surgery  This is now further complicated by the fact that recent arterial blood gas shows a high PCO2 level  This supports hypoventilation  It is most likely that this is due to under use of his machine  Recent pulmonary function test showed an obstructive component of lung disease  He has a follow-up scheduled with Dr Sylvia Del Angel from pulmonary medicine in a few weeks  We discussed that Dr Sylvia Del Angel also has training in sleep medicine, if he feels it is more appropriate to follow the patient, I can see the patient back as needed  Certainly I am happy to follow along with him  We discussed that the patient's component of central sleep apnea is likely due to his methadone maintenance program, he is on a very high dose of methadone  We discussed that if this could be safely lowered, this likely should improve his breathing and sleep  The patient understands and hopes to reduce this in the near future  He is aware that he is on a very high dose    In the past the patient had symptoms of restless legs  Most recent ferritin was normal   He is prescribed pregabalin 75 mg twice a day, consideration be made for a dose increase in the future     Methadone can be used for refractory restless leg so it is a little atypical that he would still have residual restless legs on this combination medication  Subjective:      Patient ID: Raul Watson is a 34 y o  male      Mr Richard Snyder returns in followup for CATA>  He has struggled to use bilevel PAP due to comfort issues and nasal congestion  The patient sees Dr Cady Barnes- Mr Adilia Duke tried fluticasone which has not helped     He has been going to bed 9 pm   He notes a lot of anxiety as he lays down  Takes 45 min to fall asleep  Wakes 3-4 times a night  Awake at 6 AM    He is sometimes refreshed when he wakes  [place]  Sometimes dozes  No drowsy driving     PAP Data  Resmed Aircurve 10 ASV  Machine used 27/30 nights  AHI 7 7  Average session 2 hr 53 min   Settings - EPAP 15 cm h20;  PS 4-10    L nasal congestion, + dry mouth     He continues to use methadone 150 mg a day, he hopes to come down on his dose in the near future        [place] Sleepiness Scale:     Sitting and reading: Moderate chance of dozing  Watching TV: Moderate chance of dozing  Sitting, inactive in a public place (e g  a theatre or a meeting): Moderate chance of dozing  As a passenger in a car for an hour without a break: Slight chance of dozing  Lying down to rest in the afternoon when circumstances permit: Moderate chance of dozing  Sitting and talking to someone: Slight chance of dozing  Sitting quietly after a lunch without alcohol:  Moderate chance of dozing  In a car, while stopped for a few minutes in traffic: Would never doze  Total score: 12     The following portions of the patient's history were reviewed and updated as appropriate: allergies, current medications, past family history, past medical history, past social history, past surgical history, and problem list     Review of Systems    Genitourinary difficulty with erection   Cardiology none   Gastrointestinal none   Neurology muscle weakness, numbness/tingling of an extremity, forgetfulness, poor concentration or confusion,  and difficulty with memory   Constitutional claustrophobia, fatigue, excessive sweating at night and weight change   Integumentary none   Psychiatry anxiety, depression and mood change Musculoskeletal muscle aches, back pain and sciatica   Pulmonary shortness of breath with activity   ENT none   Endocrine excessive thirst   Hematological none       Objective:        /77 (BP Location: Left arm, Patient Position: Sitting, Cuff Size: Large)   Pulse 83   Ht 5' 10" (1 778 m)   Wt (!) 156 kg (344 lb)   BMI 49 36 kg/m²     Physical Exam   RRR  Lungs CTA b/l  Trace edema    Data reviewed-Notes from pain management, internal medicine, blood work including TSH    I also reviewed test light previously ordered including arterial blood gas and pulmonary function test

## 2022-12-09 ENCOUNTER — OFFICE VISIT (OUTPATIENT)
Dept: OBGYN CLINIC | Facility: CLINIC | Age: 32
End: 2022-12-09

## 2022-12-09 ENCOUNTER — PREP FOR PROCEDURE (OUTPATIENT)
Dept: OBGYN CLINIC | Facility: CLINIC | Age: 32
End: 2022-12-09

## 2022-12-09 VITALS — BODY MASS INDEX: 45.1 KG/M2 | WEIGHT: 315 LBS | HEIGHT: 70 IN

## 2022-12-09 DIAGNOSIS — G56.03 CARPAL TUNNEL SYNDROME, BILATERAL: Primary | ICD-10-CM

## 2022-12-09 RX ORDER — CHLORHEXIDINE GLUCONATE 4 G/100ML
SOLUTION TOPICAL DAILY PRN
OUTPATIENT
Start: 2022-12-09

## 2022-12-09 RX ORDER — CHLORHEXIDINE GLUCONATE 0.12 MG/ML
15 RINSE ORAL ONCE
OUTPATIENT
Start: 2022-12-09 | End: 2022-12-09

## 2022-12-09 NOTE — PATIENT INSTRUCTIONS
Patient will obtain MRI of cervical spine disc from outside facility and scan into [facility]'s PACS system  If possible, also transfer the impression results as well

## 2022-12-09 NOTE — PROGRESS NOTES
Assessment:   Diagnosis ICD-10-CM Associated Orders   1  Carpal tunnel syndrome, bilateral  G56 03 Case request operating room: RELEASE CARPAL TUNNEL     CBC and differential     Comprehensive metabolic panel     APTT     Protime-INR     Case request operating room: RELEASE CARPAL TUNNEL          Plan:  Reviewed today's physical exam findings and x-ray findings with patient at time of visit  EMG 2 Limb Upper Extremity taken on 10/05/2022 were reviewed and showed Chronic C6 radiculopathy on the left as evidenced by the decreased recruitment and chronic denervation changes in the biceps and pronator teres  Bilateral mild median nerve compression neuropathy at the wrist with demyelinative changes, consistent with a diagnosis of carpal tunnel syndrome  Risks and benefits of a Left carpal tunnel release were explained to patient in detail today and questions were answered to their satisfaction  Patient gave their informed consent for above procedure  He will schedule an official date with the surgery scheduler  Will discuss referral to spine specialist after patient brings the MRI of cervical spine to a [address] [place] [address]  Patient expresses understanding and is in agreement with this treatment plan  The patient was given the opportunity to ask questions or present concerns  To do next visit:  No follow-ups on file  The above stated was discussed in layman's terms and the patient expressed understanding  All questions were answered to the patient's satisfaction         Scribe Attestation    I,:  Cliff Roche am acting as a scribe while in the presence of the attending physician :       I,:  Fara Arboleda DO personally performed the services described in this documentation    as scribed in my presence :             Subjective:   Keegan Diego is a 34 y o  male who presents today for an Initial evaluation of his bilateral wrist  Patient was last seen by Dr Chante De Leon on 05/15/2019 for left wrist incision drainage for potential septic arthritis  On today's presentation, the patient states that he has been experiencing worsening bilateral wrist pain with numbness and tingling in 1-4 digits  Left worse than right  He states that he has trouble with pinching and gripping motions causing him to drop objects  He states that he had an EMG of his upper extremities and would like to proceed with operative management  Review of systems negative unless otherwise specified in HPI  Review of Systems   Constitutional: Negative for chills and fever  HENT: Negative for ear pain and sore throat  Eyes: Negative for pain and visual disturbance  Respiratory: Negative for cough and shortness of breath  Cardiovascular: Negative for chest pain and palpitations  Gastrointestinal: Negative for abdominal pain and vomiting  Genitourinary: Negative for dysuria and hematuria  Musculoskeletal: Negative for arthralgias and back pain  Skin: Negative for color change and rash  Neurological: Negative for seizures and syncope  All other systems reviewed and are negative        Past Medical History:   Diagnosis Date   • Allergic    • Anemia 6/24/2018   • Anxiety     from records   • Benign essential hypertension 11/17/2016   • Bipolar 1 disorder ([facility] )     from records   • Chronic pain    • Claustrophobia 3/15/2018   • Community acquired pneumonia 6/24/2018   • Depressed 7/14/2016   • Depression     from records   • GERD (gastroesophageal reflux disease)    • Hepatitis C virus infection 8/14/2014   • Heroin abuse ([facility] ) 7/24/2018   • Infectious viral hepatitis    • Obesity 10/12/2016   • Obstructive sleep apnea     from records   • Sleep disorder    • Substance abuse [facility])        Past Surgical History:   Procedure Laterality Date   • EPIDURAL BLOCK INJECTION Right 10/28/2022    Procedure: BLOCK / INJECTION EPIDURAL STEROID LUMBAR  right L4-5 and L5-S1 TFESI;  Surgeon: Rolando Espinoza MD;  Location: [facility];  Service: Pain Management    • DC DRAIN SKIN ABSCESS COMPLIC Left 0/5/8956    Procedure: INCISION AND DRAINAGE (I&D) EXTREMITY;  Surgeon: Luna Jules MD;  Location: [facility];  Service: Orthopedics   • DC LAP,SURG,COLECTOMY, PARTIAL, W/ANAST Left 5/21/2020    Procedure: LAPAROSCOPIC LEFT HEMICOLECTOMY TAKEDOWN SPLENIC FLECTURE, COLORECTAL ANASTOMOSIS ;  Surgeon: Saravanan Fraga MD;  Location: [facility];  Service: Colorectal   • WISDOM TOOTH EXTRACTION         Family History   Problem Relation Age of Onset   • Diabetes Mother    • Restless legs syndrome Mother    • Sleep apnea Mother    • Colon cancer Father    • Alzheimer's disease Maternal Grandmother    • Depression Family        Social History     Occupational History   • Not on file   Tobacco Use   • Smoking status: Former     Packs/day: 1 00     Years: 13 00     Pack years: 13 00     Types: E-Cigarettes, Cigarettes   • Smokeless tobacco: Never   • Tobacco comments:     vapes   Vaping Use   • Vaping Use: Every day   • Substances: Nicotine   Substance and Sexual Activity   • Alcohol use: Not Currently   • Drug use: Not Currently     Types: Heroin, Marijuana, Prescription     Comment: on Methadone   • Sexual activity: Not Currently         Current Outpatient Medications:   •  fluticasone (FLONASE) 50 mcg/act nasal spray, 1 spray into each nostril 2 (two) times a day, Disp: 16 g, Rfl: 11  •  levocetirizine (XYZAL) 5 MG tablet, Take 1 tablet (5 mg total) by mouth every evening, Disp: 30 tablet, Rfl: 11  •  linaCLOtide (Linzess) 72 MCG CAPS, Take 1 capsule by mouth in the morning, Disp: 30 capsule, Rfl: 2  •  METHADONE HCL PO, Take 110 mg by mouth daily 150mg daily , Disp: , Rfl:   •  olopatadine (PATANOL) 0 1 % ophthalmic solution, Administer 1 drop to both eyes 2 (two) times a day, Disp: 5 mL, Rfl: 0  •  omeprazole (PriLOSEC) 20 mg delayed release capsule, Take 20 mg by mouth 2 (two) times a day , Disp: , Rfl:   •  ondansetron (ZOFRAN-ODT) 4 mg disintegrating tablet, , Disp: , Rfl:   •  pregabalin (LYRICA) 75 mg capsule, Take 1 capsule (75 mg total) by mouth 2 (two) times a day, Disp: 60 capsule, Rfl: 2  •  sucralfate (CARAFATE) 1 g tablet, Take 1 tablet (1 g total) by mouth 4 (four) times a day, Disp: 120 tablet, Rfl: 1  •  triamcinolone (KENALOG) 0 1 % cream, Apply topically 2 (two) times a day, Disp: 30 g, Rfl: 0    Allergies   Allergen Reactions   • Red Dye - Food Allergy Diarrhea, Nausea Only and Abdominal Pain   • Bee Venom Angioedema        There were no vitals filed for this visit  Objective:                    Ortho Exam  bilateral wrist -  Patient presents with no obvious anatomical deformity  Skin is warm and dry to touch with no signs of erythema, ecchymosis, infection  No TTP  MMT: 4/5 throughout  No soft tissue swelling or effusion noted  Full FDS, FDP, extensor mechanisms are intact  - Thenar atrophy, - intrinsic atrophy  + Tinel's at carpal tunnel  + Phalen's sign  + Carpal Tunnel Compression  - AP / LM Drawer  - Finklestein  - Ulnar / Radial impaction   - Gallegos's  Demonstrates normal wrist, elbow, shoulder or motion  Forearm compartments are soft and supple  2+ Distal radial pulse with brisk capillary refill to the fingers  Radial, median, ulnar motor and sensory distribution intact  Sensation to light touch intact distally      Diagnostics, reviewed and taken today if performed as documented: The attending physician has personally reviewed the pertinent films in PACS and interpretation is as follows:    EMG 2 Limb Upper Extremity taken on 10/05/2022 were reviewed and showed Chronic C6 radiculopathy on the left as evidenced by the decreased recruitment and chronic denervation changes in the biceps and pronator teres  Bilateral mild median nerve compression neuropathy at the wrist with demyelinative changes, consistent with a diagnosis of carpal tunnel syndrome      Procedures, if performed today:    None performed    Portions of the record may have been created with voice recognition software  Occasional wrong word or "sound a like" substitutions may have occurred due to the inherent limitations of voice recognition software  Read the chart carefully and recognize, using context, where substitutions have occurred

## 2022-12-14 ENCOUNTER — OFFICE VISIT (OUTPATIENT)
Dept: PHYSICAL THERAPY | Facility: CLINIC | Age: 32
End: 2022-12-14

## 2022-12-14 DIAGNOSIS — G89.29 CHRONIC PAIN OF BOTH KNEES: Primary | ICD-10-CM

## 2022-12-14 DIAGNOSIS — M54.2 NECK PAIN: ICD-10-CM

## 2022-12-14 DIAGNOSIS — M25.561 CHRONIC PAIN OF BOTH KNEES: Primary | ICD-10-CM

## 2022-12-14 DIAGNOSIS — G89.29 CHRONIC LOW BACK PAIN WITHOUT SCIATICA, UNSPECIFIED BACK PAIN LATERALITY: ICD-10-CM

## 2022-12-14 DIAGNOSIS — M54.12 CERVICAL RADICULOPATHY: ICD-10-CM

## 2022-12-14 DIAGNOSIS — M54.50 CHRONIC LOW BACK PAIN WITHOUT SCIATICA, UNSPECIFIED BACK PAIN LATERALITY: ICD-10-CM

## 2022-12-14 DIAGNOSIS — G89.29 CHRONIC BILATERAL LOW BACK PAIN WITHOUT SCIATICA: ICD-10-CM

## 2022-12-14 DIAGNOSIS — M25.562 CHRONIC PAIN OF BOTH KNEES: Primary | ICD-10-CM

## 2022-12-14 DIAGNOSIS — M54.50 CHRONIC BILATERAL LOW BACK PAIN WITHOUT SCIATICA: ICD-10-CM

## 2022-12-14 DIAGNOSIS — G89.4 CHRONIC PAIN SYNDROME: ICD-10-CM

## 2022-12-14 DIAGNOSIS — M54.16 LUMBAR RADICULOPATHY: ICD-10-CM

## 2022-12-14 NOTE — PROGRESS NOTES
Daily Note     Today's date: 2022  Patient name: Evelyn Corcoran  : 1990  MRN: [id number]  Referring provider: Nichol Peguero MD  Dx:   Encounter Diagnosis     ICD-10-CM    1  Chronic pain of both knees  M25 561     M25 562     G89 29       2  Chronic pain syndrome  G89 4       3  Chronic low back pain without sciatica, unspecified back pain laterality  M54 50     G89 29       4  Neck pain  M54 2       5  Chronic bilateral low back pain without sciatica  M54 50     G89 29       6  Cervical radiculopathy  M54 12       7  Lumbar radiculopathy  M54 16                      Subjective: Patient reports he is a little sore as he has had pretty active days recently  He states he is getting L wrist CTR on 23  Objective: See treatment diary below      Assessment: Tolerated treatment well with no increase in symptoms other than fatigue  Patient would benefit from continued PT to increase trunk mobility and core/LE strength for improved function in ADLs  Plan: Continue per plan of care        Precautions: chronic low back pain, knee pain         Manuals    Manual traction            Joint work            flexibility             Piriformis release   CM CM R side AS     ST           Neuro Re-Ed           TA           TA+march           Dead Bug            Side Stepping           TA            Bird-dog           PPU NP    x15                 Ther Ex           NuStep/TM Bike 10' Bike 10' Bike 10' Bike 10' Bike 10'   Ham curls P8 3x10 P8 3x10 P8 3x10 P8 3x10 P7 3x10   Knee ext P4 3x10 P5 3x10 P5 3x10 P5 3x10 P4 3x10   CC LAE nv P8 3x10 P8 3x10 P8 3x10 P8 3x10   Lumbar ext 100# 3x12 100# 3x10 110# 3x10 110# 3x10 100# 3x12   Anti- Rotation Press nv P4 3x10 P5 3x10 P5 3x10 P4 3x10   HS stretch with strap          Piriformis & figure 4 stretching           PB squats          Machine Rows P5 3x10 P5 3x10 P5 3x10 P5 3x10 P5 3x10   Lat Pull down  P5 3x10 P5 3x10 P5 3x10 P5 3x10 P5 3x10   Foam rolling thoracic          Foam roll angels/Side to side          Box lifts                      Ther Activity                                   Gait Training                                   Modalities

## 2022-12-20 ENCOUNTER — HOSPITAL ENCOUNTER (OUTPATIENT)
Facility: HOSPITAL | Age: 32
Setting detail: OUTPATIENT SURGERY
Discharge: HOME | End: 2022-12-20
Attending: ANESTHESIOLOGY | Admitting: ANESTHESIOLOGY

## 2022-12-20 VITALS
SYSTOLIC BLOOD PRESSURE: 133 MMHG | RESPIRATION RATE: 18 BRPM | OXYGEN SATURATION: 96 % | HEART RATE: 71 BPM | TEMPERATURE: 98.9 F | DIASTOLIC BLOOD PRESSURE: 75 MMHG

## 2022-12-20 RX ORDER — DEXAMETHASONE SODIUM PHOSPHATE 10 MG/ML
INJECTION, SOLUTION INTRAMUSCULAR; INTRAVENOUS AS NEEDED
Status: DISCONTINUED | OUTPATIENT
Start: 2022-12-20 | End: 2022-12-20 | Stop reason: HOSPADM

## 2022-12-20 RX ORDER — LIDOCAINE HYDROCHLORIDE 10 MG/ML
INJECTION, SOLUTION EPIDURAL; INFILTRATION; INTRACAUDAL; PERINEURAL AS NEEDED
Status: DISCONTINUED | OUTPATIENT
Start: 2022-12-20 | End: 2022-12-20 | Stop reason: HOSPADM

## 2022-12-20 NOTE — INTERVAL H&P NOTE
H&P reviewed  After examining the patient I find no changes in the patients condition since the H&P had been written      Vitals:    12/20/22 1026   BP: 127/72   Pulse: 66   Resp: 20   Temp: 98 9 °F (37 2 °C)   SpO2: 93%

## 2022-12-20 NOTE — OP NOTE
OPERATIVE REPORT  PATIENT NAME: Brenda Sun    :  1990  MRN: [id number]  Pt Location: [facility] OR ROOM 01    SURGERY DATE: 2022    Surgeon(s) and Role:     * Wei Russo MD - Primary    Preop Diagnosis:  Chronic pain syndrome [G89 4]  Chronic bilateral low back pain without sciatica [M54 50, G89 29]  Lumbar radiculopathy [M54 16]    Post-Op Diagnosis Codes:     * Chronic pain syndrome [G89 4]     * Chronic bilateral low back pain without sciatica [M54 50, G89 29]     * Lumbar radiculopathy [M54 16]    Procedure(s) (LRB):  BLOCK / INJECTION EPIDURAL STEROID LUMBAR  right  L4-5 and L5-S1 TFESI (Right)    Specimen(s):  * No specimens in log *    Estimated Blood Loss:   Minimal    Drains:  * No LDAs found *    Anesthesia Type:   Local    Operative Indications:  Chronic pain syndrome [G89 4]  Chronic bilateral low back pain without sciatica [M54 50, G89 29]  Lumbar radiculopathy [M54 16]      Operative Findings:  same    Complications:   None    Procedure and Technique:  Fluoroscopically-guided right L5 and L5-S1 transforaminal epidural steroid injection under fluoroscopy      After discussing the risks, benefits, and alternatives to the procedure, the patient expressed understanding and wished to proceed  The patient was brought to the fluoroscopy suite and placed in the prone position  A procedural pause was conducted to verify:  correct patient identity, procedure to be performed and as applicable, correct side and site, correct patient position, and availability of implants, special equipment and special requirements  After identifying the right L4 and L5 pedicles fluoroscopically with an oblique view, the skin was sterilely prepped and draped in the usual fashion using Chloraprep skin prep  The skin and subcutaneous tissue were anesthetized with 0 5% lidocaine    A 5 inch 22 gauge spinal needle was then advanced under fluoroscopic guidance to the posterior aspect of the right L4-L5 and L5  Neural foramens  Appropriate foraminal depth was determined with a lateral fluoroscopic view, and AP visualization confirmed needle positioning at approximately the 6 o’clock position relative to the pedicles  After negative aspiration, 1 mL of Omnipaque 300 contrast was injected using live fluoroscopy/digital subtraction angiography, confirming appropriate transforaminal spread without evidence of intravascular or intrathecal uptake  Next, a local anesthetic test dose consisting of 1 mL of 2% lidocaine was injected through the needle at each level  After an appropriate period of observation, a directed neurological exam was performed which revealed no new neurologic deficits  Next, a 1 5 ml solution consisting of 7 5 mg of dexamethasone in sterile saline was injected slowly and incrementally into the epidural space at each level  Following the injection the needles were withdrawn slightly and flushed with lidocaine as they were fully extracted  The patient tolerated the procedure well and there were no apparent complications  The patient did not develop any new neurologic deficits  After appropriate observation, the patient was dismissed from the clinic in good condition under their own power  COMMENTS   The patient received a total steroid dose of 15 mg of dexamethasone     I was present for the entire procedure    Patient Disposition:  hemodynamically stable        SIGNATURE: Zenon Ramirez MD  DATE: December 20, 2022  TIME: 10:32 AM

## 2022-12-20 NOTE — DISCHARGE INSTR - AVS FIRST PAGE
Epidural Steroid Injection   WHAT YOU NEED TO KNOW:   An epidural steroid injection (MAKENZIE) is a procedure to inject steroid medicine into the epidural space  The epidural space is between your spinal cord and vertebrae  Steroids reduce inflammation and fluid buildup in your spine that may be causing pain  You may be given pain medicine along with the steroids  ACTIVITY  Do not drive or operate machinery today  No strenuous activity today - bending, lifting, etc   You may resume normal activites starting tomorrow - start slowly and as tolerated  You may shower today, but no tub baths or hot tubs  You may have numbness for several hours from the local anesthetic  Please use caution and common sense, especially with weight-bearing activities  CARE OF THE INJECTION SITE  If you have soreness or pain, apply ice to the area today (20 minutes on/20 minutes off)  Starting tomorrow, you may use warm, moist heat or ice if needed  You may have an increase or change in your discomfort for 36-48 hours after your treatment  Apply ice and continue with any pain medication you have been prescribed  Notify the [facility] if you have any of the following: redness, drainage, swelling, headache, stiff neck or fever above 100°F     SPECIAL INSTRUCTIONS  Our office will contact you in approximately 7 days for a progress report  MEDICATIONS  Continue to take all routine medications  Our office may have instructed you to hold some medications  As no general anesthesia was used in today's procedure, you should not experience any side effects related to anesthesia  If you are diabetic, the steroids used in today's injection may temporarily increase your blood sugar levels after the first few days after your injection  Please keep a close eye on your sugars and alert the doctor who manages your diabetes if your sugars are significantly high from your baseline or you are symptomatic       If you have a problem specifically related to your procedure, please call our office at [phone]  Problems not related to your procedure should be directed to your primary care physician

## 2022-12-21 ENCOUNTER — CONSULT (OUTPATIENT)
Dept: PULMONOLOGY | Facility: CLINIC | Age: 32
End: 2022-12-21

## 2022-12-21 VITALS
WEIGHT: 315 LBS | HEART RATE: 72 BPM | DIASTOLIC BLOOD PRESSURE: 100 MMHG | SYSTOLIC BLOOD PRESSURE: 142 MMHG | OXYGEN SATURATION: 96 % | BODY MASS INDEX: 45.1 KG/M2 | HEIGHT: 70 IN

## 2022-12-21 DIAGNOSIS — G47.33 CHRONIC INTERMITTENT HYPOXIA WITH OBSTRUCTIVE SLEEP APNEA: ICD-10-CM

## 2022-12-21 DIAGNOSIS — G47.33 OSA (OBSTRUCTIVE SLEEP APNEA): ICD-10-CM

## 2022-12-21 DIAGNOSIS — J45.30 MILD PERSISTENT ASTHMA WITHOUT COMPLICATION: Primary | ICD-10-CM

## 2022-12-21 DIAGNOSIS — F11.90 CENTRAL SLEEP APNEA COMORBID WITH PRESCRIBED OPIOID USE: ICD-10-CM

## 2022-12-21 DIAGNOSIS — E66.01 OBESITY, CLASS III, BMI 40-49.9 (MORBID OBESITY) (HCC): ICD-10-CM

## 2022-12-21 DIAGNOSIS — G25.81 RESTLESS LEG SYNDROME: ICD-10-CM

## 2022-12-21 DIAGNOSIS — G47.34 CHRONIC INTERMITTENT HYPOXIA WITH OBSTRUCTIVE SLEEP APNEA: ICD-10-CM

## 2022-12-21 DIAGNOSIS — G47.37 CENTRAL SLEEP APNEA COMORBID WITH PRESCRIBED OPIOID USE: ICD-10-CM

## 2022-12-21 DIAGNOSIS — J96.12 CHRONIC HYPERCAPNIC RESPIRATORY FAILURE (HCC): ICD-10-CM

## 2022-12-21 DIAGNOSIS — Z77.29 LONG-TERM EXPOSURE INVOLVING BIRD DROPPINGS: ICD-10-CM

## 2022-12-21 DIAGNOSIS — F11.20 METHADONE MAINTENANCE THERAPY PATIENT (HCC): ICD-10-CM

## 2022-12-21 RX ORDER — FLUTICASONE FUROATE AND VILANTEROL 100; 25 UG/1; UG/1
1 POWDER RESPIRATORY (INHALATION) DAILY
Qty: 60 BLISTER | Refills: 3 | Status: SHIPPED | OUTPATIENT
Start: 2022-12-21 | End: 2023-01-20

## 2022-12-21 RX ORDER — LINACLOTIDE 145 UG/1
145 CAPSULE, GELATIN COATED ORAL DAILY
COMMUNITY
Start: 2022-12-12

## 2022-12-21 RX ORDER — ALBUTEROL SULFATE 90 UG/1
2 AEROSOL, METERED RESPIRATORY (INHALATION) EVERY 6 HOURS PRN
Qty: 18 G | Refills: 6 | Status: SHIPPED | OUTPATIENT
Start: 2022-12-21

## 2022-12-21 RX ORDER — BUSPIRONE HYDROCHLORIDE 10 MG/1
10 TABLET ORAL DAILY
COMMUNITY
Start: 2022-12-09

## 2022-12-21 NOTE — ASSESSMENT & PLAN NOTE
He is currently on ASV with minimum EPAP of 15 cm H2O, pressure support 4-10 with mostly adequate elimination of his obstructive sleep apnea  He is currently seeing bariatric surgery and plan for further follow-up for conservative weight loss and consideration of future weight loss surgery  Encouraged increased use of ASV at all times of sleep  On compliance check on 12/8/2022 his average use was 2 hours 53 minutes

## 2022-12-21 NOTE — ASSESSMENT & PLAN NOTE
He likely has asthma based on his pulmonary function testing that showed a significant bronchodilator response  I have started him on Breo 100-25 mcg once a day and albuterol as needed for shortness of breath  Based on pulmonary function testing he has airway obstruction that partially reverses with bronchodilators  He had a significant response in FEV1  Allergy testing noted with IgE at 75, mild allergies to Alternaria alternata, dust, ragweed

## 2022-12-21 NOTE — ASSESSMENT & PLAN NOTE
When he is more stable on ASV therapy and more compliant with his machine, we will order a nocturnal overnight oximetry to ensure that ASV at the settings is resolving his nocturnal hypoxemia

## 2022-12-21 NOTE — PATIENT INSTRUCTIONS
Start Breo once a day, rinse out your mouth after using  Use albuterol only when short of breath or before exercise  Continue ASV usage as much as possible, especially when sleep overnight

## 2022-12-21 NOTE — ASSESSMENT & PLAN NOTE
Currently on Lyrica 75 mg twice daily and methadone therapy which should theoretically decrease his symptoms of restless leg  We will hold off on any any additional therapies at this time    Reevaluate symptoms at follow-up visits

## 2022-12-21 NOTE — ASSESSMENT & PLAN NOTE
He has a [place] cockatoo that he inherited from a family member  He wears a mask when cleaning the birdcage but clearly there is significant bird dust   If he experiences worsening respiratory symptoms then this exposure should be eliminated  He is at risk for acute or chronic hypersensitivity pneumonitis due to exposure to this we will periodically follow his pulmonary function testing  Recent DLCO and lung volumes do not suggest early signs of interstitial lung disease

## 2022-12-21 NOTE — PROGRESS NOTES
Sleep Medicine Outpatient Note   Amando Amato 34 y o  male MRN: [id number]  12/21/2022      Referring Physician: Nathaniel Ramirez MD    Reason for Consultation:    Chief Complaint   Patient presents with   • Asthma   • Shortness of Breath   • Sleep Apnea         Assessment/Plan:    1  Mild persistent asthma without complication  Assessment & Plan:  He likely has asthma based on his pulmonary function testing that showed a significant bronchodilator response  I have started him on Breo 100-25 mcg once a day and albuterol as needed for shortness of breath  Based on pulmonary function testing he has airway obstruction that partially reverses with bronchodilators  He had a significant response in FEV1  Allergy testing noted with IgE at 75, mild allergies to Alternaria alternata, dust, ragweed  Orders:  -     Fluticasone Furoate-Vilanterol (Breo Ellipta) 100-25 mcg/actuation inhaler; Inhale 1 puff daily Rinse mouth after use  -     albuterol (Ventolin HFA) 90 mcg/act inhaler; Inhale 2 puffs every 6 (six) hours as needed for wheezing    2  Central sleep apnea comorbid with prescribed opioid use  Assessment & Plan:  He is tapering down his methadone at a safe interval   This is likely contributing to the high prevalence of central sleep apnea  He should continue ASV at all times of sleep  Compliance recently has been suboptimal   I stressed that he should use ASV at all times of sleep due to worsening hypercapnia  We will gauge compliance data at next visit    Orders:  -     Ambulatory Referral to Pulmonology    3  CATA (obstructive sleep apnea)  Assessment & Plan:  He is currently on ASV with minimum EPAP of 15 cm H2O, pressure support 4-10 with mostly adequate elimination of his obstructive sleep apnea  He is currently seeing bariatric surgery and plan for further follow-up for conservative weight loss and consideration of future weight loss surgery      Encouraged increased use of ASV at all times of sleep  On compliance check on 12/8/2022 his average use was 2 hours 53 minutes  4  Chronic hypercapnic respiratory failure ([address] )  Assessment & Plan:  He has an arterial blood gas during wakefulness with PCO2 47 in 10/2022  Serum bicarbonate on basic metabolic panels have been in the low 30s  This is likely due to obesity hypoventilation syndrome, central sleep apnea due to methadone use  He needs improved compliance with his ASV  He needs to pursue further weight loss  He is tapering his methadone dose at a safe interval     Based on pulmonary function testing he has mild obstructive  lung disease that is likely a result of asthma and a history of cigarette smoking  He has significant bronchodilator response on this pulmonary function testing  5  Long-term exposure involving bird droppings  Assessment & Plan:  He has a [place] that he inherited from a family member  He wears a mask when cleaning the birdcage but clearly there is significant bird dust   If he experiences worsening respiratory symptoms then this exposure should be eliminated  He is at risk for acute or chronic hypersensitivity pneumonitis due to exposure to this we will periodically follow his pulmonary function testing  Recent DLCO and lung volumes do not suggest early signs of interstitial lung disease  6  Chronic intermittent hypoxia with obstructive sleep apnea  Assessment & Plan:  When he is more stable on ASV therapy and more compliant with his machine, we will order a nocturnal overnight oximetry to ensure that ASV at the settings is resolving his nocturnal hypoxemia  Orders:  -     Ambulatory Referral to Pulmonology    7  Restless leg syndrome  Assessment & Plan:  Currently on Lyrica 75 mg twice daily and methadone therapy which should theoretically decrease his symptoms of restless leg  We will hold off on any any additional therapies at this time    Reevaluate symptoms at follow-up visits      8  Methadone maintenance therapy patient ([facility] )    9  Obesity, Class III, BMI 40-49 9 (morbid obesity) ([facility] )        Health Maintenance  Immunization History   Administered Date(s) Administered   • COVID-19 MODERNA VACC 0 5 ML IM 04/02/2021, 05/04/2021, 12/11/2021   • DTP 1990, 03/03/1990, 05/05/1990, 05/08/1991, 10/18/1991   • Hep A / Hep B 04/01/2015   • Hep B, adult 04/02/1996, 06/11/1996, 10/01/1997   • Hib (PRP-OMP) 01/31/1991   • INFLUENZA 09/24/2014, 10/31/2016, 11/21/2017, 12/19/2018   • IPV 03/03/1990, 05/05/1990, 05/08/1991, 10/18/1991, 04/02/1996   • Influenza Quadrivalent Preservative Free 3 years and older IM 10/31/2016   • Influenza Quadrivalent, 6-35 Months IM 11/21/2017   • Influenza, injectable, quadrivalent, preservative free 0 5 mL 12/19/2018, 10/16/2020, 11/18/2022   • Influenza, seasonal, injectable 09/24/2014   • MMR 01/31/1991, 10/18/1991   • Tuberculin Skin Test 08/04/1990, 01/21/1997   • Tuberculin Skin Test-PPD Intradermal 08/04/1990, 01/21/1997   • Varicella 06/11/1996        Return in about 3 months (around 3/21/2023)  History of Present Illness   HPI:  Susan Baez is a 34 y o  male who has a past medical history of, bipolar disorder, hepatitis C, severe CATA, chronic pain syndrome, chronic lower back pain, lumbar radiculopathy, history of substance abuse on methadone maintenance therapy, central sleep apnea likely due to methadone currently on ASV, chronic constipation, morbid obesity currently being evaluated for bariatric surgery  He is presenting as a referral from Dr Opal Duke sleep medicine for pulmonary and sleep evaluation due to obstructive lung disease, chronic hypercapnia, CATA/OHS/central sleep apnea due to opioid use  Patient had original sleep study in 2017 that showed  3 events per hour  Patient had 75 central apneas during that sleep study    Subsequently he underwent a sleep study with split component in June 2022 that showed AHI of 124 9 with a central apnea index of 77 8  The patient was titrated on CPAP and BiPAP and was ineffective due to primarily a central apnea component  ASV titration in August showed significant improvement with a EPAP of 15 cm H2O  Oxygenation normalized and AHI was improved at 8  He received his machine and August and followed up with Dr Joel Lamb in September  He felt better after using the machine  He has gained significant symptomatic benefit with decrease excessive daytime sleepiness, improved sleep quality, sleep maintenance  He is going to bed around 10 PM and waking up at 5 AM   He has several overnight awakenings  He has to get up to use the restroom once at night  He followed up with Dr Joel Lamb on 12/8/2022  At that time his compliance was 27/30 nights  He is currently on ResMed air curve 10 ASV  Residual AHI 7 7  Average session was only 2 hours and 53 minutes  EPAP set at 15 cm H2O pressure support 4-10  Recently he has admitted that the last week he has not used his ASV as much due to depressed mood  His mood has improved however and he is determined to use it more often  He sometimes uses ASV during the daytime while watching TV in case he dozes off and falls asleep  Patient has always had bad allergies as a child especially with heat and humidity  He believes that he is always had asthma  He cannot breathe when the house is hot  He is especially bothered by mold and pollen  Dogs at home do not seem to bother him  He does not have frequent asthma exacerbations where he has to use prednisone, go to the emergency room, urgent care, or hospital   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 in that regard  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 [employer]  He is currently not working due to frequent health problems      Recently had pulmonary function testing performed that showed FEV1 71% of predicted residual volume 162% of predicted FEV1 FVC ratio 67% predicted significant airway response with administration of bronchodilators  Normal DLCO  He endorses significant nasal congestion, nasal surgery was recommended by his ENT physician Dr Sarabjit Tellez  However Dr Ehsan Johansen was concerned that he would not be able to use his ASV after nasal surgery so this was deferred  He takes Xyzal and Flonase PRN    He was also endorsing symptoms of restless leg  He is on pregabalin 75 mg twice a day and is also on methadone  He was on methadone 150 mg a day  He is currently trying to taper methadone slowly through his program      His A1AT was normal    He started an antidepressant vilanzdone his weight increased     He is seeing bariatric surgery - they wanted to buy specialized meals but it's very expensive  He has recently started an antidepressant vilanzdone and this he believes has contributed to some weight gain  Cutting back on vaping  Former smoker - 13 pack year smoking - stopped 5-6 years ago through a smoking cessation program    '    He always had bad allergies as a child especially with heat and humidity  He can't breath when the house is hot  Mold and pollen really makes short of breath  Dogs don't bother him  He does not get exacerbations where he has to use prednisone, go to ED/urgent care or hospital       Exposure history:  Unemployed due to health problems  Loaded trucks with fertilizer  Pets - [place]  Gets irritated by bird dust   He wears a mask when he cleans the bird dust        Questionnaires:   [place] is  13 today -high chance of dozing off when lying down to rest in the afternoon        Review of Systems      Genitourinary none   Cardiology none   Gastrointestinal none   Neurology poor concentration or confusion,  and difficulty with memory   Constitutional fatigue and weight change   Integumentary rash or dry skin   Psychiatry depression   Musculoskeletal leg cramps   Pulmonary chest tightness, snoring and difficulty breathing when lying flat    ENT none   Endocrine none   Hematological none       Historical Information   Past Medical History:   Diagnosis Date   • Allergic    • Anemia 6/24/2018   • Anxiety     from records   • Benign essential hypertension 11/17/2016   • Bipolar 1 disorder ([facility] )     from records   • Chronic pain    • Claustrophobia 3/15/2018   • Community acquired pneumonia 6/24/2018   • Depressed 7/14/2016   • Depression     from records   • GERD (gastroesophageal reflux disease)    • Hepatitis C virus infection 8/14/2014   • Heroin abuse ([facility] ) 7/24/2018   • Infectious viral hepatitis    • Obesity 10/12/2016   • Obstructive sleep apnea     from records   • Sleep disorder    • Substance abuse [facility])      Past Surgical History:   Procedure Laterality Date   • EPIDURAL BLOCK INJECTION Right 10/28/2022    Procedure: BLOCK / INJECTION EPIDURAL STEROID LUMBAR  right L4-5 and L5-S1 TFESI;  Surgeon: Megan King MD;  Location: [facility] MAIN OR;  Service: Pain Management    • FL DRAIN SKIN ABSCESS COMPLIC Left 2/9/8478    Procedure: INCISION AND DRAINAGE (I&D) EXTREMITY;  Surgeon: Fransisco Vergara MD;  Location: [facility] MA[facility];  Service: Orthopedics   • FL LAP,SURG,COLECTOMY, PARTIAL, W/ANAST Left 5/21/2020    Procedure: LAPAROSCOPIC LEFT HEMICOLECTOMY TAKEDOWN SPLENIC FLECTURE, COLORECTAL ANASTOMOSIS ;  Surgeon: Shauna Zhang MD;  Location:  MAIN OR;  Service: Colorectal   • WISDOM TOOTH EXTRACTION       Family History   Problem Relation Age of Onset   • Diabetes Mother    • Restless legs syndrome Mother    • Sleep apnea Mother    • Colon cancer Father    • Alzheimer's disease Maternal Grandmother    • Depression Family          Meds/Allergies     Current Outpatient Medications:   •  albuterol (Ventolin HFA) 90 mcg/act inhaler, Inhale 2 puffs every 6 (six) hours as needed for wheezing, Disp: 18 g, Rfl: 6  •  busPIRone (BUSPAR) 10 mg tablet, Take 10 mg by mouth in the morning, Disp: , Rfl:   • fluticasone (FLONASE) 50 mcg/act nasal spray, 1 spray into each nostril 2 (two) times a day, Disp: 16 g, Rfl: 11  •  Fluticasone Furoate-Vilanterol (Breo Ellipta) 100-25 mcg/actuation inhaler, Inhale 1 puff daily Rinse mouth after use , Disp: 60 blister, Rfl: 3  •  levocetirizine (XYZAL) 5 MG tablet, Take 1 tablet (5 mg total) by mouth every evening, Disp: 30 tablet, Rfl: 11  •  Linzess 145 MCG CAPS, Take 145 mcg by mouth in the morning, Disp: , Rfl:   •  METHADONE HCL PO, Take 110 mg by mouth daily 150mg daily , Disp: , Rfl:   •  olopatadine (PATANOL) 0 1 % ophthalmic solution, Administer 1 drop to both eyes 2 (two) times a day, Disp: 5 mL, Rfl: 0  •  omeprazole (PriLOSEC) 20 mg delayed release capsule, Take 20 mg by mouth 2 (two) times a day , Disp: , Rfl:   •  ondansetron (ZOFRAN-ODT) 4 mg disintegrating tablet, , Disp: , Rfl:   •  pregabalin (LYRICA) 75 mg capsule, Take 1 capsule (75 mg total) by mouth 2 (two) times a day, Disp: 60 capsule, Rfl: 2  •  sucralfate (CARAFATE) 1 g tablet, Take 1 tablet (1 g total) by mouth 4 (four) times a day, Disp: 120 tablet, Rfl: 1  •  triamcinolone (KENALOG) 0 1 % cream, Apply topically 2 (two) times a day, Disp: 30 g, Rfl: 0  •  VILAZODONE HCL PO, Take by mouth, Disp: , Rfl:   •  linaCLOtide (Linzess) 72 MCG CAPS, Take 1 capsule by mouth in the morning (Patient not taking: Reported on 12/21/2022), Disp: 30 capsule, Rfl: 2  Allergies   Allergen Reactions   • Red Dye - Food Allergy Diarrhea, Nausea Only and Abdominal Pain   • Bee Venom Angioedema       Vitals: Blood pressure 142/100, pulse 72, height 5' 10" (1 778 m), weight (!) 162 kg (357 lb), SpO2 96 %  Body mass index is 51 22 kg/m²  Oxygen Therapy  SpO2: 96 %  Oxygen Therapy: None (Room air)      Physical Exam  Neck Circumference: 20     Labs: I have personally reviewed pertinent lab results      ABG:   Lab Results   Component Value Date    PHART 7 380 10/12/2022    AJU7IZE 47 3 (H) 10/12/2022    PO2ART 76 2 10/12/2022 QNJ9LNA 27 4 10/12/2022    BEART 1 4 10/12/2022    SOURCE Radial, Right 10/12/2022   ,   BNP: No results found for: BNP,   CBC:  Lab Results   Component Value Date    WBC 5 84 08/23/2022    HGB 12 6 08/23/2022    HCT 38 3 08/23/2022    MCV 80 (L) 08/23/2022     08/23/2022    EOSPCT 3 08/23/2022    EOSABS 0 19 08/23/2022    NEUTOPHILPCT 63 08/23/2022    LYMPHOPCT 28 08/23/2022   ,   CMP:   Lab Results   Component Value Date    SODIUM 138 08/23/2022    K 3 6 08/23/2022     08/23/2022    CO2 31 08/23/2022    ANIONGAP 11 04/01/2015    BUN 11 08/23/2022    CREATININE 0 80 08/23/2022    GLUCOSE 95 04/01/2015    CALCIUM 8 9 08/23/2022    AST 31 08/23/2022    ALT 42 08/23/2022    ALKPHOS 86 08/23/2022    PROT 8 0 04/01/2015    BILITOT 0 8 04/01/2015    EGFR 118 08/23/2022   ,   PT/INR:   Lab Results   Component Value Date    INR 1 01 04/28/2019   ,   Ferrtin: No components found for: FERRTIN,  Magensium: No results found for: MAGNESIUM,  11/19/2022 [facility] allergy panel -0 35 Alternaria alter [place]  0 1 to cockroach  0 20 Common ragweed  0 12 cockroach  0 12  D pteronyssinus  A1AT swab was normal MM    ABG 10/12/2022 - 7 38/47 3/76 2/27  This was performed on room air     Imaging and other studies: I have personally reviewed pertinent reports  and I have personally reviewed pertinent films in PACS    Pulmonary function testing 10/12/22  Results:  FEV1/FVC Ratio: 67 %  Forced Vital Capacity: 4 70 L    86 % predicted  FEV1: 3 17 L     71 % predicted     Lung volumes by body plethysmography:   Total Lung Capacity 108 % predicted   Residual volume 162 % predicted     DLCO corrected for patients hemoglobin level: 94 %        Sleep Study:  6/25/22  SLEEP:  Totals:  Patient slept for 295 0 minutes out of 405 5 minutes in bed representing a sleep efficiency of 72 7%  Pre-Treatment:  Patient slept 37 0 minutes out of 67 5 minutes in bed representing a sleep efficiency of 54 8%    Sleep architecture showed a sleep latency of 14 5 minutes and a REM sleep latency of N/A minutes  There was 55 4% Stage N1 noted  There was 44 6% Stage N2 noted  There was 0 0% Stage N3 noted  There was 0 0% REM sleep noted  The patient had 26 arousals for an average of 42 2 arousals per hour of sleep  Post-Treatment:  Patient slept 258 0 minutes out of 332 5 minutes in bed representing a sleep efficiency of 77 6%  There was 8 5% Stage N1 noted  There was 64 7% Stage N2 noted  There was 5 0% Stage N3 noted  There was 21 7% REM sleep noted  The patient had 60 arousals for an average of 14 0 arousals per hour of sleep      RESPIRATORY:  Pre-Treatment:  There were a total of 77 respiratory events made up of 16 obstructive apneas, 7 mixed apneas, 48 central apneas, and 6 hypopneas resulting in an apnea/hypopnea index was 124 9 events per hour of sleep  The AHI in the supine position was 124 9  The AHI during REM sleep was N/A  Post-Treatment:  Positive airway pressure was initiated at 5cm H2O pressure and titrated up to 23/19cm H2O pressure using the Medium ResMed AirFit f20 Full Face mask with no added humidity interface  With CPAP and BiPAP, overall of 359 respiratory events, 204 were central and 18 were mixed      OXIMETRY:  Totals: The baseline oxygen saturation with the patient awake was 93 2%  The mean oxygen saturation throughout the study was 92 3%  The amount of sleep time below 90% was 55 9 minutes  The lowest oxygen saturation was 81 0%  Pre-Treatment:  The mean oxygen saturation was 90 7%  The amount of sleep time below 90% was 11 6 minutes  The lowest oxygen saturation was 82 0%  Post-Treatment:  The mean oxygen saturation was 92 6%  The amount of sleep time below 90% was 44 3 minutes  The lowest oxygen saturation was 81 0%        BODY POSITION:  The patient slept 64 2% of the evening in the supine position, 8 4% on her left side, 27 5% on her right side, and 0 0% in the prone position       LEG MOVEMENT:  There were 0 PLMs; PLM index of 0 0; 0 PLMs associated with arousal; PLM w/arousal index of 0 0      SUMMARY:           Pre-Treatment Post-Treatment   Total Sleep Time (minutes) 37 0 258 0   Apnea/Hypopnea Index (AHI) 124 9 83 5   Central Apnea Index 77 8 47 4   Low oxygen saturation 82 0% 81 0%   PLMs index 0 0 0 0         9/3/2022  IMPRESSION:     1  Sleep apnea was better controlled with adaptive servo ventilation as compared to Bilevel PAP S/T  Respiratory events persisted with Bilevel PAP S/T, with a residual AHI in the moderate sleep apnea severity range  With ASV, at the EPAP of 15 cm h20, the AHI was 0 and baseline oxygen saturation was normal, without significant hypoxia  This was assessed in both  supine REM and NREM sleep  2  Normal baseline oxygen saturation with ASV and Bilevel PAP   3  The patient sleep well, with a high sleep efficiency, normal amount of Stage N1 sleep (light sleep), normal Stage N3 sleep (deep sleep), and normal REM sleep percentage  4  Periodic limb movements during sleep not present  5  No EKG abnormalities   RECOMMENDATION  Adaptive servoventilation with an EPAP of 15 cm h20, PS min 4, PS max 10, auto-backup     Transthoracic Echo:  •  Left Ventricle: Left ventricular cavity size is normal  Wall thickness is normal  The left ventricular ejection fraction is 60%  Systolic function is normal  Wall motion is normal  Diastolic function is normal   •  Right Ventricle: Systolic function is normal   •  Mitral Valve: There is trace regurgitation  •  Tricuspid Valve: There is trace regurgitation  •  Pericardium: There is no pericardial effusion  Major Mcnamara MD  Pulmonary, Critical Care and Sleep Medicine  [facility]     Portions of the record may have been created with voice recognition software   Occasional wrong word or "sound a like" substitutions may have occurred due to the inherent limitations of voice recognition software  Please read the chart carefully and recognize, using context, where substitutions have occurred

## 2022-12-21 NOTE — ASSESSMENT & PLAN NOTE
He has an arterial blood gas during wakefulness with PCO2 47 in 10/2022  Serum bicarbonate on basic metabolic panels have been in the low 30s  This is likely due to obesity hypoventilation syndrome, central sleep apnea due to methadone use  He needs improved compliance with his ASV  He needs to pursue further weight loss  He is tapering his methadone dose at a safe interval     Based on pulmonary function testing he has mild obstructive  lung disease that is likely a result of asthma and a history of cigarette smoking  He has significant bronchodilator response on this pulmonary function testing

## 2022-12-21 NOTE — ASSESSMENT & PLAN NOTE
He is tapering down his methadone at a safe interval   This is likely contributing to the high prevalence of central sleep apnea  He should continue ASV at all times of sleep  Compliance recently has been suboptimal   I stressed that he should use ASV at all times of sleep due to worsening hypercapnia      We will gauge compliance data at next visit

## 2022-12-22 ENCOUNTER — APPOINTMENT (OUTPATIENT)
Dept: PHYSICAL THERAPY | Facility: CLINIC | Age: 32
End: 2022-12-22

## 2022-12-24 ENCOUNTER — APPOINTMENT (OUTPATIENT)
Dept: LAB | Facility: MEDICAL CENTER | Age: 32
End: 2022-12-24

## 2022-12-24 DIAGNOSIS — G56.03 CARPAL TUNNEL SYNDROME, BILATERAL: ICD-10-CM

## 2022-12-24 LAB
ALBUMIN SERPL BCP-MCNC: 3.3 G/DL (ref 3.5–5)
ALP SERPL-CCNC: 73 U/L (ref 46–116)
ALT SERPL W P-5'-P-CCNC: 37 U/L (ref 12–78)
ANION GAP SERPL CALCULATED.3IONS-SCNC: 5 MMOL/L (ref 4–13)
APTT PPP: 26 SECONDS (ref 23–37)
AST SERPL W P-5'-P-CCNC: 23 U/L (ref 5–45)
BASOPHILS # BLD AUTO: 0.02 THOUSANDS/ÂΜL (ref 0–0.1)
BASOPHILS NFR BLD AUTO: 0 % (ref 0–1)
BILIRUB SERPL-MCNC: 0.3 MG/DL (ref 0.2–1)
BUN SERPL-MCNC: 19 MG/DL (ref 5–25)
CALCIUM ALBUM COR SERPL-MCNC: 9.4 MG/DL (ref 8.3–10.1)
CALCIUM SERPL-MCNC: 8.8 MG/DL (ref 8.3–10.1)
CHLORIDE SERPL-SCNC: 107 MMOL/L (ref 96–108)
CO2 SERPL-SCNC: 29 MMOL/L (ref 21–32)
CREAT SERPL-MCNC: 0.77 MG/DL (ref 0.6–1.3)
EOSINOPHIL # BLD AUTO: 0.19 THOUSAND/ÂΜL (ref 0–0.61)
EOSINOPHIL NFR BLD AUTO: 2 % (ref 0–6)
ERYTHROCYTE [DISTWIDTH] IN BLOOD BY AUTOMATED COUNT: 14 % (ref 11.6–15.1)
GFR SERPL CREATININE-BSD FRML MDRD: 119 ML/MIN/1.73SQ M
GLUCOSE P FAST SERPL-MCNC: 95 MG/DL (ref 65–99)
HCT VFR BLD AUTO: 38.7 % (ref 36.5–49.3)
HGB BLD-MCNC: 12.2 G/DL (ref 12–17)
IMM GRANULOCYTES # BLD AUTO: 0.02 THOUSAND/UL (ref 0–0.2)
IMM GRANULOCYTES NFR BLD AUTO: 0 % (ref 0–2)
INR PPP: 0.87 (ref 0.84–1.19)
LYMPHOCYTES # BLD AUTO: 2.67 THOUSANDS/ÂΜL (ref 0.6–4.47)
LYMPHOCYTES NFR BLD AUTO: 34 % (ref 14–44)
MCH RBC QN AUTO: 25.8 PG (ref 26.8–34.3)
MCHC RBC AUTO-ENTMCNC: 31.5 G/DL (ref 31.4–37.4)
MCV RBC AUTO: 82 FL (ref 82–98)
MONOCYTES # BLD AUTO: 0.42 THOUSAND/ÂΜL (ref 0.17–1.22)
MONOCYTES NFR BLD AUTO: 5 % (ref 4–12)
NEUTROPHILS # BLD AUTO: 4.44 THOUSANDS/ÂΜL (ref 1.85–7.62)
NEUTS SEG NFR BLD AUTO: 59 % (ref 43–75)
NRBC BLD AUTO-RTO: 0 /100 WBCS
PLATELET # BLD AUTO: 231 THOUSANDS/UL (ref 149–390)
PMV BLD AUTO: 10.1 FL (ref 8.9–12.7)
POTASSIUM SERPL-SCNC: 3.8 MMOL/L (ref 3.5–5.3)
PROT SERPL-MCNC: 7.1 G/DL (ref 6.4–8.4)
PROTHROMBIN TIME: 12 SECONDS (ref 11.6–14.5)
RBC # BLD AUTO: 4.73 MILLION/UL (ref 3.88–5.62)
SODIUM SERPL-SCNC: 141 MMOL/L (ref 135–147)
WBC # BLD AUTO: 7.76 THOUSAND/UL (ref 4.31–10.16)

## 2022-12-27 ENCOUNTER — TELEPHONE (OUTPATIENT)
Dept: PAIN MEDICINE | Facility: CLINIC | Age: 32
End: 2022-12-27

## 2022-12-28 ENCOUNTER — OFFICE VISIT (OUTPATIENT)
Dept: PHYSICAL THERAPY | Facility: CLINIC | Age: 32
End: 2022-12-28

## 2022-12-28 ENCOUNTER — OFFICE VISIT (OUTPATIENT)
Dept: DENTISTRY | Facility: CLINIC | Age: 32
End: 2022-12-28

## 2022-12-28 VITALS — DIASTOLIC BLOOD PRESSURE: 79 MMHG | SYSTOLIC BLOOD PRESSURE: 115 MMHG

## 2022-12-28 DIAGNOSIS — M25.562 CHRONIC PAIN OF BOTH KNEES: Primary | ICD-10-CM

## 2022-12-28 DIAGNOSIS — M54.50 CHRONIC BILATERAL LOW BACK PAIN WITHOUT SCIATICA: ICD-10-CM

## 2022-12-28 DIAGNOSIS — M54.12 CERVICAL RADICULOPATHY: ICD-10-CM

## 2022-12-28 DIAGNOSIS — G89.29 CHRONIC BILATERAL LOW BACK PAIN WITHOUT SCIATICA: ICD-10-CM

## 2022-12-28 DIAGNOSIS — K03.6 ACCRETIONS ON TEETH: Primary | ICD-10-CM

## 2022-12-28 DIAGNOSIS — G89.4 CHRONIC PAIN SYNDROME: ICD-10-CM

## 2022-12-28 DIAGNOSIS — G89.29 CHRONIC LOW BACK PAIN WITHOUT SCIATICA, UNSPECIFIED BACK PAIN LATERALITY: ICD-10-CM

## 2022-12-28 DIAGNOSIS — M54.50 CHRONIC LOW BACK PAIN WITHOUT SCIATICA, UNSPECIFIED BACK PAIN LATERALITY: ICD-10-CM

## 2022-12-28 DIAGNOSIS — M54.16 LUMBAR RADICULOPATHY: ICD-10-CM

## 2022-12-28 DIAGNOSIS — M54.2 NECK PAIN: ICD-10-CM

## 2022-12-28 DIAGNOSIS — G89.29 CHRONIC PAIN OF BOTH KNEES: Primary | ICD-10-CM

## 2022-12-28 DIAGNOSIS — M25.561 CHRONIC PAIN OF BOTH KNEES: Primary | ICD-10-CM

## 2022-12-28 NOTE — PROGRESS NOTES
Daily Note     Today's date: 2022  Patient name: Brenda Sun  : 1990  MRN: [id number]  Referring provider: Jovany Fu MD  Dx:   Encounter Diagnosis     ICD-10-CM    1  Chronic pain of both knees  M25 561     M25 562     G89 29       2  Chronic pain syndrome  G89 4       3  Chronic low back pain without sciatica, unspecified back pain laterality  M54 50     G89 29       4  Neck pain  M54 2       5  Chronic bilateral low back pain without sciatica  M54 50     G89 29       6  Cervical radiculopathy  M54 12       7  Lumbar radiculopathy  M54 16                      Subjective: Patient reports that his R LE has minimal radicular symptoms in lateral aspect  He got a second injection and lasted slightly longer  He is able to maintain strength by going to the gym  Objective: See treatment diary below  Assessment: Tolerated treatment well  Patient will be transitioning to HEP/gym program after today's session  Will call if he has any set backs  Plan: DC per PT       Precautions: chronic low back pain, knee pain         Manuals    Manual traction            Joint work            flexibility             Piriformis release   CM CM R side AS  CM R side   ST           Neuro Re-Ed           TA           TA+march           Dead Bug            Side Stepping           TA            Bird-dog           PPU NP    x15                 Ther Ex           NuStep/TM Bike 10' Bike 10' Bike 10' Bike 10' Bike 10'   Ham curls P8 3x10 P8 3x10 P8 3x10 P8 3x10 P8 3x10   Knee ext P4 3x10 P5 3x10 P5 3x10 P5 3x10 P5 3x10   CC LAE nv P8 3x10 P8 3x10 P8 3x10 P8 3x10   Lumbar ext 100# 3x12 100# 3x10 110# 3x10 110# 3x10 110# 3x12   Anti- Rotation Press nv P4 3x10 P5 3x10 P5 3x10 P5 3x10   HS stretch with strap          Piriformis & figure 4 stretching           PB squats          Machine Rows P5 3x10 P5 3x10 P5 3x10 P5 3x10 P5 3x10   Lat Pull down  P5 3x10 P5 3x10 P5 3x10 P5 3x10 P5 3x10   Foam rolling thoracic          Foam roll angels/Side to side          Box lifts                      Ther Activity                                   Gait Training                                   Modalities

## 2022-12-28 NOTE — PROGRESS NOTES
Adult Prophy  Chief Complaint   Patient presents with   • Routine Oral Cleaning    Pt is nervous about treatment due to having a bad experience with his pervious cleanings  Patient did well  Went over c-shaped flossing in mirror  Method Used:  · Prophy Method Used: Hand Scaling  · Polished  · Flossed    Radiographs Taken in Dexis: (Taken to assess periodontal health)  · None    Intra/Extra Oral Cancer Screening:  · Within normal limits    Oral Hygiene:  · Fair    Plaque:  · Light  · Moderate    Calculus:  · Generalized  · Light    Bleeding:  · Generalized  · Moderate    Stain:  · Light    Periodontal Charting: Showed patient sub calc specks and bone height  · Spot probing    Periodontal Classification:  · Generalized  · Mild  · Periodontal Disease    Oral Hygiene Instruction:    Went over daily routine and c-shaped flossing  No orders of the defined types were placed in this encounter  Next Visit:  6 Month [facility]  Dentist visit- resins  I will send an e-mail to [place] for treatment  Patient states he never received a call about his treatment plan/next steps  He tried calling but was not successful talking to anyone

## 2022-12-29 ENCOUNTER — ANESTHESIA EVENT (OUTPATIENT)
Dept: PERIOP | Facility: HOSPITAL | Age: 32
End: 2022-12-29

## 2022-12-29 NOTE — PRE-PROCEDURE INSTRUCTIONS
Pre-Surgery Instructions:   Medication Instructions   • albuterol (Ventolin HFA) 90 mcg/act inhaler Uses PRN- OK to take day of surgery   • busPIRone (BUSPAR) 10 mg tablet Take day of surgery  • fluticasone (FLONASE) 50 mcg/act nasal spray Take day of surgery  • Fluticasone Furoate-Vilanterol (Breo Ellipta) 100-25 mcg/actuation inhaler Take day of surgery  • levocetirizine (XYZAL) 5 MG tablet Take night before surgery   • Linzess 145 MCG CAPS Take day of surgery  • METHADONE HCL PO Take day of surgery  • olopatadine (PATANOL) 0 1 % ophthalmic solution Take day of surgery  • omeprazole (PriLOSEC) 20 mg delayed release capsule Take day of surgery  • ondansetron (ZOFRAN-ODT) 4 mg disintegrating tablet Uses PRN- OK to take day of surgery   • pregabalin (LYRICA) 75 mg capsule Take night before surgery   • sucralfate (CARAFATE) 1 g tablet Uses PRN- OK to take day of surgery   • triamcinolone (KENALOG) 0 1 % cream Hold day of surgery  • VILAZODONE HCL PO Take day of surgery  Pt verbalizes understanding of the following:    - avoid marijuana x12hrs  - avoid vaping x24hrs    - Bathing instructions, has chg, neck down, no genitals  - No lotions, powders, sprays, deodorant, jewelry, body piercings  - No shaving within 24hrs    - NPO after MN  - Avoid OTC non-directed Vit/ Suppl/ Herbals 7 days prior to surgery to ensure no drug interactions with perioperative surgical/ anesthetic meds  - Avoid NSAIDs 3 days prior  - Avoid ASA containing products 5 days prior    - Bring list of meds with last dose noted  - Bring insurance cards & photo id      - Notify surgeon if you develop any cold symptoms, change in your health history or develop open wounds     - Did the surgeon's office give you any other special instructions? No  - Did you require any clearances?  No

## 2022-12-29 NOTE — TELEPHONE ENCOUNTER
Caller: patient   Doctor/office: Amber Stauffer  #: [phone]    % of improvement: 70  Pain Scale (1-10): 4-5/10 tingling on the right knee only

## 2022-12-29 NOTE — PROGRESS NOTES
PT Discharge    Today's date: 2022  Patient name: Romona Closs  : 1990  MRN: [id number]  Referring provider: Ai Acosta MD  Dx:   Encounter Diagnosis     ICD-10-CM    1  Chronic pain of both knees  M25 561     M25 562     G89 29       2  Chronic pain syndrome  G89 4       3  Chronic low back pain without sciatica, unspecified back pain laterality  M54 50     G89 29       4  Neck pain  M54 2       5  Chronic bilateral low back pain without sciatica  M54 50     G89 29       6  Cervical radiculopathy  M54 12       7  Lumbar radiculopathy  M54 16           Assessment  Assessment details: Patient continues to make improvements both subjectively and objectively in lumbar ROM and overall core and LE strength  Piriformis mobility has significantly improved and he is consistently working on this at home  Patient demonstrates readiness to continue to progress towards LTGs by transitioning to independent management c/ HEP  He will be discharged from PT at this time, will contact us for further care as indicated     Impairments: abnormal gait, abnormal or restricted ROM, activity intolerance, impaired physical strength, pain with function, weight-bearing intolerance and poor posture   Functional limitations: walking, any physical activity, stairs, sleeping  Symptom irritability: moderateUnderstanding of Dx/Px/POC: good   Prognosis: fair    Goals  Stg: 3-4 weeks  Improve walking tolerance to 5 blocks prior to needing break - met  Complete transfers without extra time required and 50% reduction in pain - met  Reduce pain with sleeping by 30% - progressing  Assess knee pain and address accordingly - progressing    LT-6 weeks - met  Independent with trunk and leg strength program   Improving walking to 0 25 miles prior to rest and pain  complete trunk flex hold test for 20 seconds  Complete bird dog with good control  Complete stairs reciprocally and minimal pain in low back    Plan  Plan details: D/c patient to independent management c/ HEP  Planned therapy interventions: home exercise program        Subjective Evaluation    History of Present Illness  Mechanism of injury: 27year old male presenting to PT with long term low back and knee pain  Pain is generally R side low back and iliac crest area  Update 8/11: pt notes improvement with less back pain during typical daily activities  Gets some soreness when very active or bending forward a bit  Also with walking he gets discomfort but is now able to walk 8-9 blocks compared to just 1 or so at start of treatment prior to pain  UPDATE 12/29: Patient reports he has had minimal radicular symptoms and feels ready to discharge to independent gym strengthening program      UPDATE 9/13/22: The patient states that he feels therapy has been helping and he feels like he has been moving better  He notes that he has more pain with increased activity, feels better at rest       The patient states that he will be getting his MRI on Thursday for his neck and lower back  He will be going back to see the doctor next Tuesday for her follow up appointment  UPDATE 10/24: Patient reports he continues to feel like the increased strengthening is helping him  He has started to have occasional sharp pains in right lateral thigh but "it's quick " Is scheduled for a back injection on Friday 10/28 and has follow-up with pain management end of November  UPDATE 11/21: Since incorporating piriformis stretch into regimen at home and utilizing lax ball for STM to area, patient has experienced considerable relief  He continues to experience numbness of right thigh and occasionally get tingling in same area; however, has had minimal pain across the low back and any other regions of his right leg   Has been digging and working around his house to fix the basement, and does not "feel like (he) needs to take a rest day after completing the work " Follows-up with pain management next week  Pain  At best pain ratin  At worst pain ratin    Patient Goals  Patient goals for therapy: decreased pain, increased motion, increased strength, independence with ADLs/IADLs, return to sport/leisure activities and return to work          Objective     General Comments:      Lumbar Comments  Multi-seg movement patterns  Flex to ankles, improved overall segmental mobility of lumbar spine during flexion forward  Ext: hinges slightly at mid lumbar, improved symptoms c/ lumbar extension    SB: distal third tibia b/l - slight discomfort R lateral thigh with R sb    Lumbar  Mild pain with PA through mid and lower lumbar    Hip screen  Symmetrical hip mobility, no pain reproduction    Passive SLR: hamstring limitation (60 deg) but no pain reproduction    Strength/motor control  Core flex test: 15 sec                Precautions: chronic low back pain, knee pain

## 2023-01-03 ENCOUNTER — TELEPHONE (OUTPATIENT)
Dept: PULMONOLOGY | Facility: CLINIC | Age: 33
End: 2023-01-03

## 2023-01-03 ENCOUNTER — OFFICE VISIT (OUTPATIENT)
Dept: DENTISTRY | Facility: CLINIC | Age: 33
End: 2023-01-03

## 2023-01-03 VITALS — TEMPERATURE: 97.7 F | SYSTOLIC BLOOD PRESSURE: 126 MMHG | DIASTOLIC BLOOD PRESSURE: 80 MMHG | HEART RATE: 73 BPM

## 2023-01-03 DIAGNOSIS — Z01.20 ENCOUNTER FOR DENTAL EXAMINATION: ICD-10-CM

## 2023-01-03 DIAGNOSIS — K02.9 DENTAL CARIES: Primary | ICD-10-CM

## 2023-01-03 NOTE — PROGRESS NOTES
Treatment Planning Presentation    Tereza Nguyễn is a 29yo  male that presented to clinic for Treatment Plan presentation  Pt CC: "I just want the pain in my teeth to go away and fill my cavities and get someone to finally finish this tooth right here (pointing to #9) "    PMHR, no changes  ASA II  Pain = yes, #12 pain on biting  BP = 126/80mmHg  Translation = none needed    Clinical: Tooth #12 mesial amalgam fracture that was mobile and causing pt pain  Tooth #5 still causing pt pain    Radiographic:  (2) PAX #5 and #12  XR shows lucency encroaching upon pulp chamber on #5 and #12    TxP:      Resins   Endo Tx  4-MOD caries   5-RCT + P&C + crown   6- D and M caries   9 - P&C + crown   7- Distal caries   12 - RCT (after removing existing amalgam eval for Endo)  11 - ML caries   12 - MO (re-do amalgam)   14 - MOD caries   19 - D hole (missing filling?)      Palliative Tooth #12    Applied topical benzocaine, administered 0 5 carps 4% articaine 1:100k epi via MSA block    Sectioned fractured amalgam on mesial of tooth #5 with 330 carbide on high speed  Placed tofflemyer  Isolation with cotton rolls  Caries not removed  Applied Gluma with light air dry  Filled the mesial of #12 with GIC and burnished with wet cotton swab  Refined with finishing burs  Verified occlusion and contacts  POI given  Email sent to Dr Katherin Alaniz at [facility] to coordinate tx  Pre-auths sent out for RCT + P&C + crown on #5, 9, and 12  Pt left satisfied and ambulatory        NV: #[id number]

## 2023-01-04 DIAGNOSIS — J45.40 MODERATE PERSISTENT ASTHMA WITHOUT COMPLICATION: Primary | ICD-10-CM

## 2023-01-04 NOTE — TELEPHONE ENCOUNTER
Breo denied- covered alternatives are-   Advair HFA  Anne Edwards  Generic Airduo Respiclick  Generic Advair Diskus  Brand Symbicort

## 2023-01-09 ENCOUNTER — ANESTHESIA (OUTPATIENT)
Dept: PERIOP | Facility: HOSPITAL | Age: 33
End: 2023-01-09

## 2023-01-09 ENCOUNTER — HOSPITAL ENCOUNTER (OUTPATIENT)
Facility: HOSPITAL | Age: 33
Setting detail: OUTPATIENT SURGERY
Discharge: HOME | End: 2023-01-09
Attending: STUDENT IN AN ORGANIZED HEALTH CARE EDUCATION/TRAINING PROGRAM | Admitting: STUDENT IN AN ORGANIZED HEALTH CARE EDUCATION/TRAINING PROGRAM

## 2023-01-09 VITALS
HEART RATE: 79 BPM | SYSTOLIC BLOOD PRESSURE: 117 MMHG | TEMPERATURE: 97.7 F | DIASTOLIC BLOOD PRESSURE: 70 MMHG | RESPIRATION RATE: 14 BRPM | BODY MASS INDEX: 45.1 KG/M2 | OXYGEN SATURATION: 96 % | HEIGHT: 70 IN | WEIGHT: 315 LBS

## 2023-01-09 DIAGNOSIS — Z47.89 AFTERCARE FOLLOWING SURGERY OF THE MUSCULOSKELETAL SYSTEM: Primary | ICD-10-CM

## 2023-01-09 RX ORDER — SODIUM CHLORIDE, SODIUM LACTATE, POTASSIUM CHLORIDE, CALCIUM CHLORIDE 600; 310; 30; 20 MG/100ML; MG/100ML; MG/100ML; MG/100ML
INJECTION, SOLUTION INTRAVENOUS CONTINUOUS PRN
Status: DISCONTINUED | OUTPATIENT
Start: 2023-01-09 | End: 2023-01-09

## 2023-01-09 RX ORDER — LIDOCAINE HYDROCHLORIDE 10 MG/ML
INJECTION, SOLUTION EPIDURAL; INFILTRATION; INTRACAUDAL; PERINEURAL AS NEEDED
Status: DISCONTINUED | OUTPATIENT
Start: 2023-01-09 | End: 2023-01-09 | Stop reason: HOSPADM

## 2023-01-09 RX ORDER — CHLORHEXIDINE GLUCONATE 0.12 MG/ML
15 RINSE ORAL ONCE
Status: COMPLETED | OUTPATIENT
Start: 2023-01-09 | End: 2023-01-09

## 2023-01-09 RX ORDER — MIDAZOLAM HYDROCHLORIDE 2 MG/2ML
INJECTION, SOLUTION INTRAMUSCULAR; INTRAVENOUS AS NEEDED
Status: DISCONTINUED | OUTPATIENT
Start: 2023-01-09 | End: 2023-01-09

## 2023-01-09 RX ORDER — ONDANSETRON 2 MG/ML
4 INJECTION INTRAMUSCULAR; INTRAVENOUS ONCE AS NEEDED
Status: DISCONTINUED | OUTPATIENT
Start: 2023-01-09 | End: 2023-01-09 | Stop reason: HOSPADM

## 2023-01-09 RX ORDER — CHLORHEXIDINE GLUCONATE 4 G/100ML
SOLUTION TOPICAL DAILY PRN
Status: DISCONTINUED | OUTPATIENT
Start: 2023-01-09 | End: 2023-01-09 | Stop reason: HOSPADM

## 2023-01-09 RX ORDER — GLYCOPYRROLATE 0.2 MG/ML
INJECTION INTRAMUSCULAR; INTRAVENOUS AS NEEDED
Status: DISCONTINUED | OUTPATIENT
Start: 2023-01-09 | End: 2023-01-09

## 2023-01-09 RX ORDER — FENTANYL CITRATE 50 UG/ML
INJECTION, SOLUTION INTRAMUSCULAR; INTRAVENOUS AS NEEDED
Status: DISCONTINUED | OUTPATIENT
Start: 2023-01-09 | End: 2023-01-09

## 2023-01-09 RX ORDER — ACETAMINOPHEN 500 MG
1000 TABLET ORAL EVERY 8 HOURS
Qty: 30 TABLET | Refills: 0 | Status: SHIPPED | OUTPATIENT
Start: 2023-01-09 | End: 2023-01-20

## 2023-01-09 RX ORDER — FENTANYL CITRATE/PF 50 MCG/ML
25 SYRINGE (ML) INJECTION
Status: DISCONTINUED | OUTPATIENT
Start: 2023-01-09 | End: 2023-01-09 | Stop reason: HOSPADM

## 2023-01-09 RX ORDER — LIDOCAINE HYDROCHLORIDE 10 MG/ML
INJECTION, SOLUTION EPIDURAL; INFILTRATION; INTRACAUDAL; PERINEURAL AS NEEDED
Status: DISCONTINUED | OUTPATIENT
Start: 2023-01-09 | End: 2023-01-09

## 2023-01-09 RX ORDER — BUPIVACAINE HYDROCHLORIDE 5 MG/ML
INJECTION, SOLUTION EPIDURAL; INTRACAUDAL AS NEEDED
Status: DISCONTINUED | OUTPATIENT
Start: 2023-01-09 | End: 2023-01-09 | Stop reason: HOSPADM

## 2023-01-09 RX ORDER — ONDANSETRON 2 MG/ML
INJECTION INTRAMUSCULAR; INTRAVENOUS AS NEEDED
Status: DISCONTINUED | OUTPATIENT
Start: 2023-01-09 | End: 2023-01-09

## 2023-01-09 RX ORDER — SODIUM CHLORIDE, SODIUM LACTATE, POTASSIUM CHLORIDE, CALCIUM CHLORIDE 600; 310; 30; 20 MG/100ML; MG/100ML; MG/100ML; MG/100ML
125 INJECTION, SOLUTION INTRAVENOUS CONTINUOUS
Status: DISCONTINUED | OUTPATIENT
Start: 2023-01-09 | End: 2023-01-09 | Stop reason: HOSPADM

## 2023-01-09 RX ORDER — ACETAMINOPHEN 325 MG/1
650 TABLET ORAL EVERY 6 HOURS PRN
Status: DISCONTINUED | OUTPATIENT
Start: 2023-01-09 | End: 2023-01-09 | Stop reason: HOSPADM

## 2023-01-09 RX ORDER — PROPOFOL 10 MG/ML
INJECTION, EMULSION INTRAVENOUS CONTINUOUS PRN
Status: DISCONTINUED | OUTPATIENT
Start: 2023-01-09 | End: 2023-01-09

## 2023-01-09 RX ORDER — NAPROXEN 500 MG/1
500 TABLET ORAL 2 TIMES DAILY WITH MEALS
Qty: 60 TABLET | Refills: 0 | Status: SHIPPED | OUTPATIENT
Start: 2023-01-09

## 2023-01-09 RX ORDER — PROPOFOL 10 MG/ML
INJECTION, EMULSION INTRAVENOUS AS NEEDED
Status: DISCONTINUED | OUTPATIENT
Start: 2023-01-09 | End: 2023-01-09

## 2023-01-09 RX ADMIN — GLYCOPYRROLATE 0.2 MG: 0.2 INJECTION, SOLUTION INTRAMUSCULAR; INTRAVENOUS at 07:28

## 2023-01-09 RX ADMIN — Medication 10 MG: at 07:39

## 2023-01-09 RX ADMIN — FENTANYL CITRATE 50 MCG: 50 INJECTION, SOLUTION INTRAMUSCULAR; INTRAVENOUS at 07:36

## 2023-01-09 RX ADMIN — SODIUM CHLORIDE, SODIUM LACTATE, POTASSIUM CHLORIDE, AND CALCIUM CHLORIDE: .6; .31; .03; .02 INJECTION, SOLUTION INTRAVENOUS at 07:25

## 2023-01-09 RX ADMIN — ONDANSETRON 4 MG: 2 INJECTION INTRAMUSCULAR; INTRAVENOUS at 07:40

## 2023-01-09 RX ADMIN — PROPOFOL 50 MG: 10 INJECTION, EMULSION INTRAVENOUS at 07:32

## 2023-01-09 RX ADMIN — CHLORHEXIDINE GLUCONATE 0.12% ORAL RINSE 15 ML: 1.2 LIQUID ORAL at 06:48

## 2023-01-09 RX ADMIN — PROPOFOL 50 MG: 10 INJECTION, EMULSION INTRAVENOUS at 07:54

## 2023-01-09 RX ADMIN — LIDOCAINE HYDROCHLORIDE 25 MG: 10 INJECTION, SOLUTION EPIDURAL; INFILTRATION; INTRACAUDAL; PERINEURAL at 07:32

## 2023-01-09 RX ADMIN — FENTANYL CITRATE 50 MCG: 50 INJECTION, SOLUTION INTRAMUSCULAR; INTRAVENOUS at 07:32

## 2023-01-09 RX ADMIN — CEFAZOLIN 3000 MG: 1 INJECTION, POWDER, FOR SOLUTION INTRAMUSCULAR; INTRAVENOUS at 07:28

## 2023-01-09 RX ADMIN — MIDAZOLAM HYDROCHLORIDE 2 MG: 1 INJECTION, SOLUTION INTRAMUSCULAR; INTRAVENOUS at 07:28

## 2023-01-09 RX ADMIN — PROPOFOL 75 MCG/KG/MIN: 10 INJECTION, EMULSION INTRAVENOUS at 07:32

## 2023-01-09 RX ADMIN — Medication 40 MG: at 07:35

## 2023-01-09 RX ADMIN — PROPOFOL 50 MG: 10 INJECTION, EMULSION INTRAVENOUS at 07:47

## 2023-01-09 NOTE — H&P
Assessment:   Diagnosis ICD-10-CM Associated Orders   1  Carpal tunnel syndrome, bilateral  G56 03 Case request operating room: RELEASE CARPAL TUNNEL     CBC and differential     Comprehensive metabolic panel     APTT     Protime-INR     Case request operating room: RELEASE CARPAL TUNNEL          Plan:  Reviewed today's physical exam findings and x-ray findings with patient at time of visit  EMG 2 Limb Upper Extremity taken on 10/05/2022 were reviewed and showed Chronic C6 radiculopathy on the left as evidenced by the decreased recruitment and chronic denervation changes in the biceps and pronator teres  Bilateral mild median nerve compression neuropathy at the wrist with demyelinative changes, consistent with a diagnosis of carpal tunnel syndrome  Risks and benefits of a Left carpal tunnel release were explained to patient in detail today and questions were answered to their satisfaction  Patient gave their informed consent for above procedure  He will schedule an official date with the surgery scheduler  Will discuss referral to spine specialist after patient brings the MRI of cervical spine to a [address] [place] [address]  Patient expresses understanding and is in agreement with this treatment plan  The patient was given the opportunity to ask questions or present concerns  To do next visit:  No follow-ups on file  The above stated was discussed in layman's terms and the patient expressed understanding  All questions were answered to the patient's satisfaction  Subjective:   Haley Mccray is a 34 y o  male who presents today for planned carpal tunnel release on L wrist  No changes in PE or medical status since last visit  Review of systems negative unless otherwise specified in HPI  Review of Systems   Constitutional: Negative for chills and fever  HENT: Negative for ear pain and sore throat  Eyes: Negative for pain and visual disturbance     Respiratory: Negative for cough and shortness of breath  Cardiovascular: Negative for chest pain and palpitations  Gastrointestinal: Negative for abdominal pain and vomiting  Genitourinary: Negative for dysuria and hematuria  Musculoskeletal: Negative for arthralgias and back pain  Skin: Negative for color change and rash  Neurological: Negative for seizures and syncope  All other systems reviewed and are negative        Past Medical History:   Diagnosis Date   • Allergic    • Anemia 06/24/2018   • Anxiety     from records   • Arthritis    • Asthma    • Benign essential hypertension 11/17/2016   • Bipolar 1 disorder ([facility] )     from records   • Chronic pain    • Chronic pain disorder     back/ knees/ hip   • Claustrophobia 03/15/2018   • Community acquired pneumonia 06/24/2018   • CPAP (continuous positive airway pressure) dependence    • Depressed 07/14/2016   • Depression     from records   • GERD (gastroesophageal reflux disease)    • Hepatitis C virus infection 08/14/2014   • Heroin abuse ([facility] ) 07/24/2018   • Infectious viral hepatitis    • Obesity 10/12/2016   • Obstructive sleep apnea     from records   • Sleep disorder    • Substance abuse [facility])        Past Surgical History:   Procedure Laterality Date   • EPIDURAL BLOCK INJECTION Right 10/28/2022    Procedure: BLOCK / INJECTION EPIDURAL STEROID LUMBAR  right L4-5 and L5-S1 TFESI;  Surgeon: Rolando Espinoza MD;  Location: [facility];  Service: Pain Management    • EPIDURAL BLOCK INJECTION Right 12/20/2022    Procedure: BLOCK / INJECTION EPIDURAL STEROID LUMBAR  right  L4-5 and L5-S1 TFESI;  Surgeon: Rolando Espinoza MD;  Location: [facility];  Service: Pain Management    • WV INCISION & DRAINAGE ABSCESS COMPLICATED/MULTIPLE Left 5/2/2019    Procedure: INCISION AND DRAINAGE (I&D) EXTREMITY;  Surgeon: Tiara Navas MD;  Location: [facility];  Service: Orthopedics   • WV LAPAROSCOPY COLECTOMY PARTIAL W/ANASTOMOSIS Left 5/21/2020    Procedure: LAPAROSCOPIC LEFT HEMICOLECTOMY TAKEDOWN SPLENIC FLECTURE, COLORECTAL ANASTOMOSIS ;  Surgeon: Mirian Galindo MD;  Location: [facility];  Service: Colorectal   • WISDOM TOOTH EXTRACTION         Family History   Problem Relation Age of Onset   • Diabetes Mother    • Restless legs syndrome Mother    • Sleep apnea Mother    • Colon cancer Father    • Alzheimer's disease Maternal Grandmother    • Depression Family        Social History     Occupational History   • Not on file   Tobacco Use   • Smoking status: Former     Packs/day: 1 00     Years: 13 00     Pack years: 13 00     Types: E-Cigarettes, Cigarettes   • Smokeless tobacco: Never   • Tobacco comments:     vapes   Vaping Use   • Vaping Use: Every day   • Substances: Nicotine   Substance and Sexual Activity   • Alcohol use: Never   • Drug use: Not Currently     Types: Heroin, Marijuana, Prescription     Comment: on Methadone; occaionally marijuana   • Sexual activity: Not Currently         Current Facility-Administered Medications:   •  ceFAZolin (ANCEF) 3,000 mg in dextrose 5 % 100 mL IVPB, 3,000 mg, Intravenous, Once, Florence Payor, DO  •  chlorhexidine (HIBICLENS) 4 % topical liquid, , Topical, Daily PRN, Florence Payor, DO    Allergies   Allergen Reactions   • Red Dye - Food Allergy Diarrhea, Nausea Only and Abdominal Pain   • Bee Venom Angioedema          Vitals:    01/09/23 0642   BP: 111/67   Pulse: 72   Resp: 18   Temp: (!) 97 4 °F (36 3 °C)   SpO2: 95%       Objective:                    Ortho Exam  bilateral wrist -  Patient presents with no obvious anatomical deformity  Skin is warm and dry to touch with no signs of erythema, ecchymosis, infection  No TTP  MMT: 4/5 throughout  No soft tissue swelling or effusion noted  Full FDS, FDP, extensor mechanisms are intact  - Thenar atrophy, - intrinsic atrophy  + Tinel's at carpal tunnel  + Phalen's sign  + Carpal Tunnel Compression  - AP / LM Drawer  - Finklestein  - Ulnar / Radial impaction   - Gallegos's  Demonstrates normal wrist, elbow, shoulder or motion  Forearm compartments are soft and supple  2+ Distal radial pulse with brisk capillary refill to the fingers  Radial, median, ulnar motor and sensory distribution intact  Sensation to light touch intact distally      Diagnostics, reviewed and taken today if performed as documented: The attending physician has personally reviewed the pertinent films in PACS and interpretation is as follows:    EMG 2 Limb Upper Extremity taken on 10/05/2022 were reviewed and showed Chronic C6 radiculopathy on the left as evidenced by the decreased recruitment and chronic denervation changes in the biceps and pronator teres  Bilateral mild median nerve compression neuropathy at the wrist with demyelinative changes, consistent with a diagnosis of carpal tunnel syndrome  Procedures, if performed today:    None performed    Portions of the record may have been created with voice recognition software  Occasional wrong word or "sound a like" substitutions may have occurred due to the inherent limitations of voice recognition software  Read the chart carefully and recognize, using context, where substitutions have occurred

## 2023-01-09 NOTE — OP NOTE
OPERATIVE REPORT  PATIENT NAME: Susan Braswell    :  1990  MRN: [id number]  Pt Location: [facility] OR ROOM 03    SURGERY DATE: 2023    Surgeon(s) and Role:     * Grecia Valles DO - Primary  Daniela FAY- Assisting    Preop Diagnosis:  Carpal tunnel syndrome, bilateral [G56 03]    Post-Op Diagnosis Codes:     * Carpal tunnel syndrome, bilateral [G56 03]    Procedure(s) (LRB):  RELEASE CARPAL TUNNEL (Left)    Specimen(s):  * No specimens in log *    Estimated Blood Loss:   Minimal    Drains:  * No LDAs found *    Anesthesia Type:   Regional    Operative Indications:  Carpal tunnel syndrome, bilateral [G56 03]    Patient is a 27-year-old male previous opioid abuse history, patient had consistent numbness tingling weakness in bilateral hands, an EMG was previously performed identifying cervical radiculopathy as well as bilateral median nerve compression neuropathies at the wrist   He was indicated for carpal tunnel release of the left side  Risk benefits and alternatives were explained to the patient there are but are not limited to blood loss, blood clot, infection, wound complications, damage to arteries and nerves, need for future surgery  We reached a mutual decision to proceed with surgery    Operative Findings:  Left carpal tunnel syndrome    Complications:   None    Procedure and Technique:  Patient was brought to the operating room where he was kept on the stretcher, a hand table was attached to the stretcher  He was sedated in the standard manner  3 g of Ancef were given, tourniquet was placed on left upper extremity, left upper extremities prepped and draped in standard sterile fashion, timeout was performed identifying all team members in the room and the left upper extremity as the proper operative extremity  Tourniquet was inflated to 250 mmHg after Esmarch tourniquet was used to exsanguinate the limb  1% lidocaine was injected in the area of the surgical incision    Approximately 3 cm incision was made just proximal to Tate's cardinal line and distal to the wrist crease  Sharply incised down through palmar cutaneous fascia as well as deep fat, fat was swept away and palmaris brevis musculature was able to be seen deeply on top of the transverse carpal ligament, muscle was swept away, a small hole was made sharply with a knife and the transverse carpal ligament, hemostat was used to open up the hole, a Mitchell as well as a sled were used to place underneath the transverse carpal ligament and the ligament was incised both proximally and distally protecting the median nerve with alternating Littler scissors as well as a 15 blade knife  Entire transverse carpal ligament was completely released with no adhesions surrounding the median nerve with proximally and distally  Deep palmar arch was intact  Median nerve was free without adhesions  Wound was copiously irrigated with normal saline, tourniquet was let down, hemostasis was obtained  Marcaine was injected in the subcutaneous tissues  Skin was closed with 3-0 nylon, skin was cleansed and dried, Xeroform 4 x 4 soft roll and Ace or uses dressing    Patient was awoken from anesthesia anesthesia and sent to the PACU without complication     I was present for the entire procedure, A qualified resident physician was not available and A physician assistant was required during the procedure for retraction tissue handling,dissection and suturing    Patient Disposition:  PACU  and extubated and stable        SIGNATURE: Moiz Ma DO  DATE: January 9, 2023  TIME: 8:16 AM

## 2023-01-09 NOTE — DISCHARGE INSTR - AVS FIRST PAGE
Post Operative Instructions    You have had surgery on your arm today, please read and follow the information below:  Elevate your hand above your elbow during the next 24-48 hours to help with swelling  Place your hand and arm over your head with motion at your shoulder three times a day  Do not apply any cream/ointment/oil to your incisions including antibiotics  Do not soak your hands in standing water (dishwater, tubs, Jacuzzi's, pools, etc ) until given permission (typically 2-3 weeks after injury)    Call the office if you notice any:  Increased numbness or tingling of your hand or fingers that is not relieved with elevation  Increasing pain that is not controlled with medication  Difficulty chewing, breathing, swallowing  Chest pains or shortness of breath  Fever over 101 4 degrees  Bandage: Do NOT remove bandage until follow-up appointment  Motion: Move fingers into a fist 5 times a day, DO NOT move any splinted fingers  Weight bearing status: The operated extremity should be non-weight bearing until further notice  Ice: Ice for 10 minutes every hour as needed for swelling x 24 hours  Sling: No sling necessary  Medications:   Naproxen 220 mg two times a day   Tylenol Extended Release 650 mg every 8 hours     Follow-up Appointment: 10-14 days  Please call the office if you have any questions or concerns regarding your post-operative care

## 2023-01-09 NOTE — ANESTHESIA PREPROCEDURE EVALUATION
Procedure:  RELEASE CARPAL TUNNEL (Left: Wrist)    Relevant Problems   CARDIO   (+) Essential hypertension      GI/HEPATIC   (+) Acid reflux   (+) Chronic hepatitis C without hepatic coma (HCC)      MUSCULOSKELETAL   (+) Chronic bilateral low back pain without sciatica   (+) Upper back pain      NEURO/PSYCH   (+) Anxiety   (+) Chronic bilateral low back pain without sciatica   (+) Chronic pain syndrome   (+) Claustrophobia   (+) Depressed   (+) Generalized anxiety disorder      PULMONARY   (+) Chronic hypercapnic respiratory failure (HCC)   (+) Chronic intermittent hypoxia with obstructive sleep apnea   (+) Mild persistent asthma without complication   (+) CATA (obstructive sleep apnea)             Anesthesia Plan  ASA Score- 2     Anesthesia Type- IV sedation with anesthesia with ASA Monitors  Additional Monitors:   Airway Plan:           Plan Factors-    Chart reviewed  Induction- intravenous  Postoperative Plan-     Informed Consent- Anesthetic plan and risks discussed with patient  I personally reviewed this patient with the CRNA  Discussed and agreed on the Anesthesia Plan with the CRNA  Gerhardt Sep

## 2023-01-09 NOTE — ANESTHESIA POSTPROCEDURE EVALUATION
Post-Op Assessment Note    CV Status:  Stable    Pain management: adequate     Mental Status:  Sleepy   Hydration Status:  Euvolemic   PONV Controlled:  Controlled   Airway Patency:  Patent      Post Op Vitals Reviewed: Yes      Staff: CRNA         No notable events documented      BP   131/85   Temp     Pulse  85   Resp   18   SpO2   98%

## 2023-01-20 ENCOUNTER — OFFICE VISIT (OUTPATIENT)
Dept: OBGYN CLINIC | Facility: CLINIC | Age: 33
End: 2023-01-20

## 2023-01-20 VITALS
BODY MASS INDEX: 45.1 KG/M2 | HEART RATE: 65 BPM | DIASTOLIC BLOOD PRESSURE: 84 MMHG | SYSTOLIC BLOOD PRESSURE: 123 MMHG | HEIGHT: 70 IN | WEIGHT: 315 LBS

## 2023-01-20 DIAGNOSIS — G56.03 CARPAL TUNNEL SYNDROME, BILATERAL: Primary | ICD-10-CM

## 2023-01-20 RX ORDER — VILAZODONE HYDROCHLORIDE 20 MG/1
TABLET ORAL
COMMUNITY
Start: 2023-01-10

## 2023-01-20 RX ORDER — ESZOPICLONE 3 MG/1
TABLET, FILM COATED ORAL
COMMUNITY
Start: 2023-01-06

## 2023-01-20 NOTE — PROGRESS NOTES
ORTHOPEDIC POST OPERATIVE NOTE    Patient: Ko Auguste   Age: 34 y o  Sex: male  Gender: male  Date of Visit: 01/20/23    DOS: 1/9/23  Procedure performed: left carpal tunnel release    Chief Complaint   Patient presents with   • Left Wrist - Post-op       Subjective:  34 y o  male POD # 11 s/p left carpal tunnel release     Pain/Complaints: none   Numbness/weakness extremity: none, has improved since surgery    Physical Therapy Progress: n/a, has been doing home exercises   DVT ppx: n/a   Abx: n/a   Eating/Drinking improving  Bowel/Bladder: WNL   Denies fever/chills, numbness/tingling, injury/trauma, night sweats    Physical Exam:  Height: 5' 10" (177 8 cm)  Weight - Scale: (!) 164 kg (361 lb 3 2 oz)  BMI (Calculated): 51 8  BSA (Calculated - m2): 2 68 sq meters         Vitals:    01/20/23 1027   BP: 123/84   Pulse: 65     Body mass index is 51 83 kg/m²  General: alert and oriented; well nourished/well developed; no apparent distress  Extremity left hand: minimal swelling throughout  Dressing/Surgical site: Dressing removed, suture line intact, no drainage or erythema of incision site, suture tails/staples removed without complication, dressed with steristrips/ proximal portion of incision with slight gapping  Motor/Senstation: Motor grossly intact, SILT distally  Brisk cap refill    Impression/Plan: 34 y o  male history of 11days s/p left carpal  Tunnel release  1  S/p left carpal tunnel release  - Pain - continue current regimen  -Wound Care- OK to shower  Dab dry with towel  Steristrips will fall off on their own  No soaking/bathing for another 1-2 weeks  - continue Ice packs/elevation  - WBAT with assistance as needed  - to begin PT at future appointments       FOLLOW UP: Return in about 4 weeks (around 2/17/2023)  To discuss release of R carpal tunnel       Gricelda Yoder PA-C   1/20/2023 11:31 AM

## 2023-01-21 ENCOUNTER — APPOINTMENT (OUTPATIENT)
Dept: LAB | Facility: MEDICAL CENTER | Age: 33
End: 2023-01-21

## 2023-01-21 DIAGNOSIS — R63.5 WEIGHT GAIN: ICD-10-CM

## 2023-01-21 DIAGNOSIS — F41.9 ANXIETY: ICD-10-CM

## 2023-01-21 LAB
T4 FREE SERPL-MCNC: 1 NG/DL (ref 0.76–1.46)
TSH SERPL DL<=0.05 MIU/L-ACNC: 2.58 UIU/ML (ref 0.45–4.5)

## 2023-01-23 ENCOUNTER — OFFICE VISIT (OUTPATIENT)
Dept: PAIN MEDICINE | Facility: CLINIC | Age: 33
End: 2023-01-23

## 2023-01-23 VITALS
DIASTOLIC BLOOD PRESSURE: 80 MMHG | HEART RATE: 87 BPM | BODY MASS INDEX: 45.1 KG/M2 | SYSTOLIC BLOOD PRESSURE: 113 MMHG | WEIGHT: 315 LBS | HEIGHT: 70 IN

## 2023-01-23 DIAGNOSIS — M54.50 CHRONIC BILATERAL LOW BACK PAIN WITHOUT SCIATICA: ICD-10-CM

## 2023-01-23 DIAGNOSIS — G89.4 CHRONIC PAIN SYNDROME: Primary | ICD-10-CM

## 2023-01-23 DIAGNOSIS — M54.16 LUMBAR RADICULOPATHY: ICD-10-CM

## 2023-01-23 DIAGNOSIS — G89.29 CHRONIC BILATERAL LOW BACK PAIN WITHOUT SCIATICA: ICD-10-CM

## 2023-01-23 NOTE — PROGRESS NOTES
Assessment:  1  Chronic pain syndrome    2  Chronic bilateral low back pain without sciatica    3  Lumbar radiculopathy        Plan:  While the patient was in the office today, I did have a thorough conversation regarding their chronic pain syndrome, medication management, and treatment plan options  Patient is being seen for a follow-up visit  He underwent second right-sided L4-5 and L5-S1 transforaminal epidural steroid injection on 12/20/2022  Overall, he is reporting about 70% improvement in his symptoms  Has become much more tolerable  Patient previously participated in physical therapy and states that he does exercises at home  We discussed the importance of a lifelong commitment to daily exercises geared toward lumbar core stretching and strengthening  The patient was agreeable and verbalized an understanding  Repeat right-sided L4-5 and L5-S1 transforaminal epidural steroid injection as needed  Continue Lyrica 75 mg twice daily as prescribed by his PCP  The patient will follow-up in 2 months for medication prescription refill and reevaluation  The patient was advised to contact the office should their symptoms worsen in the interim  The patient was agreeable and verbalized an understanding  My impressions and treatment recommendations were discussed in detail with the patient who verbalized understanding and had no further questions  Discharge instructions were provided  I personally saw and examined the patient and I agree with the above discussed plan of care  No orders of the defined types were placed in this encounter  New Medications Ordered This Visit   Medications   • eszopiclone (LUNESTA) 3 MG tablet   • vilazodone (VIIBRYD) 20 mg tablet       History of Present Illness:  Amando Amato is a 34 y o  male who presents for a follow up office visit in regards to Follow-up, Back Pain, Foot Pain, and Knee Pain     The patient’s current symptoms include complaints of low back and right leg pain  Current pain levels a 4/10  Quality pain is described as burning, dull, aching, pressure-like, numb, pins-and-needles  PCP prescribes Lyrica 75 mg twice daily  Pain is relieved by 70% between the medication and recent right-sided L4-5 and L5-S1 transforaminal epidural steroid injection  I have personally reviewed and/or updated the patient's past medical history, past surgical history, family history, social history, current medications, allergies, and vital signs today  Review of Systems   Constitutional: Negative  HENT: Negative  Eyes: Negative  Respiratory: Negative  Cardiovascular: Negative  Gastrointestinal: Positive for constipation  Endocrine: Negative  Genitourinary: Negative  Musculoskeletal: Negative  Joint stiffness  Pain Extremity  Decrease ROM   Skin: Negative  Allergic/Immunologic: Negative  Neurological: Positive for weakness  Hematological: Negative  Psychiatric/Behavioral: Negative          Patient Active Problem List   Diagnosis   • Acid reflux   • Allergic rhinitis   • Essential hypertension   • Bipolar disorder ([place] [address] )   • Generalized anxiety disorder   • Insomnia   • Morbid obesity with BMI of 45 0-49 9, adult ([place] [address] )   • Opioid dependence in remission ([place] [address] )   • Claustrophobia   • Restless leg syndrome   • Morbid obesity with BMI of 50 0-59 9, adult ([facility])   • Chronic pain syndrome   • Anxiety   • Chronic hepatitis C without hepatic coma ([facility])   • Depressed   • Methadone maintenance therapy patient ([place] [address] )   • Obesity, Class III, BMI 40-49 9 (morbid obesity) ([facility])   • CATA (obstructive sleep apnea)   • Drug dependence ([place] [address] )   • Medication therapy continued   • Encounter for monitoring Suboxone maintenance therapy   • IV drug abuse ([facility])   • Heroin addiction ([place] [address] )   • Colonic mass   • Arthralgia   • Eczema   • Generalized abdominal pain   • Muscle ache   • Chronic pain of both knees   • Central sleep apnea comorbid with prescribed opioid use   • Chronic constipation   • Chronic bilateral low back pain without sciatica   • Ventral hernia without obstruction or gangrene   • Chronic intermittent hypoxia with obstructive sleep apnea   • Hernia of abdominal wall   • History of colonoscopy   • Upper back pain   • Neck pain   • Lumbar radiculopathy   • Chronic hypercapnic respiratory failure (HCC)   • Mild persistent asthma without complication   • Long-term exposure involving bird droppings       Past Medical History:   Diagnosis Date   • Allergic    • Anemia 06/24/2018   • Anxiety     from records   • Arthritis    • Asthma    • Benign essential hypertension 11/17/2016   • Bipolar 1 disorder ([facility] )     from records   • Chronic pain    • Chronic pain disorder     back/ knees/ hip   • Claustrophobia 03/15/2018   • Community acquired pneumonia 06/24/2018   • CPAP (continuous positive airway pressure) dependence    • Depressed 07/14/2016   • Depression     from records   • GERD (gastroesophageal reflux disease)    • Hepatitis C virus infection 08/14/2014   • Heroin abuse ([facility] ) 07/24/2018   • Infectious viral hepatitis    • Obesity 10/12/2016   • Obstructive sleep apnea     from records   • Sleep disorder    • Substance abuse [facility])        Past Surgical History:   Procedure Laterality Date   • EPIDURAL BLOCK INJECTION Right 10/28/2022    Procedure: BLOCK / INJECTION EPIDURAL STEROID LUMBAR  right L4-5 and L5-S1 TFESI;  Surgeon: Bernabe Mccabe MD;  Location: [facility];  Service: Pain Management    • EPIDURAL BLOCK INJECTION Right 12/20/2022    Procedure: BLOCK / INJECTION EPIDURAL STEROID LUMBAR  right  L4-5 and L5-S1 TFESI;  Surgeon: Bernabe Mccabe MD;  Location: [facility];  Service: Pain Management    • CA INCISION & DRAINAGE ABSCESS COMPLICATED/MULTIPLE Left 5/2/2019    Procedure: INCISION AND DRAINAGE (I&D) EXTREMITY;  Surgeon: Tiana Benitez MD;  Location: [facility];  Service: Orthopedics   • CA LAPAROSCOPY COLECTOMY PARTIAL W/ANASTOMOSIS Left 5/21/2020    Procedure: LAPAROSCOPIC LEFT HEMICOLECTOMY TAKEDOWN SPLENIC FLECTURE, COLORECTAL ANASTOMOSIS ;  Surgeon: Jae Ardon MD;  Location: [facility];  Service: Colorectal   • SC NEUROPLASTY &/TRANSPOS MEDIAN NRV CARPAL TUNNE Left 1/9/2023    Procedure: RELEASE CARPAL TUNNEL;  Surgeon: Melissa Hanna DO;  Location: [facility];  Service: Orthopedics   • WISDOM TOOTH EXTRACTION         Family History   Problem Relation Age of Onset   • Diabetes Mother    • Restless legs syndrome Mother    • Sleep apnea Mother    • Colon cancer Father    • Alzheimer's disease Maternal Grandmother    • Depression Family        Social History     Occupational History   • Not on file   Tobacco Use   • Smoking status: Former     Packs/day: 1 00     Years: 13 00     Pack years: 13 00     Types: E-Cigarettes, Cigarettes   • Smokeless tobacco: Never   • Tobacco comments:     vapes   Vaping Use   • Vaping Use: Every day   • Substances: Nicotine   Substance and Sexual Activity   • Alcohol use: Never   • Drug use: Not Currently     Types: Heroin, Marijuana, Prescription     Comment: on Methadone; occaionally marijuana   • Sexual activity: Not Currently       Current Outpatient Medications on File Prior to Visit   Medication Sig   • albuterol (Ventolin HFA) 90 mcg/act inhaler Inhale 2 puffs every 6 (six) hours as needed for wheezing   • busPIRone (BUSPAR) 10 mg tablet Take 10 mg by mouth in the morning   • eszopiclone (LUNESTA) 3 MG tablet    • fluticasone (FLONASE) 50 mcg/act nasal spray 1 spray into each nostril 2 (two) times a day   • levocetirizine (XYZAL) 5 MG tablet Take 1 tablet (5 mg total) by mouth every evening   • Linzess 145 MCG CAPS Take 145 mcg by mouth in the morning   • METHADONE HCL PO Take 150 mg by mouth daily   • mometasone-formoterol (Dulera) 100-5 MCG/ACT inhaler Inhale 2 puffs 2 (two) times a day Rinse mouth after use     • naproxen (Naprosyn) 500 mg tablet Take 1 tablet (500 mg total) by mouth 2 (two) times a day with meals   • olopatadine (PATANOL) 0 1 % ophthalmic solution Administer 1 drop to both eyes 2 (two) times a day   • omeprazole (PriLOSEC) 20 mg delayed release capsule Take 20 mg by mouth 2 (two) times a day    • ondansetron (ZOFRAN-ODT) 4 mg disintegrating tablet Take 4 mg by mouth every 6 (six) hours   • pregabalin (LYRICA) 75 mg capsule Take 1 capsule (75 mg total) by mouth 2 (two) times a day   • sucralfate (CARAFATE) 1 g tablet Take 1 tablet (1 g total) by mouth 4 (four) times a day (Patient taking differently: Take 1 g by mouth if needed)   • triamcinolone (KENALOG) 0 1 % cream Apply topically 2 (two) times a day   • vilazodone (VIIBRYD) 20 mg tablet    • VILAZODONE HCL PO Take 20 mg by mouth in the morning     No current facility-administered medications on file prior to visit  Allergies   Allergen Reactions   • Red Dye - Food Allergy Diarrhea, Nausea Only and Abdominal Pain   • Bee Venom Angioedema       Physical Exam:    /80 (BP Location: Left arm, Patient Position: Sitting, Cuff Size: Adult)   Pulse 87   Ht 5' 10" (1 778 m)   Wt (!) 165 kg (363 lb 6 4 oz)   BMI 52 14 kg/m²     Constitutional:normal, well developed, well nourished, alert, in no distress and non-toxic and no overt pain behavior    Eyes:anicteric  HEENT:grossly intact  Neck:supple, symmetric, trachea midline and no masses   Pulmonary:even and unlabored  Cardiovascular:No edema or pitting edema present  Skin:Normal without rashes or lesions and well hydrated  Psychiatric:Mood and affect appropriate  Neurologic:Cranial Nerves II-XII grossly intact  Musculoskeletal:normal    Imaging

## 2023-02-06 ENCOUNTER — PREP FOR PROCEDURE (OUTPATIENT)
Dept: OBGYN CLINIC | Facility: CLINIC | Age: 33
End: 2023-02-06

## 2023-02-06 ENCOUNTER — OFFICE VISIT (OUTPATIENT)
Dept: PODIATRY | Facility: CLINIC | Age: 33
End: 2023-02-06

## 2023-02-06 ENCOUNTER — APPOINTMENT (OUTPATIENT)
Dept: RADIOLOGY | Facility: MEDICAL CENTER | Age: 33
End: 2023-02-06

## 2023-02-06 VITALS — WEIGHT: 315 LBS | HEIGHT: 70 IN | BODY MASS INDEX: 45.1 KG/M2

## 2023-02-06 DIAGNOSIS — Q66.222 METATARSUS ADDUCTUS OF BOTH FEET: ICD-10-CM

## 2023-02-06 DIAGNOSIS — S99.191K CLOSED FRACTURE OF BASE OF FIFTH METATARSAL BONE OF RIGHT FOOT AT METAPHYSEAL-DIAPHYSEAL JUNCTION WITH NONUNION, SUBSEQUENT ENCOUNTER: ICD-10-CM

## 2023-02-06 DIAGNOSIS — Q66.221 METATARSUS ADDUCTUS OF BOTH FEET: ICD-10-CM

## 2023-02-06 DIAGNOSIS — M79.672 PAIN IN BOTH FEET: Primary | ICD-10-CM

## 2023-02-06 DIAGNOSIS — M79.672 PAIN IN BOTH FEET: ICD-10-CM

## 2023-02-06 DIAGNOSIS — M79.671 PAIN IN BOTH FEET: ICD-10-CM

## 2023-02-06 DIAGNOSIS — M79.671 PAIN IN BOTH FEET: Primary | ICD-10-CM

## 2023-02-06 RX ORDER — ZALEPLON 10 MG/1
CAPSULE ORAL
COMMUNITY
Start: 2023-01-27

## 2023-02-06 RX ORDER — LUBIPROSTONE 24 UG/1
CAPSULE, GELATIN COATED ORAL
COMMUNITY
Start: 2023-02-04

## 2023-02-06 RX ORDER — HYDROXYZINE 50 MG/1
TABLET, FILM COATED ORAL
COMMUNITY
Start: 2023-01-27

## 2023-02-06 NOTE — LETTER
February 6, 2023     Dimitri Kaye PA-C  [id number]    Patient: Milana Alexis   YOB: 1990   Date of Visit: 2/6/2023       Dear Dr Jelani Irby: Thank you for referring Kieran Flannery to me for evaluation  Below are my notes for this consultation  If you have questions, please do not hesitate to call me  I look forward to following your patient along with you  Sincerely,        Chloe Villela DPM        CC: No Recipients  Raffi Duke  2/6/2023  1:53 PM  Signed  Assessment/Plan:    No problem-specific Assessment & Plan notes found for this encounter  Diagnoses and all orders for this visit:    Pain in both feet  -     X-ray foot right 3+ views; Future  -     X-ray foot left 3+ views; Future  -     Basic metabolic panel; Future  -     CBC and differential; Future    Closed fracture of base of fifth metatarsal bone of right foot at metaphyseal-diaphyseal junction with nonunion, subsequent encounter  -     Osteogenesic Stimulator  -     Case request operating room: OPEN REDUCTION W/ INTERNAL FIXATION (ORIF) FOOT; Standing  -     Basic metabolic panel; Future  -     CBC and differential; Future  -     Case request operating room: OPEN REDUCTION W/ INTERNAL FIXATION (ORIF) FOOT    Metatarsus adductus of both feet  -     Basic metabolic panel; Future  -     CBC and differential; Future    Other orders  -     hydrOXYzine HCL (ATARAX) 50 mg tablet  -     Amitiza 24 MCG capsule  -     zaleplon (SONATA) 10 MG capsule  -     Nursing Communication Warmimg Interventions Implemented; Standing  -     Nursing Communication CHG bath, have staff wash entire body (neck down) per pre-op bathing protocol  Routine, evening prior to, and day of surgery ; Standing  -     Nursing Communication Swab both nares with Povidone-Iodine solution, EXCLUDE if patient has shellfish/Iodine allergy, and replace with nasal alcohol swabstick   Routine, day of surgery, on call to OR; Standing  -     Void on call to OR; Standing  -     Insert peripheral IV; Standing  -     Diet NPO; Sips with meds; Standing  -     ceFAZolin (ANCEF) 3,000 mg in dextrose 5 % 100 mL IVPB     -X-rays 3 views weightbearing taken reviewed the bilateral feet and do shows severely high arched feet with bilateral metatarsus adductus and lateral column overload  There are also sclerotic fracture line along the lateral aspect of the base of the fifth metatarsal right foot  There is fracture with nonunion of the tuberosity extending into the Flanagan fracture area  - We will plan for debridement of the nonunion, with hook plate application  - Rx given for bone stimulator as well as to bring to surgery for application  - You need continued management following surgical intervention on the right foot, he will need likely custom orthotics to control his severe foot pathology  - The results of this surgery will be complicated by his weight, and severe for type of lateral overload, I expect that he will likely need to stay off his feet for approximately 6 weeks following surgery    Patient was counseled and educated on the condition and the diagnosis  The diagnosis, treatment options and prognosis were discussed with the patient  The patient failed conservative care at this time and wished to proceed with surgical treatment  Explained surgical details and post-op course  Discussed all risks and complications related to the patient's condition and surgery  The benefits of surgery were also discussed  The patient understood that the surgery would not guarantee desirable outcome  All questions and concerns were addressed and the consent was signed  Subjective:     Patient ID: Isamar Sousa is a 34 y o  male  Patient presents for evaluation management of bilateral foot pain, as have been going on for some time, he is having right greater than left pain    He notes that he did have severe pain in his right foot after kicking a blanket, he notes that this does swell up as the day goes on but he is having global pain bilateral feet right greater than left  The following portions of the patient's history were reviewed and updated as appropriate: allergies, current medications, past family history, past medical history, past social history, past surgical history and problem list     Review of Systems   Constitutional: Negative for chills and fever  HENT: Negative for ear pain and sore throat  Eyes: Negative for pain and visual disturbance  Respiratory: Negative for cough and shortness of breath  Cardiovascular: Negative for chest pain and palpitations  Gastrointestinal: Negative for abdominal pain and vomiting  Genitourinary: Negative for dysuria and hematuria  Musculoskeletal: Negative for arthralgias and back pain  Skin: Negative for color change and rash  Neurological: Negative for seizures and syncope  All other systems reviewed and are negative  Objective:      Ht 5' 10" (1 778 m)   Wt (!) 165 kg (363 lb)   BMI 52 09 kg/m²         Physical Exam  Vitals reviewed  Constitutional:       Appearance: Normal appearance  He is obese  HENT:      Head: Normocephalic  Musculoskeletal:      Comments: Pulses are palpable, skin temperature is within normal limits, patient has a severe she is shaped foot bilaterally with severe metatarsus bilaterally with cavus foot type  There is pain with palpation along the styloid process of the fifth metatarsal extending into the fracture site  Skin:     Capillary Refill: Capillary refill takes less than 2 seconds  Neurological:      General: No focal deficit present  Mental Status: He is alert  Mental status is at baseline

## 2023-02-06 NOTE — PROGRESS NOTES
Assessment/Plan:    No problem-specific Assessment & Plan notes found for this encounter  Diagnoses and all orders for this visit:    Pain in both feet  -     X-ray foot right 3+ views; Future  -     X-ray foot left 3+ views; Future  -     Basic metabolic panel; Future  -     CBC and differential; Future    Closed fracture of base of fifth metatarsal bone of right foot at metaphyseal-diaphyseal junction with nonunion, subsequent encounter  -     Osteogenesic Stimulator  -     Case request operating room: OPEN REDUCTION W/ INTERNAL FIXATION (ORIF) FOOT; Standing  -     Basic metabolic panel; Future  -     CBC and differential; Future  -     Case request operating room: OPEN REDUCTION W/ INTERNAL FIXATION (ORIF) FOOT    Metatarsus adductus of both feet  -     Basic metabolic panel; Future  -     CBC and differential; Future    Other orders  -     hydrOXYzine HCL (ATARAX) 50 mg tablet  -     Amitiza 24 MCG capsule  -     zaleplon (SONATA) 10 MG capsule  -     Nursing Communication Warmimg Interventions Implemented; Standing  -     Nursing Communication CHG bath, have staff wash entire body (neck down) per pre-op bathing protocol  Routine, evening prior to, and day of surgery ; Standing  -     Nursing Communication Swab both nares with Povidone-Iodine solution, EXCLUDE if patient has shellfish/Iodine allergy, and replace with nasal alcohol swabstick  Routine, day of surgery, on call to OR; Standing  -     Void on call to OR; Standing  -     Insert peripheral IV; Standing  -     Diet NPO; Sips with meds; Standing  -     ceFAZolin (ANCEF) 3,000 mg in dextrose 5 % 100 mL IVPB      -X-rays 3 views weightbearing taken reviewed the bilateral feet and do shows severely high arched feet with bilateral metatarsus adductus and lateral column overload  There are also sclerotic fracture line along the lateral aspect of the base of the fifth metatarsal right foot    There is fracture with nonunion of the tuberosity extending into the Flanagan fracture area  - We will plan for debridement of the nonunion, with hook plate application  - Rx given for bone stimulator as well as to bring to surgery for application  - You need continued management following surgical intervention on the right foot, he will need likely custom orthotics to control his severe foot pathology  - The results of this surgery will be complicated by his weight, and severe for type of lateral overload, I expect that he will likely need to stay off his feet for approximately 6 weeks following surgery    Patient was counseled and educated on the condition and the diagnosis  The diagnosis, treatment options and prognosis were discussed with the patient  The patient failed conservative care at this time and wished to proceed with surgical treatment  Explained surgical details and post-op course  Discussed all risks and complications related to the patient's condition and surgery  The benefits of surgery were also discussed  The patient understood that the surgery would not guarantee desirable outcome  All questions and concerns were addressed and the consent was signed  Subjective:      Patient ID: Tammie Porter is a 34 y o  male  Patient presents for evaluation management of bilateral foot pain, as have been going on for some time, he is having right greater than left pain  He notes that he did have severe pain in his right foot after kicking a blanket, he notes that this does swell up as the day goes on but he is having global pain bilateral feet right greater than left  The following portions of the patient's history were reviewed and updated as appropriate: allergies, current medications, past family history, past medical history, past social history, past surgical history and problem list     Review of Systems   Constitutional: Negative for chills and fever  HENT: Negative for ear pain and sore throat      Eyes: Negative for pain and visual disturbance  Respiratory: Negative for cough and shortness of breath  Cardiovascular: Negative for chest pain and palpitations  Gastrointestinal: Negative for abdominal pain and vomiting  Genitourinary: Negative for dysuria and hematuria  Musculoskeletal: Negative for arthralgias and back pain  Skin: Negative for color change and rash  Neurological: Negative for seizures and syncope  All other systems reviewed and are negative  Objective:      Ht 5' 10" (1 778 m)   Wt (!) 165 kg (363 lb)   BMI 52 09 kg/m²          Physical Exam  Vitals reviewed  Constitutional:       Appearance: Normal appearance  He is obese  HENT:      Head: Normocephalic  Musculoskeletal:      Comments: Pulses are palpable, skin temperature is within normal limits, patient has a severe she is shaped foot bilaterally with severe metatarsus bilaterally with cavus foot type  There is pain with palpation along the styloid process of the fifth metatarsal extending into the fracture site  Skin:     Capillary Refill: Capillary refill takes less than 2 seconds  Neurological:      General: No focal deficit present  Mental Status: He is alert  Mental status is at baseline

## 2023-02-13 ENCOUNTER — TELEPHONE (OUTPATIENT)
Dept: PODIATRY | Facility: CLINIC | Age: 33
End: 2023-02-13

## 2023-02-13 NOTE — TELEPHONE ENCOUNTER
Caller: Zenia/TAYLOR Rivas radiology    Doctor/Office : Jarred/Eh    Radiology CB# : [id number] Radiology called to report Significant on X-Ray  on both feet/see Epic

## 2023-02-13 NOTE — TELEPHONE ENCOUNTER
Caller: Zenia/TAYLOR Miners    Doctor/Office : Dr Talamantes/Eh    Radiology CB# : [id number] Radiology called to report Significant on X-Ray of both left and right foot/see EPIC

## 2023-02-14 ENCOUNTER — APPOINTMENT (OUTPATIENT)
Dept: LAB | Facility: HOSPITAL | Age: 33
End: 2023-02-14

## 2023-02-14 ENCOUNTER — APPOINTMENT (OUTPATIENT)
Dept: LAB | Facility: MEDICAL CENTER | Age: 33
End: 2023-02-14

## 2023-02-14 ENCOUNTER — TELEPHONE (OUTPATIENT)
Dept: OTOLARYNGOLOGY | Facility: CLINIC | Age: 33
End: 2023-02-14

## 2023-02-14 DIAGNOSIS — Q66.222 METATARSUS ADDUCTUS OF BOTH FEET: ICD-10-CM

## 2023-02-14 DIAGNOSIS — M79.671 PAIN IN BOTH FEET: ICD-10-CM

## 2023-02-14 DIAGNOSIS — S99.191K CLOSED FRACTURE OF BASE OF FIFTH METATARSAL BONE OF RIGHT FOOT AT METAPHYSEAL-DIAPHYSEAL JUNCTION WITH NONUNION, SUBSEQUENT ENCOUNTER: ICD-10-CM

## 2023-02-14 DIAGNOSIS — J30.2 SEASONAL ALLERGIES: ICD-10-CM

## 2023-02-14 DIAGNOSIS — Q66.221 METATARSUS ADDUCTUS OF BOTH FEET: ICD-10-CM

## 2023-02-14 DIAGNOSIS — L30.9 ECZEMA, UNSPECIFIED TYPE: ICD-10-CM

## 2023-02-14 DIAGNOSIS — R94.31 NONSPECIFIC ABNORMAL ELECTROCARDIOGRAM (ECG) (EKG): ICD-10-CM

## 2023-02-14 DIAGNOSIS — M79.672 PAIN IN BOTH FEET: ICD-10-CM

## 2023-02-14 LAB
ANION GAP SERPL CALCULATED.3IONS-SCNC: 3 MMOL/L (ref 4–13)
ATRIAL RATE: 72 BPM
BASOPHILS # BLD AUTO: 0.03 THOUSANDS/ÂΜL (ref 0–0.1)
BASOPHILS NFR BLD AUTO: 1 % (ref 0–1)
BUN SERPL-MCNC: 16 MG/DL (ref 5–25)
CALCIUM SERPL-MCNC: 9.1 MG/DL (ref 8.3–10.1)
CHLORIDE SERPL-SCNC: 107 MMOL/L (ref 96–108)
CO2 SERPL-SCNC: 30 MMOL/L (ref 21–32)
CREAT SERPL-MCNC: 0.7 MG/DL (ref 0.6–1.3)
EOSINOPHIL # BLD AUTO: 0.28 THOUSAND/ÂΜL (ref 0–0.61)
EOSINOPHIL NFR BLD AUTO: 5 % (ref 0–6)
ERYTHROCYTE [DISTWIDTH] IN BLOOD BY AUTOMATED COUNT: 14.4 % (ref 11.6–15.1)
GFR SERPL CREATININE-BSD FRML MDRD: 123 ML/MIN/1.73SQ M
GLUCOSE P FAST SERPL-MCNC: 88 MG/DL (ref 65–99)
HCT VFR BLD AUTO: 40.8 % (ref 36.5–49.3)
HGB BLD-MCNC: 12.4 G/DL (ref 12–17)
IMM GRANULOCYTES # BLD AUTO: 0.01 THOUSAND/UL (ref 0–0.2)
IMM GRANULOCYTES NFR BLD AUTO: 0 % (ref 0–2)
LYMPHOCYTES # BLD AUTO: 2.33 THOUSANDS/ÂΜL (ref 0.6–4.47)
LYMPHOCYTES NFR BLD AUTO: 40 % (ref 14–44)
MCH RBC QN AUTO: 25.5 PG (ref 26.8–34.3)
MCHC RBC AUTO-ENTMCNC: 30.4 G/DL (ref 31.4–37.4)
MCV RBC AUTO: 84 FL (ref 82–98)
MONOCYTES # BLD AUTO: 0.44 THOUSAND/ÂΜL (ref 0.17–1.22)
MONOCYTES NFR BLD AUTO: 8 % (ref 4–12)
NEUTROPHILS # BLD AUTO: 2.72 THOUSANDS/ÂΜL (ref 1.85–7.62)
NEUTS SEG NFR BLD AUTO: 46 % (ref 43–75)
NRBC BLD AUTO-RTO: 0 /100 WBCS
P AXIS: 50 DEGREES
PLATELET # BLD AUTO: 208 THOUSANDS/UL (ref 149–390)
PMV BLD AUTO: 9.6 FL (ref 8.9–12.7)
POTASSIUM SERPL-SCNC: 3.9 MMOL/L (ref 3.5–5.3)
PR INTERVAL: 192 MS
QRS AXIS: 17 DEGREES
QRSD INTERVAL: 112 MS
QT INTERVAL: 444 MS
QTC INTERVAL: 486 MS
RBC # BLD AUTO: 4.87 MILLION/UL (ref 3.88–5.62)
SODIUM SERPL-SCNC: 140 MMOL/L (ref 135–147)
T WAVE AXIS: 35 DEGREES
VENTRICULAR RATE: 72 BPM
WBC # BLD AUTO: 5.81 THOUSAND/UL (ref 4.31–10.16)

## 2023-02-14 RX ORDER — TRIAMCINOLONE ACETONIDE 1 MG/G
CREAM TOPICAL 2 TIMES DAILY
Qty: 30 G | Refills: 1 | Status: SHIPPED | OUTPATIENT
Start: 2023-02-14

## 2023-02-14 RX ORDER — LEVOCETIRIZINE DIHYDROCHLORIDE 5 MG/1
5 TABLET, FILM COATED ORAL EVERY EVENING
Qty: 30 TABLET | Refills: 0 | Status: SHIPPED | OUTPATIENT
Start: 2023-02-14

## 2023-02-14 NOTE — TELEPHONE ENCOUNTER
Spoke to  Nellie Soriano and told her that the request for Sacha's refill for his medication was sent in

## 2023-02-16 ENCOUNTER — PROCEDURE VISIT (OUTPATIENT)
Dept: INTERNAL MEDICINE CLINIC | Facility: CLINIC | Age: 33
End: 2023-02-16

## 2023-02-16 VITALS
HEIGHT: 70 IN | BODY MASS INDEX: 45.1 KG/M2 | HEART RATE: 78 BPM | TEMPERATURE: 98.7 F | OXYGEN SATURATION: 98 % | WEIGHT: 315 LBS | SYSTOLIC BLOOD PRESSURE: 124 MMHG | DIASTOLIC BLOOD PRESSURE: 80 MMHG

## 2023-02-16 DIAGNOSIS — I10 ESSENTIAL HYPERTENSION: ICD-10-CM

## 2023-02-16 DIAGNOSIS — G47.34 CHRONIC INTERMITTENT HYPOXIA WITH OBSTRUCTIVE SLEEP APNEA: ICD-10-CM

## 2023-02-16 DIAGNOSIS — G47.33 CHRONIC INTERMITTENT HYPOXIA WITH OBSTRUCTIVE SLEEP APNEA: ICD-10-CM

## 2023-02-16 DIAGNOSIS — F11.21 OPIOID DEPENDENCE IN REMISSION (HCC): ICD-10-CM

## 2023-02-16 DIAGNOSIS — B18.2 CHRONIC HEPATITIS C WITHOUT HEPATIC COMA (HCC): ICD-10-CM

## 2023-02-16 DIAGNOSIS — K59.09 CHRONIC CONSTIPATION: ICD-10-CM

## 2023-02-16 DIAGNOSIS — M77.41 METATARSALGIA OF RIGHT FOOT: ICD-10-CM

## 2023-02-16 DIAGNOSIS — Z01.818 VISIT FOR PRE-OPERATIVE EXAMINATION: Primary | ICD-10-CM

## 2023-02-16 DIAGNOSIS — J96.12 CHRONIC HYPERCAPNIC RESPIRATORY FAILURE (HCC): ICD-10-CM

## 2023-02-16 DIAGNOSIS — J45.30 MILD PERSISTENT ASTHMA WITHOUT COMPLICATION: ICD-10-CM

## 2023-02-16 DIAGNOSIS — E66.01 MORBID OBESITY WITH BMI OF 45.0-49.9, ADULT (HCC): ICD-10-CM

## 2023-02-16 NOTE — PROGRESS NOTES
Assessment/Plan:  Problem List Items Addressed This Visit        Digestive    Chronic hepatitis C without hepatic coma (HCC)    Chronic constipation       Respiratory    Mild persistent asthma without complication    Chronic intermittent hypoxia with obstructive sleep apnea    Chronic hypercapnic respiratory failure (HCC)       Cardiovascular and Mediastinum    Essential hypertension       Other    Opioid dependence in remission ([facility] )    Morbid obesity with BMI of 45 0-49 9, adult ([facility] )   Other Visit Diagnoses     Visit for pre-operative examination    -  Primary    Metatarsalgia of right foot               Diagnoses and all orders for this visit:    Visit for pre-operative examination    Chronic hepatitis C without hepatic coma ([facility] )    Chronic constipation    Chronic intermittent hypoxia with obstructive sleep apnea    Chronic hypercapnic respiratory failure ([facility] )    Mild persistent asthma without complication    Essential hypertension    Morbid obesity with BMI of 45 0-49 9, adult ([facility] )    Opioid dependence in remission ([facility] )    Metatarsalgia of right foot      No problem-specific Assessment & Plan notes found for this encounter  A/P: PAT tests c/o labs and ekg were reviewed and acceptable  EKG w/o acute changes  Echo about one year ago was normal except for some valvular findings  Pt and co-morbidities are stable  Pt is a Lucio's Class I and carries a cardiac risk of about 1%, but risk is probably higher due to body habitus and CATA with hypoxia  Recommend holding any ASA, NSAID's, omega 3, and MVT one week prior to the procedure  Aggressive pulmonary secretion control and aggressive DVT prophylaxis  Pt with a history of narc abuse and recommend non narcotic pain control is possible  Methadone dosing will be left to his prescribing MD   Recommend pt use his ARYAN MDI prior to surgery  On call to the OR, pt may take with a sip of water his buspar, omprazole, lyrica, viibryd and his maintenance MDI   May resume the rest of his meds once eating  No other reds at this time  Thanks and good luck  Subjective:      Patient ID: Dalila Mejía is a 34 y o  male  WM presents at the request of Dr Angela Palomino for pre-op eval for upcoming right foot sx  tentatively scheduled for 2/24/23  Ira Fierro Since last visit, doing well and no recent illnesses  Remains active w/o difficulty and no falls  No travel history  Denies depression  No recent illnesses  No fever, chills, or sweats  No unexplained wt changes  Denies CP, SOB, or palpitations  No edema  No orthopnea or PND  No sz or syncope  No changes in bowel or bladder habits  PMH includes Hep C, GERD, constipation, HTN, CATA, asthma, DJD/DDD, drug dependency on methadone,and MAXIMO/bipolar   Past sx include CTR, dental extraction, colectomy, and epidural injections and reports no problems with prior procedures or anesthesia  Admits to remote past smoking and denies ETOH use,but does vape  No illicit drug use    No history of DVT or PE  NO history of bleeding issues and is not on anti-coagulants  Denies dental plates  Denies C spine issues  No objections to getting blood products if deemed necessary  Had PAT testing done  The following portions of the patient's history were reviewed and updated as appropriate:   He has a past medical history of Allergic, Anemia (06/24/2018), Anxiety, Arthritis, Asthma, Benign essential hypertension (11/17/2016), Bipolar 1 disorder ([facility] ), Chronic pain, Chronic pain disorder, Claustrophobia (03/15/2018), Community acquired pneumonia (06/24/2018), CPAP (continuous positive airway pressure) dependence, Depressed (07/14/2016), Depression, GERD (gastroesophageal reflux disease), Hepatitis C virus infection (08/14/2014), Heroin abuse ([facility] ) (07/24/2018), Infectious viral hepatitis, Obesity (10/12/2016), Obstructive sleep apnea, Sleep disorder, and Substance abuse ([place] [address] )  ,  does not have any pertinent problems on file  ,   has a past surgical history that includes [place] tooth extraction; pr incision & drainage abscess complicated/multiple (Left, 5/2/2019); pr laparoscopy colectomy partial w/anastomosis (Left, 5/21/2020); Epidural block injection (Right, 10/28/2022); Epidural block injection (Right, 12/20/2022); and pr neuroplasty &/transpos median nrv carpal tunne (Left, 1/9/2023)  ,  family history includes Alzheimer's disease in his maternal grandmother; Colon cancer in his father; Depression in his family; Diabetes in his mother; Restless legs syndrome in his mother; Sleep apnea in his mother  ,   reports that he has quit smoking  His smoking use included e-cigarettes and cigarettes  He has a 13 00 pack-year smoking history  He has never used smokeless tobacco  He reports that he does not currently use drugs after having used the following drugs: Heroin, Marijuana, and Prescription  He reports that he does not drink alcohol ,  is allergic to red dye - food allergy and bee venom     Current Outpatient Medications   Medication Sig Dispense Refill   • albuterol (Ventolin HFA) 90 mcg/act inhaler Inhale 2 puffs every 6 (six) hours as needed for wheezing 18 g 6   • Amitiza 24 MCG capsule      • busPIRone (BUSPAR) 10 mg tablet Take 10 mg by mouth in the morning     • eszopiclone (LUNESTA) 3 MG tablet      • fluticasone (FLONASE) 50 mcg/act nasal spray 1 spray into each nostril 2 (two) times a day 16 g 11   • hydrOXYzine HCL (ATARAX) 50 mg tablet      • levocetirizine (XYZAL) 5 MG tablet Take 1 tablet (5 mg total) by mouth every evening 30 tablet 0   • Linzess 145 MCG CAPS Take 145 mcg by mouth in the morning     • METHADONE HCL PO Take 150 mg by mouth daily     • mometasone-formoterol (Dulera) 100-5 MCG/ACT inhaler Inhale 2 puffs 2 (two) times a day Rinse mouth after use   39 g 3   • naproxen (Naprosyn) 500 mg tablet Take 1 tablet (500 mg total) by mouth 2 (two) times a day with meals 60 tablet 0   • olopatadine (PATANOL) 0 1 % ophthalmic solution Administer 1 drop to both eyes 2 (two) times a day 5 mL 0   • omeprazole (PriLOSEC) 20 mg delayed release capsule Take 20 mg by mouth 2 (two) times a day      • ondansetron (ZOFRAN-ODT) 4 mg disintegrating tablet Take 4 mg by mouth every 6 (six) hours     • pregabalin (LYRICA) 75 mg capsule Take 1 capsule (75 mg total) by mouth 2 (two) times a day 60 capsule 2   • sucralfate (CARAFATE) 1 g tablet Take 1 tablet (1 g total) by mouth 4 (four) times a day (Patient taking differently: Take 1 g by mouth if needed) 120 tablet 1   • triamcinolone (KENALOG) 0 1 % cream Apply topically 2 (two) times a day 30 g 1   • vilazodone (VIIBRYD) 20 mg tablet      • VILAZODONE HCL PO Take 20 mg by mouth in the morning     • zaleplon (SONATA) 10 MG capsule        No current facility-administered medications for this visit  Review of Systems   Constitutional: Positive for activity change  Negative for chills, diaphoresis, fatigue and fever  HENT: Negative  Eyes: Negative for visual disturbance  Respiratory: Negative for cough, chest tightness, shortness of breath and wheezing  Cardiovascular: Negative for chest pain, palpitations and leg swelling  Gastrointestinal: Negative for abdominal pain, constipation, diarrhea, nausea and vomiting  Endocrine: Negative for cold intolerance and heat intolerance  Genitourinary: Negative for difficulty urinating, dysuria and frequency  Musculoskeletal: Positive for arthralgias and gait problem  Negative for myalgias  Neurological: Negative for dizziness, seizures, syncope, weakness, light-headedness and headaches  Psychiatric/Behavioral: Negative for confusion, dysphoric mood and sleep disturbance  The patient is not nervous/anxious  PHQ-2/9 Depression Screening          Objective:  Vitals:    02/16/23 1342   BP: 124/80   Pulse: 78   Temp: 98 7 °F (37 1 °C)   SpO2: 98%   Weight: (!) 164 kg (361 lb 2 oz)   Height: 5' 10" (1 778 m)     Body mass index is 51 82 kg/m²       Physical Exam  Vitals and nursing note reviewed  Constitutional:       General: He is not in acute distress  Appearance: Normal appearance  He is obese  He is not ill-appearing  HENT:      Head: Normocephalic and atraumatic  Comments: No oropharyngeal obstructions  Mouth/Throat:      Mouth: Mucous membranes are moist    Eyes:      Extraocular Movements: Extraocular movements intact  Conjunctiva/sclera: Conjunctivae normal       Pupils: Pupils are equal, round, and reactive to light  Neck:      Vascular: No carotid bruit  Comments: NO C spine restrictions  Cardiovascular:      Rate and Rhythm: Normal rate and regular rhythm  Heart sounds: Normal heart sounds  No murmur heard  Pulmonary:      Effort: Pulmonary effort is normal  No respiratory distress  Breath sounds: Normal breath sounds  No wheezing, rhonchi or rales  Abdominal:      General: Bowel sounds are normal  There is no distension  Palpations: Abdomen is soft  Tenderness: There is no abdominal tenderness  Musculoskeletal:      Cervical back: Normal range of motion and neck supple  No rigidity  Right lower leg: No edema  Left lower leg: No edema  Lymphadenopathy:      Cervical: No cervical adenopathy  Neurological:      General: No focal deficit present  Mental Status: He is alert and oriented to person, place, and time  Mental status is at baseline  Psychiatric:         Mood and Affect: Mood normal          Behavior: Behavior normal          Thought Content:  Thought content normal          Judgment: Judgment normal

## 2023-02-20 NOTE — PRE-PROCEDURE INSTRUCTIONS
Pre-Surgery Instructions:   Medication Instructions   • albuterol (Ventolin HFA) 90 mcg/act inhaler Instructed to take as needed including DOS   • fluticasone (FLONASE) 50 mcg/act nasal spray Instructed to take as needed including DOS   • hydrOXYzine HCL (ATARAX) 50 mg tablet Take per normal schedule    • levocetirizine (XYZAL) 5 MG tablet Take per normal schedule    • Linzess 145 MCG CAPS Take per normal schedule    • METHADONE HCL PO Instructions provided by MD   • mometasone-formoterol (Dulera) 100-5 MCG/ACT inhaler Take per normal schedule    • olopatadine (PATANOL) 0 1 % ophthalmic solution Take per normal schedule    • omeprazole (PriLOSEC) 20 mg delayed release capsule Instructed to take per normal schedule including DOS with sips water   • pregabalin (LYRICA) 75 mg capsule Take per normal schedule    • sucralfate (CARAFATE) 1 g tablet Take per normal schedule    • vilazodone (VIIBRYD) 20 mg tablet Take per normal schedule    • zaleplon (SONATA) 10 MG capsule Instructed to take per normal schedule except DOS    Pre op,medications and showering instructions reviewed-Patient has hibiclens  Instructed to avoid all ASA/NSAIDs and OTC Vit/Supp from now until after surgery per anesthesia guidelines  Tylenol ok prn  Pt states instructed to hold Methadone by MD QUARLES  Pt  Verbalized an understanding of all instructions reviewed and offers no concerns at this time

## 2023-02-24 ENCOUNTER — HOSPITAL ENCOUNTER (OUTPATIENT)
Facility: HOSPITAL | Age: 33
Setting detail: OUTPATIENT SURGERY
Discharge: HOME | End: 2023-02-24
Attending: PODIATRIST | Admitting: PODIATRIST

## 2023-02-24 ENCOUNTER — APPOINTMENT (OUTPATIENT)
Dept: RADIOLOGY | Facility: HOSPITAL | Age: 33
End: 2023-02-24

## 2023-02-24 ENCOUNTER — ANESTHESIA EVENT (OUTPATIENT)
Dept: PERIOP | Facility: HOSPITAL | Age: 33
End: 2023-02-24

## 2023-02-24 ENCOUNTER — ANESTHESIA (OUTPATIENT)
Dept: PERIOP | Facility: HOSPITAL | Age: 33
End: 2023-02-24

## 2023-02-24 VITALS
WEIGHT: 315 LBS | HEART RATE: 61 BPM | HEIGHT: 70 IN | TEMPERATURE: 97.7 F | BODY MASS INDEX: 45.1 KG/M2 | SYSTOLIC BLOOD PRESSURE: 105 MMHG | RESPIRATION RATE: 19 BRPM | OXYGEN SATURATION: 93 % | DIASTOLIC BLOOD PRESSURE: 56 MMHG

## 2023-02-24 DIAGNOSIS — M79.671 PAIN IN RIGHT FOOT: Primary | ICD-10-CM

## 2023-02-24 DIAGNOSIS — G89.29 CHRONIC TOE PAIN, RIGHT FOOT: ICD-10-CM

## 2023-02-24 DIAGNOSIS — M79.674 CHRONIC TOE PAIN, RIGHT FOOT: ICD-10-CM

## 2023-02-24 DEVICE — INJECTABLE KIT
Type: IMPLANTABLE DEVICE | Site: HEEL | Status: FUNCTIONAL
Brand: AUGMENT® INJECTABLE

## 2023-02-24 DEVICE — IMPLANTABLE DEVICE: Type: IMPLANTABLE DEVICE | Site: HEEL | Status: FUNCTIONAL

## 2023-02-24 DEVICE — IMPLANTABLE DEVICE
Type: IMPLANTABLE DEVICE | Site: HEEL | Status: FUNCTIONAL
Brand: ORTHOLOC

## 2023-02-24 RX ORDER — OXYCODONE HYDROCHLORIDE 10 MG/1
10 TABLET ORAL EVERY 6 HOURS PRN
Status: DISCONTINUED | OUTPATIENT
Start: 2023-02-24 | End: 2023-02-24 | Stop reason: HOSPADM

## 2023-02-24 RX ORDER — MAGNESIUM HYDROXIDE 1200 MG/15ML
LIQUID ORAL AS NEEDED
Status: DISCONTINUED | OUTPATIENT
Start: 2023-02-24 | End: 2023-02-24 | Stop reason: HOSPADM

## 2023-02-24 RX ORDER — HYDROMORPHONE HCL/PF 1 MG/ML
0.4 SYRINGE (ML) INJECTION
Status: DISCONTINUED | OUTPATIENT
Start: 2023-02-24 | End: 2023-02-24 | Stop reason: HOSPADM

## 2023-02-24 RX ORDER — PROPOFOL 10 MG/ML
INJECTION, EMULSION INTRAVENOUS AS NEEDED
Status: DISCONTINUED | OUTPATIENT
Start: 2023-02-24 | End: 2023-02-24

## 2023-02-24 RX ORDER — ROCURONIUM BROMIDE 10 MG/ML
INJECTION, SOLUTION INTRAVENOUS AS NEEDED
Status: DISCONTINUED | OUTPATIENT
Start: 2023-02-24 | End: 2023-02-24

## 2023-02-24 RX ORDER — ONDANSETRON 2 MG/ML
4 INJECTION INTRAMUSCULAR; INTRAVENOUS EVERY 6 HOURS PRN
Status: DISCONTINUED | OUTPATIENT
Start: 2023-02-24 | End: 2023-02-24 | Stop reason: HOSPADM

## 2023-02-24 RX ORDER — FENTANYL CITRATE 50 UG/ML
INJECTION, SOLUTION INTRAMUSCULAR; INTRAVENOUS AS NEEDED
Status: DISCONTINUED | OUTPATIENT
Start: 2023-02-24 | End: 2023-02-24

## 2023-02-24 RX ORDER — MEPERIDINE HYDROCHLORIDE 25 MG/ML
12.5 INJECTION INTRAMUSCULAR; INTRAVENOUS; SUBCUTANEOUS
Status: DISCONTINUED | OUTPATIENT
Start: 2023-02-24 | End: 2023-02-24 | Stop reason: HOSPADM

## 2023-02-24 RX ORDER — KETOROLAC TROMETHAMINE 30 MG/ML
INJECTION, SOLUTION INTRAMUSCULAR; INTRAVENOUS AS NEEDED
Status: DISCONTINUED | OUTPATIENT
Start: 2023-02-24 | End: 2023-02-24

## 2023-02-24 RX ORDER — PROMETHAZINE HYDROCHLORIDE 25 MG/ML
25 INJECTION, SOLUTION INTRAMUSCULAR; INTRAVENOUS ONCE AS NEEDED
Status: DISCONTINUED | OUTPATIENT
Start: 2023-02-24 | End: 2023-02-24 | Stop reason: HOSPADM

## 2023-02-24 RX ORDER — DEXAMETHASONE SODIUM PHOSPHATE 10 MG/ML
INJECTION, SOLUTION INTRAMUSCULAR; INTRAVENOUS AS NEEDED
Status: DISCONTINUED | OUTPATIENT
Start: 2023-02-24 | End: 2023-02-24

## 2023-02-24 RX ORDER — ACETAMINOPHEN 325 MG/1
650 TABLET ORAL EVERY 4 HOURS PRN
Status: DISCONTINUED | OUTPATIENT
Start: 2023-02-24 | End: 2023-02-24 | Stop reason: HOSPADM

## 2023-02-24 RX ORDER — ASPIRIN 325 MG
325 TABLET ORAL 2 TIMES DAILY
Qty: 60 TABLET | Refills: 0 | Status: SHIPPED | OUTPATIENT
Start: 2023-02-24 | End: 2023-03-26

## 2023-02-24 RX ORDER — OXYCODONE HYDROCHLORIDE 5 MG/1
5 TABLET ORAL EVERY 4 HOURS PRN
Status: DISCONTINUED | OUTPATIENT
Start: 2023-02-24 | End: 2023-02-24 | Stop reason: HOSPADM

## 2023-02-24 RX ORDER — FENTANYL CITRATE/PF 50 MCG/ML
50 SYRINGE (ML) INJECTION
Status: DISCONTINUED | OUTPATIENT
Start: 2023-02-24 | End: 2023-02-24 | Stop reason: HOSPADM

## 2023-02-24 RX ORDER — LIDOCAINE HYDROCHLORIDE 20 MG/ML
INJECTION, SOLUTION EPIDURAL; INFILTRATION; INTRACAUDAL; PERINEURAL AS NEEDED
Status: DISCONTINUED | OUTPATIENT
Start: 2023-02-24 | End: 2023-02-24

## 2023-02-24 RX ORDER — LIDOCAINE HYDROCHLORIDE 10 MG/ML
INJECTION, SOLUTION EPIDURAL; INFILTRATION; INTRACAUDAL; PERINEURAL AS NEEDED
Status: DISCONTINUED | OUTPATIENT
Start: 2023-02-24 | End: 2023-02-24 | Stop reason: HOSPADM

## 2023-02-24 RX ORDER — SODIUM CHLORIDE, SODIUM LACTATE, POTASSIUM CHLORIDE, CALCIUM CHLORIDE 600; 310; 30; 20 MG/100ML; MG/100ML; MG/100ML; MG/100ML
125 INJECTION, SOLUTION INTRAVENOUS CONTINUOUS
Status: DISCONTINUED | OUTPATIENT
Start: 2023-02-24 | End: 2023-02-24

## 2023-02-24 RX ORDER — ONDANSETRON 2 MG/ML
4 INJECTION INTRAMUSCULAR; INTRAVENOUS ONCE AS NEEDED
Status: DISCONTINUED | OUTPATIENT
Start: 2023-02-24 | End: 2023-02-24 | Stop reason: HOSPADM

## 2023-02-24 RX ORDER — MIDAZOLAM HYDROCHLORIDE 2 MG/2ML
INJECTION, SOLUTION INTRAMUSCULAR; INTRAVENOUS AS NEEDED
Status: DISCONTINUED | OUTPATIENT
Start: 2023-02-24 | End: 2023-02-24

## 2023-02-24 RX ORDER — ONDANSETRON 2 MG/ML
INJECTION INTRAMUSCULAR; INTRAVENOUS AS NEEDED
Status: DISCONTINUED | OUTPATIENT
Start: 2023-02-24 | End: 2023-02-24

## 2023-02-24 RX ORDER — DEXMEDETOMIDINE HYDROCHLORIDE 100 UG/ML
INJECTION, SOLUTION INTRAVENOUS AS NEEDED
Status: DISCONTINUED | OUTPATIENT
Start: 2023-02-24 | End: 2023-02-24

## 2023-02-24 RX ADMIN — MIDAZOLAM HYDROCHLORIDE 2 MG: 1 INJECTION, SOLUTION INTRAMUSCULAR; INTRAVENOUS at 12:18

## 2023-02-24 RX ADMIN — SODIUM CHLORIDE, SODIUM LACTATE, POTASSIUM CHLORIDE, AND CALCIUM CHLORIDE: .6; .31; .03; .02 INJECTION, SOLUTION INTRAVENOUS at 12:01

## 2023-02-24 RX ADMIN — LIDOCAINE HYDROCHLORIDE 100 MG: 20 INJECTION, SOLUTION EPIDURAL; INFILTRATION; INTRACAUDAL; PERINEURAL at 12:27

## 2023-02-24 RX ADMIN — DEXMEDETOMIDINE HCL 8 MCG: 100 INJECTION INTRAVENOUS at 13:25

## 2023-02-24 RX ADMIN — DEXMEDETOMIDINE HCL 8 MCG: 100 INJECTION INTRAVENOUS at 13:18

## 2023-02-24 RX ADMIN — ONDANSETRON 4 MG: 2 INJECTION INTRAMUSCULAR; INTRAVENOUS at 13:38

## 2023-02-24 RX ADMIN — SUGAMMADEX 400 MG: 100 INJECTION, SOLUTION INTRAVENOUS at 13:42

## 2023-02-24 RX ADMIN — DEXMEDETOMIDINE HCL 8 MCG: 100 INJECTION INTRAVENOUS at 13:31

## 2023-02-24 RX ADMIN — ACETAMINOPHEN 650 MG: 325 TABLET ORAL at 15:03

## 2023-02-24 RX ADMIN — ROCURONIUM BROMIDE 50 MG: 50 INJECTION INTRAVENOUS at 12:27

## 2023-02-24 RX ADMIN — DEXAMETHASONE SODIUM PHOSPHATE 10 MG: 10 INJECTION INTRAMUSCULAR; INTRAVENOUS at 12:44

## 2023-02-24 RX ADMIN — KETOROLAC TROMETHAMINE 30 MG: 30 INJECTION, SOLUTION INTRAMUSCULAR at 14:10

## 2023-02-24 RX ADMIN — DEXMEDETOMIDINE HCL 8 MCG: 100 INJECTION INTRAVENOUS at 13:11

## 2023-02-24 RX ADMIN — DEXMEDETOMIDINE HCL 8 MCG: 100 INJECTION INTRAVENOUS at 13:36

## 2023-02-24 RX ADMIN — SODIUM CHLORIDE, SODIUM LACTATE, POTASSIUM CHLORIDE, AND CALCIUM CHLORIDE 125 ML/HR: .6; .31; .03; .02 INJECTION, SOLUTION INTRAVENOUS at 11:11

## 2023-02-24 RX ADMIN — ROCURONIUM BROMIDE 30 MG: 50 INJECTION INTRAVENOUS at 13:24

## 2023-02-24 RX ADMIN — Medication 3000 MG: at 12:34

## 2023-02-24 RX ADMIN — PROPOFOL 250 MG: 10 INJECTION, EMULSION INTRAVENOUS at 12:27

## 2023-02-24 RX ADMIN — FENTANYL CITRATE 100 MCG: 50 INJECTION, SOLUTION INTRAMUSCULAR; INTRAVENOUS at 12:18

## 2023-02-24 RX ADMIN — OXYCODONE HYDROCHLORIDE 10 MG: 10 TABLET ORAL at 15:03

## 2023-02-24 NOTE — DISCHARGE SUMMARY
Discharge Summary - Nathalie Love 34 y o  male MRN: [id number]    Unit/Bed#: OR [place] Encounter: [id number]    Admission Date:     Admitting Diagnosis: Closed fracture of base of fifth metatarsal bone of right foot at metaphyseal-diaphyseal junction with nonunion, subsequent encounter [S91 191K]    HPI: Status post ORIF right foot    Procedures Performed: No orders of the defined types were placed in this encounter  Summary of Hospital Course: Status post ORIF right foot    Significant Findings, Care, Treatment and Services Provided: Status post ORIF right foot    Complications: Status post ORIF right foot    Discharge Diagnosis: Status post ORIF right foot    Medical Problems     Resolved Problems  Date Reviewed: 2/16/2023   None         Condition at Discharge: good         Discharge instructions/Information to patient and family:   See after visit summary for information provided to patient and family  Provisions for Follow-Up Care:  See after visit summary for information related to follow-up care and any pertinent home health orders  PCP: Tomás Lind PA-C    Disposition: Home    Planned Readmission: No      Discharge Statement   I spent 15 minutes discharging the patient  This time was spent on the day of discharge  I had direct contact with the patient on the day of discharge  Additional documentation is required if more than 30 minutes were spent on discharge  Discharge Medications:  See after visit summary for reconciled discharge medications provided to patient and family

## 2023-02-24 NOTE — ANESTHESIA PREPROCEDURE EVALUATION
Procedure:  OPEN REDUCTION W/ INTERNAL FIXATION (ORIF) FOOT (Right: Foot)    Relevant Problems   CARDIO   (+) Essential hypertension      GI/HEPATIC   (+) Acid reflux   (+) Chronic hepatitis C without hepatic coma (HCC)      MUSCULOSKELETAL   (+) Chronic bilateral low back pain without sciatica   (+) Upper back pain      NEURO/PSYCH   (+) Anxiety   (+) Chronic bilateral low back pain without sciatica   (+) Chronic pain syndrome   (+) Claustrophobia   (+) Depressed   (+) Generalized anxiety disorder      PULMONARY   (+) Chronic hypercapnic respiratory failure (HCC)   (+) Chronic intermittent hypoxia with obstructive sleep apnea   (+) Mild persistent asthma without complication   (+) CATA (obstructive sleep apnea)      Other   (+) Morbid obesity with BMI of 45 0-49 9, adult (HCC)        Physical Exam    Airway    Mallampati score: II  TM Distance: >3 FB  Neck ROM: full     Dental   No notable dental hx     Cardiovascular      Pulmonary      Other Findings        Anesthesia Plan  ASA Score- 3     Anesthesia Type- general with ASA Monitors  Additional Monitors:   Airway Plan: ETT  Plan Factors-Exercise tolerance (METS): >4 METS  Chart reviewed  Existing labs reviewed  Patient summary reviewed  Induction- intravenous  Postoperative Plan- Plan for postoperative opioid use  Informed Consent- Anesthetic plan and risks discussed with patient

## 2023-02-24 NOTE — DISCHARGE INSTR - AVS FIRST PAGE
Dr Robert Siddiqui, DPM  Post-op surgery Instructions    Pain / Swelling  There is expected to be some discomfort, swelling and bruising of the foot  You might see some blood on the bandage  This is not a cause for alarm  However, if there is active or persistent bleeding (blood running out of the bandage while at rest) - call the office at once (or) go to a [address] ER and ask them to page the podiatry residents  Apply an ice bag to the top of your ankle for 30 minutes for each waking hour, for the first 72 hours  This should be discontinued when sleeping  This will also work through your cast if you have one  Ice must not leak and wet the dressings  Also, using the ice inappropriately can cause permanent nerve damage  Your foot should be elevated as much as possible for the first 72 hours  The foot should be above heart level  If your foot is below heart level, throbbing and pain will increase  When sleeping, elevation can be accomplished by putting a small hard suitcase between the box spring and mattress at the foot of the bed  Walking and standing will increase pain, throbbing and bleeding  Persistent pain despite elevation and your pain meds can many times be relieved by removing the tight brown compression layer (called the ACE wrap) that is over the white gauze dressing  If you are elevating and taking your pain meds and pain is still severe, remove this brown stretchy layer but leave the gauze intact  Wait 30 minutes  If the pain subsides, reapply the ACE so it’s not so tight  If pain doesn’t get better, call your doctor  Dressings / Casts  Do not remove your surgical dressings - they will be changed at your doctor appointment  Do not allow surgical dressings to get wet  Sponge baths should be used until the sutures are removed  Do not try to keep the foot dry using a garbage bag and tape - this rarely works  If you get your dressings or cast wet - call your doctor immediately    If your cast or dressings feel tight - elevate your foot for 30 minutes  If this doesn’t help and you feel tingling or see toe discoloration - call your doctor or go to a [facility] ER and ask them to page the podiatry residents  Do not put things in your cast such as powder, coat hangers to scratch, etc  This can cause skin damage and infections  Infection  If you have a fever at or above 100 degrees, chills, sweats, or see red streaks rising above the dressing or smell odor / see pus (creamy white drainage), call your doctor immediately or go to a [facility] ER and ask them to page the podiatry residents  Constipation  If you have severe constipation after surgery, this can be due to the pain medication  Notify your doctor and special medication will be prescribed to deal with this  Blood Clots  If you had surgery and are in a cast, have an external fixation device, or are non-weightbearing using crutches, a knee scooter, a wheelchair, a walker, or an iWalk device - you need to be on a blood thinner  Your doctor will prescribe one of the regimens below  If you run out of the blood thinner checked off below before you are walking normally on your foot and out of your cast - notify your doctor immediately so you can get a refill  Not doing so can lead to blood clots and serious complications including death  Aspirin 325mg twice per day    Numbness  It is normal for your foot to be numb until about dinner time  If you’ve had a popliteal block procedure, you might be numb until the following day  When you start to feel pins and needles in the foot - this means the block is wearing off  That is the appropriate time to take your pain medication  Pain Medication  Do not supplement your pain medication with over the counter drugs, old leftover pain medications, or extra Tylenol  You must discuss any additional medications with your doctor prior to taking them for pain     Driving  No driving is allowed without discussion with the doctor  Ambulation  Nonweightbearing to surgical foot  If given a flat, stiff shoe / darco wedge shoe, Do not walk at all without it  Use a device (cane, walker, crutches) to take some weight off of the foot when walking  If instructed not to put weight on the surgical foot, use the following:  Crutches/ walker     Putting weight on the foot will lead to complications

## 2023-02-24 NOTE — OP NOTE
OPERATIVE REPORT  PATIENT NAME: Yosi Merrill    :  1990  MRN: [id number]  Pt Location: [facility] OR ROOM 01    SURGERY DATE: 2023    Surgeon(s) and Role:     * Fuad Haq DPM - Primary    Preop Diagnosis:  Closed fracture of base of fifth metatarsal bone of right foot at metaphyseal-diaphyseal junction with nonunion, subsequent encounter [S99 191K]    Post-Op Diagnosis Codes:     * Closed fracture of base of fifth metatarsal bone of right foot at metaphyseal-diaphyseal junction with nonunion, subsequent encounter [S99 191K]    Procedure(s):  Right - OPEN REDUCTION W/ INTERNAL FIXATION (ORIF) FOOT    Specimen(s):  * No specimens in log *    Estimated Blood Loss:   Minimal    Drains:  * No LDAs found *    Anesthesia Type:   General    Operative Indications:  Closed fracture of base of fifth metatarsal bone of right foot at metaphyseal-diaphyseal junction with nonunion, subsequent encounter [S99 191K]      Operative Findings:  Consistent with diagnosis, nonunited base of the fifth metatarsal    Complications:   None    Procedure and Technique:  Under mild sedation patient was brought the operating placed on the operating table in supine position the patient was then placed under general anesthesia and the foot was then scrubbed prepped and draped in usual septic manner    Attention was then directed to the right foot where a full timeout was then performed identify the correct patient location and procedure    A incision was made overlying the fifth metatarsal utilizing C-arm's guidance, using a combination of sharp and blunt dissection dissection was carried down to level of periosteum which was incised  Dissection was carried back to the level of the metatarsal base with a fracture line was identified, this was debrided, fenestrated and augment was applied to this area      The plate was then applied utilizing manufacture guidelines, the hooks were placed in the base of fifth metatarsal and hammered in  Good purchase was noted  Positioning of the plate was confirmed on AP and lateral view in the remainder of the screws were filled according manufacture guidelines  Good stability was noted  This was then rinsed with copious nonsterile saline and closure was obtained utilizing combination of 3-0 Vicryl and 4-0 nylon  It was then deflated after 55 minutes    The patient tolerated procedure and anesthesia well    Dressing was applied consisting of Adaptic, 4 x 4's, ABD, Nickie, Ace     I was present for the entire procedure    Patient Disposition:  PACU  and extubated and stable        SIGNATURE: Anita Quintero DPM  DATE: February 24, 2023  TIME: 2:00 PM

## 2023-02-24 NOTE — ANESTHESIA POSTPROCEDURE EVALUATION
Post-Op Assessment Note    CV Status:  Stable  Pain Score: 0    Pain management: adequate     Mental Status:  Awake and sleepy   Hydration Status:  Euvolemic   PONV Controlled:  Controlled   Airway Patency:  Patent      Post Op Vitals Reviewed: Yes      Staff: CRNA         No notable events documented      BP   105/59   Temp   97 7   Pulse  63   Resp   12   SpO2   94

## 2023-03-02 ENCOUNTER — OFFICE VISIT (OUTPATIENT)
Dept: PODIATRY | Facility: CLINIC | Age: 33
End: 2023-03-02

## 2023-03-02 VITALS — BODY MASS INDEX: 45.1 KG/M2 | WEIGHT: 315 LBS | HEIGHT: 70 IN

## 2023-03-02 DIAGNOSIS — S99.191K CLOSED FRACTURE OF BASE OF FIFTH METATARSAL BONE OF RIGHT FOOT AT METAPHYSEAL-DIAPHYSEAL JUNCTION WITH NONUNION, SUBSEQUENT ENCOUNTER: Primary | ICD-10-CM

## 2023-03-02 NOTE — PROGRESS NOTES
PATIENT:  Susan Baez      1990    ASSESSMENT     1  Closed fracture of base of fifth metatarsal bone of right foot at metaphyseal-diaphyseal junction with nonunion, subsequent encounter               PLAN  Patient is doing well post-operatively  Sutures left intact  Incision was cleaned with betadine and DSD applied to be kept C/D/I  Continue post-op care as instructed  Stressed on patient compliance about proper off-loading, staying off of feet, and proper dressing care  Call if any increase in pain, fevers, calf pain, shortness of breath, or general distress is noted  Patient instructed to go to ER if call is not returned immediately  -will obtain Xr next week  - strict NWB, given crutches today    HISTORY OF PRESENT ILLNESS  Patient presents for post-op appointment  Post-op pain is under control and resolving well  The patient is feeling well and in good spirits  Patient reported no post-op concern  Patient states that he had to put this area down and bear weight due to lack of stability, walker was very difficult for him to get around with  REVIEW OF SYSTEMS  GENERAL: No fever or chills  HEART: No chest pain, or palpitation  RESPIRATORY:  No SOB or cough  GI: No Nausea, vomit or diarrhea  NEUROLOGIC: No syncope or acute weakness  MUSCULOSKELETAL: No calf pain or edema  PHYSICAL EXAMINATION  GENERAL  The patient appears in NAD / non-toxic  Afebrile  VSS    VASCULAR EXAM  Pedal pulses and vascular status are intact  No calf pain or edema bilaterally  No cyanosis  DERMATOLOGIC EXAM  Incision is coapted and healing well  No signs of infection  No active drainage  Normal post-op edema and ecchymosis  No necrosis or dehiscence  NEUROLOGIC EXAM  AAO X 3  No focal neurologic deficit  Neurologic status is intact BLE  MUSCULOSKELETAL EXAM  Good surgical correction  Normal post-op findings  ROM intact  No fluctuation or crepitus

## 2023-03-06 ENCOUNTER — TELEPHONE (OUTPATIENT)
Dept: OBGYN CLINIC | Facility: CLINIC | Age: 33
End: 2023-03-06

## 2023-03-06 NOTE — TELEPHONE ENCOUNTER
Left message for patient to call and r/s his appt to a different time or day  Dr Alfaro Doctor will be leaving early for surgery  Call back number was provided

## 2023-03-09 ENCOUNTER — OFFICE VISIT (OUTPATIENT)
Dept: PULMONOLOGY | Facility: CLINIC | Age: 33
End: 2023-03-09

## 2023-03-09 ENCOUNTER — DOCUMENTATION (OUTPATIENT)
Dept: PULMONOLOGY | Facility: CLINIC | Age: 33
End: 2023-03-09

## 2023-03-09 VITALS
OXYGEN SATURATION: 96 % | HEIGHT: 70 IN | HEART RATE: 92 BPM | DIASTOLIC BLOOD PRESSURE: 76 MMHG | BODY MASS INDEX: 51.8 KG/M2 | SYSTOLIC BLOOD PRESSURE: 118 MMHG

## 2023-03-09 DIAGNOSIS — J96.12 CHRONIC HYPERCAPNIC RESPIRATORY FAILURE (HCC): ICD-10-CM

## 2023-03-09 DIAGNOSIS — F11.90 CENTRAL SLEEP APNEA COMORBID WITH PRESCRIBED OPIOID USE: ICD-10-CM

## 2023-03-09 DIAGNOSIS — G47.33 OSA (OBSTRUCTIVE SLEEP APNEA): ICD-10-CM

## 2023-03-09 DIAGNOSIS — G47.37 CENTRAL SLEEP APNEA COMORBID WITH PRESCRIBED OPIOID USE: ICD-10-CM

## 2023-03-09 DIAGNOSIS — Z77.29 LONG-TERM EXPOSURE INVOLVING BIRD DROPPINGS: ICD-10-CM

## 2023-03-09 DIAGNOSIS — G25.81 RESTLESS LEG SYNDROME: ICD-10-CM

## 2023-03-09 DIAGNOSIS — J45.40 MODERATE PERSISTENT ASTHMA WITHOUT COMPLICATION: Primary | ICD-10-CM

## 2023-03-09 RX ORDER — TEMAZEPAM 15 MG/1
15 CAPSULE ORAL DAILY
COMMUNITY
Start: 2023-02-27

## 2023-03-09 RX ORDER — ASPIRIN 325 MG
325 TABLET, DELAYED RELEASE (ENTERIC COATED) ORAL DAILY
COMMUNITY
Start: 2023-02-24

## 2023-03-09 RX ORDER — MOMETASONE FUROATE AND FORMOTEROL FUMARATE DIHYDRATE 200; 5 UG/1; UG/1
2 AEROSOL RESPIRATORY (INHALATION) 2 TIMES DAILY
Qty: 39 G | Refills: 6 | Status: SHIPPED | OUTPATIENT
Start: 2023-03-09

## 2023-03-09 NOTE — PROGRESS NOTES
Sleep Medicine Outpatient Note   Claudette Wills 34 y o  male MRN: [id number]  3/11/2023    Reason for Consultation:    Chief Complaint   Patient presents with   • Sleep Apnea   • Asthma       Assessment/Plan:    1  Moderate persistent asthma without complication  Assessment & Plan:  He has asthma based on his pulmonary function testing that showed a significant bronchodilator response  Jakub Villa was denied so now he's on Dulera  He doesn't feel as much benefit on Dulera 100 as he did on Breo so I increased Dulera to 200-5mcg/act  Also gave him a script for a spacer today so that he can use his Dulera and Albuterol more efficiently    Allergy testing noted with IgE at 75, mild allergies to Alternaria alternata, dust, ragweed  Orders:  -     mometasone-formoterol (Dulera) 200-5 MCG/ACT inhaler; Inhale 2 puffs 2 (two) times a day Rinse mouth after use  -     Spacer Device for Inhaler    2  Central sleep apnea comorbid with prescribed opioid use  Assessment & Plan:  He is tapering down his methadone at a safe interval   This is likely contributing to the high prevalence of central sleep apnea  He should continue ASV at all times of sleep  Compliance recently has been suboptimal   I stressed that he should use ASV at all times of sleep due to worsening hypercapnia  Marvin Aaron was down when he presented but subsequent compliance review after he had left office showed suboptimal compliance  He used the machine 23/30 days but only 8 days>4 hours  Residual AHI is 14  Median leak 0 5, 95th% 27  He did not complain of mask leak in the office but admitted he hasn't been using it as much  3  CATA (obstructive sleep apnea)  Assessment & Plan:  He is currently on ASV with minimum EPAP of 15 cm H2O, pressure support 4-10 with mostly adequate elimination of his obstructive sleep apnea  However, compliance has been suboptimal      4   Chronic hypercapnic respiratory failure [facility])  Assessment & Plan:  Rozina Covarrubias has an arterial blood gas during wakefulness with PCO2 47 in 10/2022  Serum bicarbonate on basic metabolic panels have been in the low 30  This is likely due to obesity hypoventilation syndrome, central sleep apnea due to methadone use  He needs improved compliance with his ASV  He needs to pursue further weight loss  He is tapering his methadone dose at a safe interval     Based on pulmonary function testing he has mild obstructive  lung disease that is likely a result of asthma and a history of cigarette smoking  He has significant bronchodilator response on this pulmonary function testing  5  Long-term exposure involving bird droppings  Assessment & Plan:  He has a [place] that he inherited from a family member  He wears a respirator when cleaning the birdcage (I had recommended that last visit)  If he experiences worsening respiratory symptoms then this exposure should be eliminated  He is at risk for acute or chronic hypersensitivity pneumonitis due to exposure  Recent DLCO and lung volumes do not suggest early signs of interstitial lung disease  6  Restless leg syndrome  Assessment & Plan:  Currently on Lyrica 75 mg twice daily and methadone therapy which should theoretically decrease his symptoms of restless leg  We will hold off on any any additional therapies at this time    Reevaluate symptoms at follow-up visits          Health Maintenance  Immunization History   Administered Date(s) Administered   • COVID-19 MODERNA VACC 0 5 ML IM 04/02/2021, 05/04/2021, 12/11/2021   • DTP 1989, 03/03/1990, 05/05/1990, 05/08/1991, 10/18/1991   • Hep A / Hep B 04/01/2015   • Hep B, adult 04/02/1996, 06/11/1996, 10/01/1997   • Hib (PRP-OMP) 01/31/1991   • INFLUENZA 09/24/2014, 10/31/2016, 11/21/2017, 12/19/2018   • IPV 03/03/1990, 05/05/1990, 05/08/1991, 10/18/1991, 04/02/1996   • Influenza Quadrivalent Preservative Free 3 years and older IM 10/31/2016   • Influenza Quadrivalent, 6-35 Months IM 11/21/2017   • Influenza, injectable, quadrivalent, preservative free 0 5 mL 12/19/2018, 10/16/2020, 11/18/2022   • Influenza, seasonal, injectable 09/24/2014   • MMR 01/31/1991, 10/18/1991   • Tuberculin Skin Test 08/04/1990, 01/21/1997   • Tuberculin Skin Test-PPD Intradermal 08/04/1990, 01/21/1997   • Varicella 06/11/1996        Return in about 6 months (around 9/9/2023)  History of Present Illness    HPI:  Dalila Mejía is a 34 y o  male who has a past medical history of, bipolar disorder, hepatitis C, severe CATA/OHS, chronic pain syndrome, chronic lower back pain, lumbar radiculopathy, history of substance abuse on methadone maintenance therapy, central sleep apnea likely due to methadone currently on ASV    Presenting for follow up    He had R leg for fracture 2 weeks ago  Breathing has been okay  He has Dulera since [place] was denied  He felt great with Breo but not as much with Dulera  He is doing a lot of work in the basement - he wears a respirator when working in the basement or with bird droppings  ACT score is 15  Doesn't have to use albuterol PRN  No recent exacerbations    He has had really heavy nightmares recently - it happened with Marty  Now taking temazepam before sleeping which helps with anxiety  [facility], provider there orders temazepam for him  Haven't been using ASV recently as much due to nightmares  Called bariatric surgery (not with [facility]) - referred by mother  Mood is good  4-6 weeks on the scooter due to R leg fracture      Prior Pulmonary/Sleep History  Patient had original sleep study in 2017 that showed  3 events per hour  Patient had 75 central apneas during that sleep study  Subsequently he underwent a sleep study with split component in June 2022 that showed AHI of 124 9 with a central apnea index of 77 8  The patient was titrated on CPAP and BiPAP and was ineffective due to central apnea component   ASV titration in August showed significant improvement with a EPAP of 15 cm H2O  Oxygenation normalized and AHI was improved at 8  He received his machine and August and followed up with Dr Amanda Gray in September  He felt better after using the machine  He has gained significant symptomatic benefit with decrease excessive daytime sleepiness, improved sleep quality, sleep maintenance  He is going to bed around 10 PM and waking up at 5 AM   He has several overnight awakenings  He has to get up to use the restroom once at night  Patient has always had bad allergies as a child especially with heat and humidity  He believes that he is always had asthma  He cannot breathe when the house is hot  He is especially bothered by mold and pollen  Dogs at home do not seem to bother him  He does not have frequent asthma exacerbations where he has to use prednisone, go to the emergency room, urgent care, or hospital   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 in that regard  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 birdcage  He is currently not working due to frequent health problems  He endorses significant nasal congestion, nasal surgery was recommended by his ENT physician Dr Kala Ventura  However Dr Amanda Gray was concerned that he would not be able to use his ASV after nasal surgery so this was deferred  He takes Xyzal and Flonase PRN    He was also endorsing symptoms of restless leg  He is on pregabalin 75 mg twice a day and is also on methadone  He was on methadone 150 mg a day  He is currently trying to taper methadone slowly through his program      He is seeing bariatric surgery - they wanted to buy specialized meals but it's very expensive  He has recently started an antidepressant vilanzdone and this he believes has contributed to some weight gain      Cutting back on vaping  Former smoker - 13 pack year smoking - stopped 5-6 years ago through a smoking cessation program  '       Exposure history:  Unemployed due to health problems  Loaded trucks with fertilizer  Pets - [place]    Gets irritated by bird dust   He wears a mask when he cleans the bird dust         Historical Information   Past Medical History:   Diagnosis Date   • Allergic    • Anemia 06/24/2018   • Anxiety     from records   • Arthritis    • Asthma    • Benign essential hypertension 11/17/2016   • Bipolar 1 disorder ([facility] [address] )     from records   • Chronic pain    • Chronic pain disorder     back/ knees/ hip   • Claustrophobia 03/15/2018   • Community acquired pneumonia 06/24/2018   • CPAP (continuous positive airway pressure) dependence    • Depressed 07/14/2016   • Depression     from records   • GERD (gastroesophageal reflux disease)    • Hepatitis C virus infection 08/14/2014   • [facility] 07/24/2018   • Infectious viral hepatitis    • Obesity 10/12/2016   • Obstructive sleep apnea     from records   • Sleep disorder    • Substance abuse [facility])      Past Surgical History:   Procedure Laterality Date   • EPIDURAL BLOCK INJECTION Right 10/28/2022    Procedure: BLOCK / INJECTION EPIDURAL STEROID LUMBAR  right L4-5 and L5-S1 TFESI;  Surgeon: Blane Valencia MD;  Location: [facility];  Service: Pain Management    • EPIDURAL BLOCK INJECTION Right 12/20/2022    Procedure: BLOCK / INJECTION EPIDURAL STEROID LUMBAR  right  L4-5 and L5-S1 TFESI;  Surgeon: Blane Valencia MD;  Location: [facility];  Service: Pain Management    • NC INCISION & DRAINAGE ABSCESS COMPLICATED/MULTIPLE Left 5/2/2019    Procedure: INCISION AND DRAINAGE (I&D) EXTREMITY;  Surgeon: Chip Romo MD;  Location: [facility];  Service: Orthopedics   • NC LAPAROSCOPY COLECTOMY PARTIAL W/ANASTOMOSIS Left 5/21/2020    Procedure: LAPAROSCOPIC LEFT HEMICOLECTOMY TAKEDOWN SPLENIC FLECTURE, COLORECTAL ANASTOMOSIS ;  Surgeon: Dontrell Radford MD;  Location:  MAIN OR;  Service: Colorectal   • NC NEUROPLASTY &/TRANSPOS MEDIAN NRV CARPAL OMAYRAE Left 1/9/2023    Procedure: RELEASE CARPAL TUNNEL;  Surgeon: Giancarlo Lopez DO;  Location: [facility];  Service: Orthopedics   • ND OPEN TREATMENT METATARSAL FRACTURE EACH Right 2/24/2023    Procedure: OPEN REDUCTION W/ INTERNAL FIXATION (ORIF) FOOT;  Surgeon: Bennie Ryder DPM;  Location: [facility];  Service: Podiatry   • WISDOM TOOTH EXTRACTION       Family History   Problem Relation Age of Onset   • Diabetes Mother    • Restless legs syndrome Mother    • Sleep apnea Mother    • Colon cancer Father    • Alzheimer's disease Maternal Grandmother    • Depression Family          Meds/Allergies     Current Outpatient Medications:   •  albuterol (Ventolin HFA) 90 mcg/act inhaler, Inhale 2 puffs every 6 (six) hours as needed for wheezing, Disp: 18 g, Rfl: 6  •  aspirin 325 mg tablet, Take 1 tablet (325 mg total) by mouth 2 (two) times a day, Disp: 60 tablet, Rfl: 0  •  fluticasone (FLONASE) 50 mcg/act nasal spray, 1 spray into each nostril 2 (two) times a day, Disp: 16 g, Rfl: 11  •  hydrOXYzine HCL (ATARAX) 50 mg tablet, , Disp: , Rfl:   •  levocetirizine (XYZAL) 5 MG tablet, Take 1 tablet (5 mg total) by mouth every evening, Disp: 30 tablet, Rfl: 0  •  Linzess 145 MCG CAPS, Take 145 mcg by mouth in the morning, Disp: , Rfl:   •  METHADONE HCL PO, Take 150 mg by mouth daily, Disp: , Rfl:   •  mometasone-formoterol (Dulera) 200-5 MCG/ACT inhaler, Inhale 2 puffs 2 (two) times a day Rinse mouth after use , Disp: 39 g, Rfl: 6  •  olopatadine (PATANOL) 0 1 % ophthalmic solution, Administer 1 drop to both eyes 2 (two) times a day, Disp: 5 mL, Rfl: 0  •  omeprazole (PriLOSEC) 20 mg delayed release capsule, Take 20 mg by mouth 2 (two) times a day , Disp: , Rfl:   •  ondansetron (ZOFRAN-ODT) 4 mg disintegrating tablet, Take 4 mg by mouth every 6 (six) hours, Disp: , Rfl:   •  pregabalin (LYRICA) 75 mg capsule, Take 1 capsule (75 mg total) by mouth 2 (two) times a day, Disp: 60 capsule, Rfl: 2  •  sucralfate (CARAFATE) 1 g tablet, Take 1 tablet (1 g total) by mouth 4 (four) times a day (Patient taking differently: Take 1 g by mouth if needed), Disp: 120 tablet, Rfl: 1  •  temazepam (RESTORIL) 15 mg capsule, Take 15 mg by mouth daily, Disp: , Rfl:   •  triamcinolone (KENALOG) 0 1 % cream, Apply topically 2 (two) times a day, Disp: 30 g, Rfl: 1  •  vilazodone (VIIBRYD) 20 mg tablet, , Disp: , Rfl:   •  zaleplon (SONATA) 10 MG capsule, , Disp: , Rfl:   •  Amitiza 24 MCG capsule, , Disp: , Rfl:   •  aspirin (ECOTRIN) 325 mg EC tablet, Take 325 mg by mouth daily, Disp: , Rfl:   •  naproxen (Naprosyn) 500 mg tablet, Take 1 tablet (500 mg total) by mouth 2 (two) times a day with meals (Patient not taking: Reported on 3/9/2023), Disp: 60 tablet, Rfl: 0  Allergies   Allergen Reactions   • Red Dye - Food Allergy Diarrhea, Nausea Only and Abdominal Pain   • Bee Venom Angioedema       Vitals: Blood pressure 118/76, pulse 92, height 5' 10" (1 778 m), SpO2 96 %  Body mass index is 51 8 kg/m²  Oxygen Therapy  SpO2: 96 %  Oxygen Therapy: None (Room air)      Physical Exam  Vitals and nursing note reviewed  Constitutional:       General: He is not in acute distress  Appearance: He is well-developed  He is obese  HENT:      Head: Normocephalic and atraumatic  Eyes:      Conjunctiva/sclera: Conjunctivae normal    Cardiovascular:      Rate and Rhythm: Normal rate and regular rhythm  Heart sounds: No murmur heard  Pulmonary:      Effort: Pulmonary effort is normal  No respiratory distress  Breath sounds: Normal breath sounds  Abdominal:      General: There is no distension  Palpations: Abdomen is soft  Tenderness: There is no guarding  Musculoskeletal:         General: No swelling or deformity  Cervical back: Neck supple  Comments: Using a scooter due to recent right leg fracture   Skin:     General: Skin is warm and dry  Capillary Refill: Capillary refill takes less than 2 seconds  Neurological:      General: No focal deficit present  Mental Status: He is alert and oriented to person, place, and time  Psychiatric:         Mood and Affect: Mood normal          Behavior: Behavior normal              Labs: I have personally reviewed pertinent lab results  ABG:   Lab Results   Component Value Date    PHART 7 380 10/12/2022    TDS8HIR 47 3 (H) 10/12/2022    PO2ART 76 2 10/12/2022    EPI4NUX 27 4 10/12/2022    BEART 1 4 10/12/2022    SOURCE Radial, Right 10/12/2022   ,   BNP: No results found for: BNP,   CBC:  Lab Results   Component Value Date    WBC 5 81 02/14/2023    HGB 12 4 02/14/2023    HCT 40 8 02/14/2023    MCV 84 02/14/2023     02/14/2023    EOSPCT 5 02/14/2023    EOSABS 0 28 02/14/2023    NEUTOPHILPCT 46 02/14/2023    LYMPHOPCT 40 02/14/2023   ,   CMP:   Lab Results   Component Value Date    SODIUM 140 02/14/2023    K 3 9 02/14/2023     02/14/2023    CO2 30 02/14/2023    ANIONGAP 11 04/01/2015    BUN 16 02/14/2023    CREATININE 0 70 02/14/2023    GLUCOSE 95 04/01/2015    CALCIUM 9 1 02/14/2023    AST 23 12/24/2022    ALT 37 12/24/2022    ALKPHOS 73 12/24/2022    PROT 8 0 04/01/2015    BILITOT 0 8 04/01/2015    EGFR 123 02/14/2023   ,   PT/INR:   Lab Results   Component Value Date    INR 0 87 12/24/2022   ,   Ferrtin: No components found for: FERRTIN,  Magensium: No results found for: MAGNESIUM,  11/19/2022 [place] allergy panel -0 35 Alternaria alter [place]  0 1 to cockroach  0 20 Common ragweed  0 12 cockroach  0 12  D pteronyssinus  A1AT swab was normal MM    ABG 10/12/2022 - 7 38/47 3/76 2/27  This was performed on room air     Imaging and other studies: I have personally reviewed pertinent reports     and I have personally reviewed pertinent films in PACS    Pulmonary function testing 10/12/22  Results:  FEV1/FVC Ratio: 67 %  Forced Vital Capacity: 4 70 L    86 % predicted  FEV1: 3 17 L     71 % predicted     Lung volumes by body plethysmography: Total Lung Capacity 108 % predicted   Residual volume 162 % predicted     DLCO corrected for patients hemoglobin level: 94 %        Sleep Study:  6/25/22  SLEEP:  Totals:  Patient slept for 295 0 minutes out of 405 5 minutes in bed representing a sleep efficiency of 72 7%  Pre-Treatment:  Patient slept 37 0 minutes out of 67 5 minutes in bed representing a sleep efficiency of 54 8%  Sleep architecture showed a sleep latency of 14 5 minutes and a REM sleep latency of N/A minutes  There was 55 4% Stage N1 noted  There was 44 6% Stage N2 noted  There was 0 0% Stage N3 noted  There was 0 0% REM sleep noted  The patient had 26 arousals for an average of 42 2 arousals per hour of sleep  Post-Treatment:  Patient slept 258 0 minutes out of 332 5 minutes in bed representing a sleep efficiency of 77 6%  There was 8 5% Stage N1 noted  There was 64 7% Stage N2 noted  There was 5 0% Stage N3 noted  There was 21 7% REM sleep noted  The patient had 60 arousals for an average of 14 0 arousals per hour of sleep      RESPIRATORY:  Pre-Treatment:  There were a total of 77 respiratory events made up of 16 obstructive apneas, 7 mixed apneas, 48 central apneas, and 6 hypopneas resulting in an apnea/hypopnea index was 124 9 events per hour of sleep  The AHI in the supine position was 124 9  The AHI during REM sleep was N/A  Post-Treatment:  Positive airway pressure was initiated at 5cm H2O pressure and titrated up to 23/19cm H2O pressure using the Medium ResMed AirFit f20 Full Face mask with no added humidity interface  With CPAP and BiPAP, overall of 359 respiratory events, 204 were central and 18 were mixed      OXIMETRY:  Totals: The baseline oxygen saturation with the patient awake was 93 2%  The mean oxygen saturation throughout the study was 92 3%  The amount of sleep time below 90% was 55 9 minutes  The lowest oxygen saturation was 81 0%  Pre-Treatment:  The mean oxygen saturation was 90 7%    The amount of sleep time below 90% was 11 6 minutes  The lowest oxygen saturation was 82 0%  Post-Treatment:  The mean oxygen saturation was 92 6%  The amount of sleep time below 90% was 44 3 minutes  The lowest oxygen saturation was 81 0%  BODY POSITION:  The patient slept 64 2% of the evening in the supine position, 8 4% on her left side, 27 5% on her right side, and 0 0% in the prone position       LEG MOVEMENT:  There were 0 PLMs; PLM index of 0 0; 0 PLMs associated with arousal; PLM w/arousal index of 0 0      SUMMARY:           Pre-Treatment Post-Treatment   Total Sleep Time (minutes) 37 0 258 0   Apnea/Hypopnea Index (AHI) 124 9 83 5   Central Apnea Index 77 8 47 4   Low oxygen saturation 82 0% 81 0%   PLMs index 0 0 0 0         9/3/2022  IMPRESSION:     1  Sleep apnea was better controlled with adaptive servo ventilation as compared to Bilevel PAP S/T  Respiratory events persisted with Bilevel PAP S/T, with a residual AHI in the moderate sleep apnea severity range  With ASV, at the EPAP of 15 cm h20, the AHI was 0 and baseline oxygen saturation was normal, without significant hypoxia  This was assessed in both  supine REM and NREM sleep  2  Normal baseline oxygen saturation with ASV and Bilevel PAP   3  The patient sleep well, with a high sleep efficiency, normal amount of Stage N1 sleep (light sleep), normal Stage N3 sleep (deep sleep), and normal REM sleep percentage  4  Periodic limb movements during sleep not present  5  No EKG abnormalities   RECOMMENDATION  Adaptive servoventilation with an EPAP of 15 cm h20, PS min 4, PS max 10, auto-backup     Transthoracic Echo:  •  Left Ventricle: Left ventricular cavity size is normal  Wall thickness is normal  The left ventricular ejection fraction is 60%  Systolic function is normal  Wall motion is normal  Diastolic function is normal   •  Right Ventricle: Systolic function is normal   •  Mitral Valve: There is trace regurgitation  •  Tricuspid Valve:  There is trace regurgitation  •  Pericardium: There is no pericardial effusion  Jorge A Osei MD  Pulmonary, Critical Care and Sleep Medicine  [address] [facility]     Portions of the record may have been created with voice recognition software  Occasional wrong word or "sound a like" substitutions may have occurred due to the inherent limitations of voice recognition software  Please read the chart carefully and recognize, using context, where substitutions have occurred

## 2023-03-09 NOTE — PATIENT INSTRUCTIONS
We will get you a spacer through the durable medical equipment company  Use ASV as much as you can during hours of sleep  I sent higher dose Dulera 200mcg to your pharmacy

## 2023-03-12 NOTE — ASSESSMENT & PLAN NOTE
He has a [place] cock[place] that he inherited from a family member  He wears a respirator when cleaning the birdcage (I had recommended that last visit)  If he experiences worsening respiratory symptoms then this exposure should be eliminated  He is at risk for acute or chronic hypersensitivity pneumonitis due to exposure  Recent DLCO and lung volumes do not suggest early signs of interstitial lung disease

## 2023-03-12 NOTE — ASSESSMENT & PLAN NOTE
He has asthma based on his pulmonary function testing that showed a significant bronchodilator response  Milvia Snell was denied so now he's on Dulera  He doesn't feel as much benefit on Dulera 100 as he did on Breo so I increased Dulera to 200-5mcg/act  Also gave him a script for a spacer today so that he can use his Dulera and Albuterol more efficiently    Allergy testing noted with IgE at 75, mild allergies to Alternaria alternata, dust, ragweed

## 2023-03-12 NOTE — ASSESSMENT & PLAN NOTE
He is tapering down his methadone at a safe interval   This is likely contributing to the high prevalence of central sleep apnea  He should continue ASV at all times of sleep  Compliance recently has been suboptimal   I stressed that he should use ASV at all times of sleep due to worsening hypercapnia  Valkatelyn Truong was down when he presented but subsequent compliance review after he had left office showed suboptimal compliance  He used the machine 23/30 days but only 8 days>4 hours  Residual AHI is 14  Median leak 0 5, 95th% 27  He did not complain of mask leak in the office but admitted he hasn't been using it as much

## 2023-03-12 NOTE — ASSESSMENT & PLAN NOTE
[facility] has an arterial blood gas during wakefulness with PCO2 47 in 10/2022  Serum bicarbonate on basic metabolic panels have been in the low 30  This is likely due to obesity hypoventilation syndrome, central sleep apnea due to methadone use  He needs improved compliance with his ASV  He needs to pursue further weight loss  He is tapering his methadone dose at a safe interval     Based on pulmonary function testing he has mild obstructive  lung disease that is likely a result of asthma and a history of cigarette smoking  He has significant bronchodilator response on this pulmonary function testing

## 2023-03-12 NOTE — ASSESSMENT & PLAN NOTE
He is currently on ASV with minimum EPAP of 15 cm H2O, pressure support 4-10 with mostly adequate elimination of his obstructive sleep apnea    However, compliance has been suboptimal

## 2023-03-13 LAB
DME PARACHUTE DELIVERY DATE ACTUAL: NORMAL
DME PARACHUTE DELIVERY DATE REQUESTED: NORMAL
DME PARACHUTE ITEM DESCRIPTION: NORMAL
DME PARACHUTE ORDER STATUS: NORMAL
DME PARACHUTE SUPPLIER NAME: NORMAL
DME PARACHUTE SUPPLIER PHONE: NORMAL

## 2023-03-16 ENCOUNTER — TELEPHONE (OUTPATIENT)
Dept: PODIATRY | Facility: CLINIC | Age: 33
End: 2023-03-16

## 2023-03-16 NOTE — TELEPHONE ENCOUNTER
Caller: Melissa Baez    Doctor: Anamika Deluca    Reason for call: please call back to reschedule a pot op appt    Call back#: [phone]

## 2023-03-23 ENCOUNTER — OFFICE VISIT (OUTPATIENT)
Dept: PODIATRY | Facility: CLINIC | Age: 33
End: 2023-03-23

## 2023-03-23 ENCOUNTER — APPOINTMENT (OUTPATIENT)
Dept: RADIOLOGY | Facility: MEDICAL CENTER | Age: 33
End: 2023-03-23

## 2023-03-23 VITALS — BODY MASS INDEX: 45.1 KG/M2 | WEIGHT: 315 LBS | HEIGHT: 70 IN

## 2023-03-23 DIAGNOSIS — M79.671 PAIN IN BOTH FEET: Primary | ICD-10-CM

## 2023-03-23 DIAGNOSIS — S99.191K CLOSED FRACTURE OF BASE OF FIFTH METATARSAL BONE OF RIGHT FOOT AT METAPHYSEAL-DIAPHYSEAL JUNCTION WITH NONUNION, SUBSEQUENT ENCOUNTER: ICD-10-CM

## 2023-03-23 DIAGNOSIS — M79.672 PAIN IN BOTH FEET: Primary | ICD-10-CM

## 2023-03-23 NOTE — PROGRESS NOTES
Assessment/Plan:      Diagnoses and all orders for this visit:    Pain in both feet    Closed fracture of base of fifth metatarsal bone of right foot at metaphyseal-diaphyseal junction with nonunion, subsequent encounter  -     X-ray foot right 3+ views; Future        -X-rays 3 views taken reviewed do show adequate placement of the hook plate with mild osseous bridging across the union site  - Sutures removed today, no further restrictions from an incisional standpoint, patient is strict nonweightbearing for the next 3 weeks, he is to return in 3 weeks for repeat x-rays and hopeful transition into heel weightbearing status  - We will plan to keep him heel weightbearing hopefully pending osseous bridging between the next 2 to 4 weeks and transition into a shoe      Subjective:     Patient ID: Chandan Purcell is a 34 y o  male  Patient presents for evaluation management of his right foot fracture, he is approximately weeks after surgery with plate application  He presents today nonweightbearing compliant with weightbearing status utilizing knee scooter  Review of Systems   Constitutional: Negative for chills and fever  HENT: Negative for ear pain and sore throat  Eyes: Negative for pain and visual disturbance  Respiratory: Negative for cough and shortness of breath  Cardiovascular: Negative for chest pain and palpitations  Gastrointestinal: Negative for abdominal pain and vomiting  Genitourinary: Negative for dysuria and hematuria  Musculoskeletal: Negative for arthralgias and back pain  Skin: Negative for color change and rash  Neurological: Negative for seizures and syncope  All other systems reviewed and are negative          Objective:     Physical Exam  Musculoskeletal:      Comments: No signs of dehiscence following suture removal   No signs of infection, minimal swelling, normal postoperative appearance

## 2023-03-29 DIAGNOSIS — J45.40 MODERATE PERSISTENT ASTHMA WITHOUT COMPLICATION: ICD-10-CM

## 2023-04-03 ENCOUNTER — OFFICE VISIT (OUTPATIENT)
Dept: DENTISTRY | Facility: CLINIC | Age: 33
End: 2023-04-03

## 2023-04-03 VITALS — SYSTOLIC BLOOD PRESSURE: 118 MMHG | DIASTOLIC BLOOD PRESSURE: 82 MMHG | TEMPERATURE: 98.7 F | HEART RATE: 93 BPM

## 2023-04-03 DIAGNOSIS — K02.9 DENTAL CARIES: Primary | ICD-10-CM

## 2023-04-03 NOTE — PROGRESS NOTES
Resin Filling #14-MOLD    Norma Marion is a 29yo  male and presented to clinic for composite filling #14-MOD  However, upon clinical inspection prior to procedure, it was determined to do add lingual surface  PMH reviewed, updates include:  • Hep C - resolved  • CPAP being used for CATA   • Night terrors have stopped for now, seeing mental health therapist and taking medication (RX: Temazapam)  • Complained of dry mouth  6  ASA III  Pain = pt reports on #12 (LV:1/3/23 palliative endo performed #12) - see NV note below    Discussed with patient need for RCT if pulp exposure occurs or in future if pulp is inflamed  Pt understands and consents  Applied topical benzocaine, administered 0 5 carps 4% articaine 1:100k epi via PSA and MSA blocks     Isolation with cotton rolls + mouth prop size Adult  Prepped tooth #14-MOLD with 330, 557 carbide on high speed  Caries removed with 4 round carbide on slow speed  Placed tofflemyer + wedge  Etched with 37% H2PO4, rinse and dry  Applied Limelight with light cure  Applied Adhese with 20 second scrub once, gentle air dry and light cured for 10s  Restored with Tetric flowable/bulk yoli shade A2 and light cured  Refined with finishing burs, polished with enhance point  Verified occlusion and contacts  POI given  Pt left satisfied and ambulatory        NV: #19-DO + check for #12 pre-auth (RCT/P&C/Crown) to finish tx (requested permission for crown lengthening study case via email)     NNV: Recall + Script for [place]/ suggest Xylitol gum

## 2023-05-04 ENCOUNTER — OFFICE VISIT (OUTPATIENT)
Dept: DENTISTRY | Facility: CLINIC | Age: 33
End: 2023-05-04

## 2023-05-04 VITALS — DIASTOLIC BLOOD PRESSURE: 91 MMHG | SYSTOLIC BLOOD PRESSURE: 130 MMHG | HEART RATE: 87 BPM

## 2023-05-04 DIAGNOSIS — K02.9 TOOTH DECAY: Primary | ICD-10-CM

## 2023-05-04 RX ORDER — CLONAZEPAM 0.5 MG/1
TABLET ORAL
COMMUNITY
Start: 2023-04-20

## 2023-05-04 NOTE — PROGRESS NOTES
Composite Filling    Amy Neville presents for composite filling  PMH reviewed, no changes  Discussed with patient need for RCT if pulp exposure occurs or in future if pulp is inflamed  Pt understands and consents  #14 adjusted to patient's liking before numbing teeth (pt says existing resin feels too high)    Applied topical benzocaine, administered 1 carps 4% articaine 1:100k epi via infiltration    Prepped tooth# 15 with 330 carbide on high speed  Caries removed with round carbide on slow speed  Isolation with cotton rolls and dri-angles    #14 adjusted to patient's liking before numbing teeth  Etch with 37% H2PO4, rinse, dry  Applied Adhese with 20 second scrub once, gentle air dry and light cured for 10s  Restored with Tetric bulk yoli shade A2 and light cured  Refined with finishing burs, polished with enhance point  Verified occlusion and contacts  Pt left satisfied        NV: Resins

## 2023-05-11 ENCOUNTER — OFFICE VISIT (OUTPATIENT)
Dept: DENTISTRY | Facility: CLINIC | Age: 33
End: 2023-05-11

## 2023-05-11 VITALS — DIASTOLIC BLOOD PRESSURE: 82 MMHG | HEART RATE: 71 BPM | SYSTOLIC BLOOD PRESSURE: 113 MMHG

## 2023-05-11 DIAGNOSIS — K02.9 TOOTH DECAY: Primary | ICD-10-CM

## 2023-05-11 NOTE — PROGRESS NOTES
Composite Filling    Gertrude Flanagan presents for composite filling  PMH reviewed, no changes  Discussed with patient need for RCT if pulp exposure occurs or in future if pulp is inflamed  Pt understands and consents  Applied topical benzocaine, administered 2 carps 2% lido 1:100k epi via IANB and long buccal     Prepped tooth #19 with 245 carbide on high speed  Caries removed with round carbide on slow speed  Isolation with cotton rolls and dri-angles    Etch with 37% H2PO4, rinse, dry  Applied Adhese with 20 second scrub once, gentle air dry and light cured for 10s  Restored with Tetric bulk yoli shade A2 and beautifil flowable resin shade A2 and light cured  Refined with finishing burs, polished with enhance point  Verified occlusion and contacts  Pt left satisfied      NV: Resins

## 2023-05-18 ENCOUNTER — APPOINTMENT (OUTPATIENT)
Dept: RADIOLOGY | Facility: CLINIC | Age: 33
End: 2023-05-18

## 2023-05-18 ENCOUNTER — OFFICE VISIT (OUTPATIENT)
Dept: INTERNAL MEDICINE CLINIC | Facility: CLINIC | Age: 33
End: 2023-05-18

## 2023-05-18 VITALS
BODY MASS INDEX: 45.1 KG/M2 | TEMPERATURE: 97.8 F | SYSTOLIC BLOOD PRESSURE: 132 MMHG | DIASTOLIC BLOOD PRESSURE: 72 MMHG | HEART RATE: 81 BPM | WEIGHT: 315 LBS | OXYGEN SATURATION: 98 % | HEIGHT: 70 IN

## 2023-05-18 DIAGNOSIS — S99.191K CLOSED FRACTURE OF BASE OF FIFTH METATARSAL BONE OF RIGHT FOOT AT METAPHYSEAL-DIAPHYSEAL JUNCTION WITH NONUNION, SUBSEQUENT ENCOUNTER: ICD-10-CM

## 2023-05-18 DIAGNOSIS — L30.9 ECZEMA, UNSPECIFIED TYPE: ICD-10-CM

## 2023-05-18 DIAGNOSIS — M77.41 METATARSALGIA OF RIGHT FOOT: Primary | ICD-10-CM

## 2023-05-18 DIAGNOSIS — M77.41 METATARSALGIA OF RIGHT FOOT: ICD-10-CM

## 2023-05-18 DIAGNOSIS — M54.16 LUMBAR RADICULOPATHY: ICD-10-CM

## 2023-05-18 DIAGNOSIS — J30.2 SEASONAL ALLERGIES: ICD-10-CM

## 2023-05-18 PROBLEM — M54.9 UPPER BACK PAIN: Status: RESOLVED | Noted: 2022-09-20 | Resolved: 2023-05-18

## 2023-05-18 PROBLEM — F11.20 HEROIN ADDICTION (HCC): Status: RESOLVED | Noted: 2019-01-22 | Resolved: 2023-05-18

## 2023-05-18 PROBLEM — G47.34 CHRONIC INTERMITTENT HYPOXIA WITH OBSTRUCTIVE SLEEP APNEA: Status: RESOLVED | Noted: 2022-09-02 | Resolved: 2023-05-18

## 2023-05-18 PROBLEM — K43.9 HERNIA OF ABDOMINAL WALL: Status: RESOLVED | Noted: 2022-09-08 | Resolved: 2023-05-18

## 2023-05-18 PROBLEM — G47.33 CHRONIC INTERMITTENT HYPOXIA WITH OBSTRUCTIVE SLEEP APNEA: Status: RESOLVED | Noted: 2022-09-02 | Resolved: 2023-05-18

## 2023-05-18 PROBLEM — J96.12 CHRONIC HYPERCAPNIC RESPIRATORY FAILURE (HCC): Status: RESOLVED | Noted: 2022-12-21 | Resolved: 2023-05-18

## 2023-05-18 PROBLEM — J45.40 MODERATE PERSISTENT ASTHMA WITHOUT COMPLICATION: Status: RESOLVED | Noted: 2023-03-09 | Resolved: 2023-05-18

## 2023-05-18 PROBLEM — M79.10 MUSCLE ACHE: Status: RESOLVED | Noted: 2021-12-17 | Resolved: 2023-05-18

## 2023-05-18 PROBLEM — R10.84 GENERALIZED ABDOMINAL PAIN: Status: RESOLVED | Noted: 2021-12-17 | Resolved: 2023-05-18

## 2023-05-18 PROBLEM — M54.2 NECK PAIN: Status: RESOLVED | Noted: 2022-09-20 | Resolved: 2023-05-18

## 2023-05-18 RX ORDER — PREGABALIN 75 MG/1
75 CAPSULE ORAL 2 TIMES DAILY
Qty: 60 CAPSULE | Refills: 2 | Status: SHIPPED | OUTPATIENT
Start: 2023-05-18

## 2023-05-18 RX ORDER — LEVOCETIRIZINE DIHYDROCHLORIDE 5 MG/1
5 TABLET, FILM COATED ORAL EVERY EVENING
Qty: 30 TABLET | Refills: 5 | Status: SHIPPED | OUTPATIENT
Start: 2023-05-18

## 2023-05-18 RX ORDER — TRIAMCINOLONE ACETONIDE 1 MG/G
CREAM TOPICAL 2 TIMES DAILY
Qty: 30 G | Refills: 1 | Status: SHIPPED | OUTPATIENT
Start: 2023-05-18

## 2023-05-18 NOTE — ASSESSMENT & PLAN NOTE
Pt underwent surgical correction but has been unable to follow up due to scheduling issues  Will get updated xray   Follow up with podiatry for appt next week

## 2023-05-18 NOTE — PROGRESS NOTES
Name: Luiz Hopkins      : 1990      MRN: [id number]  Encounter Provider: Farhad Levi PA-C  Encounter Date: 2023   Encounter department: [address] [place] [address]     1  Metatarsalgia of right foot  -     XR foot 3+ vw right; Future; Expected date: 2023    2  Eczema, unspecified type  -     triamcinolone (KENALOG) 0 1 % cream; Apply topically 2 (two) times a day    3  Lumbar radiculopathy  -     pregabalin (LYRICA) 75 mg capsule; Take 1 capsule (75 mg total) by mouth 2 (two) times a day    4  Seasonal allergies  -     levocetirizine (XYZAL) 5 MG tablet; Take 1 tablet (5 mg total) by mouth every evening    5  Closed fracture of base of fifth metatarsal bone of right foot at metaphyseal-diaphyseal junction with nonunion, subsequent encounter  Assessment & Plan:  Pt underwent surgical correction but has been unable to follow up due to scheduling issues  Will get updated xray  Follow up with podiatry for appt next week        BMI Counseling: Body mass index is 51 39 kg/m²  The BMI is above normal  Nutrition recommendations include decreasing portion sizes, consuming healthier snacks and limiting drinks that contain sugar  Rationale for BMI follow-up plan is due to patient being overweight or obese  Subjective      Pt presents for routine visit  Since our last visit her underwent surgery on his right foot  He had post op xray that revealed mild bridging  He was to be non weightbearing for 3 weeks then get repeat xray  If bridging improved he would then be able to start heel walking and then a shoe 2-4 weeks after that  Unfortunately all of his appts with podiatry since March were cancelled and he has not been able to get updated xray  He continues with pain and swelling  Review of Systems   Constitutional: Negative for chills and fever     HENT: Negative for congestion, ear pain, hearing loss, postnasal drip, rhinorrhea, sinus pressure, sinus pain, sore throat and trouble swallowing  Eyes: Negative for pain and visual disturbance  Respiratory: Negative for cough, chest tightness, shortness of breath and wheezing  Cardiovascular: Negative  Negative for chest pain, palpitations and leg swelling  Gastrointestinal: Negative for abdominal pain, blood in stool, constipation, diarrhea, nausea and vomiting  Endocrine: Negative for cold intolerance, heat intolerance, polydipsia, polyphagia and polyuria  Genitourinary: Negative for difficulty urinating, dysuria, flank pain and urgency  Musculoskeletal: Positive for arthralgias and joint swelling  Negative for back pain, gait problem and myalgias  Skin: Negative for rash  Allergic/Immunologic: Negative  Neurological: Negative for dizziness, weakness, light-headedness and headaches  Hematological: Negative  Psychiatric/Behavioral: Negative for behavioral problems, dysphoric mood and sleep disturbance  The patient is not nervous/anxious  Current Outpatient Medications on File Prior to Visit   Medication Sig   • albuterol (Ventolin HFA) 90 mcg/act inhaler Inhale 2 puffs every 6 (six) hours as needed for wheezing   • clonazePAM (KlonoPIN) 0 5 mg tablet Take 0 5 mg by mouth 2 (two) times a day   • fluticasone (FLONASE) 50 mcg/act nasal spray 1 spray into each nostril 2 (two) times a day   • METHADONE HCL PO Take 150 mg by mouth daily   • mometasone-formoterol (Dulera) 200-5 MCG/ACT inhaler Inhale 2 puffs 2 (two) times a day Rinse mouth after use     • olopatadine (PATANOL) 0 1 % ophthalmic solution Administer 1 drop to both eyes 2 (two) times a day   • omeprazole (PriLOSEC) 20 mg delayed release capsule Take 20 mg by mouth 2 (two) times a day    • ondansetron (ZOFRAN-ODT) 4 mg disintegrating tablet Take 4 mg by mouth every 6 (six) hours   • sucralfate (CARAFATE) 1 g tablet Take 1 tablet (1 g total) by mouth 4 (four) times a day (Patient taking differently: Take 1 g by mouth 4 (four) times a day "PRN)   • temazepam (RESTORIL) 15 mg capsule Take 15 mg by mouth daily   • vilazodone (VIIBRYD) 20 mg tablet Take 20 mg by mouth daily with breakfast   • [DISCONTINUED] levocetirizine (XYZAL) 5 MG tablet Take 1 tablet (5 mg total) by mouth every evening   • [DISCONTINUED] pregabalin (LYRICA) 75 mg capsule Take 1 capsule (75 mg total) by mouth 2 (two) times a day   • [DISCONTINUED] triamcinolone (KENALOG) 0 1 % cream Apply topically 2 (two) times a day   • aspirin 325 mg tablet Take 1 tablet (325 mg total) by mouth 2 (two) times a day   • [DISCONTINUED] Amitiza 24 MCG capsule  (Patient not taking: Reported on 3/9/2023)   • [DISCONTINUED] aspirin (ECOTRIN) 325 mg EC tablet Take 325 mg by mouth daily (Patient not taking: Reported on 5/4/2023)   • [DISCONTINUED] hydrOXYzine HCL (ATARAX) 50 mg tablet  (Patient not taking: Reported on 5/18/2023)   • [DISCONTINUED] Linzess 145 MCG CAPS Take 145 mcg by mouth in the morning (Patient not taking: Reported on 5/4/2023)   • [DISCONTINUED] naproxen (Naprosyn) 500 mg tablet Take 1 tablet (500 mg total) by mouth 2 (two) times a day with meals (Patient not taking: Reported on 3/9/2023)   • [DISCONTINUED] zaleplon (SONATA) 10 MG capsule  (Patient not taking: Reported on 5/4/2023)       Objective     /72 (BP Location: Left arm, Patient Position: Sitting)   Pulse 81   Temp 97 8 °F (36 6 °C)   Ht 5' 10\" (1 778 m)   Wt (!) 162 kg (358 lb 2 oz)   SpO2 98%   BMI 51 39 kg/m²     Physical Exam  Vitals and nursing note reviewed  Constitutional:       General: He is not in acute distress  Appearance: He is well-developed  He is obese  He is not diaphoretic  HENT:      Head: Normocephalic and atraumatic  Right Ear: External ear normal       Left Ear: External ear normal       Nose: Nose normal       Mouth/Throat:      Pharynx: No oropharyngeal exudate  Eyes:      General: No scleral icterus  Right eye: No discharge  Left eye: No discharge        " Conjunctiva/sclera: Conjunctivae normal       Pupils: Pupils are equal, round, and reactive to light  Neck:      Thyroid: No thyromegaly  Cardiovascular:      Rate and Rhythm: Normal rate and regular rhythm  Heart sounds: Normal heart sounds  No murmur heard  No friction rub  No gallop  Pulmonary:      Effort: Pulmonary effort is normal  No respiratory distress  Breath sounds: Normal breath sounds  No wheezing or rales  Abdominal:      General: Bowel sounds are normal  There is no distension  Palpations: Abdomen is soft  Tenderness: There is no abdominal tenderness  Musculoskeletal:         General: No deformity  Normal range of motion  Cervical back: Normal range of motion and neck supple  Right foot: Swelling and tenderness present  Skin:     General: Skin is warm and dry  Neurological:      Mental Status: He is alert and oriented to person, place, and time  Cranial Nerves: No cranial nerve deficit  Psychiatric:         Behavior: Behavior normal          Thought Content:  Thought content normal          Judgment: Judgment normal        Dalia Rodriguez PA-C

## 2023-05-25 ENCOUNTER — OFFICE VISIT (OUTPATIENT)
Dept: PODIATRY | Facility: CLINIC | Age: 33
End: 2023-05-25

## 2023-05-25 ENCOUNTER — APPOINTMENT (OUTPATIENT)
Dept: RADIOLOGY | Facility: MEDICAL CENTER | Age: 33
End: 2023-05-25

## 2023-05-25 ENCOUNTER — APPOINTMENT (OUTPATIENT)
Dept: LAB | Facility: MEDICAL CENTER | Age: 33
End: 2023-05-25

## 2023-05-25 VITALS
SYSTOLIC BLOOD PRESSURE: 132 MMHG | BODY MASS INDEX: 45.1 KG/M2 | DIASTOLIC BLOOD PRESSURE: 72 MMHG | WEIGHT: 315 LBS | HEIGHT: 70 IN | HEART RATE: 85 BPM

## 2023-05-25 DIAGNOSIS — K76.0 FATTY (CHANGE OF) LIVER, NOT ELSEWHERE CLASSIFIED: ICD-10-CM

## 2023-05-25 DIAGNOSIS — Z86.010 PERSONAL HISTORY OF COLONIC POLYPS: ICD-10-CM

## 2023-05-25 DIAGNOSIS — M79.671 RIGHT FOOT PAIN: Primary | ICD-10-CM

## 2023-05-25 DIAGNOSIS — K59.00 CONSTIPATION, UNSPECIFIED CONSTIPATION TYPE: ICD-10-CM

## 2023-05-25 DIAGNOSIS — E55.9 VITAMIN D DEFICIENCY DISEASE: ICD-10-CM

## 2023-05-25 DIAGNOSIS — B18.2 CHRONIC HEPATITIS C WITHOUT HEPATIC COMA (HCC): ICD-10-CM

## 2023-05-25 DIAGNOSIS — S99.191K CLOSED FRACTURE OF BASE OF FIFTH METATARSAL BONE OF RIGHT FOOT AT METAPHYSEAL-DIAPHYSEAL JUNCTION WITH NONUNION, SUBSEQUENT ENCOUNTER: ICD-10-CM

## 2023-05-25 DIAGNOSIS — M79.671 RIGHT FOOT PAIN: ICD-10-CM

## 2023-05-25 LAB
25(OH)D3 SERPL-MCNC: 32.1 NG/ML (ref 30–100)
ANION GAP SERPL CALCULATED.3IONS-SCNC: -1 MMOL/L (ref 4–13)
BASOPHILS # BLD AUTO: 0.03 THOUSANDS/ÂΜL (ref 0–0.1)
BASOPHILS NFR BLD AUTO: 0 % (ref 0–1)
BILIRUB DIRECT SERPL-MCNC: <0.05 MG/DL (ref 0–0.2)
BUN SERPL-MCNC: 15 MG/DL (ref 5–25)
CALCIUM SERPL-MCNC: 9 MG/DL (ref 8.3–10.1)
CHLORIDE SERPL-SCNC: 109 MMOL/L (ref 96–108)
CO2 SERPL-SCNC: 29 MMOL/L (ref 21–32)
CREAT SERPL-MCNC: 0.89 MG/DL (ref 0.6–1.3)
EOSINOPHIL # BLD AUTO: 0.07 THOUSAND/ÂΜL (ref 0–0.61)
EOSINOPHIL NFR BLD AUTO: 1 % (ref 0–6)
ERYTHROCYTE [DISTWIDTH] IN BLOOD BY AUTOMATED COUNT: 15.8 % (ref 11.6–15.1)
GFR SERPL CREATININE-BSD FRML MDRD: 112 ML/MIN/1.73SQ M
GLUCOSE SERPL-MCNC: 139 MG/DL (ref 65–140)
HCT VFR BLD AUTO: 42.4 % (ref 36.5–49.3)
HGB BLD-MCNC: 13.1 G/DL (ref 12–17)
IMM GRANULOCYTES # BLD AUTO: 0.02 THOUSAND/UL (ref 0–0.2)
IMM GRANULOCYTES NFR BLD AUTO: 0 % (ref 0–2)
LYMPHOCYTES # BLD AUTO: 2.16 THOUSANDS/ÂΜL (ref 0.6–4.47)
LYMPHOCYTES NFR BLD AUTO: 30 % (ref 14–44)
MCH RBC QN AUTO: 25.5 PG (ref 26.8–34.3)
MCHC RBC AUTO-ENTMCNC: 30.9 G/DL (ref 31.4–37.4)
MCV RBC AUTO: 83 FL (ref 82–98)
MONOCYTES # BLD AUTO: 0.42 THOUSAND/ÂΜL (ref 0.17–1.22)
MONOCYTES NFR BLD AUTO: 6 % (ref 4–12)
NEUTROPHILS # BLD AUTO: 4.52 THOUSANDS/ÂΜL (ref 1.85–7.62)
NEUTS SEG NFR BLD AUTO: 63 % (ref 43–75)
NRBC BLD AUTO-RTO: 0 /100 WBCS
PLATELET # BLD AUTO: 211 THOUSANDS/UL (ref 149–390)
PMV BLD AUTO: 10.3 FL (ref 8.9–12.7)
POTASSIUM SERPL-SCNC: 3.8 MMOL/L (ref 3.5–5.3)
RBC # BLD AUTO: 5.14 MILLION/UL (ref 3.88–5.62)
SODIUM SERPL-SCNC: 137 MMOL/L (ref 135–147)
WBC # BLD AUTO: 7.22 THOUSAND/UL (ref 4.31–10.16)

## 2023-05-25 PROCEDURE — 99024 POSTOP FOLLOW-UP VISIT: CPT | Performed by: PODIATRIST

## 2023-05-25 NOTE — PROGRESS NOTES
Assessment/Plan:    No problem-specific Assessment & Plan notes found for this encounter  Diagnoses and all orders for this visit:    Right foot pain  -     X-ray foot right 3+ views; Future  -     Ambulatory Referral to Podiatry; Future    Closed fracture of base of fifth metatarsal bone of right foot at metaphyseal-diaphyseal junction with nonunion, subsequent encounter  -     Ambulatory Referral to Podiatry; Future      -Patient was compliant with follow-up, but office was noncompliant with scheduling him  - He is been walking on nonunited fracture without any doctors orders unfortunately  - X-rays 3 views taken reviewed of the right foot and do show significant plate breakage with loss of fixation and gap which  - I discussed with him at length how to care for this and he should follow-up with Dr Collette Hart as he does not feel comfortable with repeat revision through the [facility] office and myself as the surgeon    Subjective:      Patient ID: Asia Navas is a 34 y o  male  Patient presents for evaluation management of his right foot, he is a last appointment was 3/23 states that he tried to come multiple times back in the office and was rescheduled  He is now walking on the foot because had no idea what to do  Reports swelling and pain as the day goes on with stabbing pains      The following portions of the patient's history were reviewed and updated as appropriate: allergies, current medications, past family history, past medical history, past social history, past surgical history and problem list     Review of Systems   Constitutional: Negative for chills and fever  HENT: Negative for ear pain and sore throat  Eyes: Negative for pain and visual disturbance  Respiratory: Negative for cough and shortness of breath  Cardiovascular: Negative for chest pain and palpitations  Gastrointestinal: Negative for abdominal pain and vomiting  Genitourinary: Negative for dysuria and hematuria  "  Musculoskeletal: Negative for arthralgias and back pain  Skin: Negative for color change and rash  Neurological: Negative for seizures and syncope  All other systems reviewed and are negative  Objective:      /72 (BP Location: Right arm, Patient Position: Sitting, Cuff Size: Large)   Pulse 85   Ht 5' 10\" (1 778 m)   Wt (!) 162 kg (358 lb)   BMI 51 37 kg/m²          Physical Exam  Musculoskeletal:      Comments: Erythema, swelling, tenderness with range of motion of the osteotomy site           "

## 2023-05-27 LAB
HCV RNA SERPL NAA+PROBE-ACNC: NORMAL IU/ML
TEST INFORMATION: NORMAL

## 2023-06-01 ENCOUNTER — TELEPHONE (OUTPATIENT)
Dept: INTERNAL MEDICINE CLINIC | Facility: CLINIC | Age: 33
End: 2023-06-01

## 2023-06-01 NOTE — TELEPHONE ENCOUNTER
Patient called and stated that he would like another referral to a new podiatrist, he would like a second opinion, and he would like PCP recommendations on who to see        Please advise   Thank you

## 2023-06-01 NOTE — TELEPHONE ENCOUNTER
Spoke w/ patient, he didn't feel comfortable w/ seeing a doctor in the same office   I gave him the info for Dr Ibarra Beams per Dr Donald Villatoro recommendation

## 2023-06-06 ENCOUNTER — OFFICE VISIT (OUTPATIENT)
Dept: PODIATRY | Facility: CLINIC | Age: 33
End: 2023-06-06
Payer: COMMERCIAL

## 2023-06-06 VITALS
WEIGHT: 315 LBS | BODY MASS INDEX: 45.1 KG/M2 | DIASTOLIC BLOOD PRESSURE: 80 MMHG | HEIGHT: 70 IN | SYSTOLIC BLOOD PRESSURE: 144 MMHG

## 2023-06-06 DIAGNOSIS — S99.191K CLOSED FRACTURE OF BASE OF FIFTH METATARSAL BONE OF RIGHT FOOT AT METAPHYSEAL-DIAPHYSEAL JUNCTION WITH NONUNION, SUBSEQUENT ENCOUNTER: Primary | ICD-10-CM

## 2023-06-06 DIAGNOSIS — Z96.7 FIXATION HARDWARE IN LOWER EXTREMITY: ICD-10-CM

## 2023-06-06 PROCEDURE — 99213 OFFICE O/P EST LOW 20 MIN: CPT | Performed by: PODIATRIST

## 2023-06-06 NOTE — PROGRESS NOTES
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 and broken hardware  This requires revision but he is not sure if he wants to do it now  I discussed the risks of further hardware failure, infection, further displacement of fracture  HE did agree to wear CAM boot to better protect the foot  IF he changes his mind, he would need removal of all hardware, intraosseous fixation, possible bone graft  We discussed the recovery as can best be predicted  PAtient was told he must stop using nicotine products (he vapes heavily)  We discussed the relationship between cigarette smoking, atherosclerotic disease, and its effects on the lower extremity  I emphasized the importance of smoking cessation       Subjective:      Patient ID: Pacheco Viera is a 34 y o  male  Patient broke his right foot a year ago, he initially didn't realize it was broken  Last winter it still hurt, an [address] showed he had a broken 5th metatarsal  He underwent ORIF end of February with a plate some screws  Initial postop seemed to be going well but pain didn't improve  He got an XR last month and the fracture was displaced and the plate is broken  The following portions of the patient's history were reviewed and updated as appropriate: allergies, current medications, past family history, past medical history, past social history, past surgical history and problem list     Review of Systems    Constitutional: Negative  Respiratory: Negative for cough and shortness of breath      Gastrointestinal: Negative for diarrhea, "nausea and vomiting  Musculoskeletal: right foot fracture  Skin: Negative for rash or wound  Neurological: Negative for weakness, numbness and headaches  Objective:      /80   Ht 5' 10\" (1 778 m)   Wt (!) 162 kg (358 lb)   BMI 51 37 kg/m²          Physical Exam  Vitals reviewed  Constitutional:       Appearance: He is obese  He is not ill-appearing or diaphoretic  Cardiovascular:      Rate and Rhythm: Normal rate  Pulses: Normal pulses  Pulmonary:      Effort: No respiratory distress  Musculoskeletal:         General: Tenderness (pain lateral column right foot  PEroneal function normal) present  Skin:     Capillary Refill: Capillary refill takes less than 2 seconds  Findings: No erythema or rash  Neurological:      Mental Status: He is alert and oriented to person, place, and time  Sensory: No sensory deficit           "

## 2023-06-09 ENCOUNTER — OFFICE VISIT (OUTPATIENT)
Dept: OBGYN CLINIC | Facility: CLINIC | Age: 33
End: 2023-06-09
Payer: COMMERCIAL

## 2023-06-09 VITALS
DIASTOLIC BLOOD PRESSURE: 80 MMHG | HEIGHT: 70 IN | WEIGHT: 315 LBS | SYSTOLIC BLOOD PRESSURE: 120 MMHG | BODY MASS INDEX: 45.1 KG/M2

## 2023-06-09 DIAGNOSIS — S92.351K CLOSED DISPLACED FRACTURE OF FIFTH METATARSAL BONE OF RIGHT FOOT WITH NONUNION: Primary | ICD-10-CM

## 2023-06-09 PROCEDURE — 99243 OFF/OP CNSLTJ NEW/EST LOW 30: CPT | Performed by: ORTHOPAEDIC SURGERY

## 2023-06-09 NOTE — PROGRESS NOTES
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           Plan:   · The patient verbalized understanding of exam findings and treatment plan  We engaged in the shared decision-making process and treatment options were discussed at length with the patient  Surgical and conservative management discussed today along with risks and benefits  · I am not sure he ever had a fracture to this bone  He has no history of trauma and states that he had pain in the lateral border of his foot for years  This was more likely an Os vesalianum  · If he has continued pain in this area, we would remove the hardware, excise the os vesalianum and then perform a peroneus brevis tendon transfer to his cuboid bone  · Discussed surgical intervention, he does not have significant debilitating pain at this time and he is also a current smoker  Return if symptoms worsen or fail to improve  He will return if he has every day walking pain and is able to quit smoking to further discuss the above surgery  History of Present Illness:   Chief Complaint:   Chief Complaint   Patient presents with   • Right Foot - Pain     Derrek Samson is a 34 y o  male who is being seen for right foot pain  He recently underwent a ORIF of the 5th metatarsal performed by Dr Rosales Schroeder DPM on 02/24/2023  Pain is localized at lateral foot with minimal radiating and described as sharp and severe  Patient denies numbness, tingling or radicular pain  Denies history of neuropathy  Patient does have history of smoking, does not have diabetes and does not take blood thinners  Patient denies family history of anesthesia complications and has not had any complications with anesthesia       Pain/symptom timing:  Worse during the day when active  Pain/symptom context:  Worse with activites and work  Pain/symptom modifying factors:  Rest makes better, activities make worse  Pain/symptom associated signs/symptoms: none    Prior treatment   · NSAIDsYes    · Injections No   · Bracing/Orthotics Yes   · Physical Therapy No     Orthopedic Surgical History:   See below    Past Medical, Surgical and Social History:  Past Medical History:  has a past medical history of Allergic, Anemia (06/24/2018), Anxiety, Arthritis, Asthma, Benign essential hypertension (11/17/2016), Bipolar 1 disorder ([place] [address] ), Chronic pain, Chronic pain disorder, Claustrophobia (03/15/2018), Community acquired pneumonia (06/24/2018), CPAP (continuous positive airway pressure) dependence, Depressed (07/14/2016), Depression, GERD (gastroesophageal reflux disease), Hepatitis C virus infection (08/14/2014), Heroin abuse ([place] [address] ) (07/24/2018), Infectious viral hepatitis, Obesity (10/12/2016), Obstructive sleep apnea, Sleep disorder, and Substance abuse ([place] [address] )  Problem List: does not have any pertinent problems on file  Past Surgical History:  has a past surgical history that includes [place] tooth extraction; pr incision & drainage abscess complicated/multiple (Left, 5/2/2019); pr laparoscopy colectomy partial w/anastomosis (Left, 5/21/2020); Epidural block injection (Right, 10/28/2022); Epidural block injection (Right, 12/20/2022); pr neuroplasty &/transpos median nrv carpal tunne (Left, 1/9/2023); and pr open treatment metatarsal fracture each (Right, 2/24/2023)  Family History: family history includes Alzheimer's disease in his maternal grandmother; Colon cancer in his father; Depression in his family; Diabetes in his mother; Restless legs syndrome in his mother; Sleep apnea in his mother  Social History:  reports that he has quit smoking  His smoking use included e-cigarettes and cigarettes   He has never used smokeless tobacco  He reports that he does not currently use drugs after having used the following drugs: Heroin, Marijuana, "and Prescription  He reports that he does not drink alcohol  Current Medications: has a current medication list which includes the following prescription(s): albuterol, aspirin, clonazepam, fluticasone, levocetirizine, methadone hcl, dulera, olopatadine, omeprazole, ondansetron, pregabalin, sucralfate, temazepam, triamcinolone, and vilazodone  Allergies: is allergic to red dye - food allergy and bee venom  Review of Systems:  General- denies fever/chills  HEENT- denies hearing loss or sore throat  Eyes- denies eye pain or visual disturbances, denies red eyes  Respiratory- denies cough or SOB  Cardio- denies chest pain or palpitations  GI- denies abdominal pain  Endocrine- denies urinary frequency  Urinary- denies pain with urination  Musculoskeletal- Negative except noted above  Skin- denies rashes or wounds  Neurological- denies dizziness or headache  Psychiatric- denies anxiety or difficulty concentrating    Physical Exam:   /80 (BP Location: Left arm, Patient Position: Sitting, Cuff Size: Adult)   Ht 5' 10\" (1 778 m)   Wt (!) 162 kg (358 lb)   BMI 51 37 kg/m²   General/Constitutional: No apparent distress: well-nourished and well developed  Eyes: normal ocular motion  Cardio: RRR, Normal S1S2, No m/r/g  Lymphatic: No appreciable lymphadenopathy  Respiratory: Non-labored breathing, CTA b/l no w/c/r  Vascular: No edema, swelling or tenderness, except as noted in detailed exam   Integumentary: No impressive skin lesions present, except as noted in detailed exam   Neuro: No ataxia or tremors noted  Psych: Normal mood and affect, oriented to person, place and time  Appropriate affect  Musculoskeletal: Normal, except as noted in detailed exam and in HPI      Examination    Right    Gait Antalgic   Musculoskeletal Tender to palpation at 5th metacarpal    Skin Normal   Well healed incision    Nails Normal    Range of Motion  10 degrees dorsiflexion, 20 degrees plantarflexion  Subtalar motion: full  " Stability Stable    Muscle Strength 5/5 tibialis anterior  5/5 gastrocnemius-soleus  5/5 posterior tibialis  5/5 peroneal/eversion strength  5/5 EHL  5/5 FHL    Neurologic Normal    Sensation  Intact to light touch throughout sural, saphenous, superficial peroneal, deep peroneal and medial/lateral plantar nerve distributions  Av[place]-Roni 5 07 filament (10g) testing  deferred  Cardiovascular Brisk capillary refill < 2 seconds,intact DP and PT pulses    Special Tests None      Imaging Studies:   3+ views of the right foot 05/25/2023 were taken, reviewed and interpreted independently that demonstrate 5th metatarsal base discontinuity with broken hook plate  Reviewed by me personally  Scribe Attestation    I,:  Radha Lambert am acting as a scribe while in the presence of the attending physician :       I,:  Juan Young MD personally performed the services described in this documentation    as scribed in my presence :           Gerlean Lim Lachman, MD  Foot & Ankle Surgery   Department of [address] [place] [address]      I personally performed the service  Gerlean Lim Lachman, MD

## 2023-07-05 ENCOUNTER — OFFICE VISIT (OUTPATIENT)
Dept: DENTISTRY | Facility: CLINIC | Age: 33
End: 2023-07-05
Payer: COMMERCIAL

## 2023-07-05 DIAGNOSIS — K03.6 ACCRETIONS ON TEETH: Primary | ICD-10-CM

## 2023-07-05 PROCEDURE — D1110 PROPHYLAXIS - ADULT: HCPCS | Performed by: DENTAL HYGIENIST

## 2023-07-05 PROCEDURE — D0190 SCREENING OF A PATIENT: HCPCS | Performed by: DENTAL HYGIENIST

## 2023-07-05 PROCEDURE — D1330 ORAL HYGIENE INSTRUCTIONS: HCPCS | Performed by: DENTAL HYGIENIST

## 2023-07-05 NOTE — PROGRESS NOTES
Adult Matti  Chief Complaint   Patient presents with   • Routine Oral Cleaning    Patient states he keeps calling [facility] and does not receive a call back. He will try again today. Method Used:  · Monique Method Used: Hand Scaling  · Polished  · Flossed    Radiographs Taken in [facility]: (Taken to assess periodontal health)  · None    Intra/Extra Oral Cancer Screening:  · Within normal limits    Oral Hygiene:  · Fair    Plaque:  · Generalized  · Moderate    Calculus:  · Light    Bleeding:  · Light  · Moderate    Stain:  · Light    Periodontal Charting:   · Spot probing    Periodontal Classification:  · Generalized  · Mild  · Periodontal Disease    Oral Hygiene Instruction:    Luis Alberto Velazco over daily routine and c-shaped flossing. No orders of the defined types were placed in this encounter.        Next Visit:  6 Month [facility]  Dentist visit- resins  [facility] for tx completion

## 2023-07-07 DIAGNOSIS — L30.9 ECZEMA, UNSPECIFIED TYPE: ICD-10-CM

## 2023-07-07 RX ORDER — TRIAMCINOLONE ACETONIDE 1 MG/G
CREAM TOPICAL
Qty: 30 G | Refills: 1 | Status: SHIPPED | OUTPATIENT
Start: 2023-07-07

## 2023-08-14 DIAGNOSIS — L30.9 ECZEMA, UNSPECIFIED TYPE: ICD-10-CM

## 2023-08-14 RX ORDER — TRIAMCINOLONE ACETONIDE 1 MG/G
CREAM TOPICAL
Qty: 30 G | Refills: 1 | Status: SHIPPED | OUTPATIENT
Start: 2023-08-14

## 2023-08-16 DIAGNOSIS — M54.16 LUMBAR RADICULOPATHY: ICD-10-CM

## 2023-08-16 RX ORDER — PREGABALIN 75 MG/1
75 CAPSULE ORAL 2 TIMES DAILY
Qty: 120 CAPSULE | Refills: 0 | Status: SHIPPED | OUTPATIENT
Start: 2023-08-16

## 2023-08-31 RX ORDER — LINACLOTIDE 145 UG/1
145 CAPSULE, GELATIN COATED ORAL EVERY OTHER DAY
COMMUNITY
Start: 2023-08-11

## 2023-08-31 RX ORDER — BREXPIPRAZOLE 2 MG/1
2 TABLET ORAL DAILY
COMMUNITY
Start: 2023-07-21

## 2023-08-31 RX ORDER — VILAZODONE HYDROCHLORIDE 40 MG/1
40 TABLET ORAL
COMMUNITY
Start: 2023-08-14

## 2023-09-01 ENCOUNTER — OFFICE VISIT (OUTPATIENT)
Dept: PULMONOLOGY | Facility: CLINIC | Age: 33
End: 2023-09-01
Payer: COMMERCIAL

## 2023-09-01 ENCOUNTER — OFFICE VISIT (OUTPATIENT)
Dept: INTERNAL MEDICINE CLINIC | Facility: CLINIC | Age: 33
End: 2023-09-01
Payer: COMMERCIAL

## 2023-09-01 VITALS
DIASTOLIC BLOOD PRESSURE: 81 MMHG | BODY MASS INDEX: 45.1 KG/M2 | WEIGHT: 315 LBS | SYSTOLIC BLOOD PRESSURE: 124 MMHG | HEIGHT: 70 IN | OXYGEN SATURATION: 94 % | HEART RATE: 81 BPM

## 2023-09-01 VITALS
HEIGHT: 70 IN | TEMPERATURE: 97.8 F | DIASTOLIC BLOOD PRESSURE: 64 MMHG | OXYGEN SATURATION: 93 % | HEART RATE: 92 BPM | BODY MASS INDEX: 45.1 KG/M2 | WEIGHT: 315 LBS | SYSTOLIC BLOOD PRESSURE: 128 MMHG

## 2023-09-01 DIAGNOSIS — F11.90 CENTRAL SLEEP APNEA COMORBID WITH PRESCRIBED OPIOID USE: ICD-10-CM

## 2023-09-01 DIAGNOSIS — G47.37 CENTRAL SLEEP APNEA COMORBID WITH PRESCRIBED OPIOID USE: Primary | ICD-10-CM

## 2023-09-01 DIAGNOSIS — J30.2 SEASONAL ALLERGIES: ICD-10-CM

## 2023-09-01 DIAGNOSIS — G47.33 OSA (OBSTRUCTIVE SLEEP APNEA): ICD-10-CM

## 2023-09-01 DIAGNOSIS — F17.200 NICOTINE DEPENDENCE, UNCOMPLICATED, UNSPECIFIED NICOTINE PRODUCT TYPE: ICD-10-CM

## 2023-09-01 DIAGNOSIS — Q74.2 ACCESSORY BONE OF FOOT: Primary | ICD-10-CM

## 2023-09-01 DIAGNOSIS — F11.90 CENTRAL SLEEP APNEA COMORBID WITH PRESCRIBED OPIOID USE: Primary | ICD-10-CM

## 2023-09-01 DIAGNOSIS — G47.37 CENTRAL SLEEP APNEA COMORBID WITH PRESCRIBED OPIOID USE: ICD-10-CM

## 2023-09-01 DIAGNOSIS — Z23 NEED FOR VACCINATION: ICD-10-CM

## 2023-09-01 DIAGNOSIS — J45.40 MODERATE PERSISTENT ASTHMA WITHOUT COMPLICATION: Primary | ICD-10-CM

## 2023-09-01 DIAGNOSIS — M54.16 LUMBAR RADICULOPATHY: ICD-10-CM

## 2023-09-01 PROBLEM — F17.210 CIGARETTE NICOTINE DEPENDENCE WITHOUT COMPLICATION: Status: ACTIVE | Noted: 2023-09-01

## 2023-09-01 PROCEDURE — 90471 IMMUNIZATION ADMIN: CPT

## 2023-09-01 PROCEDURE — 99214 OFFICE O/P EST MOD 30 MIN: CPT | Performed by: PHYSICIAN ASSISTANT

## 2023-09-01 PROCEDURE — 99213 OFFICE O/P EST LOW 20 MIN: CPT | Performed by: PHYSICIAN ASSISTANT

## 2023-09-01 PROCEDURE — 90715 TDAP VACCINE 7 YRS/> IM: CPT

## 2023-09-01 RX ORDER — FLUTICASONE PROPIONATE 50 MCG
1 SPRAY, SUSPENSION (ML) NASAL 2 TIMES DAILY
Qty: 16 G | Refills: 11 | Status: SHIPPED | OUTPATIENT
Start: 2023-09-01 | End: 2023-09-01

## 2023-09-01 RX ORDER — PREGABALIN 150 MG/1
150 CAPSULE ORAL 2 TIMES DAILY
Qty: 60 CAPSULE | Refills: 1 | Status: SHIPPED | OUTPATIENT
Start: 2023-09-01

## 2023-09-01 RX ORDER — NICOTINE 21 MG/24HR
1 PATCH, TRANSDERMAL 24 HOURS TRANSDERMAL EVERY 24 HOURS
Qty: 28 PATCH | Refills: 2 | Status: SHIPPED | OUTPATIENT
Start: 2023-09-01

## 2023-09-01 RX ORDER — FLUTICASONE PROPIONATE 50 MCG
1 SPRAY, SUSPENSION (ML) NASAL 2 TIMES DAILY
Qty: 16 G | Refills: 11 | Status: SHIPPED | OUTPATIENT
Start: 2023-09-01

## 2023-09-01 NOTE — ASSESSMENT & PLAN NOTE
Luciana De La Rosa continues to vape approximately 3 mg nicotine daily. Discussed complete cessation for a total of 7 minutes today in the office. Reviewed different quit strategies including pharmacotherapies and behavioral modifications. Following discussion we agreed upon trialing nicotine replacement patches at 14 mg transdermally every 24 hours and Nicorette gum 4 mg p.o. every to 2 hours as needed for breakthrough cravings.

## 2023-09-01 NOTE — ASSESSMENT & PLAN NOTE
Compliance remains suboptimal after review today. No changes were made to his settings today but I will let his primary pulmonologist know.

## 2023-09-01 NOTE — ASSESSMENT & PLAN NOTE
Continue Dulera 200/5 mcg 2 puffs twice daily. He was reminded to rinse his mouth after each use. Continue albuterol HFA 2 puffs q6hrs prn for SOB/wheeze.

## 2023-09-01 NOTE — PROGRESS NOTES
Pulmonary Follow Up Note   Jo Wolff 34 y.o. male MRN: [id number]  9/1/2023      Assessment:    Moderate persistent asthma without complication  Continue Dulera 200/5 mcg 2 puffs twice daily. He was reminded to rinse his mouth after each use. Continue albuterol HFA 2 puffs q6hrs prn for SOB/wheeze. CTAA (obstructive sleep apnea)  Compliance remains suboptimal after review today. No changes were made to his settings today but I will let his primary pulmonologist know. Central sleep apnea comorbid with prescribed opioid use  As above. Continue with current settings ASV auto min EPAP 15, PS 4-10. Seasonal allergies  Continue flonase 1 spray each nostril daily. Nicotine dependence, uncomplicated  Bernadette Munoz continues to vape approximately 3 mg nicotine daily. Discussed complete cessation for a total of 7 minutes today in the office. Reviewed different quit strategies including pharmacotherapies and behavioral modifications. Following discussion we agreed upon trialing nicotine replacement patches at 14 mg transdermally every 24 hours and Nicorette gum 4 mg p.o. every to 2 hours as needed for breakthrough cravings. Plan:    Diagnoses and all orders for this visit:    Moderate persistent asthma without complication    Seasonal allergies  -     Discontinue: fluticasone (FLONASE) 50 mcg/act nasal spray; 1 spray into each nostril 2 (two) times a day  -     fluticasone (FLONASE) 50 mcg/act nasal spray; 1 spray into each nostril 2 (two) times a day    Nicotine dependence, uncomplicated, unspecified nicotine product type  -     nicotine polacrilex (NICORETTE) 4 mg gum; Chew 1 each (4 mg total) as needed for smoking cessation  -     nicotine (NICODERM CQ) 14 mg/24hr TD 24 hr patch; Place 1 patch on the skin over 24 hours every 24 hours    CATA (obstructive sleep apnea)    Central sleep apnea comorbid with prescribed opioid use        Return in about 6 months (around 3/1/2024).     History of Present Illness HPI:  Geraldine Bowers is a 34 y.o. male who presents the office today for routine follow-up. Patient last saw Dr. Janett Judd in [facility]. He has a history of moderate persistent asthma and central and obstructive sleep apnea, chronic hypercapnic respiratory failure, restless leg syndrome. Patient states that his asthma symptoms have been well controlled currently. He has not required systemic steroids, antibiotics, or been hospitalized for wrist related illness. Since increasing his Dulera to 200/5 he feels that his shortness of breath and wheeze is improved. He tells me however he does not always remember to take his evening dose of Dulera. Patient usually only requires the use of his albuterol inhaler once a week on average. Denies significant cough or mucopurulent sputum production. No hemoptysis. No fevers chills or sick contacts. Denies chest pain or palpitations. From a sleep apnea standpoint he admits that he is not using his machine as often as he should. He tries to go to sleep with it every night but often finds that he takes it off midway through the night because he feels like he is suffocating him. He thinks that the mask is  causing him to feel claustrophobic. He is also complaining of increased pain in his legs that he relates to "withdrawal pains."  Remains compliant on methadone. He is also compliant on Lyrica which he states used to help with this pain. Review of Systems   All other systems reviewed and are negative.       Historical Information   Past Medical History:   Diagnosis Date   • Allergic    • Anemia 06/24/2018   • Anxiety     from records   • Arthritis    • Asthma    • Benign essential hypertension 11/17/2016   • Bipolar 1 disorder ([address])     from records   • Chronic pain    • Chronic pain disorder     back/ knees/ hip   • Claustrophobia 03/15/2018   • Community acquired pneumonia 06/24/2018   • CPAP (continuous positive airway pressure) dependence    • Depressed 07/14/2016   • Depression     from records   • GERD (gastroesophageal reflux disease)    • Hepatitis C virus infection 08/14/2014   • Heroin abuse ([address] [facility]) 07/24/2018   • Infectious viral hepatitis    • Obesity 10/12/2016   • Obstructive sleep apnea     from records   • Sleep disorder    • Substance abuse [facility])      Past Surgical History:   Procedure Laterality Date   • EPIDURAL BLOCK INJECTION Right 10/28/2022    Procedure: BLOCK / INJECTION EPIDURAL STEROID LUMBAR  right L4-5 and L5-S1 TFESI;  Surgeon: Shelbi Mcintyre MD;  Location: [facility];  Service: Pain Management    • EPIDURAL BLOCK INJECTION Right 12/20/2022    Procedure: BLOCK / INJECTION EPIDURAL STEROID LUMBAR  right  L4-5 and L5-S1 TFESI;  Surgeon: Shelbi Mcintyre MD;  Location: [facility];  Service: Pain Management    • TN INCISION & DRAINAGE ABSCESS COMPLICATED/MULTIPLE Left 5/2/2019    Procedure: INCISION AND DRAINAGE (I&D) EXTREMITY;  Surgeon: Aby Jean MD;  Location: [facility];  Service: Orthopedics   • TN LAPAROSCOPY COLECTOMY PARTIAL W/ANASTOMOSIS Left 5/21/2020    Procedure: LAPAROSCOPIC LEFT HEMICOLECTOMY TAKEDOWN SPLENIC FLECTURE, COLORECTAL ANASTOMOSIS.;  Surgeon: Norma Beltran MD;  Location:  MAIN OR;  Service: Colorectal   • TN NEUROPLASTY &/TRANSPOS MEDIAN NRV CARPAL [place] Left 1/9/2023    Procedure: RELEASE CARPAL TUNNEL;  Surgeon: Missouri Apley, DO;  Location: [facility];  Service: Orthopedics   • TN OPEN TREATMENT METATARSAL FRACTURE EACH Right 2/24/2023    Procedure: OPEN REDUCTION W/ INTERNAL FIXATION (ORIF) FOOT;  Surgeon: Codi Melendrez DPM;  Location: [facility];  Service: Podiatry   • WISDOM TOOTH EXTRACTION       Family History   Problem Relation Age of Onset   • Diabetes Mother    • Restless legs syndrome Mother    • Sleep apnea Mother    • Colon cancer Father    • Alzheimer's disease Maternal Grandmother    • Depression Family        Social History     Tobacco Use   Smoking Status Former   • Packs/day: 0.00   • Years: 13.00   • Total pack years: 0.00   • Types: E-Cigarettes, Cigarettes   Smokeless Tobacco Never   Tobacco Comments    Vapes / 2019 quit cigs         Meds/Allergies     Current Outpatient Medications:   •  fluticasone (FLONASE) 50 mcg/act nasal spray, 1 spray into each nostril 2 (two) times a day, Disp: 16 g, Rfl: 11  •  nicotine (NICODERM CQ) 14 mg/24hr TD 24 hr patch, Place 1 patch on the skin over 24 hours every 24 hours, Disp: 28 patch, Rfl: 2  •  nicotine polacrilex (NICORETTE) 4 mg gum, Chew 1 each (4 mg total) as needed for smoking cessation, Disp: 100 each, Rfl: 2  •  albuterol (Ventolin HFA) 90 mcg/act inhaler, Inhale 2 puffs every 6 (six) hours as needed for wheezing, Disp: 18 g, Rfl: 6  •  clonazePAM (KlonoPIN) 0.5 mg tablet, Take 0.5 mg by mouth 2 (two) times a day, Disp: , Rfl:   •  levocetirizine (XYZAL) 5 MG tablet, Take 1 tablet (5 mg total) by mouth every evening, Disp: 30 tablet, Rfl: 5  •  Linzess 145 MCG CAPS, Take 145 mcg by mouth every other day, Disp: , Rfl:   •  METHADONE HCL PO, Take 150 mg by mouth daily, Disp: , Rfl:   •  mometasone-formoterol (Dulera) 200-5 MCG/ACT inhaler, Inhale 2 puffs 2 (two) times a day Rinse mouth after use., Disp: 39 g, Rfl: 6  •  omeprazole (PriLOSEC) 20 mg delayed release capsule, Take 20 mg by mouth 2 (two) times a day , Disp: , Rfl:   •  pregabalin (LYRICA) 150 mg capsule, Take 1 capsule (150 mg total) by mouth 2 (two) times a day, Disp: 60 capsule, Rfl: 1  •  Rexulti 2 MG tablet, Take 2 mg by mouth daily, Disp: , Rfl:   •  temazepam (RESTORIL) 15 mg capsule, Take 15 mg by mouth daily, Disp: , Rfl:   •  triamcinolone (KENALOG) 0.1 % cream, APPLY TOPICALLY 2 TIMES DAILY, Disp: 30 g, Rfl: 1  •  vilazodone (VIIBRYD) 20 mg tablet, Take 20 mg by mouth daily with breakfast (Patient not taking: Reported on 9/1/2023), Disp: , Rfl:   •  vilazodone (VIIBRYD) 40 mg tablet, 40 mg daily with breakfast, Disp: , Rfl:   Allergies   Allergen Reactions • Red Dye - Food Allergy Diarrhea, Nausea Only and Abdominal Pain   • Bee Venom Angioedema       Vitals: Blood pressure 124/81, pulse 81, height 5' 10" (1.778 m), weight (!) 157 kg (347 lb), SpO2 94 %. Body mass index is 49.79 kg/m². Oxygen Therapy  SpO2: 94 %    Physical Exam  Physical Exam    Labs: I have personally reviewed pertinent lab results. , ABG: No results found for: "PHART", "NQF0OPL", "PO2ART", "RSU2HKQ", "P5GWWVZF", "BEART", "SOURCE", BNP: No results found for: "BNP", CBC: No results found for: "WBC", "HGB", "HCT", "MCV", "PLT", "ADJUSTEDWBC", "RBC", "MCH", "MCHC", "RDW", "MPV", "NRBC", CMP: No results found for: "SODIUM", "K", "CL", "CO2", "ANIONGAP", "BUN", "CREATININE", "GLUCOSE", "CALCIUM", "AST", "ALT", "ALKPHOS", "PROT", "BILITOT", "EGFR", PT/INR: No results found for: "PT", "INR", Troponin: No results found for: "TROPONINI"  Lab Results   Component Value Date    WBC 7.22 05/25/2023    HGB 13.1 05/25/2023    HCT 42.4 05/25/2023    MCV 83 05/25/2023     05/25/2023     Lab Results   Component Value Date    GLUCOSE 95 04/01/2015    CALCIUM 9.0 05/25/2023     04/01/2015    K 3.8 05/25/2023    CO2 29 05/25/2023     (H) 05/25/2023    BUN 15 05/25/2023    CREATININE 0.89 05/25/2023     Lab Results   Component Value Date    IGE 75.6 11/19/2022     Lab Results   Component Value Date    ALT 37 12/24/2022    AST 23 12/24/2022    GGT 32 08/14/2014    ALKPHOS 73 12/24/2022    BILITOT 0.8 04/01/2015       Imaging and other studies: I have personally reviewed pertinent reports. and I have personally reviewed pertinent films in PACS     CT CAP 2/1/2020  Trace bilateral pleural effusions with mild atelectasis at bases. Otherwise no other acute infiltrates or pneumothorax.     Pulmonary function testing:  Performed 10/12/2022  FEV1/FVC ratio 67%   FEV1 71% predicted  FVC 86% predicted  There is response to bronchodilators   % predicted   % predicted  DLCO corrected for hemoglobin 94 % predicted  Mild obstructive airflow defect. There is significant response to bronchodilator. Normal TLC with increased RV indicative of air trapping. Normal diffusion capacity. Other Studies: I have personally reviewed pertinent reports.       Compliance data 5/30/23-8/27/2023  Settings: ASV Auto Min EPAP 15, Max EPAP 15, min PS 4, max PS 10   % days with usage >= 4 hours: 8/90, 9%  % days used: 32/90, 36%  Average usage (days used) 2 hours 46 mins  AHI 7.1  Leak: median 8.5L/min

## 2023-09-06 ENCOUNTER — TELEPHONE (OUTPATIENT)
Dept: PULMONOLOGY | Facility: CLINIC | Age: 33
End: 2023-09-06

## 2023-09-06 PROBLEM — Q74.2 ACCESSORY BONE OF FOOT: Status: ACTIVE | Noted: 2023-09-06

## 2023-09-06 NOTE — PROGRESS NOTES
Name: Taiwo Carroll      : 1990      MRN: [id number]  Encounter Provider: Sindy Bay PA-C  Encounter Date: 2023   Encounter department: [id number] [place] [address] [place]     1. Accessory bone of foot  Assessment & Plan:  Pt with ongoing pain from surgery that had complications. Will increase lyrica to 150mg BID. 2. Lumbar radiculopathy  -     pregabalin (LYRICA) 150 mg capsule; Take 1 capsule (150 mg total) by mouth 2 (two) times a day    3. Need for vaccination  -     Tdap Vaccine greater than or equal to 6yo           Subjective      Pt presents for routine visit. He is doing well overall. He is noting ongoing pain in his foot. He desires an increase in lyrica to help with this. He had surgery with plate placement for fracture. He was lost to follow up and had hardware complication with splitting of the plate. He sought another opinion and found out he was never fracture, instead had os vesalianum. He is working on quitting smoking. Bp is normal.     Review of Systems   Constitutional: Negative for chills and fever. HENT: Negative for congestion, ear pain, hearing loss, postnasal drip, rhinorrhea, sinus pressure, sinus pain, sore throat and trouble swallowing. Eyes: Negative for pain and visual disturbance. Respiratory: Negative for cough, chest tightness, shortness of breath and wheezing. Cardiovascular: Negative. Negative for chest pain, palpitations and leg swelling. Gastrointestinal: Negative for abdominal pain, blood in stool, constipation, diarrhea, nausea and vomiting. Endocrine: Negative for cold intolerance, heat intolerance, polydipsia, polyphagia and polyuria. Genitourinary: Negative for difficulty urinating, dysuria, flank pain and urgency. Musculoskeletal: Positive for arthralgias, gait problem and joint swelling. Negative for back pain and myalgias. Skin: Negative for rash. Allergic/Immunologic: Negative.     Neurological: Negative for dizziness, weakness, light-headedness and headaches. Hematological: Negative. Psychiatric/Behavioral: Negative for behavioral problems, dysphoric mood and sleep disturbance. The patient is not nervous/anxious. Current Outpatient Medications on File Prior to Visit   Medication Sig   • albuterol (Ventolin HFA) 90 mcg/act inhaler Inhale 2 puffs every 6 (six) hours as needed for wheezing   • clonazePAM (KlonoPIN) 0.5 mg tablet Take 0.5 mg by mouth 2 (two) times a day   • levocetirizine (XYZAL) 5 MG tablet Take 1 tablet (5 mg total) by mouth every evening   • Linzess 145 MCG CAPS Take 145 mcg by mouth every other day   • METHADONE HCL PO Take 150 mg by mouth daily   • mometasone-formoterol (Dulera) 200-5 MCG/ACT inhaler Inhale 2 puffs 2 (two) times a day Rinse mouth after use. • omeprazole (PriLOSEC) 20 mg delayed release capsule Take 20 mg by mouth 2 (two) times a day    • Rexulti 2 MG tablet Take 2 mg by mouth daily   • temazepam (RESTORIL) 15 mg capsule Take 15 mg by mouth daily   • triamcinolone (KENALOG) 0.1 % cream APPLY TOPICALLY 2 TIMES DAILY   • vilazodone (VIIBRYD) 40 mg tablet 40 mg daily with breakfast   • vilazodone (VIIBRYD) 20 mg tablet Take 20 mg by mouth daily with breakfast (Patient not taking: Reported on 9/1/2023)       Objective     /64 (BP Location: Left arm, Patient Position: Sitting)   Pulse 92   Temp 97.8 °F (36.6 °C)   Ht 5' 10" (1.778 m)   Wt (!) 157 kg (347 lb 2 oz)   SpO2 93%   BMI 49.81 kg/m²     Physical Exam  Vitals and nursing note reviewed. Constitutional:       General: He is not in acute distress. Appearance: He is well-developed. He is not diaphoretic. HENT:      Head: Normocephalic and atraumatic. Right Ear: External ear normal.      Left Ear: External ear normal.      Nose: Nose normal.      Mouth/Throat:      Pharynx: No oropharyngeal exudate. Eyes:      General: No scleral icterus. Right eye: No discharge.          Left eye: No discharge. Conjunctiva/sclera: Conjunctivae normal.      Pupils: Pupils are equal, round, and reactive to light. Neck:      Thyroid: No thyromegaly. Cardiovascular:      Rate and Rhythm: Normal rate and regular rhythm. Heart sounds: Normal heart sounds. No murmur heard. No friction rub. No gallop. Pulmonary:      Effort: Pulmonary effort is normal. No respiratory distress. Breath sounds: Normal breath sounds. No wheezing or rales. Abdominal:      General: Bowel sounds are normal. There is no distension. Palpations: Abdomen is soft. Tenderness: There is no abdominal tenderness. Musculoskeletal:         General: No tenderness. Cervical back: Normal range of motion and neck supple. Right foot: Decreased range of motion. Deformity present. Skin:     General: Skin is warm and dry. Neurological:      Mental Status: He is alert and oriented to person, place, and time. Cranial Nerves: No cranial nerve deficit. Psychiatric:         Behavior: Behavior normal.         Thought Content:  Thought content normal.         Judgment: Judgment normal.       Dalia Rodriguez PA-C

## 2023-09-08 DIAGNOSIS — L30.9 ECZEMA, UNSPECIFIED TYPE: ICD-10-CM

## 2023-09-08 RX ORDER — TRIAMCINOLONE ACETONIDE 1 MG/G
CREAM TOPICAL
Qty: 30 G | Refills: 1 | Status: SHIPPED | OUTPATIENT
Start: 2023-09-08

## 2023-10-19 DIAGNOSIS — J30.2 SEASONAL ALLERGIES: ICD-10-CM

## 2023-10-19 RX ORDER — LEVOCETIRIZINE DIHYDROCHLORIDE 5 MG/1
5 TABLET, FILM COATED ORAL EVERY EVENING
Qty: 150 TABLET | Refills: 0 | Status: SHIPPED | OUTPATIENT
Start: 2023-10-19

## 2023-10-23 DIAGNOSIS — M54.16 LUMBAR RADICULOPATHY: ICD-10-CM

## 2023-10-24 RX ORDER — PREGABALIN 150 MG/1
150 CAPSULE ORAL 2 TIMES DAILY
Qty: 60 CAPSULE | Refills: 2 | Status: SHIPPED | OUTPATIENT
Start: 2023-10-24

## 2023-10-27 ENCOUNTER — OFFICE VISIT (OUTPATIENT)
Dept: OBGYN CLINIC | Facility: CLINIC | Age: 33
End: 2023-10-27
Payer: COMMERCIAL

## 2023-10-27 ENCOUNTER — PREP FOR PROCEDURE (OUTPATIENT)
Dept: OBGYN CLINIC | Facility: CLINIC | Age: 33
End: 2023-10-27

## 2023-10-27 VITALS — HEIGHT: 70 IN | WEIGHT: 315 LBS | BODY MASS INDEX: 45.1 KG/M2

## 2023-10-27 DIAGNOSIS — T84.84XA PAINFUL ORTHOPAEDIC HARDWARE (HCC): Primary | ICD-10-CM

## 2023-10-27 PROCEDURE — 99214 OFFICE O/P EST MOD 30 MIN: CPT | Performed by: ORTHOPAEDIC SURGERY

## 2023-10-27 RX ORDER — CHLORHEXIDINE GLUCONATE 4 G/100ML
SOLUTION TOPICAL DAILY PRN
OUTPATIENT
Start: 2023-10-27

## 2023-10-27 NOTE — PATIENT INSTRUCTIONS
Reilly Castelan M.D. Attending, [address] [place] [address] Office Phone: [phone] ? Fax: [phone]  [facility] Office Phone: [phone] ? PHR:[phone]    : [place]     Surgery Coordinators Edelmira Riggs: [place], [address] [facility], [phone]  Surgery Coordinator Caryn:  Lisa Amaya, [phone]                                                        Adela Myers, [phone]    [email or web address]   PRE-OPERATIVE AND POST-OPERATIVE INSTRUCTIONS    General Information:  Your surgery is with Dr. Cindy Pulido. Dates can change (although rare) depending on emergencies. Typical post operative visits are at the following intervals:  3 weeks post surgery(except 1 week for bunions and wound monitoring), 6 weeks post surgery, 3 months post surgery, 6 months post surgery, and then on a yearly basis. However, this may change based on Dr. Dionne Delgado recommendation. #1 post-operative rule for foot/ankle surgery:  ONCE YOU ARE OUT OF YOUR CAST AND/OR REMOVABLE BOOT, SWELLING MAY PERSIST FOR MANY MONTHS. YOU MIGHT ALSO EXPERIENCE A BLUISH DISCOLORATION OF YOUR LEG. THIS IS NORMAL AND PART OF THE USUAL POSTOPERATIVE EXPERIENCE. SMOKING:  Smoking results in incomplete healing of fractures (broken bones) and joints that my have been fused. Smoking and nicotine also prevents the growth of bone into ankle replacements and bone healing. It also slows the healing of muscles and skin (soft tissue). Therefore, please do not have surgery if you continue to smoke. We reserve the right to cancel your surgery if we suspect that you are smoking. DO NOT use nicorette gum or other patches. Please find an alternative method to quit smoking before your surgery. Pre-Operative Information:  Surgery date and preoperative visits:   If you have medical problems, such as an abnormal EKG, history of BLOOD CLOT, ANEURYSM, and any other heart condition, please inform us so that we can get your medical clearance several weeks before the surgery. Please bring any important medical information, such as an EKG, chest x-ray, or echocardiogram, with you to ensure that your surgery will not be delayed. If needed, you will receive your preoperative appointments in the mail or by phone from our scheduling office. The location of the preoperative appointment will be given to you also. You may not eat after midnight the night before surgery. If you do, your surgery will be cancelled. You will receive a phone call from your surgery center the day before your surgery (if your surgery is on a Monday, you will get a call the Friday before). If you do not hear from someone by 4pm the day before your surgery, please call the Surgical coordinator (number above) to notify us. Start taking Vitamin D3 4000 units per day and Calcium 1200mg per day immediately. You will continue this until your 3 month post-op visit. These are over the counter and available at all pharmacies and supermarkets. FOR THOSE HAVING SURGERY AT [address] [place] [address] WILL NEED CRUTCHES OR A ROLLING WALKER AFTER SURGERY, ASK FOR A PRESCRIPTION FOR THIS FROM OUR OFFICE TODAY. THIS CANNOT BE HANDLED THE DAY OF SURGERY AS [facility] DOES NOT STOCK THESE. Because bacterial can often enter any defect in the skin, it is important to avoid any cuts before surgery. Any breaks in the skin on the leg will often result in your surgery being postponed. Please avoid going on a very long walk the day prior to surgery, or doing other activities that could lead to irritation of the skin, including yard work, extra athletic activity, or shaving. This could result in surgery cancellation. You MUST be fasting the day of your surgery. Therefore, please do not consume any foot or beverage after midnight the night before surgery.   The morning of surgery you may take your usual medications with a sip of water. It is important not to take anti-inflammatory medication like Ibuprofen, Motrin, Naproxen (Aleve), or Aspirin 7-10 days before surgery because they will make you bleed more than usual.  Vitamin, E, Plavix and Coumadin also have the same effect. Stop Aspirin and Vitamin E two weeks before surgery. YOUR MEDICAL DOCTOR SHOULD TELL YOU WHEN TO STOP COUMADIN OR PLAVIX. If your surgery involves any bone healing, please do not take anti-inflammatories for at least 6 weeks after surgery. This can impede bone healing (ibuprofen, Aleve, Relafen, iodine). Tylenol is fine to take. PREOPERATIVE BATHING INSTRUCTIONS:    Before your surgery, bathe with Hibiclens (4% Chlorhexidene) as instructed below. This skin cleanser will help reduce the bacteria on your skin before surgery. To avoid irritating your eyes, do not apply Hibiclens above the level of your neck. On the evening before AND the morning of surgery, bathe your entire body except the face and scalp, then rinse freely. DO NOT apply to your face or scalp, as Hibiclens can irritate your eyes. Purchasing information:   Hibiclens is available without a prescription at [facility]. ADDITIONAL INSTRUCTIONS:  PATIENTS HAVING FOOT/ANKLE SURGERY     In preparation for your upcoming surgery, we kindly request and advise the following:  Notify our office if you are taking any of the following:  Coumadin (warfarin):  Persantine (dipyridamole); Pletal (cilostazol); Plavix (clopidogrel); Ticlid (ticlopidine); Agrylin (anagrelide); Aggrenox (dipyridamole and aspirin) or other blood thinners,. In addition, stop taking Vitamin E and herbal supplements. Do not schedule any elective dental work for at least 6 months after surgery. If you had an ankle replacement, you will need to take antibiotics before any future dental procedures. Your dentist or our office can prescribe these for you. 1000mg of Amoxicillin 1 hour prior to any dental procedure is the recommended dosing. THREE RULES:    After surgery you will most likely be given the instructions “KEEP YOUR TOES ABOVE YOUR NOSE.”  This means that you MUST have your feet elevated higher than your heart. Keeping your toes above your nose helps to heal the muscles and skin (soft tissues) by reducing swelling in your leg. This position also helps to prevent infection, and is very important in avoiding deep venous thrombosis (blood clots). In order to keep the blood circulating in your legs and in order to avoid deep vein   thrombosis (blood clots), we ask patients to GET UP ONCE AN HOUR during the day. This means you should at least cross the room and come back. It does not mean you have to be up for long periods of time. In most cases we will not have people immediately put any weight on their operated part. This is important to prevent loosening of metal or other devices holding the bones together. It also prevents irritation of the soft tissues which can lead to prolonged healing. When we say get up once an hour, please walk, hop or move with an assisted device. This is important! Do not do any excessive walking during the first few days after surgery. Recovering from surgery is a full-time task for the patient. Postoperative care is important to avoid irritating the skin incision, which can lead to infection. Please do not plan activities or go out of town for several weeks after surgery. If you are unsure about your future activities, please schedule surgery only when you know it is acceptable for you. Scheduling surgery and then canceling the date, prevents other people from having surgery on that date as it takes time to line everything up effectively. If you cancel your surgery the week of your planned surgery, we reserve the right to cancel all future surgical procedures.     THE DAY OF SURGERY:    Arrival to the hospital or outpatient surgical center on time is imperative. If you arrive late, then your surgery will be cancelled. You MUST have a family member/friend bring you, stay with you throughout the DURATION of your surgery, and drive you home. You MUST be fasting the day of your surgery. Therefore, do not consume any food or beverage after midnight the night before surgery. At your pre-operative visit with the anesthesia staff, or during your phone screen, a nurse will instruct you what medications you will need to take the day of surgery. MAKE SURE THAT THE PHARMACY LISTED IN THE ELECTRONIC MEDICAL RECORD (EPIC) IS YOUR PREFERRED PHARMACY. For example, if you are staying with family or a friend, and will not be near your preferred pharmacy, YOU MUST, tell the nurses checking you in the day of surgery so that this can be changed in the system. If your prescriptions are sent to a pharmacy, this cannot be changed. AFTER YOUR SURGERY:  Bleeding through the bandage almost always occurs. Do not let this alarm you. Simply add more gauze or a towel, call us, and come in for a dressing change. If you think it is excessive, contact us immediately or go to the local emergency room. Do not get the bandage wet. Showering is possible with plastic protectors. Be very careful, as the bathroom can be wet and slippery. If you do get your dressing wet, it should be changed immediately. Please contact us. ONCE YOUR ARE OUT OF YOUR CAST AND/OR REMOVABLE BOOT, SWELLING MAY PERSIST FOR MANY MONTHS. YOU MIGHT ALSO EXPERIENCE A BLUISH DISCOLORATION OF YOUR LEG. THIS IS NORMAL AND PART OF THE USUAL POSTOPERATIVE EXPERIENCE. WEARING COMPRESSION HOSE (ELASTIC STOCKINGS) CAN HELP AVOID SOME OF THIS SWELLING. DRESSING:   The purpose of the surgical dressing is to keep your wound and the surgical site protected from the environment.   Most dressings contain splints, which help to hold your foot and ankle in a corrected position, and also allow the surgical site to heal properly. Dressings will remain in place and undisturbed until the first postop visit. If you have a drain in place, this will need to be removed in 1-3 days after surgery. The time for the drain to be pulled will be written on your discharge instruction sheet. CAST  INSTRUCTIONS:  You may or may not get a cast following surgery. If you do, pay close attention to the following:    After application of a splint or cast, it is very important to elevate your leg for 24 to 72 hours. The injured area should be elevated well above the heart. Remember “Toes above your Nose”. Rest and elevation greatly reduce pain and speed the healing process by minimizing early swelling. CALL YOUR DOCTORS OFFICE OR VISIT LOCATION EMERGENCY ROOM IF YOU HAVE ANY OF THE FOLLOWING:    Significant increased pain, which may be caused by swelling, and the feeling that the splint or cast is too tight  Numbness and tingling in your hand or foot, which may be caused by too much pressure on the nerves  Burning and stinging, which may be caused by too much pressure on the skin  Excessive swelling below the cast, which may mean the cast is slowing your blood circulation  Loss of active movement of toes, which request an urgent evaluation  Loss of “capillary refill”. Pinch the tip of toes and roxy the skin. Release pressure and if the skin does not return pink then call the office immediately. DO NOT GET YOUR CAST WET. Bacteria thrive in moist dark areas. We do not want this. If your cast becomes wet, return to the office and we will apply another one. PAIN AFTER SURGERY:  Narcotic pain medication can and will depress your respiratory system if taken in excess. The goal of pain management with narcotics is to be comfortable not pain free. If you take enough narcotics to be pain free then you run the risk of stopping breathing.   If this happens, call 911 immediately! Pain in the heel is often  caused by pressure from the weight of your foot on the bed. Make sure your heel is suspended off the bed by keeping a pillow underneath your calf not your knee. Medications: You will be given narcotic pain medication. Do NOT drive while taking narcotic medications. Medications such as Darvocet, Percocet, Vicoden or Tylenol #3, also contain acetaminophen (Tylenol). Do not take acetaminophen or Tylenol from home when taking theses medications. When you fill your prescription, you may ask the pharmacist if your pain medication has acetaminophen/Tylenol in it. It is okay to take Tylenol with Oxycontin/Oxycodone. Should you have pain after taking your prescription medication, ibuprophen (Motrin, Advil, and Alleve) is a common over the counter preparation and may often be taken with the prescription pain medication as long as you take them with food. These medications can irritate the stomach lining. Unless you are allergic to aspirin or currently taking a blood thinner, Dr. Antonia Tate patients are requested to take one 325 mg aspirin every 12 hours until you are back to walking normally after surgery (This can be up to 6 weeks). Narcotic medications commonly cause nausea. Taking them with food will decrease this side effect. If you are having extreme nausea, please contact us for an alternative medication or for something that can be taken with this medication to decrease the nausea. Also, narcotic medications frequently cause constipation. An increase of fiber, fruits and vegetables in your diet may alleviate this problem, or if necessary, you may use an over-the-counter medication such as senekot, colace, or Fibercon for constipation problems. You should resume all medications you were taking prior to the surgery unless otherwise specified. Activity:   Because of your recent foot surgery, your activity level will decrease.  You will need to elevate your foot ABOVE the level of your heart for a minimum of four days. The length of time necessary for the swelling to go down, and for your wounds to heal properly depends greatly on your efforts here. Elevation is extremely important to avoid compromising the blood supply to your foot. Remember when your foot is down it will swell, which will increase pain and slow healing. Wiggle your toes frequently if possible. If you go home with a regional block, (a type of anesthesia) the foot and leg will be numb. Think of ways to get into your house and around the house until the block wears off. Keep in mind that it may be a legal issue if you drive while in a cast or splint, especially when the splint is on the right foot. You may call the [employer] to schedule a road test if you have adaptive equipment applied to your car. The amount of weight you are allowed to bear on your foot will be written on your discharge sheet filled out at the time of surgery. The following is an explanation of the possibilities:       [id number] has the following policies when it comes to ELECTIVE surgery  No elective surgery requiring anesthesia until 7 weeks after a patient tested positive for COVID-19   No elective surgery requiring anesthesia until 3 months after a patient was hospitalized for COVID-19      Non-weight bearing: You are to put NO weight whatsoever on your foot. When using crutches or a walker, your foot should not touch the ground, except when you are standing. Then, it may rest on the ground. If you are to be non-weight bearing, and you are not compliant, you could compromise the surgery. Some of our patients have been requesting prescriptions for a roll-a-bout knee scooter. [employer] and other insurances have been denying these claims, and you may either have to rent one or pay out of pocket to purchase one.   THIS SHOULD BE PURCHASED PRIOR TO THE SURGERY AND YOU SHOULD BRING IT WITH YOU THE DAY OF THE SURGERY TO AIDE IN GETTING FROM THE CAR INTO THE HOUSE AFTER SURGERY.

## 2023-10-27 NOTE — PROGRESS NOTES
Edgard Benavidez M.D. Attending, Orthopaedic Surgery  Foot and [facility]      ORTHOPAEDIC FOOT AND ANKLE CLINIC VISIT     Assessment:     Encounter Diagnosis   Name Primary? Painful orthopaedic hardware [facility]) Yes            Plan:   The patient verbalized understanding of exam findings and treatment plan. We engaged in the shared decision-making process and treatment options were discussed at length with the patient. Surgical and conservative management discussed today along with risks and benefits. Patient has a painful os vesalianum with previous orthopedic hardware placed for a suspected 5th metatarsal fracture of the right foot by a podiatrist. I am not sure he ever had a fracture to this bone. He has no history of trauma and states that he had pain in the lateral border of his foot for years. He has continued pain about his lateral foot and would like to discuss a surgical procedure as mentioned at his prior visit. Informed consent was obtained today for a right foot removal of hardware, excision of painful os vesalianum and peroneus brevis tendon transfer to the cuboid. Return in about 3 weeks (around 11/17/2023) for postop. CONSENT FOR SOFT TISSUE PROCEDURES:   Patient understands that there is no guarantee that the surgery will relieve all of their pain and also understands that there may be a prolonged course of protected weight-bearing status required which will restrict them from driving and other activities as discussed at today's visit. Patient recognizes that there are risks with surgery including bleeding, numbness, nerve irritation, wound complications, infection, continued pain, anesthetic complications, death, failure of procedure and possible need for further surgery. The patient understands that there is no guarantee that this surgery will relieve all of His pain and symptoms.   Patient understands that there is no guarantee that they will return Verified Results  COMP METABOLIC PANEL WITH LIPID PANEL AND CBCA (CPNL,CBCA,HDL) [id number] 10:12AM Ander Ormond   [Jan 14, 2017 7:20AM ASHKAN GARCIA]  push fluids, amanda in 3 mths     Test Name Result Flag Reference   FASTING STATUS 12 hrs     SODIUM 142 mmol/L  135-145   POTASSIUM 3.9 mmol/L  3.4-5.1   CHLORIDE 105 mmol/L     CARBON DIOXIDE 26 mmol/L  21-32   ANION GAP 15 mmol/L  10-20   GLUCOSE 96 mg/dl  65-99   BUN 21 mg/dl H 10-20   CREATININE 1.25 mg/dl H 0.67-1.17   GFR EST.  AMER 64     In patients with known chronic kidney disease, the stage of disease based on eGFR is interpreted as follows:  eGFR results 60-89 mL/min/1.73m2 = Stage II CKD (chronic kidney disease), or mild kidney disease. GFR EST. Madalynn Kicks 56     In patients with known chronic kidney disease, the stage of disease based on eGFR is interpreted as follows:  eGFR results 30 - 59 mL/min/1.73m2 = Stage III CKD (chronic kidney disease), or moderate kidney disease. BUN/CREATININE RATIO 17  7-25   CALCIUM 9.3 mg/dl  8.4-10.2   BILIRUBIN TOTAL 0.6 mg/dl  0.2-1.0   GOT/AST 18 Units/L  <38   GPT/ALT 39 Units/L  <79   ALKALINE PHOSPHATASE 61 Units/L     TOTAL PROTEIN 7.3 g/dl  6.4-8.2   ALBUMIN 4.1 g/dl  3.6-5.1   GLOBULIN (CALCULATED) 3.2 g/dl  2.0-4.0   A/G RATIO 1.3  1.0-2.4   FASTING STATUS 12 hrs     CHOLESTEROL 176 mg/dl  100-200   Desirable                <200  Borderline High          200 to 239  High                     >=240   LDL CHOLESTEROL (CALCULATED) 71 mg/dl  <130   Optimal                  <100  Near Optimal             100 to 129  Borderline High          130 to 159  High                     160 to 189  Very High                >=190   HDL CHOLESTEROL 76 mg/dl  >39   Interpretive Data: Low <40, Borderline Low 40 to 49.  Near Optimal 50 to 59, Optimal >=60   TRIGLYCERIDES 146 mg/dl  <150   Normal                   <150  Borderline High          150 to 199  High                     200 to 499  Very High >=500   NON-HDL CHOLESTEROL 100 mg/dl     Therapeutic Target:  CHD and risk equivalents <130  Multiple risk factors    <160  0 to 1 risk factors      <190   CHOLESTEROL/HDL RATIO 2.3  <4.5   WHITE BLOOD COUNT 4.7 K/mcL  4.2-11.0   RED CELL COUNT 4.80 mil/mcL  4.50-5.90   HEMOGLOBIN 15.2 g/dl  13.0-17.0   HEMATOCRIT 46.1 %  39.0-51.0   MEAN CORPUSCULAR VOLUME 96.0 fL  78.0-100.0   MEAN CORPUSCULAR HEMOGLOBIN 31.7 pg  26.0-34.0   MEAN CORPUSCULAR HGB CONC 33.0 g/dl  32.0-36.5   RDW-CV 13.3 %  11.0-15.0   PLATELET COUNT 156 K/mcL  140-450   DIFF TYPE      AUTOMATED DIFFERENTIAL   LETICIA% 60 %     LYM% 32 %     MON% 6 %     EOS% 2 %     BASO% 0 %     LETICIA ABS 2.8 K/mcL  1.8-7.7   LYM ABS 1.5 K/mcL  1.0-4.0   MON ABS 0.3 K/mcL  0.3-0.9   EOS ABS 0.1 K/mcL  0.1-0.5   BASO ABS 0.0 K/mcL  0.0-0.3     URINALYSIS SCREEN with MICROSCOPIC IF INDICATED AND URINE CULTURE REFLEX 13Jan2017 10:12AM Jackelin Hernandez   [Jan 14, 2017 7:20AM ASHKAN GARCIA]  push fluids, amanda in 3 mths     Test Name Result Flag Reference   URINE COLOR YELLOW  YELLOW   APPEARANCE, URINE CLEAR     URINE GLUCOSE NEGATIVE mg/dl  NEGATIVE   URINE BILIRUBIN NEGATIVE  NEGATIVE   KETONES NEGATIVE mg/dl  NEGATIVE   URINE SPECIFIC GRAVITY 1.013  1.005-1.030   RBC-URINE NEGATIVE  NEGATIVE   PH-URINE 5.0 Units  5.0-7.0   URINE PROTEIN NEGATIVE mg/dl  NEGATIVE   UROBILINOGEN-URINE 0.2 mg/dl  0.0-1.0   NITRITE NEGATIVE  NEGATIVE   WBC-URINE NEGATIVE  NEGATIVE   SPECIMEN TYPE      URINE, CLEAN CATCH/MIDSTREAM     PSA - PROSTATE SPECIFIC AG [id number] 10:12AM Jackelin Hernandez   [Jan 14, 2017 7:20AM ASHKAN GARCIA]  push fluids, amanda in 3 mths     Test Name Result Flag Reference   PROSTATE SPECIFIC AG 1.18 ng/ml  <4.01   SUGGESTED AGE SPECIFIC REFERENCE RANGES     AGE                NG/ML  40-49              0. 01-2.50  50-59              0. 01-3.50  60-69              0. 01-4.50  70-79              0. 01-6.50     REFERENCE: JOURNAL OF UROLOGY 155, [address] [facility] to full function after the procedure. Patient has provided informed consent for the procedure. History of Present Illness:   Chief Complaint:   Chief Complaint   Patient presents with    Follow-up     Patient is smoking a couple hits of a cigarettes a day. He is using the gum. Says if he needs to quit he can. Sebas Fox is a 28 y.o. male who is being seen in follow-up for Right foot. When we last saw he we recommended continuing activities as tolerated in supportive shoe wear. Pain has not improved. Residual pain is localized at lateral foot with minimal radiating and described as sharp and severe. Pain/symptom timing:  Worse during the day when active  Pain/symptom context:  Worse with activites and work  Pain/symptom modifying factors:  Rest makes better, activities make worse  Pain/symptom associated signs/symptoms: none    Prior treatment   NSAIDsYes   Injections No   Bracing/Orthotics Yes    Physical Therapy Yes     Orthopedic Surgical History:   See below    Past Medical, Surgical and Social History:  Past Medical History:  has a past medical history of Allergic, Anemia (06/24/2018), Anxiety, Arthritis, Asthma, Benign essential hypertension (11/17/2016), Bipolar 1 disorder ([facility]), Chronic pain, Chronic pain disorder, Claustrophobia (03/15/2018), Community acquired pneumonia (06/24/2018), CPAP (continuous positive airway pressure) dependence, Depressed (07/14/2016), Depression, GERD (gastroesophageal reflux disease), Hepatitis C virus infection (08/14/2014), Heroin abuse ([address]) (07/24/2018), Infectious viral hepatitis, Obesity (10/12/2016), Obstructive sleep apnea, Sleep disorder, and Substance abuse ([facility] ). Problem List: does not have any pertinent problems on file.   Past Surgical History:  has a past surgical history that includes [place] tooth extraction; pr incision & drainage abscess complicated/multiple (Left, 5/2/2019); pr laparoscopy colectomy partial w/anastomosis (Left, 5/21/2020); Epidural block injection (Right, 10/28/2022); Epidural block injection (Right, 12/20/2022); pr neuroplasty &/transpos median nrv carpal tunne (Left, 1/9/2023); and pr open treatment metatarsal fracture each (Right, 2/24/2023). Family History: family history includes Alzheimer's disease in his maternal grandmother; Colon cancer in his father; Depression in his family; Diabetes in his mother; Restless legs syndrome in his mother; Sleep apnea in his mother. Social History:  reports that he has quit smoking. His smoking use included e-cigarettes and cigarettes. He has never used smokeless tobacco. He reports that he does not currently use drugs after having used the following drugs: Heroin, Marijuana, and Prescription. He reports that he does not drink alcohol. Current Medications: has a current medication list which includes the following prescription(s): albuterol, clonazepam, fluticasone, levocetirizine, linzess, methadone hcl, dulera, nicotine, nicotine polacrilex, omeprazole, pregabalin, rexulti, temazepam, triamcinolone, and vilazodone. Allergies: is allergic to red dye - food allergy and bee venom. Review of Systems:  General- denies fever/chills  HEENT- denies hearing loss or sore throat  Eyes- denies eye pain or visual disturbances, denies red eyes  Respiratory- denies cough or SOB  Cardio- denies chest pain or palpitations  GI- denies abdominal pain  Endocrine- denies urinary frequency  Urinary- denies pain with urination  Musculoskeletal- Negative except noted above  Skin- denies rashes or wounds  Neurological- denies dizziness or headache  Psychiatric- denies anxiety or difficulty concentrating    Physical Exam:   Ht 5' 10" (1.778 m)   Wt (!) 161 kg (354 lb)   BMI 50.79 kg/m²   General/Constitutional: No apparent distress: well-nourished and well developed.   Eyes: normal ocular motion  Lymphatic: No appreciable lymphadenopathy  Respiratory: Non-labored breathing  Vascular: No edema, Chemiluminescence swelling or tenderness, except as noted in detailed exam.  Integumentary: No impressive skin lesions present, except as noted in detailed exam.  Neuro: No ataxia or tremors noted  Psych: Normal mood and affect, oriented to person, place and time. Appropriate affect. Musculoskeletal: Normal, except as noted in detailed exam and in HPI. Examination    Right    Gait Antalgic with cane   Musculoskeletal Tender to palpation at 5th metatarsal base    Skin Normal.  Well-healed incisions. Nails Normal    Range of Motion  20 degrees dorsiflexion, 30 degrees plantarflexion  Subtalar motion: normal    Stability Stable    Muscle Strength 5/5 tibialis anterior  5/5 gastrocnemius-soleus  5/5 posterior tibialis  5/5 peroneal/eversion strength  5/5 EHL  5/5 FHL    Neurologic Normal    Sensation Intact to light touch throughout sural, saphenous, superficial peroneal, deep peroneal and medial/lateral plantar nerve distributions. Preston-Roni 5.07 filament (10g) testing deferred. Cardiovascular Brisk capillary refill < 2 seconds,intact DP and PT pulses    Special Tests None      Imaging Studies:   No new imaging      James R. Lachman, MD  Foot & Ankle Surgery   Department of [address] [place] [address]      I personally performed the service. Felipe Giles.  Lachman, MD    Scribe Attestation      I,:  Cornelius Rodriges am acting as a scribe while in the presence of the attending physician.:       I,:  Leesa Espinosa MD personally performed the services described in this documentation    as scribed in my presence.:

## 2023-11-09 ENCOUNTER — OFFICE VISIT (OUTPATIENT)
Dept: URGENT CARE | Facility: MEDICAL CENTER | Age: 33
End: 2023-11-09
Payer: COMMERCIAL

## 2023-11-09 VITALS
BODY MASS INDEX: 51.08 KG/M2 | HEART RATE: 97 BPM | TEMPERATURE: 98.3 F | OXYGEN SATURATION: 97 % | DIASTOLIC BLOOD PRESSURE: 90 MMHG | WEIGHT: 315 LBS | RESPIRATION RATE: 18 BRPM | SYSTOLIC BLOOD PRESSURE: 118 MMHG

## 2023-11-09 DIAGNOSIS — F17.200 NICOTINE DEPENDENCE, UNCOMPLICATED, UNSPECIFIED NICOTINE PRODUCT TYPE: ICD-10-CM

## 2023-11-09 DIAGNOSIS — K04.7 DENTAL ABSCESS: Primary | ICD-10-CM

## 2023-11-09 PROCEDURE — 99213 OFFICE O/P EST LOW 20 MIN: CPT | Performed by: PHYSICIAN ASSISTANT

## 2023-11-09 RX ORDER — AMOXICILLIN 500 MG/1
500 CAPSULE ORAL EVERY 8 HOURS SCHEDULED
Qty: 31 CAPSULE | Refills: 0 | Status: SHIPPED | OUTPATIENT
Start: 2023-11-09 | End: 2023-11-19

## 2023-11-09 RX ORDER — NICOTINE 21 MG/24HR
1 PATCH, TRANSDERMAL 24 HOURS TRANSDERMAL EVERY 24 HOURS
Qty: 30 PATCH | Refills: 5 | Status: SHIPPED | OUTPATIENT
Start: 2023-11-09

## 2023-11-09 NOTE — PROGRESS NOTES
[facility]        NAME: Tawanda Teague is a 28 y.o. male  : 1990    MRN: [id number]  DATE: 2023  TIME: 11:18 AM    Assessment and Plan   Dental abscess [K04.7]  1. Dental abscess  amoxicillin (AMOXIL) 500 mg capsule            Patient Instructions     Take amoxicillin as prescribed  Follow up with dentist  Salt water gargles  Ice over area  Ibuprofen 600-800mg + Tylenol 1000mg every 6 hours. Do not exceed this amount. Follow up with PCP in 3-5 days. Proceed to  ER if symptoms worsen. Eat yogurt with live and active cultures and/or take a probiotic at least 3 hours before or after antibiotic dose. Monitor stool for diarrhea and/or blood. If this occurs, contact primary care doctor ASAP. Chief Complaint     Chief Complaint   Patient presents with    Dental Pain     Upper right dental swelling. Face swollen. Started 3 days ago. Does have dental appointment in 1-2 weeks. History of Present Illness       Dental Pain   This is a new problem. The current episode started in the past 7 days. The problem has been gradually worsening. The pain is moderate. Associated symptoms include facial pain and thermal sensitivity. Pertinent negatives include no fever or sinus pressure. He has tried acetaminophen (benzocaine) for the symptoms. Review of Systems   Review of Systems   Constitutional:  Negative for chills and fever. HENT:  Positive for dental problem. Negative for drooling, sinus pressure, sinus pain and sore throat. Skin:  Negative for color change. Neurological:  Negative for headaches. Current Medications       Current Outpatient Medications:     albuterol (Ventolin HFA) 90 mcg/act inhaler, Inhale 2 puffs every 6 (six) hours as needed for wheezing, Disp: 18 g, Rfl: 6    amoxicillin (AMOXIL) 500 mg capsule, Take 1 capsule (500 mg total) by mouth every 8 (eight) hours for 10 days .  Take 2 capsules for first dose., Disp: 31 capsule, Rfl: 0 clonazePAM (KlonoPIN) 0.5 mg tablet, Take 0.5 mg by mouth 2 (two) times a day, Disp: , Rfl:     fluticasone (FLONASE) 50 mcg/act nasal spray, 1 spray into each nostril 2 (two) times a day, Disp: 16 g, Rfl: 11    levocetirizine (XYZAL) 5 MG tablet, TAKE ONE TABLET BY MOUTH EVERY EVENING, Disp: 150 tablet, Rfl: 0    Linzess 145 MCG CAPS, Take 145 mcg by mouth every other day, Disp: , Rfl:     METHADONE HCL PO, Take 150 mg by mouth daily, Disp: , Rfl:     mometasone-formoterol (Dulera) 200-5 MCG/ACT inhaler, Inhale 2 puffs 2 (two) times a day Rinse mouth after use., Disp: 39 g, Rfl: 6    nicotine (NICODERM CQ) 14 mg/24hr TD 24 hr patch, Place 1 patch on the skin over 24 hours every 24 hours, Disp: 28 patch, Rfl: 2    nicotine polacrilex (NICORETTE) 4 mg gum, Chew 1 each (4 mg total) as needed for smoking cessation, Disp: 100 each, Rfl: 2    pregabalin (LYRICA) 150 mg capsule, TAKE 1 CAPSULE (150 MG TOTAL) BY MOUTH 2 (TWO) TIMES A DAY, Disp: 60 capsule, Rfl: 2    Rexulti 2 MG tablet, Take 2 mg by mouth daily, Disp: , Rfl:     temazepam (RESTORIL) 15 mg capsule, Take 15 mg by mouth daily, Disp: , Rfl:     triamcinolone (KENALOG) 0.1 % cream, APPLY TOPICALLY 2 TIMES DAILY, Disp: 30 g, Rfl: 1    vilazodone (VIIBRYD) 40 mg tablet, 40 mg daily with breakfast, Disp: , Rfl:     omeprazole (PriLOSEC) 20 mg delayed release capsule, Take 20 mg by mouth 2 (two) times a day , Disp: , Rfl:     Current Allergies     Allergies as of 11/09/2023 - Reviewed 11/09/2023   Allergen Reaction Noted    Red dye - food allergy Diarrhea, Nausea Only, and Abdominal Pain 09/08/2022    Bee venom Angioedema 05/20/2015            The following portions of the patient's history were reviewed and updated as appropriate: allergies, current medications, past family history, past medical history, past social history, past surgical history and problem list.     Past Medical History:   Diagnosis Date    Allergic     Anemia 06/24/2018    Anxiety     from records    Arthritis     Asthma     Benign essential hypertension 11/17/2016    Bipolar 1 disorder (HCC)     from records    Chronic pain     Chronic pain disorder     back/ knees/ hip    Claustrophobia 03/15/2018    Community acquired pneumonia 06/24/2018    CPAP (continuous positive airway pressure) dependence     Depressed 07/14/2016    Depression     from records    GERD (gastroesophageal reflux disease)     Hepatitis C virus infection 08/14/2014    Heroin abuse ([address]) 07/24/2018    Infectious viral hepatitis     Obesity 10/12/2016    Obstructive sleep apnea     from records    Sleep disorder     Substance abuse [facility])        Past Surgical History:   Procedure Laterality Date    EPIDURAL BLOCK INJECTION Right 10/28/2022    Procedure: BLOCK / INJECTION EPIDURAL STEROID LUMBAR  right L4-5 and L5-S1 TFESI;  Surgeon: Omkar Rodgers MD;  Location: [facility];  Service: Pain Management     EPIDURAL BLOCK INJECTION Right 12/20/2022    Procedure: BLOCK / INJECTION EPIDURAL STEROID LUMBAR  right  L4-5 and L5-S1 TFESI;  Surgeon: Omkar Rodgers MD;  Location: [facility];  Service: Pain Management     CT INCISION & DRAINAGE ABSCESS COMPLICATED/MULTIPLE Left 5/2/2019    Procedure: INCISION AND DRAINAGE (I&D) EXTREMITY;  Surgeon: Adrián Michaud MD;  Location: [facility];  Service: Orthopedics    CT LAPAROSCOPY COLECTOMY PARTIAL W/ANASTOMOSIS Left 5/21/2020    Procedure: LAPAROSCOPIC LEFT HEMICOLECTOMY TAKEDOWN SPLENIC FLECTURE, COLORECTAL ANASTOMOSIS.;  Surgeon: Matt Pratt MD;  Location: [facility];  Service: Colorectal    CT NEUROPLASTY &/TRANSPOS MEDIAN NRV CARPAL Srikanth Filbert Left 1/9/2023    Procedure: RELEASE CARPAL TUNNEL;  Surgeon: Nuzhat Cage DO;  Location: [facility];  Service: Orthopedics    CT OPEN TREATMENT METATARSAL FRACTURE EACH Right 2/24/2023    Procedure: OPEN REDUCTION W/ INTERNAL FIXATION (ORIF) FOOT;  Surgeon: Chan Victoria DPM;  Location: [facility];  Service: Podiatry WISDOM TOOTH EXTRACTION         Family History   Problem Relation Age of Onset    Diabetes Mother     Restless legs syndrome Mother     Sleep apnea Mother     Colon cancer Father     Alzheimer's disease Maternal Grandmother     Depression Family          Medications have been verified. Objective   /90   Pulse 97   Temp 98.3 °F (36.8 °C)   Resp 18   Wt (!) 161 kg (356 lb)   SpO2 97%   BMI 51.08 kg/m²   No LMP for male patient. Physical Exam     Physical Exam  Vitals reviewed. Constitutional:       General: He is not in acute distress. Appearance: He is well-developed. HENT:      Head: Normocephalic and atraumatic. Mouth/Throat:      Mouth: Mucous membranes are moist.     Eyes:      Extraocular Movements: Extraocular movements intact. Cardiovascular:      Rate and Rhythm: Normal rate and regular rhythm. Heart sounds: Normal heart sounds. No murmur heard. No friction rub. No gallop. Pulmonary:      Effort: Pulmonary effort is normal. No respiratory distress. Breath sounds: Normal breath sounds. No wheezing, rhonchi or rales. Lymphadenopathy:      Cervical: No cervical adenopathy. Skin:     General: Skin is warm. Neurological:      Mental Status: He is alert and oriented to person, place, and time. Psychiatric:         Behavior: Behavior normal.         Thought Content:  Thought content normal.         Judgment: Judgment normal.

## 2023-11-09 NOTE — PATIENT INSTRUCTIONS
Take amoxicillin as prescribed  Follow up with dentist  Salt water gargles  Ice over area  Ibuprofen 600-800mg + Tylenol 1000mg every 6 hours. Do not exceed this amount. Follow up with PCP in 3-5 days. Proceed to  ER if symptoms worsen. Eat yogurt with live and active cultures and/or take a probiotic at least 3 hours before or after antibiotic dose. Monitor stool for diarrhea and/or blood. If this occurs, contact primary care doctor ASAP.

## 2023-11-13 ENCOUNTER — ANESTHESIA EVENT (OUTPATIENT)
Dept: PERIOP | Facility: HOSPITAL | Age: 33
End: 2023-11-13
Payer: COMMERCIAL

## 2023-11-14 NOTE — PRE-PROCEDURE INSTRUCTIONS
Pre-Surgery Instructions:   Medication Instructions    albuterol (Ventolin HFA) 90 mcg/act inhaler Uses PRN- OK to take day of surgery    amoxicillin (AMOXIL) 500 mg capsule Pt should finish by 11/19/23- pt had a toothache    clonazePAM (KlonoPIN) 0.5 mg tablet Uses PRN- OK to take day of surgery    fluticasone (FLONASE) 50 mcg/act nasal spray Take night before surgery    levocetirizine (XYZAL) 5 MG tablet Take night before surgery    Linzess 145 MCG CAPS Hold day of surgery. METHADONE HCL PO Take day of surgery. mometasone-formoterol (Dulera) 200-5 MCG/ACT inhaler Take day of surgery. nicotine (NICODERM CQ) 14 mg/24hr TD 24 hr patch Hold day of surgery. nicotine polacrilex (NICORETTE) 4 mg gum Hold day of surgery. omeprazole (PriLOSEC) 20 mg delayed release capsule Take day of surgery. pregabalin (LYRICA) 150 mg capsule Take day of surgery. Rexulti 2 MG tablet Take day of surgery. temazepam (RESTORIL) 15 mg capsule Take night before surgery    triamcinolone (KENALOG) 0.1 % cream Hold day of surgery. vilazodone (VIIBRYD) 40 mg tablet Take night before surgery   See above    Medication instructions for day surgery reviewed. Please use only a sip of water to take your instructed medications. Avoid all over the counter vitamins, supplements and NSAIDS for one week prior to surgery per anesthesia guidelines. Tylenol is ok to take as needed. You will receive a call one business day prior to surgery with an arrival time and hospital directions. If your surgery is scheduled on a Monday, the hospital will be calling you on the Friday prior to your surgery. If you have not heard from anyone by 8pm, please call the hospital supervisor through the hospital  at [phone]. Farrukh Stratton [phone]). Do not eat or drink anything after midnight the night before your surgery, including candy, mints, lifesavers, or chewing gum. Do not drink alcohol 24hrs before your surgery.  Try not to smoke at least 24hrs before your surgery. Follow the pre surgery showering instructions as listed in the [facility] Surgical Experience Booklet” or otherwise provided by your surgeon's office. Do not use a blade to shave the surgical area 1 week before surgery. It is okay to use a clean electric clippers up to 24 hours before surgery. Do not apply any lotions, creams, including makeup, cologne, deodorant, or perfumes after showering on the day of your surgery. Do not use dry shampoo, hair spray, hair gel, or any type of hair products. No contact lenses, eye make-up, or artificial eyelashes. Remove nail polish, including gel polish, and any artificial, gel, or acrylic nails if possible. Remove all jewelry including rings and body piercing jewelry. Wear causal clothing that is easy to take on and off. Consider your type of surgery. Keep any valuables, jewelry, piercings at home. Please bring any specially ordered equipment (sling, braces) if indicated. Arrange for a responsible person to drive you to and from the hospital on the day of your surgery. Visitor Guidelines discussed. Call the surgeon's office with any new illnesses, exposures, or additional questions prior to surgery. Please reference your [facility] Surgical Experience Booklet” for additional information to prepare for your upcoming surgery.

## 2023-11-16 ENCOUNTER — OFFICE VISIT (OUTPATIENT)
Dept: DENTISTRY | Facility: CLINIC | Age: 33
End: 2023-11-16
Payer: COMMERCIAL

## 2023-11-16 VITALS — HEART RATE: 86 BPM | TEMPERATURE: 98.5 F | SYSTOLIC BLOOD PRESSURE: 110 MMHG | DIASTOLIC BLOOD PRESSURE: 77 MMHG

## 2023-11-16 DIAGNOSIS — K02.9 TOOTH DECAY: Primary | ICD-10-CM

## 2023-11-16 PROCEDURE — D2332 RESIN-BASED COMPOSITE - 3 SURFACES, ANTERIOR: HCPCS | Performed by: STUDENT IN AN ORGANIZED HEALTH CARE EDUCATION/TRAINING PROGRAM

## 2023-11-16 NOTE — PROGRESS NOTES
Dental procedures in this visit    There are no dental procedures in this visit. Subjective   Patient ID: Lisa Cortes is a 28 y.o. male. No chief complaint on file.     HPI  The following portions of the chart were reviewed this encounter and updated as appropriate:    No text in SmartText           Objective   Soft Tissue Exam  No findings documented this visit      Dental Exam    {DESMOND DENTAL OBJECTIVE:26723}    Assessment/Plan   {TAYLOR LOGAN ASSESS/PLAN SMARTLINKS:[id number]}

## 2023-11-20 NOTE — H&P (VIEW-ONLY)
Composite Filling    Niya Araujo presents for composite filling. PMH reviewed, no changes. Discussed with patient need for RCT if pulp exposure occurs or in future if pulp is inflamed. Pt understands and consents. Applied topical benzocaine, administered 1 carps 4% articaine 1:100k epi via infiltration    Prepped tooth #6, 7 with 330 carbide on high speed. Caries removed with round carbide on slow speed. Placed clear matrix. Isolation with cotton rolls and dri-angles    Etch with 37% H2PO4, rinse, dry. Applied Adhese with 20 second scrub once, gentle air dry and light cured for 10s. Restored with Tetric bulk yoli shade A2 and light cured. Refined with finishing burs, polished with enhance point. Verified occlusion and contacts. Pt left satisfied. NV: recall/ follow up with [facility] for crowns and other procedures.

## 2023-11-20 NOTE — PROGRESS NOTES
Composite Filling    Norah Reus presents for composite filling. PMH reviewed, no changes. Discussed with patient need for RCT if pulp exposure occurs or in future if pulp is inflamed. Pt understands and consents. Applied topical benzocaine, administered 1 carps 4% articaine 1:100k epi via infiltration    Prepped tooth #6, 7 with 330 carbide on high speed. Caries removed with round carbide on slow speed. Placed clear matrix. Isolation with cotton rolls and dri-angles    Etch with 37% H2PO4, rinse, dry. Applied Adhese with 20 second scrub once, gentle air dry and light cured for 10s. Restored with Tetric bulk yoli shade A2 and light cured. Refined with finishing burs, polished with enhance point. Verified occlusion and contacts. Pt left satisfied. NV: recall/ follow up with [facility] for crowns and other procedures.

## 2023-11-21 DIAGNOSIS — L30.9 ECZEMA, UNSPECIFIED TYPE: ICD-10-CM

## 2023-11-21 RX ORDER — TRIAMCINOLONE ACETONIDE 1 MG/G
CREAM TOPICAL
Qty: 30 G | Refills: 1 | Status: SHIPPED | OUTPATIENT
Start: 2023-11-21

## 2023-11-27 ENCOUNTER — APPOINTMENT (OUTPATIENT)
Dept: RADIOLOGY | Facility: HOSPITAL | Age: 33
End: 2023-11-27
Payer: COMMERCIAL

## 2023-11-27 ENCOUNTER — ANESTHESIA (OUTPATIENT)
Dept: PERIOP | Facility: HOSPITAL | Age: 33
End: 2023-11-27
Payer: COMMERCIAL

## 2023-11-27 ENCOUNTER — HOSPITAL ENCOUNTER (OUTPATIENT)
Facility: HOSPITAL | Age: 33
Setting detail: OUTPATIENT SURGERY
Discharge: HOME | End: 2023-11-27
Attending: ORTHOPAEDIC SURGERY | Admitting: ORTHOPAEDIC SURGERY
Payer: COMMERCIAL

## 2023-11-27 VITALS
DIASTOLIC BLOOD PRESSURE: 63 MMHG | RESPIRATION RATE: 19 BRPM | BODY MASS INDEX: 49.33 KG/M2 | HEART RATE: 72 BPM | WEIGHT: 315 LBS | OXYGEN SATURATION: 89 % | SYSTOLIC BLOOD PRESSURE: 142 MMHG | TEMPERATURE: 97.9 F

## 2023-11-27 DIAGNOSIS — Q74.2 ACCESSORY BONE OF FOOT: ICD-10-CM

## 2023-11-27 DIAGNOSIS — T84.84XA PAINFUL ORTHOPAEDIC HARDWARE (HCC): Primary | ICD-10-CM

## 2023-11-27 PROCEDURE — 20680 REMOVAL OF IMPLANT DEEP: CPT | Performed by: ORTHOPAEDIC SURGERY

## 2023-11-27 PROCEDURE — 28122 PARTIAL REMOVAL OF FOOT BONE: CPT | Performed by: ORTHOPAEDIC SURGERY

## 2023-11-27 PROCEDURE — 27691 REVISE LOWER LEG TENDON: CPT | Performed by: ORTHOPAEDIC SURGERY

## 2023-11-27 PROCEDURE — C9290 INJ, BUPIVACAINE LIPOSOME: HCPCS | Performed by: ANESTHESIOLOGY

## 2023-11-27 PROCEDURE — C1713 ANCHOR/SCREW BN/BN,TIS/BN: HCPCS | Performed by: ORTHOPAEDIC SURGERY

## 2023-11-27 DEVICE — IMPLANTABLE DEVICE
Type: IMPLANTABLE DEVICE | Site: FOOT | Status: FUNCTIONAL
Brand: G-FORCE

## 2023-11-27 RX ORDER — CEFAZOLIN SODIUM 2 G/50ML
2000 SOLUTION INTRAVENOUS ONCE
Status: DISCONTINUED | OUTPATIENT
Start: 2023-11-27 | End: 2023-11-27 | Stop reason: SDUPTHER

## 2023-11-27 RX ORDER — CEFAZOLIN SODIUM 1 G/50ML
1000 SOLUTION INTRAVENOUS ONCE
Status: DISCONTINUED | OUTPATIENT
Start: 2023-11-27 | End: 2023-11-27 | Stop reason: HOSPADM

## 2023-11-27 RX ORDER — LIDOCAINE HYDROCHLORIDE 10 MG/ML
INJECTION, SOLUTION EPIDURAL; INFILTRATION; INTRACAUDAL; PERINEURAL AS NEEDED
Status: DISCONTINUED | OUTPATIENT
Start: 2023-11-27 | End: 2023-11-27

## 2023-11-27 RX ORDER — HYDROMORPHONE HCL/PF 1 MG/ML
0.5 SYRINGE (ML) INJECTION
Status: DISCONTINUED | OUTPATIENT
Start: 2023-11-27 | End: 2023-11-27 | Stop reason: HOSPADM

## 2023-11-27 RX ORDER — OXYCODONE HYDROCHLORIDE 5 MG/1
5 TABLET ORAL EVERY 4 HOURS PRN
Qty: 15 TABLET | Refills: 0 | Status: SHIPPED | OUTPATIENT
Start: 2023-11-27

## 2023-11-27 RX ORDER — HYDROMORPHONE HCL/PF 1 MG/ML
SYRINGE (ML) INJECTION AS NEEDED
Status: DISCONTINUED | OUTPATIENT
Start: 2023-11-27 | End: 2023-11-27

## 2023-11-27 RX ORDER — ONDANSETRON 2 MG/ML
4 INJECTION INTRAMUSCULAR; INTRAVENOUS ONCE AS NEEDED
Status: DISCONTINUED | OUTPATIENT
Start: 2023-11-27 | End: 2023-11-27 | Stop reason: HOSPADM

## 2023-11-27 RX ORDER — CHLORHEXIDINE GLUCONATE 4 G/100ML
SOLUTION TOPICAL DAILY PRN
Status: DISCONTINUED | OUTPATIENT
Start: 2023-11-27 | End: 2023-11-27 | Stop reason: HOSPADM

## 2023-11-27 RX ORDER — CEFAZOLIN SODIUM 1 G/3ML
INJECTION, POWDER, FOR SOLUTION INTRAMUSCULAR; INTRAVENOUS AS NEEDED
Status: DISCONTINUED | OUTPATIENT
Start: 2023-11-27 | End: 2023-11-27

## 2023-11-27 RX ORDER — ASPIRIN 325 MG
325 TABLET, DELAYED RELEASE (ENTERIC COATED) ORAL 2 TIMES DAILY
Qty: 84 TABLET | Refills: 0 | Status: SHIPPED | OUTPATIENT
Start: 2023-11-27

## 2023-11-27 RX ORDER — METHADONE HYDROCHLORIDE 10 MG/ML
10 INJECTION, SOLUTION INTRAMUSCULAR; INTRAVENOUS; SUBCUTANEOUS ONCE
Status: COMPLETED | OUTPATIENT
Start: 2023-11-27 | End: 2023-11-27

## 2023-11-27 RX ORDER — DEXAMETHASONE SODIUM PHOSPHATE 10 MG/ML
INJECTION, SOLUTION INTRAMUSCULAR; INTRAVENOUS AS NEEDED
Status: DISCONTINUED | OUTPATIENT
Start: 2023-11-27 | End: 2023-11-27

## 2023-11-27 RX ORDER — ONDANSETRON 4 MG/1
4 TABLET, FILM COATED ORAL EVERY 8 HOURS PRN
Qty: 10 TABLET | Refills: 0 | Status: SHIPPED | OUTPATIENT
Start: 2023-11-27

## 2023-11-27 RX ORDER — CEFAZOLIN SODIUM 2 G/50ML
2000 SOLUTION INTRAVENOUS ONCE
Status: DISCONTINUED | OUTPATIENT
Start: 2023-11-27 | End: 2023-11-27 | Stop reason: HOSPADM

## 2023-11-27 RX ORDER — SODIUM CHLORIDE, SODIUM LACTATE, POTASSIUM CHLORIDE, CALCIUM CHLORIDE 600; 310; 30; 20 MG/100ML; MG/100ML; MG/100ML; MG/100ML
INJECTION, SOLUTION INTRAVENOUS CONTINUOUS PRN
Status: DISCONTINUED | OUTPATIENT
Start: 2023-11-27 | End: 2023-11-27

## 2023-11-27 RX ORDER — LIDOCAINE HYDROCHLORIDE 20 MG/ML
INJECTION, SOLUTION EPIDURAL; INFILTRATION; INTRACAUDAL; PERINEURAL AS NEEDED
Status: DISCONTINUED | OUTPATIENT
Start: 2023-11-27 | End: 2023-11-27

## 2023-11-27 RX ORDER — ONDANSETRON 2 MG/ML
INJECTION INTRAMUSCULAR; INTRAVENOUS AS NEEDED
Status: DISCONTINUED | OUTPATIENT
Start: 2023-11-27 | End: 2023-11-27

## 2023-11-27 RX ORDER — BUPIVACAINE HYDROCHLORIDE 5 MG/ML
INJECTION, SOLUTION EPIDURAL; INTRACAUDAL AS NEEDED
Status: DISCONTINUED | OUTPATIENT
Start: 2023-11-27 | End: 2023-11-27

## 2023-11-27 RX ORDER — ROCURONIUM BROMIDE 10 MG/ML
INJECTION, SOLUTION INTRAVENOUS AS NEEDED
Status: DISCONTINUED | OUTPATIENT
Start: 2023-11-27 | End: 2023-11-27

## 2023-11-27 RX ORDER — MIDAZOLAM HYDROCHLORIDE 2 MG/2ML
INJECTION, SOLUTION INTRAMUSCULAR; INTRAVENOUS AS NEEDED
Status: DISCONTINUED | OUTPATIENT
Start: 2023-11-27 | End: 2023-11-27

## 2023-11-27 RX ORDER — PROPOFOL 10 MG/ML
INJECTION, EMULSION INTRAVENOUS AS NEEDED
Status: DISCONTINUED | OUTPATIENT
Start: 2023-11-27 | End: 2023-11-27

## 2023-11-27 RX ORDER — FENTANYL CITRATE 50 UG/ML
INJECTION, SOLUTION INTRAMUSCULAR; INTRAVENOUS AS NEEDED
Status: DISCONTINUED | OUTPATIENT
Start: 2023-11-27 | End: 2023-11-27

## 2023-11-27 RX ADMIN — SUGAMMADEX 400 MG: 100 INJECTION, SOLUTION INTRAVENOUS at 10:45

## 2023-11-27 RX ADMIN — PROPOFOL 200 MG: 10 INJECTION, EMULSION INTRAVENOUS at 10:12

## 2023-11-27 RX ADMIN — HYDROMORPHONE HYDROCHLORIDE 0.5 MG: 1 INJECTION, SOLUTION INTRAMUSCULAR; INTRAVENOUS; SUBCUTANEOUS at 12:18

## 2023-11-27 RX ADMIN — HYDROMORPHONE HYDROCHLORIDE 1 MG: 1 INJECTION, SOLUTION INTRAMUSCULAR; INTRAVENOUS; SUBCUTANEOUS at 10:46

## 2023-11-27 RX ADMIN — LIDOCAINE HYDROCHLORIDE 1 ML: 10 INJECTION, SOLUTION EPIDURAL; INFILTRATION; INTRACAUDAL; PERINEURAL at 09:53

## 2023-11-27 RX ADMIN — FENTANYL CITRATE 50 MCG: 50 INJECTION, SOLUTION INTRAMUSCULAR; INTRAVENOUS at 10:12

## 2023-11-27 RX ADMIN — LIDOCAINE HYDROCHLORIDE 100 MG: 20 INJECTION, SOLUTION EPIDURAL; INFILTRATION; INTRACAUDAL; PERINEURAL at 10:12

## 2023-11-27 RX ADMIN — FENTANYL CITRATE 50 MCG: 50 INJECTION, SOLUTION INTRAMUSCULAR; INTRAVENOUS at 09:51

## 2023-11-27 RX ADMIN — DEXAMETHASONE SODIUM PHOSPHATE 10 MG: 10 INJECTION, SOLUTION INTRAMUSCULAR; INTRAVENOUS at 10:31

## 2023-11-27 RX ADMIN — BUPIVACAINE 20 ML: 13.3 INJECTION, SUSPENSION, LIPOSOMAL INFILTRATION at 09:55

## 2023-11-27 RX ADMIN — ONDANSETRON 4 MG: 2 INJECTION INTRAMUSCULAR; INTRAVENOUS at 10:50

## 2023-11-27 RX ADMIN — ROCURONIUM BROMIDE 50 MG: 10 INJECTION, SOLUTION INTRAVENOUS at 10:13

## 2023-11-27 RX ADMIN — METHADONE HYDROCHLORIDE 10 MG: 10 INJECTION, SOLUTION INTRAMUSCULAR; INTRAVENOUS; SUBCUTANEOUS at 10:37

## 2023-11-27 RX ADMIN — MIDAZOLAM 1 MG: 1 INJECTION INTRAMUSCULAR; INTRAVENOUS at 10:12

## 2023-11-27 RX ADMIN — MIDAZOLAM 1 MG: 1 INJECTION INTRAMUSCULAR; INTRAVENOUS at 09:51

## 2023-11-27 RX ADMIN — BUPIVACAINE HYDROCHLORIDE 5 ML: 5 INJECTION, SOLUTION EPIDURAL; INTRACAUDAL; PERINEURAL at 09:55

## 2023-11-27 RX ADMIN — SODIUM CHLORIDE, SODIUM LACTATE, POTASSIUM CHLORIDE, AND CALCIUM CHLORIDE: .6; .31; .03; .02 INJECTION, SOLUTION INTRAVENOUS at 10:13

## 2023-11-27 RX ADMIN — CEFAZOLIN 3000 MG: 1 INJECTION, POWDER, FOR SOLUTION INTRAMUSCULAR; INTRAVENOUS at 10:25

## 2023-11-27 NOTE — ANESTHESIA PREPROCEDURE EVALUATION
Procedure:  REMOVAL HARDWARE FOOT, excision of painful os vesalanium, transfer of peroneus brevis tendon to the cuboid bone (Right: Ankle)  transfer of peroneus brevis tendon to the cuboid bone (Right: Foot)      2/2023 ORIF  DL with Kike Pacheco 2  Grade 2 view - 8.0 ETT    Relevant Problems   CARDIO   (+) Essential hypertension      GI/HEPATIC   (+) Acid reflux   (+) Chronic hepatitis C without hepatic coma (HCC)      MUSCULOSKELETAL   (+) Chronic bilateral low back pain without sciatica      NEURO/PSYCH   (+) Anxiety   (+) Chronic bilateral low back pain without sciatica   (+) Chronic pain syndrome   (+) Claustrophobia   (+) Depressed   (+) Generalized anxiety disorder      PULMONARY   (+) Mild persistent asthma without complication   (+) Moderate persistent asthma without complication   (+) CATA (obstructive sleep apnea)      Hep C - s/p Harvoni treatment  Bipolar disorder  PDMP checked - prescriptions correlate to patient report except for methadone.      [address] - clinic closed before I could confirm dosage    Physical Exam    Airway    Mallampati score: II    Neck ROM: full     Dental   Comment: None loose     Cardiovascular      Pulmonary      Other Findings         Latest Reference Range & Units 05/25/23 08:15   Sodium 135 - 147 mmol/L 137   Potassium 3.5 - 5.3 mmol/L 3.8   Chloride 96 - 108 mmol/L 109 (H)   CO2 21 - 32 mmol/L 29   Anion Gap 4 - 13 mmol/L -1 (L)   BUN 5 - 25 mg/dL 15   Creatinine 0.60 - 1.30 mg/dL 0.89   Glucose, Random 65 - 140 mg/dL 139   Calcium 8.3 - 10.1 mg/dL 9.0   eGFR ml/min/1.73sq m 112   BILIRUBIN DIRECT 0.00 - 0.20 mg/dL <0.05   (H): Data is abnormally high  (L): Data is abnormally low       Latest Reference Range & Units 05/25/23 08:15   WBC 4.31 - 10.16 Thousand/uL 7.22   Red Blood Cell Count 3.88 - 5.62 Million/uL 5.14   Hemoglobin 12.0 - 17.0 g/dL 13.1   HCT 36.5 - 49.3 % 42.4   MCV 82 - 98 fL 83   MCH 26.8 - 34.3 pg 25.5 (L)   MCHC 31.4 - 37.4 g/dL 30.9 (L)   RDW 11.6 - 15.1 % 15.8 (H)   Platelet Count 126 - 390 Thousands/uL 211   (L): Data is abnormally low  (H): Data is abnormally high        EKG (2/2023)  Normal sinus rhythm  Prolonged QT (Qtc = 486 msec)  Abnormal ECG    Anesthesia Plan  ASA Score- 3     Anesthesia Type- general with ASA Monitors. Additional Monitors:     Airway Plan: ETT. Comment: NPO after MN    Preop popliteal nerve block    Patient normally takes 150mg of Methadone daily by 07:00. Patient did not take his morning dose - will give 10mg IV of methadone given that patient is chronically on this med. Patient instructed not to take his methadone dose for today but he may resume his normal dose tomorrow morning. Plan Factors-Exercise tolerance (METS): <4 METS. Chart reviewed. Existing labs reviewed. Patient summary reviewed. Patient is a current smoker. Patient instructed to abstain from smoking on day of procedure. Patient did not smoke on day of surgery. Induction- intravenous. Postoperative Plan- Plan for postoperative opioid use. Planned trial extubation    Informed Consent- Anesthetic plan and risks discussed with patient. I personally reviewed this patient with the CRNA. Discussed and agreed on the Anesthesia Plan with the CRNA. Luciana Dunn

## 2023-11-27 NOTE — ANESTHESIA POSTPROCEDURE EVALUATION
Post-Op Assessment Note    CV Status:  Stable  Pain Score: 0    Pain management: adequate    Multimodal analgesia used between 6 hours prior to anesthesia start to PACU discharge    Mental Status:  Sleepy and arousable   Hydration Status:  Euvolemic   PONV Controlled:  None   Airway Patency:  Patent  Airway: intubated  Two or more mitigation strategies used for obstructive sleep apnea   Post Op Vitals Reviewed: Yes    No anethesia notable event occurred.     Staff: Anesthesiologist, CRNA               BP   132/78   Temp      Pulse  83   Resp   10   SpO2   94%

## 2023-11-27 NOTE — DISCHARGE INSTR - AVS FIRST PAGE
Anne Gutierrez M.D. Attending, [address] [place] [address] Office Phone: [phone] ? Fax: [phone]  [place] Office Phone: [phone] ? AUJ:[phone]    : Abdullahi [place]     Surgery Coordinators Rian Dyson: Rigo Perez, [phone]  Jones Naidu, [phone]  Surgery Coordinator Caryn:  Greg Faria, [phone]                                                              [email or web address]   POST-OPERATIVE INSTRUCTIONS    General Information:  Typical post operative visits are at the following intervals:  2-3 weeks post surgery, 6 weeks post surgery, 3 months post surgery, 6 months post surgery, and then on a yearly basis. However, this may change based on Dr. Nevin Abrams recommendation. #1 post-operative rule for foot/ankle surgery:  ONCE YOU ARE OUT OF YOUR CAST AND/OR REMOVABLE BOOT, SWELLING MAY PERSIST FOR MANY MONTHS. YOU MIGHT ALSO EXPERIENCE A BLUISH DISCOLORATION OF YOUR LEG. THIS IS NORMAL AND PART OF THE USUAL POSTOPERATIVE EXPERIENCE. DO NOT WAIT UNTIL YOUR BLOCK WEARS OFF TO TAKE YOUR PAIN MEDICATION. IT TAKES A FEW DOSES OF THE PAIN MEDICATION TO REACH A THERAPEUTIC LEVEL. TAKE A TABLET PROACTIVELY BEFORE YOU HAVE ANY PAIN AND AGAIN 4 HOURS LATER SO WHEN THE BLOCK WEARS OFF, YOU ARE NOT CAUGHT OFF GUARD. SMOKING:  Smoking results in incomplete healing of fractures (broken bones) and joints that my have been fused. Smoking and nicotine also prevents the growth of bone into ankle replacements and bone healing. It also slows the healing of muscles and skin (soft tissue). Therefore, please do not have surgery if you continue to smoke. We reserve the right to cancel your surgery if we suspect that you are smoking. DO NOT use nicorette gum or other patches.   Please find an alternative method to quit smoking before your surgery and do not restart after surgery to allow for healing. THREE RULES:    After surgery you will most likely be given the instructions “KEEP YOUR TOES ABOVE YOUR NOSE.”  This means that you MUST have your feet elevated higher than your heart. Keeping your toes above your nose helps to heal the muscles and skin (soft tissues) by reducing swelling in your leg. This position also helps to prevent infection, and is very important in avoiding deep venous thrombosis (blood clots). In order to keep the blood circulating in your legs and in order to avoid deep vein   thrombosis (blood clots), we ask patients to GET UP ONCE AN HOUR during the day. This means you should at least cross the room and come back. It does not mean you have to be up for long periods of time. In most cases we will not have people immediately put any weight on their operated part. This is important to prevent loosening of metal or other devices holding the bones together. It also prevents irritation of the soft tissues which can lead to prolonged healing. When we say get up once an hour, please walk, hop or move with an assisted device. This is important! Do not do any excessive walking during the first few days after surgery. Recovering from surgery is a full-time task for the patient. Postoperative care is important to avoid irritating the skin incision, which can lead to infection. Please do not plan activities or go out of town for several weeks after surgery. AFTER YOUR SURGERY:  Bleeding through the bandage almost always occurs. Do not let this alarm you. Simply overwrap with an ABD pad and Ace bandage (The nurses discharging your from the day of surgery will provide this.)   If you think it is excessive, you can come in early for a dressing change (at around 1 week instead of 3 weeks postop.)    Do not get the bandage wet. Showering is possible with plastic protectors. Be very careful, as the bathroom can be wet and slippery.   If you do get your dressing wet, it should be changed immediately. Please contact us. ONCE YOUR ARE OUT OF YOUR CAST AND/OR REMOVABLE BOOT, SWELLING MAY PERSIST FOR MANY MONTHS. THERE WILL ALSO BE A BLUISH DISCOLORATION OF YOUR LEG FOR MONTHS. THIS IS NORMAL AND PART OF THE USUAL POSTOPERATIVE EXPERIENCE. WEARING COMPRESSION HOSE (ELASTIC STOCKINGS) CAN HELP AVOID SOME OF THIS SWELLING. Ice the area 20 minutes every hour once the nerve block wears off. If you are in a cast or a splint, you may need to leave the ice on longer than 20 minutes in order to feel any benefits. DRESSING:   The purpose of the surgical dressing is to keep your wound and the surgical site protected from the environment. Most dressings contain splints, which help to hold your foot and ankle in a corrected position, and also allow the surgical site to heal properly. If you have a drain in place, this will need to be removed in 1 day after surgery. The time for the drain to be pulled will be written on your discharge instruction sheet. CAST  INSTRUCTIONS:  You may or may not get a cast following surgery. If you do, pay close attention to the following:    After application of a splint or cast, it is very important to elevate your leg for 24 to 72 hours. The injured area should be elevated well above the heart. Remember “Toes above your Nose”. Rest and elevation greatly reduce pain and speed the healing process by minimizing early swelling.     CALL YOUR DOCTORS OFFICE OR VISIT LOCATION EMERGENCY ROOM IF YOU HAVE ANY OF THE FOLLOWING:    Significant increased pain, which may be caused by swelling (Strict elevation will alleviate this)  Numbness and tingling in your hand or foot, which may be caused by too much pressure on the nerves (There is always some numbness after surgery due to nerve blocks)  Burning and stinging, which may be caused by too much pressure on the skin  Excessive swelling below the cast, which may mean the cast is slowing your blood circulation  Loss of active movement of toes, which request an urgent evaluation  Loss of “capillary refill”. Pinch the tip of toes and roxy the skin. Release pressure and if the skin does not return pink then call the office immediately. DO NOT GET YOUR CAST WET. Bacteria thrive in moist dark areas. We do not want this. If your cast becomes wet, return to the office and we will apply another one. PAIN AFTER SURGERY:  Narcotic pain medication can and will depress your respiratory system if taken in excess. The goal of pain management with narcotics is to be comfortable not pain free. If you take enough narcotics to be pain free then you run the risk of stopping breathing. If this happens, call 911 immediately! Pain in the heel is often  caused by pressure from the weight of your foot on the bed. Make sure your heel is suspended off the bed by keeping a pillow underneath your calf not your knee. Medications: You will be given narcotic pain medication. Do NOT drive while taking narcotic medications. Medications such as Darvocet, Percocet, Vicoden or Tylenol #3, also contain acetaminophen (Tylenol). Do not take acetaminophen or Tylenol from home when taking theses medications. When you fill your prescription, you may ask the pharmacist if your pain medication has acetaminophen/Tylenol in it. It is okay to take Tylenol with Oxycontin/Oxycodone. Unless you are allergic to aspirin or currently taking a blood thinner, Dr. Mirna Zimmerman patients are requested to take one 325 mg aspirin every 12 hours until you are back to walking normally after surgery (This can be up to 6 weeks). Ecotrin (Enteric-coated aspirin) is more sensitive to the stomach and we recommend purchasing this instead of regular aspirin to minimize the risk of stomach irritation. Narcotic medications commonly cause nausea. Taking them with food will decrease this side effect.  If you are having extreme nausea, please contact us for an alternative medication or for something that can be taken with this medication to decrease the nausea. Also, narcotic medications frequently cause constipation. An increase of fiber, fruits and vegetables in your diet may alleviate this problem, or if necessary, you may use an over-the-counter medication such as senekot, colace, or Fibercon for constipation problems. You should resume all medications you were taking prior to the surgery unless otherwise specified. If you had fracture surgery, bony surgery like an osteotomy or fusion, or a surgery that requires bone healing, you are advised to take Vitamin D and Calcium to improve healing potential.  Vitamin D3 4000 units/day and Calcium 1200mg/day. These are over the counter medications so please pick them up at the pharmacy when you are picking up your prescriptions. Activity:   Because of your recent foot surgery, your activity level will decrease. You will need to elevate your foot ABOVE the level of your heart for a minimum of four days. The length of time necessary for the swelling to go down, and for your wounds to heal properly depends greatly on your efforts here. Elevation is extremely important to avoid compromising the blood supply to your foot. Remember when your foot is down it will swell, which will increase pain and slow healing. Wiggle your toes frequently if possible. If you go home with a regional block, (a type of anesthesia) the foot and leg will be numb. Think of ways to get into your house and around the house until the block wears off. Keep in mind that it may be a legal issue if you drive while in a cast or splint, especially when the splint is on the right foot. You may call the Department of Motor Vehicles to schedule a road test if you have adaptive equipment applied to your car.    The amount of weight you are allowed to bear on your foot will be written on your discharge sheet filled out at the time of surgery. The following is an explanation of the possibilities:     Non-weight bearing: You are to put NO weight whatsoever on your foot. When using crutches or a walker, your foot should not touch the ground, except when you are standing. Then, it may rest on the ground. If you are to be non-weight bearing, and you are not compliant, you could compromise the surgery. Some of our patients have been requesting prescriptions for a roll-a-bout knee scooter. [employer] and other insurances have been denying these claims, and you may either have to rent one or pay out of pocket to purchase one.

## 2023-11-27 NOTE — ANESTHESIA PROCEDURE NOTES
Peripheral Block    Patient location during procedure: holding area  Start time: 11/27/2023 9:55 AM  Reason for block: at surgeon's request and post-op pain management  Staffing  Performed by: Alexandr Zayas MD  Authorized by: Alexandr Zayas MD    Preanesthetic Checklist  Completed: patient identified, IV checked, site marked, risks and benefits discussed, surgical consent, monitors and equipment checked, pre-op evaluation and timeout performed  Peripheral Block  Patient position: left lateral  Prep: ChloraPrep  Patient monitoring: heart rate and continuous pulse oximetry  Block type: Popliteal  Laterality: right  Injection technique: single-shot  Procedures: ultrasound guided, Ultrasound guidance required for the procedure to increase accuracy and safety of medication placement and decrease risk of complications.  and nerve stimulator  Ultrasound permanent image saved  Needle  Needle type: Tuohy   Needle gauge: 20 G  Needle length: 6 in  Needle localization: ultrasound guidance and nerve stimulator  Needle insertion depth: 3 cm  Assessment  Injection assessment: frequent aspiration, injected with ease, negative aspiration, no paresthesia on injection, no symptoms of intraneural/intravenous injection, negative for heart rate change, needle tip visualized at all times and incremental injection  Paresthesia pain: none  Post-procedure:  site cleaned  patient tolerated the procedure well with no immediate complications

## 2023-11-27 NOTE — OP NOTE
OPERATIVE REPORT  PATIENT NAME: Jesica Luo    :  1990  MRN: [id number]  Pt Location: [facility] OR ROOM 02    SURGERY DATE: 2023    Surgeon(s) and Role:     * Jennett Hodgkin, MD - Primary     * Clara Cuevas MD - Assisting     * Kenna Ramsey PA-C - Assisting    Preop Diagnosis:  Painful orthopaedic hardware [facility]) Mirian Pleva    Post-Op Diagnosis Codes:     * Painful orthopaedic hardware ([address]) Mirian Pleva    Procedure(s):  Right - REMOVAL HARDWARE FOOT. excision of painful os vesalanium. transfer of peroneus brevis tendon to the cuboid bone  Right - transfer of peroneus brevis tendon to the cuboid bone    Specimen(s):  * No specimens in log *    Estimated Blood Loss:   Minimal    Drains:  * No LDAs found *    Anesthesia Type:   Choice    Operative Indications:  Painful orthopaedic hardware [facility]) [Q76.85TP]      Operative Findings:  Consistent with diagnosis    Complications:   None    Procedure and Technique:  1. Removal of 5th metatarsal hook plate  2. Excision of painful os Vesalinium  3. Transfer of peroneus brevis tendon to the cuboid bone  4. Scar revision lateral midfoot (4.5cm x 6mm)  5. Placement into short leg nonweightbearing plaster splint        OPERATIVE REPORT:    After informed consent and preoperative medical clearance were obtained, the patient was taken to the preoperative holding area. Allergies were properly assessed and patient was given appropriate perioperative IV antibiotics without complication. Please see the anesthesia report for details of the anesthesia administered. Patient was taken the operating room placed supine on the operating room table. All bony prominences and skin were well padded, airway maintained, genitalia protected and brachial plexi/ulnar nerves at the elbows protected. Patient's operative lower extremity was prepped and draped in sterile fashion.      The operative lower extremity was exsanguinated with esmarch elastic bandage and a calf tourniquet was utilized. A time-out was performed with the attending surgeon in the room. A longitudinal elliptical incision was made laterally over the fifth metatarsal excising the previous scar which was 4.5cm x 6mm to ensure healing wound edges, taking care to protect the sural nerve which traversed the field proximally. Careful soft tissue dissection was performed. With minimal periosteal stripping, the broken plate was exposed. We removed the screws through the plate and then removed the plate. Next, we grabbed the os with a kocher and used the knife to sharply remove it from all of its soft tissue attachments. We took care proximally at the peroneus brevis and were sure to preserve all of the fibers of this tendon. Next we whip stitched the tendon and measured it. It was 6mm. We drilled a 6mm hole in the cuboid bone and used the pull through technique to establish tension in the tendon. We then used a biotenodesis screw to stabilize the tendon transfer in the cuboid. The foot was held in slight hindfoot eversion for the rest of the case. The wound was closed in layers with Vicryl suture for the deeper layers and nylon suture for the skin for a tension-less closure. There was no blanching at the skin margins after closure. Sterile dressings were applied with the ankle in neutral position and over-abundant padding with a posterior/sugar tong splint was applied. Satisfactory capillary refill remained in all toes. Patient tolerated the procedure well. There were no complications. He was taken to the recovery room in stable condition. DISPOSITION:   1. Patient is stable to PACU. 2. Non-weightbearing to lower extremity for 6 weeks. 3. Pain control. 4. DVT prophylaxis with ASA 325mg BID. 5. Follow-up at 3 weeks with suture removal if appropriate and short leg cast at neutral position and advance to touchdown-weightbearing at that time.      I was present for the entire procedure.     Patient Disposition:  PACU         SIGNATURE: Minh Roman MD  DATE: November 27, 2023  TIME: 11:06 AM

## 2023-11-27 NOTE — INTERVAL H&P NOTE
H&P reviewed. After examining the patient I find no changes in the patients condition since the H&P had been written.     Vitals:    11/27/23 0916   BP: 110/64   Pulse: 72   Resp: 18   Temp: 98.1 °F (36.7 °C)   SpO2: 96%     Plan for right foot removal hardware, excision of painful os vesalinium and transfer of Peroneus brevis to the cuboid bone

## 2023-12-13 DIAGNOSIS — L30.9 ECZEMA, UNSPECIFIED TYPE: ICD-10-CM

## 2023-12-13 RX ORDER — TRIAMCINOLONE ACETONIDE 1 MG/G
CREAM TOPICAL
Qty: 30 G | Refills: 1 | Status: SHIPPED | OUTPATIENT
Start: 2023-12-13

## 2023-12-15 ENCOUNTER — OFFICE VISIT (OUTPATIENT)
Dept: OBGYN CLINIC | Facility: CLINIC | Age: 33
End: 2023-12-15
Payer: COMMERCIAL

## 2023-12-15 VITALS — DIASTOLIC BLOOD PRESSURE: 80 MMHG | SYSTOLIC BLOOD PRESSURE: 120 MMHG

## 2023-12-15 DIAGNOSIS — T84.84XA PAINFUL ORTHOPAEDIC HARDWARE (HCC): Primary | ICD-10-CM

## 2023-12-15 PROCEDURE — 99024 POSTOP FOLLOW-UP VISIT: CPT | Performed by: ORTHOPAEDIC SURGERY

## 2023-12-15 PROCEDURE — 29405 APPL SHORT LEG CAST: CPT | Performed by: ORTHOPAEDIC SURGERY

## 2023-12-15 NOTE — PATIENT INSTRUCTIONS
Touch-down weightbearing in short leg cast    Continue aspirin/lovenox to prevent blood clots  Purchase a compression stocking (Knee high, 20-30mm Hg) to be worn at all times while awake for your next visit when the cast comes off. Care of Casts and Splints    Casts and splints support and protect injured bones and soft tissue. When you break a bone, your doctor will put the pieces back together in the right position. Casts and splints hold the bones in place while they heal. They also reduce pain, swelling, and muscle spasm. In some cases, splints and casts are applied following surgery. Splints or "half-casts" provide less support than casts. However, splints can be adjusted to accommodate swelling from injuries easier than enclosed casts. Your doctor will decide which type of support is best for you. Types of Splints and Casts  Casts are custom-made. They must fit the shape of your injured limb correctly to provide the best support. Casts can be made of plaster or fiberglass -- a plastic that can be shaped. Splints or half-casts can also be custom-made, especially if an exact fit is necessary. Other times, a ready-made splint will be used. These off-the-shelf splints are made in a variety of shapes and sizes, and are much easier and faster to use. They have Velcro straps which make the splints easy to put on, take off, and adjust.    Materials  Fiberglass or plaster materials form the hard supportive layer in splints and casts. Fiberglass is lighter in weight and stronger than plaster. In addition, x-rays can "see through" fiberglass better than through plaster. This is important because your doctor will probably schedule additional x-rays after your splint or cast has been applied. X-rays can show whether the bones are healing well or have moved out of place. Plaster is less expensive than fiberglass and shapes better than fiberglass for some uses.     Application  Both fiberglass and plaster splints and casts use padding, usually cotton, as a protective layer next to the skin. Both materials come in strips or rolls which are dipped in water and applied over the padding covering the injured area. The splint or cast must fit the shape of the injured arm or leg correctly to provide the best possible support. Generally, the splint or cast also covers the joint above and below the broken bone. In many cases, a splint is applied to a fresh injury first. As swelling subsides, a full cast may replace the splint. Sometimes, it may be necessary to replace a cast as swelling goes down and the cast gets "too big." As a fracture heals, the cast may be replaced by a splint to make it easier to perform physical therapy exercises. Apply ice to the splint or cast and elevate your leg to reduce swelling. Warning Signs  Swelling can create a lot of pressure under your cast. This can lead to problems. If you experience any of the following symptoms, contact your doctor's office immediately for advice. Increased pain and the feeling that the splint or cast is too tight. This may be caused by swelling. Numbness and tingling in your hand or foot. This may be caused by too much pressure on the nerves. Burning and stinging. This may be caused by too much pressure on the skin. Excessive swelling below the cast. This may mean the cast is slowing your blood circulation. Loss of active movement of toes or fingers. This requires an urgent evaluation by your doctor. Getting Used to a Splint or Cast  Swelling due to your injury may cause pressure in your splint or cast for the first 48 to 72 hours. This may cause your injured arm or leg to feel snug or tight in the splint or cast. If you have a splint, your doctor will show you how to adjust it to accommodate the swelling. It is very important to keep the swelling down. This will lessen pain and help your injury heal. To help reduce swelling:  Elevate.  It is very important to elevate your injured arm or leg for the first 24 to 72 hours. Prop your injured arm or leg up above your heart by putting it on pillows or some other support. You will have to recline if the splint or cast is on your leg. Elevation allows clear fluid and blood to drain "downhill" to your heart. Exercise. Move your uninjured, but swollen fingers or toes gently and often. Moving them often will prevent stiffness. Ice. Apply ice to the splint or cast. Place the ice in a dry plastic bag or ice pack and loosely wrap it around the splint or cast at the level of the injury. Ice that is packed in a rigid container and touches the cast at only one point will not be effective. Taking Care of Your Splint or Cast  Your doctor will explain any restrictions on using your injured arm or leg while it is healing. You must follow your doctor's instructions carefully to make sure your bone heals properly. The following information provides general guidelines only, and is not a substitute for your doctor's advice. After you have adjusted to your splint or cast for a few days, it is important to keep it in good condition. This will help your recovery. Keep your splint or cast dry. Moisture weakens plaster and damp padding next to the skin can cause irritation. Use two layers of plastic or purchase waterproof shields to keep your splint or cast dry while you shower or bathe. Even if the cast is covered, do not submerge it or hold it under running water. A small pinhole in the cast cover can cause the injury to get soaked. Walking casts. Do not walk on a "walking cast" until it is completely dry and hard. It takes about one hour for fiberglass, and two to three days for plaster to become hard enough to walk on. Avoid dirt. Keep dirt, sand, and powder away from the inside of your splint or cast.   Padding. Do not pull out the padding from your splint or cast.   Itching.  Do not stick objects such as coat hangers inside the splint or cast to scratch itching skin. Do not apply powders or deodorants to itching skin. If itching persists, contact your doctor. Trimming. Do not break off rough edges of the cast or trim the cast before asking your doctor. Skin. Inspect the skin around the cast. If your skin becomes red or raw around the cast, contact your doctor. Inspect the cast regularly. If it becomes cracked or develops soft spots, contact your doctor's office. Use common sense. You have a serious injury and you must protect your cast from damage so it can protect your injury while it heals. After the initial swelling has subsided, proper splint or cast support will usually allow you to continue your daily activities with a minimum of inconvenience. Cast Removal  Never remove the cast yourself. You may cut your skin or prevent proper healing of your injury. Your doctor will use a cast saw to remove your cast. The saw vibrates, but does not rotate. If the blade of the saw touches the padding inside the hard shell of the cast, the padding will vibrate with the blade and will protect your skin. Cast saws make noise and may feel "hot" from friction, but will not harm you -- "their bark is worse than their bite."  If you do feel pain while the cast is being removed, let your doctor or an assistant know and they will be able to make adjustments. The saw vibrates but does not rotate. Cast saws make noise but will not harm you.

## 2023-12-15 NOTE — PROGRESS NOTES
Reilly Castelan M.D. Attending, Orthopaedic Surgery  Foot and Ankle  [facility]      ORTHOPAEDIC FOOT AND ANKLE POST-OP VISIT     Procedure:     Right removal of hardware 5th MT, transfer or peroneus brevis to cuboid, excision os Vesalinium       Date of surgery:   11/27/23      PLAN  1. Weightbearing Status- touch down weight bearing operative extremity in short leg cast  2. DVT prophylaxis- ASA 325mg BID  3. Continue to elevate 23hrs/day getting up 1x per hour to prevent a blood clot  4. Pain control- OTC pain medication  5. RTC in 3 weeks  6. Xrays needed next visit - no     History of Present Illness:   Chief Complaint: S/p right removal of hardware and tendon transfer     Akil Stover is a 28 y.o. male who is being seen for post-operative visit for the above procedure. Pain is well controlled and the patient has successfully transitioned to OTC pain medicines. he is taking ASA 325mg BID for DVT prophylaxis. Patient has been NWB in a Splint. Review of Systems:  General- denies fever/chills  Respiratory- denies cough or SOB  Cardio- denies chest pain or palpitations  GI- denies abdominal pain  Musculoskeletal- Negative except noted above  Skin- denies rashes or wounds    Physical Exam:   There were no vitals taken for this visit. General/Constitutional: No apparent distress: well-nourished and well developed. Eyes: normal ocular motion  Lymphatic: No appreciable lymphadenopathy  Respiratory: Non-labored breathing  Vascular: No edema, swelling or tenderness, except as noted in detailed exam.  Integumentary: No impressive skin lesions present, except as noted in detailed exam.  Neuro: No ataxia or tremors noted  Psych: Normal mood and affect, oriented to person, place and time. Appropriate affect. Musculoskeletal: Normal, except as noted in detailed exam and in HPI.     Examination    right        Incision Clean, dry, intact  Sutures Removed this visit    Ecchymosis mildnone    Swelling Mild    Sensation Intact to light touch throughout sural, saphenous, superficial peroneal, deep peroneal and medial/lateral plantar nerve distributions. Bryan-Roni 5.07 filament (10g) testing deferred. Cardiovascular Brisk capillary refill < 2 seconds,intact DP and PT pulses    Special Tests None      Imaging Studies:   None    Cast application    Date/Time: 12/15/2023 10:15 AM    Performed by: Anne Gutierrez MD  Authorized by: Anne Gutierrez MD  [place] Protocol:  Consent: Verbal consent obtained. Consent given by: patient    Procedure details:     Laterality:  Right    Location:  Ankle    Ankle:  R ankleCast type:  Short leg        Supplies:  Cotton padding and fiberglass      Scribe Attestation      I,:  Jayde Frost PA-C am acting as a scribe while in the presence of the attending physician.:       I,:  Anne Gutierrez MD personally performed the services described in this documentation    as scribed in my presence.:             Brady Layne. Lachman, MD  Foot & Ankle Surgery   Department of [address] [place] [address]      I personally performed the service. Brady Layne.  Lachman, MD

## 2024-01-05 ENCOUNTER — OFFICE VISIT (OUTPATIENT)
Dept: OBGYN CLINIC | Facility: CLINIC | Age: 34
End: 2024-01-05

## 2024-01-05 VITALS — WEIGHT: 315 LBS | HEIGHT: 70 IN | BODY MASS INDEX: 45.1 KG/M2

## 2024-01-05 DIAGNOSIS — M76.71 PERONEAL TENDINITIS OF RIGHT LOWER EXTREMITY: ICD-10-CM

## 2024-01-05 DIAGNOSIS — T84.84XA PAINFUL ORTHOPAEDIC HARDWARE (HCC): Primary | ICD-10-CM

## 2024-01-05 PROCEDURE — 99024 POSTOP FOLLOW-UP VISIT: CPT | Performed by: ORTHOPAEDIC SURGERY

## 2024-01-05 NOTE — PATIENT INSTRUCTIONS
2 days of partial weight bearing 50%  Then, 2 days of partial weight bearing 75%  Then, 2 days of partial weight bearing 90%  Then full weight bearing in the boot and wean your crutches/rolling walker.    Then 3 weeks of weightbearing as tolerated in the CAM boot.    You may begin weaning your boot and transitioning to a sneaker (2/3). It is important to do this gradually to avoid aggravating the healing process.    2/3, you may come out of the boot into a sneaker for 2 hours.  2. 2/4, you may come out of the boot into a sneaker for 4 hours,  3. The next day, you may come out of the boot into a sneaker for 6 hours.  4. Continue this (adding 2 hours per day) as you tolerate. For example, if you do 6 hours out of the boot into a sneaker and your foot swells more than usual at night and it is difficult to control the discomfort, do not advance to 8 hours the next day, stay at 6 hours until you are able to tolerate it.    It is essential to follow this protocol and not modify this.  No matter when you begin weaning the boot, it will be difficult and there will be swelling and soreness.  If you spend more time than is recommended in the boot, this process becomes longer and more painful.    Elevation, Ice and tylenol and staying off of it at night will be important to aide in this transition out of the boot. Swelling and soreness are normal as you begin to do more with the injured leg.    May DC aspirin/lovenox, no longer needed.  May shower, do not soak in a tub/pool/ocean/etc for another 1 weeks.  Begin PT  Scar massage- pea sized amount of lotion, massage into scar for 5 minutes each day.  Compression stocking (Knee high, 20-30mm Hg) to be worn at all times while awake.

## 2024-01-05 NOTE — PROGRESS NOTES
"      James R Lachman, M.D.  Attending, Orthopaedic Surgery  Foot and Ankle  [facility]      ORTHOPAEDIC FOOT AND ANKLE POST-OP VISIT     Procedure:     Right removal of hardware 5th MT, transfer or peroneus brevis to cuboid, excision os Vesalinium      Date of surgery:   11/27/23       PLAN  1. Weightbearing Status - PWB operative extremity  2. DVT prophylaxis - Completed  3. Begin PT/HEP as directed  4. Pain control - OTC pain medication  5. RTC in 6 week(s)  6. Xrays needed next visit - No    History of Present Illness:   Chief Complaint:   Chief Complaint   Patient presents with    Right Foot - Post-op       Sacha Foster is a 33 y.o. male who is being seen for 6 week post-operative visit for the above procedure. Pain is well controlled and the patient has successfully transitioned to OTC pain medicines.  he has completed ASA 325mg BID for DVT prophylaxis. Patient has been NWB in a Short leg cast      Review of Systems:  General- denies fever/chills  Respiratory- denies cough or SOB  Cardio- denies chest pain or palpitations  GI- denies abdominal pain  Musculoskeletal- Negative except noted above  Skin- denies rashes or wounds    Physical Exam:   Ht 5' 10\" (1.778 m)   Wt (!) 156 kg (343 lb 12.8 oz)   BMI 49.33 kg/m²   General/Constitutional: No apparent distress: well-nourished and well developed.  Eyes: normal ocular motion  Lymphatic: No appreciable lymphadenopathy  Respiratory: Non-labored breathing  Vascular: No edema, swelling or tenderness, except as noted in detailed exam.  Integumentary: No impressive skin lesions present, except as noted in detailed exam.  Neuro: No ataxia or tremors noted  Psych: Normal mood and affect, oriented to person, place and time. Appropriate affect.  Musculoskeletal: Normal, except as noted in detailed exam and in HPI.    Examination    right        Incision Clean, dry, intact  Sutures Previously removed.    Ecchymosis none    Swelling mild  "   Sensation Intact to light touch throughout sural, saphenous, superficial peroneal, deep peroneal and medial/lateral plantar nerve distributions.  Sibley-Roni 5.07 filament (10g) testing deferred.    Cardiovascular Brisk capillary refill < 2 seconds,intact DP and PT pulses    Special Tests None      Imaging Studies:   No new images      Scribe Attestation      I,:  Aleksander Cuba PA-C am acting as a scribe while in the presence of the attending physician.:       I,:  James R Lachman, MD personally performed the services described in this documentation    as scribed in my presence.:                James R. Lachman, MD  Foot & Ankle Surgery   Department of Orthopaedic Surgery  [facility]      I personally performed the service.    James R. Lachman, MD

## 2024-01-09 ENCOUNTER — OFFICE VISIT (OUTPATIENT)
Dept: DENTISTRY | Facility: CLINIC | Age: 34
End: 2024-01-09
Payer: COMMERCIAL

## 2024-01-09 VITALS — HEART RATE: 85 BPM | SYSTOLIC BLOOD PRESSURE: 108 MMHG | DIASTOLIC BLOOD PRESSURE: 75 MMHG

## 2024-01-09 DIAGNOSIS — K03.6 ACCRETIONS ON TEETH: Primary | ICD-10-CM

## 2024-01-09 PROCEDURE — D0190 SCREENING OF A PATIENT: HCPCS | Performed by: DENTAL HYGIENIST

## 2024-01-09 PROCEDURE — D1330 ORAL HYGIENE INSTRUCTIONS: HCPCS | Performed by: DENTAL HYGIENIST

## 2024-01-09 PROCEDURE — D1110 PROPHYLAXIS - ADULT: HCPCS | Performed by: DENTAL HYGIENIST

## 2024-01-09 NOTE — PROGRESS NOTES
Adult Paul  Chief Complaint   Patient presents with    Routine Oral Cleaning   Patient was unable to get a hold of [facility] after leaving multiple messages. He will likely seek care elsewhere for crowns. He understands the longer he waits the lower the prognosis will be.     Method Used:  Paul Method Used: Hand Scaling  Polished  Flossed  Fluoride    Radiographs Taken in Dexis: (Taken to assess periodontal health)  None    Intra/Extra Oral Cancer Screening:  Within normal limits    Oral Hygiene:  Poor    Plaque:  Moderate    Calculus:  Light    Bleeding:  Moderate    Stain:  Light    Periodontal Charting:   Spot probing    Periodontal Classification:  Generalized  Mild  Periodontal Disease      Oral Hygiene Instruction:    Went over daily routine and c-shaped flossing.       No orders of the defined types were placed in this encounter.       Next Visit:  6 Month paul JOE/ DARREN  Dentist visit periodic in Sept

## 2024-01-18 DIAGNOSIS — F17.200 NICOTINE DEPENDENCE, UNCOMPLICATED, UNSPECIFIED NICOTINE PRODUCT TYPE: ICD-10-CM

## 2024-01-18 RX ORDER — NICOTINE POLACRILEX 4 MG/1
GUM, CHEWING ORAL
Qty: 100 EACH | Refills: 2 | Status: SHIPPED | OUTPATIENT
Start: 2024-01-18

## 2024-01-22 ENCOUNTER — EVALUATION (OUTPATIENT)
Dept: PHYSICAL THERAPY | Facility: CLINIC | Age: 34
End: 2024-01-22
Payer: COMMERCIAL

## 2024-01-22 DIAGNOSIS — T84.84XA PAINFUL ORTHOPAEDIC HARDWARE (HCC): Primary | ICD-10-CM

## 2024-01-22 DIAGNOSIS — M76.71 PERONEAL TENDINITIS OF RIGHT LOWER EXTREMITY: ICD-10-CM

## 2024-01-22 PROCEDURE — 97535 SELF CARE MNGMENT TRAINING: CPT

## 2024-01-22 PROCEDURE — 97110 THERAPEUTIC EXERCISES: CPT

## 2024-01-22 PROCEDURE — 97161 PT EVAL LOW COMPLEX 20 MIN: CPT

## 2024-01-25 ENCOUNTER — OFFICE VISIT (OUTPATIENT)
Dept: PHYSICAL THERAPY | Facility: CLINIC | Age: 34
End: 2024-01-25
Payer: COMMERCIAL

## 2024-01-25 DIAGNOSIS — M76.71 PERONEAL TENDINITIS OF RIGHT LOWER EXTREMITY: ICD-10-CM

## 2024-01-25 DIAGNOSIS — T84.84XA PAINFUL ORTHOPAEDIC HARDWARE (HCC): Primary | ICD-10-CM

## 2024-01-25 PROCEDURE — 97140 MANUAL THERAPY 1/> REGIONS: CPT

## 2024-01-25 PROCEDURE — 97110 THERAPEUTIC EXERCISES: CPT
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
## 2024-01-30 DIAGNOSIS — M54.16 LUMBAR RADICULOPATHY: ICD-10-CM

## 2024-01-31 ENCOUNTER — OFFICE VISIT (OUTPATIENT)
Dept: PHYSICAL THERAPY | Facility: CLINIC | Age: 34
End: 2024-01-31
Payer: COMMERCIAL

## 2024-01-31 DIAGNOSIS — T84.84XA PAINFUL ORTHOPAEDIC HARDWARE (HCC): Primary | ICD-10-CM

## 2024-01-31 DIAGNOSIS — M76.71 PERONEAL TENDINITIS OF RIGHT LOWER EXTREMITY: ICD-10-CM

## 2024-01-31 PROCEDURE — 97110 THERAPEUTIC EXERCISES: CPT

## 2024-01-31 PROCEDURE — 97140 MANUAL THERAPY 1/> REGIONS: CPT

## 2024-01-31 RX ORDER — PREGABALIN 150 MG/1
150 CAPSULE ORAL 2 TIMES DAILY
Qty: 60 CAPSULE | Refills: 2 | Status: SHIPPED | OUTPATIENT
Start: 2024-01-31

## 2024-01-31 NOTE — PROGRESS NOTES
"Daily Note     Today's date: 2024  Patient name: Sacha Foster  : 1990  MRN: [id number]  Referring provider: Aleksander Cuba PA-C  Dx:   Encounter Diagnosis     ICD-10-CM    1. Painful orthopaedic hardware (HCC)  T84.84XA       2. Peroneal tendinitis of right lower extremity  M76.71           Start Time: 1530  Stop Time: 1625  Total time in clinic (min): 55 minutes    Subjective: Patient reports no new complaints in the R foot this afternoon. He notes removing the remaining suture from the incision with no notable drainage.      Objective: See treatment diary below      Assessment: Tolerated treatment well. We progressed the current directed therapeutic exercise program with the addition of R ankle and foot intrinsic strengthening today. Patient tolerated progressions well. Incision is healing appropriately. He exhibited good technique with therapeutic exercises and would benefit from continued PT.      Plan: Continue per plan of care.      Precautions: Removal of Hardware & Transfer of peroneus brevis tendon to the cuboid bone ()      Manuals           R Ankle PROM   BM BM          R Ankle MR             R STM   BM BM                       Neuro Re-Ed             SLS             Tandem Stance             Fitter Board             BAPS Board                                                    Ther Ex             ABC's x1 x2 x2          Ankle Pumps  2x10 2x10 2x10          SLR 2x10 2x10 2x10           TB Inv/Ev/DF/PF L2 PF 2x10 L2 2x10 ea L2 2x10 ea          Towel Scrunches   2x10 w/ sliding board           Ev/Inv Iso   5\" Hold x10 ea          Gastroc Stretch  3x20\" Hold w/ strap  3x20\" Hold w/ strap           Soleus Stretch              Hamstring Stretch  3x20\" Hold w/ strap  3x20\" Hold w/ strap           HR/TR   2x10 Seated           Knee Ext Machine             Knee Hamstring Machine             SB for muscular endurance & strength             HEP Instruction BM            Ther " Activity                                       Gait Training                                       Modalities             Ice 10' 10' 10'

## 2024-02-06 ENCOUNTER — OFFICE VISIT (OUTPATIENT)
Dept: PHYSICAL THERAPY | Facility: CLINIC | Age: 34
End: 2024-02-06
Payer: COMMERCIAL

## 2024-02-06 DIAGNOSIS — T84.84XA PAINFUL ORTHOPAEDIC HARDWARE (HCC): Primary | ICD-10-CM

## 2024-02-06 DIAGNOSIS — M76.71 PERONEAL TENDINITIS OF RIGHT LOWER EXTREMITY: ICD-10-CM

## 2024-02-06 PROCEDURE — 97140 MANUAL THERAPY 1/> REGIONS: CPT

## 2024-02-06 PROCEDURE — 97110 THERAPEUTIC EXERCISES: CPT

## 2024-02-06 NOTE — PROGRESS NOTES
"Daily Note     Today's date: 2024  Patient name: Sacha Foster  : 1990  MRN: [id number]  Referring provider: Lorena Hernandez MD  Dx:   Encounter Diagnosis     ICD-10-CM    1. Painful orthopaedic hardware (HCC)  T84.84XA       2. Peroneal tendinitis of right lower extremity  M76.71           Start Time: 1515  Stop Time: 1610  Total time in clinic (min): 55 minutes    Subjective: Patient reports transitioning to wearing the sneaker is going well. He notes pain levels continue to be manageable.      Objective: See treatment diary below      Assessment: Tolerated treatment well. We progressed the current directed therapeutic exercise program with the addition of aerobic exercise and static stretching to improve R ankle mobility. Patient tolerated progressions well. We will continue to progress patient within tolerance to address functional deficits. He would benefit from continued PT.      Plan: Continue per plan of care.      Precautions: Removal of Hardware & Transfer of peroneus brevis tendon to the cuboid bone ()      Manuals          R Ankle PROM   BM BM BM         R Ankle MR             R Ankle STM   BM BM BM                      Neuro Re-Ed             SLS             Tandem Stance             Fitter Board             BAPS Board                                                    Ther Ex             ABC's x1 x2 x2 x2         Ankle Pumps  2x10 2x10 2x10 HEP          SLR 2x10 2x10 2x10  2x10          TB Inv/Ev/DF/PF L2 PF 2x10 L2 2x10 ea L2 2x10 ea L2 2x10 ea         Towel Scrunches   2x10 w/ sliding board  2x10 w/ sliding board          Ev/Inv Iso   5\" Hold x10 ea 5\" Hold x10 ea         Gastroc Stretch  3x20\" Hold w/ strap  3x20\" Hold w/ strap  3x20\" Hold standing          Soleus Stretch     3x20\" Hold Standing          Hamstring Stretch  3x20\" Hold w/ strap  3x20\" Hold w/ strap  3x20\" standing 8\" step          HR/TR   2x10 Seated  2x10 Seated          Knee Ext Machine   "           Knee Hamstring Curl Machine             SB for muscular endurance & strength    10' L4         HEP Instruction BM            Ther Activity                                       Gait Training                                       Modalities             Ice 10' 10' 10'  10'

## 2024-02-08 ENCOUNTER — OFFICE VISIT (OUTPATIENT)
Dept: PHYSICAL THERAPY | Facility: CLINIC | Age: 34
End: 2024-02-08
Payer: COMMERCIAL

## 2024-02-08 DIAGNOSIS — M76.71 PERONEAL TENDINITIS OF RIGHT LOWER EXTREMITY: ICD-10-CM

## 2024-02-08 DIAGNOSIS — T84.84XA PAINFUL ORTHOPAEDIC HARDWARE (HCC): Primary | ICD-10-CM

## 2024-02-08 PROCEDURE — 97112 NEUROMUSCULAR REEDUCATION: CPT

## 2024-02-08 PROCEDURE — 97140 MANUAL THERAPY 1/> REGIONS: CPT

## 2024-02-08 PROCEDURE — 97110 THERAPEUTIC EXERCISES: CPT

## 2024-02-08 NOTE — PROGRESS NOTES
"Daily Note     Today's date: 2024  Patient name: Sacha Foster  : 1990  MRN: [id number]  Referring provider: Lorena Hernandez MD  Dx:   Encounter Diagnosis     ICD-10-CM    1. Painful orthopaedic hardware (HCC)  T84.84XA       2. Peroneal tendinitis of right lower extremity  M76.71           Start Time: 1615  Stop Time: 1710  Total time in clinic (min): 55 minutes    Subjective: Patient notes no new complaints in the R foot this afternoon.      Objective: See treatment diary below      Assessment: Tolerated treatment well. We progressed the current directed therapeutic exercise program with the addition of proprioceptive control exercise in all planes of motion of the R ankle. Patient tolerated progressions well with a minimal increase in symptomatology. He would benefit from continued PT to address functional limitations.      Plan: Continue per plan of care.      Precautions: Removal of Hardware & Transfer of peroneus brevis tendon to the cuboid bone ()      Manuals         R Ankle PROM   BM BM BM BM        R Ankle MR             R Ankle STM   BM BM BM BM                     Neuro Re-Ed             SLS             Tandem Stance             Fitter Board             BAPS Board     X10 CW/CCW                                               Ther Ex             ABC's x1 x2 x2 x2 x2        SLR 2x10 2x10 2x10  2x10  2x10        TB Inv/Ev/DF/PF L2 PF 2x10 L2 2x10 ea L2 2x10 ea L2 2x10 ea L2 2x10 ea        Towel Scrunches   2x10 w/ sliding board  2x10 w/ sliding board  2x10 w/ sliding board         Ev/Inv Iso   5\" Hold x10 ea 5\" Hold x10 ea 5\" Hold x10 ea        Gastroc Stretch  3x20\" Hold w/ strap  3x20\" Hold w/ strap  3x20\" Hold standing  3x20\" Hold w 1/2 foam         Soleus Stretch     3x20\" Hold Standing  3x20\" Hold Standing        Hamstring Stretch  3x20\" Hold w/ strap  3x20\" Hold w/ strap  3x20\" standing 8\" step  3x20\" Standing 8\" step         HR/TR   2x10 Seated  2x10 " Seated  2x10 Seated         Knee Ext Machine             Knee Hamstring Curl Machine             SB for muscular endurance & strength    10' L4 10' L4        HEP Instruction BM            Ther Activity                                       Gait Training                                       Modalities             Ice 10' 10' 10'  10' 10'

## 2024-02-09 ENCOUNTER — OFFICE VISIT (OUTPATIENT)
Dept: OBGYN CLINIC | Facility: CLINIC | Age: 34
End: 2024-02-09

## 2024-02-09 VITALS — BODY MASS INDEX: 45.1 KG/M2 | HEIGHT: 70 IN | WEIGHT: 315 LBS

## 2024-02-09 DIAGNOSIS — M76.71 PERONEAL TENDINITIS OF RIGHT LOWER EXTREMITY: ICD-10-CM

## 2024-02-09 DIAGNOSIS — T84.84XA PAINFUL ORTHOPAEDIC HARDWARE (HCC): Primary | ICD-10-CM

## 2024-02-09 PROCEDURE — 99024 POSTOP FOLLOW-UP VISIT: CPT | Performed by: ORTHOPAEDIC SURGERY

## 2024-02-09 RX ORDER — CLONAZEPAM 1 MG/1
1 TABLET ORAL 2 TIMES DAILY
COMMUNITY
Start: 2024-02-06

## 2024-02-09 RX ORDER — PRAZOSIN HYDROCHLORIDE 2 MG/1
2 CAPSULE ORAL
COMMUNITY
Start: 2024-02-06

## 2024-02-09 NOTE — PROGRESS NOTES
"      James R Lachman, M.D.  Attending, Orthopaedic Surgery  Foot and Ankle  [facility]      ORTHOPAEDIC FOOT AND ANKLE POST-OP VISIT     Procedure:      Right removal of hardware 5th MT, transfer of peroneus brevis to cuboid, excision os Vesalinium       Date of surgery:   11/27/23      PLAN  1. Weightbearing Status - WBAT operative extremity  2. DVT prophylaxis - Completed  3. Continue PT/HEP as directed  4. Pain control - OTC pain medication  5. RTC  PRN    6. Xrays needed next visit - PRN    History of Present Illness:   Chief Complaint:   Chief Complaint   Patient presents with    Post-op     Final visit right foot. Everything going good.        Sacha Foster is a 33 y.o. male who is being seen for  10 weeks  post-operative visit for the above procedure. Pain is well controlled and the patient has successfully transitioned to OTC pain medicines.  he has completed ASA 325mg BID for DVT prophylaxis. Patient has been WBAT in a Supportive sneaker      Review of Systems:  General- denies fever/chills  Respiratory- denies cough or SOB  Cardio- denies chest pain or palpitations  GI- denies abdominal pain  Musculoskeletal- Negative except noted above  Skin- denies rashes or wounds    Physical Exam:   Ht 5' 10\" (1.778 m)   Wt (!) 159 kg (350 lb)   BMI 50.22 kg/m²   General/Constitutional: No apparent distress: well-nourished and well developed.  Eyes: normal ocular motion  Lymphatic: No appreciable lymphadenopathy  Respiratory: Non-labored breathing  Vascular: No edema, swelling or tenderness, except as noted in detailed exam.  Integumentary: No impressive skin lesions present, except as noted in detailed exam.  Neuro: No ataxia or tremors noted  Psych: Normal mood and affect, oriented to person, place and time. Appropriate affect.  Musculoskeletal: Normal, except as noted in detailed exam and in HPI.    Examination    right        Incision Clean, dry, intact  Sutures Previously removed.  "   Ecchymosis none    Swelling mild    Sensation Intact to light touch throughout sural, saphenous, superficial peroneal, deep peroneal and medial/lateral plantar nerve distributions.  Gar[place]ales-Roni 5.07 filament (10g) testing deferred.    Cardiovascular Brisk capillary refill < 2 seconds,intact DP and PT pulses    Special Tests None      Imaging Studies:   No new images    Scribe Attestation      I,:  Isauro Afsanehmack am acting as a scribe while in the presence of the attending physician.:       I,:  James R Lachman, MD personally performed the services described in this documentation    as scribed in my presence.:                James R. Lachman, MD  Foot & Ankle Surgery   Department of Orthopaedic Surgery  [facility]      I personally performed the service.    James R. Lachman, MD

## 2024-02-13 ENCOUNTER — APPOINTMENT (OUTPATIENT)
Dept: PHYSICAL THERAPY | Facility: CLINIC | Age: 34
End: 2024-02-13
Payer: COMMERCIAL

## 2024-02-15 ENCOUNTER — OFFICE VISIT (OUTPATIENT)
Dept: PHYSICAL THERAPY | Facility: CLINIC | Age: 34
End: 2024-02-15
Payer: COMMERCIAL

## 2024-02-15 DIAGNOSIS — T84.84XA PAINFUL ORTHOPAEDIC HARDWARE (HCC): Primary | ICD-10-CM

## 2024-02-15 DIAGNOSIS — M76.71 PERONEAL TENDINITIS OF RIGHT LOWER EXTREMITY: ICD-10-CM

## 2024-02-15 PROCEDURE — 97110 THERAPEUTIC EXERCISES: CPT

## 2024-02-15 PROCEDURE — 97112 NEUROMUSCULAR REEDUCATION: CPT

## 2024-02-15 NOTE — PROGRESS NOTES
"Daily Note     Today's date: 2/15/2024  Patient name: Sacha Foster  : 1990  MRN: [id number]  Referring provider: Lorena Hernandez MD  Dx:   Encounter Diagnosis     ICD-10-CM    1. Painful orthopaedic hardware (HCC)  T84.84XA       2. Peroneal tendinitis of right lower extremity  M76.71           Start Time: 1615  Stop Time: 1710  Total time in clinic (min): 55 minutes    Subjective: Patient reports soreness this afternoon in the R ankle. He attributes this to snow shoveling and doing work the past couple of days. He notes having a follow up appointment with his MD on 24.      Objective: See treatment diary below      Assessment: Tolerated treatment well. Patient exhibited good technique with therapeutic exercises and would benefit from continued PT. He is continuing to respond well to Physical Therapy treatment and is making appropriate progressions with the current directed therapeutic exercise program.      Plan: Continue per plan of care.      Precautions: Removal of Hardware & Transfer of peroneus brevis tendon to the cuboid bone ()      Manuals 1/22 1/25 1/31 2/6 2/8 2/15       R Ankle PROM   BM BM BM BM        R Ankle MR             R Ankle STM   BM BM BM BM                     Neuro Re-Ed             SLS             Tandem Stance             Fitter Board DF/PF      2x10        BAPS Board     X10 CW/CCW X20 CW/CCW                                              Ther Ex             ABC's x1 x2 x2 x2 x2 x2       SLR 2x10 2x10 2x10  2x10  2x10 HEP        TB Inv/Ev/DF/PF L2 PF 2x10 L2 2x10 ea L2 2x10 ea L2 2x10 ea L2 2x10 ea L3 2x10 ea       Towel Scrunches   2x10 w/ sliding board  2x10 w/ sliding board  2x10 w/ sliding board  2x10 w/ sliding board        Ev/Inv Iso   5\" Hold x10 ea 5\" Hold x10 ea 5\" Hold x10 ea D/C       Gastroc Stretch  3x20\" Hold w/ strap  3x20\" Hold w/ strap  3x20\" Hold standing  3x20\" Hold w 1/2 foam  3x20\" Hold w 1/2 foam        Soleus Stretch     3x20\" Hold Standing  " "3x20\" Hold Standing 3x20\" Hold Standing       Hamstring Stretch  3x20\" Hold w/ strap  3x20\" Hold w/ strap  3x20\" standing 8\" step  3x20\" Standing 8\" step  3x20\" Standing 10\" step        HR/TR   2x10 Seated  2x10 Seated  2x10 Seated  2x10 Standing        Knee Ext Machine             Knee Hamstring Curl Machine             SB for muscular endurance & strength    10' L4 10' L4 10' L4       HEP Instruction BM            Ther Activity                                       Gait Training                                       Modalities             Ice 10' 10' 10'  10' 10' 10                                 "

## 2024-02-20 ENCOUNTER — OFFICE VISIT (OUTPATIENT)
Dept: PHYSICAL THERAPY | Facility: CLINIC | Age: 34
End: 2024-02-20
Payer: COMMERCIAL

## 2024-02-20 DIAGNOSIS — T84.84XA PAINFUL ORTHOPAEDIC HARDWARE (HCC): Primary | ICD-10-CM

## 2024-02-20 DIAGNOSIS — M76.71 PERONEAL TENDINITIS OF RIGHT LOWER EXTREMITY: ICD-10-CM

## 2024-02-20 PROCEDURE — 97110 THERAPEUTIC EXERCISES: CPT

## 2024-02-20 PROCEDURE — 97140 MANUAL THERAPY 1/> REGIONS: CPT

## 2024-02-20 PROCEDURE — 97112 NEUROMUSCULAR REEDUCATION: CPT

## 2024-02-20 NOTE — PROGRESS NOTES
"Daily Note     Today's date: 2024  Patient name: Sacha Foster  : 1990  MRN: [id number]  Referring provider: Lorena Hernandez MD  Dx:   Encounter Diagnosis     ICD-10-CM    1. Painful orthopaedic hardware (HCC)  T84.84XA       2. Peroneal tendinitis of right lower extremity  M76.71           Start Time: 1630  Stop Time: 1725  Total time in clinic (min): 55 minutes    Subjective: Patient reports no new complaints in the R ankle this evening.      Objective: See treatment diary below      Assessment: Tolerated treatment well. We progressed the current program with the addition of proprioceptive control exercises for the R ankle and hamstring strengthening. Patient tolerated progressions well with a minimal increase in symptomatology. Patient would benefit from continued PT.      Plan: Continue per plan of care.      Precautions: Removal of Hardware & Transfer of peroneus brevis tendon to the cuboid bone ()      Manuals 1/22 1/25 1/31 2/6 2/8 2/15 2/20      R Ankle PROM   BM BM BM BM  BM      R Ankle MR             R Ankle STM   BM BM BM BM  BM                   Neuro Re-Ed             SLS       15\" Hold x3      Tandem Stance       15\" Hold x3      Fitter Board DF/PF      2x10  Standing 2x10       BAPS Board     L1 X10 CW/CCW L2 X20 CW/CCW L2 x20 CW/CCW                                             Ther Ex             ABC's x1 x2 x2 x2 x2 x2 x2      TB Inv/Ev/DF/PF L2 PF 2x10 L2 2x10 ea L2 2x10 ea L2 2x10 ea L2 2x10 ea L3 2x10 ea L3 2x10 ea      Towel Scrunches   2x10 w/ sliding board  2x10 w/ sliding board  2x10 w/ sliding board  2x10 w/ sliding board  2x10 w/ sliding  board       Gastroc Stretch  3x20\" Hold w/ strap  3x20\" Hold w/ strap  3x20\" Hold standing  3x20\" Hold w 1/2 foam  3x20\" Hold w 1/2 foam  3x20\" Hold w 1/2 foam       Soleus Stretch     3x20\" Hold Standing  3x20\" Hold Standing 3x20\" Hold Standing 3x20\" Hold Standing       Hamstring Stretch  3x20\" Hold w/ strap  3x20\" Hold w/ " "strap  3x20\" standing 8\" step  3x20\" Standing 8\" step  3x20\" Standing 10\" step  HEP       HR/TR   2x10 Seated  2x10 Seated  2x10 Seated  2x10 Standing  2x10 Standing       Knee Ext Machine             Knee Hamstring Curl Machine       P7 2x10       SB for muscular endurance & strength    10' L4 10' L4 10' L4 10' L4      HEP Instruction BM            Ther Activity                                       Gait Training                                       Modalities             Ice 10' 10' 10'  10' 10' 10 10'                                  "

## 2024-02-21 DIAGNOSIS — J30.2 SEASONAL ALLERGIES: ICD-10-CM

## 2024-02-21 RX ORDER — LEVOCETIRIZINE DIHYDROCHLORIDE 5 MG/1
5 TABLET, FILM COATED ORAL EVERY EVENING
Qty: 90 TABLET | Refills: 1 | Status: SHIPPED | OUTPATIENT
Start: 2024-02-21

## 2024-02-22 ENCOUNTER — OFFICE VISIT (OUTPATIENT)
Dept: PHYSICAL THERAPY | Facility: CLINIC | Age: 34
End: 2024-02-22
Payer: COMMERCIAL

## 2024-02-22 DIAGNOSIS — T84.84XA PAINFUL ORTHOPAEDIC HARDWARE (HCC): Primary | ICD-10-CM

## 2024-02-22 DIAGNOSIS — M76.71 PERONEAL TENDINITIS OF RIGHT LOWER EXTREMITY: ICD-10-CM

## 2024-02-22 PROCEDURE — 97110 THERAPEUTIC EXERCISES: CPT | Performed by: PHYSICAL THERAPIST

## 2024-02-22 PROCEDURE — 97140 MANUAL THERAPY 1/> REGIONS: CPT | Performed by: PHYSICAL THERAPIST

## 2024-02-22 PROCEDURE — 97112 NEUROMUSCULAR REEDUCATION: CPT | Performed by: PHYSICAL THERAPIST

## 2024-02-22 NOTE — PROGRESS NOTES
"Daily Note     Today's date: 2024  Patient name: Sacha Foster  : 1990  MRN: [id number]  Referring provider: Lorena Hernandez MD  Dx:   Encounter Diagnosis     ICD-10-CM    1. Painful orthopaedic hardware (HCC)  T84.84XA       2. Peroneal tendinitis of right lower extremity  M76.71           Start Time: 1615  Stop Time: 1710  Total time in clinic (min): 55 minutes    Subjective: The patient states that he took his dog for a walk earlier today and he had 3/10 pain in the R ankle.      Objective: See treatment diary below      Assessment: The patient reported ankle pain increased to 4-5/10 after closed chain exercises. Pain decreased after CP. Deficits remain present in R ankle stability. Tolerated treatment well. Patient would benefit from continued PT to improve standing and walking tolerance.      Plan: Continue per plan of care.      Precautions: Removal of Hardware & Transfer of peroneus brevis tendon to the cuboid bone ()      Manuals 1/22 1/25 1/31 2/6 2/8 2/15 2/20 2/22     R Ankle PROM   BM BM BM BM  BM JM     R Ankle MR             R Ankle STM   BM BM BM BM  BM JM                  Neuro Re-Ed             SLS       15\" Hold x3 15\"x3     Tandem Stance       15\" Hold x3 15\"x3 hold     Fitter Board DF/PF      2x10  Standing 2x10  Standing 2x10     BAPS Board     L1 X10 CW/CCW L2 X20 CW/CCW L2 x20 CW/CCW L2 x20 CW/CCW                                            Ther Ex             ABC's x1 x2 x2 x2 x2 x2 x2 x2     TB Inv/Ev/DF/PF L2 PF 2x10 L2 2x10 ea L2 2x10 ea L2 2x10 ea L2 2x10 ea L3 2x10 ea L3 2x10 ea L3 2x10 ea     Towel Scrunches   2x10 w/ sliding board  2x10 w/ sliding board  2x10 w/ sliding board  2x10 w/ sliding board  2x10 w/ sliding  board  2x10 w/ sliding board     Gastroc Stretch  3x20\" Hold w/ strap  3x20\" Hold w/ strap  3x20\" Hold standing  3x20\" Hold w 1/2 foam  3x20\" Hold w 1/2 foam  3x20\" Hold w 1/2 foam  3x20\" hold w1/2 foam     Soleus Stretch     3x20\" Hold Standing  " "3x20\" Hold Standing 3x20\" Hold Standing 3x20\" Hold Standing  3x20\" hold standing     Hamstring Stretch  3x20\" Hold w/ strap  3x20\" Hold w/ strap  3x20\" standing 8\" step  3x20\" Standing 8\" step  3x20\" Standing 10\" step  HEP       HR/TR   2x10 Seated  2x10 Seated  2x10 Seated  2x10 Standing  2x10 Standing  2x10 Standing     Knee Ext Machine             Knee Hamstring Curl Machine       P7 2x10  P7 2x10     SB for muscular endurance & strength    10' L4 10' L4 10' L4 10' L4 10' L4     HEP Instruction BM            Ther Activity                                       Gait Training                                       Modalities             Ice 10' 10' 10'  10' 10' 10 10'                                    "

## 2024-02-27 ENCOUNTER — OFFICE VISIT (OUTPATIENT)
Dept: PHYSICAL THERAPY | Facility: CLINIC | Age: 34
End: 2024-02-27
Payer: COMMERCIAL

## 2024-02-27 DIAGNOSIS — T84.84XA PAINFUL ORTHOPAEDIC HARDWARE (HCC): Primary | ICD-10-CM

## 2024-02-27 DIAGNOSIS — M76.71 PERONEAL TENDINITIS OF RIGHT LOWER EXTREMITY: ICD-10-CM

## 2024-02-27 PROCEDURE — 97112 NEUROMUSCULAR REEDUCATION: CPT

## 2024-02-27 PROCEDURE — 97140 MANUAL THERAPY 1/> REGIONS: CPT

## 2024-02-27 PROCEDURE — 97110 THERAPEUTIC EXERCISES: CPT

## 2024-02-27 NOTE — PROGRESS NOTES
"Daily Note     Today's date: 2024  Patient name: Sacha Foster  : 1990  MRN: [id number]  Referring provider: Lorena Hernandez MD  Dx:   Encounter Diagnosis     ICD-10-CM    1. Painful orthopaedic hardware (HCC)  T84.84XA       2. Peroneal tendinitis of right lower extremity  M76.71             Start Time: 1615  Stop Time: 1718  Total time in clinic (min): 63 minutes    Subjective: 1-2/10 pain noted today.       Objective: See treatment diary below      Assessment: Deficits remain present in R ankle stability. Tolerated treatment well. Patient would benefit from continued PT to improve standing and walking tolerance.      Plan: Continue per plan of care.      Precautions: Removal of Hardware & Transfer of peroneus brevis tendon to the cuboid bone ()      Manuals 1/22 1/25 1/31 2/6 2/8 2/15 2/20 2/22 2/27    R Ankle PROM   BM BM BM BM  BM JM KL    R Ankle MR             R Ankle STM   BM BM BM BM  BM JM                  Neuro Re-Ed             SLS       15\" Hold x3 15\"x3 15\"x3    Tandem Stance       15\" Hold x3 15\"x3 hold 30\"x2 ea    Fitter Board DF/PF      2x10  Standing 2x10  Standing 2x10 Standing 2x10    BAPS Board     L1 X10 CW/CCW L2 X20 CW/CCW L2 x20 CW/CCW L2 x20 CW/CCW L2 x20 CW/CCW                                           Ther Ex             ABC's x1 x2 x2 x2 x2 x2 x2 x2 x2    TB Inv/Ev/DF/PF L2 PF 2x10 L2 2x10 ea L2 2x10 ea L2 2x10 ea L2 2x10 ea L3 2x10 ea L3 2x10 ea L3 2x10 ea L3 2x10 ea    Towel Scrunches   2x10 w/ sliding board  2x10 w/ sliding board  2x10 w/ sliding board  2x10 w/ sliding board  2x10 w/ sliding  board  2x10 w/ sliding board 2x10 w/ sliding board    Gastroc Stretch  3x20\" Hold w/ strap  3x20\" Hold w/ strap  3x20\" Hold standing  3x20\" Hold w 1/2 foam  3x20\" Hold w 1/2 foam  3x20\" Hold w 1/2 foam  3x20\" hold w1/2 foam 3x20\" hold standing    Soleus Stretch     3x20\" Hold Standing  3x20\" Hold Standing 3x20\" Hold Standing 3x20\" Hold Standing  3x20\" hold standing " "3x20\" hold standing    Hamstring Stretch  3x20\" Hold w/ strap  3x20\" Hold w/ strap  3x20\" standing 8\" step  3x20\" Standing 8\" step  3x20\" Standing 10\" step  HEP       HR/TR   2x10 Seated  2x10 Seated  2x10 Seated  2x10 Standing  2x10 Standing  2x10 Standing 2x10 Standing    Knee Ext Machine             Knee Hamstring Curl Machine       P7 2x10  P7 2x10 P7 2x10    SB for muscular endurance & strength    10' L4 10' L4 10' L4 10' L4 10' L4 10' L5    HEP Instruction BM            Ther Activity                                       Gait Training                                       Modalities             Ice 10' 10' 10'  10' 10' 10 10'  10'                                   "

## 2024-02-29 ENCOUNTER — EVALUATION (OUTPATIENT)
Dept: PHYSICAL THERAPY | Facility: CLINIC | Age: 34
End: 2024-02-29
Payer: COMMERCIAL

## 2024-02-29 DIAGNOSIS — M76.71 PERONEAL TENDINITIS OF RIGHT LOWER EXTREMITY: ICD-10-CM

## 2024-02-29 DIAGNOSIS — T84.84XA PAINFUL ORTHOPAEDIC HARDWARE (HCC): Primary | ICD-10-CM

## 2024-02-29 PROCEDURE — 97112 NEUROMUSCULAR REEDUCATION: CPT | Performed by: PHYSICAL THERAPIST

## 2024-02-29 PROCEDURE — 97110 THERAPEUTIC EXERCISES: CPT | Performed by: PHYSICAL THERAPIST

## 2024-02-29 PROCEDURE — 97140 MANUAL THERAPY 1/> REGIONS: CPT | Performed by: PHYSICAL THERAPIST

## 2024-02-29 NOTE — PROGRESS NOTES
PT Re-Evaluation     Today's date: 2024  Patient name: Sacha Foster  : 1990  MRN: [id number]  Referring provider: Aleksander Cuba PA-C  Dx:   Encounter Diagnosis     ICD-10-CM    1. Painful orthopaedic hardware (HCC)  T84.84XA       2. Peroneal tendinitis of right lower extremity  M76.71           Assessment  Assessment details: The patient is a 33 y.o. male presenting to Physical Therapy today with improvement in R ankle function and symptomatology. Upon completion of the re-evaluation the patient is demonstrating with limitations in R ankle mobility, R ankle strength, balance, and pain. Patient is having difficulty with functional activities such as ambulation on uneven terrain, stair navigation, and lifting due to symptomatology. Patient would continue to benefit from skilled Physical Therapy services to address functional limitations to return to prior level of function.   Impairments: abnormal gait, abnormal muscle firing, abnormal muscle tone, abnormal or restricted ROM, abnormal movement, activity intolerance, impaired balance, impaired physical strength, lacks appropriate home exercise program, pain with function and weight-bearing intolerance    Symptom irritability: lowUnderstanding of Dx/Px/POC: good   Prognosis: good    Goals  ST.) Patient will initiate HEP Independently MET  2.) Patient will demonstrate a </= 2/10 pain level at its worse PROGRESSING  3.) Patient will demonstrate improved R ankle strength evidenced by a 1-2 MMT grade increase PROGRESSING  4.) Patient will demonstrate R ankle DF AROM >/= -5 deg, PF WFL, Inv WFL, Eversion 15 deg PROGRESSING  5.) Patient will demonstrate improved R hip & knee strength evidenced by a 1-2 MMT grade increase PROGRESSING    LT.) Patient will be d/c to an HEP independently PROGRESSING  2.) Patient will improve functional abilities evidenced by FOTO scores PROGRESSING  3.) Eliminate pain with functional activity PROGRESSING  4.) Patient  will demonstrate R ankle DF AROM >/= 0 deg, Eversion WFL PROGRESSING  5.) Return to PLOF PROGRESSING    Plan  Plan details: Plan of care was reviewed and discussed thoroughly with the patient on their current condition. Patient was instructed on a HEP with written instructions. Patient is in agreement with PT recommendations and will attend Physical Therapy 2x/week for the next 3 weeks to address current deficits.    Patient would benefit from: skilled physical therapy  Planned modality interventions: cryotherapy and thermotherapy: hydrocollator packs  Planned therapy interventions: balance, flexibility, functional ROM exercises, gait training, graded activity, graded exercise, graded motor, home exercise program, IASTM, massage, manual therapy, neuromuscular re-education, patient education, strengthening, stretching, therapeutic activities, therapeutic exercise and therapeutic training  Frequency: 2x week  Duration in weeks: 3  Plan of Care beginning date: 1/22/2024  Plan of Care expiration date: 3/18/2024  Treatment plan discussed with: patient    Subjective Evaluation    History of Present Illness  Mechanism of injury: surgery  Mechanism of injury: The patient reports today s/p R foot hardware removal performed on 11/27/23. He notes R foot pain first started approximately 2 years ago when he was doing house work and hit his foot off of a brick. He notes pain progressively worsened over the year and had had a consult with his Podiatrist. He underwent a R ORIF for a fifth metatarsal fracture on 2/24/23. He notes post surgery symptoms continued with no relief. He then made an appointment with Orthopedic Surgery and had hardware removed as well as a transfer of the peroneus brevis tendon in the R foot. He notes since surgery, pain levels have been much more manageable. He notes he is currently only wearing his CAM boot for a couple of hours a day and is in a sneaker most of the time. He is now referred to  OPPT.    Update 24: The patient reports today with an improvement in R ankle function and symptomatology. He notes still having difficulty with walking on uneven terrain, stairs, and lifting. He is pleased with progress this far.          Recurrent probem    Quality of life: good    Patient Goals  Patient goals for therapy: decreased pain, increased motion, increased strength, independence with ADLs/IADLs and improved balance    Pain  Current pain ratin  At best pain ratin  At worst pain rating: 3  Location: R foot  Quality: sharp and discomfort  Aggravating factors: stair climbing, walking, standing and lifting  Progression: improved      Diagnostic Tests  X-ray: abnormal  Treatments  Current treatment: physical therapy    Objective     Observations     Right Ankle/Foot   Positive for incision. Negative for drainage, effusion and trophic changes.     Palpation     Right   No palpable tenderness to the peroneus.   Hypertonic in the lateral gastrocnemius, medial gastrocnemius and soleus.     Tenderness     Right Ankle/Foot   No tenderness in the fifth metatarsal base and peroneal tendon.     Neurological Testing     Sensation     Ankle/Foot   Left Ankle/Foot   Intact: light touch    Right Ankle/Foot   Intact: light touch     Active Range of Motion   Left Knee   Flexion: WFL  Extension: WFL    Right Knee   Flexion: WFL  Extension: WFL  Left Ankle/Foot   Normal active range of motion    Right Ankle/Foot   Dorsiflexion (ke): -8 degrees   Dorsiflexion (kf): WFL  Plantar flexion: WFL  Inversion: WFL  Eversion: 15 degrees     Passive Range of Motion     Right Ankle/Foot    Dorsiflexion (ke): -5 degrees     Strength/Myotome Testing     Left Hip   Normal muscle strength    Right Hip   Planes of Motion   Flexion: 4-  Abduction: 4-  Adduction: 4  External rotation: WFL  Internal rotation: WFL    Right Knee   Flexion: 4  Extension: 4  Quadriceps contraction: good    Left Ankle/Foot   Normal strength    Right  Ankle/Foot   Dorsiflexion: 3+  Plantar flexion: 4-  Inversion: 4-  Eversion: 3+    Tests     Additional Tests Details  None    Ambulation     Observational Gait     Additional Observational Gait Details  Patient ambulates w/o an AD. He demonstrates decreased stride length, step length, step width, and kristel.

## 2024-02-29 NOTE — PROGRESS NOTES
"Daily Note     Today's date: 2024  Patient name: Sacha Foster  : 1990  MRN: [id number]  Referring provider: Aleksander Cuba PA-C  Dx:   Encounter Diagnosis     ICD-10-CM    1. Painful orthopaedic hardware (HCC)  T84.84XA       2. Peroneal tendinitis of right lower extremity  M76.71                      Subjective: Patient has pain in the inside and outside of his ankle rated at 2/10. His ankle may be swollen as well, but he is not wearing his compression socks.      Objective: See treatment diary below      Assessment: Tolerated treatment well. Patient exhibited good technique with therapeutic exercises      Plan: Recertification by evaluating PT.      Precautions: Removal of Hardware & Transfer of peroneus brevis tendon to the cuboid bone ()      Manuals 1/22 1/25 1/31 2/6 2/8 2/15 2/20 2/22 2/27 2/29   R Ankle PROM   BM BM BM BM  BM JM KL RK   R Ankle MR             R Ankle STM   BM BM BM BM  BM JM  RK                Neuro Re-Ed             SLS       15\" Hold x3 15\"x3 15\"x3 15\"x3   Tandem Stance       15\" Hold x3 15\"x3 hold 30\"x2 ea 30\"x2 ea   Fitter Board DF/PF      2x10  Standing 2x10  Standing 2x10 Standing 2x10 Standing 2/10   BAPS Board     L1 X10 CW/CCW L2 X20 CW/CCW L2 x20 CW/CCW L2 x20 CW/CCW L2 x20 CW/CCW L2 20x CW/CCW                                          Ther Ex             ABC's x1 x2 x2 x2 x2 x2 x2 x2 x2 x2   TB Inv/Ev/DF/PF L2 PF 2x10 L2 2x10 ea L2 2x10 ea L2 2x10 ea L2 2x10 ea L3 2x10 ea L3 2x10 ea L3 2x10 ea L3 2x10 ea L3 2/10 ea   Towel Scrunches   2x10 w/ sliding board  2x10 w/ sliding board  2x10 w/ sliding board  2x10 w/ sliding board  2x10 w/ sliding  board  2x10 w/ sliding board 2x10 w/ sliding board 2x10 w/ sliding board   Gastroc Stretch  3x20\" Hold w/ strap  3x20\" Hold w/ strap  3x20\" Hold standing  3x20\" Hold w 1/2 foam  3x20\" Hold w 1/2 foam  3x20\" Hold w 1/2 foam  3x20\" hold w1/2 foam 3x20\" hold standing 3x20\" hold standing   Soleus Stretch     3x20\" Hold " "Standing  3x20\" Hold Standing 3x20\" Hold Standing 3x20\" Hold Standing  3x20\" hold standing 3x20\" hold standing 3x20\" hold standing   Hamstring Stretch  3x20\" Hold w/ strap  3x20\" Hold w/ strap  3x20\" standing 8\" step  3x20\" Standing 8\" step  3x20\" Standing 10\" step  HEP       HR/TR   2x10 Seated  2x10 Seated  2x10 Seated  2x10 Standing  2x10 Standing  2x10 Standing 2x10 Standing 2x10 standing   Knee Ext Machine             Knee Hamstring Curl Machine       P7 2x10  P7 2x10 P7 2x10 P7 2x10   SB for muscular endurance & strength    10' L4 10' L4 10' L4 10' L4 10' L4 10' L5 10' L5   HEP Instruction BM            Ther Activity                                       Gait Training                                       Modalities             Ice 10' 10' 10'  10' 10' 10 10'  10'  10'                                   "

## 2024-03-03 NOTE — PROGRESS NOTES
"Daily Note     Today's date: 3/4/2024  Patient name: Sacha Foster  : 1990  MRN: [id number]  Referring provider: Aleksander Cuba PA-C  Dx:   Encounter Diagnosis     ICD-10-CM    1. Painful orthopaedic hardware (HCC)  T84.84XA       2. Peroneal tendinitis of right lower extremity  M76.71                      Subjective:       Objective: See treatment diary below      Assessment: Tolerated treatment {Tolerated treatment :[id number]}. Patient would benefit from continued PT      Plan: Continue per plan of care.      Precautions: Removal of Hardware & Transfer of peroneus brevis tendon to the cuboid bone ()      Manuals 3/4 1/25 1/31 2/6 2/8 2/15 2/20 2/22 2/27 2/29   R Ankle PROM  ML BM BM BM BM  BM JM KL RK   R Ankle MR             R Ankle STM  ML BM BM BM BM  BM JM  RK                Neuro Re-Ed             SLS 15\"x3      15\" Hold x3 15\"x3 15\"x3 15\"x3   Tandem Stance 30\"x2 ea      15\" Hold x3 15\"x3 hold 30\"x2 ea 30\"x2 ea   Fitter Board DF/PF Stand 2x10     2x10  Standing 2x10  Standing 2x10 Standing 2x10 Standing 2/10   BAPS Board L2 20x CW  CCW    L1 X10 CW/CCW L2 X20 CW/CCW L2 x20 CW/CCW L2 x20 CW/CCW L2 x20 CW/CCW L2 20x CW/CCW                                          Ther Ex             ABC's x2 x2 x2 x2 x2 x2 x2 x2 x2 x2   TB Inv/Ev/DF/PF L3 2x10 ea L2 2x10 ea L2 2x10 ea L2 2x10 ea L2 2x10 ea L3 2x10 ea L3 2x10 ea L3 2x10 ea L3 2x10 ea L3 2/10 ea   Towel Scrunches 2x10 w/sliding boarc  2x10 w/ sliding board  2x10 w/ sliding board  2x10 w/ sliding board  2x10 w/ sliding board  2x10 w/ sliding  board  2x10 w/ sliding board 2x10 w/ sliding board 2x10 w/ sliding board   Gastroc Stretch 20\"x3 Stand 3x20\" Hold w/ strap  3x20\" Hold w/ strap  3x20\" Hold standing  3x20\" Hold w 1/2 foam  3x20\" Hold w 1/2 foam  3x20\" Hold w 1/2 foam  3x20\" hold w1/2 foam 3x20\" hold standing 3x20\" hold standing   Soleus Stretch  20\"x3 stand   3x20\" Hold Standing  3x20\" Hold Standing 3x20\" Hold Standing 3x20\" Hold Standing  " "3x20\" hold standing 3x20\" hold standing 3x20\" hold standing   Hamstring Stretch  3x20\" Hold w/ strap  3x20\" Hold w/ strap  3x20\" standing 8\" step  3x20\" Standing 8\" step  3x20\" Standing 10\" step  HEP       HR/TR 2x10 Stand  2x10 Seated  2x10 Seated  2x10 Seated  2x10 Standing  2x10 Standing  2x10 Standing 2x10 Standing 2x10 standing   Knee Ext Machine             Knee Hamstring Curl Machine P7 2x10      P7 2x10  P7 2x10 P7 2x10 P7 2x10   SB for muscular endurance & strength L5 10'   10' L4 10' L4 10' L4 10' L4 10' L4 10' L5 10' L5   HEP Instruction             Ther Activity                                       Gait Training                                       Modalities             Ice 10' 10' 10'  10' 10' 10 10'  10'  10'                                     "

## 2024-03-04 ENCOUNTER — OFFICE VISIT (OUTPATIENT)
Dept: PHYSICAL THERAPY | Facility: CLINIC | Age: 34
End: 2024-03-04
Payer: COMMERCIAL

## 2024-03-04 DIAGNOSIS — T84.84XA PAINFUL ORTHOPAEDIC HARDWARE (HCC): Primary | ICD-10-CM

## 2024-03-04 DIAGNOSIS — M76.71 PERONEAL TENDINITIS OF RIGHT LOWER EXTREMITY: ICD-10-CM

## 2024-03-04 PROCEDURE — 97112 NEUROMUSCULAR REEDUCATION: CPT | Performed by: PHYSICAL THERAPIST

## 2024-03-04 PROCEDURE — 97140 MANUAL THERAPY 1/> REGIONS: CPT | Performed by: PHYSICAL THERAPIST

## 2024-03-04 PROCEDURE — 97110 THERAPEUTIC EXERCISES: CPT | Performed by: PHYSICAL THERAPIST

## 2024-03-04 NOTE — PROGRESS NOTES
"Daily Note     Today's date: 3/4/2024  Patient name: Sacha Foster  : 1990  MRN: [id number]  Referring provider: Aleksander Cuba PA-C  Dx:   Encounter Diagnosis     ICD-10-CM    1. Painful orthopaedic hardware (HCC)  T84.84XA       2. Peroneal tendinitis of right lower extremity  M76.71                      Subjective: Patient has been having some pain in his surgical area when doing his standing calf stretches the past few days. He requests that he perform them at the end of the session instead of in the beginning.      Objective: See treatment diary below.       Assessment: Tolerated treatment well. Standing gastroc and soleus stretches were less painful when performed at end of session. Patient would benefit from continued PT      Plan: Progress treatment as tolerated.       Precautions: Removal of Hardware & Transfer of peroneus brevis tendon to the cuboid bone ()      Manuals 3/4 1/25 1/31 2/6 2/8 2/15 2/20 2/22 2/27 2/29   R Ankle PROM  RK BM BM BM BM  BM JM KL RK   R Ankle MR             R Ankle STM  RK BM BM BM BM  BM JM  RK                Neuro Re-Ed             SLS 15\"x3      15\" Hold x3 15\"x3 15\"x3 15\"x3   Tandem Stance 30\"x2 ea      15\" Hold x3 15\"x3 hold 30\"x2 ea 30\"x2 ea   Fitter Board DF/PF Standing 2/10     2x10  Standing 2x10  Standing 2x10 Standing 2x10 Standing 2/10   BAPS Board L2 20x CW/CCW    L1 X10 CW/CCW L2 X20 CW/CCW L2 x20 CW/CCW L2 x20 CW/CCW L2 x20 CW/CCW L2 20x CW/CCW                                          Ther Ex             ABC's x2 x2 x2 x2 x2 x2 x2 x2 x2 x2   TB Inv/Ev/DF/PF L3 2/10 ea L2 2x10 ea L2 2x10 ea L2 2x10 ea L2 2x10 ea L3 2x10 ea L3 2x10 ea L3 2x10 ea L3 2x10 ea L3 2/10 ea   Towel Scrunches 2x10 w/ sliding board  2x10 w/ sliding board  2x10 w/ sliding board  2x10 w/ sliding board  2x10 w/ sliding board  2x10 w/ sliding  board  2x10 w/ sliding board 2x10 w/ sliding board 2x10 w/ sliding board   Gastroc Stretch at end of session 3x20\" hold standing 3x20\" " "Hold w/ strap  3x20\" Hold w/ strap  3x20\" Hold standing  3x20\" Hold w 1/2 foam  3x20\" Hold w 1/2 foam  3x20\" Hold w 1/2 foam  3x20\" hold w1/2 foam 3x20\" hold standing 3x20\" hold standing   Soleus Stretch at end of session 3x20\" hold standing   3x20\" Hold Standing  3x20\" Hold Standing 3x20\" Hold Standing 3x20\" Hold Standing  3x20\" hold standing 3x20\" hold standing 3x20\" hold standing   Hamstring Stretch  3x20\" Hold w/ strap  3x20\" Hold w/ strap  3x20\" standing 8\" step  3x20\" Standing 8\" step  3x20\" Standing 10\" step  HEP       HR/TR 2x10 standing  2x10 Seated  2x10 Seated  2x10 Seated  2x10 Standing  2x10 Standing  2x10 Standing 2x10 Standing 2x10 standing   Knee Ext Machine             Knee Hamstring Curl Machine P7 2x10      P7 2x10  P7 2x10 P7 2x10 P7 2x10   SB for muscular endurance & strength 10' L5   10' L4 10' L4 10' L4 10' L4 10' L4 10' L5 10' L5   HEP Instruction             Ther Activity                                       Gait Training                                       Modalities             Ice 10' 10' 10'  10' 10' 10 10'  10'  10'                                     "

## 2024-03-06 NOTE — PROGRESS NOTES
"Daily Note     Today's date: 3/7/2024  Patient name: Sacha Foster  : 1990  MRN: [id number]  Referring provider: Aleksander Cuba PA-C  Dx:   Encounter Diagnosis     ICD-10-CM    1. Painful orthopaedic hardware (HCC)  T84.84XA       2. Peroneal tendinitis of right lower extremity  M76.71                      Subjective:       Objective: See treatment diary below      Assessment: Tolerated treatment {Tolerated treatment :[id number]}. Patient would benefit from continued PT      Plan: Continue per plan of care.      Precautions: Removal of Hardware & Transfer of peroneus brevis tendon to the cuboid bone ()      Manuals 3/4 3/7 1/31 2/6 2/8 2/15 2/20 2/22 2/27 2/29   R Ankle PROM  RK ML BM BM BM  BM JM KL RK   R Ankle MR             R Ankle STM  RK ML BM BM BM  BM JM  RK                Neuro Re-Ed             SLS 15\"x3 15\"x3     15\" Hold x3 15\"x3 15\"x3 15\"x3   Tandem Stance 30\"x2 ea 30\"x2 ea     15\" Hold x3 15\"x3 hold 30\"x2 ea 30\"x2 ea   Fitter Board DF/PF Standing 2/10 Stand  2x10    2x10  Standing 2x10  Standing 2x10 Standing 2x10 Standing 2/10   BAPS Board L2 20x CW/CCW L2 20x   CW  CCW   L1 X10 CW/CCW L2 X20 CW/CCW L2 x20 CW/CCW L2 x20 CW/CCW L2 x20 CW/CCW L2 20x CW/CCW                                          Ther Ex             ABC's x2 x2 x2 x2 x2 x2 x2 x2 x2 x2   TB Inv/Ev/DF/PF L3 2/10 ea L3 2x10 ea L2 2x10 ea L2 2x10 ea L2 2x10 ea L3 2x10 ea L3 2x10 ea L3 2x10 ea L3 2x10 ea L3 2/10 ea   Towel Scrunches 2x10 w/ sliding board 2x10 w/slide board 2x10 w/ sliding board  2x10 w/ sliding board  2x10 w/ sliding board  2x10 w/ sliding board  2x10 w/ sliding  board  2x10 w/ sliding board 2x10 w/ sliding board 2x10 w/ sliding board   Gastroc Stretch at end of session 3x20\" hold standing 3x20\" Hold w/ strap  3x20\" Hold w/ strap  3x20\" Hold standing  3x20\" Hold w 1/2 foam  3x20\" Hold w 1/2 foam  3x20\" Hold w 1/2 foam  3x20\" hold w1/2 foam 3x20\" hold standing 3x20\" hold standing   Soleus Stretch at end of " "session 3x20\" hold standing 20\"x3 hold Stand  3x20\" Hold Standing  3x20\" Hold Standing 3x20\" Hold Standing 3x20\" Hold Standing  3x20\" hold standing 3x20\" hold standing 3x20\" hold standing   Hamstring Stretch   3x20\" Hold w/ strap  3x20\" standing 8\" step  3x20\" Standing 8\" step  3x20\" Standing 10\" step  HEP       HR/TR 2x10 standing 2x10 Stand 2x10 Seated  2x10 Seated  2x10 Seated  2x10 Standing  2x10 Standing  2x10 Standing 2x10 Standing 2x10 standing   Knee Ext Machine             Knee Hamstring Curl Machine P7 2x10 P7 2x10     P7 2x10  P7 2x10 P7 2x10 P7 2x10   SB for muscular endurance & strength 10' L5 L5 10'  10' L4 10' L4 10' L4 10' L4 10' L4 10' L5 10' L5   HEP Instruction             Ther Activity                                       Gait Training                                       Modalities             Ice 10' 10' 10'  10' 10' 10 10'  10'  10'                                       "

## 2024-03-07 ENCOUNTER — OFFICE VISIT (OUTPATIENT)
Dept: PHYSICAL THERAPY | Facility: CLINIC | Age: 34
End: 2024-03-07
Payer: COMMERCIAL

## 2024-03-07 DIAGNOSIS — T84.84XA PAINFUL ORTHOPAEDIC HARDWARE (HCC): Primary | ICD-10-CM

## 2024-03-07 DIAGNOSIS — M76.71 PERONEAL TENDINITIS OF RIGHT LOWER EXTREMITY: ICD-10-CM

## 2024-03-07 PROCEDURE — 97140 MANUAL THERAPY 1/> REGIONS: CPT | Performed by: PHYSICAL THERAPIST

## 2024-03-07 PROCEDURE — 97112 NEUROMUSCULAR REEDUCATION: CPT | Performed by: PHYSICAL THERAPIST

## 2024-03-07 PROCEDURE — 97110 THERAPEUTIC EXERCISES: CPT | Performed by: PHYSICAL THERAPIST

## 2024-03-07 NOTE — PROGRESS NOTES
"Daily Note     Today's date: 3/7/2024  Patient name: Sacha Foster  : 1990  MRN: [id number]  Referring provider: Aleksander Cuba PA-C  Dx:   Encounter Diagnosis     ICD-10-CM    1. Painful orthopaedic hardware (HCC)  T84.84XA       2. Peroneal tendinitis of right lower extremity  M76.71                      Subjective: Patient feels weak today, but he is not having any pain in the ankle or foot.      Objective: See treatment diary below      Assessment: Tolerated treatment well. He did not have any pain during calf stretch exercise today. Patient would benefit from continued PT      Plan: Continue per plan of care.      Precautions: Removal of Hardware & Transfer of peroneus brevis tendon to the cuboid bone ()      Manuals 3/4 3/7 1/31 2/6 2/8 2/15 2/20 2/22 2/27 2/29   R Ankle PROM  RK RK BM BM BM  BM JM KL RK   R Ankle MR             R Ankle STM  RK RK BM BM BM  BM JM  RK                Neuro Re-Ed             SLS 15\"x3 15\"x3     15\" Hold x3 15\"x3 15\"x3 15\"x3   Tandem Stance 30\"x2 ea 30\"x2 ea     15\" Hold x3 15\"x3 hold 30\"x2 ea 30\"x2 ea   Fitter Board DF/PF Standing 2/10 Standing 2/10    2x10  Standing 2x10  Standing 2x10 Standing 2x10 Standing 2/10   BAPS Board L2 20x CW/CCW L2 20x CW/CCW   L1 X10 CW/CCW L2 X20 CW/CCW L2 x20 CW/CCW L2 x20 CW/CCW L2 x20 CW/CCW L2 20x CW/CCW                                          Ther Ex             ABC's x2 x2 x2 x2 x2 x2 x2 x2 x2 x2   TB Inv/Ev/DF/PF L3 2/10 ea L3 2x10 ea L2 2x10 ea L2 2x10 ea L2 2x10 ea L3 2x10 ea L3 2x10 ea L3 2x10 ea L3 2x10 ea L3 2/10 ea   Towel Scrunches 2x10 w/ sliding board 2/10 with sliding board 2x10 w/ sliding board  2x10 w/ sliding board  2x10 w/ sliding board  2x10 w/ sliding board  2x10 w/ sliding  board  2x10 w/ sliding board 2x10 w/ sliding board 2x10 w/ sliding board   Gastroc Stretch at end of session 3x20\" hold standing 3x20\" Hold standing 3x20\" Hold w/ strap  3x20\" Hold standing  3x20\" Hold w 1/2 foam  3x20\" Hold w 1/2 foam  " "3x20\" Hold w 1/2 foam  3x20\" hold w1/2 foam 3x20\" hold standing 3x20\" hold standing   Soleus Stretch at end of session 3x20\" hold standing 3x20\" Hold standing  3x20\" Hold Standing  3x20\" Hold Standing 3x20\" Hold Standing 3x20\" Hold Standing  3x20\" hold standing 3x20\" hold standing 3x20\" hold standing   Hamstring Stretch    3x20\" Hold w/ strap  3x20\" standing 8\" step  3x20\" Standing 8\" step  3x20\" Standing 10\" step  HEP       HR/TR 2x10 standing 2/10 standing 2x10 Seated  2x10 Seated  2x10 Seated  2x10 Standing  2x10 Standing  2x10 Standing 2x10 Standing 2x10 standing   Knee Ext Machine             Knee Hamstring Curl Machine P7 2x10 P7 2x10     P7 2x10  P7 2x10 P7 2x10 P7 2x10   SB for muscular endurance & strength 10' L5 10' L5  10' L4 10' L4 10' L4 10' L4 10' L4 10' L5 10' L5   HEP Instruction             Ther Activity                                       Gait Training                                       Modalities             Ice 10' Def 10'  10' 10' 10 10'  10'  10'                                       "

## 2024-03-11 ENCOUNTER — OFFICE VISIT (OUTPATIENT)
Dept: PHYSICAL THERAPY | Facility: CLINIC | Age: 34
End: 2024-03-11
Payer: COMMERCIAL

## 2024-03-11 DIAGNOSIS — M76.71 PERONEAL TENDINITIS OF RIGHT LOWER EXTREMITY: ICD-10-CM

## 2024-03-11 DIAGNOSIS — T84.84XA PAINFUL ORTHOPAEDIC HARDWARE (HCC): Primary | ICD-10-CM

## 2024-03-11 PROCEDURE — 97112 NEUROMUSCULAR REEDUCATION: CPT

## 2024-03-11 PROCEDURE — 97140 MANUAL THERAPY 1/> REGIONS: CPT

## 2024-03-11 PROCEDURE — 97110 THERAPEUTIC EXERCISES: CPT

## 2024-03-11 NOTE — PROGRESS NOTES
"Daily Note     Today's date: 3/11/2024  Patient name: Sacha Foster  : 1990  MRN: [id number]  Referring provider: Aleksander Cuba PA-C  Dx:   Encounter Diagnosis     ICD-10-CM    1. Painful orthopaedic hardware (HCC)  T84.84XA       2. Peroneal tendinitis of right lower extremity  M76.71           Start Time: 1430  Stop Time: 1530  Total time in clinic (min): 60 minutes    Subjective: Patient reports no new complaints in the R ankle this afternoon.       Objective: See treatment diary below      Assessment: Tolerated treatment well. We progressed the current program today in proprioceptive/balance control exercise. Patient tolerated progression well with a minimal increase in symptomatology in the R ankle. We will continue to progress patient within tolerance to address functional deficits. Patient would benefit from continued PT.      Plan: Continue per plan of care.      Precautions: Removal of Hardware & Transfer of peroneus brevis tendon to the cuboid bone ()      Manuals 3/4 3/7 3/11 2/6 2/8 2/15 2/20 2/22 2/27 2/29   R Ankle PROM  RK RK BM BM BM  BM JM KL RK   R Ankle MR             R Ankle STM  RK RK BM BM BM  BM JM  RK                Neuro Re-Ed             SLS 15\"x3 15\"x3 15\"x3    15\" Hold x3 15\"x3 15\"x3 15\"x3   Tandem Stance 30\"x2 ea 30\"x2 ea 30\" x2 ea     15\" Hold x3 15\"x3 hold 30\"x2 ea 30\"x2 ea   Fitter Board DF/PF Standing 2/10 Standing 2/10 Standing 2x10    2x10  Standing 2x10  Standing 2x10 Standing 2x10 Standing 2/10   BAPS Board L2 20x CW/CCW L2 20x CW/CCW L2 20x CW/CCW Standing   L1 X10 CW/CCW L2 X20 CW/CCW L2 x20 CW/CCW L2 x20 CW/CCW L2 x20 CW/CCW L2 20x CW/CCW                                          Ther Ex             ABC's x2 x2 HEP  x2 x2 x2 x2 x2 x2 x2   TB Inv/Ev/DF/PF L3 2/10 ea L3 2x10 ea L3 2x10 ea L2 2x10 ea L2 2x10 ea L3 2x10 ea L3 2x10 ea L3 2x10 ea L3 2x10 ea L3 2/10 ea   Towel Scrunches 2x10 w/ sliding board 2/10 with sliding board 2x10 w sliding board 2x10 w/ " "sliding board  2x10 w/ sliding board  2x10 w/ sliding board  2x10 w/ sliding  board  2x10 w/ sliding board 2x10 w/ sliding board 2x10 w/ sliding board   Gastroc Stretch at end of session 3x20\" hold standing 3x20\" Hold standing 3x20\" Hold standing  3x20\" Hold standing  3x20\" Hold w 1/2 foam  3x20\" Hold w 1/2 foam  3x20\" Hold w 1/2 foam  3x20\" hold w1/2 foam 3x20\" hold standing 3x20\" hold standing   Soleus Stretch at end of session 3x20\" hold standing 3x20\" Hold standing 3x20\" Hold standing  3x20\" Hold Standing  3x20\" Hold Standing 3x20\" Hold Standing 3x20\" Hold Standing  3x20\" hold standing 3x20\" hold standing 3x20\" hold standing   HR/TR 2x10 standing 2/10 standing 2x10 standing  2x10 Seated  2x10 Seated  2x10 Standing  2x10 Standing  2x10 Standing 2x10 Standing 2x10 standing   Knee Ext Machine             Knee Hamstring Curl Machine P7 2x10 P7 2x10 P8 3x10     P7 2x10  P7 2x10 P7 2x10 P7 2x10   SB for muscular endurance & strength 10' L5 10' L5 10' L5 10' L4 10' L4 10' L4 10' L4 10' L4 10' L5 10' L5   HEP Instruction             Ther Activity                                       Gait Training                                       Modalities             Ice 10' Def 10' 10' 10' 10 10'  10'  10'                                         "

## 2024-03-13 ENCOUNTER — OFFICE VISIT (OUTPATIENT)
Dept: PHYSICAL THERAPY | Facility: CLINIC | Age: 34
End: 2024-03-13
Payer: COMMERCIAL

## 2024-03-13 DIAGNOSIS — T84.84XA PAINFUL ORTHOPAEDIC HARDWARE (HCC): Primary | ICD-10-CM

## 2024-03-13 DIAGNOSIS — M76.71 PERONEAL TENDINITIS OF RIGHT LOWER EXTREMITY: ICD-10-CM

## 2024-03-13 PROCEDURE — 97140 MANUAL THERAPY 1/> REGIONS: CPT

## 2024-03-13 PROCEDURE — 97110 THERAPEUTIC EXERCISES: CPT

## 2024-03-13 NOTE — PROGRESS NOTES
"Daily Note     Today's date: 3/13/2024  Patient name: Sacha Foster  : 1990  MRN: [id number]  Referring provider: Aleksander Cuba PA-C  Dx:   Encounter Diagnosis     ICD-10-CM    1. Painful orthopaedic hardware (HCC)  T84.84XA       2. Peroneal tendinitis of right lower extremity  M76.71           Start Time: 1105  Stop Time: 1155  Total time in clinic (min): 50 minutes    Subjective: Patient reports no new complaints in the R ankle this morning.      Objective: See treatment diary below      Assessment: Tolerated treatment well. We progressed the current program today with the addition of proprioceptive/balance control exercises today. Patient tolerated progressions with a moderate loss in proprioceptive control requiring UE support for stability. Patient would benefit from continued PT.      Plan: Continue per plan of care.      Precautions: Removal of Hardware & Transfer of peroneus brevis tendon to the cuboid bone ()      Manuals 3/4 3/7 3/11 3/13 2/8 2/15 2/20 2/22 2/27 2/29   R Ankle PROM  RK RK BM BM BM  BM JM KL RK   R Ankle MR             R Ankle STM  RK RK BM BM BM  BM JM  RK                Neuro Re-Ed             SLS 15\"x3 15\"x3 15\"x3 15\"x3 on AE    15\" Hold x3 15\"x3 15\"x3 15\"x3   Tandem Stance 30\"x2 ea 30\"x2 ea 30\" x2 ea  D/C   15\" Hold x3 15\"x3 hold 30\"x2 ea 30\"x2 ea   Fitter Board DF/PF Standing 2/10 Standing 2/10 Standing 2x10  D/C  2x10  Standing 2x10  Standing 2x10 Standing 2x10 Standing 2/10   BAPS Board L2 20x CW/CCW L2 20x CW/CCW L2 20x CW/CCW Standing  L2 20x CW/CCW Standing  L1 X10 CW/CCW L2 X20 CW/CCW L2 x20 CW/CCW L2 x20 CW/CCW L2 x20 CW/CCW L2 20x CW/CCW   BOSU Marches    15\" x3                                                 Ther Ex             TB Inv/Ev/DF/PF L3 2/10 ea L3 2x10 ea L3 2x10 ea NT L2 2x10 ea L3 2x10 ea L3 2x10 ea L3 2x10 ea L3 2x10 ea L3 2/10 ea   Towel Scrunches 2x10 w/ sliding board 2/10 with sliding board 2x10 w sliding board NT 2x10 w/ sliding board  2x10 " "w/ sliding board  2x10 w/ sliding  board  2x10 w/ sliding board 2x10 w/ sliding board 2x10 w/ sliding board   Gastroc Stretch at end of session 3x20\" hold standing 3x20\" Hold standing 3x20\" Hold standing  3x20\" Hold Standing  3x20\" Hold w 1/2 foam  3x20\" Hold w 1/2 foam  3x20\" Hold w 1/2 foam  3x20\" hold w1/2 foam 3x20\" hold standing 3x20\" hold standing   Soleus Stretch at end of session 3x20\" hold standing 3x20\" Hold standing 3x20\" Hold standing  NT  3x20\" Hold Standing 3x20\" Hold Standing 3x20\" Hold Standing  3x20\" hold standing 3x20\" hold standing 3x20\" hold standing   HR/TR 2x10 standing 2/10 standing 2x10 standing  2x10 standing  2x10 Seated  2x10 Standing  2x10 Standing  2x10 Standing 2x10 Standing 2x10 standing   Knee Ext Machine    P4 2x10          Knee Hamstring Curl Machine P7 2x10 P7 2x10 P8 3x10  P9 3x10    P7 2x10  P7 2x10 P7 2x10 P7 2x10   SB for muscular endurance & strength 10' L5 10' L5 10' L5 10' L5 10' L4 10' L4 10' L4 10' L4 10' L5 10' L5   HEP Instruction             Ther Activity                                       Gait Training                                       Modalities             Ice 10' Def 10' 10' 10' 10 10'  10'  10'                                           "

## 2024-03-18 ENCOUNTER — OFFICE VISIT (OUTPATIENT)
Dept: PHYSICAL THERAPY | Facility: CLINIC | Age: 34
End: 2024-03-18
Payer: COMMERCIAL

## 2024-03-18 DIAGNOSIS — M76.71 PERONEAL TENDINITIS OF RIGHT LOWER EXTREMITY: ICD-10-CM

## 2024-03-18 DIAGNOSIS — T84.84XA PAINFUL ORTHOPAEDIC HARDWARE (HCC): Primary | ICD-10-CM

## 2024-03-18 PROCEDURE — 97110 THERAPEUTIC EXERCISES: CPT

## 2024-03-18 PROCEDURE — 97112 NEUROMUSCULAR REEDUCATION: CPT

## 2024-03-18 PROCEDURE — 97140 MANUAL THERAPY 1/> REGIONS: CPT

## 2024-03-18 NOTE — PROGRESS NOTES
PT Discharge    Today's date: 3/18/2024  Patient name: Sacha Foster  : 1990  MRN: [id number]  Referring provider: Aleksander Cuba PA-C  Dx:   Encounter Diagnosis     ICD-10-CM    1. Painful orthopaedic hardware (HCC)  T84.84XA       2. Peroneal tendinitis of right lower extremity  M76.71             Assessment  Assessment details: The patient presents to Physical Therapy today with a notable improvement in symptomatology and function in the R ankle. Patient is happy with his progress to this point and feels that his condition is much more manageable. We will d/c patient today from Physical Therapy services and transition to an independent HEP to continue managing his condition. Patient was encouraged to call the office if he has any questions or concerns.     Goals  ST.) Patient will initiate HEP Independently MET  2.) Patient will demonstrate a </= 2/10 pain level at its worse MET  3.) Patient will demonstrate improved R ankle strength evidenced by a 1-2 MMT grade increase MET  4.) Patient will demonstrate R ankle DF AROM >/= -5 deg, PF WFL, Inv WFL, Eversion 15 deg MET  5.) Patient will demonstrate improved R hip & knee strength evidenced by a 1-2 MMT grade increase MET    LT.) Patient will be d/c to an HEP independently MET  2.) Patient will improve functional abilities evidenced by FOTO scores MET  3.) Eliminate pain with functional activity MET  4.) Patient will demonstrate R ankle DF AROM >/= 0 deg, Eversion WFL MET  5.) Return to PLOF MET    Plan  Plan details: Plan of care was reviewed and discussed thoroughly with the patient on their current condition. Patient was instructed on a HEP with written instructions. We will d/c patient from Physical Therapy services today and transition to an independent HEP. Patient was encouraged to call the office if he has any questions or concerns.  Treatment plan discussed with: patient    Subjective Evaluation    History of Present Illness  Mechanism  of injury: surgery  Mechanism of injury: The patient reports today s/p R foot hardware removal performed on 23. He notes R foot pain first started approximately 2 years ago when he was doing house work and hit his foot off of a brick. He notes pain progressively worsened over the year and had had a consult with his Podiatrist. He underwent a R ORIF for a fifth metatarsal fracture on 23. He notes post surgery symptoms continued with no relief. He then made an appointment with Orthopedic Surgery and had hardware removed as well as a transfer of the peroneus brevis tendon in the R foot. He notes since surgery, pain levels have been much more manageable. He notes he is currently only wearing his CAM boot for a couple of hours a day and is in a sneaker most of the time. He is now referred to OPPT.    Update 24: The patient reports today with an improvement in R ankle function and symptomatology. He notes still having difficulty with walking on uneven terrain, stairs, and lifting. He is pleased with progress this far.    Update 3-18-24: The patient reports today with continued improvement in R ankle function. He feels that his condition is much more manageable and would like to transition to an independent home exercise program today. He is happy with his progress to this point.   Patient Goals  Patient goal: Goals have been met.  Pain  Current pain ratin  At best pain ratin  At worst pain ratin      Objective     Observations     Right Ankle/Foot   Positive for incision. Negative for drainage, effusion and trophic changes.     Tenderness     Right Ankle/Foot   No tenderness in the fifth metatarsal base and peroneal tendon.     Neurological Testing     Sensation     Ankle/Foot   Left Ankle/Foot   Intact: light touch    Right Ankle/Foot   Intact: light touch     Active Range of Motion   Left Knee   Flexion: WFL  Extension: WFL    Right Knee   Flexion: WFL  Extension: WFL  Left Ankle/Foot  "  Normal active range of motion    Right Ankle/Foot   Dorsiflexion (ke): 3 degrees   Dorsiflexion (kf): WFL  Plantar flexion: WFL  Inversion: WFL  Eversion: WFL    Passive Range of Motion     Right Ankle/Foot    Dorsiflexion (ke): 5 degrees     Strength/Myotome Testing     Left Hip   Normal muscle strength    Right Hip   Planes of Motion   Flexion: WFL  Abduction: WFL  Adduction: WFL  External rotation: WFL  Internal rotation: WFL    Right Knee   Flexion: WFL  Extension: WFL  Quadriceps contraction: good    Left Ankle/Foot   Normal strength    Right Ankle/Foot   Dorsiflexion: WFL.   Plantar flexion: WFL.   Inversion: WFL.   Eversion: WFL.     Tests     Additional Tests Details  None    Precautions: Removal of Hardware & Transfer of peroneus brevis tendon to the cuboid bone (11-27)      Manuals 3/4 3/7 3/11 3/13 3/18 2/15 2/20 2/22 2/27 2/29   R Ankle PROM  RK RK BM BM BM  BM JM KL RK   R Ankle MR             R Ankle STM  RK RK BM BM BM  BM JM  RK                Neuro Re-Ed             SLS 15\"x3 15\"x3 15\"x3 15\"x3 on AE  15\"x3 on AE   15\" Hold x3 15\"x3 15\"x3 15\"x3   BAPS Board L2 20x CW/CCW L2 20x CW/CCW L2 20x CW/CCW Standing  L2 20x CW/CCW Standing  L3 20x CW/CCW Seated  L2 X20 CW/CCW L2 x20 CW/CCW L2 x20 CW/CCW L2 x20 CW/CCW L2 20x CW/CCW   BOSU Marches    15\" x3  15\" x3                                               Ther Ex             TB Inv/Ev/DF/PF L3 2/10 ea L3 2x10 ea L3 2x10 ea NT L3 2x10 ea L3 2x10 ea L3 2x10 ea L3 2x10 ea L3 2x10 ea L3 2/10 ea   Towel Scrunches 2x10 w/ sliding board 2/10 with sliding board 2x10 w sliding board NT 2x10 w sliding board  2x10 w/ sliding board  2x10 w/ sliding  board  2x10 w/ sliding board 2x10 w/ sliding board 2x10 w/ sliding board   Gastroc Stretch at end of session 3x20\" hold standing 3x20\" Hold standing 3x20\" Hold standing  3x20\" Hold Standing  3x20\" Hold Standing  3x20\" Hold w 1/2 foam  3x20\" Hold w 1/2 foam  3x20\" hold w1/2 foam 3x20\" hold standing 3x20\" hold standing " "  Soleus Stretch at end of session 3x20\" hold standing 3x20\" Hold standing 3x20\" Hold standing  NT   3x20\" Hold Standing 3x20\" Hold Standing  3x20\" hold standing 3x20\" hold standing 3x20\" hold standing   HR/TR 2x10 standing 2/10 standing 2x10 standing  2x10 standing  2x10 Standing  2x10 Standing  2x10 Standing  2x10 Standing 2x10 Standing 2x10 standing   Knee Ext Machine    P4 2x10  P4 2x10         Knee Hamstring Curl Machine P7 2x10 P7 2x10 P8 3x10  P9 3x10  P9 3x10   P7 2x10  P7 2x10 P7 2x10 P7 2x10   SB for muscular endurance & strength 10' L5 10' L5 10' L5 10' L5 10' L5 10' L4 10' L4 10' L4 10' L5 10' L5   HEP Instruction             Ther Activity                                       Gait Training                                       Modalities             Ice 10' Def 10' 10' 10' 10 10'  10'  10'                           "

## 2024-03-19 ENCOUNTER — OFFICE VISIT (OUTPATIENT)
Dept: INTERNAL MEDICINE CLINIC | Facility: CLINIC | Age: 34
End: 2024-03-19
Payer: COMMERCIAL

## 2024-03-19 VITALS
OXYGEN SATURATION: 96 % | WEIGHT: 315 LBS | HEART RATE: 103 BPM | HEIGHT: 70 IN | TEMPERATURE: 97.5 F | SYSTOLIC BLOOD PRESSURE: 110 MMHG | BODY MASS INDEX: 45.1 KG/M2 | DIASTOLIC BLOOD PRESSURE: 82 MMHG

## 2024-03-19 DIAGNOSIS — B35.6 TINEA CRURIS: Primary | ICD-10-CM

## 2024-03-19 DIAGNOSIS — Z13.220 SCREENING, LIPID: ICD-10-CM

## 2024-03-19 DIAGNOSIS — E55.9 VITAMIN D DEFICIENCY: ICD-10-CM

## 2024-03-19 DIAGNOSIS — Z13.0 SCREENING FOR DEFICIENCY ANEMIA: ICD-10-CM

## 2024-03-19 DIAGNOSIS — J45.40 MODERATE PERSISTENT ASTHMA WITHOUT COMPLICATION: ICD-10-CM

## 2024-03-19 DIAGNOSIS — I10 ESSENTIAL HYPERTENSION: ICD-10-CM

## 2024-03-19 DIAGNOSIS — Z13.1 SCREENING FOR DIABETES MELLITUS: ICD-10-CM

## 2024-03-19 DIAGNOSIS — Z13.29 SCREENING FOR THYROID DISORDER: ICD-10-CM

## 2024-03-19 PROCEDURE — 99214 OFFICE O/P EST MOD 30 MIN: CPT | Performed by: PHYSICIAN ASSISTANT

## 2024-03-19 RX ORDER — NYSTATIN 100000 [USP'U]/G
POWDER TOPICAL EVERY MORNING
Qty: 30 G | Refills: 2 | Status: SHIPPED | OUTPATIENT
Start: 2024-03-19

## 2024-03-19 RX ORDER — NYSTATIN 100000 U/G
CREAM TOPICAL
Qty: 30 G | Refills: 1 | Status: SHIPPED | OUTPATIENT
Start: 2024-03-19

## 2024-03-19 RX ORDER — ONDANSETRON 4 MG/1
4 TABLET, ORALLY DISINTEGRATING ORAL AS NEEDED
COMMUNITY
Start: 2024-03-11

## 2024-03-19 RX ORDER — MOMETASONE FUROATE AND FORMOTEROL FUMARATE DIHYDRATE 200; 5 UG/1; UG/1
2 AEROSOL RESPIRATORY (INHALATION) 2 TIMES DAILY
Qty: 39 G | Refills: 6 | Status: SHIPPED | OUTPATIENT
Start: 2024-03-19

## 2024-03-19 NOTE — PROGRESS NOTES
Name: Sacha Foster      : 1990      MRN: [id number]  Encounter Provider: Dalia Rodriguez PA-C  Encounter Date: 3/19/2024   Encounter department: [facility]    Assessment & Plan     1. Tinea cruris  Assessment & Plan:  Shower in AM, dry area well then apply nystatin powder. At evening apply nystatin cream.     Orders:  -     nystatin (MYCOSTATIN) cream; Apply topically daily at bedtime  -     nystatin (MYCOSTATIN) powder; Apply topically every morning    2. Moderate persistent asthma without complication  -     mometasone-formoterol (Dulera) 200-5 MCG/ACT inhaler; Inhale 2 puffs 2 (two) times a day Rinse mouth after use.    3. Essential hypertension    4. Screening, lipid  -     Lipid panel; Future    5. Screening for thyroid disorder  -     TSH, 3rd generation; Future    6. Vitamin D deficiency  -     Vitamin D 25 hydroxy; Future    7. Screening for deficiency anemia  -     CBC and differential; Future    8. Screening for diabetes mellitus  -     Comprehensive metabolic panel; Future           Subjective      Pt presents for routine visit. He is doing well overall. He is due for labs. He is noting a rash on both sides of his groin. It is red and itchy and moist. At times it becomes raw due to scratching.       Review of Systems   Constitutional:  Negative for chills and fever.   HENT:  Negative for congestion, ear pain, hearing loss, postnasal drip, rhinorrhea, sinus pressure, sinus pain, sore throat and trouble swallowing.    Eyes:  Negative for pain and visual disturbance.   Respiratory:  Negative for cough, chest tightness, shortness of breath and wheezing.    Cardiovascular: Negative.  Negative for chest pain, palpitations and leg swelling.   Gastrointestinal:  Negative for abdominal pain, blood in stool, constipation, diarrhea, nausea and vomiting.   Endocrine: Negative for cold intolerance, heat intolerance, polydipsia, polyphagia and polyuria.   Genitourinary:  Negative for  difficulty urinating, dysuria, flank pain and urgency.   Musculoskeletal:  Negative for arthralgias, back pain, gait problem and myalgias.   Skin:  Positive for rash.   Allergic/Immunologic: Negative.    Neurological:  Negative for dizziness, weakness, light-headedness and headaches.   Hematological: Negative.    Psychiatric/Behavioral:  Negative for behavioral problems, dysphoric mood and sleep disturbance. The patient is not nervous/anxious.        Current Outpatient Medications on File Prior to Visit   Medication Sig    albuterol (Ventolin HFA) 90 mcg/act inhaler Inhale 2 puffs every 6 (six) hours as needed for wheezing    clonazePAM (KlonoPIN) 1 mg tablet Take 1 mg by mouth 2 (two) times a day    levocetirizine (XYZAL) 5 MG tablet TAKE ONE TABLET BY MOUTH EVERY EVENING    Linzess 145 MCG CAPS Take 145 mcg by mouth every other day    METHADONE HCL PO Take 150 mg by mouth daily    omeprazole (PriLOSEC) 20 mg delayed release capsule Take 20 mg by mouth 2 (two) times a day     ondansetron (ZOFRAN-ODT) 4 mg disintegrating tablet Take 4 mg by mouth as needed for nausea    prazosin (MINIPRESS) 2 mg capsule Take 2 mg by mouth daily at bedtime    pregabalin (LYRICA) 150 mg capsule TAKE 1 CAPSULE (150 MG TOTAL) BY MOUTH 2 (TWO) TIMES A DAY    Rexulti 2 MG tablet Take 2 mg by mouth daily    temazepam (RESTORIL) 15 mg capsule Take 15 mg by mouth daily at bedtime    vilazodone (VIIBRYD) 40 mg tablet 40 mg daily with breakfast    [DISCONTINUED] fluticasone (FLONASE) 50 mcg/act nasal spray 1 spray into each nostril 2 (two) times a day (Patient taking differently: 1 spray into each nostril daily at bedtime)    [DISCONTINUED] mometasone-formoterol (Dulera) 200-5 MCG/ACT inhaler Inhale 2 puffs 2 (two) times a day Rinse mouth after use.    [DISCONTINUED] triamcinolone (KENALOG) 0.1 % cream APPLY TOPICALLY 2 TIMES DAILY    [DISCONTINUED] aspirin (ECOTRIN) 325 mg EC tablet Take 1 tablet (325 mg total) by mouth 2 (two) times a day     "[DISCONTINUED] clonazePAM (KlonoPIN) 0.5 mg tablet Take 0.5 mg by mouth 2 (two) times a day as needed for anxiety    [DISCONTINUED] nicotine (NICODERM CQ) 14 mg/24hr TD 24 hr patch PLACE 1 PATCH ON THE SKIN OVER 24 HOURS EVERY 24 HOURS    [DISCONTINUED] ondansetron (ZOFRAN) 4 mg tablet Take 1 tablet (4 mg total) by mouth every 8 (eight) hours as needed for nausea or vomiting    [DISCONTINUED] oxyCODONE (Roxicodone) 5 immediate release tablet Take 1 tablet (5 mg total) by mouth every 4 (four) hours as needed for severe pain for up to 15 doses Max Daily Amount: 30 mg (Patient not taking: Reported on 1/5/2024)    [DISCONTINUED] SM Nicotine 4 MG gum CHEW 1 EACH (4 MG TOTAL) AS NEEDED FOR SMOKING CESSATION       Objective     /82   Pulse 103   Temp 97.5 °F (36.4 °C) (Tympanic)   Ht 5' 10\" (1.778 m)   Wt (!) 164 kg (361 lb)   SpO2 96%   BMI 51.80 kg/m²     Physical Exam  Vitals and nursing note reviewed.   Constitutional:       General: He is not in acute distress.     Appearance: Normal appearance. He is well-developed. He is not diaphoretic.   HENT:      Head: Normocephalic and atraumatic.      Right Ear: External ear normal.      Left Ear: External ear normal.      Nose: Nose normal.      Mouth/Throat:      Pharynx: No oropharyngeal exudate.   Eyes:      General: No scleral icterus.        Right eye: No discharge.         Left eye: No discharge.      Conjunctiva/sclera: Conjunctivae normal.      Pupils: Pupils are equal, round, and reactive to light.   Neck:      Thyroid: No thyromegaly.   Cardiovascular:      Rate and Rhythm: Normal rate and regular rhythm.      Heart sounds: Normal heart sounds. No murmur heard.     No friction rub. No gallop.   Pulmonary:      Effort: Pulmonary effort is normal. No respiratory distress.      Breath sounds: Normal breath sounds. No wheezing or rales.   Abdominal:      General: Bowel sounds are normal. There is no distension.      Palpations: Abdomen is soft.      " Tenderness: There is no abdominal tenderness.   Musculoskeletal:         General: No tenderness or deformity. Normal range of motion.      Cervical back: Normal range of motion and neck supple.   Skin:     General: Skin is warm and dry.   Neurological:      Mental Status: He is alert and oriented to person, place, and time.      Cranial Nerves: No cranial nerve deficit.   Psychiatric:         Behavior: Behavior normal.         Thought Content: Thought content normal.         Judgment: Judgment normal.       Dalia Rodriguez PA-C

## 2024-04-10 DIAGNOSIS — B35.6 TINEA CRURIS: ICD-10-CM

## 2024-04-10 RX ORDER — NYSTATIN 100000 U/G
CREAM TOPICAL
Qty: 30 G | Refills: 3 | Status: SHIPPED | OUTPATIENT
Start: 2024-04-10

## 2024-04-17 DIAGNOSIS — M54.16 LUMBAR RADICULOPATHY: ICD-10-CM

## 2024-04-17 RX ORDER — PREGABALIN 150 MG/1
150 CAPSULE ORAL 2 TIMES DAILY
Qty: 60 CAPSULE | Refills: 2 | Status: SHIPPED | OUTPATIENT
Start: 2024-04-17

## 2024-05-10 ENCOUNTER — APPOINTMENT (OUTPATIENT)
Dept: LAB | Facility: MEDICAL CENTER | Age: 34
End: 2024-05-10
Payer: COMMERCIAL

## 2024-05-10 DIAGNOSIS — K59.00 CONSTIPATION, UNSPECIFIED CONSTIPATION TYPE: Primary | ICD-10-CM

## 2024-05-10 DIAGNOSIS — Z13.29 SCREENING FOR THYROID DISORDER: ICD-10-CM

## 2024-05-10 DIAGNOSIS — Z13.0 SCREENING FOR DEFICIENCY ANEMIA: ICD-10-CM

## 2024-05-10 DIAGNOSIS — B18.9 CHRONIC VIRAL HEPATITIS, UNSPECIFIED VIRAL HEPATITIS TYPE (HCC): ICD-10-CM

## 2024-05-10 DIAGNOSIS — K76.0 FATTY (CHANGE OF) LIVER, NOT ELSEWHERE CLASSIFIED: ICD-10-CM

## 2024-05-10 DIAGNOSIS — Z13.1 SCREENING FOR DIABETES MELLITUS: ICD-10-CM

## 2024-05-10 DIAGNOSIS — Z13.220 SCREENING, LIPID: ICD-10-CM

## 2024-05-10 DIAGNOSIS — E55.9 VITAMIN D DEFICIENCY: ICD-10-CM

## 2024-05-10 DIAGNOSIS — Z86.010 PERSONAL HISTORY OF COLONIC POLYPS: ICD-10-CM

## 2024-05-10 LAB
25(OH)D3 SERPL-MCNC: 97 NG/ML (ref 30–100)
ALBUMIN SERPL BCP-MCNC: 4 G/DL (ref 3.5–5)
ALP SERPL-CCNC: 56 U/L (ref 34–104)
ALT SERPL W P-5'-P-CCNC: 32 U/L (ref 7–52)
ANION GAP SERPL CALCULATED.3IONS-SCNC: 8 MMOL/L (ref 4–13)
AST SERPL W P-5'-P-CCNC: 22 U/L (ref 13–39)
BASOPHILS # BLD AUTO: 0.02 THOUSANDS/ÂΜL (ref 0–0.1)
BASOPHILS NFR BLD AUTO: 0 % (ref 0–1)
BILIRUB DIRECT SERPL-MCNC: 0.09 MG/DL (ref 0–0.2)
BILIRUB SERPL-MCNC: 0.26 MG/DL (ref 0.2–1)
BUN SERPL-MCNC: 14 MG/DL (ref 5–25)
CALCIUM SERPL-MCNC: 8.9 MG/DL (ref 8.4–10.2)
CHLORIDE SERPL-SCNC: 103 MMOL/L (ref 96–108)
CHOLEST SERPL-MCNC: 161 MG/DL
CO2 SERPL-SCNC: 29 MMOL/L (ref 21–32)
CREAT SERPL-MCNC: 0.6 MG/DL (ref 0.6–1.3)
EOSINOPHIL # BLD AUTO: 0.16 THOUSAND/ÂΜL (ref 0–0.61)
EOSINOPHIL NFR BLD AUTO: 3 % (ref 0–6)
ERYTHROCYTE [DISTWIDTH] IN BLOOD BY AUTOMATED COUNT: 14.1 % (ref 11.6–15.1)
GFR SERPL CREATININE-BSD FRML MDRD: 131 ML/MIN/1.73SQ M
GLUCOSE P FAST SERPL-MCNC: 95 MG/DL (ref 65–99)
HCT VFR BLD AUTO: 40.8 % (ref 36.5–49.3)
HDLC SERPL-MCNC: 54 MG/DL
HGB BLD-MCNC: 13 G/DL (ref 12–17)
IMM GRANULOCYTES # BLD AUTO: 0.02 THOUSAND/UL (ref 0–0.2)
IMM GRANULOCYTES NFR BLD AUTO: 0 % (ref 0–2)
LDLC SERPL CALC-MCNC: 94 MG/DL (ref 0–100)
LYMPHOCYTES # BLD AUTO: 1.79 THOUSANDS/ÂΜL (ref 0.6–4.47)
LYMPHOCYTES NFR BLD AUTO: 33 % (ref 14–44)
MCH RBC QN AUTO: 26.4 PG (ref 26.8–34.3)
MCHC RBC AUTO-ENTMCNC: 31.9 G/DL (ref 31.4–37.4)
MCV RBC AUTO: 83 FL (ref 82–98)
MONOCYTES # BLD AUTO: 0.34 THOUSAND/ÂΜL (ref 0.17–1.22)
MONOCYTES NFR BLD AUTO: 6 % (ref 4–12)
NEUTROPHILS # BLD AUTO: 3.18 THOUSANDS/ÂΜL (ref 1.85–7.62)
NEUTS SEG NFR BLD AUTO: 58 % (ref 43–75)
NONHDLC SERPL-MCNC: 107 MG/DL
NRBC BLD AUTO-RTO: 0 /100 WBCS
PLATELET # BLD AUTO: 196 THOUSANDS/UL (ref 149–390)
PMV BLD AUTO: 9.8 FL (ref 8.9–12.7)
POTASSIUM SERPL-SCNC: 4.1 MMOL/L (ref 3.5–5.3)
PROT SERPL-MCNC: 7.1 G/DL (ref 6.4–8.4)
RBC # BLD AUTO: 4.92 MILLION/UL (ref 3.88–5.62)
SODIUM SERPL-SCNC: 140 MMOL/L (ref 135–147)
TRIGL SERPL-MCNC: 67 MG/DL
TSH SERPL DL<=0.05 MIU/L-ACNC: 1.34 UIU/ML (ref 0.45–4.5)
WBC # BLD AUTO: 5.51 THOUSAND/UL (ref 4.31–10.16)

## 2024-05-10 PROCEDURE — 87521 HEPATITIS C PROBE&RVRS TRNSC: CPT

## 2024-05-10 PROCEDURE — 84443 ASSAY THYROID STIM HORMONE: CPT

## 2024-05-10 PROCEDURE — 85025 COMPLETE CBC W/AUTO DIFF WBC: CPT

## 2024-05-10 PROCEDURE — 36415 COLL VENOUS BLD VENIPUNCTURE: CPT

## 2024-05-10 PROCEDURE — 82306 VITAMIN D 25 HYDROXY: CPT

## 2024-05-10 PROCEDURE — 80061 LIPID PANEL: CPT

## 2024-05-10 PROCEDURE — 87522 HEPATITIS C REVRS TRNSCRPJ: CPT

## 2024-05-10 PROCEDURE — 82248 BILIRUBIN DIRECT: CPT

## 2024-05-10 PROCEDURE — 80053 COMPREHEN METABOLIC PANEL: CPT

## 2024-05-13 ENCOUNTER — HOSPITAL ENCOUNTER (EMERGENCY)
Facility: HOSPITAL | Age: 34
Discharge: HOME | End: 2024-05-13
Attending: EMERGENCY MEDICINE
Payer: COMMERCIAL

## 2024-05-13 VITALS
RESPIRATION RATE: 16 BRPM | HEART RATE: 101 BPM | OXYGEN SATURATION: 95 % | DIASTOLIC BLOOD PRESSURE: 85 MMHG | TEMPERATURE: 97.7 F | SYSTOLIC BLOOD PRESSURE: 132 MMHG

## 2024-05-13 DIAGNOSIS — T15.00XA CORNEAL FOREIGN BODY: Primary | ICD-10-CM

## 2024-05-13 LAB — HCV RNA SERPL NAA+PROBE-ACNC: NOT DETECTED K[IU]/ML

## 2024-05-13 PROCEDURE — 99283 EMERGENCY DEPT VISIT LOW MDM: CPT

## 2024-05-13 PROCEDURE — 99284 EMERGENCY DEPT VISIT MOD MDM: CPT | Performed by: PHYSICIAN ASSISTANT

## 2024-05-13 PROCEDURE — 65220 REMOVE FOREIGN BODY FROM EYE: CPT | Performed by: PHYSICIAN ASSISTANT

## 2024-05-13 RX ORDER — TETRACAINE HYDROCHLORIDE 5 MG/ML
2 SOLUTION OPHTHALMIC ONCE
Status: COMPLETED | OUTPATIENT
Start: 2024-05-13 | End: 2024-05-13

## 2024-05-13 RX ORDER — TOBRAMYCIN 3 MG/ML
2 SOLUTION/ DROPS OPHTHALMIC ONCE
Status: COMPLETED | OUTPATIENT
Start: 2024-05-13 | End: 2024-05-13

## 2024-05-13 RX ADMIN — TETRACAINE HYDROCHLORIDE 2 DROP: 5 SOLUTION/ DROPS OPHTHALMIC at 09:14

## 2024-05-13 RX ADMIN — FLUORESCEIN SODIUM 1 STRIP: 1 STRIP OPHTHALMIC at 09:14

## 2024-05-13 RX ADMIN — TOBRAMYCIN 2 DROP: 3 SOLUTION/ DROPS OPHTHALMIC at 09:14

## 2024-05-13 NOTE — ED PROVIDER NOTES
History  Chief Complaint   Patient presents with    Eye Problem     Patient states he got something in his right eye yesterday and presents with eye redness/irritation/itchiness     This is a 33-year-old male presenting to the emergency department today for evaluation of what he believes to be a foreign body in the right eye.  He states it has been in the eye for 2 days.  He is unsure what it is.  He states he was in the shower when he felt as though he got an eyelash in the right eye.  He denies any recent grinding of metal or welding.  Admits to eye irritation and clear discharge.  No visual acuity deficit.  Does not wear contact lenses.        Prior to Admission Medications   Prescriptions Last Dose Informant Patient Reported? Taking?   Linzess 145 MCG CAPS  Self Yes No   Sig: Take 145 mcg by mouth every other day   METHADONE HCL PO  Self Yes No   Sig: Take 150 mg by mouth daily   Rexulti 2 MG tablet  Self Yes No   Sig: Take 2 mg by mouth daily   albuterol (Ventolin HFA) 90 mcg/act inhaler  Self No No   Sig: Inhale 2 puffs every 6 (six) hours as needed for wheezing   clonazePAM (KlonoPIN) 1 mg tablet   Yes No   Sig: Take 1 mg by mouth 2 (two) times a day   levocetirizine (XYZAL) 5 MG tablet   No No   Sig: TAKE ONE TABLET BY MOUTH EVERY EVENING   mometasone-formoterol (Dulera) 200-5 MCG/ACT inhaler   No No   Sig: Inhale 2 puffs 2 (two) times a day Rinse mouth after use.   nystatin (MYCOSTATIN) cream   No No   Sig: APPLY TOPICALLY DAILY AT BEDTIME   nystatin (MYCOSTATIN) powder   No No   Sig: Apply topically every morning   omeprazole (PriLOSEC) 20 mg delayed release capsule  Self Yes No   Sig: Take 20 mg by mouth 2 (two) times a day    ondansetron (ZOFRAN-ODT) 4 mg disintegrating tablet   Yes No   Sig: Take 4 mg by mouth as needed for nausea   prazosin (MINIPRESS) 2 mg capsule   Yes No   Sig: Take 2 mg by mouth daily at bedtime   pregabalin (LYRICA) 150 mg capsule   No No   Sig: TAKE 1 CAPSULE (150 MG TOTAL) BY  MOUTH 2 (TWO) TIMES A DAY   temazepam (RESTORIL) 15 mg capsule  Self Yes No   Sig: Take 15 mg by mouth daily at bedtime   vilazodone (VIIBRYD) 40 mg tablet  Self Yes No   Si mg daily with breakfast      Facility-Administered Medications: None       Past Medical History:   Diagnosis Date    Allergic     Anemia 2018    Anxiety     from records    Arthritis     Asthma     Benign essential hypertension 2016    Bipolar 1 disorder (HCC)     from records    Chronic pain     Chronic pain disorder     back/ knees/ hip    Claustrophobia 03/15/2018    Community acquired pneumonia 2018    CPAP (continuous positive airway pressure) dependence     Dental abscess     23- pt was started on amoxicillin- will finish on 23    Depressed 2016    Depression     from records    GERD (gastroesophageal reflux disease)     Hepatitis C virus infection 2014    Heroin abuse (HCC) 2018    Infectious viral hepatitis     Obesity 10/12/2016    Obstructive sleep apnea     from records    Sleep disorder     Substance abuse ([facility])        Past Surgical History:   Procedure Laterality Date    ACHILLES TENDON SURGERY Right 2023    Procedure: transfer of peroneus brevis tendon to the cuboid bone;  Surgeon: James R Lachman, MD;  Location:  MAIN OR;  Service: Orthopedics    COLONOSCOPY      EGD      EPIDURAL BLOCK INJECTION Right 10/28/2022    Procedure: BLOCK / INJECTION EPIDURAL STEROID LUMBAR  right L4-5 and L5-S1 TFESI;  Surgeon: Lorena Hernandez MD;  Location: [facility];  Service: Pain Management     EPIDURAL BLOCK INJECTION Right 2022    Procedure: BLOCK / INJECTION EPIDURAL STEROID LUMBAR  right  L4-5 and L5-S1 TFESI;  Surgeon: Lorena Hernandez MD;  Location: [facility];  Service: Pain Management     WY INCISION & DRAINAGE ABSCESS COMPLICATED/MULTIPLE Left 2019    Procedure: INCISION AND DRAINAGE (I&D) EXTREMITY;  Surgeon: Fco Woodall MD;  Location: [facility];   Service: Orthopedics    ID LAPAROSCOPY COLECTOMY PARTIAL W/ANASTOMOSIS Left 05/21/2020    Procedure: LAPAROSCOPIC LEFT HEMICOLECTOMY TAKEDOWN SPLENIC FLECTURE, COLORECTAL ANASTOMOSIS.;  Surgeon: Abdelrahman Canales MD;  Location: [facility];  Service: Colorectal    ID NEUROPLASTY &/TRANSPOS MEDIAN NRV CARPAL TUNNE Left 01/09/2023    Procedure: RELEASE CARPAL TUNNEL;  Surgeon: Isauro Ledbetter DO;  Location: [facility];  Service: Orthopedics    ID OPEN TREATMENT METATARSAL FRACTURE EACH Right 02/24/2023    Procedure: OPEN REDUCTION W/ INTERNAL FIXATION (ORIF) FOOT;  Surgeon: Esteban Talamantes DPM;  Location: [facility];  Service: Podiatry    ID REMOVAL IMPLANT DEEP Right 11/27/2023    Procedure: REMOVAL HARDWARE FOOT, excision of painful os vesalanium, transfer of peroneus brevis tendon to the cuboid bone;  Surgeon: James R Lachman, MD;  Location:  MAIN OR;  Service: Orthopedics    WISDOM TOOTH EXTRACTION         Family History   Problem Relation Age of Onset    Diabetes Mother     Restless legs syndrome Mother     Sleep apnea Mother     Colon cancer Father     Alzheimer's disease Maternal Grandmother     Depression Family      I have reviewed and agree with the history as documented.    E-Cigarette/Vaping    E-Cigarette Use Current Every Day User     Cartridges/Day 1     Comments NICOTINE      E-Cigarette/Vaping Substances    Nicotine Yes     THC No     CBD No     Flavoring Yes     Other No     Unknown No      Social History     Tobacco Use    Smoking status: Former     Current packs/day: 0.00     Types: E-Cigarettes, Cigarettes     Passive exposure: Past    Smokeless tobacco: Never    Tobacco comments:     Vapes / 2019 quit cigs   Vaping Use    Vaping status: Every Day    Substances: Nicotine, Flavoring   Substance Use Topics    Alcohol use: Never    Drug use: Not Currently     Types: Marijuana, Prescription, Methamphetamines     Comment: on Methadone; occaionally marijuana- last used 2/10/23       Review  of Systems   Constitutional: Negative.  Negative for activity change, appetite change, chills, diaphoresis, fatigue, fever and unexpected weight change.   HENT: Negative.  Negative for sore throat, trouble swallowing and voice change.    Eyes:  Positive for discharge and redness. Negative for visual disturbance.   Respiratory: Negative.  Negative for cough, chest tightness, shortness of breath and wheezing.    Cardiovascular: Negative.  Negative for chest pain, palpitations and leg swelling.   Gastrointestinal: Negative.  Negative for abdominal pain, blood in stool, nausea and vomiting.   Endocrine: Negative.    Genitourinary: Negative.  Negative for flank pain and hematuria.   Musculoskeletal: Negative.  Negative for arthralgias, back pain, gait problem, joint swelling, myalgias, neck pain and neck stiffness.   Skin: Negative.  Negative for rash and wound.   Allergic/Immunologic: Negative.    Neurological: Negative.  Negative for dizziness, seizures, syncope, weakness, light-headedness and headaches.   Hematological: Negative.    Psychiatric/Behavioral: Negative.     All other systems reviewed and are negative.      Physical Exam  Physical Exam  Vitals reviewed.   Constitutional:       General: He is not in acute distress.     Appearance: Normal appearance. He is not ill-appearing, toxic-appearing or diaphoretic.   HENT:      Head: Normocephalic and atraumatic.      Right Ear: External ear normal.      Left Ear: External ear normal.   Eyes:      General: Lids are normal. Vision grossly intact. Gaze aligned appropriately. No allergic shiner, visual field deficit or scleral icterus.        Right eye: Foreign body present. No hordeolum.         Left eye: No foreign body, discharge or hordeolum.      Extraocular Movements: Extraocular movements intact.      Right eye: No nystagmus.      Left eye: No nystagmus.      Conjunctiva/sclera:      Right eye: Right conjunctiva is injected. No exudate.     Left eye: Left  conjunctiva is not injected. No chemosis, exudate or hemorrhage.     Pupils: Pupils are equal, round, and reactive to light.        Comments: There is an apparent speck of foreign body in the cornea over the iris approximate 8 o'clock position.   Cardiovascular:      Rate and Rhythm: Normal rate.      Pulses: Normal pulses.   Pulmonary:      Effort: Pulmonary effort is normal. No respiratory distress.      Breath sounds: No stridor.   Musculoskeletal:         General: No deformity or signs of injury.      Cervical back: Normal range of motion. No rigidity.   Skin:     General: Skin is warm.      Coloration: Skin is not jaundiced.      Findings: No lesion or rash.   Neurological:      General: No focal deficit present.      Mental Status: He is alert and oriented to person, place, and time. Mental status is at baseline.      Gait: Gait normal.   Psychiatric:         Mood and Affect: Mood normal.         Thought Content: Thought content normal.         Judgment: Judgment normal.         Vital Signs  ED Triage Vitals [05/13/24 0848]   Temperature Pulse Respirations Blood Pressure SpO2   97.7 °F (36.5 °C) 101 16 132/85 95 %      Temp Source Heart Rate Source Patient Position - Orthostatic VS BP Location FiO2 (%)   Temporal Monitor Sitting Right arm --      Pain Score       --           Vitals:    05/13/24 0848   BP: 132/85   Pulse: 101   Patient Position - Orthostatic VS: Sitting         Visual Acuity  Visual Acuity      Flowsheet Row Most Recent Value   Visual acuity R eye is 20/40   Visual acuity Left eye is 20/20   Visual acuity in both eyes is 20/20   Wearing corrective eyewear/lenses? No   L Pupil Size (mm) 3   R Pupil Size (mm) 3   L Pupil Shape Round   R Pupil Shape Round            ED Medications  Medications   tobramycin (TOBREX) 0.3 % ophthalmic solution 2 drop (2 drops Right Eye Given 5/13/24 0914)   tetracaine 0.5 % ophthalmic solution 2 drop (2 drops Right Eye Given 5/13/24 0914)   fluorescein sodium  sterile ophthalmic strip 1 strip (1 strip Right Eye Given 5/13/24 0914)       Diagnostic Studies  Results Reviewed       None                   No orders to display              Procedures  Procedures     2 drops of tetracaine were instilled in the right eye personally for corneal anesthesia which was achieved successfully.  Fluorescein stain was completed the above-stated foreign body was highlighted.  I do not see any dendritic lesions.  Utilizing a 30-gauge half inch needle the tip of the needle was used to remove a foreign body from the right I was punctate in size I was copiously irrigated after the procedure fluorescein stain was completed again there may be a small amount of ulceration where the foreign body was.  I did instill 2 drops of tetracaine.    ED Course  ED Course as of 05/13/24 0949   Mon May 13, 2024   0930 SpO2: 95 %   0930 Respirations: 16   0930 Pulse: 101   0930 Temperature: 97.7 °F (36.5 °C)   0931 Blood Pressure: 132/85  Vital signs reviewed within normal limits.                               SBIRT 22yo+      Flowsheet Row Most Recent Value   Initial Alcohol Screen: US AUDIT-C     1. How often do you have a drink containing alcohol? 0 Filed at: 05/13/2024 0848   2. How many drinks containing alcohol do you have on a typical day you are drinking?  0 Filed at: 05/13/2024 0848   3a. Male UNDER 65: How often do you have five or more drinks on one occasion? 0 Filed at: 05/13/2024 0848   3b. FEMALE Any Age, or MALE 65+: How often do you have 4 or more drinks on one occassion? 0 Filed at: 05/13/2024 0848   Audit-C Score 0 Filed at: 05/13/2024 0848   CHARLINE: How many times in the past year have you...    Used an illegal drug or used a prescription medication for non-medical reasons? Never Filed at: 05/13/2024 0848                      Medical Decision Making  33-year-old male here for evaluation of possible foreign body right eye.  Symptoms commenced 2 days ago.  Admits to clear discharge and  irritation.  No visual acuity deficit differential diagnosis includes corneal abrasion, corneal ulcer, conjunctivitis, foreign body.    Foreign body noted on exam see procedure note for fluorescein stain and foreign body removal.  He was given tetracaine eyedrops in the ED.  He was counseled on how to utilize them at home he was counseled on the absolute imperative this to phone the ophthalmologist of his choice she was given local ophthalmology group information at discharge.    Risk  Prescription drug management.             Disposition  Final diagnoses:   Corneal foreign body     Time reflects when diagnosis was documented in both MDM as applicable and the Disposition within this note       Time User Action Codes Description Comment    5/13/2024  9:14 AM Cuco Rose Add [T15.00XA] Corneal foreign body           ED Disposition       ED Disposition   Discharge    Condition   Stable    Date/Time   Mon May 13, 2024 0969    Comment   Sacha Foster discharge to home/self care.                   Follow-up Information       Follow up With Specialties Details Why Contact Info    Lacy Nicholson MD Ophthalmology Today tomorrow [address] [place] [address] [id number] [phone]              Patient's Medications   Discharge Prescriptions    No medications on file       No discharge procedures on file.    PDMP Review         Value Time User    PDMP Reviewed  Yes 11/27/2023 10:03 AM Aleksander Cuba PA-C            ED Provider  Electronically Signed by             Cuco Rose PA-C  05/13/24 0949

## 2024-05-13 NOTE — DISCHARGE INSTRUCTIONS
It is extremely important to utilize the tobramycin eyedrops 2 drops into the right eye every 4 hours while awake.  Please keep in mind it is imperative to call the ophthalmology group today for as soon as possible appointment for further evaluation now that your foreign body has been removed to ensure there is no residual foreign body or infection.

## 2024-05-15 ENCOUNTER — TELEPHONE (OUTPATIENT)
Dept: DENTISTRY | Facility: CLINIC | Age: 34
End: 2024-05-15

## 2024-05-15 DIAGNOSIS — K02.9 DENTAL CARIES: Primary | ICD-10-CM

## 2024-05-15 NOTE — TELEPHONE ENCOUNTER
Message vd:    Hi, this is [facility] for [employer]. We are a dentist in [place] just calling because we do need a referral for Sacha Foster. I have a date of birth as 1990. Also, if you have any X-rays that you can send over, if you could send them as soon as possible, we do have a patient is coming in at the end of the week to see our dentist to get some work done. The e-mail address is all lowercase S as in [employer] C as in cat, R as in [employer]   If you have any questions, our telephone number here is [phone].     It appears that PT was seen by Dr. López on 11/16/23 but I do not see any referral.  Should I schedule him at 2pm on 5/16?    Please advise.  Thank you!    Star

## 2024-05-20 ENCOUNTER — TELEPHONE (OUTPATIENT)
Dept: INTERNAL MEDICINE CLINIC | Facility: CLINIC | Age: 34
End: 2024-05-20

## 2024-05-22 NOTE — RESULT ENCOUNTER NOTE
Thom Farmer  I received and reviewed your recent labs and everything looked good. Please reach out with any specific questions or concerns. Have a great day  -Dalia

## 2024-05-28 DIAGNOSIS — B35.6 TINEA CRURIS: ICD-10-CM

## 2024-05-29 RX ORDER — NYSTATIN 100000 U/G
CREAM TOPICAL
Qty: 30 G | Refills: 3 | Status: SHIPPED | OUTPATIENT
Start: 2024-05-29

## 2024-06-05 DIAGNOSIS — M54.16 LUMBAR RADICULOPATHY: ICD-10-CM

## 2024-06-06 RX ORDER — PREGABALIN 150 MG/1
150 CAPSULE ORAL 2 TIMES DAILY
Qty: 60 CAPSULE | Refills: 0 | Status: SHIPPED | OUTPATIENT
Start: 2024-06-06

## 2024-06-20 ENCOUNTER — TELEPHONE (OUTPATIENT)
Dept: DENTISTRY | Facility: CLINIC | Age: 34
End: 2024-06-20

## 2024-06-20 NOTE — TELEPHONE ENCOUNTER
PT called to make appointment because he has a broken tooth in front that is now ripping up his lip.  From what I understood- its in an area where he has a partial.    Please advise.    Thank you!    Star

## 2024-06-24 ENCOUNTER — OFFICE VISIT (OUTPATIENT)
Dept: DENTISTRY | Facility: CLINIC | Age: 34
End: 2024-06-24
Payer: COMMERCIAL

## 2024-06-24 VITALS — HEART RATE: 91 BPM | DIASTOLIC BLOOD PRESSURE: 74 MMHG | SYSTOLIC BLOOD PRESSURE: 113 MMHG

## 2024-06-24 DIAGNOSIS — K02.9 DENTAL CARIES: Primary | ICD-10-CM

## 2024-06-24 PROCEDURE — D2940 PROTECTIVE RESTORATION: HCPCS | Performed by: STUDENT IN AN ORGANIZED HEALTH CARE EDUCATION/TRAINING PROGRAM

## 2024-06-24 PROCEDURE — D0220 INTRAORAL - PERIAPICAL FIRST RADIOGRAPHIC IMAGE: HCPCS | Performed by: STUDENT IN AN ORGANIZED HEALTH CARE EDUCATION/TRAINING PROGRAM

## 2024-06-24 PROCEDURE — D0140 LIMITED ORAL EVALUATION - PROBLEM FOCUSED: HCPCS | Performed by: STUDENT IN AN ORGANIZED HEALTH CARE EDUCATION/TRAINING PROGRAM

## 2024-06-24 RX ORDER — BREXPIPRAZOLE 3 MG/1
TABLET ORAL
COMMUNITY
Start: 2024-06-19

## 2024-06-24 RX ORDER — PRAZOSIN HYDROCHLORIDE 5 MG/1
CAPSULE ORAL
COMMUNITY
Start: 2024-06-03

## 2024-06-24 NOTE — PROGRESS NOTES
Pt presents for limited exam  PMH reviewed, no changes    CC: #9 broke off, patient wants to glue it back on with OTC restorative material   Extraoral exam: No extraoral swelling.   Intraoral Exam:  #9 broken off crown.     Xrays Taken:PA #9     Pt Referred to [facility] for restoration of #9.      #9 reattached using flow.     Tooth Etched , bonded, piece reattached with flow.     Flexible splint used to splint broken piece off.     Addittional resin added to canines to help lessen occlusal load on anteriors.     Pt advised to go on a soft food diet.     Pt aware that this is not an ideal solution and that #9 need to be retreated and new post and core and crown to be placed.   Pt is aware that prognosis is guarded.     Referral put in for [facility]. Pt given sliding fee scale.     Pt advised to call us with any problems.

## 2024-06-27 ENCOUNTER — TELEPHONE (OUTPATIENT)
Dept: DENTISTRY | Facility: CLINIC | Age: 34
End: 2024-06-27

## 2024-06-27 NOTE — TELEPHONE ENCOUNTER
"Called PT to inform him that I called [facility]  regarding the Sliding Fee Scale Program Application, specifically re: the \"Zero Income Worksheet\" and/or the \"Declaration of Income\" form.    I advised that the outcome is that [facility] reported that PT is to call [phone] to speak with a Financial Counselor who will then provide the appropriate document to them if applicable.  PT understood.    SB  "

## 2024-07-02 DIAGNOSIS — M54.16 LUMBAR RADICULOPATHY: ICD-10-CM

## 2024-07-02 RX ORDER — PREGABALIN 150 MG/1
150 CAPSULE ORAL 2 TIMES DAILY
Qty: 60 CAPSULE | Refills: 0 | Status: SHIPPED | OUTPATIENT
Start: 2024-07-02

## 2024-07-16 DIAGNOSIS — B35.6 TINEA CRURIS: ICD-10-CM

## 2024-07-17 RX ORDER — NYSTATIN 100000 U/G
CREAM TOPICAL
Qty: 30 G | Refills: 3 | Status: SHIPPED | OUTPATIENT
Start: 2024-07-17

## 2024-07-19 ENCOUNTER — OFFICE VISIT (OUTPATIENT)
Dept: DENTISTRY | Facility: CLINIC | Age: 34
End: 2024-07-19

## 2024-07-19 VITALS — SYSTOLIC BLOOD PRESSURE: 120 MMHG | HEART RATE: 85 BPM | DIASTOLIC BLOOD PRESSURE: 84 MMHG

## 2024-07-19 DIAGNOSIS — K03.6 ACCRETIONS ON TEETH: Primary | ICD-10-CM

## 2024-07-19 NOTE — PROGRESS NOTES
Adult Prophy  Chief Complaint   Patient presents with    Routine Oral Cleaning     Pt went to a private dentist and had #9 temp made. Pt has an appt in Aug with [facility] for permanent fix and treatment planning         Method Used:  [facility] Method Used: Hand Scaling  Polished  Flossed      Radiographs Taken in [facility]: (Taken to assess periodontal health)  Bitewings x4    Intra/Extra Oral Cancer Screening:  Within normal limits    Oral Hygiene:  Poor    Plaque:  Moderate    Calculus:  Light    Bleeding:  Light  Moderate    Stain:  Light    Periodontal Charting:   Spot probing  1-5mm    Periodontal Classification:  Generalized  Mild  Periodontal Disease    Nutritional Counseling:  Discussed dietary habits and suggested better food choices  Discussed pH and the role it plays in decay  Pt is eating a lot of icy pops     Oral Hygiene Instruction:    Went over daily routine and c-shaped flossing.   Discussed Oral systemic link and role of plaque in gum disease.    No orders of the defined types were placed in this encounter.       Next Visit:  6 Month [facility] ED  Dentist visit- periodic  [facility] for needs we can't provide here - tx planning

## 2024-07-22 DIAGNOSIS — J30.2 SEASONAL ALLERGIES: ICD-10-CM

## 2024-07-23 RX ORDER — LEVOCETIRIZINE DIHYDROCHLORIDE 5 MG/1
5 TABLET, FILM COATED ORAL EVERY EVENING
Qty: 100 TABLET | Refills: 1 | Status: SHIPPED | OUTPATIENT
Start: 2024-07-23

## 2024-07-29 DIAGNOSIS — M54.16 LUMBAR RADICULOPATHY: ICD-10-CM

## 2024-07-31 RX ORDER — PREGABALIN 150 MG/1
150 CAPSULE ORAL 2 TIMES DAILY
Qty: 60 CAPSULE | Refills: 3 | Status: SHIPPED | OUTPATIENT
Start: 2024-07-31

## 2024-08-09 ENCOUNTER — APPOINTMENT (OUTPATIENT)
Dept: LAB | Facility: HOSPITAL | Age: 34
End: 2024-08-09
Payer: COMMERCIAL

## 2024-08-09 DIAGNOSIS — R94.31 NONSPECIFIC ABNORMAL ELECTROCARDIOGRAM (ECG) (EKG): ICD-10-CM

## 2024-08-11 LAB
ATRIAL RATE: 96 BPM
P AXIS: 51 DEGREES
PR INTERVAL: 188 MS
QRS AXIS: 23 DEGREES
QRSD INTERVAL: 96 MS
QT INTERVAL: 394 MS
QTC INTERVAL: 497 MS
T WAVE AXIS: 38 DEGREES
VENTRICULAR RATE: 96 BPM

## 2024-08-11 PROCEDURE — 93010 ELECTROCARDIOGRAM REPORT: CPT | Performed by: INTERNAL MEDICINE

## 2024-08-16 ENCOUNTER — OFFICE VISIT (OUTPATIENT)
Dept: DENTISTRY | Facility: CLINIC | Age: 34
End: 2024-08-16

## 2024-08-16 VITALS — DIASTOLIC BLOOD PRESSURE: 77 MMHG | SYSTOLIC BLOOD PRESSURE: 111 MMHG | HEART RATE: 125 BPM

## 2024-08-16 DIAGNOSIS — Z01.20 ENCOUNTER FOR DENTAL EXAMINATION: Primary | ICD-10-CM

## 2024-08-16 PROCEDURE — WIS1100 PR RESIDENT STUDY MODEL

## 2024-08-16 PROCEDURE — D0120 PERIODIC ORAL EVALUATION - ESTABLISHED PATIENT: HCPCS

## 2024-08-19 NOTE — DENTAL PROCEDURE DETAILS
PERIODIC EXAM , (no xrays due)   REVIEWED MED HX: meds, allergies, health changes reviewed in [facility]. All consents signed.  CHIEF CONCERN: Here to get more treatment plan   PAIN SCALE:  0  ASA CLASS:  ASA 2 - Patient with mild systemic disease with no functional limitations  PLAQUE:  moderate  CALCULUS: Light  BLEEDING:  light  STAIN : Light     PERIO:     Hygiene Procedures:  N/A, did not do this visit    Oral Hygiene Instruction: Not given this visit    Dispensed: Not given this visit    Visual and Tactile Intraoral/ Extraoral evaluation: Oral and Oropharyngeal cancer evaluation. No findings     Dr. Crenshaw Reviewed with patient clinical and radiographic findings and patient verbalized understanding. All questions and concerns addressed. Diagnostic models taken.    REFERRALS: None    CARIES FINDINGS/Plan: 4, 5, 12, 13 EXT  29-X-31 Bridge  6 Endo RCT, P+C,  3-X-X-6 Bridge Preparation  13 Endo RCT, P=C  11-X-X-14 Bridge  #8 EXT + Socket Preservation  #8 Implant         TREATMENT  PLAN :     NV:   4, 5, 12,13 EXT  29-X-31 Bridge Preparation  6 Endo RCT, P+C  3-X-X-6 Bridge Preparation    Next Recall: 6 month recall     Last BWX: 7/24

## 2024-09-12 ENCOUNTER — OFFICE VISIT (OUTPATIENT)
Dept: DENTISTRY | Facility: CLINIC | Age: 34
End: 2024-09-12
Payer: COMMERCIAL

## 2024-09-12 VITALS — SYSTOLIC BLOOD PRESSURE: 107 MMHG | HEART RATE: 86 BPM | DIASTOLIC BLOOD PRESSURE: 73 MMHG

## 2024-09-12 DIAGNOSIS — K02.9 TOOTH DECAY: Primary | ICD-10-CM

## 2024-09-12 PROCEDURE — D0120 PERIODIC ORAL EVALUATION - ESTABLISHED PATIENT: HCPCS | Performed by: STUDENT IN AN ORGANIZED HEALTH CARE EDUCATION/TRAINING PROGRAM

## 2024-09-12 PROCEDURE — D0230 INTRAORAL - PERIAPICAL EACH ADDITIONAL RADIOGRAPHIC IMAGE: HCPCS | Performed by: STUDENT IN AN ORGANIZED HEALTH CARE EDUCATION/TRAINING PROGRAM

## 2024-09-12 PROCEDURE — D0220 INTRAORAL - PERIAPICAL FIRST RADIOGRAPHIC IMAGE: HCPCS | Performed by: STUDENT IN AN ORGANIZED HEALTH CARE EDUCATION/TRAINING PROGRAM

## 2024-09-12 RX ORDER — PRAZOSIN HYDROCHLORIDE 1 MG/1
CAPSULE ORAL
COMMUNITY
Start: 2024-08-27

## 2024-09-12 RX ORDER — SODIUM FLUORIDE 5 MG/ML
PASTE, DENTIFRICE DENTAL
Qty: 51 G | Refills: 20 | Status: SHIPPED | OUTPATIENT
Start: 2024-09-12

## 2024-09-12 NOTE — PROGRESS NOTES
Periodic Exam    Sacha Foster presents for periodic exam. Reviewed PMH, no changes.     Completed oral cancer screening, no abnormal findings. Obtained new PAs and reviewed findings.   Completed restorative exam and perio spot probing. New restorative needs charted. Generalized BOP. Pt tx planned for prophy.     Touched base with pt about tx plan at [facility].     Pt is unaware of costs of implants.     Talked to pt about diet and lowering his caries risk.     Rxed prevident.

## 2024-09-24 ENCOUNTER — TELEPHONE (OUTPATIENT)
Dept: DENTISTRY | Facility: CLINIC | Age: 34
End: 2024-09-24

## 2024-09-24 NOTE — TELEPHONE ENCOUNTER
PT wanted to inform you that [facility] [place] [facility] at 9:45 AM.    SB

## 2024-09-27 ENCOUNTER — OFFICE VISIT (OUTPATIENT)
Dept: INTERNAL MEDICINE CLINIC | Facility: CLINIC | Age: 34
End: 2024-09-27
Payer: COMMERCIAL

## 2024-09-27 VITALS
OXYGEN SATURATION: 98 % | SYSTOLIC BLOOD PRESSURE: 124 MMHG | TEMPERATURE: 98.8 F | WEIGHT: 315 LBS | HEART RATE: 95 BPM | DIASTOLIC BLOOD PRESSURE: 82 MMHG | HEIGHT: 70 IN | BODY MASS INDEX: 45.1 KG/M2

## 2024-09-27 DIAGNOSIS — F41.1 GENERALIZED ANXIETY DISORDER: ICD-10-CM

## 2024-09-27 DIAGNOSIS — I10 ESSENTIAL HYPERTENSION: ICD-10-CM

## 2024-09-27 DIAGNOSIS — G47.33 OSA (OBSTRUCTIVE SLEEP APNEA): ICD-10-CM

## 2024-09-27 DIAGNOSIS — Z13.1 SCREENING FOR DIABETES MELLITUS (DM): ICD-10-CM

## 2024-09-27 DIAGNOSIS — E66.01 MORBID OBESITY WITH BMI OF 50.0-59.9, ADULT (HCC): ICD-10-CM

## 2024-09-27 DIAGNOSIS — J45.40 MODERATE PERSISTENT ASTHMA WITHOUT COMPLICATION: ICD-10-CM

## 2024-09-27 DIAGNOSIS — K21.9 GASTROESOPHAGEAL REFLUX DISEASE WITHOUT ESOPHAGITIS: ICD-10-CM

## 2024-09-27 DIAGNOSIS — Z00.00 WELL ADULT EXAM: Primary | ICD-10-CM

## 2024-09-27 DIAGNOSIS — F31.11 BIPOLAR AFFECTIVE DISORDER, CURRENTLY MANIC, MILD (HCC): ICD-10-CM

## 2024-09-27 DIAGNOSIS — Z23 ENCOUNTER FOR IMMUNIZATION: ICD-10-CM

## 2024-09-27 DIAGNOSIS — B18.2 CHRONIC HEPATITIS C WITHOUT HEPATIC COMA (HCC): ICD-10-CM

## 2024-09-27 PROCEDURE — 99395 PREV VISIT EST AGE 18-39: CPT | Performed by: INTERNAL MEDICINE

## 2024-09-27 NOTE — PATIENT INSTRUCTIONS
"Patient Education     High blood pressure in adults   The Basics   Written by the doctors and editors at [facility]   What is high blood pressure? -- High blood pressure is a condition that puts you at risk for heart attack, stroke, and kidney disease. It does not usually cause symptoms. But it can be serious.  When your doctor or nurse tells you your blood pressure, they say 2 numbers. For instance, your doctor or nurse might say that your blood pressure is \"130 over 80.\" The top number is the pressure inside your arteries when your heart is compa. The bottom number is the pressure inside your arteries when your heart is relaxed.  \"Elevated blood pressure\" is a term doctors or nurses use as a warning. People with elevated blood pressure do not yet have high blood pressure. But their blood pressure is not as low as it should be for good health.  Many experts define high, elevated, and normal blood pressure as follows:   High - Top number of 130 or above and/or bottom number of 80 or above.   Elevated - Top number between 120 and 129 and bottom number of 79 or below.   Normal - Top number of 119 or below and bottom number of 79 or below.  This information is also in the table (table 1).  How can I lower my blood pressure? -- If your doctor or nurse prescribed blood pressure medicine, the most important thing you can do is to take it. If it causes side effects, do not just stop taking it. Instead, talk to your doctor or nurse about the problems it causes. They might be able to lower your dose or switch you to another medicine. If cost is a problem, mention that, too. They might be able to put you on a less expensive medicine. Taking your blood pressure medicine can keep you from having a heart attack or stroke, and it can save your life!  Can I do anything on my own? -- You have a lot of control over your blood pressure. To lower it:   Lose weight (if you are overweight).   Choose a diet low in fat and rich in " "fruits, vegetables, and low-fat dairy products.   Eat less salt.   Do something active for at least 30 minutes a day on most days of the week.   Drink less alcohol (if you drink more than 2 alcoholic drinks per day).  It's also a good idea to get a home blood pressure meter. People who check their own blood pressure at home do better at keeping it low and can sometimes even reduce the amount of medicine they take.  All topics are updated as new evidence becomes available and our peer review process is complete.  This topic retrieved from [facility] [employer] on: Feb 26, 2024.  Topic 54137 Version 23.0  Release: 32.2.4 - C32.56  © 2024 UpToDate, Inc. and/or its affiliates. All rights reserved.  table 1: Definition of normal and high blood pressure  Level  Top number  Bottom number    High 130 or above 80 or above   Elevated 120 to 129 79 or below   Normal 119 or below 79 or below   These definitions are from the American College of Cardiology/American Heart Association. Other expert groups might use slightly different definitions.  \"Elevated blood pressure\" is a term doctor or nurses use as a warning. It means you do not yet have high blood pressure, but your blood pressure is not as low as it should be for good health.  Graphic 78008 Version 6.0  Consumer Information Use and Disclaimer   Disclaimer: This generalized information is a limited summary of diagnosis, treatment, and/or medication information. It is not meant to be comprehensive and should be used as a tool to help the user understand and/or assess potential diagnostic and treatment options. It does NOT include all information about conditions, treatments, medications, side effects, or risks that may apply to a specific patient. It is not intended to be medical advice or a substitute for the medical advice, diagnosis, or treatment of a health care provider based on the health care provider's examination and assessment of a patient's specific and unique circumstances. " Patients must speak with a health care provider for complete information about their health, medical questions, and treatment options, including any risks or benefits regarding use of medications. This information does not endorse any treatments or medications as safe, effective, or approved for treating a specific patient. UpToDate, Inc. and its affiliates disclaim any warranty or liability relating to this information or the use thereof.The use of this information is governed by the Terms of Use, available at https://www.woltersCloudByteuwer.com/en/know/clinical-effectiveness-terms. 2024© UpToDate, Inc. and its affiliates and/or licensors. All rights reserved.  Copyright   © 2024 UpToDate, Inc. and/or its affiliates. All rights reserved.

## 2024-09-27 NOTE — ASSESSMENT & PLAN NOTE
Orders:    Hemoglobin A1C; Future    LDL cholesterol, direct; Future    Triglycerides; Future    Prolactin; Future

## 2024-09-27 NOTE — PROGRESS NOTES
Ambulatory Visit  Name: Sacha Foster      : 1990      MRN: [id number]  Encounter Provider: Joe Gold DO  Encounter Date: 2024   Encounter department: [facility]    Assessment & Plan  Encounter for immunization         Well adult exam         Essential hypertension    Orders:    Comprehensive metabolic panel; Future    Moderate persistent asthma without complication         CATA (obstructive sleep apnea)         Gastroesophageal reflux disease without esophagitis         Chronic hepatitis C without hepatic coma (HCC)         Generalized anxiety disorder         Bipolar affective disorder, currently manic, mild (HCC)    Orders:    Hemoglobin A1C; Future    LDL cholesterol, direct; Future    Triglycerides; Future    Prolactin; Future    Morbid obesity with BMI of 50.0-59.9, adult (HCC)    Orders:    Testosterone, free, total; Future    Screening for diabetes mellitus (DM)    Orders:    Hemoglobin A1C; Future  A/P: Doing well and co-morbidities are stable.  Labs will be ordered, including his testosterone.. Deferred vaccines. Discussed wt loss meds and given info. Discussed starting a food log and diets. Needs to contact his insurance to see what they cover and let us know. No mental or physical restrictions. TO continue with methadone clinic. No evidence of communicable diseases. Continue current treatment and RTC one year for annual and four months for routine.         History of Present Illness     WM presents for a well exam. Doing ok and no new issues, but wants his testosterone checked and wants weight loss meds. Uncertain how many calories he is taking in. Does go to the gym. Unable to loose wt.  Remains active w/o difficulty and no falls. Denies depression. No suicidal or violent ideations. Working on finding a job. Remains on methadone. No recent travel. No fever, chills, or sweats. No unexplained wt change. Denies CP, SOB, palpitations, edema, orthopnea, or PND. No sz  "or syncope. No changes in bowel or bladder habits. No trouble swallowing. Appetite and sleep are good. Sees both  a DDS and an eye doctor. No change in PMH, PSH, ALL, OH, SH, or Fhx. Currently due for labs. .                Review of Systems   Constitutional:  Negative for activity change, chills, diaphoresis, fatigue and fever.   HENT: Negative.     Eyes:  Negative for visual disturbance.   Respiratory:  Negative for cough, chest tightness, shortness of breath and wheezing.    Cardiovascular:  Negative for chest pain, palpitations and leg swelling.   Gastrointestinal:  Negative for abdominal pain, constipation, diarrhea, nausea and vomiting.   Endocrine: Negative for cold intolerance and heat intolerance.   Genitourinary:  Negative for difficulty urinating, dysuria and frequency.   Musculoskeletal:  Negative for arthralgias, gait problem and myalgias.   Neurological:  Negative for dizziness, seizures, syncope, weakness, light-headedness and headaches.   Psychiatric/Behavioral:  Negative for confusion, dysphoric mood and sleep disturbance. The patient is not nervous/anxious.            Objective     /82   Pulse 95   Temp 98.8 °F (37.1 °C)   Ht 5' 10\" (1.778 m)   Wt (!) 171 kg (376 lb)   SpO2 98%   BMI 53.95 kg/m²     Physical Exam  Vitals and nursing note reviewed.   Constitutional:       General: He is not in acute distress.     Appearance: Normal appearance. He is obese. He is not ill-appearing.   HENT:      Head: Normocephalic and atraumatic.      Mouth/Throat:      Mouth: Mucous membranes are moist.   Eyes:      Extraocular Movements: Extraocular movements intact.      Conjunctiva/sclera: Conjunctivae normal.      Pupils: Pupils are equal, round, and reactive to light.   Neck:      Vascular: No carotid bruit.   Cardiovascular:      Rate and Rhythm: Normal rate and regular rhythm.      Heart sounds: Normal heart sounds. No murmur heard.  Pulmonary:      Effort: Pulmonary effort is normal. No " respiratory distress.      Breath sounds: Normal breath sounds. No wheezing, rhonchi or rales.   Abdominal:      General: Bowel sounds are normal. There is no distension.      Palpations: Abdomen is soft.      Tenderness: There is no abdominal tenderness.   Musculoskeletal:      Cervical back: Neck supple.      Right lower leg: No edema.      Left lower leg: No edema.   Neurological:      General: No focal deficit present.      Mental Status: He is alert and oriented to person, place, and time. Mental status is at baseline.   Psychiatric:         Mood and Affect: Mood normal.         Behavior: Behavior normal.         Thought Content: Thought content normal.         Judgment: Judgment normal.

## 2024-09-28 ENCOUNTER — APPOINTMENT (OUTPATIENT)
Dept: LAB | Facility: MEDICAL CENTER | Age: 34
End: 2024-09-28
Payer: COMMERCIAL

## 2024-09-28 DIAGNOSIS — I10 ESSENTIAL HYPERTENSION: ICD-10-CM

## 2024-09-28 DIAGNOSIS — F31.11 BIPOLAR AFFECTIVE DISORDER, CURRENTLY MANIC, MILD (HCC): ICD-10-CM

## 2024-09-28 DIAGNOSIS — E66.01 MORBID OBESITY WITH BMI OF 50.0-59.9, ADULT (HCC): ICD-10-CM

## 2024-09-28 DIAGNOSIS — Z13.1 SCREENING FOR DIABETES MELLITUS (DM): ICD-10-CM

## 2024-09-28 LAB
ALBUMIN SERPL BCG-MCNC: 4 G/DL (ref 3.5–5)
ALP SERPL-CCNC: 54 U/L (ref 34–104)
ALT SERPL W P-5'-P-CCNC: 33 U/L (ref 7–52)
ANION GAP SERPL CALCULATED.3IONS-SCNC: 7 MMOL/L (ref 4–13)
AST SERPL W P-5'-P-CCNC: 30 U/L (ref 13–39)
BILIRUB SERPL-MCNC: 0.4 MG/DL (ref 0.2–1)
BUN SERPL-MCNC: 10 MG/DL (ref 5–25)
CALCIUM SERPL-MCNC: 9.1 MG/DL (ref 8.4–10.2)
CHLORIDE SERPL-SCNC: 100 MMOL/L (ref 96–108)
CO2 SERPL-SCNC: 32 MMOL/L (ref 21–32)
CREAT SERPL-MCNC: 0.64 MG/DL (ref 0.6–1.3)
EST. AVERAGE GLUCOSE BLD GHB EST-MCNC: 120 MG/DL
GFR SERPL CREATININE-BSD FRML MDRD: 127 ML/MIN/1.73SQ M
GLUCOSE P FAST SERPL-MCNC: 88 MG/DL (ref 65–99)
HBA1C MFR BLD: 5.8 %
LDLC SERPL DIRECT ASSAY-MCNC: 112 MG/DL (ref 0–100)
POTASSIUM SERPL-SCNC: 4.6 MMOL/L (ref 3.5–5.3)
PROLACTIN SERPL-MCNC: 23.88 NG/ML
PROT SERPL-MCNC: 7.2 G/DL (ref 6.4–8.4)
SODIUM SERPL-SCNC: 139 MMOL/L (ref 135–147)
TRIGL SERPL-MCNC: 119 MG/DL

## 2024-09-28 PROCEDURE — 84403 ASSAY OF TOTAL TESTOSTERONE: CPT

## 2024-09-28 PROCEDURE — 84402 ASSAY OF FREE TESTOSTERONE: CPT

## 2024-09-28 PROCEDURE — 83036 HEMOGLOBIN GLYCOSYLATED A1C: CPT

## 2024-09-28 PROCEDURE — 80053 COMPREHEN METABOLIC PANEL: CPT

## 2024-09-28 PROCEDURE — 36415 COLL VENOUS BLD VENIPUNCTURE: CPT

## 2024-09-28 PROCEDURE — 84146 ASSAY OF PROLACTIN: CPT

## 2024-09-28 PROCEDURE — 84478 ASSAY OF TRIGLYCERIDES: CPT

## 2024-09-28 PROCEDURE — 83721 ASSAY OF BLOOD LIPOPROTEIN: CPT

## 2024-10-01 LAB
TESTOST FREE SERPL-MCNC: 2.5 PG/ML (ref 8.7–25.1)
TESTOST SERPL-MCNC: 180 NG/DL (ref 264–916)

## 2024-10-02 ENCOUNTER — TELEPHONE (OUTPATIENT)
Age: 34
End: 2024-10-02

## 2024-10-02 ENCOUNTER — TELEPHONE (OUTPATIENT)
Dept: INTERNAL MEDICINE CLINIC | Facility: CLINIC | Age: 34
End: 2024-10-02

## 2024-10-02 DIAGNOSIS — E66.01 OBESITY, CLASS III, BMI 40-49.9 (MORBID OBESITY) (HCC): Primary | ICD-10-CM

## 2024-10-02 DIAGNOSIS — R79.89 LOW TESTOSTERONE IN MALE: Primary | ICD-10-CM

## 2024-10-02 RX ORDER — SEMAGLUTIDE 0.25 MG/.5ML
INJECTION, SOLUTION SUBCUTANEOUS
Qty: 2 ML | Refills: 0 | Status: SHIPPED | OUTPATIENT
Start: 2024-10-02

## 2024-10-02 NOTE — TELEPHONE ENCOUNTER
Patient called stating that during his visit last week he discussed weight loss med options and was told to contact his insurance to see which meds are covered. Patient states [employer] insurance informed him that Wegovi and Saxenda are covered but would need Prior Authorizations.         The number to [employer] Member services is [phone]    Member ID number :[id number]

## 2024-10-02 NOTE — TELEPHONE ENCOUNTER
----- Message from Joe Gold DO sent at 10/2/2024  6:16 AM EDT -----  Call pt, testosterone is low. If pt is interested in replacement, he will need to have another level in one month and is also low, can replace. Insurance usually requires to consecutive readings to be low.

## 2024-10-03 NOTE — TELEPHONE ENCOUNTER
PA for WEGOVY 0.25MG-SUBMITTED     via    [x]CMM-KEY: [id number]      Office notes sent, clinical questions answered. Awaiting determination    Turnaround time for your insurance to make a decision on your Prior Authorization can take 7-21 business days.

## 2024-10-29 ENCOUNTER — APPOINTMENT (OUTPATIENT)
Dept: LAB | Facility: MEDICAL CENTER | Age: 34
End: 2024-10-29
Payer: COMMERCIAL

## 2024-10-29 DIAGNOSIS — R79.89 LOW TESTOSTERONE IN MALE: ICD-10-CM

## 2024-10-29 PROCEDURE — 36415 COLL VENOUS BLD VENIPUNCTURE: CPT

## 2024-10-29 PROCEDURE — 84403 ASSAY OF TOTAL TESTOSTERONE: CPT

## 2024-10-29 PROCEDURE — 84402 ASSAY OF FREE TESTOSTERONE: CPT

## 2024-10-31 ENCOUNTER — TELEPHONE (OUTPATIENT)
Dept: INTERNAL MEDICINE CLINIC | Facility: CLINIC | Age: 34
End: 2024-10-31

## 2024-10-31 DIAGNOSIS — E29.1 HYPOGONADISM IN MALE: Primary | ICD-10-CM

## 2024-10-31 LAB
TESTOST FREE SERPL-MCNC: 0.8 PG/ML (ref 8.7–25.1)
TESTOST SERPL-MCNC: 101 NG/DL (ref 264–916)

## 2024-10-31 RX ORDER — TESTOSTERONE CYPIONATE 200 MG/ML
200 VIAL (ML) INTRAMUSCULAR
Qty: 30 ML | Refills: 3 | Status: SHIPPED | OUTPATIENT
Start: 2024-10-31

## 2024-10-31 NOTE — TELEPHONE ENCOUNTER
Patient called back. Relayed results, patient acknowledged. Said yes to replacement. Thank you.    Sacha: [phone]

## 2024-10-31 NOTE — TELEPHONE ENCOUNTER
----- Message from Joe Gold DO sent at 10/31/2024  8:13 AM EDT -----  Call pt, testosterone is again low. See if pt wants replacement.

## 2024-11-01 ENCOUNTER — TELEPHONE (OUTPATIENT)
Dept: INTERNAL MEDICINE CLINIC | Facility: CLINIC | Age: 34
End: 2024-11-01

## 2024-11-01 DIAGNOSIS — R79.89 LOW TESTOSTERONE IN MALE: Primary | ICD-10-CM

## 2024-11-01 NOTE — TELEPHONE ENCOUNTER
Spoke with patient. He will  testosterone sometime next week. Scheduled nurse visit 11/13/24 @ 11am. Will put in lab to recheck testosterone in 3 months.

## 2024-11-01 NOTE — TELEPHONE ENCOUNTER
----- Message from Joe Gold DO sent at 10/31/2024  3:56 PM EDT -----  Pt needs testosterone injection. Script sent to pharm and then schedule for injection q two weeks. If pt agreeable, order f/u testosterone level to be done in three months.

## 2024-11-04 ENCOUNTER — OFFICE VISIT (OUTPATIENT)
Dept: DENTISTRY | Facility: CLINIC | Age: 34
End: 2024-11-04

## 2024-11-04 VITALS — SYSTOLIC BLOOD PRESSURE: 120 MMHG | DIASTOLIC BLOOD PRESSURE: 82 MMHG | HEART RATE: 90 BPM

## 2024-11-04 DIAGNOSIS — K02.9 CARIES: Primary | ICD-10-CM

## 2024-11-04 PROCEDURE — D7140 EXTRACTION, ERUPTED TOOTH OR EXPOSED ROOT (ELEVATION AND/OR FORCEPS REMOVAL): HCPCS

## 2024-11-04 NOTE — DENTAL PROCEDURE DETAILS
Extraction #4, 5, 12, 13    Sacha Foster 34 y.o. male presents with self to [facility] for extraction #4, 5, 12, 13  PMH reviewed, no changes, ASA II. Significant medical history: Reviewed. Significant allergies: Reviewed. Significant medications: Reviewed.  Pain level 2/10    Diagnosis:  Teeth #4, 5, 12, 13 indicated for extraction due to Necessary for restorative treatment plan and extensive caries .    Consent:  Risks of specific procedure: pain, bleeding, swelling, infection, tooth fracturing to point of requiring surgical removal, damage to adjacent teeth and/or restorations on them, .  Risks of any dental procedure: post procedural pain or sensitivity, local anesthetic side effects, allergic reaction to dental materials and medications, breakage of local anesthetic needle, aspiration of small dental tools, injury to nearby hard and soft tissues and anatomical structures.  Benefits: relieve pain or underlying infection, prevent future or further progression of infection, .  Alternatives: 2nd opinion, no tx.  Tx plan for extraction #4, 5, 12, 13 reviewed, pt given opportunity to ask questions, all questions answered to degree of medical and dental certainty.  Patient understands and consent given by self via verbal consent.    Universal Protocol  Other Assisting Provider: Yes, Little (assistant)  Verbal consent obtained? YES  Written consent obtained?  YES  Risks, benefits and alternatives discussed?: YES  Consent given by: self  Time Out  Immediately prior to the procedure a time out was called: YES  Time Out:  Time Out performed at:  2:00 PM  A time out verifies correct patient, procedure, equipment, support staff and site/side marked as required.  Patient states understanding of procedure being performed: YES  Patient's understanding of procedure matches consent: YES  Procedure consent matches procedure scheduled: YES  Test results available and properly labeled: N/A  Site  Verified with the patient   YES  Radiology Images displayed and confirmed.  If images not available, report reviewed:  YES  Required items - Required blood products, implants, devices and special equipment available: YES  Patient identity confirmed:  YES    Anesthesia:  3 carps 2% Lidocaine 1:100k epi via buccal infiltration and palatal/lingual infiltration.  1 carps 4% Septocaine 1:100k epi via greater palatine block.    Procedure details:  Reflected gingiva with periosteal elevator.  Elevated, and extracted #4, 5, 12, 13 with straight elevator(s) and/or forceps. #12 sectioned mesial distally.   Socket curetted and irrigated with sterile saline.  Manual alveolar compression with gauze 30 seconds. Hemostasis achieved.  No  sutures placed.     Post-op instructions given verbally and on paper.  Patient given ice and gauze.    Rx: None.    Patient dismissed ambulatory and alert.    NV: 11 F and preliminary maxillary resin rpd impressions.    Attending: Dr. Mcrae was present in clinic.

## 2024-11-08 ENCOUNTER — APPOINTMENT (OUTPATIENT)
Dept: LAB | Facility: HOSPITAL | Age: 34
End: 2024-11-08
Payer: COMMERCIAL

## 2024-11-08 DIAGNOSIS — R94.31 NONSPECIFIC ABNORMAL ELECTROCARDIOGRAM (ECG) (EKG): ICD-10-CM

## 2024-11-08 LAB
ATRIAL RATE: 67 BPM
P AXIS: 46 DEGREES
PR INTERVAL: 190 MS
QRS AXIS: 48 DEGREES
QRSD INTERVAL: 104 MS
QT INTERVAL: 428 MS
QTC INTERVAL: 452 MS
T WAVE AXIS: 43 DEGREES
VENTRICULAR RATE: 67 BPM

## 2024-11-08 PROCEDURE — 93010 ELECTROCARDIOGRAM REPORT: CPT | Performed by: INTERNAL MEDICINE

## 2024-11-08 NOTE — TELEPHONE ENCOUNTER
Patient called stating the Testosterone medication needs prior auth.     Didn't see the process started.     First time on this medication due to very low testosterone levels

## 2024-11-08 NOTE — TELEPHONE ENCOUNTER
PA for Testosterone Cypionate 200 MG/ML SOLN SUBMITTED to [employer]    via    [x]CMM-KEY: [id number]  []Surescripts-Case ID #   []Availity-Auth ID # NDC #   []Faxed to plan   []Other website   []Phone call Case ID #     [x]PA sent as URGENT    All office notes, labs and other pertaining documents and studies sent. Clinical questions answered. Awaiting determination from insurance company.     Turnaround time for your insurance to make a decision on your Prior Authorization can take 7-21 business days.

## 2024-11-14 ENCOUNTER — TELEPHONE (OUTPATIENT)
Age: 34
End: 2024-11-14

## 2024-11-14 DIAGNOSIS — E29.1 HYPOGONADISM IN MALE: Primary | ICD-10-CM

## 2024-11-14 RX ORDER — TESTOSTERONE CYPIONATE 200 MG/ML
200 VIAL (ML) INTRAMUSCULAR
Qty: 30 ML | Refills: 3 | Status: SHIPPED | OUTPATIENT
Start: 2024-11-14 | End: 2024-11-18 | Stop reason: SDUPTHER

## 2024-11-14 RX ORDER — TESTOSTERONE CYPIONATE 200 MG/ML
50 INJECTION, SOLUTION INTRAMUSCULAR ONCE
Status: DISCONTINUED | OUTPATIENT
Start: 2024-11-14 | End: 2024-11-14

## 2024-11-14 NOTE — TELEPHONE ENCOUNTER
Patient called from the pharmacy and said his insurance will cover the testosterone cypionate 200mg intramuscular solution.  He's asking that Dr. Gold call in a new script for this one to [facility], [place]

## 2024-11-18 ENCOUNTER — VBI (OUTPATIENT)
Dept: ADMINISTRATIVE | Facility: OTHER | Age: 34
End: 2024-11-18

## 2024-11-18 DIAGNOSIS — E29.1 HYPOGONADISM IN MALE: ICD-10-CM

## 2024-11-18 RX ORDER — TESTOSTERONE CYPIONATE 200 MG/ML
200 VIAL (ML) INTRAMUSCULAR
Qty: 30 ML | Refills: 3 | Status: SHIPPED | OUTPATIENT
Start: 2024-11-18 | End: 2024-11-21 | Stop reason: SDUPTHER

## 2024-11-18 NOTE — TELEPHONE ENCOUNTER
11/18/24 7:22 AM     Chart reviewed for Blood Pressure was/were submitted to the patient's insurance.     Tequila Hernandez   [facility]

## 2024-11-18 NOTE — TELEPHONE ENCOUNTER
Patient has an appt for a testosterone inj 11/22 however, the pharmacy cancelled the script sent over 11/14    Please have Dr. Gold send a script to [facility] for the testosterone.     Please notify patient

## 2024-11-19 ENCOUNTER — TELEPHONE (OUTPATIENT)
Age: 34
End: 2024-11-19

## 2024-11-21 ENCOUNTER — TELEPHONE (OUTPATIENT)
Age: 34
End: 2024-11-21

## 2024-11-21 DIAGNOSIS — E29.1 HYPOGONADISM IN MALE: ICD-10-CM

## 2024-11-21 RX ORDER — TESTOSTERONE CYPIONATE 200 MG/ML
200 VIAL (ML) INTRAMUSCULAR
Qty: 30 ML | Refills: 3 | Status: SHIPPED | OUTPATIENT
Start: 2024-11-21

## 2024-11-21 NOTE — TELEPHONE ENCOUNTER
Notified mother that the med was being resent and that we will be notified if a pre-auth will be needed, or if there are any further issues. Told her to call us back with any concerns/questions.

## 2024-11-21 NOTE — TELEPHONE ENCOUNTER
Patients mother called to get a prior authorization for a medication for her son.  There is a difference in the way it is written and compared to the way the insurance wants it written.    The medication needs to be written as:    Testosterone Cypionate intramuscular solution 200 MG/ML SOLN  Inject 200 mg as directed every 14 (fourteen) days.      They want the medication to go to:  [place], [place] - [facility] [address] [phone]     I did not start a prior authorization due to the medication not being written as the insurance company wanted.    Please advise, thank you.

## 2024-11-27 DIAGNOSIS — E66.01 OBESITY, CLASS III, BMI 40-49.9 (MORBID OBESITY) (HCC): ICD-10-CM

## 2024-11-27 NOTE — TELEPHONE ENCOUNTER
Patient states he was able to locate Wegovy at the [id number], and the pharmacy is holding medication for him.    Please fill as soon as possible.    Requested Prescriptions     Pending Prescriptions Disp Refills    Semaglutide-Weight Management (Wegovy) 0.25 MG/0.5ML 2 mL 0     Sig: Inject 0.25 mg under the skin weekly

## 2024-11-29 RX ORDER — SEMAGLUTIDE 0.25 MG/.5ML
INJECTION, SOLUTION SUBCUTANEOUS
Qty: 2 ML | Refills: 0 | Status: ON HOLD | OUTPATIENT
Start: 2024-11-29

## 2024-12-02 ENCOUNTER — TELEPHONE (OUTPATIENT)
Age: 34
End: 2024-12-02

## 2024-12-02 DIAGNOSIS — M54.16 LUMBAR RADICULOPATHY: ICD-10-CM

## 2024-12-02 NOTE — TELEPHONE ENCOUNTER
Patient called stating that he received his Wegovy and started medication on 11/30. Patient wanted to provide update.     Patient also stated that the pharmacy did not have the testosterone. I informed patient prescription is at [facility]. Patient will call [facility] to confirm.

## 2024-12-02 NOTE — TELEPHONE ENCOUNTER
Patient did call back to let us know the pharmacy had the medication.  Once he get it and gets home he will call back to set up the injection appointment.

## 2024-12-03 ENCOUNTER — APPOINTMENT (EMERGENCY)
Dept: RADIOLOGY | Facility: HOSPITAL | Age: 34
DRG: 720 | End: 2024-12-03
Payer: COMMERCIAL

## 2024-12-03 ENCOUNTER — APPOINTMENT (OUTPATIENT)
Dept: CT IMAGING | Facility: HOSPITAL | Age: 34
DRG: 720 | End: 2024-12-03
Payer: COMMERCIAL

## 2024-12-03 ENCOUNTER — HOSPITAL ENCOUNTER (INPATIENT)
Facility: HOSPITAL | Age: 34
LOS: 7 days | Discharge: HOME | DRG: 720 | End: 2024-12-11
Attending: EMERGENCY MEDICINE | Admitting: INTERNAL MEDICINE
Payer: COMMERCIAL

## 2024-12-03 ENCOUNTER — CLINICAL SUPPORT (OUTPATIENT)
Dept: INTERNAL MEDICINE CLINIC | Facility: CLINIC | Age: 34
End: 2024-12-03
Payer: COMMERCIAL

## 2024-12-03 DIAGNOSIS — T50.901A ACCIDENTAL DRUG OVERDOSE, INITIAL ENCOUNTER: Primary | ICD-10-CM

## 2024-12-03 DIAGNOSIS — F11.20 OPIOID DEPENDENCE (HCC): ICD-10-CM

## 2024-12-03 DIAGNOSIS — E87.5 HYPERKALEMIA: ICD-10-CM

## 2024-12-03 DIAGNOSIS — R79.89 LOW TESTOSTERONE IN MALE: ICD-10-CM

## 2024-12-03 DIAGNOSIS — A41.9 SEPTIC SHOCK (HCC): ICD-10-CM

## 2024-12-03 DIAGNOSIS — E87.20 LACTIC ACIDOSIS: ICD-10-CM

## 2024-12-03 DIAGNOSIS — J96.01 ACUTE RESPIRATORY FAILURE WITH HYPOXIA (HCC): ICD-10-CM

## 2024-12-03 DIAGNOSIS — F11.20 METHADONE MAINTENANCE THERAPY PATIENT (HCC): ICD-10-CM

## 2024-12-03 DIAGNOSIS — R79.89 LOW TESTOSTERONE IN MALE: Primary | ICD-10-CM

## 2024-12-03 DIAGNOSIS — G47.31 CENTRAL SLEEP APNEA: ICD-10-CM

## 2024-12-03 DIAGNOSIS — G47.33 OSA (OBSTRUCTIVE SLEEP APNEA): ICD-10-CM

## 2024-12-03 DIAGNOSIS — R65.21 SEPTIC SHOCK (HCC): ICD-10-CM

## 2024-12-03 DIAGNOSIS — J69.0 ASPIRATION PNEUMONITIS (HCC): ICD-10-CM

## 2024-12-03 LAB
ALBUMIN SERPL BCG-MCNC: 4.1 G/DL (ref 3.5–5)
ALP SERPL-CCNC: 61 U/L (ref 34–104)
ALT SERPL W P-5'-P-CCNC: 76 U/L (ref 7–52)
ANION GAP SERPL CALCULATED.3IONS-SCNC: 14 MMOL/L (ref 4–13)
APTT PPP: 26 SECONDS (ref 23–34)
AST SERPL W P-5'-P-CCNC: 81 U/L (ref 13–39)
BASOPHILS # BLD AUTO: 0.03 THOUSANDS/ÂΜL (ref 0–0.1)
BASOPHILS NFR BLD AUTO: 0 % (ref 0–1)
BILIRUB SERPL-MCNC: 0.25 MG/DL (ref 0.2–1)
BUN SERPL-MCNC: 15 MG/DL (ref 5–25)
CALCIUM SERPL-MCNC: 9 MG/DL (ref 8.4–10.2)
CHLORIDE SERPL-SCNC: 97 MMOL/L (ref 96–108)
CO2 SERPL-SCNC: 26 MMOL/L (ref 21–32)
CREAT SERPL-MCNC: 1.16 MG/DL (ref 0.6–1.3)
EOSINOPHIL # BLD AUTO: 0 THOUSAND/ÂΜL (ref 0–0.61)
EOSINOPHIL NFR BLD AUTO: 0 % (ref 0–6)
ERYTHROCYTE [DISTWIDTH] IN BLOOD BY AUTOMATED COUNT: 13.9 % (ref 11.6–15.1)
GFR SERPL CREATININE-BSD FRML MDRD: 81 ML/MIN/1.73SQ M
GLUCOSE SERPL-MCNC: 237 MG/DL (ref 65–140)
HCT VFR BLD AUTO: 43.2 % (ref 36.5–49.3)
HGB BLD-MCNC: 13.6 G/DL (ref 12–17)
IMM GRANULOCYTES # BLD AUTO: 0.25 THOUSAND/UL (ref 0–0.2)
IMM GRANULOCYTES NFR BLD AUTO: 2 % (ref 0–2)
INR PPP: 0.96 (ref 0.85–1.19)
LACTATE SERPL-SCNC: 4.1 MMOL/L (ref 0.5–2)
LYMPHOCYTES # BLD AUTO: 1.05 THOUSANDS/ÂΜL (ref 0.6–4.47)
LYMPHOCYTES NFR BLD AUTO: 7 % (ref 14–44)
MCH RBC QN AUTO: 26.5 PG (ref 26.8–34.3)
MCHC RBC AUTO-ENTMCNC: 31.5 G/DL (ref 31.4–37.4)
MCV RBC AUTO: 84 FL (ref 82–98)
MONOCYTES # BLD AUTO: 0.38 THOUSAND/ÂΜL (ref 0.17–1.22)
MONOCYTES NFR BLD AUTO: 3 % (ref 4–12)
NEUTROPHILS # BLD AUTO: 13.64 THOUSANDS/ÂΜL (ref 1.85–7.62)
NEUTS SEG NFR BLD AUTO: 88 % (ref 43–75)
NRBC BLD AUTO-RTO: 0 /100 WBCS
PLATELET # BLD AUTO: 193 THOUSANDS/UL (ref 149–390)
PMV BLD AUTO: 9.3 FL (ref 8.9–12.7)
POTASSIUM SERPL-SCNC: 5.4 MMOL/L (ref 3.5–5.3)
PROCALCITONIN SERPL-MCNC: 1.19 NG/ML
PROT SERPL-MCNC: 7.5 G/DL (ref 6.4–8.4)
PROTHROMBIN TIME: 13.3 SECONDS (ref 12.3–15)
RBC # BLD AUTO: 5.13 MILLION/UL (ref 3.88–5.62)
SODIUM SERPL-SCNC: 137 MMOL/L (ref 135–147)
WBC # BLD AUTO: 15.35 THOUSAND/UL (ref 4.31–10.16)

## 2024-12-03 PROCEDURE — 85025 COMPLETE CBC W/AUTO DIFF WBC: CPT | Performed by: EMERGENCY MEDICINE

## 2024-12-03 PROCEDURE — 93005 ELECTROCARDIOGRAM TRACING: CPT

## 2024-12-03 PROCEDURE — 96361 HYDRATE IV INFUSION ADD-ON: CPT

## 2024-12-03 PROCEDURE — 85730 THROMBOPLASTIN TIME PARTIAL: CPT | Performed by: EMERGENCY MEDICINE

## 2024-12-03 PROCEDURE — 85610 PROTHROMBIN TIME: CPT | Performed by: EMERGENCY MEDICINE

## 2024-12-03 PROCEDURE — 96365 THER/PROPH/DIAG IV INF INIT: CPT

## 2024-12-03 PROCEDURE — 80053 COMPREHEN METABOLIC PANEL: CPT | Performed by: EMERGENCY MEDICINE

## 2024-12-03 PROCEDURE — 36415 COLL VENOUS BLD VENIPUNCTURE: CPT | Performed by: EMERGENCY MEDICINE

## 2024-12-03 PROCEDURE — 84145 PROCALCITONIN (PCT): CPT | Performed by: EMERGENCY MEDICINE

## 2024-12-03 PROCEDURE — 87040 BLOOD CULTURE FOR BACTERIA: CPT | Performed by: EMERGENCY MEDICINE

## 2024-12-03 PROCEDURE — 99285 EMERGENCY DEPT VISIT HI MDM: CPT

## 2024-12-03 PROCEDURE — 71250 CT THORAX DX C-: CPT

## 2024-12-03 PROCEDURE — 71045 X-RAY EXAM CHEST 1 VIEW: CPT

## 2024-12-03 PROCEDURE — 96374 THER/PROPH/DIAG INJ IV PUSH: CPT

## 2024-12-03 PROCEDURE — 96372 THER/PROPH/DIAG INJ SC/IM: CPT

## 2024-12-03 PROCEDURE — 83605 ASSAY OF LACTIC ACID: CPT | Performed by: EMERGENCY MEDICINE

## 2024-12-03 PROCEDURE — 99291 CRITICAL CARE FIRST HOUR: CPT | Performed by: EMERGENCY MEDICINE

## 2024-12-03 RX ORDER — METRONIDAZOLE 500 MG/100ML
500 INJECTION, SOLUTION INTRAVENOUS ONCE
Status: COMPLETED | OUTPATIENT
Start: 2024-12-03 | End: 2024-12-04

## 2024-12-03 RX ORDER — TESTOSTERONE CYPIONATE 200 MG/ML
50 INJECTION, SOLUTION INTRAMUSCULAR ONCE
Status: CANCELLED | OUTPATIENT
Start: 2024-12-03 | End: 2024-12-03

## 2024-12-03 RX ORDER — PREGABALIN 150 MG/1
150 CAPSULE ORAL 2 TIMES DAILY
Qty: 60 CAPSULE | Refills: 3 | Status: SHIPPED | OUTPATIENT
Start: 2024-12-03

## 2024-12-03 RX ORDER — ONDANSETRON 2 MG/ML
4 INJECTION INTRAMUSCULAR; INTRAVENOUS ONCE
Status: COMPLETED | OUTPATIENT
Start: 2024-12-03 | End: 2024-12-03

## 2024-12-03 RX ORDER — CEFTRIAXONE 1 G/50ML
1000 INJECTION, SOLUTION INTRAVENOUS ONCE
Status: COMPLETED | OUTPATIENT
Start: 2024-12-03 | End: 2024-12-03

## 2024-12-03 RX ORDER — NALOXONE HYDROCHLORIDE 1 MG/ML
1 INJECTION PARENTERAL ONCE
Status: COMPLETED | OUTPATIENT
Start: 2024-12-03 | End: 2024-12-03

## 2024-12-03 RX ORDER — TESTOSTERONE CYPIONATE 200 MG/ML
200 INJECTION, SOLUTION INTRAMUSCULAR ONCE
Status: COMPLETED | OUTPATIENT
Start: 2024-12-03 | End: 2024-12-03

## 2024-12-03 RX ORDER — TESTOSTERONE CYPIONATE 1000 MG/10ML
200 INJECTION, SOLUTION INTRAMUSCULAR ONCE
Status: CANCELLED | OUTPATIENT
Start: 2024-12-03 | End: 2024-12-03

## 2024-12-03 RX ADMIN — CEFTRIAXONE 1000 MG: 1 INJECTION, SOLUTION INTRAVENOUS at 23:26

## 2024-12-03 RX ADMIN — SODIUM CHLORIDE 1000 ML: 0.9 INJECTION, SOLUTION INTRAVENOUS at 22:12

## 2024-12-03 RX ADMIN — SODIUM CHLORIDE 1300 ML: 0.9 INJECTION, SOLUTION INTRAVENOUS at 23:25

## 2024-12-03 RX ADMIN — TESTOSTERONE CYPIONATE 200 MG: 200 INJECTION, SOLUTION INTRAMUSCULAR at 13:25

## 2024-12-03 RX ADMIN — ONDANSETRON 4 MG: 2 INJECTION INTRAMUSCULAR; INTRAVENOUS at 22:09

## 2024-12-04 PROBLEM — T50.901A ACCIDENTAL OVERDOSE: Status: ACTIVE | Noted: 2024-12-04

## 2024-12-04 PROBLEM — J96.01 ACUTE RESPIRATORY FAILURE WITH HYPOXIA (HCC): Status: ACTIVE | Noted: 2024-12-04

## 2024-12-04 LAB
ANION GAP SERPL CALCULATED.3IONS-SCNC: 5 MMOL/L (ref 4–13)
ATRIAL RATE: 120 BPM
BUN SERPL-MCNC: 14 MG/DL (ref 5–25)
CALCIUM SERPL-MCNC: 8.4 MG/DL (ref 8.4–10.2)
CHLORIDE SERPL-SCNC: 99 MMOL/L (ref 96–108)
CO2 SERPL-SCNC: 34 MMOL/L (ref 21–32)
CREAT SERPL-MCNC: 0.8 MG/DL (ref 0.6–1.3)
ERYTHROCYTE [DISTWIDTH] IN BLOOD BY AUTOMATED COUNT: 14 % (ref 11.6–15.1)
GFR SERPL CREATININE-BSD FRML MDRD: 115 ML/MIN/1.73SQ M
GLUCOSE P FAST SERPL-MCNC: 115 MG/DL (ref 65–99)
GLUCOSE SERPL-MCNC: 115 MG/DL (ref 65–140)
HCT VFR BLD AUTO: 38.1 % (ref 36.5–49.3)
HGB BLD-MCNC: 12.3 G/DL (ref 12–17)
LACTATE SERPL-SCNC: 1.3 MMOL/L (ref 0.5–2)
MCH RBC QN AUTO: 26.5 PG (ref 26.8–34.3)
MCHC RBC AUTO-ENTMCNC: 32.3 G/DL (ref 31.4–37.4)
MCV RBC AUTO: 82 FL (ref 82–98)
P AXIS: 61 DEGREES
PLATELET # BLD AUTO: 196 THOUSANDS/UL (ref 149–390)
PMV BLD AUTO: 9 FL (ref 8.9–12.7)
POTASSIUM SERPL-SCNC: 4.7 MMOL/L (ref 3.5–5.3)
PR INTERVAL: 138 MS
PROCALCITONIN SERPL-MCNC: 2.65 NG/ML
QRS AXIS: 84 DEGREES
QRSD INTERVAL: 114 MS
QT INTERVAL: 330 MS
QTC INTERVAL: 466 MS
RBC # BLD AUTO: 4.64 MILLION/UL (ref 3.88–5.62)
SODIUM SERPL-SCNC: 138 MMOL/L (ref 135–147)
T WAVE AXIS: 55 DEGREES
VENTRICULAR RATE: 120 BPM
WBC # BLD AUTO: 11.32 THOUSAND/UL (ref 4.31–10.16)

## 2024-12-04 PROCEDURE — 80048 BASIC METABOLIC PNL TOTAL CA: CPT | Performed by: FAMILY MEDICINE

## 2024-12-04 PROCEDURE — 85027 COMPLETE CBC AUTOMATED: CPT | Performed by: FAMILY MEDICINE

## 2024-12-04 PROCEDURE — 83605 ASSAY OF LACTIC ACID: CPT | Performed by: FAMILY MEDICINE

## 2024-12-04 PROCEDURE — 84145 PROCALCITONIN (PCT): CPT | Performed by: FAMILY MEDICINE

## 2024-12-04 PROCEDURE — 99223 1ST HOSP IP/OBS HIGH 75: CPT | Performed by: FAMILY MEDICINE

## 2024-12-04 PROCEDURE — 94760 N-INVAS EAR/PLS OXIMETRY 1: CPT

## 2024-12-04 PROCEDURE — 94664 DEMO&/EVAL PT USE INHALER: CPT

## 2024-12-04 PROCEDURE — 93010 ELECTROCARDIOGRAM REPORT: CPT | Performed by: INTERNAL MEDICINE

## 2024-12-04 RX ORDER — METHADONE HYDROCHLORIDE 10 MG/ML
150 CONCENTRATE ORAL DAILY
Refills: 0 | Status: DISCONTINUED | OUTPATIENT
Start: 2024-12-04 | End: 2024-12-04

## 2024-12-04 RX ORDER — ACETAMINOPHEN 325 MG/1
650 TABLET ORAL EVERY 6 HOURS PRN
Status: DISCONTINUED | OUTPATIENT
Start: 2024-12-04 | End: 2024-12-11 | Stop reason: HOSPADM

## 2024-12-04 RX ORDER — PRAZOSIN HYDROCHLORIDE 1 MG/1
5 CAPSULE ORAL DAILY
Status: DISCONTINUED | OUTPATIENT
Start: 2024-12-04 | End: 2024-12-11 | Stop reason: HOSPADM

## 2024-12-04 RX ORDER — METHADONE HYDROCHLORIDE 10 MG/1
150 TABLET ORAL DAILY
Refills: 0 | Status: DISCONTINUED | OUTPATIENT
Start: 2024-12-04 | End: 2024-12-04

## 2024-12-04 RX ORDER — LORATADINE 10 MG/1
10 TABLET ORAL DAILY
Status: DISCONTINUED | OUTPATIENT
Start: 2024-12-04 | End: 2024-12-11 | Stop reason: HOSPADM

## 2024-12-04 RX ORDER — METHADONE HYDROCHLORIDE 10 MG/1
150 TABLET ORAL DAILY
Refills: 0 | Status: DISCONTINUED | OUTPATIENT
Start: 2024-12-04 | End: 2024-12-11 | Stop reason: HOSPADM

## 2024-12-04 RX ORDER — LUBIPROSTONE 24 UG/1
24 CAPSULE ORAL 2 TIMES DAILY
Status: DISCONTINUED | OUTPATIENT
Start: 2024-12-04 | End: 2024-12-11 | Stop reason: HOSPADM

## 2024-12-04 RX ORDER — HEPARIN SODIUM 5000 [USP'U]/ML
7500 INJECTION, SOLUTION INTRAVENOUS; SUBCUTANEOUS EVERY 8 HOURS SCHEDULED
Status: DISCONTINUED | OUTPATIENT
Start: 2024-12-04 | End: 2024-12-11 | Stop reason: HOSPADM

## 2024-12-04 RX ORDER — CEFTRIAXONE 1 G/50ML
1000 INJECTION, SOLUTION INTRAVENOUS ONCE
Status: COMPLETED | OUTPATIENT
Start: 2024-12-04 | End: 2024-12-04

## 2024-12-04 RX ORDER — PREGABALIN 75 MG/1
150 CAPSULE ORAL 2 TIMES DAILY
Status: DISCONTINUED | OUTPATIENT
Start: 2024-12-04 | End: 2024-12-11 | Stop reason: HOSPADM

## 2024-12-04 RX ORDER — CEFTRIAXONE 2 G/50ML
2000 INJECTION, SOLUTION INTRAVENOUS EVERY 24 HOURS
Status: DISCONTINUED | OUTPATIENT
Start: 2024-12-04 | End: 2024-12-11 | Stop reason: HOSPADM

## 2024-12-04 RX ORDER — CLONAZEPAM 0.5 MG/1
1 TABLET ORAL 2 TIMES DAILY
Status: DISCONTINUED | OUTPATIENT
Start: 2024-12-04 | End: 2024-12-08

## 2024-12-04 RX ORDER — PANTOPRAZOLE SODIUM 40 MG/1
40 TABLET, DELAYED RELEASE ORAL
Status: DISCONTINUED | OUTPATIENT
Start: 2024-12-04 | End: 2024-12-05

## 2024-12-04 RX ORDER — ONDANSETRON 2 MG/ML
4 INJECTION INTRAMUSCULAR; INTRAVENOUS EVERY 6 HOURS PRN
Status: DISCONTINUED | OUTPATIENT
Start: 2024-12-04 | End: 2024-12-06

## 2024-12-04 RX ORDER — GUAIFENESIN 600 MG/1
600 TABLET, EXTENDED RELEASE ORAL 2 TIMES DAILY
Status: DISCONTINUED | OUTPATIENT
Start: 2024-12-04 | End: 2024-12-11 | Stop reason: HOSPADM

## 2024-12-04 RX ORDER — VILAZODONE HYDROCHLORIDE 20 MG/1
40 TABLET ORAL
Status: DISCONTINUED | OUTPATIENT
Start: 2024-12-04 | End: 2024-12-11 | Stop reason: HOSPADM

## 2024-12-04 RX ORDER — METRONIDAZOLE 500 MG/100ML
500 INJECTION, SOLUTION INTRAVENOUS EVERY 8 HOURS
Status: DISCONTINUED | OUTPATIENT
Start: 2024-12-04 | End: 2024-12-06

## 2024-12-04 RX ORDER — ALBUTEROL SULFATE 90 UG/1
2 INHALANT RESPIRATORY (INHALATION) EVERY 4 HOURS PRN
Status: DISCONTINUED | OUTPATIENT
Start: 2024-12-04 | End: 2024-12-06

## 2024-12-04 RX ADMIN — PANTOPRAZOLE SODIUM 40 MG: 40 TABLET, DELAYED RELEASE ORAL at 05:35

## 2024-12-04 RX ADMIN — LUBIPROSTONE 24 MCG: 24 CAPSULE, GELATIN COATED ORAL at 22:02

## 2024-12-04 RX ADMIN — ACETAMINOPHEN 650 MG: 325 TABLET, FILM COATED ORAL at 22:02

## 2024-12-04 RX ADMIN — HEPARIN SODIUM 7500 UNITS: 5000 INJECTION, SOLUTION INTRAVENOUS; SUBCUTANEOUS at 00:37

## 2024-12-04 RX ADMIN — CLONAZEPAM 1 MG: 0.5 TABLET ORAL at 08:51

## 2024-12-04 RX ADMIN — ACETAMINOPHEN 650 MG: 325 TABLET, FILM COATED ORAL at 00:40

## 2024-12-04 RX ADMIN — HEPARIN SODIUM 7500 UNITS: 5000 INJECTION, SOLUTION INTRAVENOUS; SUBCUTANEOUS at 15:35

## 2024-12-04 RX ADMIN — CEFTRIAXONE 1000 MG: 1 INJECTION, SOLUTION INTRAVENOUS at 01:25

## 2024-12-04 RX ADMIN — CLONAZEPAM 1 MG: 0.5 TABLET ORAL at 17:56

## 2024-12-04 RX ADMIN — METHADONE HYDROCHLORIDE 150 MG: 10 TABLET ORAL at 10:51

## 2024-12-04 RX ADMIN — LUBIPROSTONE 24 MCG: 24 CAPSULE, GELATIN COATED ORAL at 08:52

## 2024-12-04 RX ADMIN — LORATADINE 10 MG: 10 TABLET ORAL at 08:51

## 2024-12-04 RX ADMIN — CEFTRIAXONE 2000 MG: 2 INJECTION, SOLUTION INTRAVENOUS at 22:02

## 2024-12-04 RX ADMIN — PREGABALIN 150 MG: 75 CAPSULE ORAL at 17:56

## 2024-12-04 RX ADMIN — METRONIDAZOLE 500 MG: 500 INJECTION, SOLUTION INTRAVENOUS at 17:16

## 2024-12-04 RX ADMIN — METRONIDAZOLE 500 MG: 500 INJECTION, SOLUTION INTRAVENOUS at 23:51

## 2024-12-04 RX ADMIN — METRONIDAZOLE 500 MG: 500 INJECTION, SOLUTION INTRAVENOUS at 00:00

## 2024-12-04 RX ADMIN — PRAZOSIN HYDROCHLORIDE 5 MG: 1 CAPSULE ORAL at 08:53

## 2024-12-04 RX ADMIN — METRONIDAZOLE 500 MG: 500 INJECTION, SOLUTION INTRAVENOUS at 08:55

## 2024-12-04 RX ADMIN — ONDANSETRON 4 MG: 2 INJECTION INTRAMUSCULAR; INTRAVENOUS at 22:10

## 2024-12-04 RX ADMIN — HEPARIN SODIUM 7500 UNITS: 5000 INJECTION, SOLUTION INTRAVENOUS; SUBCUTANEOUS at 08:50

## 2024-12-04 RX ADMIN — VILAZODONE HYDROCHLORIDE 40 MG: 20 TABLET ORAL at 08:53

## 2024-12-04 RX ADMIN — GUAIFENESIN 600 MG: 600 TABLET, EXTENDED RELEASE ORAL at 17:56

## 2024-12-04 RX ADMIN — GUAIFENESIN 600 MG: 600 TABLET, EXTENDED RELEASE ORAL at 08:51

## 2024-12-04 RX ADMIN — PREGABALIN 150 MG: 75 CAPSULE ORAL at 08:51

## 2024-12-04 RX ADMIN — HEPARIN SODIUM 7500 UNITS: 5000 INJECTION, SOLUTION INTRAVENOUS; SUBCUTANEOUS at 22:02

## 2024-12-04 NOTE — PLAN OF CARE
Problem: RESPIRATORY - ADULT  Goal: Achieves optimal ventilation and oxygenation  Description: INTERVENTIONS:  - Assess for changes in respiratory status  - Assess for changes in mentation and behavior  - Position to facilitate oxygenation and minimize respiratory effort  - Oxygen administered by appropriate delivery if ordered  - Initiate smoking cessation education as indicated  - Encourage broncho-pulmonary hygiene including cough, deep breathe, Incentive Spirometry  - Assess the need for suctioning and aspirate as needed  - Assess and instruct to report SOB or any respiratory difficulty  - Respiratory Therapy support as indicated  Outcome: Progressing     Problem: METABOLIC, FLUID AND ELECTROLYTES - ADULT  Goal: Electrolytes maintained within normal limits  Description: INTERVENTIONS:  - Monitor labs and assess patient for signs and symptoms of electrolyte imbalances  - Administer electrolyte replacement as ordered  - Monitor response to electrolyte replacements, including repeat lab results as appropriate  - Instruct patient on fluid and nutrition as appropriate  Outcome: Progressing     Problem: PAIN - ADULT  Goal: Verbalizes/displays adequate comfort level or baseline comfort level  Description: Interventions:  - Encourage patient to monitor pain and request assistance  - Assess pain using appropriate pain scale  - Administer analgesics based on type and severity of pain and evaluate response  - Implement non-pharmacological measures as appropriate and evaluate response  - Consider cultural and social influences on pain and pain management  - Notify physician/advanced practitioner if interventions unsuccessful or patient reports new pain  Outcome: Progressing     Problem: INFECTION - ADULT  Goal: Absence or prevention of progression during hospitalization  Description: INTERVENTIONS:  - Assess and monitor for signs and symptoms of infection  - Monitor lab/diagnostic results  - Monitor all insertion sites,  i.e. indwelling lines, tubes, and drains  - Monitor endotracheal if appropriate and nasal secretions for changes in amount and color  - [place] appropriate cooling/warming therapies per order  - Administer medications as ordered  - Instruct and encourage patient and family to use good hand hygiene technique  - Identify and instruct in appropriate isolation precautions for identified infection/condition  Outcome: Progressing     Problem: DISCHARGE PLANNING  Goal: Discharge to home or other facility with appropriate resources  Description: INTERVENTIONS:  - Identify barriers to discharge w/patient and caregiver  - Arrange for needed discharge resources and transportation as appropriate  - Identify discharge learning needs (meds, etc.)  - Arrange for interpretive services to assist at discharge as needed  - Refer to Case Management Department for coordinating discharge planning if the patient needs post-hospital services based on physician/advanced practitioner order or complex needs related to functional status, cognitive ability, or social support system  Outcome: Progressing

## 2024-12-04 NOTE — RESPIRATORY THERAPY NOTE
RT Protocol Note  Sacha Foster 34 y.o. male MRN: [id number]  Unit/Bed#: 409-01 Encounter: [id number]    Assessment    Principal Problem:    Accidental overdose  Active Problems:    Acid reflux    Generalized anxiety disorder    Opioid dependence in remission (HCC)    Morbid obesity with BMI of 50.0-59.9, adult ([facility])    Depressed    Chronic constipation    Moderate persistent asthma without complication    Aspiration pneumonia ([facility])    Acute respiratory failure with hypoxia ([facility])      Home Pulmonary Medications:  Dulera, Albuterol MDI PRN  Home Devices/Therapy: BiPAP/CPAP  ASV     Past Medical History:   Diagnosis Date    Allergic     Anemia 06/24/2018    Anxiety     from records    Arthritis     Asthma     Benign essential hypertension 11/17/2016    Bipolar 1 disorder ([facility])     from records    Chronic pain     Chronic pain disorder     back/ knees/ hip    Claustrophobia 03/15/2018    Community acquired pneumonia 06/24/2018    CPAP (continuous positive airway pressure) dependence     Dental abscess     11/9/23- pt was started on amoxicillin- will finish on 11/19/23    Depressed 07/14/2016    Depression     from records    GERD (gastroesophageal reflux disease)     Hepatitis C virus infection 08/14/2014    Heroin abuse ([facility]) 07/24/2018    Infectious viral hepatitis     Obesity 10/12/2016    Obstructive sleep apnea     from records    Sleep disorder     Substance abuse ([facility])      Social History     Socioeconomic History    Marital status: Single     Spouse name: None    Number of children: None    Years of education: None    Highest education level: None   Occupational History    None   Tobacco Use    Smoking status: Former     Current packs/day: 0.00     Types: E-Cigarettes, Cigarettes     Passive exposure: Past    Smokeless tobacco: Never    Tobacco comments:     Vapes / 2019 quit cigs   Vaping Use    Vaping status: Every Day    Substances: Nicotine, Flavoring   Substance and Sexual Activity    Alcohol use: Never     Drug use: Not Currently     Types: Marijuana, Prescription, Methamphetamines     Comment: on Methadone; occaionally marijuana- last used 2/10/23    Sexual activity: Not Currently   Other Topics Concern    None   Social History Narrative    H/O intravenous drug use in remission    Hepatitis C virus Infection    Lives with parents    No caffeine use    Unemployed     Social Drivers of Health     Financial Resource Strain: High Risk (10/10/2022)    Overall Financial Resource Strain (CARDIA)     Difficulty of Paying Living Expenses: Very hard   Food Insecurity: No Food Insecurity (2024)    Nursing - Inadequate Food Risk Classification     Worried About Running Out of Food in the Last Year: Not on file     Ran Out of Food in the Last Year: Not on file     Ran Out of Food in the Last Year: 1   Transportation Needs: No Transportation Needs (2024)    Nursing - Transportation Risk Classification     Lack of Transportation: Not on file     Lack of Transportation: 2   Physical Activity: Not on file   Stress: Not on file   Social Connections: Unknown (2024)    Received from Identified    Social Connections     How often do you feel lonely or isolated from those around you? (Adult - for ages 18 years and over): Not on file   Intimate Partner Violence: Unknown (2024)    Nursing IPS     Feels Physically and Emotionally Safe: Not on file     Physically Hurt by Someone: Not on file     Humiliated or Emotionally Abused by Someone: Not on file     Physically Hurt by Someone: 2     Hurt or Threatened by Someone: 2   Housing Stability: Unknown (2024)    Nursing: Inadequate Housing Risk Classification     Has Housing: Not on file     Worried About Losing Housing: Not on file     Unable to Get Utilities: Not on file     Unable to Pay for Housing in the Last Year: 2     Has Housin       Subjective         Objective    Physical Exam:   Assessment Type: Assess only  Respiratory Pattern: Normal  Chest  "Assessment: Chest expansion symmetrical  Bilateral Breath Sounds: Diminished  Cough: None  O2 Device: Nasal Cannula    Vitals:  Blood pressure 115/75, pulse 87, temperature 98.5 °F (36.9 °C), temperature source Oral, resp. rate 16, height 5' 10\" (1.778 m), weight (!) 172 kg (378 lb 15.5 oz), SpO2 91%.          Imaging and other studies: Results Review Statement: I reviewed radiology reports from this admission including: chest xray.    O2 Device: Nasal Cannula     Plan    Respiratory Plan: Home Bronchodilator Patient pathway      Will order PRN Albuterol.  Patient wears ASV PAP for Central/ CATA.  Currently on oxygen for Sleep.           "

## 2024-12-04 NOTE — SEPSIS NOTE
"  Sepsis Note   Sacha Foster 34 y.o. male MRN: [id number]  Unit/Bed#: 409-01 Encounter: [id number]       Initial Sepsis Screening       Row Name 12/03/24 2315                Is the patient's history suggestive of a new or worsening infection? Yes (Proceed)  -BC        Suspected source of infection pneumonia  -BC        Indicate SIRS criteria Tachycardia > 90 bpm;Leukocytosis (WBC > 74645 IJL) OR Leukopenia (WBC <4000 IJL) OR Bandemia (WBC >10% bands)  -BC        Are two or more of the above signs & symptoms of infection both present and new to the patient? Yes (Proceed)  -BC        Assess for evidence of organ dysfunction: Are any of the below criteria present within 6 hours of suspected infection and SIRS criteria that are NOT considered to be chronic conditions? Lactate >/equal 4.0  -BC        Date of presentation of septic shock 12/03/24  -BC        Time of presentation of septic shock 2315  -BC        Fluid Resuscitation: 30 ml/kg IV fluid bolus will be given based on ideal body weight (use if BMI >30)  -BC        Is the patient is persistently hypotensive in the hour after fluid bolus administration? If yes, patient meets criteria for vasopressor use. NO  -BC        Sepsis Note: Click \"NEXT\" below (NOT \"close\") to generate sepsis note based on above information. YES (proceed by clicking \"NEXT\")  -BC                  User Key  (r) = Recorded By, (t) = Taken By, (c) = Cosigned By      Initials Name Provider Type    BC Waqar Babin MD Physician                    Default Flowsheet Data (Last 720 Hours)       Sepsis Reassess       Row Name 12/04/24 2200 12/03/24 2337                Repeat Volume Status and Tissue Perfusion Assessment Performed    Date of Reassessment: 12/04/24  -BC 12/03/24  -BC       Time of Reassessment: 0030  -BC 2337  -BC       Sepsis Reassessment Note: Click \"NEXT\" below (NOT \"close\") to generate sepsis reassessment note. YES (proceed by clicking \"NEXT\")  -BC YES (proceed " "by clicking \"NEXT\")  -BC       Repeat Volume Status and Tissue Perfusion Assessment Performed -- --                 User Key  (r) = Recorded By, (t) = Taken By, (c) = Cosigned By      Initials Name Provider Type    BC Waqar Babin MD Physician                    Body mass index is 54.38 kg/m².  Wt Readings from Last 1 Encounters:   12/04/24 (!) 172 kg (378 lb 15.5 oz)     IBW (Ideal Body Weight): 73 kg    Ideal body weight: 73 kg (160 lb 15 oz)  Adjusted ideal body weight: 113 kg (248 lb 2.4 oz)    "

## 2024-12-04 NOTE — UTILIZATION REVIEW
WAS OBSERVATION 12/3/24 @ 2342 CONVERTED TO INPATIENT ADMISSION 12/4/24 @ 1036 DUE TO CONTINUED STAY REQUIRED TO CARE FOR PATIENT WITH Accidental overdose, Aspiration pneumonia, Acute Respiratory Failure w/ hypoxia requiring Oxygen, IV abx.    Initial Clinical Review    Admission: Date/Time/Statement:   Admission Orders (From admission, onward)       Ordered        12/04/24 1036  INPATIENT ADMISSION  Once            12/03/24 2342  Place in Observation  Once                          Orders Placed This Encounter   Procedures    INPATIENT ADMISSION     Standing Status:   Standing     Number of Occurrences:   1     Level of Care:   Med Surg [16]     Estimated length of stay:   More than 2 Midnights     Certification:   I certify that inpatient services are medically necessary for this patient for a duration of greater than two midnights. See H&P and MD Progress Notes for additional information about the patient's course of treatment.     ED Arrival Information       Expected   -    Arrival   12/3/2024 21:48    Acuity   Emergent              Means of arrival   Ambulance    Escorted by   [facility]    Service   Hospitalist    Admission type   Emergency              Arrival complaint   Overdose             Chief Complaint   Patient presents with    Overdose - Accidental     Pt Found unresponsive at home given nasal narcan        Initial Presentation: 34 y.o. male who presented by EMS to [facility] ED. Admitted as Observation for evaluation and treatment of Accidental overdose.      PMHx:  has a past medical history of Allergic, Anemia (06/24/2018), Anxiety, Arthritis, Asthma, Benign essential hypertension (11/17/2016), Bipolar 1 disorder (HCC), Chronic pain, Chronic pain disorder, Claustrophobia (03/15/2018), Community acquired pneumonia (06/24/2018), CPAP (continuous positive airway pressure) dependence, Dental abscess, Depressed (07/14/2016), Depression, GERD (gastroesophageal reflux disease),  Hepatitis C virus infection (08/14/2014), Heroin abuse (HCC) (07/24/2018), Infectious viral hepatitis, Obesity (10/12/2016), Obstructive sleep apnea, Sleep disorder, and Substance abuse (HCC).      Presented w/ unresponsive at home after taking Xanax from the streets in addition to his medication he takes at home including Klovopin, Viibryd and other psych meds. Narcan given on field w/ response. Pt arrived on O2 2L 88%, O2 increase to 4L 91%.  On exam, decreased BS bilaterally. Abnormal Labs K 5.4, AST 81, ALT 76, WBC 15.35, Lactic Acid 4.1, Procal 1.19, WBC 15.35. CXR: BL lower lung field opacities, suspect aspiration event. See below med list for meds given in ED.    Plan: IV Rocephin, Flagyl. Mucinex, Zofran IV PRN, Albuteraol PRN. Wean O2 as able, Continue home meds.    Date: 12/4/24   Day 2: BED STATUS CHANGED TO INPATIENT  Pt requires @2 4L 90%. NC.Abnormal labs: Procal 2.65, WBC 11.32. Plan: continue IV Ceftriaxone, IV Flagyl, O2 to maintain sats >89%. Abnormal labs: Procal 2.65, WBC 11.32. Plan: IV Rocephin, Flagyl. Mucinex, Zofran IV PRN, Albuteraol PRN. Wean O2 as able, Tylenol PRN. Continue home meds.    12/5/24   Pt reports has had a headache since admission and vomited last night. Given Tylenol for headache w/ minimal relief. O2 Weaned down to O2 3L @ 90%. Abnormal labs: Procal 1.38. Plan: Continue IV abx, Continue to wean O2 as able. Mucinex, Breo Ellipta, PRN Albuterol. Add Motrin in addition to Tylenol PRN. Continue Zofran IV PRN, Continue home meds including methadone. CBC and CMP in am.     Certification Statement: The patient will continue to require additional inpatient hospital stay due to acute respiratory failure secondary to aspiration pneumonia due to drug overdose     ED Treatment-Medication Administration from 12/03/2024 0741 to 12/04/2024 0011         Date/Time Order Dose Route Action     12/03/2024 2207 ondansetron (ZOFRAN) injection 4 mg 4 mg Intravenous Given     12/03/2024 0811  sodium chloride 0.9 % bolus 1,000 mL 1,000 mL Intravenous New Bag     12/03/2024 2222 naloxone (FOR EMS ONLY) (NARCAN) 2 MG/2ML injection 2 mg 0 mg Does not apply Given to EMS     12/03/2024 2325 sodium chloride 0.9 % bolus 1,300 mL 1,300 mL Intravenous New Bag     12/03/2024 2326 cefTRIAXone (ROCEPHIN) IVPB (premix in dextrose) 1,000 mg 50 mL 1,000 mg Intravenous New Bag     12/04/2024 0000 metroNIDAZOLE (FLAGYL) IVPB (premix) 500 mg 100 mL 500 mg Intravenous New Bag            Scheduled Medications:  cefTRIAXone, 2,000 mg, Intravenous, Q24H  clonazePAM, 1 mg, Oral, BID  guaiFENesin, 600 mg, Oral, BID  heparin (porcine), 7,500 Units, Subcutaneous, Q8H MARICEL  loratadine, 10 mg, Oral, Daily  lubiprostone, 24 mcg, Oral, BID  methadone, 150 mg, Oral, Daily  metroNIDAZOLE, 500 mg, Intravenous, Q8H  pantoprazole, 40 mg, Oral, Early Morning  prazosin, 5 mg, Oral, Daily  pregabalin, 150 mg, Oral, BID  vilazodone, 40 mg, Oral, Daily With Breakfast    Continuous IV Infusions: NONE      PRN Meds:  acetaminophen, 650 mg, Oral, Q6H PRN - given x2 12/4, x1 12/5  albuterol, 2 puff, Inhalation, Q4H PRN  ondansetron (ZOFRAN) injection 4 mg  Dose: 4 mg  Freq: Every 6 hours PRN Route: IV  PRN Reasons: vomiting,nausea  Start: 12/04/24 2202 - given x1 12/4, x2 12/5    ED Triage Vitals [12/03/24 2152]   Temperature Pulse Respirations Blood Pressure SpO2 Pain Score   97.8 °F (36.6 °C) (!) 115 20 118/79 (!) 88 % No Pain     Weight (last 2 days)       Date/Time Weight    12/04/24 0013 172 (378.97)    12/03/24 2152 175 (385.81)            Vital Signs (last 3 days)       Date/Time Temp Pulse Resp BP MAP (mmHg) SpO2 Calculated FIO2 (%) - Nasal Cannula Nasal Cannula O2 Flow Rate (L/min) O2 Device O2 Interface Device Patient Position - Orthostatic VS Gabriela Coma Scale Score Pain    12/05/24 0949 -- -- -- -- -- -- -- -- -- -- -- -- Med Not Given for Pain - for MAR use only    12/05/24 0815 -- -- -- -- -- 90 % 32 3 L/min Nasal cannula -- -- -- --     12/05/24 0722 -- -- -- -- -- 92 % 32 3 L/min Nasal cannula -- -- -- --    12/05/24 0518 -- -- -- -- -- -- -- -- -- -- -- -- 3    12/05/24 0420 -- -- -- -- -- 95 % -- -- -- Face mask -- -- --    12/04/24 2300 -- -- -- -- -- 93 % -- -- -- Face mask -- -- --    12/04/24 22:15:51 98.9 °F (37.2 °C) 92 -- 139/90 106 92 % 36 4 L/min -- -- -- 15 --    12/04/24 1602 -- -- -- -- -- 90 % 36 4 L/min Nasal cannula -- -- -- --    12/04/24 1051 -- -- -- -- -- -- -- -- -- -- -- -- Med Not Given for Pain - for MAR use only    12/04/24 0900 -- -- -- -- -- 92 % 36 4 L/min Nasal cannula -- -- -- --    12/04/24 0852 -- -- -- -- -- -- -- -- -- -- -- -- Med Not Given for Pain - for MAR use only    12/04/24 07:25:36 98.2 °F (36.8 °C) 85 18 115/79 91 88 % -- -- -- -- -- -- --    12/04/24 0718 -- -- -- -- -- 89 % 36 4 L/min Nasal cannula -- -- -- --    12/04/24 0448 -- 87 16 -- -- 91 % 36 4 L/min Nasal cannula -- -- -- --    12/04/24 0111 -- -- -- -- -- 88 % 36 4 L/min Nasal cannula -- -- -- --    12/04/24 0100 -- 109 -- -- -- 92 % -- -- -- -- -- -- --    12/04/24 0040 -- -- -- -- -- -- -- -- -- -- -- -- 5    12/04/24 00:25:29 -- 112 14 115/75 88 88 % -- -- -- -- -- -- --    12/04/24 0022 98.5 °F (36.9 °C) -- -- -- -- -- -- -- -- -- -- -- 5    12/04/24 0020 -- -- -- -- -- -- 32 3 L/min Nasal cannula -- -- -- --    12/03/24 2300 -- 115 12 110/68 -- 91 % -- -- Nasal cannula -- Lying -- No Pain    12/03/24 2219 -- -- -- -- -- -- -- -- Nasal cannula -- -- -- --    12/03/24 2218 -- -- -- -- -- -- -- -- -- -- -- 15 No Pain    12/03/24 2215 -- 118 10 116/70 87 91 % -- -- None (Room air) -- -- -- No Pain    12/03/24 2152 97.8 °F (36.6 °C) 115 20 118/79 -- 88 % 28 2 L/min Nasal cannula -- Lying -- No Pain              Pertinent Labs/Diagnostic Test Results:   Radiology:  CT chest wo contrast   Final Interpretation by Bro Campos MD (12/04 0036)      1.  Multifocal bronchiolitis most prominently involving the dependent right lower lobe, favoring  sequelae of aspiration. See body of the report for full details.   2.  Hepatic steatosis.               Workstation performed: [id number]         XR chest portable   ED Interpretation by Waqar Babin MD (12/03 2208)   Abnormal   BL lower lung field opacities, suspect aspiration event        Results from last 7 days   Lab Units 12/05/24  0958 12/04/24  0537 12/03/24 2203   WBC Thousand/uL 6.74 11.32* 15.35*   HEMOGLOBIN g/dL 12.1 12.3 13.6   HEMATOCRIT % 37.4 38.1 43.2   PLATELETS Thousands/uL 162 196 193   TOTAL NEUT ABS Thousands/µL 4.80  --  13.64*         Results from last 7 days   Lab Units 12/05/24  0958 12/04/24  0537 12/03/24 2203   SODIUM mmol/L 136 138 137   POTASSIUM mmol/L 4.2 4.7 5.4*   CHLORIDE mmol/L 97 99 97   CO2 mmol/L 32 34* 26   ANION GAP mmol/L 7 5 14*   BUN mg/dL 10 14 15   CREATININE mg/dL 0.76 0.80 1.16   EGFR ml/min/1.73sq m 118 115 81   CALCIUM mg/dL 8.6 8.4 9.0     Results from last 7 days   Lab Units 12/05/24  0958 12/03/24  2203   AST U/L 67* 81*   ALT U/L 57* 76*   ALK PHOS U/L 46 61   TOTAL PROTEIN g/dL 6.9 7.5   ALBUMIN g/dL 3.9 4.1   TOTAL BILIRUBIN mg/dL 0.23 0.25         Results from last 7 days   Lab Units 12/05/24  0958 12/04/24  0537 12/03/24  2203   GLUCOSE RANDOM mg/dL 131 115 237*     Results from last 7 days   Lab Units 12/03/24  2248   PROTIME seconds 13.3   INR  0.96   PTT seconds 26         Results from last 7 days   Lab Units 12/05/24  0958 12/04/24  0537 12/03/24  2248   PROCALCITONIN ng/ml 1.38* 2.65* 1.19*     Results from last 7 days   Lab Units 12/04/24  0210 12/03/24  2248   LACTIC ACID mmol/L 1.3 4.1*       Past Medical History:   Diagnosis Date    Allergic     Anemia 06/24/2018    Anxiety     from records    Arthritis     Asthma     Benign essential hypertension 11/17/2016    Bipolar 1 disorder (HCC)     from records    Chronic pain     Chronic pain disorder     back/ knees/ hip    Claustrophobia 03/15/2018    Community acquired pneumonia  06/24/2018    CPAP (continuous positive airway pressure) dependence     Dental abscess     11/9/23- pt was started on amoxicillin- will finish on 11/19/23    Depressed 07/14/2016    Depression     from records    GERD (gastroesophageal reflux disease)     Hepatitis C virus infection 08/14/2014    Heroin abuse (HCC) 07/24/2018    Infectious viral hepatitis     Obesity 10/12/2016    Obstructive sleep apnea     from records    Sleep disorder     Substance abuse (HCC)      Present on Admission:   Acid reflux   Aspiration pneumonia (HCC)   Opioid dependence in remission (HCC)   Accidental overdose   Acute respiratory failure with hypoxia (HCC)   Generalized anxiety disorder   Moderate persistent asthma without complication   Depressed   Chronic constipation      Admitting Diagnosis: Aspiration pneumonitis (HCC) [J69.0]  Hyperkalemia [E87.5]  Lactic acidosis [E87.20]  Overdose [T50.901A]  Accidental drug overdose, initial encounter [T50.901A]  Septic shock (HCC) [A41.9, R65.21]  Age/Sex: 34 y.o. male    Network Utilization Review Department  ATTENTION: Please call with any questions or concerns to [phone] and carefully listen to the prompts so that you are directed to the right person. All voicemails are confidential.   For Discharge needs, contact Care Management DC Support Team at [phone] opt. [phone]  Send all requests for admission clinical reviews, approved or denied determinations and any other requests to dedicated fax number below belonging to the campus where the patient is receiving treatment. List of dedicated fax numbers for the Facilities:  FACILITY NAME UR FAX NUMBER   ADMISSION DENIALS (Administrative/Medical Necessity) [phone]   DISCHARGE SUPPORT TEAM (NETWORK) [phone]   PARENT CHILD HEALTH (Maternity/NICU/Pediatrics) [phone]   [facility] [phone]   [facility] [phone]   [facility] [phone]    [facility] [phone]   [facility] [phone]   [facility] [phone]   [facility] [phone]   [facility] [phone]   [facility] [phone]   [facility] [phone]   [facility] [phone]   [facility] [phone]

## 2024-12-04 NOTE — QUICK NOTE
Patient with evidence of septic shock present on admission, ER note reviewed, H&P.    Continue antibiotics patient with acute respiratory failure with hypoxia requiring continuous oxygen via nasal cannula requiring 4 L to maintain sats greater than 88%    Will require respiratory therapy intervention, ongoing IV antibiotics.

## 2024-12-04 NOTE — RESPIRATORY THERAPY NOTE
12/04/24 0718   Respiratory Assessment   Resp Comments patient received sleeping laying on his left side on 4 lpm, he woke easily, left wrapped IS in his room for education at a later time   Oxygen Therapy/Pulse Ox   O2 Device Nasal cannula   O2 Therapy Oxygen   Nasal Cannula O2 Flow Rate (L/min) 4 L/min   Calculated FIO2 (%) - Nasal Cannula 36   SpO2 (!) 89 %   SpO2 Activity At Rest   $ Pulse Oximetry Spot Check Charge Completed

## 2024-12-04 NOTE — ED CARE HANDOFF
[facility] Warm Handoff Outcome Note    Patient name Sacha Foster  Location /[id number] MRN [id number]  Age: 34 y.o.          Plan Type:  Warm Handoff                                                                                    Plan Date: 12/4/2024  Service:  ED Warm Handoff      Substance Use History:  Opioid (Currently receiving Methadone), Hx. Of Methamphetamine and THC    Warm Handoff Update:  [facility] (Contact: Arlen) confirmed pt. to be connected with D/A services and refused WHO referral/follow-up.    Warm Handoff Outcome: Patient Refused

## 2024-12-04 NOTE — ED PROVIDER NOTES
Time reflects when diagnosis was documented in both MDM as applicable and the Disposition within this note       Time User Action Codes Description Comment    12/3/2024 11:36 PM Waqar Babin [T50.901A] Accidental drug overdose, initial encounter     12/3/2024 11:36 PM Waqar Babin [J69.0] Aspiration pneumonitis (HCC)     12/3/2024 11:36 PM Waqar Babin [A41.9,  R65.21] Septic shock (HCC)     12/3/2024 11:36 PM Waqar Babin [E87.5] Hyperkalemia     12/3/2024 11:36 PM Waqar Babin [E87.20] Lactic acidosis           ED Disposition       ED Disposition   Admit    Condition   Stable    Date/Time   Tue Dec 3, 2024 11:36 PM    Comment   Case was discussed with DERIAN and the patient's admission status was agreed to be Admission Status: observation status to the service of Dr. Perez .               Assessment & Plan       Medical Decision Making  I reviewed the patient's medical chart, PMHx, prior encounters, medications.    My independent interpretation of ECG demonstrated: Sinus tachycardia,   My independent interpretation of CXR demonstrated: BL lower lung fields    My DDx includes: opioid overdose, medication overdose, electrolyte abnormality, dehydration, toxidrome, KATHY. At this time given history do not suspect trauma.    Given reversal of symptomatology with narcan, suspect this is opioid overdose. Will check labs including electrolytes, evaluate kidney function for dehydration, check CXR for possible aspiration. Zofran for nausea, fluids for resuscitation, will reassess. Patient has new O2 requirement of 4L NC. Patient's breathing will continue to be monitored here to evaluate for further effects of aspiration as well as bradypnea.    Given SIRS positive, broad infectious workup was performed that showed a lactate of 4.1. Given that this meets septic shock criteria, will treat with 30 cc/kg fluids based on IBW as patient's BMI is 55. Ceftriaxone/flagyl  ordered.    Patient ultimately admitted to [facility] service    Amount and/or Complexity of Data Reviewed  Labs: ordered. Decision-making details documented in ED Course.  Radiology: ordered and independent interpretation performed.    Risk  Prescription drug management.  Decision regarding hospitalization.        ED Course as of 12/04/24 2158   Tue Dec 03, 2024   2211 WBC(!): 15.35  Mild leukocytosis noted.   2239 Potassium(!): 5.4  Will trend at this time, very minimal elevation, will likely correct with fluids and resolution of mild KATHY.   2319 Procalcitonin(!): 1.19  Elevated.       Medications   ondansetron (ZOFRAN) injection 4 mg (4 mg Intravenous Given 12/3/24 2209)   sodium chloride 0.9 % bolus 1,000 mL (0 mL Intravenous Stopped 12/3/24 2345)   naloxone (FOR EMS ONLY) (NARCAN) 2 MG/2ML injection 2 mg (0 mg Does not apply Given to EMS 12/3/24 2222)   sodium chloride 0.9 % bolus 1,300 mL (1,300 mL Intravenous New Bag 12/3/24 2325)   cefTRIAXone (ROCEPHIN) IVPB (premix in dextrose) 1,000 mg 50 mL (0 mg Intravenous Stopped 12/3/24 2358)   metroNIDAZOLE (FLAGYL) IVPB (premix) 500 mg 100 mL (500 mg Intravenous New Bag 12/4/24 0000)       ED Risk Strat Scores                           SBIRT 20yo+      Flowsheet Row Most Recent Value   Initial Alcohol Screen: US AUDIT-C     1. How often do you have a drink containing alcohol? 0 Filed at: 12/03/2024 2154   2. How many drinks containing alcohol do you have on a typical day you are drinking?  0 Filed at: 12/03/2024 2154   3a. Male UNDER 65: How often do you have five or more drinks on one occasion? 0 Filed at: 12/03/2024 2154   3b. FEMALE Any Age, or MALE 65+: How often do you have 4 or more drinks on one occassion? 0 Filed at: 12/03/2024 2154   Audit-C Score 0 Filed at: 12/03/2024 2154   CHARLINE: How many times in the past year have you...    Used an illegal drug or used a prescription medication for non-medical reasons? Daily or Almost Daily Filed at: 12/03/2024 2709                             History of Present Illness       Chief Complaint   Patient presents with    Overdose - Accidental     Pt Found unresponsive at home given nasal narcan        Past Medical History:   Diagnosis Date    Allergic     Anemia 06/24/2018    Anxiety     from records    Arthritis     Asthma     Benign essential hypertension 11/17/2016    Bipolar 1 disorder (HCC)     from records    Chronic pain     Chronic pain disorder     back/ knees/ hip    Claustrophobia 03/15/2018    Community acquired pneumonia 06/24/2018    CPAP (continuous positive airway pressure) dependence     Dental abscess     11/9/23- pt was started on amoxicillin- will finish on 11/19/23    Depressed 07/14/2016    Depression     from records    GERD (gastroesophageal reflux disease)     Hepatitis C virus infection 08/14/2014    Heroin abuse (HCC) 07/24/2018    Infectious viral hepatitis     Obesity 10/12/2016    Obstructive sleep apnea     from records    Sleep disorder     Substance abuse ([facility])       Past Surgical History:   Procedure Laterality Date    ACHILLES TENDON SURGERY Right 11/27/2023    Procedure: transfer of peroneus brevis tendon to the cuboid bone;  Surgeon: James R Lachman, MD;  Location:  MAIN OR;  Service: Orthopedics    COLONOSCOPY      EGD      EPIDURAL BLOCK INJECTION Right 10/28/2022    Procedure: BLOCK / INJECTION EPIDURAL STEROID LUMBAR  right L4-5 and L5-S1 TFESI;  Surgeon: Lorena Hernandez MD;  Location: [facility];  Service: Pain Management     EPIDURAL BLOCK INJECTION Right 12/20/2022    Procedure: BLOCK / INJECTION EPIDURAL STEROID LUMBAR  right  L4-5 and L5-S1 TFESI;  Surgeon: Lorena Hernandez MD;  Location: [facility];  Service: Pain Management     CT INCISION & DRAINAGE ABSCESS COMPLICATED/MULTIPLE Left 05/02/2019    Procedure: INCISION AND DRAINAGE (I&D) EXTREMITY;  Surgeon: Fco Woodall MD;  Location: [facility];  Service: Orthopedics    CT LAPAROSCOPY COLECTOMY PARTIAL W/ANASTOMOSIS Left  05/21/2020    Procedure: LAPAROSCOPIC LEFT HEMICOLECTOMY TAKEDOWN SPLENIC FLECTURE, COLORECTAL ANASTOMOSIS.;  Surgeon: Abdelrahman Canales MD;  Location: [facility];  Service: Colorectal    UT NEUROPLASTY &/TRANSPOS MEDIAN NRV CARPAL TUNNE Left 01/09/2023    Procedure: RELEASE CARPAL TUNNEL;  Surgeon: Isauro Ledbetter DO;  Location: [facility];  Service: Orthopedics    UT OPEN TREATMENT METATARSAL FRACTURE EACH Right 02/24/2023    Procedure: OPEN REDUCTION W/ INTERNAL FIXATION (ORIF) FOOT;  Surgeon: Esteban Talamantes DPM;  Location: [facility];  Service: Podiatry    UT REMOVAL IMPLANT DEEP Right 11/27/2023    Procedure: REMOVAL HARDWARE FOOT, excision of painful os vesalanium, transfer of peroneus brevis tendon to the cuboid bone;  Surgeon: James R Lachman, MD;  Location:  MAIN OR;  Service: Orthopedics    WISDOM TOOTH EXTRACTION        Family History   Problem Relation Age of Onset    Diabetes Mother     Restless legs syndrome Mother     Sleep apnea Mother     Colon cancer Father     Alzheimer's disease Maternal Grandmother     Depression Family       Social History     Tobacco Use    Smoking status: Former     Current packs/day: 0.00     Types: E-Cigarettes, Cigarettes     Passive exposure: Past    Smokeless tobacco: Never    Tobacco comments:     Vapes / 2019 quit cigs   Vaping Use    Vaping status: Every Day    Substances: Nicotine, Flavoring   Substance Use Topics    Alcohol use: Never    Drug use: Not Currently     Types: Marijuana, Prescription, Methamphetamines     Comment: on Methadone; occaionally marijuana- last used 2/10/23      E-Cigarette/Vaping    E-Cigarette Use Current Every Day User     Cartridges/Day 1     Comments NICOTINE       E-Cigarette/Vaping Substances    Nicotine Yes     THC No     CBD No     Flavoring Yes     Other No     Unknown No       I have reviewed and agree with the history as documented.     34-year-old male who presents for suspected overdose.  Patient reports that he  did take opioids several hours ago other is not sure exactly what time.  He was found in his bed on his side.  Interviewed he did vomit.  He does feel nauseous currently.  Denies any known trauma.  He was given Narcan in the field with significant improvement, initially was very poorly responsive, is now answering questions appropriately.  Review of systems otherwise negative        Review of Systems   Constitutional:  Negative for chills, diaphoresis, fatigue and fever.   HENT:  Negative for congestion and sore throat.    Eyes:  Negative for visual disturbance.   Respiratory:  Negative for cough, chest tightness and shortness of breath.    Cardiovascular:  Negative for chest pain, palpitations and leg swelling.   Gastrointestinal:  Positive for nausea and vomiting. Negative for abdominal distention, abdominal pain, constipation and diarrhea.   Genitourinary:  Negative for difficulty urinating and dysuria.   Musculoskeletal:  Negative for arthralgias and myalgias.   Skin:  Negative for rash.   Neurological:  Positive for weakness. Negative for dizziness, light-headedness, numbness and headaches.   Psychiatric/Behavioral:  Negative for agitation, behavioral problems and confusion. The patient is not nervous/anxious.    All other systems reviewed and are negative.          Objective       ED Triage Vitals [12/03/24 2152]   Temperature Pulse Blood Pressure Respirations SpO2 Patient Position - Orthostatic VS   97.8 °F (36.6 °C) (!) 115 118/79 20 (!) 88 % Lying      Temp Source Heart Rate Source BP Location FiO2 (%) Pain Score    Temporal Monitor Right arm -- No Pain      Vitals      Date and Time Temp Pulse SpO2 Resp BP Pain Score FACES Pain Rating User   12/04/24 1602 -- -- 90 % -- -- -- --    12/04/24 1051 -- -- -- -- -- Med Not Given for Pain - for MAR use only --    12/04/24 0900 -- -- 92 % -- -- -- --    12/04/24 0852 -- -- -- -- -- Med Not Given for Pain - for MAR use only --    12/04/24 0725 98.2 °F (36.8  °C) 85 88 % 18 115/79 -- -- DII   12/04/24 0718 -- -- 89 % -- -- -- -- GK   12/04/24 0448 -- 87 91 % 16 -- -- -- JMS   12/04/24 0111 -- -- 88 % -- -- -- -- AR   12/04/24 0100 -- 109 92 % -- -- -- -- AR   12/04/24 0040 -- -- -- -- -- 5 -- AR   12/04/24 0025 -- 112 88 % 14 115/75 -- -- DII   12/04/24 0022 98.5 °F (36.9 °C) -- -- -- -- 5 -- AR   12/03/24 2300 -- 115 91 % 12 110/68 No Pain -- EZ   12/03/24 2218 -- -- -- -- -- No Pain -- EZ   12/03/24 2215 -- 118 91 % 10 116/70 No Pain -- EZ   12/03/24 2152 97.8 °F (36.6 °C) 115 88 % 20 118/79 No Pain -- RJP            Physical Exam  Constitutional:       Appearance: He is well-developed.   HENT:      Head: Normocephalic and atraumatic.   Cardiovascular:      Rate and Rhythm: Normal rate and regular rhythm.      Heart sounds: Normal heart sounds. No murmur heard.  Pulmonary:      Effort: Pulmonary effort is normal. No respiratory distress.      Breath sounds: Normal breath sounds.   Abdominal:      General: Bowel sounds are normal. There is no distension.      Palpations: Abdomen is soft.      Tenderness: There is no abdominal tenderness.   Musculoskeletal:         General: No deformity.   Skin:     General: Skin is warm.      Findings: No rash.   Neurological:      Mental Status: He is alert and oriented to person, place, and time.   Psychiatric:         Behavior: Behavior normal.         Thought Content: Thought content normal.         Judgment: Judgment normal.         Results Reviewed       Procedure Component Value Units Date/Time    Blood culture #1 [[id number]] Collected: 12/03/24 2248    Lab Status: Preliminary result Specimen: Blood from Line, Venous Updated: 12/04/24 0901     Blood Culture Received in Microbiology Lab. Culture in Progress.    Blood culture #2 [[id number]] Collected: 12/03/24 2249    Lab Status: Preliminary result Specimen: Blood from Line, Venous Updated: 12/04/24 0901     Blood Culture Received in Microbiology Lab. Culture in Progress.    Lactic  acid 2 Hours [[id number]]  (Normal) Collected: 12/04/24 0210    Lab Status: Final result Specimen: Blood from Hand, Right Updated: 12/04/24 0231     LACTIC ACID 1.3 mmol/L     Narrative:      Result may be elevated if tourniquet was used during collection.    Procalcitonin [[id number]]  (Abnormal) Collected: 12/03/24 2248    Lab Status: Final result Specimen: Blood from Line, Venous Updated: 12/03/24 2319     Procalcitonin 1.19 ng/ml     Lactic acid [[id number]]  (Abnormal) Collected: 12/03/24 2248    Lab Status: Final result Specimen: Blood from Line, Venous Updated: 12/03/24 2313     LACTIC ACID 4.1 mmol/L     Narrative:      Result may be elevated if tourniquet was used during collection.    Protime-INR [[id number]]  (Normal) Collected: 12/03/24 2248    Lab Status: Final result Specimen: Blood from Line, Venous Updated: 12/03/24 2306     Protime 13.3 seconds      INR 0.96    Narrative:      INR Therapeutic Range    Indication                                             INR Range      Atrial Fibrillation                                               2.0-3.0  Hypercoagulable State                                    2.0.2.3  Left Ventricular Asist Device                            2.0-3.0  Mechanical Heart Valve                                  -    Aortic(with afib, MI, embolism, HF, LA enlargement,    and/or coagulopathy)                                     2.0-3.0 (2.5-3.5)     Mitral                                                             2.5-3.5  Prosthetic/Bioprosthetic Heart Valve               2.0-3.0  Venous thromboembolism (VTE: VT, PE        2.0-3.0    APTT [[id number]]  (Normal) Collected: 12/03/24 2248    Lab Status: Final result Specimen: Blood from Line, Venous Updated: 12/03/24 2306     PTT 26 seconds     Comprehensive metabolic panel [[id number]]  (Abnormal) Collected: 12/03/24 2203    Lab Status: Final result Specimen: Blood from Arm, Right Updated: 12/03/24 2226     Sodium 137 mmol/L      Potassium  5.4 mmol/L      Chloride 97 mmol/L      CO2 26 mmol/L      ANION GAP 14 mmol/L      BUN 15 mg/dL      Creatinine 1.16 mg/dL      Glucose 237 mg/dL      Calcium 9.0 mg/dL      AST 81 U/L      ALT 76 U/L      Alkaline Phosphatase 61 U/L      Total Protein 7.5 g/dL      Albumin 4.1 g/dL      Total Bilirubin 0.25 mg/dL      eGFR 81 ml/min/1.73sq m     Narrative:      National Kidney Disease Foundation guidelines for Chronic Kidney Disease (CKD):     Stage 1 with normal or high GFR (GFR > 90 mL/min/1.73 square meters)    Stage 2 Mild CKD (GFR = 60-89 mL/min/1.73 square meters)    Stage 3A Moderate CKD (GFR = 45-59 mL/min/1.73 square meters)    Stage 3B Moderate CKD (GFR = 30-44 mL/min/1.73 square meters)    Stage 4 Severe CKD (GFR = 15-29 mL/min/1.73 square meters)    Stage 5 End Stage CKD (GFR <15 mL/min/1.73 square meters)  Note: GFR calculation is accurate only with a steady state creatinine    CBC and differential [[id number]]  (Abnormal) Collected: 12/03/24 2203    Lab Status: Final result Specimen: Blood from Arm, Right Updated: 12/03/24 2209     WBC 15.35 Thousand/uL      RBC 5.13 Million/uL      Hemoglobin 13.6 g/dL      Hematocrit 43.2 %      MCV 84 fL      MCH 26.5 pg      MCHC 31.5 g/dL      RDW 13.9 %      MPV 9.3 fL      Platelets 193 Thousands/uL      nRBC 0 /100 WBCs      Segmented % 88 %      Immature Grans % 2 %      Lymphocytes % 7 %      Monocytes % 3 %      Eosinophils Relative 0 %      Basophils Relative 0 %      Absolute Neutrophils 13.64 Thousands/µL      Absolute Immature Grans 0.25 Thousand/uL      Absolute Lymphocytes 1.05 Thousands/µL      Absolute Monocytes 0.38 Thousand/µL      Eosinophils Absolute 0.00 Thousand/µL      Basophils Absolute 0.03 Thousands/µL             CT chest wo contrast   Final Interpretation by Bro Campos MD (12/04 0036)      1.  Multifocal bronchiolitis most prominently involving the dependent right lower lobe, favoring sequelae of aspiration. See body of the report for  full details.   2.  Hepatic steatosis.               Workstation performed: [id number]         XR chest portable   ED Interpretation by Waqar Babin MD (12/03 2208)   Abnormal   BL lower lung field opacities, suspect aspiration event      Final Interpretation by Jose Angel Garcia MD (12/04 0940)      Possible mild interstitial edema in the lung bases. No pleural effusions.            Resident: BUBBA GRAY I, the attending radiologist, have reviewed the images and agree with the final report above.      Workstation performed: [id number]             ECG 12 Lead Documentation Only    Date/Time: 12/3/2024 10:09 PM    Performed by: Waqar Babin MD  Authorized by: Waqar Babin MD    Indications / Diagnosis:  Overdose  ECG reviewed by me, the ED Provider: yes    Patient location:  ED  Previous ECG:     Previous ECG:  Compared to current    Similarity:  No change  Interpretation:     Interpretation: abnormal    Rate:     ECG rate:  120    ECG rate assessment: tachycardic    Rhythm:     Rhythm: sinus rhythm    Ectopy:     Ectopy: none    QRS:     QRS axis:  Normal    QRS intervals:  Normal  Conduction:     Conduction: normal    ST segments:     ST segments:  Normal  T waves:     T waves: normal    CriticalCare Time    Date/Time: 12/3/2024 11:15 PM    Performed by: Waqar Babin MD  Authorized by: Waqar Babin MD    Critical care provider statement:     Critical care time (minutes):  35    Critical care start time:  12/3/2024 11:15 PM    Critical care end time:  12/3/2024 11:50 PM    Critical care time was exclusive of:  Teaching time and separately billable procedures and treating other patients    Critical care was necessary to treat or prevent imminent or life-threatening deterioration of the following conditions:  Sepsis and shock    Critical care was time spent personally by me on the following activities:  Obtaining history from  patient or surrogate, development of treatment plan with patient or surrogate, examination of patient, ordering and performing treatments and interventions, ordering and review of laboratory studies, ordering and review of radiographic studies, re-evaluation of patient's condition, evaluation of patient's response to treatment and review of old charts    I assumed direction of critical care for this patient from another provider in my specialty: no        ED Medication and Procedure Management   Prior to Admission Medications   Prescriptions Last Dose Informant Patient Reported? Taking?   Linzess 145 MCG CAPS 12/2/2024 Self Yes Yes   Sig: Take 145 mcg by mouth every other day   METHADONE HCL PO 12/3/2024 Self Yes Yes   Sig: Take 150 mg by mouth daily   Rexulti 2 MG tablet  Self Yes No   Sig: Take 2 mg by mouth daily   Patient not taking: Reported on 6/24/2024   Rexulti 3 MG tablet Not Taking  Yes No   Patient not taking: Reported on 12/4/2024   SODIUM FLUORIDE, DENTAL GEL, 1.1 % CREA   No No   Sig: Apply to teeth daily at bedtime   Semaglutide-Weight Management (Wegovy) 0.25 MG/0.5ML 11/30/2024  No Yes   Sig: Inject 0.25 mg under the skin weekly   Testosterone Cypionate 200 MG/ML SOLN 12/3/2024  No Yes   Sig: Inject 200 mg as directed every 14 (fourteen) days   albuterol (Ventolin HFA) 90 mcg/act inhaler Past Week Self No Yes   Sig: Inhale 2 puffs every 6 (six) hours as needed for wheezing   clonazePAM (KlonoPIN) 1 mg tablet 12/3/2024  Yes Yes   Sig: Take 1 mg by mouth 2 (two) times a day   levocetirizine (XYZAL) 5 MG tablet Past Week  No Yes   Sig: TAKE ONE TABLET BY MOUTH EVERY EVENING   mometasone-formoterol (Dulera) 200-5 MCG/ACT inhaler Past Week  No Yes   Sig: Inhale 2 puffs 2 (two) times a day Rinse mouth after use.   nystatin (MYCOSTATIN) cream Not Taking  No No   Sig: APPLY TOPICALLY DAILY AT BEDTIME   Patient not taking: Reported on 12/4/2024   nystatin (MYCOSTATIN) powder Unknown  No No   Sig: Apply  topically every morning   omeprazole (PriLOSEC) 20 mg delayed release capsule Past Week Self Yes Yes   Sig: Take 20 mg by mouth 2 (two) times a day    ondansetron (ZOFRAN-ODT) 4 mg disintegrating tablet Past Week  Yes Yes   Sig: Take 4 mg by mouth as needed for nausea   prazosin (MINIPRESS) 1 mg capsule   Yes No   Sig: Take by mouth daily at bedtime   prazosin (MINIPRESS) 2 mg capsule   Yes No   Sig: Take 2 mg by mouth daily at bedtime   Patient not taking: Reported on 2024   prazosin (MINIPRESS) 5 mg capsule Past Week  Yes Yes   pregabalin (LYRICA) 150 mg capsule Past Week  No Yes   Sig: TAKE 1 CAPSULE (150 MG TOTAL) BY MOUTH 2 (TWO) TIMES A DAY   temazepam (RESTORIL) 15 mg capsule Past Week Self Yes Yes   Sig: Take 15 mg by mouth daily at bedtime   vilazodone (VIIBRYD) 40 mg tablet Past Week Self Yes Yes   Si mg daily with breakfast      Facility-Administered Medications Last Administration Doses Remaining   testosterone cypionate (DEPO-TESTOSTERONE) 200 mg/mL IM injection 200 mg 12/3/2024  1:25 PM 0        Current Discharge Medication List        CONTINUE these medications which have NOT CHANGED    Details   albuterol (Ventolin HFA) 90 mcg/act inhaler Inhale 2 puffs every 6 (six) hours as needed for wheezing  Qty: 18 g, Refills: 6    Comments: Substitution to a formulary equivalent within the same pharmaceutical class is authorized.  Associated Diagnoses: Mild persistent asthma without complication      clonazePAM (KlonoPIN) 1 mg tablet Take 1 mg by mouth 2 (two) times a day      levocetirizine (XYZAL) 5 MG tablet TAKE ONE TABLET BY MOUTH EVERY EVENING  Qty: 100 tablet, Refills: 1    Associated Diagnoses: Seasonal allergies      Linzess 145 MCG CAPS Take 145 mcg by mouth every other day      METHADONE HCL PO Take 150 mg by mouth daily      mometasone-formoterol (Dulera) 200-5 MCG/ACT inhaler Inhale 2 puffs 2 (two) times a day Rinse mouth after use.  Qty: 39 g, Refills: 6    Associated Diagnoses:  Moderate persistent asthma without complication      omeprazole (PriLOSEC) 20 mg delayed release capsule Take 20 mg by mouth 2 (two) times a day       ondansetron (ZOFRAN-ODT) 4 mg disintegrating tablet Take 4 mg by mouth as needed for nausea      !! prazosin (MINIPRESS) 5 mg capsule       pregabalin (LYRICA) 150 mg capsule TAKE 1 CAPSULE (150 MG TOTAL) BY MOUTH 2 (TWO) TIMES A DAY  Qty: 60 capsule, Refills: 3    Associated Diagnoses: Lumbar radiculopathy      Semaglutide-Weight Management (Wegovy) 0.25 MG/0.5ML Inject 0.25 mg under the skin weekly  Qty: 2 mL, Refills: 0    Associated Diagnoses: Obesity, Class III, BMI 40-49.9 (morbid obesity) (HCC)      temazepam (RESTORIL) 15 mg capsule Take 15 mg by mouth daily at bedtime      Testosterone Cypionate 200 MG/ML SOLN Inject 200 mg as directed every 14 (fourteen) days  Qty: 30 mL, Refills: 3    Associated Diagnoses: Hypogonadism in male      vilazodone (VIIBRYD) 40 mg tablet 40 mg daily with breakfast      nystatin (MYCOSTATIN) cream APPLY TOPICALLY DAILY AT BEDTIME  Qty: 30 g, Refills: 3    Associated Diagnoses: Tinea cruris      nystatin (MYCOSTATIN) powder Apply topically every morning  Qty: 30 g, Refills: 2    Associated Diagnoses: Tinea cruris      !! prazosin (MINIPRESS) 1 mg capsule Take by mouth daily at bedtime      !! prazosin (MINIPRESS) 2 mg capsule Take 2 mg by mouth daily at bedtime      !! Rexulti 2 MG tablet Take 2 mg by mouth daily      !! Rexulti 3 MG tablet       SODIUM FLUORIDE, DENTAL GEL, 1.1 % CREA Apply to teeth daily at bedtime  Qty: 51 g, Refills: 20    Associated Diagnoses: Tooth decay       !! - Potential duplicate medications found. Please discuss with provider.        No discharge procedures on file.  ED SEPSIS DOCUMENTATION   Time reflects when diagnosis was documented in both MDM as applicable and the Disposition within this note       Time User Action Codes Description Comment    12/3/2024 11:36 PM Waqar Babin Add [T59.982H]  "Accidental drug overdose, initial encounter     12/3/2024 11:36 PM Waqar Babin Add [J69.0] Aspiration pneumonitis (HCC)     12/3/2024 11:36 PM Waqar Babin Add [A41.9,  R65.21] Septic shock (HCC)     12/3/2024 11:36 PM Waqar Babin Add [E87.5] Hyperkalemia     12/3/2024 11:36 PM Waqar Babin [E87.20] Lactic acidosis            Initial Sepsis Screening       Row Name 12/03/24 2315                Is the patient's history suggestive of a new or worsening infection? Yes (Proceed)  -BC        Suspected source of infection pneumonia  -BC        Indicate SIRS criteria Tachycardia > 90 bpm;Leukocytosis (WBC > 95820 IJL) OR Leukopenia (WBC <4000 IJL) OR Bandemia (WBC >10% bands)  -BC        Are two or more of the above signs & symptoms of infection both present and new to the patient? Yes (Proceed)  -BC        Assess for evidence of organ dysfunction: Are any of the below criteria present within 6 hours of suspected infection and SIRS criteria that are NOT considered to be chronic conditions? Lactate >/equal 4.0  -BC        Date of presentation of septic shock 12/03/24  -BC        Time of presentation of septic shock 2315  -BC        Fluid Resuscitation: 30 ml/kg IV fluid bolus will be given based on ideal body weight (use if BMI >30)  -BC        Is the patient is persistently hypotensive in the hour after fluid bolus administration? If yes, patient meets criteria for vasopressor use. NO  -BC        Sepsis Note: Click \"NEXT\" below (NOT \"close\") to generate sepsis note based on above information. YES (proceed by clicking \"NEXT\")  -BC                  User Key  (r) = Recorded By, (t) = Taken By, (c) = Cosigned By      Initials Name Provider Type    BC Waqar Babin MD Physician                  Default Flowsheet Data (Last 720 Hours)       Sepsis Reassess       Row Name 12/03/24 3091                   Repeat Volume Status and Tissue Perfusion Assessment Performed    Date of " "Reassessment: 12/03/24  -BC        Time of Reassessment: 2337  -BC        Sepsis Reassessment Note: Click \"NEXT\" below (NOT \"close\") to generate sepsis reassessment note. YES (proceed by clicking \"NEXT\")  -BC        Repeat Volume Status and Tissue Perfusion Assessment Performed --                  User Key  (r) = Recorded By, (t) = Taken By, (c) = Cosigned By      Initials Name Provider Type    BC Waqar Babin MD Physician                     Waqar Babin MD  12/04/24 2158    "

## 2024-12-04 NOTE — H&P
H&P - Hospitalist   Name: Sacha Foster 34 y.o. male I MRN: [id number]  Unit/Bed#: 409-01 I Date of Admission: 12/3/2024   Date of Service: 12/4/2024 I Hospital Day: 0     Assessment & Plan  Accidental overdose  Unresponsive at home, given Narcan with response.  Most likely opioid overdose as the patient is on chronic methadone.  The patient states that he got Xanax from the street.  Aspiration pneumonia (HCC)  Secondary to overdose.    Will start Rocephin and Flagyl  Acute respiratory failure with hypoxia (HCC)  Secondary to aspiration pneumonia.  Currently on 4 L which we will wean as able.  Opioid dependence in remission (HCC)  Patient is on chronic methadone 150 mg daily.  He is treated at the [facility].  Acid reflux  Continue PPI  Generalized anxiety disorder  Continue with the Viibryd, Klonopin  Depressed  Continue Viibryd, clonazepam  Moderate persistent asthma without complication  Continue Dulera at home  Morbid obesity with BMI of 50.0-59.9, adult ([facility])  BMI 54.38  Chronic constipation  Continue Linzess        Disposition  #1  IV Rocephin and Flagyl  #2  CT of the chest is pending  #3  Wean oxygen as able        VTE Pharmacologic Prophylaxis: VTE Score: 6 High Risk (Score >/= 5) - Pharmacological DVT Prophylaxis Ordered: heparin. Sequential Compression Devices Ordered.  Code Status: Level 1 - Full Code       Anticipated Length of Stay: Patient will be admitted on an observation basis with an anticipated length of stay of less than 2 midnights secondary to aspiration pneumonia.    History of Present Illness   Chief Complaint: Unresponsive    Sacha Foster is a 34 y.o. male with a PMH of PTSD/anxiety/depression, opioid dependence on methadone, morbid obesity acid reflux who presents with unresponsive at home.  The patient was given nasal Narcan with response.  Patient had a new O2 requirement of 4 L with evaluation revealing infiltrates bilaterally which most likely is aspiration  pneumonia.  Patient states that he got Xanax from the streets in addition to his medication that he takes at home including Klonopin and Viibryd and another psychiatric medications.  The patient is treated at [facility] for his methadone.  Patient states he does not really remember what it happened    Review of Systems   Constitutional: Negative.    HENT: Negative.     Respiratory:  Positive for cough and shortness of breath.    Cardiovascular: Negative.    Gastrointestinal: Negative.    Genitourinary: Negative.    Musculoskeletal: Negative.        Historical Information   Past Medical History:   Diagnosis Date    Allergic     Anemia 06/24/2018    Anxiety     from records    Arthritis     Asthma     Benign essential hypertension 11/17/2016    Bipolar 1 disorder (HCC)     from records    Chronic pain     Chronic pain disorder     back/ knees/ hip    Claustrophobia 03/15/2018    Community acquired pneumonia 06/24/2018    CPAP (continuous positive airway pressure) dependence     Dental abscess     11/9/23- pt was started on amoxicillin- will finish on 11/19/23    Depressed 07/14/2016    Depression     from records    GERD (gastroesophageal reflux disease)     Hepatitis C virus infection 08/14/2014    Heroin abuse (HCC) 07/24/2018    Infectious viral hepatitis     Obesity 10/12/2016    Obstructive sleep apnea     from records    Sleep disorder     Substance abuse ([facility])      Past Surgical History:   Procedure Laterality Date    ACHILLES TENDON SURGERY Right 11/27/2023    Procedure: transfer of peroneus brevis tendon to the cuboid bone;  Surgeon: James R Lachman, MD;  Location:  MAIN OR;  Service: Orthopedics    COLONOSCOPY      EGD      EPIDURAL BLOCK INJECTION Right 10/28/2022    Procedure: BLOCK / INJECTION EPIDURAL STEROID LUMBAR  right L4-5 and L5-S1 TFESI;  Surgeon: Lorena Hernandez MD;  Location: [facility] MAIN [facility];  Service: Pain Management     EPIDURAL BLOCK INJECTION Right 12/20/2022    Procedure:  BLOCK / INJECTION EPIDURAL STEROID LUMBAR  right  L4-5 and L5-S1 TFESI;  Surgeon: Lorena Hernandez MD;  Location: [facility];  Service: Pain Management     NE INCISION & DRAINAGE ABSCESS COMPLICATED/MULTIPLE Left 05/02/2019    Procedure: INCISION AND DRAINAGE (I&D) EXTREMITY;  Surgeon: Fco Woodall MD;  Location: [facility];  Service: Orthopedics    NE LAPAROSCOPY COLECTOMY PARTIAL W/ANASTOMOSIS Left 05/21/2020    Procedure: LAPAROSCOPIC LEFT HEMICOLECTOMY TAKEDOWN SPLENIC FLECTURE, COLORECTAL ANASTOMOSIS.;  Surgeon: Abdelrahman Canales MD;  Location:  MAIN OR;  Service: Colorectal    NE NEUROPLASTY &/TRANSPOS MEDIAN NRV CARPAL TUNNE Left 01/09/2023    Procedure: RELEASE CARPAL TUNNEL;  Surgeon: Isauro Ledbetter DO;  Location: [facility];  Service: Orthopedics    NE OPEN TREATMENT METATARSAL FRACTURE EACH Right 02/24/2023    Procedure: OPEN REDUCTION W/ INTERNAL FIXATION (ORIF) FOOT;  Surgeon: Esteban Talamantes DPM;  Location: [facility];  Service: Podiatry    NE REMOVAL IMPLANT DEEP Right 11/27/2023    Procedure: REMOVAL HARDWARE FOOT, excision of painful os vesalanium, transfer of peroneus brevis tendon to the cuboid bone;  Surgeon: James R Lachman, MD;  Location:  MAIN OR;  Service: Orthopedics    WISDOM TOOTH EXTRACTION       Social History     Tobacco Use    Smoking status: Former     Current packs/day: 0.00     Types: E-Cigarettes, Cigarettes     Passive exposure: Past    Smokeless tobacco: Never    Tobacco comments:     Vapes / 2019 quit cigs   Vaping Use    Vaping status: Every Day    Substances: Nicotine, Flavoring   Substance and Sexual Activity    Alcohol use: Never    Drug use: Not Currently     Types: Marijuana, Prescription, Methamphetamines     Comment: on Methadone; occaionally marijuana- last used 2/10/23    Sexual activity: Not Currently     E-Cigarette/Vaping    E-Cigarette Use Current Every Day User     Cartridges/Day 1     Comments NICOTINE      E-Cigarette/Vaping Substances     Nicotine Yes     THC No     CBD No     Flavoring Yes     Other No     Unknown No      Family history non-contributory  Social History:  Marital Status: Single       Meds/Allergies   I have reviewed home medications with patient personally.  Prior to Admission medications    Medication Sig Start Date End Date Taking? Authorizing Provider   albuterol (Ventolin HFA) 90 mcg/act inhaler Inhale 2 puffs every 6 (six) hours as needed for wheezing 12/21/22  Yes Cuco Ross MD   clonazePAM (KlonoPIN) 1 mg tablet Take 1 mg by mouth 2 (two) times a day 2/6/24  Yes Historical Provider, MD   levocetirizine (XYZAL) 5 MG tablet TAKE ONE TABLET BY MOUTH EVERY EVENING 7/23/24  Yes Dalia Rodriguez PA-C   Linzess 145 MCG CAPS Take 145 mcg by mouth every other day 8/11/23  Yes Historical Provider, MD   METHADONE HCL PO Take 150 mg by mouth daily   Yes Historical Provider, MD   mometasone-formoterol (Dulera) 200-5 MCG/ACT inhaler Inhale 2 puffs 2 (two) times a day Rinse mouth after use. 3/19/24  Yes Dalia Rodriguez PA-C   omeprazole (PriLOSEC) 20 mg delayed release capsule Take 20 mg by mouth 2 (two) times a day    Yes Historical Provider, MD   ondansetron (ZOFRAN-ODT) 4 mg disintegrating tablet Take 4 mg by mouth as needed for nausea 3/11/24  Yes Historical Provider, MD   prazosin (MINIPRESS) 5 mg capsule  6/3/24  Yes Historical Provider, MD   pregabalin (LYRICA) 150 mg capsule TAKE 1 CAPSULE (150 MG TOTAL) BY MOUTH 2 (TWO) TIMES A DAY 12/3/24  Yes Dalia Rodriguez PA-C   Semaglutide-Weight Management (Wegovy) 0.25 MG/0.5ML Inject 0.25 mg under the skin weekly 11/29/24  Yes Dalia Rodriguez PA-C   temazepam (RESTORIL) 15 mg capsule Take 15 mg by mouth daily at bedtime 2/27/23  Yes Historical Provider, MD   Testosterone Cypionate 200 MG/ML SOLN Inject 200 mg as directed every 14 (fourteen) days 11/21/24  Yes Dalia Rodriguez PA-C   vilazodone (VIIBRYD) 40 mg tablet 40 mg daily with breakfast 8/14/23  Yes Historical Provider, MD    nystatin (MYCOSTATIN) cream APPLY TOPICALLY DAILY AT BEDTIME  Patient not taking: Reported on 12/4/2024 7/17/24   Dalia Rodriguez PA-C   nystatin (MYCOSTATIN) powder Apply topically every morning 3/19/24   Dalia Rodriguez PA-C   prazosin (MINIPRESS) 1 mg capsule Take by mouth daily at bedtime 8/27/24   Historical Provider, MD   prazosin (MINIPRESS) 2 mg capsule Take 2 mg by mouth daily at bedtime  Patient not taking: Reported on 6/24/2024 2/6/24   Historical Provider, MD   Rexulti 2 MG tablet Take 2 mg by mouth daily  Patient not taking: Reported on 6/24/2024 7/21/23   Historical Provider, MD   Rexulti 3 MG tablet  6/19/24   Historical Provider, MD   SODIUM FLUORIDE, DENTAL GEL, 1.1 % CREA Apply to teeth daily at bedtime 9/12/24   Norman Galindo, DMD     Allergies   Allergen Reactions    Red Dye - Food Allergy Diarrhea, Nausea Only and Abdominal Pain    Bee Venom Angioedema     Pt states he gets swelling at the site       Objective :  Temp:  [97.8 °F (36.6 °C)-98.5 °F (36.9 °C)] 98.5 °F (36.9 °C)  HR:  [109-118] 109  BP: (110-118)/(68-79) 115/75  Resp:  [10-20] 14  SpO2:  [88 %-92 %] 92 %  O2 Device: Nasal cannula  Nasal Cannula O2 Flow Rate (L/min):  [2 L/min-3 L/min] 3 L/min    Physical Exam  Constitutional:       Appearance: He is obese.   HENT:      Head: Normocephalic and atraumatic.      Nose: Nose normal.      Mouth/Throat:      Mouth: Mucous membranes are moist.      Pharynx: Oropharynx is clear.   Eyes:      Extraocular Movements: Extraocular movements intact.      Conjunctiva/sclera: Conjunctivae normal.   Cardiovascular:      Rate and Rhythm: Normal rate and regular rhythm.      Pulses: Normal pulses.      Heart sounds: Normal heart sounds.   Pulmonary:      Comments: Decreased breath sounds bilaterally  Abdominal:      General: There is no distension.      Palpations: Abdomen is soft.      Tenderness: There is no abdominal tenderness. There is no guarding.   Musculoskeletal:         General:  Normal range of motion.   Skin:     General: Skin is warm and dry.   Neurological:      General: No focal deficit present.      Mental Status: He is alert and oriented to person, place, and time. Mental status is at baseline.   Psychiatric:         Mood and Affect: Mood normal.         Behavior: Behavior normal.         Thought Content: Thought content normal.                 Lab Results: I have reviewed the following results:  Results from last 7 days   Lab Units 12/03/24  2203   WBC Thousand/uL 15.35*   HEMOGLOBIN g/dL 13.6   HEMATOCRIT % 43.2   PLATELETS Thousands/uL 193   SEGS PCT % 88*   LYMPHO PCT % 7*   MONO PCT % 3*   EOS PCT % 0     Results from last 7 days   Lab Units 12/03/24  2203   SODIUM mmol/L 137   POTASSIUM mmol/L 5.4*   CHLORIDE mmol/L 97   CO2 mmol/L 26   BUN mg/dL 15   CREATININE mg/dL 1.16   ANION GAP mmol/L 14*   CALCIUM mg/dL 9.0   ALBUMIN g/dL 4.1   TOTAL BILIRUBIN mg/dL 0.25   ALK PHOS U/L 61   ALT U/L 76*   AST U/L 81*   GLUCOSE RANDOM mg/dL 237*     Results from last 7 days   Lab Units 12/03/24  2248   INR  0.96         Lab Results   Component Value Date    HGBA1C 5.8 (H) 09/28/2024    HGBA1C 5.6 03/26/2022    HGBA1C 5.4 10/19/2020     Results from last 7 days   Lab Units 12/03/24  2248   LACTIC ACID mmol/L 4.1*   PROCALCITONIN ng/ml 1.19*       Imaging Results Review: I personally reviewed the following image studies in PACS and associated radiology reports: chest xray. My interpretation of the radiology images/reports is: Bilateral infiltrate and haziness.  Other Study Results Review: EKG was reviewed.  My interpretation of EKG sinus tachycardia    Administrative Statements     Medical decision making: High  Diagnosis addressed: Illness/injury posing threat to life or bodily function: Accidental overdose, aspiration pneumonia  Data:   Reviewed  CBC, CMP, lactic acid, Pro-[place], INR, chest x-ray  Ordered CBC, BMP,  Reviewed external notes from PCP  Assessment by independent historian:  Mostly from ER notes and EMS as the patient does remember what happened  Independent interpretation of imaging: Chest x-ray bilateral infiltrates  Independent interpretation of testing EKG/telemetry: Sinus tachycardia  Discussion of management with ER provider: Given Rocephin and Flagyl and IV fluids           Risk:  Prescription drug management: IV Rocephin and Flagyl  Discussion for hospitalization with ER provider: Requires hospitalization for respiratory failure and aspiration pneumonia        ** Please Note: This note has been constructed using a voice recognition system. **

## 2024-12-04 NOTE — PLAN OF CARE
Problem: RESPIRATORY - ADULT  Goal: Achieves optimal ventilation and oxygenation  Description: INTERVENTIONS:  - Assess for changes in respiratory status  - Assess for changes in mentation and behavior  - Position to facilitate oxygenation and minimize respiratory effort  - Oxygen administered by appropriate delivery if ordered  - Initiate smoking cessation education as indicated  - Encourage broncho-pulmonary hygiene including cough, deep breathe, Incentive Spirometry  - Assess the need for suctioning and aspirate as needed  - Assess and instruct to report SOB or any respiratory difficulty  - Respiratory Therapy support as indicated  Outcome: Progressing     Problem: METABOLIC, FLUID AND ELECTROLYTES - ADULT  Goal: Electrolytes maintained within normal limits  Description: INTERVENTIONS:  - Monitor labs and assess patient for signs and symptoms of electrolyte imbalances  - Administer electrolyte replacement as ordered  - Monitor response to electrolyte replacements, including repeat lab results as appropriate  - Instruct patient on fluid and nutrition as appropriate  Outcome: Progressing     Problem: PAIN - ADULT  Goal: Verbalizes/displays adequate comfort level or baseline comfort level  Description: Interventions:  - Encourage patient to monitor pain and request assistance  - Assess pain using appropriate pain scale  - Administer analgesics based on type and severity of pain and evaluate response  - Implement non-pharmacological measures as appropriate and evaluate response  - Consider cultural and social influences on pain and pain management  - Notify physician/advanced practitioner if interventions unsuccessful or patient reports new pain  Outcome: Progressing     Problem: INFECTION - ADULT  Goal: Absence or prevention of progression during hospitalization  Description: INTERVENTIONS:  - Assess and monitor for signs and symptoms of infection  - Monitor lab/diagnostic results  - Monitor all insertion sites,  i.e. indwelling lines, tubes, and drains  - Monitor endotracheal if appropriate and nasal secretions for changes in amount and color  - [place] appropriate cooling/warming therapies per order  - Administer medications as ordered  - Instruct and encourage patient and family to use good hand hygiene technique  - Identify and instruct in appropriate isolation precautions for identified infection/condition  Outcome: Progressing     Problem: DISCHARGE PLANNING  Goal: Discharge to home or other facility with appropriate resources  Description: INTERVENTIONS:  - Identify barriers to discharge w/patient and caregiver  - Arrange for needed discharge resources and transportation as appropriate  - Identify discharge learning needs (meds, wound care, etc.)  - Arrange for interpretive services to assist at discharge as needed  - Refer to Case Management Department for coordinating discharge planning if the patient needs post-hospital services based on physician/advanced practitioner order or complex needs related to functional status, cognitive ability, or social support system  Outcome: Progressing

## 2024-12-04 NOTE — CASE MANAGEMENT
Case Management Discharge Planning Note    Patient name Sacha Foster  Location /409-01 MRN [id number]  : 1990 Date 2024       Current Admission Date: 12/3/2024  Current Admission Diagnosis:Accidental overdose   Patient Active Problem List    Diagnosis Date Noted Date Diagnosed    Accidental overdose 2024     Acute respiratory failure with hypoxia ([facility]) 2024     Aspiration pneumonia ([facility]) 2024     Painful orthopaedic hardware ([facility]) 2023     Accessory bone of foot 2023     Seasonal allergies 2023     Nicotine dependence, uncomplicated 2023     Closed displaced fracture of fifth metatarsal bone of right foot with nonunion 2023     Fixation hardware in lower extremity 2023     Moderate persistent asthma without complication 2023     Fracture of base of fifth metatarsal bone of right foot at metaphyseal-diaphyseal junction with nonunion 2023     Mild persistent asthma without complication 2022     Long-term exposure involving bird droppings 2022     Lumbar radiculopathy 2022     Tinea cruris 2022     History of colonoscopy 2022     Ventral hernia without obstruction or gangrene 2022     Chronic bilateral low back pain without sciatica 2022     Chronic constipation 2022     Central sleep apnea comorbid with prescribed opioid use      Chronic pain of both knees 2022     Eczema 2021     Arthralgia 10/17/2020     Colonic mass 2020     Drug dependence ([facility]) 2019     Medication therapy continued 2019     Encounter for monitoring Suboxone maintenance therapy 2019     IV drug abuse ([facility]) 2019     CATA (obstructive sleep apnea) 2018     Claustrophobia 03/15/2018     Restless leg syndrome 03/15/2018     Morbid obesity with BMI of 50.0-59.9, adult ([facility]) 03/15/2018     Chronic pain syndrome 03/15/2018     Acid reflux 2017     Essential  hypertension 11/17/2016     Generalized anxiety disorder 10/19/2016     Anxiety 07/14/2016     Depressed 07/14/2016     Methadone maintenance therapy patient (HCC) 07/14/2016     Obesity, Class III, BMI 40-49.9 (morbid obesity) (HCC) 07/14/2016     Chronic hepatitis C without hepatic coma (HCC) 02/21/2016     Bipolar disorder (HCC) 04/03/2015     Allergic rhinitis 08/14/2014     Insomnia 08/14/2014     Opioid dependence in remission (HCC) 08/14/2014       LOS (days): 0  Geometric Mean LOS (GMLOS) (days):   Days to GMLOS:     OBJECTIVE:            Current admission status: Observation   Preferred Pharmacy:   [id number] - [place], [place] - [address]  [place] [address]  Phone: [phone] Fax: [phone]    [facility] [address]  [address]  [facility] [address]  Phone: [phone] Fax: [phone]    Primary Care Provider: Dalia Rodriguez PA-C    Primary Insurance: [employer]  Secondary Insurance:     DISCHARGE DETAILS:  Plans at this time are home on dc with OP follow up and CM provided information/phone number for warm hand off ([facility]).

## 2024-12-04 NOTE — ASSESSMENT & PLAN NOTE
Unresponsive at home, given Narcan with response.  Most likely opioid overdose as the patient is on chronic methadone.  The patient states that he got Xanax from the street.

## 2024-12-05 ENCOUNTER — APPOINTMENT (INPATIENT)
Dept: RADIOLOGY | Facility: HOSPITAL | Age: 34
DRG: 720 | End: 2024-12-05
Payer: COMMERCIAL

## 2024-12-05 PROBLEM — R11.2 NAUSEA AND VOMITING: Status: ACTIVE | Noted: 2024-12-05

## 2024-12-05 LAB
ALBUMIN SERPL BCG-MCNC: 3.9 G/DL (ref 3.5–5)
ALP SERPL-CCNC: 46 U/L (ref 34–104)
ALT SERPL W P-5'-P-CCNC: 57 U/L (ref 7–52)
AMPHETAMINES SERPL QL SCN: NEGATIVE
ANION GAP SERPL CALCULATED.3IONS-SCNC: 7 MMOL/L (ref 4–13)
AST SERPL W P-5'-P-CCNC: 67 U/L (ref 13–39)
BARBITURATES UR QL: NEGATIVE
BASOPHILS # BLD AUTO: 0.02 THOUSANDS/ÂΜL (ref 0–0.1)
BASOPHILS NFR BLD AUTO: 0 % (ref 0–1)
BENZODIAZ UR QL: POSITIVE
BILIRUB SERPL-MCNC: 0.23 MG/DL (ref 0.2–1)
BUN SERPL-MCNC: 10 MG/DL (ref 5–25)
CALCIUM SERPL-MCNC: 8.6 MG/DL (ref 8.4–10.2)
CHLORIDE SERPL-SCNC: 97 MMOL/L (ref 96–108)
CO2 SERPL-SCNC: 32 MMOL/L (ref 21–32)
COCAINE UR QL: NEGATIVE
CREAT SERPL-MCNC: 0.76 MG/DL (ref 0.6–1.3)
EOSINOPHIL # BLD AUTO: 0.07 THOUSAND/ÂΜL (ref 0–0.61)
EOSINOPHIL NFR BLD AUTO: 1 % (ref 0–6)
ERYTHROCYTE [DISTWIDTH] IN BLOOD BY AUTOMATED COUNT: 13.8 % (ref 11.6–15.1)
FENTANYL UR QL SCN: NEGATIVE
GFR SERPL CREATININE-BSD FRML MDRD: 118 ML/MIN/1.73SQ M
GLUCOSE SERPL-MCNC: 131 MG/DL (ref 65–140)
HCT VFR BLD AUTO: 37.4 % (ref 36.5–49.3)
HGB BLD-MCNC: 12.1 G/DL (ref 12–17)
HYDROCODONE UR QL SCN: NEGATIVE
IMM GRANULOCYTES # BLD AUTO: 0.01 THOUSAND/UL (ref 0–0.2)
IMM GRANULOCYTES NFR BLD AUTO: 0 % (ref 0–2)
LYMPHOCYTES # BLD AUTO: 1.47 THOUSANDS/ÂΜL (ref 0.6–4.47)
LYMPHOCYTES NFR BLD AUTO: 22 % (ref 14–44)
MCH RBC QN AUTO: 27 PG (ref 26.8–34.3)
MCHC RBC AUTO-ENTMCNC: 32.4 G/DL (ref 31.4–37.4)
MCV RBC AUTO: 84 FL (ref 82–98)
METHADONE UR QL: POSITIVE
MONOCYTES # BLD AUTO: 0.37 THOUSAND/ÂΜL (ref 0.17–1.22)
MONOCYTES NFR BLD AUTO: 6 % (ref 4–12)
NEUTROPHILS # BLD AUTO: 4.8 THOUSANDS/ÂΜL (ref 1.85–7.62)
NEUTS SEG NFR BLD AUTO: 71 % (ref 43–75)
NRBC BLD AUTO-RTO: 0 /100 WBCS
OPIATES UR QL SCN: NEGATIVE
OXYCODONE+OXYMORPHONE UR QL SCN: NEGATIVE
PCP UR QL: NEGATIVE
PLATELET # BLD AUTO: 162 THOUSANDS/UL (ref 149–390)
PMV BLD AUTO: 9.6 FL (ref 8.9–12.7)
POTASSIUM SERPL-SCNC: 4.2 MMOL/L (ref 3.5–5.3)
PROCALCITONIN SERPL-MCNC: 1.38 NG/ML
PROT SERPL-MCNC: 6.9 G/DL (ref 6.4–8.4)
RBC # BLD AUTO: 4.48 MILLION/UL (ref 3.88–5.62)
SODIUM SERPL-SCNC: 136 MMOL/L (ref 135–147)
THC UR QL: NEGATIVE
WBC # BLD AUTO: 6.74 THOUSAND/UL (ref 4.31–10.16)

## 2024-12-05 PROCEDURE — 80053 COMPREHEN METABOLIC PANEL: CPT

## 2024-12-05 PROCEDURE — 74022 RADEX COMPL AQT ABD SERIES: CPT

## 2024-12-05 PROCEDURE — 87205 SMEAR GRAM STAIN: CPT | Performed by: FAMILY MEDICINE

## 2024-12-05 PROCEDURE — 84145 PROCALCITONIN (PCT): CPT

## 2024-12-05 PROCEDURE — 85025 COMPLETE CBC W/AUTO DIFF WBC: CPT

## 2024-12-05 PROCEDURE — 94660 CPAP INITIATION&MGMT: CPT

## 2024-12-05 PROCEDURE — 87147 CULTURE TYPE IMMUNOLOGIC: CPT | Performed by: FAMILY MEDICINE

## 2024-12-05 PROCEDURE — 87070 CULTURE OTHR SPECIMN AEROBIC: CPT | Performed by: FAMILY MEDICINE

## 2024-12-05 PROCEDURE — 99233 SBSQ HOSP IP/OBS HIGH 50: CPT | Performed by: FAMILY MEDICINE

## 2024-12-05 PROCEDURE — 87449 NOS EACH ORGANISM AG IA: CPT | Performed by: FAMILY MEDICINE

## 2024-12-05 PROCEDURE — 80307 DRUG TEST PRSMV CHEM ANLYZR: CPT | Performed by: FAMILY MEDICINE

## 2024-12-05 PROCEDURE — 94760 N-INVAS EAR/PLS OXIMETRY 1: CPT

## 2024-12-05 PROCEDURE — 87186 SC STD MICRODIL/AGAR DIL: CPT | Performed by: FAMILY MEDICINE

## 2024-12-05 RX ORDER — IBUPROFEN 400 MG/1
400 TABLET, FILM COATED ORAL EVERY 8 HOURS PRN
Status: DISCONTINUED | OUTPATIENT
Start: 2024-12-05 | End: 2024-12-11 | Stop reason: HOSPADM

## 2024-12-05 RX ORDER — BISACODYL 5 MG/1
5 TABLET, DELAYED RELEASE ORAL DAILY PRN
Status: DISCONTINUED | OUTPATIENT
Start: 2024-12-05 | End: 2024-12-11 | Stop reason: HOSPADM

## 2024-12-05 RX ORDER — PANTOPRAZOLE SODIUM 40 MG/10ML
40 INJECTION, POWDER, LYOPHILIZED, FOR SOLUTION INTRAVENOUS EVERY 12 HOURS SCHEDULED
Status: DISCONTINUED | OUTPATIENT
Start: 2024-12-05 | End: 2024-12-11 | Stop reason: HOSPADM

## 2024-12-05 RX ORDER — METOCLOPRAMIDE HYDROCHLORIDE 5 MG/ML
10 INJECTION INTRAMUSCULAR; INTRAVENOUS EVERY 6 HOURS PRN
Status: DISCONTINUED | OUTPATIENT
Start: 2024-12-05 | End: 2024-12-06

## 2024-12-05 RX ORDER — SENNOSIDES 8.6 MG
1 TABLET ORAL
Status: DISCONTINUED | OUTPATIENT
Start: 2024-12-05 | End: 2024-12-11 | Stop reason: HOSPADM

## 2024-12-05 RX ORDER — FLUTICASONE FUROATE AND VILANTEROL 200; 25 UG/1; UG/1
1 POWDER RESPIRATORY (INHALATION) DAILY
Status: DISCONTINUED | OUTPATIENT
Start: 2024-12-05 | End: 2024-12-06

## 2024-12-05 RX ORDER — POLYETHYLENE GLYCOL 3350 17 G/17G
17 POWDER, FOR SOLUTION ORAL DAILY
Status: DISCONTINUED | OUTPATIENT
Start: 2024-12-05 | End: 2024-12-11 | Stop reason: HOSPADM

## 2024-12-05 RX ADMIN — CEFTRIAXONE 2000 MG: 2 INJECTION, SOLUTION INTRAVENOUS at 23:27

## 2024-12-05 RX ADMIN — ONDANSETRON 4 MG: 2 INJECTION INTRAMUSCULAR; INTRAVENOUS at 20:52

## 2024-12-05 RX ADMIN — GUAIFENESIN 600 MG: 600 TABLET, EXTENDED RELEASE ORAL at 17:21

## 2024-12-05 RX ADMIN — LUBIPROSTONE 24 MCG: 24 CAPSULE, GELATIN COATED ORAL at 09:49

## 2024-12-05 RX ADMIN — LORATADINE 10 MG: 10 TABLET ORAL at 09:56

## 2024-12-05 RX ADMIN — METHADONE HYDROCHLORIDE 150 MG: 10 TABLET ORAL at 09:49

## 2024-12-05 RX ADMIN — METRONIDAZOLE 500 MG: 500 INJECTION, SOLUTION INTRAVENOUS at 09:48

## 2024-12-05 RX ADMIN — CLONAZEPAM 1 MG: 0.5 TABLET ORAL at 17:21

## 2024-12-05 RX ADMIN — ONDANSETRON 4 MG: 2 INJECTION INTRAMUSCULAR; INTRAVENOUS at 12:04

## 2024-12-05 RX ADMIN — VILAZODONE HYDROCHLORIDE 40 MG: 20 TABLET ORAL at 09:49

## 2024-12-05 RX ADMIN — PRAZOSIN HYDROCHLORIDE 5 MG: 1 CAPSULE ORAL at 09:49

## 2024-12-05 RX ADMIN — PREGABALIN 150 MG: 75 CAPSULE ORAL at 09:49

## 2024-12-05 RX ADMIN — HEPARIN SODIUM 7500 UNITS: 5000 INJECTION, SOLUTION INTRAVENOUS; SUBCUTANEOUS at 05:18

## 2024-12-05 RX ADMIN — ONDANSETRON 4 MG: 2 INJECTION INTRAMUSCULAR; INTRAVENOUS at 05:18

## 2024-12-05 RX ADMIN — PANTOPRAZOLE SODIUM 40 MG: 40 INJECTION, POWDER, FOR SOLUTION INTRAVENOUS at 20:52

## 2024-12-05 RX ADMIN — FLUTICASONE FUROATE AND VILANTEROL TRIFENATATE 1 PUFF: 200; 25 POWDER RESPIRATORY (INHALATION) at 17:22

## 2024-12-05 RX ADMIN — SENNOSIDES 8.6 MG: 8.6 TABLET, FILM COATED ORAL at 23:27

## 2024-12-05 RX ADMIN — PANTOPRAZOLE SODIUM 40 MG: 40 TABLET, DELAYED RELEASE ORAL at 05:18

## 2024-12-05 RX ADMIN — LUBIPROSTONE 24 MCG: 24 CAPSULE, GELATIN COATED ORAL at 20:49

## 2024-12-05 RX ADMIN — CLONAZEPAM 1 MG: 0.5 TABLET ORAL at 09:49

## 2024-12-05 RX ADMIN — GUAIFENESIN 600 MG: 600 TABLET, EXTENDED RELEASE ORAL at 09:49

## 2024-12-05 RX ADMIN — HEPARIN SODIUM 7500 UNITS: 5000 INJECTION, SOLUTION INTRAVENOUS; SUBCUTANEOUS at 23:27

## 2024-12-05 RX ADMIN — IBUPROFEN 400 MG: 400 TABLET, FILM COATED ORAL at 15:07

## 2024-12-05 RX ADMIN — POLYETHYLENE GLYCOL 3350 17 G: 17 POWDER, FOR SOLUTION ORAL at 17:21

## 2024-12-05 RX ADMIN — METRONIDAZOLE 500 MG: 500 INJECTION, SOLUTION INTRAVENOUS at 17:21

## 2024-12-05 RX ADMIN — ACETAMINOPHEN 650 MG: 325 TABLET, FILM COATED ORAL at 20:49

## 2024-12-05 RX ADMIN — HEPARIN SODIUM 7500 UNITS: 5000 INJECTION, SOLUTION INTRAVENOUS; SUBCUTANEOUS at 15:07

## 2024-12-05 RX ADMIN — PREGABALIN 150 MG: 75 CAPSULE ORAL at 17:21

## 2024-12-05 RX ADMIN — ACETAMINOPHEN 650 MG: 325 TABLET, FILM COATED ORAL at 05:18

## 2024-12-05 NOTE — PLAN OF CARE
Problem: RESPIRATORY - ADULT  Goal: Achieves optimal ventilation and oxygenation  Description: INTERVENTIONS:  - Assess for changes in respiratory status  - Assess for changes in mentation and behavior  - Position to facilitate oxygenation and minimize respiratory effort  - Oxygen administered by appropriate delivery if ordered  - Initiate smoking cessation education as indicated  - Encourage broncho-pulmonary hygiene including cough, deep breathe, Incentive Spirometry  - Assess the need for suctioning and aspirate as needed  - Assess and instruct to report SOB or any respiratory difficulty  - Respiratory Therapy support as indicated  Outcome: Progressing     Problem: METABOLIC, FLUID AND ELECTROLYTES - ADULT  Goal: Electrolytes maintained within normal limits  Description: INTERVENTIONS:  - Monitor labs and assess patient for signs and symptoms of electrolyte imbalances  - Administer electrolyte replacement as ordered  - Monitor response to electrolyte replacements, including repeat lab results as appropriate  - Instruct patient on fluid and nutrition as appropriate  Outcome: Progressing     Problem: PAIN - ADULT  Goal: Verbalizes/displays adequate comfort level or baseline comfort level  Description: Interventions:  - Encourage patient to monitor pain and request assistance  - Assess pain using appropriate pain scale  - Administer analgesics based on type and severity of pain and evaluate response  - Implement non-pharmacological measures as appropriate and evaluate response  - Consider cultural and social influences on pain and pain management  - Notify physician/advanced practitioner if interventions unsuccessful or patient reports new pain  Outcome: Progressing     Problem: INFECTION - ADULT  Goal: Absence or prevention of progression during hospitalization  Description: INTERVENTIONS:  - Assess and monitor for signs and symptoms of infection  - Monitor lab/diagnostic results  - Monitor all insertion sites,  i.e. indwelling lines, tubes, and drains  - Monitor endotracheal if appropriate and nasal secretions for changes in amount and color  - [place] appropriate cooling/warming therapies per order  - Administer medications as ordered  - Instruct and encourage patient and family to use good hand hygiene technique  - Identify and instruct in appropriate isolation precautions for identified infection/condition  Outcome: Progressing     Problem: DISCHARGE PLANNING  Goal: Discharge to home or other facility with appropriate resources  Description: INTERVENTIONS:  - Identify barriers to discharge w/patient and caregiver  - Arrange for needed discharge resources and transportation as appropriate  - Identify discharge learning needs (meds, wound care, etc.)  - Arrange for interpretive services to assist at discharge as needed  - Refer to Case Management Department for coordinating discharge planning if the patient needs post-hospital services based on physician/advanced practitioner order or complex needs related to functional status, cognitive ability, or social support system  Outcome: Progressing

## 2024-12-05 NOTE — ASSESSMENT & PLAN NOTE
Associated with methadone and Hx of OUD  See sleep studies from 2022  Not compliant with CPAP at home  Continue BiPAP use while sleeping

## 2024-12-05 NOTE — ASSESSMENT & PLAN NOTE
Patient is on Dulera 200-5 at home (high steroid dose)  Switching to Breo Ellipta while hospitalized, as Dulera is not on formulary  PRN Albuterol inhaler

## 2024-12-05 NOTE — ASSESSMENT & PLAN NOTE
"Associated with Methadone and Hx of OUD  Abdominal obstruction series showed \"large volume colonic fecal burden\"  Continue Lubiprostone in lieu of home Linzess  Continue scheduled MiraLAX and add scheduled Senna  "

## 2024-12-05 NOTE — ASSESSMENT & PLAN NOTE
BMI 54.38  Weight may be contributing to respiratory difficulty  Continue weekly Wegovy (next dose this Saturday; patient will see if someone can bring it to hospital)

## 2024-12-05 NOTE — PROGRESS NOTES
Progress Note - Hospitalist   Name: Sacha Foster 34 y.o. male I MRN: [id number]  Unit/Bed#: 409-01 I Date of Admission: 12/3/2024   Date of Service: 12/5/2024 I Hospital Day: 1    Assessment & Plan  Accidental overdose  Unresponsive at home, given Narcan with response.  Most likely opioid overdose as the patient is on chronic methadone.  The patient states that he got Xanax from the street.  Aspiration pneumonia (HCC)  Secondary to overdose.    Continue Rocephin 2 g/dy  D/c Flagyl  Acute respiratory failure with hypoxia ([facility])  Secondary to aspiration pneumonia.    Currently on 3 L which we will wean as able.  Encourage patient to use incentive spirometer and to get out of bed   Opioid dependence in remission ([facility])  Patient is on chronic methadone 150 mg daily.  He is treated at the [facility].  Central sleep apnea  Associated with methadone and Hx of OUD  See sleep studies from 2022  Continue nightly CPAP  Acid reflux  Continue PPI  Generalized anxiety disorder  Continue with home Viibryd & Klonopin  Depressed  Continue Viibryd, clonazepam  Moderate persistent asthma without complication  Patient is on Dulera 200-5 at home (high steroid dose)  Switching to Breo Ellipta while hospitalized, as Dulera is not on formulary  PRN Albuterol inhaler  Morbid obesity with BMI of 50.0-59.9, adult ([facility])  BMI 54.38  Weight may be contributing to respiratory difficulty  Continue weekly Wegovy (next dose this Saturday; patient will see if someone can bring it to hospital)  Chronic constipation  Associated with Methadone and Hx of OUD  Continue Linzess  Nausea and vomiting  Since admission  May be 2/2 constipation (see above)  Continue PRN Zofran 4 mg Q6H & Reglan 10 mg Q6H    VTE Pharmacologic Prophylaxis: VTE Score: 6 High Risk (Score >/= 5) - Pharmacological DVT Prophylaxis Ordered: heparin. Sequential Compression Devices Ordered.    Mobility:   Basic Mobility Inpatient Raw Score: 23  -[facility] Goal: 7: Walk 25  feet or more  JH-HLM Achieved: 7: Walk 25 feet or more  JH-HLM Goal achieved. Continue to encourage appropriate mobility.    Patient Centered Rounds: I performed bedside rounds with nursing staff today.   Discussions with Specialists or Other Care Team Provider: Multidisciplinary care team and attending physician    Education and Discussions with Family / Patient: Patient declined call to . He is keeping his mother up-to-date.    Current Length of Stay: 1 day(s)  Current Patient Status: Inpatient   Certification Statement: The patient will continue to require additional inpatient hospital stay due to acute respiratory failure secondary to aspiration pneumonia due to drug overdose  Discharge Plan:  when stable    Code Status: Level 1 - Full Code    Subjective   Patient was seen and examined at bedside.  He says that he has had a headache since admission, and that he threw up last night.  He was given Tylenol for headache with minimal to no relief.    Objective :  Temp:  [98.9 °F (37.2 °C)] 98.9 °F (37.2 °C)  HR:  [92] 92  BP: (139)/(90) 139/90  SpO2:  [90 %-95 %] 90 %  O2 Device: Nasal cannula  Nasal Cannula O2 Flow Rate (L/min):  [3 L/min-4 L/min] 3 L/min    Body mass index is 54.38 kg/m².     Input and Output Summary (last 24 hours):     Intake/Output Summary (Last 24 hours) at 12/5/2024 1344  Last data filed at 12/5/2024 1218  Gross per 24 hour   Intake 1980 ml   Output --   Net 1980 ml       Physical Exam  Vitals reviewed.   Constitutional:       General: He is not in acute distress.     Appearance: Normal appearance. He is obese. He is not ill-appearing, toxic-appearing or diaphoretic.   Cardiovascular:      Rate and Rhythm: Normal rate and regular rhythm.      Heart sounds: Normal heart sounds. No murmur heard.  Pulmonary:      Effort: Pulmonary effort is normal. No respiratory distress.      Breath sounds: Normal breath sounds. No wheezing or rales.   Abdominal:      General: Abdomen is flat.  There is no distension.      Palpations: Abdomen is soft.      Tenderness: There is no abdominal tenderness. There is no guarding.   Musculoskeletal:      Right lower leg: No edema.      Left lower leg: No edema.   Neurological:      Mental Status: He is alert.             Lab Results: I have reviewed the following results:   Results from last 7 days   Lab Units 12/05/24  0958   WBC Thousand/uL 6.74   HEMOGLOBIN g/dL 12.1   HEMATOCRIT % 37.4   PLATELETS Thousands/uL 162   SEGS PCT % 71   LYMPHO PCT % 22   MONO PCT % 6   EOS PCT % 1     Results from last 7 days   Lab Units 12/05/24  0958   SODIUM mmol/L 136   POTASSIUM mmol/L 4.2   CHLORIDE mmol/L 97   CO2 mmol/L 32   BUN mg/dL 10   CREATININE mg/dL 0.76   ANION GAP mmol/L 7   CALCIUM mg/dL 8.6   ALBUMIN g/dL 3.9   TOTAL BILIRUBIN mg/dL 0.23   ALK PHOS U/L 46   ALT U/L 57*   AST U/L 67*   GLUCOSE RANDOM mg/dL 131     Results from last 7 days   Lab Units 12/03/24  2248   INR  0.96             Results from last 7 days   Lab Units 12/05/24  0958 12/04/24  0537 12/04/24  0210 12/03/24  2248   LACTIC ACID mmol/L  --   --  1.3 4.1*   PROCALCITONIN ng/ml 1.38* 2.65*  --  1.19*       Recent Cultures (last 7 days):   Results from last 7 days   Lab Units 12/03/24  2249 12/03/24  2248   BLOOD CULTURE  No Growth at 24 hrs. No Growth at 24 hrs.       Imaging Results Review: I reviewed radiology reports from this admission including: CT chest.  Other Study Results Review: No additional pertinent studies reviewed.    Last 24 Hours Medication List:     Current Facility-Administered Medications:     acetaminophen (TYLENOL) tablet 650 mg, Q6H PRN    albuterol (PROVENTIL HFA,VENTOLIN HFA) inhaler 2 puff, Q4H PRN    cefTRIAXone (ROCEPHIN) IVPB (premix in dextrose) 2,000 mg 50 mL, Q24H, Last Rate: 2,000 mg (12/04/24 2202)    clonazePAM (KlonoPIN) tablet 1 mg, BID    fluticasone-vilanterol 200-25 mcg/actuation 1 puff, Daily    guaiFENesin (MUCINEX) 12 hr tablet 600 mg, BID    heparin  (porcine) subcutaneous injection 7,500 Units, Q8H MARICEL    ibuprofen (MOTRIN) tablet 400 mg, Q8H PRN    loratadine (CLARITIN) tablet 10 mg, Daily    lubiprostone (AMITIZA) capsule 24 mcg, BID    methadone (Dolophine) tablet 150 mg, Daily    metroNIDAZOLE (FLAGYL) IVPB (premix) 500 mg 100 mL, Q8H, Last Rate: 500 mg (12/05/24 0948)    ondansetron (ZOFRAN) injection 4 mg, Q6H PRN    pantoprazole (PROTONIX) EC tablet 40 mg, Early Morning    prazosin (MINIPRESS) capsule 5 mg, Daily    pregabalin (LYRICA) capsule 150 mg, BID    vilazodone (VIIBRYD) tablet 40 mg, Daily With Breakfast    Administrative Statements   Today, Patient Was Seen By: Waqar Howard DO      **Please Note: This note may have been constructed using a voice recognition system.**

## 2024-12-05 NOTE — RESPIRATORY THERAPY NOTE
12/05/24 0722   Respiratory Assessment   Assessment Type Assess only   General Appearance Sleeping   Resp Comments found patient sleeping on 4 lpm nasal cannula, dropped oxygen down, nurse notified, patient in no respiratory distress at this time   Oxygen Therapy/Pulse Ox   O2 Device Nasal cannula   O2 Therapy Oxygen   Nasal Cannula O2 Flow Rate (L/min) 3 L/min   Calculated FIO2 (%) - Nasal Cannula 32   SpO2 92 %   SpO2 Activity At Rest   $ Pulse Oximetry Spot Check Charge Completed     2 pm, went back in to see if I could adjust oxygen anymore, patient was sleeping laying on his left side, he did wake, I asked him to perform the IS patient has declined to do so at this time, stating he just vomitted. I have instructed him to try and use the device more often. Patient may try and bring his pap machine in

## 2024-12-05 NOTE — ASSESSMENT & PLAN NOTE
Patient is on chronic methadone 150 mg daily.  He is treated at the [facility].

## 2024-12-05 NOTE — ASSESSMENT & PLAN NOTE
Continue home Viibryd  Patient follows with behavioral health outpatient for talk therapy.  Patient has history of bipolar on chart. This should be ascertained, as patient should probably be on a mood stabilizer.

## 2024-12-05 NOTE — ASSESSMENT & PLAN NOTE
BMI 54.38  Weight may be contributing to respiratory difficulty  Discontinue Wegovy, since this is either causing or contributing to his current nausea and vomiting.

## 2024-12-05 NOTE — PLAN OF CARE
Problem: RESPIRATORY - ADULT  Goal: Achieves optimal ventilation and oxygenation  Description: INTERVENTIONS:  - Assess for changes in respiratory status  - Assess for changes in mentation and behavior  - Position to facilitate oxygenation and minimize respiratory effort  - Oxygen administered by appropriate delivery if ordered  - Initiate smoking cessation education as indicated  - Encourage broncho-pulmonary hygiene including cough, deep breathe, Incentive Spirometry  - Assess the need for suctioning and aspirate as needed  - Assess and instruct to report SOB or any respiratory difficulty  - Respiratory Therapy support as indicated  Outcome: Progressing     Problem: METABOLIC, FLUID AND ELECTROLYTES - ADULT  Goal: Electrolytes maintained within normal limits  Description: INTERVENTIONS:  - Monitor labs and assess patient for signs and symptoms of electrolyte imbalances  - Administer electrolyte replacement as ordered  - Monitor response to electrolyte replacements, including repeat lab results as appropriate  - Instruct patient on fluid and nutrition as appropriate  Outcome: Progressing     Problem: PAIN - ADULT  Goal: Verbalizes/displays adequate comfort level or baseline comfort level  Description: Interventions:  - Encourage patient to monitor pain and request assistance  - Assess pain using appropriate pain scale  - Administer analgesics based on type and severity of pain and evaluate response  - Implement non-pharmacological measures as appropriate and evaluate response  - Consider cultural and social influences on pain and pain management  - Notify physician/advanced practitioner if interventions unsuccessful or patient reports new pain  Outcome: Progressing     Problem: INFECTION - ADULT  Goal: Absence or prevention of progression during hospitalization  Description: INTERVENTIONS:  - Assess and monitor for signs and symptoms of infection  - Monitor lab/diagnostic results  - Monitor all insertion sites,  i.e. indwelling lines, tubes, and drains  - Monitor endotracheal if appropriate and nasal secretions for changes in amount and color  - [place] appropriate cooling/warming therapies per order  - Administer medications as ordered  - Instruct and encourage patient and family to use good hand hygiene technique  - Identify and instruct in appropriate isolation precautions for identified infection/condition  Outcome: Progressing     Problem: DISCHARGE PLANNING  Goal: Discharge to home or other facility with appropriate resources  Description: INTERVENTIONS:  - Identify barriers to discharge w/patient and caregiver  - Arrange for needed discharge resources and transportation as appropriate  - Identify discharge learning needs (meds, wound care, etc.)  - Arrange for interpretive services to assist at discharge as needed  - Refer to Case Management Department for coordinating discharge planning if the patient needs post-hospital services based on physician/advanced practitioner order or complex needs related to functional status, cognitive ability, or social support system  Outcome: Progressing

## 2024-12-05 NOTE — ASSESSMENT & PLAN NOTE
Secondary to aspiration pneumonia.    Currently on 3 L which we will wean as able.  Encourage patient to use incentive spirometer and to get out of bed

## 2024-12-05 NOTE — ASSESSMENT & PLAN NOTE
"Secondary to aspiration pneumonia.    Currently on 4 L which we will wean as able.  Encourage patient to use incentive spirometer and to get out of bed   Use BiPAP while sleeping (see \"Central sleep apnea\" below)  Consulting pulmonology  "

## 2024-12-05 NOTE — ASSESSMENT & PLAN NOTE
Since admission  May be 2/2 constipation (see above)  Continue PRN Zofran 4 mg Q6H & Reglan 10 mg Q6H

## 2024-12-06 LAB
ALBUMIN SERPL BCG-MCNC: 4.1 G/DL (ref 3.5–5)
ALP SERPL-CCNC: 46 U/L (ref 34–104)
ALT SERPL W P-5'-P-CCNC: 53 U/L (ref 7–52)
ANION GAP SERPL CALCULATED.3IONS-SCNC: 5 MMOL/L (ref 4–13)
AST SERPL W P-5'-P-CCNC: 51 U/L (ref 13–39)
BASE EXCESS BLDA CALC-SCNC: 8.4 MMOL/L
BASOPHILS # BLD AUTO: 0.02 THOUSANDS/ÂΜL (ref 0–0.1)
BASOPHILS NFR BLD AUTO: 0 % (ref 0–1)
BILIRUB SERPL-MCNC: 0.23 MG/DL (ref 0.2–1)
BUN SERPL-MCNC: 10 MG/DL (ref 5–25)
CALCIUM SERPL-MCNC: 8.8 MG/DL (ref 8.4–10.2)
CHLORIDE SERPL-SCNC: 95 MMOL/L (ref 96–108)
CO2 SERPL-SCNC: 38 MMOL/L (ref 21–32)
CREAT SERPL-MCNC: 0.73 MG/DL (ref 0.6–1.3)
EOSINOPHIL # BLD AUTO: 0.01 THOUSAND/ÂΜL (ref 0–0.61)
EOSINOPHIL NFR BLD AUTO: 0 % (ref 0–6)
ERYTHROCYTE [DISTWIDTH] IN BLOOD BY AUTOMATED COUNT: 13.9 % (ref 11.6–15.1)
GFR SERPL CREATININE-BSD FRML MDRD: 120 ML/MIN/1.73SQ M
GLUCOSE SERPL-MCNC: 100 MG/DL (ref 65–140)
HCO3 BLDA-SCNC: 36.3 MMOL/L (ref 22–28)
HCT VFR BLD AUTO: 39.8 % (ref 36.5–49.3)
HGB BLD-MCNC: 12.2 G/DL (ref 12–17)
IMM GRANULOCYTES # BLD AUTO: 0.02 THOUSAND/UL (ref 0–0.2)
IMM GRANULOCYTES NFR BLD AUTO: 0 % (ref 0–2)
L PNEUMO1 AG UR QL IA.RAPID: NEGATIVE
LYMPHOCYTES # BLD AUTO: 1.32 THOUSANDS/ÂΜL (ref 0.6–4.47)
LYMPHOCYTES NFR BLD AUTO: 18 % (ref 14–44)
MCH RBC QN AUTO: 26.1 PG (ref 26.8–34.3)
MCHC RBC AUTO-ENTMCNC: 30.7 G/DL (ref 31.4–37.4)
MCV RBC AUTO: 85 FL (ref 82–98)
MONOCYTES # BLD AUTO: 0.3 THOUSAND/ÂΜL (ref 0.17–1.22)
MONOCYTES NFR BLD AUTO: 4 % (ref 4–12)
NEUTROPHILS # BLD AUTO: 5.86 THOUSANDS/ÂΜL (ref 1.85–7.62)
NEUTS SEG NFR BLD AUTO: 78 % (ref 43–75)
NRBC BLD AUTO-RTO: 0 /100 WBCS
O2 CT BLDA-SCNC: 16.9 ML/DL (ref 16–23)
OXYHGB MFR BLDA: 88.2 % (ref 94–97)
PCO2 BLDA: 66.4 MM HG (ref 36–44)
PH BLDA: 7.36 [PH] (ref 7.35–7.45)
PLATELET # BLD AUTO: 165 THOUSANDS/UL (ref 149–390)
PMV BLD AUTO: 9.7 FL (ref 8.9–12.7)
PO2 BLDA: 56.9 MM HG (ref 75–129)
POTASSIUM SERPL-SCNC: 4 MMOL/L (ref 3.5–5.3)
PROCALCITONIN SERPL-MCNC: 0.88 NG/ML
PROT SERPL-MCNC: 7.2 G/DL (ref 6.4–8.4)
RBC # BLD AUTO: 4.67 MILLION/UL (ref 3.88–5.62)
S PNEUM AG UR QL: NEGATIVE
SODIUM SERPL-SCNC: 138 MMOL/L (ref 135–147)
SPECIMEN SOURCE: ABNORMAL
WBC # BLD AUTO: 7.53 THOUSAND/UL (ref 4.31–10.16)

## 2024-12-06 PROCEDURE — 36600 WITHDRAWAL OF ARTERIAL BLOOD: CPT

## 2024-12-06 PROCEDURE — 94760 N-INVAS EAR/PLS OXIMETRY 1: CPT

## 2024-12-06 PROCEDURE — 99255 IP/OBS CONSLTJ NEW/EST HI 80: CPT | Performed by: INTERNAL MEDICINE

## 2024-12-06 PROCEDURE — 82805 BLOOD GASES W/O2 SATURATION: CPT | Performed by: NURSE PRACTITIONER

## 2024-12-06 PROCEDURE — 84145 PROCALCITONIN (PCT): CPT

## 2024-12-06 PROCEDURE — 94640 AIRWAY INHALATION TREATMENT: CPT

## 2024-12-06 PROCEDURE — 80053 COMPREHEN METABOLIC PANEL: CPT

## 2024-12-06 PROCEDURE — 99233 SBSQ HOSP IP/OBS HIGH 50: CPT | Performed by: FAMILY MEDICINE

## 2024-12-06 PROCEDURE — 94003 VENT MGMT INPAT SUBQ DAY: CPT

## 2024-12-06 PROCEDURE — 85025 COMPLETE CBC W/AUTO DIFF WBC: CPT

## 2024-12-06 PROCEDURE — 94660 CPAP INITIATION&MGMT: CPT

## 2024-12-06 RX ORDER — METOCLOPRAMIDE HYDROCHLORIDE 5 MG/ML
10 INJECTION INTRAMUSCULAR; INTRAVENOUS EVERY 6 HOURS PRN
Status: DISCONTINUED | OUTPATIENT
Start: 2024-12-06 | End: 2024-12-08

## 2024-12-06 RX ORDER — ALBUTEROL SULFATE 0.83 MG/ML
2.5 SOLUTION RESPIRATORY (INHALATION) EVERY 4 HOURS PRN
Status: DISCONTINUED | OUTPATIENT
Start: 2024-12-06 | End: 2024-12-11 | Stop reason: HOSPADM

## 2024-12-06 RX ORDER — FUROSEMIDE 10 MG/ML
40 INJECTION INTRAMUSCULAR; INTRAVENOUS ONCE
Status: COMPLETED | OUTPATIENT
Start: 2024-12-06 | End: 2024-12-06

## 2024-12-06 RX ORDER — LEVALBUTEROL INHALATION SOLUTION 1.25 MG/3ML
1.25 SOLUTION RESPIRATORY (INHALATION)
Status: DISCONTINUED | OUTPATIENT
Start: 2024-12-06 | End: 2024-12-11 | Stop reason: HOSPADM

## 2024-12-06 RX ORDER — NICOTINE 21 MG/24HR
21 PATCH, TRANSDERMAL 24 HOURS TRANSDERMAL DAILY
Status: DISCONTINUED | OUTPATIENT
Start: 2024-12-06 | End: 2024-12-11 | Stop reason: HOSPADM

## 2024-12-06 RX ORDER — ONDANSETRON 2 MG/ML
4 INJECTION INTRAMUSCULAR; INTRAVENOUS
Status: DISCONTINUED | OUTPATIENT
Start: 2024-12-06 | End: 2024-12-08

## 2024-12-06 RX ORDER — BUDESONIDE 0.5 MG/2ML
0.5 INHALANT ORAL
Status: DISCONTINUED | OUTPATIENT
Start: 2024-12-06 | End: 2024-12-11 | Stop reason: HOSPADM

## 2024-12-06 RX ORDER — METHYLPREDNISOLONE SODIUM SUCCINATE 40 MG/ML
40 INJECTION, POWDER, LYOPHILIZED, FOR SOLUTION INTRAMUSCULAR; INTRAVENOUS EVERY 8 HOURS SCHEDULED
Status: DISCONTINUED | OUTPATIENT
Start: 2024-12-06 | End: 2024-12-08

## 2024-12-06 RX ADMIN — PREGABALIN 150 MG: 75 CAPSULE ORAL at 17:14

## 2024-12-06 RX ADMIN — METHYLPREDNISOLONE SODIUM SUCCINATE 40 MG: 40 INJECTION, POWDER, FOR SOLUTION INTRAMUSCULAR; INTRAVENOUS at 22:08

## 2024-12-06 RX ADMIN — ONDANSETRON 4 MG: 2 INJECTION INTRAMUSCULAR; INTRAVENOUS at 10:16

## 2024-12-06 RX ADMIN — METRONIDAZOLE 500 MG: 500 INJECTION, SOLUTION INTRAVENOUS at 00:46

## 2024-12-06 RX ADMIN — BUDESONIDE 0.5 MG: 0.5 INHALANT RESPIRATORY (INHALATION) at 19:49

## 2024-12-06 RX ADMIN — GUAIFENESIN 600 MG: 600 TABLET, EXTENDED RELEASE ORAL at 08:26

## 2024-12-06 RX ADMIN — PANTOPRAZOLE SODIUM 40 MG: 40 INJECTION, POWDER, FOR SOLUTION INTRAVENOUS at 08:27

## 2024-12-06 RX ADMIN — METHYLPREDNISOLONE SODIUM SUCCINATE 40 MG: 40 INJECTION, POWDER, FOR SOLUTION INTRAMUSCULAR; INTRAVENOUS at 14:45

## 2024-12-06 RX ADMIN — PANTOPRAZOLE SODIUM 40 MG: 40 INJECTION, POWDER, FOR SOLUTION INTRAVENOUS at 22:08

## 2024-12-06 RX ADMIN — FUROSEMIDE 40 MG: 10 INJECTION, SOLUTION INTRAMUSCULAR; INTRAVENOUS at 14:45

## 2024-12-06 RX ADMIN — CLONAZEPAM 1 MG: 0.5 TABLET ORAL at 08:25

## 2024-12-06 RX ADMIN — HEPARIN SODIUM 7500 UNITS: 5000 INJECTION, SOLUTION INTRAVENOUS; SUBCUTANEOUS at 05:28

## 2024-12-06 RX ADMIN — LEVALBUTEROL HYDROCHLORIDE 1.25 MG: 1.25 SOLUTION RESPIRATORY (INHALATION) at 19:49

## 2024-12-06 RX ADMIN — PREGABALIN 150 MG: 75 CAPSULE ORAL at 08:26

## 2024-12-06 RX ADMIN — POLYETHYLENE GLYCOL 3350 17 G: 17 POWDER, FOR SOLUTION ORAL at 08:26

## 2024-12-06 RX ADMIN — IPRATROPIUM BROMIDE 0.5 MG: 0.5 SOLUTION RESPIRATORY (INHALATION) at 15:33

## 2024-12-06 RX ADMIN — LUBIPROSTONE 24 MCG: 24 CAPSULE, GELATIN COATED ORAL at 22:07

## 2024-12-06 RX ADMIN — METHADONE HYDROCHLORIDE 150 MG: 10 TABLET ORAL at 08:26

## 2024-12-06 RX ADMIN — HEPARIN SODIUM 7500 UNITS: 5000 INJECTION, SOLUTION INTRAVENOUS; SUBCUTANEOUS at 22:08

## 2024-12-06 RX ADMIN — FLUTICASONE FUROATE AND VILANTEROL TRIFENATATE 1 PUFF: 200; 25 POWDER RESPIRATORY (INHALATION) at 08:28

## 2024-12-06 RX ADMIN — GUAIFENESIN 600 MG: 600 TABLET, EXTENDED RELEASE ORAL at 17:14

## 2024-12-06 RX ADMIN — VILAZODONE HYDROCHLORIDE 40 MG: 20 TABLET ORAL at 08:27

## 2024-12-06 RX ADMIN — LORATADINE 10 MG: 10 TABLET ORAL at 08:25

## 2024-12-06 RX ADMIN — SENNOSIDES 8.6 MG: 8.6 TABLET, FILM COATED ORAL at 22:07

## 2024-12-06 RX ADMIN — LUBIPROSTONE 24 MCG: 24 CAPSULE, GELATIN COATED ORAL at 08:28

## 2024-12-06 RX ADMIN — ALBUTEROL SULFATE 2.5 MG: 2.5 SOLUTION RESPIRATORY (INHALATION) at 09:53

## 2024-12-06 RX ADMIN — NICOTINE 21 MG: 21 PATCH, EXTENDED RELEASE TRANSDERMAL at 17:14

## 2024-12-06 RX ADMIN — PRAZOSIN HYDROCHLORIDE 5 MG: 1 CAPSULE ORAL at 08:32

## 2024-12-06 RX ADMIN — HEPARIN SODIUM 7500 UNITS: 5000 INJECTION, SOLUTION INTRAVENOUS; SUBCUTANEOUS at 14:45

## 2024-12-06 RX ADMIN — ONDANSETRON 4 MG: 2 INJECTION INTRAMUSCULAR; INTRAVENOUS at 17:14

## 2024-12-06 RX ADMIN — CLONAZEPAM 1 MG: 0.5 TABLET ORAL at 17:14

## 2024-12-06 RX ADMIN — IBUPROFEN 400 MG: 400 TABLET, FILM COATED ORAL at 02:34

## 2024-12-06 RX ADMIN — CEFTRIAXONE 2000 MG: 2 INJECTION, SOLUTION INTRAVENOUS at 22:41

## 2024-12-06 RX ADMIN — IPRATROPIUM BROMIDE 0.5 MG: 0.5 SOLUTION RESPIRATORY (INHALATION) at 19:49

## 2024-12-06 RX ADMIN — LEVALBUTEROL HYDROCHLORIDE 1.25 MG: 1.25 SOLUTION RESPIRATORY (INHALATION) at 15:33

## 2024-12-06 NOTE — PLAN OF CARE
Problem: RESPIRATORY - ADULT  Goal: Achieves optimal ventilation and oxygenation  Description: INTERVENTIONS:  - Assess for changes in respiratory status  - Assess for changes in mentation and behavior  - Position to facilitate oxygenation and minimize respiratory effort  - Oxygen administered by appropriate delivery if ordered  - Initiate smoking cessation education as indicated  - Encourage broncho-pulmonary hygiene including cough, deep breathe, Incentive Spirometry  - Assess the need for suctioning and aspirate as needed  - Assess and instruct to report SOB or any respiratory difficulty  - Respiratory Therapy support as indicated  Outcome: Progressing     Problem: METABOLIC, FLUID AND ELECTROLYTES - ADULT  Goal: Electrolytes maintained within normal limits  Description: INTERVENTIONS:  - Monitor labs and assess patient for signs and symptoms of electrolyte imbalances  - Administer electrolyte replacement as ordered  - Monitor response to electrolyte replacements, including repeat lab results as appropriate  - Instruct patient on fluid and nutrition as appropriate  Outcome: Progressing     Problem: PAIN - ADULT  Goal: Verbalizes/displays adequate comfort level or baseline comfort level  Description: Interventions:  - Encourage patient to monitor pain and request assistance  - Assess pain using appropriate pain scale  - Administer analgesics based on type and severity of pain and evaluate response  - Implement non-pharmacological measures as appropriate and evaluate response  - Consider cultural and social influences on pain and pain management  - Notify physician/advanced practitioner if interventions unsuccessful or patient reports new pain  Outcome: Progressing     Problem: INFECTION - ADULT  Goal: Absence or prevention of progression during hospitalization  Description: INTERVENTIONS:  - Assess and monitor for signs and symptoms of infection  - Monitor lab/diagnostic results  - Monitor all insertion sites,  i.e. indwelling lines, tubes, and drains  - Monitor endotracheal if appropriate and nasal secretions for changes in amount and color  - [place] appropriate cooling/warming therapies per order  - Administer medications as ordered  - Instruct and encourage patient and family to use good hand hygiene technique  - Identify and instruct in appropriate isolation precautions for identified infection/condition  Outcome: Progressing     Problem: DISCHARGE PLANNING  Goal: Discharge to home or other facility with appropriate resources  Description: INTERVENTIONS:  - Identify barriers to discharge w/patient and caregiver  - Arrange for needed discharge resources and transportation as appropriate  - Identify discharge learning needs (meds, wound care, etc.)  - Arrange for interpretive services to assist at discharge as needed  - Refer to Case Management Department for coordinating discharge planning if the patient needs post-hospital services based on physician/advanced practitioner order or complex needs related to functional status, cognitive ability, or social support system  Outcome: Progressing

## 2024-12-06 NOTE — ASSESSMENT & PLAN NOTE
"Secondary to aspiration pneumonia.    Currently on 4 L which we will wean as able.  Encourage patient to use incentive spirometer and to get out of bed   Use BiPAP while sleeping (see \"Central sleep apnea\" below)  Pulmonology consulted; we appreciate their recommendations  - ABG: not quite acidotic, with pCO2 66.4 and pO2 56.9  - BNP slightly elevated at 105  - Patient was given 40 mg of IV Lasix on 12/6 with 3.3 L urine output  No echo at this time, as patient's signs and symptoms are most likely d/t pneumonia, central sleep apnea, and chronic asthma.  "

## 2024-12-06 NOTE — PLAN OF CARE
Problem: RESPIRATORY - ADULT  Goal: Achieves optimal ventilation and oxygenation  Description: INTERVENTIONS:  - Assess for changes in respiratory status  - Assess for changes in mentation and behavior  - Position to facilitate oxygenation and minimize respiratory effort  - Oxygen administered by appropriate delivery if ordered  - Initiate smoking cessation education as indicated  - Encourage broncho-pulmonary hygiene including cough, deep breathe, Incentive Spirometry  - Assess the need for suctioning and aspirate as needed  - Assess and instruct to report SOB or any respiratory difficulty  - Respiratory Therapy support as indicated  Outcome: Progressing     Problem: METABOLIC, FLUID AND ELECTROLYTES - ADULT  Goal: Electrolytes maintained within normal limits  Description: INTERVENTIONS:  - Monitor labs and assess patient for signs and symptoms of electrolyte imbalances  - Administer electrolyte replacement as ordered  - Monitor response to electrolyte replacements, including repeat lab results as appropriate  - Instruct patient on fluid and nutrition as appropriate  Outcome: Progressing     Problem: PAIN - ADULT  Goal: Verbalizes/displays adequate comfort level or baseline comfort level  Description: Interventions:  - Encourage patient to monitor pain and request assistance  - Assess pain using appropriate pain scale  - Administer analgesics based on type and severity of pain and evaluate response  - Implement non-pharmacological measures as appropriate and evaluate response  - Consider cultural and social influences on pain and pain management  - Notify physician/advanced practitioner if interventions unsuccessful or patient reports new pain  Outcome: Progressing     Problem: INFECTION - ADULT  Goal: Absence or prevention of progression during hospitalization  Description: INTERVENTIONS:  - Assess and monitor for signs and symptoms of infection  - Monitor lab/diagnostic results  - Monitor all insertion sites,  i.e. indwelling lines, tubes, and drains  - Monitor endotracheal if appropriate and nasal secretions for changes in amount and color  - [place] appropriate cooling/warming therapies per order  - Administer medications as ordered  - Instruct and encourage patient and family to use good hand hygiene technique  - Identify and instruct in appropriate isolation precautions for identified infection/condition  Outcome: Progressing     Problem: DISCHARGE PLANNING  Goal: Discharge to home or other facility with appropriate resources  Description: INTERVENTIONS:  - Identify barriers to discharge w/patient and caregiver  - Arrange for needed discharge resources and transportation as appropriate  - Identify discharge learning needs (meds, wound care, etc.)  - Arrange for interpretive services to assist at discharge as needed  - Refer to Case Management Department for coordinating discharge planning if the patient needs post-hospital services based on physician/advanced practitioner order or complex needs related to functional status, cognitive ability, or social support system  Outcome: Progressing

## 2024-12-06 NOTE — ASSESSMENT & PLAN NOTE
Secondary to overdose.    Continue Rocephin 2 g/dy (day 4 of antibiotics)  Repeat imaging in 4-6 wks

## 2024-12-06 NOTE — CONSULTS
"Consultation - Pulmonary Medicine   Sacha Foster 34 y.o. male MRN: [id number]  Unit/Bed#: 409-01 Encounter: [id number]      Assessment/Plan:    1.  Acute hypoxic and hypercapnic respiratory failure likely multifaceted as listed below        -Currently on 4 L 94%, does not wear home O2        -Continue sats greater than 88%        -Pulmonary toileting: Deep breathing cough out of bed as tolerated incentive spirometry        -Will need ambulatory pulse ox prior to discharge        -Serum bicarb elevated will order ABG    2.  Suspected B/L LL PNA        -Likely secondary to aspiration        -Procalcitonin: 2.64--1.38--0.88        -Day # 4/7-ceftriaxone, Day # 3/7-Flagyl        -Need repeat imaging in 4 to 6 weeks    3.  Moderate persistent asthma with acute exacerbation        -Eos- 190--280--160        -10/12/2022-mild obstruction FEV1 86% w/ +BDR        -Inpatient: IV Solu-Medrol 40 mg 3 times daily, budesonide twice daily, Xopenex/Atrovent 3 times daily        -Can titrate accordingly over the weekend        -If patient is discharged would recommend on longer prednisone taper 40 mg decrease by 10 mg every 3 days        -Home regimen: Dulera 200-5 mcg 2 puffs twice daily, mL and 2 puffs every 6 as needed    4.  Volume overload        -Significant pitting bilateral lower edema        -7/29/2022-EF 60%        -Will give 1 dose IV Lasix        -Will order BNP    5.  Central sleep apnea        -Recommended: Servoventilation EPAP 15, PS 4, PS max 10 w/ auto backup        -Reports occasional compliance          History of Present Illness   Physician Requesting Consult: Oralia Pate MD  Reason for Consult / Principal Problem: Asthma  Hx and PE limited by: Nothing  Chief Complaint: \" I feel a lot better\"  HPI: Sacha Fotser is a 34 y.o.  male who presented to [facility] on unresponsive at home.  Patient has past medical history of PTSD, anxiety, depression, opioid dependence on methadone, " morbid obesity, and GERD.  Presented to the emergency department 3 days ago unresponsive after suspected overdose secondary to opioid ingestion.  Patient was given Narcan in the field with good response.  Upon ED admission patient was noted to be hypoxic and requiring 4 L.     Pulmonary is consulted for bilateral suspected aspiration pneumonia.  Today upon examination patient was sleeping with BiPAP on.  Patient was somewhat startled when woken.  Upon examination patient had significantly diminished aeration throughout lung fields.  Patient had significant bilateral lower lobe pitting edema.  Patient currently denying any fevers, chills, night sweats, pleuritic chest pain, or palpitations.     From a pulmonary standpoint, patient follows with Dr. Ross for his moderate persistent asthma.  Patient does report to vaping however has never smoked tobacco.  Testing in 2022 did show some mild to moderate obstruction with a bronchodilator response.  She is currently maintained on Dulera 200-5 mcg 2 puffs twice daily, mL and 2 puffs every 6 as needed.  Denies any occupational exposures as he is currently not employed.  Patient is currently not maintained on oxygen therapy.  Patient denies any symptoms of GERD, seasonal allergies, or postnasal drip.  Was diagnosed with central sleep apnea and is recommended ASV auto mini.  Currently denies any acute exposures dust, mold, asbestos, or silica.        Inpatient consult to Pulmonology  Consult performed by: TC Catalan  Consult ordered by: Oralia Pate MD          Review of Systems   Constitutional:  Negative for chills and fever.   HENT:  Negative for ear pain and sore throat.    Eyes:  Negative for pain and visual disturbance.   Respiratory:  Positive for cough and shortness of breath. Negative for apnea, choking, chest tightness, wheezing and stridor.    Cardiovascular:  Negative for chest pain and palpitations.   Gastrointestinal:  Negative for abdominal  pain and vomiting.   Genitourinary:  Negative for dysuria and hematuria.   Musculoskeletal:  Negative for arthralgias and back pain.   Skin:  Negative for color change and rash.   Neurological:  Negative for seizures and syncope.   All other systems reviewed and are negative.      Historical Information   Past Medical History:   Diagnosis Date    Allergic     Anemia 06/24/2018    Anxiety     from records    Arthritis     Asthma     Benign essential hypertension 11/17/2016    Bipolar 1 disorder (HCC)     from records    Chronic pain     Chronic pain disorder     back/ knees/ hip    Claustrophobia 03/15/2018    Community acquired pneumonia 06/24/2018    CPAP (continuous positive airway pressure) dependence     Dental abscess     11/9/23- pt was started on amoxicillin- will finish on 11/19/23    Depressed 07/14/2016    Depression     from records    GERD (gastroesophageal reflux disease)     Hepatitis C virus infection 08/14/2014    Heroin abuse ([facility]) 07/24/2018    Infectious viral hepatitis     Obesity 10/12/2016    Obstructive sleep apnea     from records    Sleep disorder     Substance abuse ([facility])      Past Surgical History:   Procedure Laterality Date    ACHILLES TENDON SURGERY Right 11/27/2023    Procedure: transfer of peroneus brevis tendon to the cuboid bone;  Surgeon: James R Lachman, MD;  Location:  MAIN OR;  Service: Orthopedics    COLONOSCOPY      EGD      EPIDURAL BLOCK INJECTION Right 10/28/2022    Procedure: BLOCK / INJECTION EPIDURAL STEROID LUMBAR  right L4-5 and L5-S1 TFESI;  Surgeon: Lorena Hernandez MD;  Location: [facility];  Service: Pain Management     EPIDURAL BLOCK INJECTION Right 12/20/2022    Procedure: BLOCK / INJECTION EPIDURAL STEROID LUMBAR  right  L4-5 and L5-S1 TFESI;  Surgeon: Lorena Hernandez MD;  Location: [facility];  Service: Pain Management     OR INCISION & DRAINAGE ABSCESS COMPLICATED/MULTIPLE Left 05/02/2019    Procedure: INCISION AND DRAINAGE (I&D) EXTREMITY;   Surgeon: Fco Woodall MD;  Location: [facility];  Service: Orthopedics    NY LAPAROSCOPY COLECTOMY PARTIAL W/ANASTOMOSIS Left 05/21/2020    Procedure: LAPAROSCOPIC LEFT HEMICOLECTOMY TAKEDOWN SPLENIC FLECTURE, COLORECTAL ANASTOMOSIS.;  Surgeon: Abdelrahman Canales MD;  Location:  MAIN OR;  Service: Colorectal    NY NEUROPLASTY &/TRANSPOS MEDIAN NRV CARPAL TUNNE Left 01/09/2023    Procedure: RELEASE CARPAL TUNNEL;  Surgeon: Isauro Ledbetter DO;  Location: [facility];  Service: Orthopedics    NY OPEN TREATMENT METATARSAL FRACTURE EACH Right 02/24/2023    Procedure: OPEN REDUCTION W/ INTERNAL FIXATION (ORIF) FOOT;  Surgeon: Esteban Talamantes DPM;  Location: [facility];  Service: Podiatry    NY REMOVAL IMPLANT DEEP Right 11/27/2023    Procedure: REMOVAL HARDWARE FOOT, excision of painful os vesalanium, transfer of peroneus brevis tendon to the cuboid bone;  Surgeon: James R Lachman, MD;  Location:  MAIN OR;  Service: Orthopedics    WISDOM TOOTH EXTRACTION       Social History   Social History     Substance and Sexual Activity   Alcohol Use Never     Social History     Substance and Sexual Activity   Drug Use Not Currently    Types: Marijuana, Prescription, Methamphetamines    Comment: on Methadone; occaionally marijuana- last used 2/10/23     Social History     Tobacco Use   Smoking Status Former    Current packs/day: 0.00    Types: E-Cigarettes, Cigarettes    Passive exposure: Past   Smokeless Tobacco Never   Tobacco Comments    Vapes / 2019 quit cigs     E-Cigarette/Vaping    E-Cigarette Use Current Every Day User     Cartridges/Day 1     Comments NICOTINE      E-Cigarette/Vaping Substances    Nicotine Yes     THC No     CBD No     Flavoring Yes     Other No     Unknown No      Occupational History: na    Family History: Family history non-contributory    Meds/Allergies   all current active meds have been reviewed    Allergies   Allergen Reactions    Red Dye - Food Allergy Diarrhea, Nausea Only and  "Abdominal Pain    Bee Venom Angioedema     Pt states he gets swelling at the site       Objective   Vitals: Blood pressure 121/73, pulse 66, temperature 98.2 °F (36.8 °C), resp. rate 16, height 5' 10\" (1.778 m), weight (!) 172 kg (378 lb 15.5 oz), SpO2 94%.,Body mass index is 54.38 kg/m².    Intake/Output Summary (Last 24 hours) at 12/6/2024 1317  Last data filed at 12/6/2024 1228  Gross per 24 hour   Intake 600 ml   Output --   Net 600 ml     Invasive Devices       Peripheral Intravenous Line  Duration             Peripheral IV 12/03/24 Right;Upper;Ventral (anterior) Arm 2 days                    Physical Exam  Constitutional:       General: He is not in acute distress.     Appearance: Normal appearance. He is normal weight. He is not ill-appearing.   HENT:      Head: Normocephalic and atraumatic.      Nose: Nose normal. No congestion or rhinorrhea.      Mouth/Throat:      Mouth: Mucous membranes are dry.      Pharynx: Oropharynx is clear. No oropharyngeal exudate or posterior oropharyngeal erythema.   Cardiovascular:      Rate and Rhythm: Normal rate and regular rhythm.      Pulses: Normal pulses.      Heart sounds: Normal heart sounds. No murmur heard.     No friction rub. No gallop.   Pulmonary:      Effort: Pulmonary effort is normal. No respiratory distress.      Breath sounds: No stridor. Wheezing present. No rhonchi or rales.      Comments: Some scattered wheezing  Chest:      Chest wall: No tenderness.   Abdominal:      General: Abdomen is flat. Bowel sounds are normal. There is no distension.      Palpations: Abdomen is soft. There is no mass.   Musculoskeletal:         General: No swelling or tenderness. Normal range of motion.      Cervical back: Normal range of motion. No rigidity or tenderness.   Skin:     General: Skin is warm and dry.      Coloration: Skin is not jaundiced or pale.   Neurological:      General: No focal deficit present.      Mental Status: He is alert and oriented to person, place, " "and time. Mental status is at baseline.      Cranial Nerves: No cranial nerve deficit.      Sensory: No sensory deficit.         Lab Results: I have personally reviewed pertinent lab results., ABG: No results found for: \"PHART\", \"LMW8QTR\", \"PO2ART\", \"YIS6IZB\", \"E9FQLCKP\", \"BEART\", \"SOURCE\", BNP: No results found for: \"BNP\", CBC:   Lab Results   Component Value Date    WBC 7.53 12/06/2024    HGB 12.2 12/06/2024    HCT 39.8 12/06/2024    MCV 85 12/06/2024     12/06/2024    RBC 4.67 12/06/2024    MCH 26.1 (L) 12/06/2024    MCHC 30.7 (L) 12/06/2024    RDW 13.9 12/06/2024    MPV 9.7 12/06/2024    NRBC 0 12/06/2024   , CMP:   Lab Results   Component Value Date    SODIUM 138 12/06/2024    K 4.0 12/06/2024    CL 95 (L) 12/06/2024    CO2 38 (H) 12/06/2024    BUN 10 12/06/2024    CREATININE 0.73 12/06/2024    CALCIUM 8.8 12/06/2024    AST 51 (H) 12/06/2024    ALT 53 (H) 12/06/2024    ALKPHOS 46 12/06/2024    EGFR 120 12/06/2024           Imaging Studies: Results Review Statement: I personally reviewed the following image studies/reports in PACS and discussed pertinent findings with Radiology: chest xray. My interpretation of the radiology images/reports is:  .     Chest CT multifocal bronchitis/aspiration    EKG, Pathology, and Other Studies: Results Review Statement: I personally reviewed the following image studies/reports in PACS and discussed pertinent findings with Radiology: chest xray. My interpretation of the radiology images/reports is:  .       erpretation Summary  Show Result Comparison     Left Ventricle: Left ventricular cavity size is normal. Wall thickness is normal. The left ventricular ejection fraction is 60%. Systolic function is normal. Wall motion is normal. Diastolic function is normal.    Right Ventricle: Systolic function is normal.    Mitral Valve: There is trace regurgitation.    Tricuspid Valve: There is trace regurgitation.    Pericardium: There is no pericardial effusion.   "   Findings    Left Ventricle Left ventricular cavity size is normal. Wall thickness is normal. The left ventricular ejection fraction is 60%. Systolic function is normal.  Wall motion is normal. Diastolic function is normal.   Right Ventricle Right ventricular cavity size is normal. Systolic function is normal. Wall thickness is normal.   Left Atrium The atrium is normal in size.   Right Atrium The atrium is normal in size.   Aortic Valve The aortic valve is trileaflet. The leaflets are not thickened. The leaflets are not calcified. The leaflets exhibit normal mobility. There is no evidence of regurgitation. The aortic valve has no significant stenosis.   Mitral Valve There is trace regurgitation. There is no evidence of stenosis. The mitral valve has normal structure and normal function.   Tricuspid Valve Tricuspid valve structure is normal. There is trace regurgitation. There is no evidence of stenosis.   Pulmonic Valve Pulmonic valve structure is normal. There is no evidence of regurgitation. There is no evidence of stenosis.   Ascending Aorta The aortic root is normal in size.   IVC/SVC The inferior vena cava is normal in size.   Pericardium There is no pericardial effusion. The pericardium is normal in appearance.       Pulmonary Results (PFTs, PSG): Results Review Statement: I personally reviewed the following image studies/reports in PACS and discussed pertinent findings with Radiology: chest xray. My interpretation of the radiology images/reports is:  .       Date of Testing: 10/12/2022     Date of Interpretation: 10/13/2022     Requesting Physician: Dr. Armendariz     Reason for Testing: central sleep apnea with prescribed opioid use; methadone maintenance therapy; chronic intermittent hypoxia with CATA     Reference set for interpretation: [id number]     Procedure: The patient was taken to pulmonary function testing laboratory.  The patient demonstrated good effort and cooperation.  The results of this test meet  "ATS standards for acceptability and repeatability.  Data set appears appropriate for interpretation.     Post bronchodilator testing performed after the administration of 2.5mg albuterol in 3cc normal saline administered via nebulizer per bronchodilator protocol.     Results:  FEV1/FVC Ratio: 67 %  Forced Vital Capacity: 4.70 L    86 % predicted  FEV1: 3.17 L71 % predicted     Lung volumes by body plethysmography:   Total Lung Capacity 108 % predicted   Residual volume 162 % predicted     DLCO corrected for patients hemoglobin level: 94 %     Interpretation:     Mild obstructive airflow defect on spirometry     There is significant airway response with the administration of bronchodilator per ATS standards     Increased lung volumes indicative of air trapping     Normal diffusion capacity     Flow volume loop is obstructed.    Code Status: Level 1 - Full Code    Portions of the record may have been created with voice recognition software.  Occasional wrong word or \"sound a like\" substitutions may have occurred due to the inherent limitations of voice recognition software.  Read the chart carefully and recognize, using context, where substitutions have occurred.  "

## 2024-12-06 NOTE — ASSESSMENT & PLAN NOTE
Patient is on Dulera 200-5 at home (high steroid dose)  Switching to Breo Ellipta while hospitalized, as Dulera is not on formulary  PRN Albuterol inhaler  On discharge, prednisone taper 40 mg decreasing by 10 mg Q3D

## 2024-12-06 NOTE — RESPIRATORY THERAPY NOTE
12/06/24 0719   Respiratory Assessment   Resp Comments patient was on 3 lpm sleeping semi aviles, respirations easy and non-labored, turned oxygen down   Oxygen Therapy/Pulse Ox   O2 Device Nasal cannula   O2 Therapy Oxygen   Nasal Cannula O2 Flow Rate (L/min) 2 L/min   Calculated FIO2 (%) - Nasal Cannula 28   SpO2 94 %   SpO2 Activity At Rest   $ Pulse Oximetry Spot Check Charge Completed

## 2024-12-06 NOTE — HOSPITAL COURSE
Patient is a 34 y.o. male with a PMH of PTSD, anxiety (on PRN Klonopin), depression, BP1, insomnia (on Temazepam), opioid dependence (now on methadone), morbid obesity, and acid reflux who presented on 12/3/24 d/t being found unresponsive at home after taking Xanax that he got off the streets from an associate.  The patient was given nasal Narcan with response.  Patient had a new O2 requirement of 4 L, and chest CT revealed multifocal bronchiolitis, especially in the RLL, favoring aspiration pneumonia.  Patient was admitted for severe sepsis and acute respiratory failure 2/2 aspiration pneumonia. He was started on IV Flagyl & Rocephin. Patient complaining of nausea and vomiting. Treated initially with PRN Zofran. Reglan was later added, plus Miralax and Senna for opioid-induced constipation. He did admit to recently starting Wegovy. Antibiotics were narrowed to Rocephin. On 12/5, patient was weaned down to 3L NC. On 12/6, patient was initially weaned down to 2L, but had to be put back up to 4L after exertion. He was transitioned to BiPAP, and pulmonology consulted. ABG was drawn, not quite acidotic, with pCO2 66.4 and pO2 56.9. Patient was given 1 dose of IV lasix. BNP was ___

## 2024-12-06 NOTE — PROGRESS NOTES
"Progress Note - Hospitalist   Name: Sacha Foster 34 y.o. male I MRN: [id number]  Unit/Bed#: 409-01 I Date of Admission: 12/3/2024   Date of Service: 12/6/2024 I Hospital Day: 2    Assessment & Plan  Accidental overdose  Unresponsive at home, given Narcan with response.  Most likely opioid overdose as the patient is on chronic methadone.  The patient states that he got Xanax from the street.  Aspiration pneumonia ([facility])  Secondary to overdose.    Continue Rocephin 2 g/dy (day 4 of antibiotics)  D/c Flagyl  Acute respiratory failure with hypoxia ([facility])  Secondary to aspiration pneumonia.    Currently on 4 L which we will wean as able.  Encourage patient to use incentive spirometer and to get out of bed   Use BiPAP while sleeping (see \"Central sleep apnea\" below)  Consulting pulmonology  Opioid dependence in remission ([facility])  Patient is on chronic methadone 150 mg daily.  He is treated at the [facility].  Central sleep apnea  Associated with methadone and Hx of OUD  See sleep studies from 2022  Not compliant with CPAP at home  Continue BiPAP use while sleeping  Acid reflux  Continue PPI  Generalized anxiety disorder  Continue with home Pregabalin & Klonopin  Depressed  Continue home Viibryd  Patient follows with behavioral health outpatient for talk therapy.  Patient has history of bipolar on chart. This should be ascertained, as patient should probably be on a mood stabilizer.  Moderate persistent asthma without complication  Patient is on Dulera 200-5 at home (high steroid dose)  Switching to Breo Ellipta while hospitalized, as Dulera is not on formulary  PRN Albuterol inhaler  Morbid obesity with BMI of 50.0-59.9, adult ([facility])  BMI 54.38  Weight may be contributing to respiratory difficulty  Discontinue Wegovy, since this is either causing or contributing to his current nausea and vomiting.  Chronic constipation  Associated with Methadone and Hx of OUD  Abdominal obstruction series showed \"large " "volume colonic fecal burden\"  Continue Lubiprostone in lieu of home Linzess  Continue scheduled MiraLAX and add scheduled Senna  Nausea and vomiting  Since admission  May be 2/2 constipation (see above)  Continue Zofran 4 mg TID AC & PRN Reglan 10 mg Q6H    VTE Pharmacologic Prophylaxis: VTE Score: 6 High Risk (Score >/= 5) - Pharmacological DVT Prophylaxis Ordered: heparin. Sequential Compression Devices Ordered.    Mobility:   Basic Mobility Inpatient Raw Score: 23  JH-HLM Goal: 7: Walk 25 feet or more  JH-HLM Achieved: 7: Walk 25 feet or more  JH-HLM Goal achieved. Continue to encourage appropriate mobility.    Patient Centered Rounds: I performed bedside rounds with nursing staff today.   Discussions with Specialists or Other Care Team Provider: Multidisciplinary care team and attending physician    Education and Discussions with Family / Patient: Updated  (mother) at bedside.    Current Length of Stay: 2 day(s)  Current Patient Status: Inpatient   Certification Statement: The patient will continue to require additional inpatient hospital stay due to acute respiratory failure secondary to aspiration pneumonia  Discharge Plan:  When stable    Code Status: Level 1 - Full Code    Subjective   Patient was seen and examined at bedside.  He says that he was able to have a very small amount of stool, but no significant bowel movement yet.  He still feels nauseous.  He does not feel any abdominal pain.  He admits to dyspnea on exertion but denies orthopnea.    Objective :  Temp:  [98.1 °F (36.7 °C)-98.2 °F (36.8 °C)] 98.2 °F (36.8 °C)  HR:  [66-80] 66  BP: (121-130)/(73-86) 121/73  Resp:  [16] 16  SpO2:  [86 %-94 %] 94 %  O2 Device: BiPAP  Nasal Cannula O2 Flow Rate (L/min):  [2 L/min-4 L/min] 4 L/min  FiO2 (%):  [50] 50    Body mass index is 54.38 kg/m².     Input and Output Summary (last 24 hours):     Intake/Output Summary (Last 24 hours) at 12/6/2024 1341  Last data filed at 12/6/2024 1228  Gross per " 24 hour   Intake 600 ml   Output --   Net 600 ml       Physical Exam  Vitals reviewed.   Constitutional:       General: He is not in acute distress.     Appearance: Normal appearance. He is obese. He is not ill-appearing, toxic-appearing or diaphoretic.   Cardiovascular:      Rate and Rhythm: Normal rate and regular rhythm.      Heart sounds: Normal heart sounds. No murmur heard.  Pulmonary:      Effort: Pulmonary effort is normal. No respiratory distress.      Breath sounds: Normal breath sounds. No wheezing or rales.   Abdominal:      General: Abdomen is flat. There is no distension.      Palpations: Abdomen is soft.      Tenderness: There is no abdominal tenderness. There is no guarding.   Musculoskeletal:      Right lower le+ Pitting Edema present.      Left lower le+ Pitting Edema present.   Neurological:      Mental Status: He is alert.             Lab Results: I have reviewed the following results:   Results from last 7 days   Lab Units 24  0451   WBC Thousand/uL 7.53   HEMOGLOBIN g/dL 12.2   HEMATOCRIT % 39.8   PLATELETS Thousands/uL 165   SEGS PCT % 78*   LYMPHO PCT % 18   MONO PCT % 4   EOS PCT % 0     Results from last 7 days   Lab Units 24  0451   SODIUM mmol/L 138   POTASSIUM mmol/L 4.0   CHLORIDE mmol/L 95*   CO2 mmol/L 38*   BUN mg/dL 10   CREATININE mg/dL 0.73   ANION GAP mmol/L 5   CALCIUM mg/dL 8.8   ALBUMIN g/dL 4.1   TOTAL BILIRUBIN mg/dL 0.23   ALK PHOS U/L 46   ALT U/L 53*   AST U/L 51*   GLUCOSE RANDOM mg/dL 100     Results from last 7 days   Lab Units 24  2248   INR  0.96             Results from last 7 days   Lab Units 24  0451 24  0958 24  0537 24  0210 24   LACTIC ACID mmol/L  --   --   --  1.3 4.1*   PROCALCITONIN ng/ml 0.88* 1.38* 2.65*  --  1.19*       Recent Cultures (last 7 days):   Results from last 7 days   Lab Units 24  2117 24  0600 24  2249 24   BLOOD CULTURE   --   --  No Growth at 48  hrs. No Growth at 48 hrs.   GRAM STAIN RESULT  1+ Epithelial cells per low power field*  No polys seen*  2+ Gram positive cocci in pairs*  1+ Gram negative rods*  --   --   --    LEGIONELLA URINARY ANTIGEN   --  Negative  --   --        Imaging Results Review: I reviewed radiology reports from this admission including: xray(s).  Other Study Results Review: No additional pertinent studies reviewed.    Last 24 Hours Medication List:     Current Facility-Administered Medications:     acetaminophen (TYLENOL) tablet 650 mg, Q6H PRN    albuterol inhalation solution 2.5 mg, Q4H PRN    bisacodyl (DULCOLAX) EC tablet 5 mg, Daily PRN    cefTRIAXone (ROCEPHIN) IVPB (premix in dextrose) 2,000 mg 50 mL, Q24H, Last Rate: 2,000 mg (12/05/24 5277)    clonazePAM (KlonoPIN) tablet 1 mg, BID    fluticasone-vilanterol 200-25 mcg/actuation 1 puff, Daily    guaiFENesin (MUCINEX) 12 hr tablet 600 mg, BID    heparin (porcine) subcutaneous injection 7,500 Units, Q8H MARICEL    ibuprofen (MOTRIN) tablet 400 mg, Q8H PRN    loratadine (CLARITIN) tablet 10 mg, Daily    lubiprostone (AMITIZA) capsule 24 mcg, BID    methadone (Dolophine) tablet 150 mg, Daily    metoclopramide (REGLAN) injection 10 mg, Q6H PRN    ondansetron (ZOFRAN) injection 4 mg, TID AC    pantoprazole (PROTONIX) injection 40 mg, Q12H MARICEL    polyethylene glycol (MIRALAX) packet 17 g, Daily    prazosin (MINIPRESS) capsule 5 mg, Daily    pregabalin (LYRICA) capsule 150 mg, BID    senna (SENOKOT) tablet 8.6 mg, HS    vilazodone (VIIBRYD) tablet 40 mg, Daily With Breakfast    Administrative Statements   Today, Patient Was Seen By: Waqar Howard DO      **Please Note: This note may have been constructed using a voice recognition system.**

## 2024-12-06 NOTE — ASSESSMENT & PLAN NOTE
Since admission  May be 2/2 constipation (see above)  Continue Zofran 4 mg TID AC & PRN Reglan 10 mg Q6H

## 2024-12-07 PROBLEM — Z79.891 ENCOUNTER FOR MONITORING SUBOXONE MAINTENANCE THERAPY: Status: RESOLVED | Noted: 2019-01-22 | Resolved: 2024-12-07

## 2024-12-07 PROBLEM — Z79.899 ENCOUNTER FOR MONITORING SUBOXONE MAINTENANCE THERAPY: Status: RESOLVED | Noted: 2019-01-22 | Resolved: 2024-12-07

## 2024-12-07 PROBLEM — E87.70 VOLUME OVERLOAD: Status: ACTIVE | Noted: 2024-12-07

## 2024-12-07 PROBLEM — F43.10 PTSD (POST-TRAUMATIC STRESS DISORDER): Status: ACTIVE | Noted: 2024-12-07

## 2024-12-07 PROBLEM — Z51.81 ENCOUNTER FOR MONITORING SUBOXONE MAINTENANCE THERAPY: Status: RESOLVED | Noted: 2019-01-22 | Resolved: 2024-12-07

## 2024-12-07 LAB
ANION GAP SERPL CALCULATED.3IONS-SCNC: 6 MMOL/L (ref 4–13)
BASOPHILS # BLD AUTO: 0 THOUSANDS/ÂΜL (ref 0–0.1)
BASOPHILS NFR BLD AUTO: 0 % (ref 0–1)
BNP SERPL-MCNC: 105 PG/ML (ref 0–100)
BUN SERPL-MCNC: 9 MG/DL (ref 5–25)
CALCIUM SERPL-MCNC: 9.2 MG/DL (ref 8.4–10.2)
CHLORIDE SERPL-SCNC: 97 MMOL/L (ref 96–108)
CO2 SERPL-SCNC: 36 MMOL/L (ref 21–32)
CREAT SERPL-MCNC: 0.78 MG/DL (ref 0.6–1.3)
EOSINOPHIL # BLD AUTO: 0 THOUSAND/ÂΜL (ref 0–0.61)
EOSINOPHIL NFR BLD AUTO: 0 % (ref 0–6)
ERYTHROCYTE [DISTWIDTH] IN BLOOD BY AUTOMATED COUNT: 13.5 % (ref 11.6–15.1)
GFR SERPL CREATININE-BSD FRML MDRD: 116 ML/MIN/1.73SQ M
GLUCOSE SERPL-MCNC: 127 MG/DL (ref 65–140)
HCT VFR BLD AUTO: 38.1 % (ref 36.5–49.3)
HGB BLD-MCNC: 11.9 G/DL (ref 12–17)
IMM GRANULOCYTES # BLD AUTO: 0.03 THOUSAND/UL (ref 0–0.2)
IMM GRANULOCYTES NFR BLD AUTO: 0 % (ref 0–2)
LYMPHOCYTES # BLD AUTO: 0.71 THOUSANDS/ÂΜL (ref 0.6–4.47)
LYMPHOCYTES NFR BLD AUTO: 10 % (ref 14–44)
MCH RBC QN AUTO: 26.2 PG (ref 26.8–34.3)
MCHC RBC AUTO-ENTMCNC: 31.2 G/DL (ref 31.4–37.4)
MCV RBC AUTO: 84 FL (ref 82–98)
MONOCYTES # BLD AUTO: 0.1 THOUSAND/ÂΜL (ref 0.17–1.22)
MONOCYTES NFR BLD AUTO: 1 % (ref 4–12)
NEUTROPHILS # BLD AUTO: 6.48 THOUSANDS/ÂΜL (ref 1.85–7.62)
NEUTS SEG NFR BLD AUTO: 89 % (ref 43–75)
NRBC BLD AUTO-RTO: 0 /100 WBCS
PLATELET # BLD AUTO: 162 THOUSANDS/UL (ref 149–390)
PMV BLD AUTO: 9.9 FL (ref 8.9–12.7)
POTASSIUM SERPL-SCNC: 4.5 MMOL/L (ref 3.5–5.3)
RBC # BLD AUTO: 4.54 MILLION/UL (ref 3.88–5.62)
SODIUM SERPL-SCNC: 139 MMOL/L (ref 135–147)
WBC # BLD AUTO: 7.32 THOUSAND/UL (ref 4.31–10.16)

## 2024-12-07 PROCEDURE — 94660 CPAP INITIATION&MGMT: CPT

## 2024-12-07 PROCEDURE — 94760 N-INVAS EAR/PLS OXIMETRY 1: CPT

## 2024-12-07 PROCEDURE — 99232 SBSQ HOSP IP/OBS MODERATE 35: CPT | Performed by: FAMILY MEDICINE

## 2024-12-07 PROCEDURE — 94640 AIRWAY INHALATION TREATMENT: CPT

## 2024-12-07 PROCEDURE — 85025 COMPLETE CBC W/AUTO DIFF WBC: CPT

## 2024-12-07 PROCEDURE — 80048 BASIC METABOLIC PNL TOTAL CA: CPT

## 2024-12-07 PROCEDURE — 94664 DEMO&/EVAL PT USE INHALER: CPT

## 2024-12-07 PROCEDURE — 94003 VENT MGMT INPAT SUBQ DAY: CPT

## 2024-12-07 PROCEDURE — 83880 ASSAY OF NATRIURETIC PEPTIDE: CPT | Performed by: NURSE PRACTITIONER

## 2024-12-07 RX ORDER — FLUTICASONE PROPIONATE 50 MCG
1 SPRAY, SUSPENSION (ML) NASAL DAILY
Status: DISCONTINUED | OUTPATIENT
Start: 2024-12-07 | End: 2024-12-11 | Stop reason: HOSPADM

## 2024-12-07 RX ORDER — ARIPIPRAZOLE 10 MG/1
10 TABLET ORAL DAILY
Status: CANCELLED | OUTPATIENT
Start: 2024-12-07

## 2024-12-07 RX ADMIN — IPRATROPIUM BROMIDE 0.5 MG: 0.5 SOLUTION RESPIRATORY (INHALATION) at 09:32

## 2024-12-07 RX ADMIN — FLUTICASONE PROPIONATE 1 SPRAY: 50 SPRAY, METERED NASAL at 11:51

## 2024-12-07 RX ADMIN — GUAIFENESIN 600 MG: 600 TABLET, EXTENDED RELEASE ORAL at 17:13

## 2024-12-07 RX ADMIN — HEPARIN SODIUM 7500 UNITS: 5000 INJECTION, SOLUTION INTRAVENOUS; SUBCUTANEOUS at 05:37

## 2024-12-07 RX ADMIN — CLONAZEPAM 1 MG: 0.5 TABLET ORAL at 08:27

## 2024-12-07 RX ADMIN — VILAZODONE HYDROCHLORIDE 40 MG: 20 TABLET ORAL at 08:30

## 2024-12-07 RX ADMIN — SENNOSIDES 8.6 MG: 8.6 TABLET, FILM COATED ORAL at 22:09

## 2024-12-07 RX ADMIN — NICOTINE 21 MG: 21 PATCH, EXTENDED RELEASE TRANSDERMAL at 11:56

## 2024-12-07 RX ADMIN — METHYLPREDNISOLONE SODIUM SUCCINATE 40 MG: 40 INJECTION, POWDER, FOR SOLUTION INTRAMUSCULAR; INTRAVENOUS at 22:09

## 2024-12-07 RX ADMIN — BUDESONIDE 0.5 MG: 0.5 INHALANT RESPIRATORY (INHALATION) at 09:32

## 2024-12-07 RX ADMIN — IPRATROPIUM BROMIDE 0.5 MG: 0.5 SOLUTION RESPIRATORY (INHALATION) at 20:02

## 2024-12-07 RX ADMIN — BISACODYL 5 MG: 5 TABLET, COATED ORAL at 13:27

## 2024-12-07 RX ADMIN — PANTOPRAZOLE SODIUM 40 MG: 40 INJECTION, POWDER, FOR SOLUTION INTRAVENOUS at 08:28

## 2024-12-07 RX ADMIN — ONDANSETRON 4 MG: 2 INJECTION INTRAMUSCULAR; INTRAVENOUS at 17:14

## 2024-12-07 RX ADMIN — LEVALBUTEROL HYDROCHLORIDE 1.25 MG: 1.25 SOLUTION RESPIRATORY (INHALATION) at 14:27

## 2024-12-07 RX ADMIN — LEVALBUTEROL HYDROCHLORIDE 1.25 MG: 1.25 SOLUTION RESPIRATORY (INHALATION) at 09:32

## 2024-12-07 RX ADMIN — ONDANSETRON 4 MG: 2 INJECTION INTRAMUSCULAR; INTRAVENOUS at 11:51

## 2024-12-07 RX ADMIN — PREGABALIN 150 MG: 75 CAPSULE ORAL at 17:13

## 2024-12-07 RX ADMIN — METHYLPREDNISOLONE SODIUM SUCCINATE 40 MG: 40 INJECTION, POWDER, FOR SOLUTION INTRAMUSCULAR; INTRAVENOUS at 05:37

## 2024-12-07 RX ADMIN — ONDANSETRON 4 MG: 2 INJECTION INTRAMUSCULAR; INTRAVENOUS at 05:37

## 2024-12-07 RX ADMIN — METHYLPREDNISOLONE SODIUM SUCCINATE 40 MG: 40 INJECTION, POWDER, FOR SOLUTION INTRAMUSCULAR; INTRAVENOUS at 13:27

## 2024-12-07 RX ADMIN — CEFTRIAXONE 2000 MG: 2 INJECTION, SOLUTION INTRAVENOUS at 22:08

## 2024-12-07 RX ADMIN — PANTOPRAZOLE SODIUM 40 MG: 40 INJECTION, POWDER, FOR SOLUTION INTRAVENOUS at 22:09

## 2024-12-07 RX ADMIN — CLONAZEPAM 1 MG: 0.5 TABLET ORAL at 17:12

## 2024-12-07 RX ADMIN — PREGABALIN 150 MG: 75 CAPSULE ORAL at 08:29

## 2024-12-07 RX ADMIN — BUDESONIDE 0.5 MG: 0.5 INHALANT RESPIRATORY (INHALATION) at 20:02

## 2024-12-07 RX ADMIN — HEPARIN SODIUM 7500 UNITS: 5000 INJECTION, SOLUTION INTRAVENOUS; SUBCUTANEOUS at 22:09

## 2024-12-07 RX ADMIN — LORATADINE 10 MG: 10 TABLET ORAL at 08:28

## 2024-12-07 RX ADMIN — IPRATROPIUM BROMIDE 0.5 MG: 0.5 SOLUTION RESPIRATORY (INHALATION) at 14:27

## 2024-12-07 RX ADMIN — GUAIFENESIN 600 MG: 600 TABLET, EXTENDED RELEASE ORAL at 08:27

## 2024-12-07 RX ADMIN — METHADONE HYDROCHLORIDE 150 MG: 10 TABLET ORAL at 08:28

## 2024-12-07 RX ADMIN — POLYETHYLENE GLYCOL 3350 17 G: 17 POWDER, FOR SOLUTION ORAL at 08:28

## 2024-12-07 RX ADMIN — PRAZOSIN HYDROCHLORIDE 5 MG: 1 CAPSULE ORAL at 08:30

## 2024-12-07 RX ADMIN — LUBIPROSTONE 24 MCG: 24 CAPSULE, GELATIN COATED ORAL at 08:31

## 2024-12-07 RX ADMIN — LUBIPROSTONE 24 MCG: 24 CAPSULE, GELATIN COATED ORAL at 22:09

## 2024-12-07 RX ADMIN — LEVALBUTEROL HYDROCHLORIDE 1.25 MG: 1.25 SOLUTION RESPIRATORY (INHALATION) at 20:02

## 2024-12-07 RX ADMIN — HEPARIN SODIUM 7500 UNITS: 5000 INJECTION, SOLUTION INTRAVENOUS; SUBCUTANEOUS at 13:27

## 2024-12-07 NOTE — ASSESSMENT & PLAN NOTE
Patient is on Dulera 200-5 at home (high steroid dose)  Switching to Breo Ellipta while hospitalized, as Dulera is not on formulary  PRN Albuterol inhaler  Pulmonary consulted; we appreciate their recommendations  IV Solumedrol 40 mg Q8H. Taper to Q12H today (12/8)  On discharge, prednisone taper 40 mg decreasing by 10 mg Q3D  Pulmonology following, appreciate input

## 2024-12-07 NOTE — ASSESSMENT & PLAN NOTE
Associated with methadone and Hx of OUD  See sleep studies from 2022  Not always compliant with CPAP at home  Continue BiPAP use while sleeping

## 2024-12-07 NOTE — ASSESSMENT & PLAN NOTE
"Bipolar depression  Continue home Viibryd   Add mood-stabilizer (see \"Bipolar\" below)  Patient follows with behavioral health outpatient for talk therapy.  "

## 2024-12-07 NOTE — ASSESSMENT & PLAN NOTE
Bilateral LE edema  BNP slightly elevated at 105  2D echo pending  Received 40 mg IV Lasix x 1, trial another dose with persistent hypoxia

## 2024-12-07 NOTE — PLAN OF CARE
Problem: RESPIRATORY - ADULT  Goal: Achieves optimal ventilation and oxygenation  Description: INTERVENTIONS:  - Assess for changes in respiratory status  - Assess for changes in mentation and behavior  - Position to facilitate oxygenation and minimize respiratory effort  - Oxygen administered by appropriate delivery if ordered  - Initiate smoking cessation education as indicated  - Encourage broncho-pulmonary hygiene including cough, deep breathe, Incentive Spirometry  - Assess the need for suctioning and aspirate as needed  - Assess and instruct to report SOB or any respiratory difficulty  - Respiratory Therapy support as indicated  Outcome: Progressing     Problem: METABOLIC, FLUID AND ELECTROLYTES - ADULT  Goal: Electrolytes maintained within normal limits  Description: INTERVENTIONS:  - Monitor labs and assess patient for signs and symptoms of electrolyte imbalances  - Administer electrolyte replacement as ordered  - Monitor response to electrolyte replacements, including repeat lab results as appropriate  - Instruct patient on fluid and nutrition as appropriate  Outcome: Progressing     Problem: PAIN - ADULT  Goal: Verbalizes/displays adequate comfort level or baseline comfort level  Description: Interventions:  - Encourage patient to monitor pain and request assistance  - Assess pain using appropriate pain scale  - Administer analgesics based on type and severity of pain and evaluate response  - Implement non-pharmacological measures as appropriate and evaluate response  - Consider cultural and social influences on pain and pain management  - Notify physician/advanced practitioner if interventions unsuccessful or patient reports new pain  Outcome: Progressing     Problem: INFECTION - ADULT  Goal: Absence or prevention of progression during hospitalization  Description: INTERVENTIONS:  - Assess and monitor for signs and symptoms of infection  - Monitor lab/diagnostic results  - Monitor all insertion sites,  i.e. indwelling lines, tubes, and drains  - Monitor endotracheal if appropriate and nasal secretions for changes in amount and color  - [place] appropriate cooling/warming therapies per order  - Administer medications as ordered  - Instruct and encourage patient and family to use good hand hygiene technique  - Identify and instruct in appropriate isolation precautions for identified infection/condition  Outcome: Progressing     Problem: DISCHARGE PLANNING  Goal: Discharge to home or other facility with appropriate resources  Description: INTERVENTIONS:  - Identify barriers to discharge w/patient and caregiver  - Arrange for needed discharge resources and transportation as appropriate  - Identify discharge learning needs (meds, wound care, etc.)  - Arrange for interpretive services to assist at discharge as needed  - Refer to Case Management Department for coordinating discharge planning if the patient needs post-hospital services based on physician/advanced practitioner order or complex needs related to functional status, cognitive ability, or social support system  Outcome: Progressing      No

## 2024-12-07 NOTE — RESPIRATORY THERAPY NOTE
12/07/24 0932   Inhalation Therapy Tx   $ Inhalation Therapy Performed Yes   $ Pulse Oximetry Spot Check Charge Completed   SpO2 91 %  (5)   Pre-Treatment Pulse 90   Pre-Treatment Respirations 18   Duration 15   Breath Sounds Pre-Treatment Bilateral Diminished;Clear  (posteriorly)   Breath Sounds Post-Treatment Bilateral Diminished;Clear   Post-Treatment Pulse 89   Post-Treatment Respirations 18   Delivery Source Air;UDN   Position Sitting   Treatment Tolerance Tolerated well   Resp Comments xop/atr

## 2024-12-07 NOTE — ASSESSMENT & PLAN NOTE
Improved, concern for Wegovy contributing to patient's symptoms just recently started approximately 1 week ago  Zofran was prescribed to scheduled, changed to as needed

## 2024-12-07 NOTE — RESPIRATORY THERAPY NOTE
01/18/19      [place] [address]                          Regarding Patient:  Zhanna Dela Cruz  [address]  [facility] [address]    To Whom it may Concern:    This is to certify Zhanna Dela Cruz was evaluated with Doris Rodríguez DO  on 1/18/2019.   She was provided with the following work recommendations:     May return to work on 1/21/19  No restrictions            ____________________________  Doris Rodríguez DO                      12/07/24 1533   Respiratory Assessment   Resp Comments pt was on 6 l/m regular NC, pulse ox hasn't been out of 80's by observation. changed pt over to 8 l/m midflow  and pulse ox 90%   Oxygen Therapy/Pulse Ox   O2 Device Mid flow nasal cannula   O2 Therapy Oxygen humidified   Nasal Cannula O2 Flow Rate (L/min) 8 L/min   Calculated FIO2 (%) - Nasal Cannula 52   O2 Flow Rate (L/min) 8 L/min   SpO2 Activity At Rest

## 2024-12-07 NOTE — PROGRESS NOTES
"Progress Note - Hospitalist   Name: Sacha Foster 34 y.o. male I MRN: [id number]  Unit/Bed#: 409-01 I Date of Admission: 12/3/2024   Date of Service: 12/7/2024 I Hospital Day: 3    Assessment & Plan  Accidental overdose  Unresponsive at home, given Narcan with response.  Most likely opioid overdose as the patient is on chronic methadone.  The patient states that he got Xanax from the street.  Aspiration pneumonia ([facility])  Secondary to overdose.    Continue Rocephin 2 g/dy (day 4 of antibiotics)  Repeat imaging in 4-6 wks  Acute respiratory failure with hypoxia ([facility])  Secondary to aspiration pneumonia.    Currently on 4 L which we will wean as able.  Encourage patient to use incentive spirometer and to get out of bed   Use BiPAP while sleeping (see \"Central sleep apnea\" below)  Pulmonology consulted; we appreciate their recommendations  - ABG: not quite acidotic, with pCO2 66.4 and pO2 56.9  - BNP slightly elevated at 105  - Patient was given 40 mg of IV Lasix on 12/6 with 3.3 L urine output  No echo at this time, as patient's signs and symptoms are most likely d/t pneumonia, central sleep apnea, and chronic asthma.  Opioid dependence in remission ([facility])  Patient is on chronic methadone 150 mg daily.  He is treated at the [facility].  Central sleep apnea  Associated with methadone and Hx of OUD  See sleep studies from 2022  Not always compliant with CPAP at home  Continue BiPAP use while sleeping  Acid reflux  Continue PPI  Generalized anxiety disorder  Continue with home Pregabalin & Klonopin  Depressed  Bipolar depression  Continue home Viibryd   Add mood-stabilizer (see \"Bipolar\" below)  Patient follows with behavioral health outpatient for talk therapy.  Moderate persistent asthma without complication  Patient is on Dulera 200-5 at home (high steroid dose)  Switching to Breo Ellipta while hospitalized, as Dulera is not on formulary  PRN Albuterol inhaler  On discharge, prednisone taper 40 mg " "decreasing by 10 mg Q3D  Morbid obesity with BMI of 50.0-59.9, adult ([facility])  BMI 54.38  Weight may be contributing to respiratory difficulty  Discontinue Wegovy, since this is either causing or contributing to his current nausea and vomiting.  Chronic constipation  Associated with Methadone and Hx of OUD  Abdominal obstruction series showed \"large volume colonic fecal burden\"  Continue Lubiprostone in lieu of home Linzess  Continue scheduled MiraLAX and add scheduled Senna  Nausea and vomiting  Continue Zofran 4 mg TID AC & PRN Reglan 10 mg Q6H  Bipolar disorder ([facility])  Patient is on Rexulti 3 mg/dy at home  Use Abilify 10 mg/day while in hospital  PTSD (post-traumatic stress disorder)  Continue home prazosin    VTE Pharmacologic Prophylaxis: VTE Score: 6 High Risk (Score >/= 5) - Pharmacological DVT Prophylaxis Ordered: heparin. Sequential Compression Devices Ordered.    Mobility:   Basic Mobility Inpatient Raw Score: 23  JH-HLM Goal: 7: Walk 25 feet or more  JH-HLM Achieved: 6: Walk 10 steps or more  JH-HLM Goal NOT achieved. Continue with multidisciplinary rounding and encourage appropriate mobility to improve upon JH-HLM goals.    Patient Centered Rounds: I evaluated the patient without nursing staff present due to time constraints    Discussions with Specialists or Other Care Team Provider: attending physician    Education and Discussions with Family / Patient:  Patient keeps mother updated.     Current Length of Stay: 3 day(s)  Current Patient Status: Inpatient   Certification Statement: The patient will continue to require additional inpatient hospital stay due to acute respiratory failure secondary to aspiration pneumonia  Discharge Plan:  When stable    Code Status: Level 1 - Full Code    Subjective   Patient was seen and examined at bedside.  He seems a lot more cheery today.  He was up and out of bed, trying to stay active.  He denies fever, nausea, and shortness of breath, and admits to some chills and " coughing.    Objective :  Temp:  [97.9 °F (36.6 °C)-98.4 °F (36.9 °C)] 98.4 °F (36.9 °C)  HR:  [78-99] 78  BP: (109-130)/(76-78) 123/77  Resp:  [16-18] 18  SpO2:  [88 %-94 %] 91 %  O2 Device: Nasal cannula  Nasal Cannula O2 Flow Rate (L/min):  [4 L/min-6 L/min] 6 L/min  FiO2 (%):  [50] 50    Body mass index is 53.81 kg/m².     Input and Output Summary (last 24 hours):     Intake/Output Summary (Last 24 hours) at 2024 1051  Last data filed at 2024 1014  Gross per 24 hour   Intake 1080 ml   Output 4800 ml   Net -3720 ml       Physical Exam  Vitals reviewed.   Constitutional:       General: He is not in acute distress.     Appearance: Normal appearance. He is obese. He is not ill-appearing, toxic-appearing or diaphoretic.   Cardiovascular:      Rate and Rhythm: Normal rate and regular rhythm.      Heart sounds: Normal heart sounds. No murmur heard.  Pulmonary:      Effort: Pulmonary effort is normal. No respiratory distress.      Breath sounds: Normal breath sounds. No wheezing, rhonchi or rales.   Abdominal:      General: Abdomen is flat. There is no distension.      Palpations: Abdomen is soft.      Tenderness: There is no abdominal tenderness. There is no guarding.   Musculoskeletal:      Right lower le+ Pitting Edema present.      Left lower le+ Pitting Edema present.   Neurological:      Mental Status: He is alert.             Lab Results: I have reviewed the following results:   Results from last 7 days   Lab Units 24  0426   WBC Thousand/uL 7.32   HEMOGLOBIN g/dL 11.9*   HEMATOCRIT % 38.1   PLATELETS Thousands/uL 162   SEGS PCT % 89*   LYMPHO PCT % 10*   MONO PCT % 1*   EOS PCT % 0     Results from last 7 days   Lab Units 24  0426 24  0451   SODIUM mmol/L 139 138   POTASSIUM mmol/L 4.5 4.0   CHLORIDE mmol/L 97 95*   CO2 mmol/L 36* 38*   BUN mg/dL 9 10   CREATININE mg/dL 0.78 0.73   ANION GAP mmol/L 6 5   CALCIUM mg/dL 9.2 8.8   ALBUMIN g/dL  --  4.1   TOTAL BILIRUBIN mg/dL  --   0.23   ALK PHOS U/L  --  46   ALT U/L  --  53*   AST U/L  --  51*   GLUCOSE RANDOM mg/dL 127 100     Results from last 7 days   Lab Units 12/03/24 2248   INR  0.96             Results from last 7 days   Lab Units 12/06/24  0451 12/05/24  0958 12/04/24  0537 12/04/24  0210 12/03/24 2248   LACTIC ACID mmol/L  --   --   --  1.3 4.1*   PROCALCITONIN ng/ml 0.88* 1.38* 2.65*  --  1.19*       Recent Cultures (last 7 days):   Results from last 7 days   Lab Units 12/05/24  2117 12/05/24  0600 12/03/24 2249 12/03/24 2248   BLOOD CULTURE   --   --  No Growth at 72 hrs. No Growth at 72 hrs.   SPUTUM CULTURE  4+ Growth of Staphylococcus aureus*  --   --   --    GRAM STAIN RESULT  1+ Epithelial cells per low power field*  No polys seen*  2+ Gram positive cocci in pairs*  1+ Gram negative rods*  --   --   --    LEGIONELLA URINARY ANTIGEN   --  Negative  --   --        Imaging Results Review: No pertinent imaging studies reviewed.  Other Study Results Review: No additional pertinent studies reviewed.    Last 24 Hours Medication List:     Current Facility-Administered Medications:     acetaminophen (TYLENOL) tablet 650 mg, Q6H PRN    albuterol inhalation solution 2.5 mg, Q4H PRN    bisacodyl (DULCOLAX) EC tablet 5 mg, Daily PRN    budesonide (PULMICORT) inhalation solution 0.5 mg, Q12H    cefTRIAXone (ROCEPHIN) IVPB (premix in dextrose) 2,000 mg 50 mL, Q24H, Last Rate: 2,000 mg (12/06/24 2241)    clonazePAM (KlonoPIN) tablet 1 mg, BID    guaiFENesin (MUCINEX) 12 hr tablet 600 mg, BID    heparin (porcine) subcutaneous injection 7,500 Units, Q8H MARICEL    ibuprofen (MOTRIN) tablet 400 mg, Q8H PRN    ipratropium (ATROVENT) 0.02 % inhalation solution 0.5 mg, TID    levalbuterol (XOPENEX) inhalation solution 1.25 mg, TID    loratadine (CLARITIN) tablet 10 mg, Daily    lubiprostone (AMITIZA) capsule 24 mcg, BID    methadone (Dolophine) tablet 150 mg, Daily    methylPREDNISolone sodium succinate (Solu-MEDROL) injection 40 mg, Q8H  MARICEL    metoclopramide (REGLAN) injection 10 mg, Q6H PRN    nicotine (NICODERM CQ) 21 mg/24 hr TD 24 hr patch 21 mg, Daily    ondansetron (ZOFRAN) injection 4 mg, TID AC    pantoprazole (PROTONIX) injection 40 mg, Q12H MARICEL    polyethylene glycol (MIRALAX) packet 17 g, Daily    prazosin (MINIPRESS) capsule 5 mg, Daily    pregabalin (LYRICA) capsule 150 mg, BID    senna (SENOKOT) tablet 8.6 mg, HS    vilazodone (VIIBRYD) tablet 40 mg, Daily With Breakfast    Administrative Statements   Today, Patient Was Seen By: Waqar Howard DO      **Please Note: This note may have been constructed using a voice recognition system.**

## 2024-12-07 NOTE — PLAN OF CARE
Problem: RESPIRATORY - ADULT  Goal: Achieves optimal ventilation and oxygenation  Description: INTERVENTIONS:  - Assess for changes in respiratory status  - Assess for changes in mentation and behavior  - Position to facilitate oxygenation and minimize respiratory effort  - Oxygen administered by appropriate delivery if ordered  - Initiate smoking cessation education as indicated  - Encourage broncho-pulmonary hygiene including cough, deep breathe, Incentive Spirometry  - Assess the need for suctioning and aspirate as needed  - Assess and instruct to report SOB or any respiratory difficulty  - Respiratory Therapy support as indicated  Outcome: Progressing     Problem: METABOLIC, FLUID AND ELECTROLYTES - ADULT  Goal: Electrolytes maintained within normal limits  Description: INTERVENTIONS:  - Monitor labs and assess patient for signs and symptoms of electrolyte imbalances  - Administer electrolyte replacement as ordered  - Monitor response to electrolyte replacements, including repeat lab results as appropriate  - Instruct patient on fluid and nutrition as appropriate  Outcome: Progressing     Problem: PAIN - ADULT  Goal: Verbalizes/displays adequate comfort level or baseline comfort level  Description: Interventions:  - Encourage patient to monitor pain and request assistance  - Assess pain using appropriate pain scale  - Administer analgesics based on type and severity of pain and evaluate response  - Implement non-pharmacological measures as appropriate and evaluate response  - Consider cultural and social influences on pain and pain management  - Notify physician/advanced practitioner if interventions unsuccessful or patient reports new pain  Outcome: Progressing     Problem: INFECTION - ADULT  Goal: Absence or prevention of progression during hospitalization  Description: INTERVENTIONS:  - Assess and monitor for signs and symptoms of infection  - Monitor lab/diagnostic results  - Monitor all insertion sites,  i.e. indwelling lines, tubes, and drains  - Monitor endotracheal if appropriate and nasal secretions for changes in amount and color  - [place] appropriate cooling/warming therapies per order  - Administer medications as ordered  - Instruct and encourage patient and family to use good hand hygiene technique  - Identify and instruct in appropriate isolation precautions for identified infection/condition  Outcome: Progressing     Problem: DISCHARGE PLANNING  Goal: Discharge to home or other facility with appropriate resources  Description: INTERVENTIONS:  - Identify barriers to discharge w/patient and caregiver  - Arrange for needed discharge resources and transportation as appropriate  - Identify discharge learning needs (meds, wound care, etc.)  - Arrange for interpretive services to assist at discharge as needed  - Refer to Case Management Department for coordinating discharge planning if the patient needs post-hospital services based on physician/advanced practitioner order or complex needs related to functional status, cognitive ability, or social support system  Outcome: Progressing

## 2024-12-07 NOTE — ASSESSMENT & PLAN NOTE
Patient is on chronic methadone 150 mg daily.  He is treated at the [facility] [address].

## 2024-12-07 NOTE — ASSESSMENT & PLAN NOTE
"Associated with Methadone and Hx of OUD  Abdominal obstruction series showed \"large volume colonic fecal burden\"  Continue Lubiprostone in lieu of home Linzess  Continue scheduled MiraLAX & Senna, PRN Dulcolax  "

## 2024-12-08 PROBLEM — J45.41 MODERATE PERSISTENT ASTHMA WITH ACUTE EXACERBATION: Status: ACTIVE | Noted: 2023-03-09

## 2024-12-08 PROBLEM — J96.02 ACUTE RESPIRATORY FAILURE WITH HYPOXIA AND HYPERCAPNIA (HCC): Status: ACTIVE | Noted: 2024-12-04

## 2024-12-08 LAB
ANION GAP SERPL CALCULATED.3IONS-SCNC: 6 MMOL/L (ref 4–13)
BACTERIA SPT RESP CULT: ABNORMAL
BACTERIA SPT RESP CULT: ABNORMAL
BASOPHILS # BLD AUTO: 0.01 THOUSANDS/ÂΜL (ref 0–0.1)
BASOPHILS NFR BLD AUTO: 0 % (ref 0–1)
BUN SERPL-MCNC: 14 MG/DL (ref 5–25)
CALCIUM SERPL-MCNC: 9.4 MG/DL (ref 8.4–10.2)
CHLORIDE SERPL-SCNC: 99 MMOL/L (ref 96–108)
CO2 SERPL-SCNC: 36 MMOL/L (ref 21–32)
CREAT SERPL-MCNC: 0.74 MG/DL (ref 0.6–1.3)
EOSINOPHIL # BLD AUTO: 0 THOUSAND/ÂΜL (ref 0–0.61)
EOSINOPHIL NFR BLD AUTO: 0 % (ref 0–6)
ERYTHROCYTE [DISTWIDTH] IN BLOOD BY AUTOMATED COUNT: 13.8 % (ref 11.6–15.1)
GFR SERPL CREATININE-BSD FRML MDRD: 119 ML/MIN/1.73SQ M
GLUCOSE SERPL-MCNC: 120 MG/DL (ref 65–140)
GRAM STN SPEC: ABNORMAL
HCT VFR BLD AUTO: 38.7 % (ref 36.5–49.3)
HGB BLD-MCNC: 12.5 G/DL (ref 12–17)
IMM GRANULOCYTES # BLD AUTO: 0.12 THOUSAND/UL (ref 0–0.2)
IMM GRANULOCYTES NFR BLD AUTO: 1 % (ref 0–2)
LYMPHOCYTES # BLD AUTO: 1.16 THOUSANDS/ÂΜL (ref 0.6–4.47)
LYMPHOCYTES NFR BLD AUTO: 10 % (ref 14–44)
MCH RBC QN AUTO: 26.4 PG (ref 26.8–34.3)
MCHC RBC AUTO-ENTMCNC: 32.3 G/DL (ref 31.4–37.4)
MCV RBC AUTO: 82 FL (ref 82–98)
MONOCYTES # BLD AUTO: 0.38 THOUSAND/ÂΜL (ref 0.17–1.22)
MONOCYTES NFR BLD AUTO: 3 % (ref 4–12)
NEUTROPHILS # BLD AUTO: 9.97 THOUSANDS/ÂΜL (ref 1.85–7.62)
NEUTS SEG NFR BLD AUTO: 86 % (ref 43–75)
NRBC BLD AUTO-RTO: 0 /100 WBCS
PLATELET # BLD AUTO: 189 THOUSANDS/UL (ref 149–390)
PMV BLD AUTO: 9.6 FL (ref 8.9–12.7)
POTASSIUM SERPL-SCNC: 4.3 MMOL/L (ref 3.5–5.3)
PROCALCITONIN SERPL-MCNC: 0.34 NG/ML
RBC # BLD AUTO: 4.74 MILLION/UL (ref 3.88–5.62)
SODIUM SERPL-SCNC: 141 MMOL/L (ref 135–147)
WBC # BLD AUTO: 11.64 THOUSAND/UL (ref 4.31–10.16)

## 2024-12-08 PROCEDURE — 94640 AIRWAY INHALATION TREATMENT: CPT

## 2024-12-08 PROCEDURE — 85025 COMPLETE CBC W/AUTO DIFF WBC: CPT

## 2024-12-08 PROCEDURE — 94760 N-INVAS EAR/PLS OXIMETRY 1: CPT

## 2024-12-08 PROCEDURE — 94664 DEMO&/EVAL PT USE INHALER: CPT

## 2024-12-08 PROCEDURE — 94003 VENT MGMT INPAT SUBQ DAY: CPT

## 2024-12-08 PROCEDURE — 80048 BASIC METABOLIC PNL TOTAL CA: CPT

## 2024-12-08 PROCEDURE — 84145 PROCALCITONIN (PCT): CPT | Performed by: FAMILY MEDICINE

## 2024-12-08 PROCEDURE — 99233 SBSQ HOSP IP/OBS HIGH 50: CPT | Performed by: FAMILY MEDICINE

## 2024-12-08 PROCEDURE — 94660 CPAP INITIATION&MGMT: CPT

## 2024-12-08 RX ORDER — ONDANSETRON 2 MG/ML
4 INJECTION INTRAMUSCULAR; INTRAVENOUS EVERY 6 HOURS PRN
Status: DISCONTINUED | OUTPATIENT
Start: 2024-12-08 | End: 2024-12-11 | Stop reason: HOSPADM

## 2024-12-08 RX ORDER — METHYLPREDNISOLONE SODIUM SUCCINATE 40 MG/ML
40 INJECTION, POWDER, LYOPHILIZED, FOR SOLUTION INTRAMUSCULAR; INTRAVENOUS EVERY 12 HOURS SCHEDULED
Status: DISCONTINUED | OUTPATIENT
Start: 2024-12-08 | End: 2024-12-09

## 2024-12-08 RX ORDER — CLONAZEPAM 0.5 MG/1
1 TABLET ORAL 3 TIMES DAILY
Status: DISCONTINUED | OUTPATIENT
Start: 2024-12-08 | End: 2024-12-11 | Stop reason: HOSPADM

## 2024-12-08 RX ORDER — FUROSEMIDE 10 MG/ML
40 INJECTION INTRAMUSCULAR; INTRAVENOUS ONCE
Status: COMPLETED | OUTPATIENT
Start: 2024-12-08 | End: 2024-12-08

## 2024-12-08 RX ORDER — TEMAZEPAM 15 MG/1
15 CAPSULE ORAL
Status: DISCONTINUED | OUTPATIENT
Start: 2024-12-08 | End: 2024-12-11 | Stop reason: HOSPADM

## 2024-12-08 RX ORDER — DOXYCYCLINE 100 MG/1
100 CAPSULE ORAL EVERY 12 HOURS SCHEDULED
Status: DISCONTINUED | OUTPATIENT
Start: 2024-12-08 | End: 2024-12-09

## 2024-12-08 RX ADMIN — LORATADINE 10 MG: 10 TABLET ORAL at 08:39

## 2024-12-08 RX ADMIN — LUBIPROSTONE 24 MCG: 24 CAPSULE, GELATIN COATED ORAL at 08:41

## 2024-12-08 RX ADMIN — PREGABALIN 150 MG: 75 CAPSULE ORAL at 16:29

## 2024-12-08 RX ADMIN — TEMAZEPAM 15 MG: 15 CAPSULE ORAL at 21:51

## 2024-12-08 RX ADMIN — FLUTICASONE PROPIONATE 1 SPRAY: 50 SPRAY, METERED NASAL at 08:51

## 2024-12-08 RX ADMIN — LEVALBUTEROL HYDROCHLORIDE 1.25 MG: 1.25 SOLUTION RESPIRATORY (INHALATION) at 14:56

## 2024-12-08 RX ADMIN — HEPARIN SODIUM 7500 UNITS: 5000 INJECTION, SOLUTION INTRAVENOUS; SUBCUTANEOUS at 13:30

## 2024-12-08 RX ADMIN — SENNOSIDES 8.6 MG: 8.6 TABLET, FILM COATED ORAL at 21:51

## 2024-12-08 RX ADMIN — IPRATROPIUM BROMIDE 0.5 MG: 0.5 SOLUTION RESPIRATORY (INHALATION) at 14:56

## 2024-12-08 RX ADMIN — METHADONE HYDROCHLORIDE 150 MG: 10 TABLET ORAL at 08:39

## 2024-12-08 RX ADMIN — PANTOPRAZOLE SODIUM 40 MG: 40 INJECTION, POWDER, FOR SOLUTION INTRAVENOUS at 08:40

## 2024-12-08 RX ADMIN — ONDANSETRON 4 MG: 2 INJECTION INTRAMUSCULAR; INTRAVENOUS at 06:10

## 2024-12-08 RX ADMIN — POLYETHYLENE GLYCOL 3350 17 G: 17 POWDER, FOR SOLUTION ORAL at 08:40

## 2024-12-08 RX ADMIN — CLONAZEPAM 1 MG: 0.5 TABLET ORAL at 16:27

## 2024-12-08 RX ADMIN — BUDESONIDE 0.5 MG: 0.5 INHALANT RESPIRATORY (INHALATION) at 20:21

## 2024-12-08 RX ADMIN — PANTOPRAZOLE SODIUM 40 MG: 40 INJECTION, POWDER, FOR SOLUTION INTRAVENOUS at 21:50

## 2024-12-08 RX ADMIN — METHYLPREDNISOLONE SODIUM SUCCINATE 40 MG: 40 INJECTION, POWDER, FOR SOLUTION INTRAMUSCULAR; INTRAVENOUS at 06:09

## 2024-12-08 RX ADMIN — IPRATROPIUM BROMIDE 0.5 MG: 0.5 SOLUTION RESPIRATORY (INHALATION) at 08:55

## 2024-12-08 RX ADMIN — GUAIFENESIN 600 MG: 600 TABLET, EXTENDED RELEASE ORAL at 16:28

## 2024-12-08 RX ADMIN — LUBIPROSTONE 24 MCG: 24 CAPSULE, GELATIN COATED ORAL at 21:51

## 2024-12-08 RX ADMIN — LEVALBUTEROL HYDROCHLORIDE 1.25 MG: 1.25 SOLUTION RESPIRATORY (INHALATION) at 08:55

## 2024-12-08 RX ADMIN — HEPARIN SODIUM 7500 UNITS: 5000 INJECTION, SOLUTION INTRAVENOUS; SUBCUTANEOUS at 06:09

## 2024-12-08 RX ADMIN — LEVALBUTEROL HYDROCHLORIDE 1.25 MG: 1.25 SOLUTION RESPIRATORY (INHALATION) at 20:21

## 2024-12-08 RX ADMIN — DOXYCYCLINE HYCLATE 100 MG: 100 CAPSULE ORAL at 21:51

## 2024-12-08 RX ADMIN — METHYLPREDNISOLONE SODIUM SUCCINATE 40 MG: 40 INJECTION, POWDER, FOR SOLUTION INTRAMUSCULAR; INTRAVENOUS at 21:51

## 2024-12-08 RX ADMIN — GUAIFENESIN 600 MG: 600 TABLET, EXTENDED RELEASE ORAL at 08:39

## 2024-12-08 RX ADMIN — VILAZODONE HYDROCHLORIDE 40 MG: 20 TABLET ORAL at 08:41

## 2024-12-08 RX ADMIN — HEPARIN SODIUM 7500 UNITS: 5000 INJECTION, SOLUTION INTRAVENOUS; SUBCUTANEOUS at 21:51

## 2024-12-08 RX ADMIN — FUROSEMIDE 40 MG: 10 INJECTION, SOLUTION INTRAMUSCULAR; INTRAVENOUS at 11:08

## 2024-12-08 RX ADMIN — BUDESONIDE 0.5 MG: 0.5 INHALANT RESPIRATORY (INHALATION) at 08:55

## 2024-12-08 RX ADMIN — NICOTINE 21 MG: 21 PATCH, EXTENDED RELEASE TRANSDERMAL at 16:28

## 2024-12-08 RX ADMIN — CLONAZEPAM 1 MG: 0.5 TABLET ORAL at 21:51

## 2024-12-08 RX ADMIN — PREGABALIN 150 MG: 75 CAPSULE ORAL at 08:40

## 2024-12-08 RX ADMIN — CLONAZEPAM 1 MG: 0.5 TABLET ORAL at 08:39

## 2024-12-08 RX ADMIN — IPRATROPIUM BROMIDE 0.5 MG: 0.5 SOLUTION RESPIRATORY (INHALATION) at 20:21

## 2024-12-08 RX ADMIN — PRAZOSIN HYDROCHLORIDE 5 MG: 1 CAPSULE ORAL at 08:41

## 2024-12-08 RX ADMIN — CEFTRIAXONE 2000 MG: 2 INJECTION, SOLUTION INTRAVENOUS at 22:00

## 2024-12-08 RX ADMIN — DOXYCYCLINE HYCLATE 100 MG: 100 CAPSULE ORAL at 11:08

## 2024-12-08 NOTE — PROGRESS NOTES
"Progress Note - Hospitalist   Name: Sacha Foster 34 y.o. male I MRN: [id number]  Unit/Bed#: 409-01 I Date of Admission: 12/3/2024   Date of Service: 12/8/2024 I Hospital Day: 4    Assessment & Plan  Acute respiratory failure with hypoxia and hypercapnia (HCC)  This is a 34-year-old male patient with history of opiate use disorder on methadone, bipolar/anxiety/depression, obesity, ACTA, asthma who presented with an unresponsive episode likely due to overdose, found to have respiratory failure.  His respiratory failure appears multifactorial, secondary to aspiration pneumonia, asthma exacerbation, possible fluid overload component in setting of CATA history.  Currently on 5 L nasal cannula  Encourage patient to use incentive spirometer and to get out of bed   Use BiPAP while sleeping (see \"Central sleep apnea\" below)  Continue antibiotic regimen  Solu-Medrol taper  Additional 40 mg IV Lasix  Wean off oxygen as able to keep oxygen saturations above 90%  Await further pulmonology recommendations tomorrow  Accidental overdose  Unresponsive at home, given Narcan with response.  Most likely opioid overdose as the patient is on chronic methadone.  The patient also noted he utilized Xanax obtained from non-prescriber source  Aspiration pneumonia (HCC)  Secondary to overdose as well as persistent nausea/vomiting, also in the setting of Wegovy therapy  Continue Rocephin 2 g/dy (day 4 of antibiotics)  MRSA swab noted for MRSA, add Doxycycline  Repeat imaging in 4-6 wks  Opioid dependence in remission (HCC)  Patient is on chronic methadone 150 mg daily.  He is treated at the [facility].  Central sleep apnea  Likely contributing with the current challenge of hypoxia and weaning off supplemental oxygen  Continue home BiPAP during hours of sleep  Acid reflux  Continue PPI  Generalized anxiety disorder  Continue with home Pregabalin & Klonopin  Depressed  Bipolar depression  Continue home Viibryd   Add " "mood-stabilizer (see \"Bipolar\" below)  Patient follows with behavioral health outpatient for talk therapy.  Moderate persistent asthma with acute exacerbation  Patient is on Dulera 200-5 at home (high steroid dose)  Switching to Breo Ellipta while hospitalized, as Dulera is not on formulary  PRN Albuterol inhaler  Pulmonary consulted; we appreciate their recommendations  IV Solumedrol 40 mg Q8H. Taper to Q12H today (12/8)  On discharge, prednisone taper 40 mg decreasing by 10 mg Q3D  Pulmonology following, appreciate input  Morbid obesity with BMI of 50.0-59.9, adult ([facility])  BMI 54  Weight may be contributing to respiratory difficulty  Patient is Wegovy, defer decision to continue to PCP continue as may be the cause of patient's nausea and vomiting.  Chronic constipation  Associated with Methadone and Hx of OUD  Abdominal obstruction series showed \"large volume colonic fecal burden\"  Continue Lubiprostone in lieu of home Linzess  Continue scheduled MiraLAX & Senna, PRN Dulcolax  Nausea and vomiting  Improved, concern for Wegovy contributing to patient's symptoms just recently started approximately 1 week ago  Zofran was prescribed to scheduled, changed to as needed    Bipolar disorder ([facility])  Continue home regimen, outpatient f/u with Psychiatry    PTSD (post-traumatic stress disorder)  Continue home prazosin  Volume overload  Bilateral LE edema  BNP slightly elevated at 105  2D echo pending  Received 40 mg IV Lasix x 1, trial another dose with persistent hypoxia    VTE Pharmacologic Prophylaxis: VTE Score: 6 High Risk (Score >/= 5) - Pharmacological DVT Prophylaxis Ordered: heparin. Sequential Compression Devices Ordered.    Mobility:   Basic Mobility Inpatient Raw Score: 23  JH-HLM Goal: 7: Walk 25 feet or more  JH-HLM Achieved: 7: Walk 25 feet or more  JH-HLM Goal achieved. Continue to encourage appropriate mobility.    Patient Centered Rounds: I performed bedside rounds with nursing staff today.   Discussions with " Specialists or Other Care Team Provider: KHADAR    Education and Discussions with Family / Patient: Updated  (mother) at bedside.    Current Length of Stay: 4 day(s)  Current Patient Status: Inpatient   Certification Statement: The patient will continue to require additional inpatient hospital stay due to close monitoring  Discharge Plan: Anticipate discharge in 48-72 hrs to home.    Code Status: Level 1 - Full Code    Subjective   Patient required up to 6 L oxygen now improved to 5 L.  He otherwise voices no acute complaints.    Objective :  Temp:  [98.1 °F (36.7 °C)-98.3 °F (36.8 °C)] 98.1 °F (36.7 °C)  HR:  [] 61  BP: (120-131)/(77-86) 131/77  Resp:  [16-18] 18  SpO2:  [88 %-96 %] 96 %  O2 Device: Mid flow nasal cannula  Nasal Cannula O2 Flow Rate (L/min):  [6 L/min-8 L/min] 6 L/min    Body mass index is 53.87 kg/m².     Input and Output Summary (last 24 hours):     Intake/Output Summary (Last 24 hours) at 12/8/2024 1343  Last data filed at 12/8/2024 1306  Gross per 24 hour   Intake 720 ml   Output 1550 ml   Net -830 ml       Physical Exam  Constitutional:       General: He is not in acute distress.     Appearance: He is obese.   HENT:      Head: Normocephalic and atraumatic.   Eyes:      Conjunctiva/sclera: Conjunctivae normal.   Cardiovascular:      Rate and Rhythm: Normal rate.   Pulmonary:      Effort: No respiratory distress.      Breath sounds: No wheezing or rales.   Abdominal:      General: There is no distension.      Tenderness: There is no abdominal tenderness. There is no guarding.   Musculoskeletal:      Right lower leg: Edema (trace b/l) present.      Left lower leg: Edema present.   Skin:     General: Skin is warm and dry.   Neurological:      Mental Status: He is oriented to person, place, and time.         Lines/Drains:              Lab Results: I have reviewed the following results:   Results from last 7 days   Lab Units 12/08/24  0440   WBC Thousand/uL 11.64*   HEMOGLOBIN g/dL  12.5   HEMATOCRIT % 38.7   PLATELETS Thousands/uL 189   SEGS PCT % 86*   LYMPHO PCT % 10*   MONO PCT % 3*   EOS PCT % 0     Results from last 7 days   Lab Units 12/08/24 0440 12/07/24  0426 12/06/24  0451   SODIUM mmol/L 141   < > 138   POTASSIUM mmol/L 4.3   < > 4.0   CHLORIDE mmol/L 99   < > 95*   CO2 mmol/L 36*   < > 38*   BUN mg/dL 14   < > 10   CREATININE mg/dL 0.74   < > 0.73   ANION GAP mmol/L 6   < > 5   CALCIUM mg/dL 9.4   < > 8.8   ALBUMIN g/dL  --   --  4.1   TOTAL BILIRUBIN mg/dL  --   --  0.23   ALK PHOS U/L  --   --  46   ALT U/L  --   --  53*   AST U/L  --   --  51*   GLUCOSE RANDOM mg/dL 120   < > 100    < > = values in this interval not displayed.     Results from last 7 days   Lab Units 12/03/24 2248   INR  0.96             Results from last 7 days   Lab Units 12/08/24 0440 12/06/24  0451 12/05/24  0958 12/04/24  0537 12/04/24  0210 12/03/24 2248   LACTIC ACID mmol/L  --   --   --   --  1.3 4.1*   PROCALCITONIN ng/ml 0.34* 0.88* 1.38* 2.65*  --  1.19*       Recent Cultures (last 7 days):   Results from last 7 days   Lab Units 12/05/24 2117 12/05/24  0600 12/03/24  2249 12/03/24 2248   BLOOD CULTURE   --   --  No Growth After 4 Days. No Growth After 4 Days.   SPUTUM CULTURE  4+ Growth of Methicillin Resistant Staphylococcus aureus*  4+ Growth of  --   --   --    GRAM STAIN RESULT  1+ Epithelial cells per low power field*  No polys seen*  2+ Gram positive cocci in pairs*  1+ Gram negative rods*  --   --   --    LEGIONELLA URINARY ANTIGEN   --  Negative  --   --              Last 24 Hours Medication List:     Current Facility-Administered Medications:     acetaminophen (TYLENOL) tablet 650 mg, Q6H PRN    albuterol inhalation solution 2.5 mg, Q4H PRN    bisacodyl (DULCOLAX) EC tablet 5 mg, Daily PRN    budesonide (PULMICORT) inhalation solution 0.5 mg, Q12H    cefTRIAXone (ROCEPHIN) IVPB (premix in dextrose) 2,000 mg 50 mL, Q24H, Last Rate: 2,000 mg (12/07/24 2208)    clonazePAM (KlonoPIN)  tablet 1 mg, TID    doxycycline hyclate (VIBRAMYCIN) capsule 100 mg, Q12H MARICEL    fluticasone (FLONASE) 50 mcg/act nasal spray 1 spray, Daily    guaiFENesin (MUCINEX) 12 hr tablet 600 mg, BID    heparin (porcine) subcutaneous injection 7,500 Units, Q8H MARICEL    ibuprofen (MOTRIN) tablet 400 mg, Q8H PRN    ipratropium (ATROVENT) 0.02 % inhalation solution 0.5 mg, TID    levalbuterol (XOPENEX) inhalation solution 1.25 mg, TID    loratadine (CLARITIN) tablet 10 mg, Daily    lubiprostone (AMITIZA) capsule 24 mcg, BID    methadone (Dolophine) tablet 150 mg, Daily    methylPREDNISolone sodium succinate (Solu-MEDROL) injection 40 mg, Q12H MARICEL    nicotine (NICODERM CQ) 21 mg/24 hr TD 24 hr patch 21 mg, Daily    ondansetron (ZOFRAN) injection 4 mg, Q6H PRN    pantoprazole (PROTONIX) injection 40 mg, Q12H MARICEL    polyethylene glycol (MIRALAX) packet 17 g, Daily    prazosin (MINIPRESS) capsule 5 mg, Daily    pregabalin (LYRICA) capsule 150 mg, BID    senna (SENOKOT) tablet 8.6 mg, HS    temazepam (RESTORIL) capsule 15 mg, HS    vilazodone (VIIBRYD) tablet 40 mg, Daily With Breakfast    Administrative Statements   Today, Patient Was Seen By: Oralia Pate MD  I have spent a total time of 52 minutes in caring for this patient on the day of the visit/encounter including Diagnostic results, Prognosis, Counseling / Coordination of care, Documenting in the medical record, Reviewing / ordering tests, medicine, procedures  , and Communicating with other healthcare professionals .    **Please Note: This note may have been constructed using a voice recognition system.**

## 2024-12-08 NOTE — ASSESSMENT & PLAN NOTE
Secondary to overdose as well as persistent nausea/vomiting, also in the setting of Wegovy therapy  Continue Rocephin 2 g/dy (day 4 of antibiotics)  MRSA swab noted for MRSA, add Doxycycline  Repeat imaging in 4-6 wks

## 2024-12-08 NOTE — PLAN OF CARE
Problem: RESPIRATORY - ADULT  Goal: Achieves optimal ventilation and oxygenation  Description: INTERVENTIONS:  - Assess for changes in respiratory status  - Assess for changes in mentation and behavior  - Position to facilitate oxygenation and minimize respiratory effort  - Oxygen administered by appropriate delivery if ordered  - Initiate smoking cessation education as indicated  - Encourage broncho-pulmonary hygiene including cough, deep breathe, Incentive Spirometry  - Assess the need for suctioning and aspirate as needed  - Assess and instruct to report SOB or any respiratory difficulty  - Respiratory Therapy support as indicated  12/8/2024 0745 by Sarah Teixeira RN  Outcome: Progressing  12/8/2024 0744 by Sarah Teixeira RN  Outcome: Progressing     Problem: METABOLIC, FLUID AND ELECTROLYTES - ADULT  Goal: Electrolytes maintained within normal limits  Description: INTERVENTIONS:  - Monitor labs and assess patient for signs and symptoms of electrolyte imbalances  - Administer electrolyte replacement as ordered  - Monitor response to electrolyte replacements, including repeat lab results as appropriate  - Instruct patient on fluid and nutrition as appropriate  12/8/2024 0745 by Sarah Teixeira RN  Outcome: Progressing  12/8/2024 0744 by Sarah Teixeira RN  Outcome: Progressing     Problem: PAIN - ADULT  Goal: Verbalizes/displays adequate comfort level or baseline comfort level  Description: Interventions:  - Encourage patient to monitor pain and request assistance  - Assess pain using appropriate pain scale  - Administer analgesics based on type and severity of pain and evaluate response  - Implement non-pharmacological measures as appropriate and evaluate response  - Consider cultural and social influences on pain and pain management  - Notify physician/advanced practitioner if interventions unsuccessful or patient reports new pain  12/8/2024 0745 by Sarah Teixeira RN  Outcome:  Progressing  12/8/2024 0744 by Sarah Teixeira RN  Outcome: Progressing     Problem: INFECTION - ADULT  Goal: Absence or prevention of progression during hospitalization  Description: INTERVENTIONS:  - Assess and monitor for signs and symptoms of infection  - Monitor lab/diagnostic results  - Monitor all insertion sites, i.e. indwelling lines, tubes, and drains  - Monitor endotracheal if appropriate and nasal secretions for changes in amount and color  - [place] appropriate cooling/warming therapies per order  - Administer medications as ordered  - Instruct and encourage patient and family to use good hand hygiene technique  - Identify and instruct in appropriate isolation precautions for identified infection/condition  12/8/2024 0745 by Sarah Teixeira RN  Outcome: Progressing  12/8/2024 0744 by Sarah Teixeira RN  Outcome: Progressing     Problem: DISCHARGE PLANNING  Goal: Discharge to home or other facility with appropriate resources  Description: INTERVENTIONS:  - Identify barriers to discharge w/patient and caregiver  - Arrange for needed discharge resources and transportation as appropriate  - Identify discharge learning needs (meds, wound care, etc.)  - Arrange for interpretive services to assist at discharge as needed  - Refer to Case Management Department for coordinating discharge planning if the patient needs post-hospital services based on physician/advanced practitioner order or complex needs related to functional status, cognitive ability, or social support system  12/8/2024 0745 by Sarah Teixeira RN  Outcome: Progressing  12/8/2024 0744 by Sarah Teixeira RN  Outcome: Progressing     Problem: SKIN/TISSUE INTEGRITY - ADULT  Goal: Incision(s), wounds(s) or drain site(s) healing without S/S of infection  Description: INTERVENTIONS  - Assess and document dressing, incision, wound bed, drain sites and surrounding tissue  - Provide patient and family education  - Perform skin care/dressing  changes as ordered  Outcome: Progressing

## 2024-12-08 NOTE — RESPIRATORY THERAPY NOTE
12/08/24 0855   Inhalation Therapy Tx   $ Inhalation Therapy Performed Yes   $ Demo/Eval of Neb, MDI, IPPB, CPT Yes   $ Pulse Oximetry Spot Check Charge Completed   SpO2 96 %  (8 l/m midflow, after check decr to 6 l/m midflow)   Pre-Treatment Pulse 87   Pre-Treatment Respirations 20   Duration 15   Breath Sounds Pre-Treatment Bilateral Diminished  (posteriorly)   Breath Sounds Post-Treatment Bilateral Diminished   Post-Treatment Pulse 74   Post-Treatment Respirations 20   Delivery Source Air;UDN   Position Lying left side   Treatment Tolerance Tolerated well   Resp Comments decr to 6 l/m midflow after check

## 2024-12-08 NOTE — RESPIRATORY THERAPY NOTE
12/08/24 0911   Oxygen Therapy/Pulse Ox   O2 Device Mid flow nasal cannula   O2 Therapy Oxygen humidified   Nasal Cannula O2 Flow Rate (L/min) 6 L/min   Calculated FIO2 (%) - Nasal Cannula 44   O2 Flow Rate (L/min) 6 L/min   SpO2 Activity At Rest

## 2024-12-08 NOTE — ASSESSMENT & PLAN NOTE
"This is a 34-year-old male patient with history of opiate use disorder on methadone, bipolar/anxiety/depression, obesity, CATA, asthma who presented with an unresponsive episode likely due to overdose, found to have respiratory failure.  His respiratory failure appears multifactorial, secondary to aspiration pneumonia, asthma exacerbation, possible fluid overload component in setting of CATA history.  Currently on 5 L nasal cannula  Encourage patient to use incentive spirometer and to get out of bed   Use BiPAP while sleeping (see \"Central sleep apnea\" below)  Continue antibiotic regimen  Solu-Medrol taper  Additional 40 mg IV Lasix  Wean off oxygen as able to keep oxygen saturations above 90%  Await further pulmonology recommendations tomorrow  "

## 2024-12-08 NOTE — ASSESSMENT & PLAN NOTE
BMI 54  Weight may be contributing to respiratory difficulty  Patient is Wegovy, defer decision to continue to PCP continue as may be the cause of patient's nausea and vomiting.

## 2024-12-08 NOTE — RESPIRATORY THERAPY NOTE
Pt had C/O nose problems and wanted to take off Midflow at 6 l/m nc and place on regular cannula. Pt was informed that the flow would be 6-7 l/m and not lower. Therapist gave pt surgilube and some q-tips  for nares and titrated flow down to 5 l/m  see how pt does. Pt much more happier with the surgilube. Pt decided to keep midflow on at the 5 l/m. Pt actually improved  from 8 l/m midflow  to 5. Instructed pt  he is making some progress.

## 2024-12-08 NOTE — ASSESSMENT & PLAN NOTE
Unresponsive at home, given Narcan with response.  Most likely opioid overdose as the patient is on chronic methadone.  The patient also noted he utilized Xanax obtained from non-prescriber source

## 2024-12-08 NOTE — ASSESSMENT & PLAN NOTE
Likely contributing with the current challenge of hypoxia and weaning off supplemental oxygen  Continue home BiPAP during hours of sleep

## 2024-12-09 ENCOUNTER — APPOINTMENT (INPATIENT)
Dept: NON INVASIVE DIAGNOSTICS | Facility: HOSPITAL | Age: 34
DRG: 720 | End: 2024-12-09
Payer: COMMERCIAL

## 2024-12-09 LAB
ANION GAP SERPL CALCULATED.3IONS-SCNC: 8 MMOL/L (ref 4–13)
AORTIC ROOT: 3.9 CM
BACTERIA BLD CULT: NORMAL
BACTERIA BLD CULT: NORMAL
BASOPHILS # BLD AUTO: 0.01 THOUSANDS/ÂΜL (ref 0–0.1)
BASOPHILS NFR BLD AUTO: 0 % (ref 0–1)
BSA FOR ECHO PROCEDURE: 2.72 M2
BUN SERPL-MCNC: 19 MG/DL (ref 5–25)
CALCIUM SERPL-MCNC: 9.2 MG/DL (ref 8.4–10.2)
CHLORIDE SERPL-SCNC: 96 MMOL/L (ref 96–108)
CO2 SERPL-SCNC: 33 MMOL/L (ref 21–32)
CREAT SERPL-MCNC: 0.79 MG/DL (ref 0.6–1.3)
E WAVE DECELERATION TIME: 173 MS
E/A RATIO: 0.83
EOSINOPHIL # BLD AUTO: 0 THOUSAND/ÂΜL (ref 0–0.61)
EOSINOPHIL NFR BLD AUTO: 0 % (ref 0–6)
ERYTHROCYTE [DISTWIDTH] IN BLOOD BY AUTOMATED COUNT: 14.2 % (ref 11.6–15.1)
GFR SERPL CREATININE-BSD FRML MDRD: 116 ML/MIN/1.73SQ M
GLUCOSE SERPL-MCNC: 180 MG/DL (ref 65–140)
HCT VFR BLD AUTO: 41 % (ref 36.5–49.3)
HGB BLD-MCNC: 13.2 G/DL (ref 12–17)
IMM GRANULOCYTES # BLD AUTO: 0.09 THOUSAND/UL (ref 0–0.2)
IMM GRANULOCYTES NFR BLD AUTO: 1 % (ref 0–2)
LAAS-AP2: 31.9 CM2
LAAS-AP4: 27.7 CM2
LEFT ATRIUM VOLUME (MOD BIPLANE): 125 ML
LEFT ATRIUM VOLUME INDEX (MOD BIPLANE): 46 ML/M2
LEFT INTERNAL DIMENSION IN SYSTOLE: 3.7 CM (ref 2.1–4)
LYMPHOCYTES # BLD AUTO: 0.97 THOUSANDS/ÂΜL (ref 0.6–4.47)
LYMPHOCYTES NFR BLD AUTO: 8 % (ref 14–44)
MAGNESIUM SERPL-MCNC: 2 MG/DL (ref 1.9–2.7)
MCH RBC QN AUTO: 26.7 PG (ref 26.8–34.3)
MCHC RBC AUTO-ENTMCNC: 32.2 G/DL (ref 31.4–37.4)
MCV RBC AUTO: 83 FL (ref 82–98)
MONOCYTES # BLD AUTO: 0.42 THOUSAND/ÂΜL (ref 0.17–1.22)
MONOCYTES NFR BLD AUTO: 4 % (ref 4–12)
MV E'TISSUE VEL-SEP: 7 CM/S
MV PEAK A VEL: 1 M/S
MV PEAK E VEL: 83 CM/S
MV STENOSIS PRESSURE HALF TIME: 50 MS
MV VALVE AREA P 1/2 METHOD: 4.4
NEUTROPHILS # BLD AUTO: 10.09 THOUSANDS/ÂΜL (ref 1.85–7.62)
NEUTS SEG NFR BLD AUTO: 87 % (ref 43–75)
NRBC BLD AUTO-RTO: 0 /100 WBCS
PLATELET # BLD AUTO: 199 THOUSANDS/UL (ref 149–390)
PMV BLD AUTO: 9.9 FL (ref 8.9–12.7)
POTASSIUM SERPL-SCNC: 4.1 MMOL/L (ref 3.5–5.3)
PULM VEIN S/D RATIO: 0.59
PV PEAK D VEL: 0.27 M/S
PV PEAK S VEL: 0.16 M/S
RBC # BLD AUTO: 4.95 MILLION/UL (ref 3.88–5.62)
RIGHT ATRIAL 2D VOLUME: 85 ML
RIGHT ATRIUM AREA SYSTOLE A4C: 25 CM2
SL CV LEFT ATRIUM LENGTH A2C: 6.4 CM
SL CV LV EF: 65
SL CV PED ECHO LEFT VENTRICLE SYSTOLIC VOLUME (MOD BIPLANE) 2D: 59 ML
SODIUM SERPL-SCNC: 137 MMOL/L (ref 135–147)
TR MAX PG: 3 MMHG
TR PEAK VELOCITY: 0.8 M/S
TRICUSPID VALVE PEAK REGURGITATION VELOCITY: 0.8 M/S
WBC # BLD AUTO: 11.58 THOUSAND/UL (ref 4.31–10.16)

## 2024-12-09 PROCEDURE — 93306 TTE W/DOPPLER COMPLETE: CPT

## 2024-12-09 PROCEDURE — 85025 COMPLETE CBC W/AUTO DIFF WBC: CPT | Performed by: FAMILY MEDICINE

## 2024-12-09 PROCEDURE — 94640 AIRWAY INHALATION TREATMENT: CPT

## 2024-12-09 PROCEDURE — 94760 N-INVAS EAR/PLS OXIMETRY 1: CPT

## 2024-12-09 PROCEDURE — 99232 SBSQ HOSP IP/OBS MODERATE 35: CPT

## 2024-12-09 PROCEDURE — 80048 BASIC METABOLIC PNL TOTAL CA: CPT | Performed by: FAMILY MEDICINE

## 2024-12-09 PROCEDURE — 83735 ASSAY OF MAGNESIUM: CPT | Performed by: FAMILY MEDICINE

## 2024-12-09 PROCEDURE — 99232 SBSQ HOSP IP/OBS MODERATE 35: CPT | Performed by: FAMILY MEDICINE

## 2024-12-09 PROCEDURE — 93306 TTE W/DOPPLER COMPLETE: CPT | Performed by: INTERNAL MEDICINE

## 2024-12-09 PROCEDURE — 94660 CPAP INITIATION&MGMT: CPT

## 2024-12-09 PROCEDURE — 94664 DEMO&/EVAL PT USE INHALER: CPT

## 2024-12-09 RX ADMIN — CLONAZEPAM 1 MG: 0.5 TABLET ORAL at 21:17

## 2024-12-09 RX ADMIN — GUAIFENESIN 600 MG: 600 TABLET, EXTENDED RELEASE ORAL at 09:36

## 2024-12-09 RX ADMIN — VILAZODONE HYDROCHLORIDE 40 MG: 20 TABLET ORAL at 09:36

## 2024-12-09 RX ADMIN — IPRATROPIUM BROMIDE 0.5 MG: 0.5 SOLUTION RESPIRATORY (INHALATION) at 07:05

## 2024-12-09 RX ADMIN — LEVALBUTEROL HYDROCHLORIDE 1.25 MG: 1.25 SOLUTION RESPIRATORY (INHALATION) at 13:50

## 2024-12-09 RX ADMIN — GUAIFENESIN 600 MG: 600 TABLET, EXTENDED RELEASE ORAL at 17:30

## 2024-12-09 RX ADMIN — BUDESONIDE 0.5 MG: 0.5 INHALANT RESPIRATORY (INHALATION) at 07:05

## 2024-12-09 RX ADMIN — LEVALBUTEROL HYDROCHLORIDE 1.25 MG: 1.25 SOLUTION RESPIRATORY (INHALATION) at 20:29

## 2024-12-09 RX ADMIN — PANTOPRAZOLE SODIUM 40 MG: 40 INJECTION, POWDER, FOR SOLUTION INTRAVENOUS at 09:37

## 2024-12-09 RX ADMIN — PREGABALIN 150 MG: 75 CAPSULE ORAL at 17:30

## 2024-12-09 RX ADMIN — POLYETHYLENE GLYCOL 3350 17 G: 17 POWDER, FOR SOLUTION ORAL at 09:37

## 2024-12-09 RX ADMIN — LEVALBUTEROL HYDROCHLORIDE 1.25 MG: 1.25 SOLUTION RESPIRATORY (INHALATION) at 07:05

## 2024-12-09 RX ADMIN — IPRATROPIUM BROMIDE 0.5 MG: 0.5 SOLUTION RESPIRATORY (INHALATION) at 13:50

## 2024-12-09 RX ADMIN — FLUTICASONE PROPIONATE 1 SPRAY: 50 SPRAY, METERED NASAL at 09:38

## 2024-12-09 RX ADMIN — HEPARIN SODIUM 7500 UNITS: 5000 INJECTION, SOLUTION INTRAVENOUS; SUBCUTANEOUS at 05:00

## 2024-12-09 RX ADMIN — METHYLPREDNISOLONE SODIUM SUCCINATE 40 MG: 40 INJECTION, POWDER, FOR SOLUTION INTRAMUSCULAR; INTRAVENOUS at 09:37

## 2024-12-09 RX ADMIN — METHADONE HYDROCHLORIDE 150 MG: 10 TABLET ORAL at 09:35

## 2024-12-09 RX ADMIN — PRAZOSIN HYDROCHLORIDE 5 MG: 1 CAPSULE ORAL at 09:36

## 2024-12-09 RX ADMIN — CLONAZEPAM 1 MG: 0.5 TABLET ORAL at 09:36

## 2024-12-09 RX ADMIN — VANCOMYCIN HYDROCHLORIDE 2000 MG: 1 INJECTION, POWDER, LYOPHILIZED, FOR SOLUTION INTRAVENOUS at 17:30

## 2024-12-09 RX ADMIN — CLONAZEPAM 1 MG: 0.5 TABLET ORAL at 17:30

## 2024-12-09 RX ADMIN — LUBIPROSTONE 24 MCG: 24 CAPSULE, GELATIN COATED ORAL at 09:36

## 2024-12-09 RX ADMIN — PANTOPRAZOLE SODIUM 40 MG: 40 INJECTION, POWDER, FOR SOLUTION INTRAVENOUS at 21:17

## 2024-12-09 RX ADMIN — HEPARIN SODIUM 7500 UNITS: 5000 INJECTION, SOLUTION INTRAVENOUS; SUBCUTANEOUS at 14:30

## 2024-12-09 RX ADMIN — DOXYCYCLINE HYCLATE 100 MG: 100 CAPSULE ORAL at 09:36

## 2024-12-09 RX ADMIN — IPRATROPIUM BROMIDE 0.5 MG: 0.5 SOLUTION RESPIRATORY (INHALATION) at 20:29

## 2024-12-09 RX ADMIN — NICOTINE 21 MG: 21 PATCH, EXTENDED RELEASE TRANSDERMAL at 17:30

## 2024-12-09 RX ADMIN — LORATADINE 10 MG: 10 TABLET ORAL at 09:36

## 2024-12-09 RX ADMIN — LUBIPROSTONE 24 MCG: 24 CAPSULE, GELATIN COATED ORAL at 21:17

## 2024-12-09 RX ADMIN — TEMAZEPAM 15 MG: 15 CAPSULE ORAL at 21:17

## 2024-12-09 RX ADMIN — HEPARIN SODIUM 7500 UNITS: 5000 INJECTION, SOLUTION INTRAVENOUS; SUBCUTANEOUS at 21:17

## 2024-12-09 RX ADMIN — BUDESONIDE 0.5 MG: 0.5 INHALANT RESPIRATORY (INHALATION) at 20:29

## 2024-12-09 RX ADMIN — PREGABALIN 150 MG: 75 CAPSULE ORAL at 09:36

## 2024-12-09 NOTE — ASSESSMENT & PLAN NOTE
BMI 54  Weight may be contributing to respiratory difficulty  Patient is on Wegovy, defer decision to continue to PCP continue as may be the cause of patient's nausea and vomiting.

## 2024-12-09 NOTE — ASSESSMENT & PLAN NOTE
Titrate FiO2 to maintain SpO2 greater than or equal to 88%.  Currently seen on 3 L nasal cannula.  Nausea requirements at baseline.  Pulmonary toilet:  Increase activity as tolerated, out of bed as tolerated, cough and deep breathing exercises encourage, incentive spirometry q.1 hour  Will need home O2 eval at discharge  Continue home ASV

## 2024-12-09 NOTE — PROGRESS NOTES
Sacha Foster is a 34 y.o. male who is currently ordered Vancomycin IV with management by the Pharmacy Consult service.  Relevant clinical data and objective / subjective history reviewed.  Vancomycin Assessment:  Indication and Goal AUC/Trough: Pneumonia (goal -600, trough >10)  Clinical Status:  New  Micro:     Renal Function:  SCr: 0.79 mg/dL  CrCl: 130 mL/min  Renal replacement: Not on dialysis  Days of Therapy: 1  Current Dose: 2500mg IV q12h  Vancomycin Plan:  New Dosinmg IV loading dose, them 1500mg IV q8h  Estimated AUC: 593 mcg*hr/mL  Estimated Trough: 18.3 mcg/mL  Next Level: 24 with AM labs  Renal Function Monitoring: Daily BMP and UOP  Pharmacy will continue to follow closely for s/sx of nephrotoxicity, infusion reactions and appropriateness of therapy.  BMP and CBC will be ordered per protocol. We will continue to follow the patient’s culture results and clinical progress daily.    Fransisco Alarcon, Pharmacist

## 2024-12-09 NOTE — ASSESSMENT & PLAN NOTE
Patient is on Dulera 200-5 at home (high steroid dose)  Switching to Breo Ellipta while hospitalized, as Dulera is not on formulary  PRN Albuterol inhaler  Pulmonary consulted; we appreciate their recommendations  Continue IV Solumedrol 40 mg. Taper from BID to daily.  On discharge, prednisone taper 40 mg decreasing by 10 mg Q3D  Pulmonology following, appreciate input

## 2024-12-09 NOTE — ASSESSMENT & PLAN NOTE
Secondary to overdose as well as persistent nausea/vomiting, also in the setting of Wegovy therapy  Continue Rocephin 2 g/dy (day 8 of antibiotics)  MRSA+ (12/8); 3 doses of doxycycline given  Pulmonology consulting; we greatly appreciate the recommendations  Switched Doxy to IV vancomycin (day 3/7 MRSA coverage)  Repeat imaging in 4-6 wks

## 2024-12-09 NOTE — ASSESSMENT & PLAN NOTE
DDD .  Continue home ASV with EPAP 6-10, PS 4-8 with auto backup rate   Recommend improved compliance

## 2024-12-09 NOTE — ASSESSMENT & PLAN NOTE
"Associated with Methadone and Hx of OUD  Abdominal obstruction series showed \"large volume colonic fecal burden\"  Continue Lubiprostone in lieu of home Linzess  Continue scheduled MiraLAX & Senna, PRN Dulcolax  " Patient Reported Weight (Optional - Include Units): 153 Ipledge Number (Optional): [id number] Detail Level: Zone

## 2024-12-09 NOTE — ASSESSMENT & PLAN NOTE
Bilateral LE edema  BNP slightly elevated at 105  Received 40 mg IV Lasix x 2  2D echo pending official read.  Preserved systolic and diastolic function, per technician  Almost completely resolved  D/c Lasix

## 2024-12-09 NOTE — ASSESSMENT & PLAN NOTE
"This is a 34-year-old male patient with history of opiate use disorder on methadone, bipolar/anxiety/depression, obesity, CATA, asthma who presented with an unresponsive episode likely due to overdose, found to have respiratory failure.  His respiratory failure appears multifactorial, secondary to aspiration pneumonia, asthma exacerbation, possible fluid overload component in setting of CATA history.  Currently on 5 L nasal cannula  Encourage patient to use incentive spirometer and to get out of bed   Use BiPAP while sleeping (see \"Central sleep apnea\" below)  Continue antibiotic regimen  Solu-Medrol 40 mg IV: taper from BID to daily  Minimal/no residual volume overload; D/c IV Lasix  Wean off oxygen as able to keep oxygen saturations above 90%  Pulmonology consulting; we greatly appreciate their recommendations.  "

## 2024-12-09 NOTE — ASSESSMENT & PLAN NOTE
Still with some pitting edema on exam and noted elevated BNP.  Defer diuretic management to primary team but would advise net negative fluid balance.

## 2024-12-09 NOTE — RESPIRATORY THERAPY NOTE
12/09/24 0705   Inhalation Therapy Tx   $ Inhalation Therapy Performed Yes   $ Pulse Oximetry Spot Check Charge Completed   SpO2 93 %   Pre-Treatment Pulse 76   Pre-Treatment Respirations 20   Duration 20   Breath Sounds Pre-Treatment Bilateral Diminished   Delivery Source Air;UDN   Position Up in chair   Treatment Tolerance Tolerated well   Resp Comments patient received on 5 lpm nasal cannula

## 2024-12-09 NOTE — ASSESSMENT & PLAN NOTE
Improved, concern for Wegovy contributing to patient's symptoms just recently started approximately 1 week ago  Zofran as needed

## 2024-12-09 NOTE — ASSESSMENT & PLAN NOTE
Agree with decreased systemic steroids today. If no wheezing still on tomorrow's assessment would discontinue steroids completely.   While inpatient continue Pulmicort every 12 hours until next/Atrovent nebs 3 times daily  At discharge resume Dulera 200/5 mcg 2 puffs twice daily and albuterol HFA/nebs every 6 hours as needed

## 2024-12-09 NOTE — PROGRESS NOTES
"Progress Note - Hospitalist   Name: Sacha Foster 34 y.o. male I MRN: [id number]  Unit/Bed#: 409-01 I Date of Admission: 12/3/2024   Date of Service: 12/9/2024 I Hospital Day: 5    Assessment & Plan  Acute respiratory failure with hypoxia and hypercapnia (HCC)  This is a 34-year-old male patient with history of opiate use disorder on methadone, bipolar/anxiety/depression, obesity, CATA, asthma who presented with an unresponsive episode likely due to overdose, found to have respiratory failure.  His respiratory failure appears multifactorial, secondary to aspiration pneumonia, asthma exacerbation, possible fluid overload component in setting of CATA history.  Currently on 5 L nasal cannula  Encourage patient to use incentive spirometer and to get out of bed   Use BiPAP while sleeping (see \"Central sleep apnea\" below)  Continue antibiotic regimen  Solu-Medrol 40 mg IV: taper from BID to daily  Minimal/no residual volume overload; D/c IV Lasix  Wean off oxygen as able to keep oxygen saturations above 90%  Pulmonology consulting; we greatly appreciate their recommendations.  Accidental overdose  Unresponsive at home, given Narcan with response.  Most likely opioid overdose as the patient is on chronic methadone.  The patient also noted he utilized Xanax obtained from non-prescriber source  Aspiration pneumonia (HCC)  Secondary to overdose as well as persistent nausea/vomiting, also in the setting of Wegovy therapy  Continue Rocephin 2 g/dy (day 4 of antibiotics)  MRSA swab noted for MRSA, add Doxycycline  Repeat imaging in 4-6 wks  Opioid dependence in remission (HCC)  Patient is on chronic methadone 150 mg daily.  He is treated at the [facility].  Central sleep apnea  Likely contributing with the current challenge of hypoxia and weaning off supplemental oxygen  Continue home BiPAP during hours of sleep  Acid reflux  Continue PPI  Generalized anxiety disorder  Continue with home Pregabalin & " "Klonopin  Depressed  Bipolar depression  Continue home Viibryd   Add mood-stabilizer (see \"Bipolar\" below)  Patient follows with behavioral health outpatient for talk therapy.  Moderate persistent asthma with acute exacerbation  Patient is on Dulera 200-5 at home (high steroid dose)  Switching to Breo Ellipta while hospitalized, as Dulera is not on formulary  PRN Albuterol inhaler  Pulmonary consulted; we appreciate their recommendations  Continue IV Solumedrol 40 mg. Taper from BID to daily.  On discharge, prednisone taper 40 mg decreasing by 10 mg Q3D  Pulmonology following, appreciate input  Volume overload  Bilateral LE edema  BNP slightly elevated at 105  Received 40 mg IV Lasix x 2  2D echo pending official read.  Preserved systolic and diastolic function, per technician  Almost completely resolved  D/c Lasix  Morbid obesity with BMI of 50.0-59.9, adult (HCC)  BMI 54  Weight may be contributing to respiratory difficulty  Patient is on Wegovy, defer decision to continue to PCP continue as may be the cause of patient's nausea and vomiting.  Chronic constipation  Associated with Methadone and Hx of OUD  Abdominal obstruction series showed \"large volume colonic fecal burden\"  Continue Lubiprostone in lieu of home Linzess  Continue scheduled MiraLAX & Senna, PRN Dulcolax  Nausea and vomiting  Improved, concern for Wegovy contributing to patient's symptoms just recently started approximately 1 week ago  Zofran as needed  Bipolar disorder ([facility])  Continue home regimen, outpatient f/u with Psychiatry  PTSD (post-traumatic stress disorder)  Continue home prazosin    VTE Pharmacologic Prophylaxis: VTE Score: 6 High Risk (Score >/= 5) - Pharmacological DVT Prophylaxis Ordered: heparin. Sequential Compression Devices Ordered.    Mobility:   Basic Mobility Inpatient Raw Score: 23  JH-HLM Goal: 7: Walk 25 feet or more  JH-HLM Achieved: 7: Walk 25 feet or more  JH-HLM Goal achieved. Continue to encourage appropriate " mobility.    Patient Centered Rounds: I performed bedside rounds with nursing staff today.   Discussions with Specialists or Other Care Team Provider: Multidisciplinary care team, including attending physician and ultrasound technician    Education and Discussions with Family / Patient:  to call patient's mother.     Current Length of Stay: 5 day(s)  Current Patient Status: Inpatient   Certification Statement: The patient will continue to require additional inpatient hospital stay due to acute respiratory failure secondary to aspiration pneumonia with MRSA   Discharge Plan:  when stable    Code Status: Level 1 - Full Code    Subjective   Patient was seen and examined at bedside.  He has no acute complaints.  He denies cough, wheezing, fever, chills, nausea, vomiting, and constipation.  He has been able to have bowel movements (yesterday and the day before).  He was able to get up and walk to the bathroom without his oxygen dropping below 90%.  He feels stable mood-wise, though he admits to residual anxiety due to being here in the hospital.  His mother was not able to find his Rexulti at home.    Objective :  Temp:  [98.2 °F (36.8 °C)] 98.2 °F (36.8 °C)  HR:  [] 88  BP: (101-138)/(72-91) 116/82  Resp:  [20] 20  SpO2:  [86 %-96 %] 86 %  O2 Device: Mid flow nasal cannula  Nasal Cannula O2 Flow Rate (L/min):  [5 L/min] 5 L/min    Body mass index is 53.46 kg/m².     Input and Output Summary (last 24 hours):     Intake/Output Summary (Last 24 hours) at 12/9/2024 1011  Last data filed at 12/9/2024 0334  Gross per 24 hour   Intake 240 ml   Output 2400 ml   Net -2160 ml       Physical Exam  Vitals reviewed.   Constitutional:       General: He is not in acute distress.     Appearance: Normal appearance. He is obese. He is not ill-appearing, toxic-appearing or diaphoretic.   Cardiovascular:      Rate and Rhythm: Normal rate and regular rhythm.      Heart sounds: Normal heart sounds. No murmur heard.  Pulmonary:       Effort: Pulmonary effort is normal. No respiratory distress.      Breath sounds: Normal breath sounds. No wheezing or rales.   Abdominal:      General: Abdomen is flat. There is no distension.      Palpations: Abdomen is soft.      Tenderness: There is no abdominal tenderness. There is no guarding.   Musculoskeletal:      Left lower leg: No edema.      Comments: Residual +1 right ankle edema   Neurological:      Mental Status: He is alert.             Lab Results: I have reviewed the following results:   Results from last 7 days   Lab Units 12/09/24  0503   WBC Thousand/uL 11.58*   HEMOGLOBIN g/dL 13.2   HEMATOCRIT % 41.0   PLATELETS Thousands/uL 199   SEGS PCT % 87*   LYMPHO PCT % 8*   MONO PCT % 4   EOS PCT % 0     Results from last 7 days   Lab Units 12/09/24  0503 12/07/24  0426 12/06/24  0451   SODIUM mmol/L 137   < > 138   POTASSIUM mmol/L 4.1   < > 4.0   CHLORIDE mmol/L 96   < > 95*   CO2 mmol/L 33*   < > 38*   BUN mg/dL 19   < > 10   CREATININE mg/dL 0.79   < > 0.73   ANION GAP mmol/L 8   < > 5   CALCIUM mg/dL 9.2   < > 8.8   ALBUMIN g/dL  --   --  4.1   TOTAL BILIRUBIN mg/dL  --   --  0.23   ALK PHOS U/L  --   --  46   ALT U/L  --   --  53*   AST U/L  --   --  51*   GLUCOSE RANDOM mg/dL 180*   < > 100    < > = values in this interval not displayed.     Results from last 7 days   Lab Units 12/03/24  2248   INR  0.96             Results from last 7 days   Lab Units 12/08/24  0440 12/06/24  0451 12/05/24  0958 12/04/24  0537 12/04/24  0210 12/03/24  2248   LACTIC ACID mmol/L  --   --   --   --  1.3 4.1*   PROCALCITONIN ng/ml 0.34* 0.88* 1.38* 2.65*  --  1.19*       Recent Cultures (last 7 days):   Results from last 7 days   Lab Units 12/05/24  2117 12/05/24  0600 12/03/24  2249 12/03/24 2248   BLOOD CULTURE   --   --  No Growth After 5 Days. No Growth After 5 Days.   SPUTUM CULTURE  4+ Growth of Methicillin Resistant Staphylococcus aureus*  4+ Growth of  --   --   --    GRAM STAIN RESULT  1+ Epithelial cells  per low power field*  No polys seen*  2+ Gram positive cocci in pairs*  1+ Gram negative rods*  --   --   --    LEGIONELLA URINARY ANTIGEN   --  Negative  --   --        Imaging Results Review: No pertinent imaging studies reviewed.  Other Study Results Review: No additional pertinent studies reviewed.    Last 24 Hours Medication List:     Current Facility-Administered Medications:     acetaminophen (TYLENOL) tablet 650 mg, Q6H PRN    albuterol inhalation solution 2.5 mg, Q4H PRN    bisacodyl (DULCOLAX) EC tablet 5 mg, Daily PRN    budesonide (PULMICORT) inhalation solution 0.5 mg, Q12H    cefTRIAXone (ROCEPHIN) IVPB (premix in dextrose) 2,000 mg 50 mL, Q24H, Last Rate: 2,000 mg (12/08/24 2200)    clonazePAM (KlonoPIN) tablet 1 mg, TID    doxycycline hyclate (VIBRAMYCIN) capsule 100 mg, Q12H MARICEL    fluticasone (FLONASE) 50 mcg/act nasal spray 1 spray, Daily    guaiFENesin (MUCINEX) 12 hr tablet 600 mg, BID    heparin (porcine) subcutaneous injection 7,500 Units, Q8H MARICEL    ibuprofen (MOTRIN) tablet 400 mg, Q8H PRN    ipratropium (ATROVENT) 0.02 % inhalation solution 0.5 mg, TID    levalbuterol (XOPENEX) inhalation solution 1.25 mg, TID    loratadine (CLARITIN) tablet 10 mg, Daily    lubiprostone (AMITIZA) capsule 24 mcg, BID    methadone (Dolophine) tablet 150 mg, Daily    methylPREDNISolone sodium succinate (Solu-MEDROL) injection 40 mg, Q12H MARICEL    nicotine (NICODERM CQ) 21 mg/24 hr TD 24 hr patch 21 mg, Daily    ondansetron (ZOFRAN) injection 4 mg, Q6H PRN    pantoprazole (PROTONIX) injection 40 mg, Q12H MARICEL    perflutren lipid microsphere (DEFINITY) injection, Once in imaging    polyethylene glycol (MIRALAX) packet 17 g, Daily    prazosin (MINIPRESS) capsule 5 mg, Daily    pregabalin (LYRICA) capsule 150 mg, BID    senna (SENOKOT) tablet 8.6 mg, HS    temazepam (RESTORIL) capsule 15 mg, HS    vilazodone (VIIBRYD) tablet 40 mg, Daily With Breakfast    Administrative Statements   Today, Patient Was Seen By:  Waqar Howard, DO      **Please Note: This note may have been constructed using a voice recognition system.**

## 2024-12-09 NOTE — PLAN OF CARE
Problem: RESPIRATORY - ADULT  Goal: Achieves optimal ventilation and oxygenation  Description: INTERVENTIONS:  - Assess for changes in respiratory status  - Assess for changes in mentation and behavior  - Position to facilitate oxygenation and minimize respiratory effort  - Oxygen administered by appropriate delivery if ordered  - Initiate smoking cessation education as indicated  - Encourage broncho-pulmonary hygiene including cough, deep breathe, Incentive Spirometry  - Assess the need for suctioning and aspirate as needed  - Assess and instruct to report SOB or any respiratory difficulty  - Respiratory Therapy support as indicated  Outcome: Progressing     Problem: METABOLIC, FLUID AND ELECTROLYTES - ADULT  Goal: Electrolytes maintained within normal limits  Description: INTERVENTIONS:  - Monitor labs and assess patient for signs and symptoms of electrolyte imbalances  - Administer electrolyte replacement as ordered  - Monitor response to electrolyte replacements, including repeat lab results as appropriate  - Instruct patient on fluid and nutrition as appropriate  Outcome: Progressing     Problem: SKIN/TISSUE INTEGRITY - ADULT  Goal: Incision(s), wounds(s) or drain site(s) healing without S/S of infection  Description: INTERVENTIONS  - Assess and document dressing, incision, wound bed, drain sites and surrounding tissue  - Provide patient and family education  - Perform skin care/dressing changes as ordered  Outcome: Progressing     Problem: PAIN - ADULT  Goal: Verbalizes/displays adequate comfort level or baseline comfort level  Description: Interventions:  - Encourage patient to monitor pain and request assistance  - Assess pain using appropriate pain scale  - Administer analgesics based on type and severity of pain and evaluate response  - Implement non-pharmacological measures as appropriate and evaluate response  - Consider cultural and social influences on pain and pain management  - Notify  physician/advanced practitioner if interventions unsuccessful or patient reports new pain  Outcome: Progressing     Problem: INFECTION - ADULT  Goal: Absence or prevention of progression during hospitalization  Description: INTERVENTIONS:  - Assess and monitor for signs and symptoms of infection  - Monitor lab/diagnostic results  - Monitor all insertion sites, i.e. indwelling lines, tubes, and drains  - Monitor endotracheal if appropriate and nasal secretions for changes in amount and color  - [place] appropriate cooling/warming therapies per order  - Administer medications as ordered  - Instruct and encourage patient and family to use good hand hygiene technique  - Identify and instruct in appropriate isolation precautions for identified infection/condition  Outcome: Progressing     Problem: DISCHARGE PLANNING  Goal: Discharge to home or other facility with appropriate resources  Description: INTERVENTIONS:  - Identify barriers to discharge w/patient and caregiver  - Arrange for needed discharge resources and transportation as appropriate  - Identify discharge learning needs (meds, wound care, etc.)  - Arrange for interpretive services to assist at discharge as needed  - Refer to Case Management Department for coordinating discharge planning if the patient needs post-hospital services based on physician/advanced practitioner order or complex needs related to functional status, cognitive ability, or social support system  Outcome: Progressing

## 2024-12-09 NOTE — PROGRESS NOTES
Progress Note - Pulmonology   Name: Sacha Foster 34 y.o. male I MRN: [id number]  Unit/Bed#: 409-01 I Date of Admission: 12/3/2024   Date of Service: 12/9/2024 I Hospital Day: 5    Assessment & Plan  Acute respiratory failure with hypoxia and hypercapnia (HCC)  Titrate FiO2 to maintain SpO2 greater than or equal to 88%.  Currently seen on 3 L nasal cannula.  Nausea requirements at baseline.  Pulmonary toilet:  Increase activity as tolerated, out of bed as tolerated, cough and deep breathing exercises encourage, incentive spirometry q.1 hour  Will need home O2 eval at discharge  Continue home ASV  Central sleep apnea  Continue home ASV with EPAP 6-10, PS 4-8 with auto backup rate   Recommend improved compliance   Morbid obesity with BMI of 50.0-59.9, adult (HCC)  Weight loss encouraged  Further treatment per primary team  Moderate persistent asthma with acute exacerbation  Agree with decreased systemic steroids today. If no wheezing still on tomorrow's assessment would discontinue steroids completely.   While inpatient continue Pulmicort every 12 hours until next/Atrovent nebs 3 times daily  At discharge resume Dulera 200/5 mcg 2 puffs twice daily and albuterol HFA/nebs every 6 hours as needed  Aspiration pneumonia (HCC)  Sputum culture positive for 4+ growth MRSA and mixed respiratory keely.  Given persistent oxygen requirement would escalate to IV vancomycin.    Volume overload  Still with some pitting edema on exam and noted elevated BNP.  Defer diuretic management to primary team but would advise net negative fluid balance.    24 Hour Events : none reported  Subjective : patient was seen and examined. He states that he feels well the last two days despite continued oxygen needs. He denies cough, wheeze, or SOB out of the norm for him. Denies chest pain. He reports chronic TAM that is at his baseline per his account.     Objective :  Temp:  [98.2 °F (36.8 °C)] 98.2 °F (36.8 °C)  HR:  [] 88  BP:  (101-138)/(72-91) 116/82  Resp:  [20] 20  SpO2:  [86 %-96 %] 86 %  O2 Device: Mid flow nasal cannula  Nasal Cannula O2 Flow Rate (L/min):  [5 L/min] 5 L/min    Physical Exam  Vitals reviewed.   Constitutional:       Appearance: Normal appearance. He is well-developed. He is obese.   HENT:      Head: Normocephalic and atraumatic.      Nose: Nose normal.      Mouth/Throat:      Mouth: Mucous membranes are moist.   Eyes:      Extraocular Movements: Extraocular movements intact.   Cardiovascular:      Rate and Rhythm: Normal rate and regular rhythm.      Heart sounds: Normal heart sounds.   Pulmonary:      Effort: Pulmonary effort is normal. No respiratory distress.      Breath sounds: No wheezing, rhonchi or rales.   Musculoskeletal:      Right lower leg: Edema present.      Left lower leg: Edema present.   Skin:     General: Skin is warm and dry.   Neurological:      Mental Status: He is alert. Mental status is at baseline.   Psychiatric:         Mood and Affect: Mood normal.         Behavior: Behavior normal.         Lab Results: I have reviewed the following results:   .     12/09/24  0503   WBC 11.58*   HGB 13.2   HCT 41.0      SODIUM 137   K 4.1   CL 96   CO2 33*   BUN 19   CREATININE 0.79   GLUC 180*   MG 2.0     ABG: No new results in last 24 hours.

## 2024-12-10 LAB
ALBUMIN SERPL BCG-MCNC: 3.7 G/DL (ref 3.5–5)
ALP SERPL-CCNC: 41 U/L (ref 34–104)
ALT SERPL W P-5'-P-CCNC: 32 U/L (ref 7–52)
ANION GAP SERPL CALCULATED.3IONS-SCNC: 6 MMOL/L (ref 4–13)
AST SERPL W P-5'-P-CCNC: 21 U/L (ref 13–39)
BASOPHILS # BLD AUTO: 0.02 THOUSANDS/ÂΜL (ref 0–0.1)
BASOPHILS NFR BLD AUTO: 0 % (ref 0–1)
BILIRUB SERPL-MCNC: 0.27 MG/DL (ref 0.2–1)
BUN SERPL-MCNC: 17 MG/DL (ref 5–25)
CALCIUM SERPL-MCNC: 8.6 MG/DL (ref 8.4–10.2)
CHLORIDE SERPL-SCNC: 100 MMOL/L (ref 96–108)
CO2 SERPL-SCNC: 33 MMOL/L (ref 21–32)
CREAT SERPL-MCNC: 0.69 MG/DL (ref 0.6–1.3)
EOSINOPHIL # BLD AUTO: 0.02 THOUSAND/ÂΜL (ref 0–0.61)
EOSINOPHIL NFR BLD AUTO: 0 % (ref 0–6)
ERYTHROCYTE [DISTWIDTH] IN BLOOD BY AUTOMATED COUNT: 14.5 % (ref 11.6–15.1)
GFR SERPL CREATININE-BSD FRML MDRD: 122 ML/MIN/1.73SQ M
GLUCOSE SERPL-MCNC: 105 MG/DL (ref 65–140)
HCT VFR BLD AUTO: 37.8 % (ref 36.5–49.3)
HGB BLD-MCNC: 12.2 G/DL (ref 12–17)
IMM GRANULOCYTES # BLD AUTO: 0.09 THOUSAND/UL (ref 0–0.2)
IMM GRANULOCYTES NFR BLD AUTO: 1 % (ref 0–2)
LYMPHOCYTES # BLD AUTO: 2.5 THOUSANDS/ÂΜL (ref 0.6–4.47)
LYMPHOCYTES NFR BLD AUTO: 22 % (ref 14–44)
MCH RBC QN AUTO: 26.3 PG (ref 26.8–34.3)
MCHC RBC AUTO-ENTMCNC: 32.3 G/DL (ref 31.4–37.4)
MCV RBC AUTO: 82 FL (ref 82–98)
MONOCYTES # BLD AUTO: 0.72 THOUSAND/ÂΜL (ref 0.17–1.22)
MONOCYTES NFR BLD AUTO: 6 % (ref 4–12)
NEUTROPHILS # BLD AUTO: 8.24 THOUSANDS/ÂΜL (ref 1.85–7.62)
NEUTS SEG NFR BLD AUTO: 71 % (ref 43–75)
NRBC BLD AUTO-RTO: 0 /100 WBCS
PLATELET # BLD AUTO: 177 THOUSANDS/UL (ref 149–390)
PMV BLD AUTO: 9.8 FL (ref 8.9–12.7)
POTASSIUM SERPL-SCNC: 3.5 MMOL/L (ref 3.5–5.3)
PROCALCITONIN SERPL-MCNC: 0.16 NG/ML
PROT SERPL-MCNC: 6.4 G/DL (ref 6.4–8.4)
RBC # BLD AUTO: 4.63 MILLION/UL (ref 3.88–5.62)
SODIUM SERPL-SCNC: 139 MMOL/L (ref 135–147)
WBC # BLD AUTO: 11.59 THOUSAND/UL (ref 4.31–10.16)

## 2024-12-10 PROCEDURE — 84145 PROCALCITONIN (PCT): CPT | Performed by: FAMILY MEDICINE

## 2024-12-10 PROCEDURE — 99232 SBSQ HOSP IP/OBS MODERATE 35: CPT | Performed by: FAMILY MEDICINE

## 2024-12-10 PROCEDURE — 94664 DEMO&/EVAL PT USE INHALER: CPT

## 2024-12-10 PROCEDURE — 85025 COMPLETE CBC W/AUTO DIFF WBC: CPT

## 2024-12-10 PROCEDURE — 94640 AIRWAY INHALATION TREATMENT: CPT

## 2024-12-10 PROCEDURE — 80053 COMPREHEN METABOLIC PANEL: CPT

## 2024-12-10 PROCEDURE — 94760 N-INVAS EAR/PLS OXIMETRY 1: CPT

## 2024-12-10 PROCEDURE — 99232 SBSQ HOSP IP/OBS MODERATE 35: CPT

## 2024-12-10 RX ORDER — PREDNISONE 20 MG/1
40 TABLET ORAL DAILY
Status: DISCONTINUED | OUTPATIENT
Start: 2024-12-10 | End: 2024-12-11 | Stop reason: HOSPADM

## 2024-12-10 RX ADMIN — VILAZODONE HYDROCHLORIDE 40 MG: 20 TABLET ORAL at 09:34

## 2024-12-10 RX ADMIN — PANTOPRAZOLE SODIUM 40 MG: 40 INJECTION, POWDER, FOR SOLUTION INTRAVENOUS at 09:35

## 2024-12-10 RX ADMIN — PANTOPRAZOLE SODIUM 40 MG: 40 INJECTION, POWDER, FOR SOLUTION INTRAVENOUS at 20:52

## 2024-12-10 RX ADMIN — HEPARIN SODIUM 7500 UNITS: 5000 INJECTION, SOLUTION INTRAVENOUS; SUBCUTANEOUS at 22:02

## 2024-12-10 RX ADMIN — TEMAZEPAM 15 MG: 15 CAPSULE ORAL at 22:01

## 2024-12-10 RX ADMIN — IPRATROPIUM BROMIDE 0.5 MG: 0.5 SOLUTION RESPIRATORY (INHALATION) at 07:02

## 2024-12-10 RX ADMIN — IPRATROPIUM BROMIDE 0.5 MG: 0.5 SOLUTION RESPIRATORY (INHALATION) at 13:23

## 2024-12-10 RX ADMIN — CLONAZEPAM 1 MG: 0.5 TABLET ORAL at 20:52

## 2024-12-10 RX ADMIN — POLYETHYLENE GLYCOL 3350 17 G: 17 POWDER, FOR SOLUTION ORAL at 09:35

## 2024-12-10 RX ADMIN — CLONAZEPAM 1 MG: 0.5 TABLET ORAL at 09:34

## 2024-12-10 RX ADMIN — VANCOMYCIN HYDROCHLORIDE 1500 MG: 5 INJECTION, POWDER, LYOPHILIZED, FOR SOLUTION INTRAVENOUS at 09:35

## 2024-12-10 RX ADMIN — LUBIPROSTONE 24 MCG: 24 CAPSULE, GELATIN COATED ORAL at 09:34

## 2024-12-10 RX ADMIN — IPRATROPIUM BROMIDE 0.5 MG: 0.5 SOLUTION RESPIRATORY (INHALATION) at 20:12

## 2024-12-10 RX ADMIN — PREGABALIN 150 MG: 75 CAPSULE ORAL at 09:35

## 2024-12-10 RX ADMIN — BUDESONIDE 0.5 MG: 0.5 INHALANT RESPIRATORY (INHALATION) at 07:02

## 2024-12-10 RX ADMIN — PREDNISONE 40 MG: 20 TABLET ORAL at 11:57

## 2024-12-10 RX ADMIN — SENNOSIDES 8.6 MG: 8.6 TABLET, FILM COATED ORAL at 22:01

## 2024-12-10 RX ADMIN — VANCOMYCIN HYDROCHLORIDE 1500 MG: 5 INJECTION, POWDER, LYOPHILIZED, FOR SOLUTION INTRAVENOUS at 17:22

## 2024-12-10 RX ADMIN — BUDESONIDE 0.5 MG: 0.5 INHALANT RESPIRATORY (INHALATION) at 20:12

## 2024-12-10 RX ADMIN — VANCOMYCIN HYDROCHLORIDE 1500 MG: 5 INJECTION, POWDER, LYOPHILIZED, FOR SOLUTION INTRAVENOUS at 00:28

## 2024-12-10 RX ADMIN — GUAIFENESIN 600 MG: 600 TABLET, EXTENDED RELEASE ORAL at 09:34

## 2024-12-10 RX ADMIN — LEVALBUTEROL HYDROCHLORIDE 1.25 MG: 1.25 SOLUTION RESPIRATORY (INHALATION) at 20:12

## 2024-12-10 RX ADMIN — LEVALBUTEROL HYDROCHLORIDE 1.25 MG: 1.25 SOLUTION RESPIRATORY (INHALATION) at 07:02

## 2024-12-10 RX ADMIN — HEPARIN SODIUM 7500 UNITS: 5000 INJECTION, SOLUTION INTRAVENOUS; SUBCUTANEOUS at 05:07

## 2024-12-10 RX ADMIN — METHADONE HYDROCHLORIDE 150 MG: 10 TABLET ORAL at 09:34

## 2024-12-10 RX ADMIN — PRAZOSIN HYDROCHLORIDE 5 MG: 1 CAPSULE ORAL at 09:35

## 2024-12-10 RX ADMIN — LORATADINE 10 MG: 10 TABLET ORAL at 09:34

## 2024-12-10 RX ADMIN — CLONAZEPAM 1 MG: 0.5 TABLET ORAL at 17:22

## 2024-12-10 RX ADMIN — LUBIPROSTONE 24 MCG: 24 CAPSULE, GELATIN COATED ORAL at 20:52

## 2024-12-10 RX ADMIN — CEFTRIAXONE 2000 MG: 2 INJECTION, SOLUTION INTRAVENOUS at 22:02

## 2024-12-10 RX ADMIN — PREGABALIN 150 MG: 75 CAPSULE ORAL at 17:22

## 2024-12-10 RX ADMIN — LEVALBUTEROL HYDROCHLORIDE 1.25 MG: 1.25 SOLUTION RESPIRATORY (INHALATION) at 13:23

## 2024-12-10 RX ADMIN — HEPARIN SODIUM 7500 UNITS: 5000 INJECTION, SOLUTION INTRAVENOUS; SUBCUTANEOUS at 14:09

## 2024-12-10 RX ADMIN — GUAIFENESIN 600 MG: 600 TABLET, EXTENDED RELEASE ORAL at 17:22

## 2024-12-10 RX ADMIN — CEFTRIAXONE 2000 MG: 2 INJECTION, SOLUTION INTRAVENOUS at 00:28

## 2024-12-10 RX ADMIN — NICOTINE 21 MG: 21 PATCH, EXTENDED RELEASE TRANSDERMAL at 17:23

## 2024-12-10 NOTE — RESPIRATORY THERAPY NOTE
12/10/24 0702   Inhalation Therapy Tx   $ Inhalation Therapy Performed Yes   $ Pulse Oximetry Spot Check Charge Completed   SpO2 92 %   Pre-Treatment Pulse 99   Pre-Treatment Respirations 21   Duration 20   Breath Sounds Pre-Treatment Bilateral Clear;Diminished   Delivery Source Air;UDN   Position Sitting   Treatment Tolerance Tolerated well   Resp Comments Patient was sleeping on 2 lpm nasal cannula, he woke and went to the bathroom without his oxygen, I did change the cannula it was on the floor. Patient's oxygen saturation dropped to 87%, he did well. Patient now back on the 2 lpm nasal cannula. His breathe sounds are better, he is happier,     3 pm   Patient walked the entire floor 550 feet with 2 lpm nasal oxygen, his oxygen saturations maintained 89-94%, his heart rate , patient stated he feels good

## 2024-12-10 NOTE — ASSESSMENT & PLAN NOTE
Patient is on Dulera 200-5 at home (high steroid dose)  Pulmicort 0.5 mg BID + TID Duo-Nebs  PRN Albuterol inhaler  D/c IV Solumedrol  Pulmonary consulting; we appreciate their recommendations

## 2024-12-10 NOTE — PLAN OF CARE
Problem: RESPIRATORY - ADULT  Goal: Achieves optimal ventilation and oxygenation  Description: INTERVENTIONS:  - Assess for changes in respiratory status  - Assess for changes in mentation and behavior  - Position to facilitate oxygenation and minimize respiratory effort  - Oxygen administered by appropriate delivery if ordered  - Initiate smoking cessation education as indicated  - Encourage broncho-pulmonary hygiene including cough, deep breathe, Incentive Spirometry  - Assess the need for suctioning and aspirate as needed  - Assess and instruct to report SOB or any respiratory difficulty  - Respiratory Therapy support as indicated  Outcome: Progressing     Problem: METABOLIC, FLUID AND ELECTROLYTES - ADULT  Goal: Electrolytes maintained within normal limits  Description: INTERVENTIONS:  - Monitor labs and assess patient for signs and symptoms of electrolyte imbalances  - Administer electrolyte replacement as ordered  - Monitor response to electrolyte replacements, including repeat lab results as appropriate  - Instruct patient on fluid and nutrition as appropriate  Outcome: Progressing     Problem: SKIN/TISSUE INTEGRITY - ADULT  Goal: Incision(s), wounds(s) or drain site(s) healing without S/S of infection  Description: INTERVENTIONS  - Assess and document dressing, incision, wound bed, drain sites and surrounding tissue  - Provide patient and family education  - Perform skin care/dressing changes as ordered  Outcome: Progressing     Problem: PAIN - ADULT  Goal: Verbalizes/displays adequate comfort level or baseline comfort level  Description: Interventions:  - Encourage patient to monitor pain and request assistance  - Assess pain using appropriate pain scale  - Administer analgesics based on type and severity of pain and evaluate response  - Implement non-pharmacological measures as appropriate and evaluate response  - Consider cultural and social influences on pain and pain management  - Notify  physician/advanced practitioner if interventions unsuccessful or patient reports new pain  Outcome: Progressing     Problem: INFECTION - ADULT  Goal: Absence or prevention of progression during hospitalization  Description: INTERVENTIONS:  - Assess and monitor for signs and symptoms of infection  - Monitor lab/diagnostic results  - Monitor all insertion sites, i.e. indwelling lines, tubes, and drains  - Monitor endotracheal if appropriate and nasal secretions for changes in amount and color  - [place] appropriate cooling/warming therapies per order  - Administer medications as ordered  - Instruct and encourage patient and family to use good hand hygiene technique  - Identify and instruct in appropriate isolation precautions for identified infection/condition  Outcome: Progressing     Problem: DISCHARGE PLANNING  Goal: Discharge to home or other facility with appropriate resources  Description: INTERVENTIONS:  - Identify barriers to discharge w/patient and caregiver  - Arrange for needed discharge resources and transportation as appropriate  - Identify discharge learning needs (meds, wound care, etc.)  - Arrange for interpretive services to assist at discharge as needed  - Refer to Case Management Department for coordinating discharge planning if the patient needs post-hospital services based on physician/advanced practitioner order or complex needs related to functional status, cognitive ability, or social support system  Outcome: Progressing

## 2024-12-10 NOTE — PROGRESS NOTES
"Progress Note - Hospitalist   Name: Sacha Foster 34 y.o. male I MRN: [id number]  Unit/Bed#: 409-01 I Date of Admission: 12/3/2024   Date of Service: 12/10/2024 I Hospital Day: 6    Assessment & Plan  Acute respiratory failure with hypoxia and hypercapnia (HCC)  This is a 34-year-old male patient with history of opiate use disorder on methadone, bipolar/anxiety/depression, obesity, CATA, asthma who presented with an unresponsive episode likely due to overdose, found to have respiratory failure.  His respiratory failure appears multifactorial, secondary to aspiration pneumonia, asthma exacerbation, possible fluid overload component in setting of CATA history.  Volume overload resolved  Currently on 2 L nasal cannula  Pulmonology consulting; we greatly appreciate their recommendations.  Continue antibiotic regimen (see \"Aspiration pneumonia\" below)  D/c IV Solu-Medrol  Encourage patient to use incentive spirometer and to get out of bed   Use BiPAP while sleeping (see \"Central sleep apnea\" below)  Wean off oxygen as able to keep oxygen saturations above 90%  Will need ambulatory pulse-ox before discharge  Vaping cessation  Accidental overdose  Unresponsive at home, given Narcan with response.  Most likely opioid overdose as the patient is on chronic methadone.  The patient also noted he utilized Xanax obtained from non-prescriber source  Aspiration pneumonia (HCC)  Secondary to overdose as well as persistent nausea/vomiting, also in the setting of Wegovy therapy  Continue Rocephin 2 g/dy (day 8 of antibiotics)  MRSA+ (12/8); 3 doses of doxycycline given  Pulmonology consulting; we greatly appreciate the recommendations  Switched Doxy to IV vancomycin (day 3/7 MRSA coverage)  Repeat imaging in 4-6 wks  Opioid dependence in remission (HCC)  Patient is on chronic methadone 150 mg daily.  He is treated at the [facility].  Central sleep apnea  Likely contributing with the current challenge of hypoxia and " "weaning off supplemental oxygen  Continue home BiPAP during hours of sleep  Acid reflux  Continue PPI  Generalized anxiety disorder  Continue with home Pregabalin & Klonopin  Depressed  Bipolar depression  Continue home Viibryd   Restart mood-stabilizer on discharge (see \"Bipolar\")  Patient follows with behavioral health outpatient for talk therapy.  Moderate persistent asthma with acute exacerbation  Patient is on Dulera 200-5 at home (high steroid dose)  Pulmicort 0.5 mg BID + TID Duo-Nebs  PRN Albuterol inhaler  D/c IV Solumedrol  Pulmonary consulting; we appreciate their recommendations  Volume overload  Bilateral LE edema  BNP slightly elevated at 105  Received 40 mg IV Lasix x 2  2D echo: LVEF 65%, unable to assess diastolic function.  Pulmonology consulting; we appreciate their recommendations  Resolved  Morbid obesity with BMI of 50.0-59.9, adult ([facility])  BMI 54  Weight may be contributing to respiratory difficulty  Patient is on Wegovy, defer decision to continue to PCP continue as may be the cause of patient's nausea and vomiting.  Dietary changes, regular physical activity, and discuss medications with PCP  Chronic constipation  Associated with Methadone and Hx of OUD  Abdominal obstruction series showed \"large volume colonic fecal burden\"  Continue Lubiprostone in lieu of home Linzess  Continue scheduled MiraLAX & Senna, PRN Dulcolax  Nausea and vomiting  Improved, concern for Wegovy contributing to patient's symptoms just recently started approximately 1 week ago  Zofran as needed  Bipolar disorder ([facility])  Continue home Rexulti on discharge  Outpatient f/u with Psychiatry  PTSD (post-traumatic stress disorder)  Continue home prazosin    VTE Pharmacologic Prophylaxis: VTE Score: 6 High Risk (Score >/= 5) - Pharmacological DVT Prophylaxis Ordered: heparin. Sequential Compression Devices Ordered.    Mobility:   Basic Mobility Inpatient Raw Score: 23  JH-HLM Goal: 7: Walk 25 feet or more  JH-HLM Achieved: 7: " Walk 25 feet or more  -HL Goal achieved. Continue to encourage appropriate mobility.    Patient Centered Rounds: I performed bedside rounds with nursing staff today.   Discussions with Specialists or Other Care Team Provider: attending physician and respiratory therapist    Education and Discussions with Family / Patient:  to call patient's mother.     Current Length of Stay: 6 day(s)  Current Patient Status: Inpatient   Certification Statement: The patient will continue to require additional inpatient hospital stay due to acute respiratory failure 2/2 aspiration pneumonia  Discharge Plan:  when stable    Code Status: Level 1 - Full Code    Subjective   Patient was seen and examined at bedside.  He has no acute complaints today.  He has no leg swelling.  Discussed weight loss.    Objective :  Temp:  [98.2 °F (36.8 °C)-99 °F (37.2 °C)] 98.2 °F (36.8 °C)  HR:  [] 104  BP: (116-149)/(82-97) 136/94  Resp:  [20] 20  SpO2:  [90 %-94 %] 93 %  O2 Device: Nasal cannula  Nasal Cannula O2 Flow Rate (L/min):  [2 L/min-5 L/min] 2 L/min    Body mass index is 53.49 kg/m².     Input and Output Summary (last 24 hours):     Intake/Output Summary (Last 24 hours) at 12/10/2024 0830  Last data filed at 12/10/2024 0702  Gross per 24 hour   Intake 480 ml   Output 1850 ml   Net -1370 ml       Physical Exam  Vitals reviewed.   Constitutional:       General: He is not in acute distress.     Appearance: Normal appearance. He is obese. He is not ill-appearing, toxic-appearing or diaphoretic.   Cardiovascular:      Rate and Rhythm: Normal rate and regular rhythm.      Heart sounds: Normal heart sounds. No murmur heard.  Pulmonary:      Effort: Pulmonary effort is normal. No respiratory distress.      Breath sounds: Normal breath sounds. No wheezing, rhonchi or rales.   Abdominal:      General: Abdomen is flat. There is no distension.      Palpations: Abdomen is soft.      Tenderness: There is no abdominal tenderness. There is no  guarding.   Musculoskeletal:      Right lower leg: No edema.      Left lower leg: No edema.   Neurological:      Mental Status: He is alert.               Lab Results: I have reviewed the following results:   Results from last 7 days   Lab Units 12/10/24  0512   WBC Thousand/uL 11.59*   HEMOGLOBIN g/dL 12.2   HEMATOCRIT % 37.8   PLATELETS Thousands/uL 177   SEGS PCT % 71   LYMPHO PCT % 22   MONO PCT % 6   EOS PCT % 0     Results from last 7 days   Lab Units 12/10/24  0512   SODIUM mmol/L 139   POTASSIUM mmol/L 3.5   CHLORIDE mmol/L 100   CO2 mmol/L 33*   BUN mg/dL 17   CREATININE mg/dL 0.69   ANION GAP mmol/L 6   CALCIUM mg/dL 8.6   ALBUMIN g/dL 3.7   TOTAL BILIRUBIN mg/dL 0.27   ALK PHOS U/L 41   ALT U/L 32   AST U/L 21   GLUCOSE RANDOM mg/dL 105     Results from last 7 days   Lab Units 12/03/24  2248   INR  0.96             Results from last 7 days   Lab Units 12/10/24  0512 12/08/24  0440 12/06/24  0451 12/05/24  0958 12/04/24  0537 12/04/24  0210 12/03/24  2248   LACTIC ACID mmol/L  --   --   --   --   --  1.3 4.1*   PROCALCITONIN ng/ml 0.16 0.34* 0.88* 1.38* 2.65*  --  1.19*       Recent Cultures (last 7 days):   Results from last 7 days   Lab Units 12/05/24  2117 12/05/24  0600 12/03/24  2249 12/03/24  2248   BLOOD CULTURE   --   --  No Growth After 5 Days. No Growth After 5 Days.   SPUTUM CULTURE  4+ Growth of Methicillin Resistant Staphylococcus aureus*  4+ Growth of  --   --   --    GRAM STAIN RESULT  1+ Epithelial cells per low power field*  No polys seen*  2+ Gram positive cocci in pairs*  1+ Gram negative rods*  --   --   --    LEGIONELLA URINARY ANTIGEN   --  Negative  --   --        Imaging Results Review: I reviewed radiology reports from this admission including: Echocardiogram.  Other Study Results Review: No additional pertinent studies reviewed.    Last 24 Hours Medication List:     Current Facility-Administered Medications:     acetaminophen (TYLENOL) tablet 650 mg, Q6H PRN    albuterol  inhalation solution 2.5 mg, Q4H PRN    bisacodyl (DULCOLAX) EC tablet 5 mg, Daily PRN    budesonide (PULMICORT) inhalation solution 0.5 mg, Q12H    cefTRIAXone (ROCEPHIN) IVPB (premix in dextrose) 2,000 mg 50 mL, Q24H, Last Rate: 2,000 mg (12/10/24 0028)    clonazePAM (KlonoPIN) tablet 1 mg, TID    fluticasone (FLONASE) 50 mcg/act nasal spray 1 spray, Daily    guaiFENesin (MUCINEX) 12 hr tablet 600 mg, BID    heparin (porcine) subcutaneous injection 7,500 Units, Q8H MARICEL    ibuprofen (MOTRIN) tablet 400 mg, Q8H PRN    ipratropium (ATROVENT) 0.02 % inhalation solution 0.5 mg, TID    levalbuterol (XOPENEX) inhalation solution 1.25 mg, TID    loratadine (CLARITIN) tablet 10 mg, Daily    lubiprostone (AMITIZA) capsule 24 mcg, BID    methadone (Dolophine) tablet 150 mg, Daily    nicotine (NICODERM CQ) 21 mg/24 hr TD 24 hr patch 21 mg, Daily    ondansetron (ZOFRAN) injection 4 mg, Q6H PRN    pantoprazole (PROTONIX) injection 40 mg, Q12H MARICEL    perflutren lipid microsphere (DEFINITY) injection, Once in imaging    polyethylene glycol (MIRALAX) packet 17 g, Daily    prazosin (MINIPRESS) capsule 5 mg, Daily    pregabalin (LYRICA) capsule 150 mg, BID    senna (SENOKOT) tablet 8.6 mg, HS    temazepam (RESTORIL) capsule 15 mg, HS    vancomycin (VANCOCIN) 1,500 mg in sodium chloride 0.9 % 250 mL IVPB, Q8H, Last Rate: 1,500 mg (12/10/24 0028)    vilazodone (VIIBRYD) tablet 40 mg, Daily With Breakfast    Administrative Statements   Today, Patient Was Seen By: Waqar Howard DO      **Please Note: This note may have been constructed using a voice recognition system.**

## 2024-12-10 NOTE — ASSESSMENT & PLAN NOTE
"This is a 34-year-old male patient with history of opiate use disorder on methadone, bipolar/anxiety/depression, obesity, CATA, asthma who presented with an unresponsive episode likely due to overdose, found to have respiratory failure.  His respiratory failure appears multifactorial, secondary to aspiration pneumonia, asthma exacerbation, possible fluid overload component in setting of CATA history.  Volume overload resolved  Currently on 2 L nasal cannula  Pulmonology consulting; we greatly appreciate their recommendations.  Continue antibiotic regimen (see \"Aspiration pneumonia\" below)  D/c IV Solu-Medrol  Encourage patient to use incentive spirometer and to get out of bed   Use BiPAP while sleeping (see \"Central sleep apnea\" below)  Wean off oxygen as able to keep oxygen saturations above 90%  Will need ambulatory pulse-ox before discharge  Vaping cessation  "

## 2024-12-10 NOTE — PROGRESS NOTES
Progress Note - Pulmonology   Name: Sacha Foster 34 y.o. male I MRN: [id number]  Unit/Bed#: 409-01 I Date of Admission: 12/3/2024   Date of Service: 12/10/2024 I Hospital Day: 6    Assessment & Plan  Acute respiratory failure with hypoxia and hypercapnia (HCC)  Titrate FiO2 to maintain SpO2 greater than or equal to 88%.  Currently seen on 1 L nasal cannula.  No oxygen requirements at baseline.  Pulmonary toilet:  Increase activity as tolerated, out of bed as tolerated, cough and deep breathing exercises encourage, incentive spirometry q.1 hour  Continue home ASV  Recommend overnight pulse ox on home machine.   Also recommend home O2 eval prior to discharge.   Central sleep apnea  Continue home ASV with EPAP 6-10, PS 4-8 with auto backup rate   Recommend improved compliance   Morbid obesity with BMI of 50.0-59.9, adult (HCC)  Weight loss encouraged  Further treatment per primary team  Moderate persistent asthma with acute exacerbation  No wheezing appreciated today- would recommend Dc'ing systemic steroids.   While inpatient continue Pulmicort every 12 hours until next/Atrovent nebs 3 times daily  At discharge resume Dulera 200/5 mcg 2 puffs twice daily and albuterol HFA/nebs every 6 hours as needed  Aspiration pneumonia (HCC)  Sputum culture positive for 4+ growth MRSA and mixed respiratory keely.  Given persistent oxygen requirement would escalate to IV vancomycin.    Volume overload  Still with some pitting edema on exam and noted elevated BNP.  Defer diuretic management to primary team but would advise net negative fluid balance.    24 Hour Events : none reported  Subjective : Feels that his respiratory status has returned to baseline.  Denies worsening shortness of breath.  No chest pain.  No fevers or chills.    Objective :  Temp:  [98.2 °F (36.8 °C)-99 °F (37.2 °C)] 98.2 °F (36.8 °C)  HR:  [] 104  BP: (136-149)/(94-97) 136/94  Resp:  [20] 20  SpO2:  [90 %-94 %] 93 %  O2 Device: Nasal cannula  Nasal  Cannula O2 Flow Rate (L/min):  [2 L/min-3 L/min] 2 L/min    Physical Exam  Vitals reviewed.   Constitutional:       Appearance: Normal appearance. He is well-developed. He is obese.   HENT:      Head: Normocephalic and atraumatic.      Nose: Nose normal.      Mouth/Throat:      Mouth: Mucous membranes are moist.   Eyes:      Extraocular Movements: Extraocular movements intact.   Cardiovascular:      Rate and Rhythm: Normal rate and regular rhythm.      Heart sounds: Normal heart sounds.   Pulmonary:      Effort: Pulmonary effort is normal. No respiratory distress.      Breath sounds: No wheezing, rhonchi or rales.   Musculoskeletal:      Right lower leg: Edema present.      Left lower leg: Edema present.   Skin:     General: Skin is warm and dry.   Neurological:      Mental Status: He is alert. Mental status is at baseline.   Psychiatric:         Mood and Affect: Mood normal.         Behavior: Behavior normal.           Lab Results: I have reviewed the following results:   .     12/10/24  0512   WBC 11.59*   HGB 12.2   HCT 37.8      SODIUM 139   K 3.5      CO2 33*   BUN 17   CREATININE 0.69   GLUC 105   AST 21   ALT 32   ALB 3.7   TBILI 0.27   ALKPHOS 41     ABG: No new results in last 24 hours.

## 2024-12-10 NOTE — ASSESSMENT & PLAN NOTE
"Associated with Methadone and Hx of OUD  Abdominal obstruction series showed \"large volume colonic fecal burden\"  Continue Lubiprostone in lieu of home Linzess  Continue scheduled MiraLAX & Senna, PRN Dulcolax  " normal...

## 2024-12-10 NOTE — ASSESSMENT & PLAN NOTE
Patient is on chronic methadone 150 mg daily.  He is treated at the [facility].

## 2024-12-10 NOTE — ASSESSMENT & PLAN NOTE
Bilateral LE edema  BNP slightly elevated at 105  Received 40 mg IV Lasix x 2  2D echo: LVEF 65%, unable to assess diastolic function.  Pulmonology consulting; we appreciate their recommendations  Resolved

## 2024-12-10 NOTE — ASSESSMENT & PLAN NOTE
BMI 54  Weight may be contributing to respiratory difficulty  Patient is on Wegovy, defer decision to continue to PCP continue as may be the cause of patient's nausea and vomiting.  Dietary changes, regular physical activity, and discuss medications with PCP

## 2024-12-10 NOTE — PLAN OF CARE
Problem: RESPIRATORY - ADULT  Goal: Achieves optimal ventilation and oxygenation  Description: INTERVENTIONS:  - Assess for changes in respiratory status  - Assess for changes in mentation and behavior  - Position to facilitate oxygenation and minimize respiratory effort  - Oxygen administered by appropriate delivery if ordered  - Initiate smoking cessation education as indicated  - Encourage broncho-pulmonary hygiene including cough, deep breathe, Incentive Spirometry  - Assess the need for suctioning and aspirate as needed  - Assess and instruct to report SOB or any respiratory difficulty  - Respiratory Therapy support as indicated  Outcome: Progressing     Problem: METABOLIC, FLUID AND ELECTROLYTES - ADULT  Goal: Electrolytes maintained within normal limits  Description: INTERVENTIONS:  - Monitor labs and assess patient for signs and symptoms of electrolyte imbalances  - Administer electrolyte replacement as ordered  - Monitor response to electrolyte replacements, including repeat lab results as appropriate  - Instruct patient on fluid and nutrition as appropriate  Outcome: Progressing     Problem: SKIN/TISSUE INTEGRITY - ADULT  Goal: Incision(s), wounds(s) or drain site(s) healing without S/S of infection  Description: INTERVENTIONS  - Assess and document dressing, incision, wound bed, drain sites and surrounding tissue  - Provide patient and family education  - Perform skin care/dressing changes as ordered  Outcome: Progressing     Problem: PAIN - ADULT  Goal: Verbalizes/displays adequate comfort level or baseline comfort level  Description: Interventions:  - Encourage patient to monitor pain and request assistance  - Assess pain using appropriate pain scale  - Administer analgesics based on type and severity of pain and evaluate response  - Implement non-pharmacological measures as appropriate and evaluate response  - Consider cultural and social influences on pain and pain management  - Notify  physician/advanced practitioner if interventions unsuccessful or patient reports new pain  Outcome: Progressing     Problem: INFECTION - ADULT  Goal: Absence or prevention of progression during hospitalization  Description: INTERVENTIONS:  - Assess and monitor for signs and symptoms of infection  - Monitor lab/diagnostic results  - Monitor all insertion sites, i.e. indwelling lines, tubes, and drains  - Monitor endotracheal if appropriate and nasal secretions for changes in amount and color  - [place] appropriate cooling/warming therapies per order  - Administer medications as ordered  - Instruct and encourage patient and family to use good hand hygiene technique  - Identify and instruct in appropriate isolation precautions for identified infection/condition  Outcome: Progressing     Problem: DISCHARGE PLANNING  Goal: Discharge to home or other facility with appropriate resources  Description: INTERVENTIONS:  - Identify barriers to discharge w/patient and caregiver  - Arrange for needed discharge resources and transportation as appropriate  - Identify discharge learning needs (meds, wound care, etc.)  - Arrange for interpretive services to assist at discharge as needed  - Refer to Case Management Department for coordinating discharge planning if the patient needs post-hospital services based on physician/advanced practitioner order or complex needs related to functional status, cognitive ability, or social support system  Outcome: Progressing

## 2024-12-10 NOTE — UTILIZATION REVIEW
Continued Stay Review    Date: 12/10/24                           Current Patient Class: Inpatient  Current Level of Care: Med Surg     HPI:34 y.o. male initially admitted on 12/4/24 due to Acute respiratory failure with hypoxia and hypercapnia.      Assessment/Plan: Hospitalist: UOP 1.8 L in the past 24 hrs, Afebrile and /94, , RR 20 currently on 1 liter oxygen. Exam: denies complaints, no leg edema. Plan: Continue Pulmicort 0.5 mg BIF +TID duo Nebulizers, Continue IV Vancomycin anticipate 7-10 days of treatment, obtain overnight pulse oximetry, obtain ambulatory desaturation trial prior to dc. Anticipate DC in 24 hrs.     Pulmonology: Exam: No wheezing , rec discontinuing systemic steroids. Plan: Recommend overnight pulse ox on home machine. Also recommend home O2 eval prior to discharge.    Respiratory therapy note: Patient walked the entire floor 550 feet with 2 lpm nasal oxygen, his oxygen saturations maintained 89-94%, his heart rate , patient stated he feels good       Certification Statement: The patient will continue to require additional inpatient hospital stay due to acute respiratory failure 2/2 aspiration pneumonia       Medications:   Scheduled Medications:  budesonide, 0.5 mg, Nebulization, Q12H  cefTRIAXone, 2,000 mg, Intravenous, Q24H  clonazePAM, 1 mg, Oral, TID  fluticasone, 1 spray, Each Nare, Daily  guaiFENesin, 600 mg, Oral, BID  heparin (porcine), 7,500 Units, Subcutaneous, Q8H MARICEL  ipratropium, 0.5 mg, Nebulization, TID  levalbuterol, 1.25 mg, Nebulization, TID  loratadine, 10 mg, Oral, Daily  lubiprostone, 24 mcg, Oral, BID  methadone, 150 mg, Oral, Daily  nicotine, 21 mg, Transdermal, Daily  pantoprazole, 40 mg, Intravenous, Q12H MARICEL  polyethylene glycol, 17 g, Oral, Daily  prazosin, 5 mg, Oral, Daily  predniSONE, 40 mg, Oral, Daily  pregabalin, 150 mg, Oral, BID  senna, 1 tablet, Oral, HS  temazepam, 15 mg, Oral, HS  vancomycin, 1,500 mg, Intravenous, Q8H  vilazodone, 40  mg, Oral, Daily With Breakfast      Continuous IV Infusions: none      PRN Meds:  acetaminophen, 650 mg, Oral, Q6H PRN  albuterol, 2.5 mg, Nebulization, Q4H PRN  bisacodyl, 5 mg, Oral, Daily PRN  ibuprofen, 400 mg, Oral, Q8H PRN  ondansetron, 4 mg, Intravenous, Q6H PRN  perflutren lipid microsphere, 0.6 mL/min, Intravenous, Once in imaging      Discharge Plan: TBD     Vital Signs (last 3 days)       Date/Time Temp Pulse Resp BP MAP (mmHg) SpO2 Calculated FIO2 (%) - Nasal Cannula O2 Flow Rate (L/min) Nasal Cannula O2 Flow Rate (L/min) O2 Device O2 Interface Device Blackwell Coma Scale Score Pain    12/10/24 15:09:47 98.5 °F (36.9 °C) 114 18 116/79 91 91 % -- -- -- -- -- -- --    12/10/24 1334 -- -- -- -- -- 92 % 28 -- 2 L/min Nasal cannula -- -- --    12/10/24 0934 -- -- -- -- -- -- -- -- -- -- -- -- Med Not Given for Pain - for MAR use only    12/10/24 0844 -- -- -- -- -- 91 % -- 1 L/min -- Nasal cannula -- -- --    12/10/24 07:52:38 98.2 °F (36.8 °C) 104 20 136/94 108 93 % -- -- -- -- -- -- --    12/10/24 0703 -- -- -- -- -- 94 % 28 -- 2 L/min Nasal cannula -- -- --    12/10/24 0702 -- -- -- -- -- 92 % -- -- -- -- -- -- --    12/09/24 2310 -- -- -- -- -- 90 % -- -- -- -- -- -- --    12/09/24 2029 -- -- -- -- -- 90 % 32 -- 3 L/min Nasal cannula -- -- --    12/09/24 2023 -- -- -- -- -- -- -- -- -- -- -- 15 No Pain    12/09/24 15:27:20 99 °F (37.2 °C) 99 20 149/97 114 90 % -- -- -- -- -- -- --    12/09/24 1422 -- -- -- -- -- -- 32 -- 3 L/min Nasal cannula -- -- --    12/09/24 1352 -- -- -- -- -- 91 % 28 -- 2 L/min Nasal cannula -- -- --    12/09/24 1100 -- -- -- -- -- 92 % 40 -- 5 L/min Mid flow nasal cannula -- -- --    12/09/24 0935 -- -- -- -- -- -- -- -- -- -- -- -- Med Not Given for Pain - for MAR use only    12/09/24 0900 -- 88 -- 116/82 -- -- -- -- -- -- -- -- No Pain    12/09/24 07:52:29 -- 86 20 111/84 93 86 % -- -- -- -- -- -- --    12/09/24 0705 -- -- -- -- -- 93 % 40 -- 5 L/min Mid flow nasal cannula -- --  --    12/09/24 0354 -- -- -- -- -- 94 % -- -- -- -- Full face mask -- --    12/08/24 2332 -- -- -- -- -- 96 % -- -- -- -- Full face mask -- --    12/08/24 21:22:20 98.2 °F (36.8 °C) 96 -- 138/91 107 93 % 40 -- 5 L/min Mid flow nasal cannula -- -- --    12/08/24 2100 -- -- -- -- -- -- -- -- -- -- -- 15 --    12/08/24 2023 -- -- -- -- -- 93 % 40 -- 5 L/min Mid flow nasal cannula -- -- --    12/08/24 16:33:48 98.2 °F (36.8 °C) 91 -- -- -- 91 % -- -- -- -- -- -- --    12/08/24 15:06:06 -- 104 -- 101/72 82 91 % -- -- -- -- -- -- --    12/08/24 1500 -- -- -- -- -- -- 40 5 L/min 5 L/min Mid flow nasal cannula -- -- --    12/08/24 1458 -- -- -- -- -- 91 % -- -- -- -- -- -- --    12/08/24 0911 -- -- -- -- -- -- 44 6 L/min 6 L/min Mid flow nasal cannula -- -- --    12/08/24 0855 -- -- -- -- -- 96 % -- -- -- -- -- -- --    12/08/24 0839 -- -- -- -- -- -- -- -- -- -- -- -- No Pain    12/08/24 0740 -- -- -- -- -- -- 52 -- 8 L/min Mid flow nasal cannula -- -- No Pain    12/08/24 07:20:21 98.1 °F (36.7 °C) 61 18 131/77 95 94 % -- -- -- -- -- -- --    12/08/24 0410 -- -- -- -- -- 93 % -- -- -- -- Full face mask -- --    12/08/24 0025 -- -- -- -- -- 90 % -- -- -- -- Full face mask -- --    12/07/24 2239 -- -- -- -- -- 89 % -- -- -- BiPAP Full face mask -- --    12/07/24 21:49:35 98.3 °F (36.8 °C) 100 -- 120/86 97 91 % -- -- -- -- -- -- --    12/07/24 21:49:26 98.3 °F (36.8 °C) -- 18 120/86 97 -- -- -- -- -- -- -- --    12/07/24 2100 -- -- -- -- -- -- -- -- -- Mid flow nasal cannula -- 15 No Pain    12/07/24 2032 -- -- -- -- -- 92 % -- -- -- -- -- -- --    12/07/24 2030 -- -- -- -- -- 93 % -- -- -- -- -- -- --    12/07/24 2002 -- 86 16 -- -- 92 % 52 8 L/min 8 L/min Mid flow nasal cannula -- -- --    12/07/24 1533 -- -- -- -- -- -- 52 8 L/min 8 L/min Mid flow nasal cannula -- -- --    12/07/24 14:41:13 -- 101 -- 122/80 94 90 % -- -- -- -- -- -- --    12/07/24 1427 -- -- -- -- -- 88 % -- -- -- -- -- -- --    12/07/24 0932 -- -- -- --  -- 91 % -- -- -- -- -- -- --    12/07/24 0828 -- -- -- -- -- -- -- -- -- -- -- -- No Pain    12/07/24 0827 -- -- -- -- -- -- 40 -- 5 L/min Nasal cannula -- -- No Pain    12/07/24 06:53:55 98.4 °F (36.9 °C) 78 18 123/77 92 90 % -- -- -- -- -- -- --    12/07/24 0502 -- -- -- -- -- 92 % -- -- -- -- Full face mask -- --    12/07/24 0201 -- -- -- -- -- 91 % -- -- -- -- Full face mask -- --          Weight (last 2 days)       Date/Time Weight    12/10/24 0600 169 (372.8)    12/09/24 0900 169 (372.58)    12/09/24 0553 169 (372.58)    12/08/24 0536 170 (375.44)            Pertinent Labs/Diagnostic Results:   Radiology:  XR abdomen obstruction series   Final Interpretation by Armand Sheikh MD (12/05 1631)      Large volume colonic fecal burden.         Workstation performed: [id number]         CT chest wo contrast   Final Interpretation by Bro Campos MD (12/04 0036)      1.  Multifocal bronchiolitis most prominently involving the dependent right lower lobe, favoring sequelae of aspiration. See body of the report for full details.   2.  Hepatic steatosis.               Workstation performed: [id number]         XR chest portable   ED Interpretation by Waqar Babin MD (12/03 2208)   Abnormal   BL lower lung field opacities, suspect aspiration event      Final Interpretation by Jose Angel Garcia MD (12/04 0940)      Possible mild interstitial edema in the lung bases. No pleural effusions.            Resident: BUBBA GRAY I, the attending radiologist, have reviewed the images and agree with the final report above.      Workstation performed: [id number]           Cardiology:  Echo complete w/ contrast if indicated   Final Result by Russ Nunez MD (12/09 1001)        Left Ventricle: Left ventricular cavity size is normal. Wall thickness    is normal. The left ventricular ejection fraction is 65%. Systolic    function is normal. Wall motion cannot be accurately assessed.     Right Ventricle: Right  ventricular cavity size is dilated. Systolic    function is normal.     Study quality was poor. This was a technically difficult study         ECG 12 lead   Final Result by German Dooley DO (12/04 1436)   Sinus tachycardia   Otherwise normal ECG   When compared with ECG of 08-Nov-2024 07:11,   Vent. rate has increased by  53 bpm   Confirmed by German Dooley ([id number]) on 12/4/2024 2:36:30 PM            Results from last 7 days   Lab Units 12/10/24  0512 12/09/24  0503 12/08/24 0440 12/07/24  0426 12/06/24  0451   WBC Thousand/uL 11.59* 11.58* 11.64* 7.32 7.53   HEMOGLOBIN g/dL 12.2 13.2 12.5 11.9* 12.2   HEMATOCRIT % 37.8 41.0 38.7 38.1 39.8   PLATELETS Thousands/uL 177 199 189 162 165   TOTAL NEUT ABS Thousands/µL 8.24* 10.09* 9.97* 6.48 5.86         Results from last 7 days   Lab Units 12/10/24  0512 12/09/24  0503 12/08/24  0440 12/07/24  0426 12/06/24  0451   SODIUM mmol/L 139 137 141 139 138   POTASSIUM mmol/L 3.5 4.1 4.3 4.5 4.0   CHLORIDE mmol/L 100 96 99 97 95*   CO2 mmol/L 33* 33* 36* 36* 38*   ANION GAP mmol/L 6 8 6 6 5   BUN mg/dL 17 19 14 9 10   CREATININE mg/dL 0.69 0.79 0.74 0.78 0.73   EGFR ml/min/1.73sq m 122 116 119 116 120   CALCIUM mg/dL 8.6 9.2 9.4 9.2 8.8   MAGNESIUM mg/dL  --  2.0  --   --   --      Results from last 7 days   Lab Units 12/10/24  0512 12/06/24 0451 12/05/24  0958 12/03/24  2203   AST U/L 21 51* 67* 81*   ALT U/L 32 53* 57* 76*   ALK PHOS U/L 41 46 46 61   TOTAL PROTEIN g/dL 6.4 7.2 6.9 7.5   ALBUMIN g/dL 3.7 4.1 3.9 4.1   TOTAL BILIRUBIN mg/dL 0.27 0.23 0.23 0.25         Results from last 7 days   Lab Units 12/10/24  0512 12/09/24  0503 12/08/24  0440 12/07/24  0426 12/06/24  0451 12/05/24  0958 12/04/24  0537 12/03/24  2203   GLUCOSE RANDOM mg/dL 105 180* 120 127 100 131 115 237*             Results from last 7 days   Lab Units 12/06/24  1531   PH ART  7.356   PCO2 ART mm Hg 66.4*   PO2 ART mm Hg 56.9*   HCO3 ART mmol/L 36.3*   BASE EXC ART mmol/L 8.4   O2 CONTENT  ART mL/dL 16.9   O2 HGB, ARTERIAL % 88.2*   ABG SOURCE  Radial, Left               Results from last 7 days   Lab Units 12/03/24  2248   PROTIME seconds 13.3   INR  0.96   PTT seconds 26         Results from last 7 days   Lab Units 12/10/24  0512 12/08/24  0440 12/06/24  0451 12/05/24  0958 12/04/24  0537   PROCALCITONIN ng/ml 0.16 0.34* 0.88* 1.38* 2.65*     Results from last 7 days   Lab Units 12/04/24  0210 12/03/24  2248   LACTIC ACID mmol/L 1.3 4.1*             Results from last 7 days   Lab Units 12/07/24  0426   BNP pg/mL 105*                 Results from last 7 days   Lab Units 12/05/24  0600   STREP PNEUMONIAE ANTIGEN, URINE  Negative   LEGIONELLA URINARY ANTIGEN  Negative         Results from last 7 days   Lab Units 12/05/24  0948   AMPH/METH  Negative   BARBITURATE UR  Negative   BENZODIAZEPINE UR  Positive*   COCAINE UR  Negative   METHADONE URINE  Positive*   OPIATE UR  Negative   PCP UR  Negative   THC UR  Negative                     Results from last 7 days   Lab Units 12/05/24  2117 12/03/24  2249 12/03/24  2248   BLOOD CULTURE   --  No Growth After 5 Days. No Growth After 5 Days.   SPUTUM CULTURE  4+ Growth of Methicillin Resistant Staphylococcus aureus*  4+ Growth of  --   --    GRAM STAIN RESULT  1+ Epithelial cells per low power field*  No polys seen*  2+ Gram positive cocci in pairs*  1+ Gram negative rods*  --   --                    Network Utilization Review Department  ATTENTION: Please call with any questions or concerns to [phone] and carefully listen to the prompts so that you are directed to the right person. All voicemails are confidential.   For Discharge needs, contact Care Management DC Support Team at [phone] opt. 2  Send all requests for admission clinical reviews, approved or denied determinations and any other requests to dedicated fax number below belonging to the campus where the patient is receiving treatment. List of dedicated fax numbers for the  Facilities:  FACILITY NAME UR FAX NUMBER   ADMISSION DENIALS (Administrative/Medical Necessity) [phone]   DISCHARGE SUPPORT TEAM (NETWORK) [phone]   PARENT CHILD HEALTH (Maternity/NICU/Pediatrics) [phone]   [facility] [phone]   [facility] [phone]   [facility] [phone]   [facility] [phone]   [facility] [phone]   [facility] [phone]   [facility] [phone]   [facility] [phone]   [facility] [phone]   [facility] [phone]   [facility] [phone]   [facility] [phone]

## 2024-12-10 NOTE — ASSESSMENT & PLAN NOTE
Titrate FiO2 to maintain SpO2 greater than or equal to 88%.  Currently seen on 1 L nasal cannula.  No oxygen requirements at baseline.  Pulmonary toilet:  Increase activity as tolerated, out of bed as tolerated, cough and deep breathing exercises encourage, incentive spirometry q.1 hour  Continue home ASV  Recommend overnight pulse ox on home machine.   Also recommend home O2 eval prior to discharge.

## 2024-12-10 NOTE — ASSESSMENT & PLAN NOTE
"Bipolar depression  Continue home Viibryd   Restart mood-stabilizer on discharge (see \"Bipolar\")  Patient follows with behavioral health outpatient for talk therapy.  "

## 2024-12-10 NOTE — PROGRESS NOTES
Sacha Foster is a 34 y.o. male who is currently ordered Vancomycin IV with management by the Pharmacy Consult service.  Relevant clinical data and objective / subjective history reviewed.  Vancomycin Assessment:  Indication and Goal AUC/Trough: Pneumonia (goal -600, trough >10)  Micro:     Renal Function:  SCr: 0.79 mg/dL  CrCl: 177 mL/min  Renal replacement: Not on dialysis  Days of Therapy: 2  Current Dose: 1500mg IV q8h  Vancomycin Plan:  New Dosing: same  Estimated AUC: 532 mcg*hr/mL  Estimated Trough: 15.8 mcg/mL  Next Level: 12/11/24 with AM labs  Renal Function Monitoring: Daily BMP and UOP  Pharmacy will continue to follow closely for s/sx of nephrotoxicity, infusion reactions and appropriateness of therapy.  BMP and CBC will be ordered per protocol. We will continue to follow the patient’s culture results and clinical progress daily.    Fransisco Alarcon, Pharmacist

## 2024-12-10 NOTE — ASSESSMENT & PLAN NOTE
Sputum culture positive for 4+ growth MRSA and mixed respiratory keely.  Given persistent oxygen requirement would escalate to IV vancomycin.

## 2024-12-10 NOTE — ASSESSMENT & PLAN NOTE
No wheezing appreciated today- would recommend Dc'ing systemic steroids.   While inpatient continue Pulmicort every 12 hours until next/Atrovent nebs 3 times daily  At discharge resume Dulera 200/5 mcg 2 puffs twice daily and albuterol HFA/nebs every 6 hours as needed

## 2024-12-10 NOTE — QUICK NOTE
Called patient's mother and let her know how patient is doing and current treatment plan.  She was able to find his Rexulti at home.  Encouraged her to make sure that he restarts that once he is discharged.

## 2024-12-11 ENCOUNTER — TRANSITIONAL CARE MANAGEMENT (OUTPATIENT)
Dept: INTERNAL MEDICINE CLINIC | Facility: CLINIC | Age: 34
End: 2024-12-11

## 2024-12-11 VITALS
BODY MASS INDEX: 45.1 KG/M2 | RESPIRATION RATE: 17 BRPM | TEMPERATURE: 98.2 F | WEIGHT: 315 LBS | OXYGEN SATURATION: 92 % | HEIGHT: 70 IN | HEART RATE: 85 BPM | SYSTOLIC BLOOD PRESSURE: 130 MMHG | DIASTOLIC BLOOD PRESSURE: 77 MMHG

## 2024-12-11 PROBLEM — F11.20 OPIOID DEPENDENCE (HCC): Status: ACTIVE | Noted: 2024-12-11

## 2024-12-11 LAB
ALBUMIN SERPL BCG-MCNC: 3.6 G/DL (ref 3.5–5)
ALP SERPL-CCNC: 43 U/L (ref 34–104)
ALT SERPL W P-5'-P-CCNC: 41 U/L (ref 7–52)
ANION GAP SERPL CALCULATED.3IONS-SCNC: 6 MMOL/L (ref 4–13)
AST SERPL W P-5'-P-CCNC: 25 U/L (ref 13–39)
BASOPHILS # BLD AUTO: 0.01 THOUSANDS/ÂΜL (ref 0–0.1)
BASOPHILS NFR BLD AUTO: 0 % (ref 0–1)
BILIRUB SERPL-MCNC: 0.26 MG/DL (ref 0.2–1)
BUN SERPL-MCNC: 13 MG/DL (ref 5–25)
CALCIUM SERPL-MCNC: 8.7 MG/DL (ref 8.4–10.2)
CHLORIDE SERPL-SCNC: 100 MMOL/L (ref 96–108)
CO2 SERPL-SCNC: 32 MMOL/L (ref 21–32)
CREAT SERPL-MCNC: 0.64 MG/DL (ref 0.6–1.3)
DME PARACHUTE DELIVERY DATE ACTUAL: NORMAL
DME PARACHUTE DELIVERY DATE EXPECTED: NORMAL
DME PARACHUTE DELIVERY DATE REQUESTED: NORMAL
DME PARACHUTE ITEM DESCRIPTION: NORMAL
DME PARACHUTE ORDER STATUS: NORMAL
DME PARACHUTE SUPPLIER NAME: NORMAL
DME PARACHUTE SUPPLIER PHONE: NORMAL
EOSINOPHIL # BLD AUTO: 0.05 THOUSAND/ÂΜL (ref 0–0.61)
EOSINOPHIL NFR BLD AUTO: 0 % (ref 0–6)
ERYTHROCYTE [DISTWIDTH] IN BLOOD BY AUTOMATED COUNT: 14.6 % (ref 11.6–15.1)
GFR SERPL CREATININE-BSD FRML MDRD: 126 ML/MIN/1.73SQ M
GLUCOSE SERPL-MCNC: 104 MG/DL (ref 65–140)
HCT VFR BLD AUTO: 39.3 % (ref 36.5–49.3)
HGB BLD-MCNC: 12.4 G/DL (ref 12–17)
IMM GRANULOCYTES # BLD AUTO: 0.09 THOUSAND/UL (ref 0–0.2)
IMM GRANULOCYTES NFR BLD AUTO: 1 % (ref 0–2)
LYMPHOCYTES # BLD AUTO: 2.24 THOUSANDS/ÂΜL (ref 0.6–4.47)
LYMPHOCYTES NFR BLD AUTO: 19 % (ref 14–44)
MCH RBC QN AUTO: 26.1 PG (ref 26.8–34.3)
MCHC RBC AUTO-ENTMCNC: 31.6 G/DL (ref 31.4–37.4)
MCV RBC AUTO: 83 FL (ref 82–98)
MONOCYTES # BLD AUTO: 0.72 THOUSAND/ÂΜL (ref 0.17–1.22)
MONOCYTES NFR BLD AUTO: 6 % (ref 4–12)
NEUTROPHILS # BLD AUTO: 8.91 THOUSANDS/ÂΜL (ref 1.85–7.62)
NEUTS SEG NFR BLD AUTO: 74 % (ref 43–75)
NRBC BLD AUTO-RTO: 0 /100 WBCS
PLATELET # BLD AUTO: 162 THOUSANDS/UL (ref 149–390)
PMV BLD AUTO: 9.6 FL (ref 8.9–12.7)
POTASSIUM SERPL-SCNC: 3.7 MMOL/L (ref 3.5–5.3)
PROT SERPL-MCNC: 6.2 G/DL (ref 6.4–8.4)
RBC # BLD AUTO: 4.76 MILLION/UL (ref 3.88–5.62)
SODIUM SERPL-SCNC: 138 MMOL/L (ref 135–147)
VANCOMYCIN SERPL-MCNC: 16.2 UG/ML (ref 10–20)
WBC # BLD AUTO: 12.02 THOUSAND/UL (ref 4.31–10.16)

## 2024-12-11 PROCEDURE — 99232 SBSQ HOSP IP/OBS MODERATE 35: CPT | Performed by: PHYSICIAN ASSISTANT

## 2024-12-11 PROCEDURE — 85025 COMPLETE CBC W/AUTO DIFF WBC: CPT

## 2024-12-11 PROCEDURE — 80202 ASSAY OF VANCOMYCIN: CPT

## 2024-12-11 PROCEDURE — 94760 N-INVAS EAR/PLS OXIMETRY 1: CPT

## 2024-12-11 PROCEDURE — 94640 AIRWAY INHALATION TREATMENT: CPT

## 2024-12-11 PROCEDURE — 80053 COMPREHEN METABOLIC PANEL: CPT

## 2024-12-11 PROCEDURE — 99239 HOSP IP/OBS DSCHRG MGMT >30: CPT | Performed by: FAMILY MEDICINE

## 2024-12-11 PROCEDURE — 94664 DEMO&/EVAL PT USE INHALER: CPT

## 2024-12-11 RX ORDER — BREXPIPRAZOLE 3 MG/1
3 TABLET ORAL DAILY
Qty: 30 TABLET | Refills: 2 | Status: SHIPPED | OUTPATIENT
Start: 2024-12-11

## 2024-12-11 RX ORDER — DOXYCYCLINE 100 MG/1
100 TABLET ORAL 2 TIMES DAILY
Qty: 10 TABLET | Refills: 0 | Status: SHIPPED | OUTPATIENT
Start: 2024-12-11 | End: 2024-12-16

## 2024-12-11 RX ORDER — PREDNISONE 20 MG/1
40 TABLET ORAL DAILY
Qty: 8 TABLET | Refills: 0 | Status: SHIPPED | OUTPATIENT
Start: 2024-12-11 | End: 2024-12-15

## 2024-12-11 RX ORDER — TESTOSTERONE CYPIONATE 200 MG/ML
200 INJECTION, SOLUTION INTRAMUSCULAR ONCE
Status: COMPLETED | OUTPATIENT
Start: 2024-12-11 | End: 2024-12-17

## 2024-12-11 RX ADMIN — VANCOMYCIN HYDROCHLORIDE 1500 MG: 5 INJECTION, POWDER, LYOPHILIZED, FOR SOLUTION INTRAVENOUS at 08:53

## 2024-12-11 RX ADMIN — LEVALBUTEROL HYDROCHLORIDE 1.25 MG: 1.25 SOLUTION RESPIRATORY (INHALATION) at 07:00

## 2024-12-11 RX ADMIN — PANTOPRAZOLE SODIUM 40 MG: 40 INJECTION, POWDER, FOR SOLUTION INTRAVENOUS at 08:52

## 2024-12-11 RX ADMIN — PREGABALIN 150 MG: 75 CAPSULE ORAL at 08:44

## 2024-12-11 RX ADMIN — VANCOMYCIN HYDROCHLORIDE 1500 MG: 5 INJECTION, POWDER, LYOPHILIZED, FOR SOLUTION INTRAVENOUS at 01:23

## 2024-12-11 RX ADMIN — LORATADINE 10 MG: 10 TABLET ORAL at 08:44

## 2024-12-11 RX ADMIN — GUAIFENESIN 600 MG: 600 TABLET, EXTENDED RELEASE ORAL at 08:45

## 2024-12-11 RX ADMIN — BUDESONIDE 0.5 MG: 0.5 INHALANT RESPIRATORY (INHALATION) at 07:00

## 2024-12-11 RX ADMIN — CLONAZEPAM 1 MG: 0.5 TABLET ORAL at 08:44

## 2024-12-11 RX ADMIN — METHADONE HYDROCHLORIDE 150 MG: 10 TABLET ORAL at 08:44

## 2024-12-11 RX ADMIN — IPRATROPIUM BROMIDE 0.5 MG: 0.5 SOLUTION RESPIRATORY (INHALATION) at 07:00

## 2024-12-11 RX ADMIN — PREDNISONE 40 MG: 20 TABLET ORAL at 08:44

## 2024-12-11 RX ADMIN — FLUTICASONE PROPIONATE 1 SPRAY: 50 SPRAY, METERED NASAL at 08:51

## 2024-12-11 RX ADMIN — PRAZOSIN HYDROCHLORIDE 5 MG: 1 CAPSULE ORAL at 08:48

## 2024-12-11 RX ADMIN — LUBIPROSTONE 24 MCG: 24 CAPSULE, GELATIN COATED ORAL at 08:52

## 2024-12-11 RX ADMIN — HEPARIN SODIUM 7500 UNITS: 5000 INJECTION, SOLUTION INTRAVENOUS; SUBCUTANEOUS at 06:28

## 2024-12-11 RX ADMIN — VILAZODONE HYDROCHLORIDE 40 MG: 20 TABLET ORAL at 08:47

## 2024-12-11 NOTE — PROGRESS NOTES
Sacha Foster is a 34 y.o. male who is currently ordered Vancomycin IV with management by the Pharmacy Consult service.  Relevant clinical data and objective / subjective history reviewed.  Vancomycin Assessment:  Indication and Goal AUC/Trough: Pneumonia (goal -600, trough >10)  Micro:     Renal Function:  SCr: 0.64 mg/dL  CrCl: 161 mL/min  Renal replacement: Not on dialysis  Days of Therapy: 3  Current Dose: 1500mg IV q8h  Vancomycin Plan:  New Dosing: same  Estimated AUC: 424 mcg*hr/mL  Estimated Trough: 12 mcg/mL  Next Level: 12/17/24 with AM labs  Renal Function Monitoring: Daily BMP and UOP  Pharmacy will continue to follow closely for s/sx of nephrotoxicity, infusion reactions and appropriateness of therapy.  BMP and CBC will be ordered per protocol. We will continue to follow the patient’s culture results and clinical progress daily.    Fransisco Alarcon, Pharmacist

## 2024-12-11 NOTE — ASSESSMENT & PLAN NOTE
Titrate FiO2 to maintain SpO2 greater than or equal to 88%. Weaned to room air today. No oxygen requirements at baseline.  Pulmonary toilet:  Increase activity as tolerated, out of bed as tolerated, cough and deep breathing exercises encourage, incentive spirometry q.1 hour  Continue home ASV  Overnight pulse done last night on home machine revealed 1:33:34 with SpO2 below 88% indicating he needs supplemental oxygen bled into his home ASV. Will order 2L nocturnally at discharge and plan for repeat overnight testing as an outpatient.   Home O2 eval done today shows that supplemental oxygen is not required during waking hours.

## 2024-12-11 NOTE — RESPIRATORY THERAPY NOTE
12/11/24 0700   Inhalation Therapy Tx   $ Inhalation Therapy Performed Yes   $ Pulse Oximetry Spot Check Charge Completed   SpO2 95 %   Pre-Treatment Pulse 76   Pre-Treatment Respirations 20   Duration 20   Breath Sounds Pre-Treatment Bilateral Diminished;Clear   Delivery Source Air;UDN   Position Sitting   Treatment Tolerance Tolerated well   Resp Comments Patient placed on room air at this time for the home oxygen evaluation test

## 2024-12-11 NOTE — ASSESSMENT & PLAN NOTE
"This is a 34-year-old male patient with history of opiate use disorder on methadone, bipolar/anxiety/depression, obesity, CATA, asthma who presented with an unresponsive episode likely due to overdose, found to have respiratory failure.  His respiratory failure appears multifactorial, secondary to aspiration pneumonia, asthma exacerbation, possible fluid overload component in setting of CATA history.  Volume overload resolved  Currently on 2 L nasal cannula  Pulmonology consulting; we greatly appreciate their recommendations.  Continue antibiotic regimen (see \"Aspiration pneumonia\" below), plan to dc on doxycycline based on sputum culture 12/11/24  D/c IV Solu-Medrol; continue prednisone taper on discharge for 5 days 12/11/24  Encourage patient to use incentive spirometer and to get out of bed   Use BiPAP while sleeping (see \"Central sleep apnea\" below)  Wean off oxygen as able to keep oxygen saturations above 90%  Will need ambulatory pulse-ox before discharge  Vaping cessation  "

## 2024-12-11 NOTE — ASSESSMENT & PLAN NOTE
Bilateral LE edema  BNP slightly elevated at 105  Received 40 mg IV Lasix x 2  2D echo: LVEF 65%, unable to assess diastolic function.

## 2024-12-11 NOTE — CASE MANAGEMENT
Case Management Discharge Planning Note    Patient name Sacha Foster  Location /409-01 MRN [id number]  : 1990 Date 2024       Current Admission Date: 12/3/2024  Current Admission Diagnosis:Acute respiratory failure with hypoxia and hypercapnia ([facility])   Patient Active Problem List    Diagnosis Date Noted Date Diagnosed    Opioid dependence ([facility]) 2024     PTSD (post-traumatic stress disorder) 2024     Volume overload 2024     Nausea and vomiting 2024     Accidental overdose 2024     Acute respiratory failure with hypoxia and hypercapnia ([facility]) 2024     Aspiration pneumonia ([facility]) 2024     Painful orthopaedic hardware ([facility]) 2023     Accessory bone of foot 2023     Seasonal allergies 2023     Nicotine dependence, uncomplicated 2023     Closed displaced fracture of fifth metatarsal bone of right foot with nonunion 2023     Fixation hardware in lower extremity 2023     Moderate persistent asthma with acute exacerbation 2023     Fracture of base of fifth metatarsal bone of right foot at metaphyseal-diaphyseal junction with nonunion 2023     Mild persistent asthma without complication 2022     Long-term exposure involving bird droppings 2022     Lumbar radiculopathy 2022     Tinea cruris 2022     History of colonoscopy 2022     Ventral hernia without obstruction or gangrene 2022     Chronic bilateral low back pain without sciatica 2022     Chronic constipation 2022     Central sleep apnea      Chronic pain of both knees 2022     Eczema 2021     Arthralgia 10/17/2020     Colonic mass 2020     Drug dependence (HCC) 2019     Medication therapy continued 2019     IV drug abuse ([facility]) 2019     CATA (obstructive sleep apnea) 2018     Claustrophobia 03/15/2018     Restless leg syndrome 03/15/2018     Morbid obesity with BMI of  50.0-59.9, adult (HCC) 03/15/2018     Chronic pain syndrome 03/15/2018     Acid reflux 06/29/2017     Essential hypertension 11/17/2016     Generalized anxiety disorder 10/19/2016     Anxiety 07/14/2016     Depressed 07/14/2016     Methadone maintenance therapy patient (HCC) 07/14/2016     Obesity, Class III, BMI 40-49.9 (morbid obesity) (HCC) 07/14/2016     Chronic hepatitis C without hepatic coma (HCC) 02/21/2016     Bipolar disorder (HCC) 04/03/2015     Allergic rhinitis 08/14/2014     Insomnia 08/14/2014     Opioid dependence in remission (formerly [facility]) 08/14/2014       LOS (days): 7  Geometric Mean LOS (GMLOS) (days): 4.9  Days to GMLOS:-2.1     OBJECTIVE:  Risk of Unplanned Readmission Score: 16.31         Current admission status: Inpatient   Preferred Pharmacy:   [id number] - [place] - [address] [address]  Phone: [phone] Fax: [phone]    [address]  [address] [address]  Phone: [phone] Fax: [phone]    Primary Care Provider: Dalia Rodriguez PA-C    Primary Insurance: [employer]  Secondary Insurance:     DISCHARGE DETAILS:  Oxygen order in [place] was approved by On [facility] of [facility] for same day delivery.(Being delivered today to pt's home).    Pt is being dc'd home on this date with OP follow up and nocturnal 02 arranged by [employer] (to be bled into  that pt already has).

## 2024-12-11 NOTE — PROGRESS NOTES
Progress Note - Pulmonology   Name: Sacha Foster 34 y.o. male I MRN: [id number]  Unit/Bed#: 409-01 I Date of Admission: 12/3/2024   Date of Service: 12/11/2024 I Hospital Day: 7    Assessment & Plan  Acute respiratory failure with hypoxia and hypercapnia (HCC)  Titrate FiO2 to maintain SpO2 greater than or equal to 88%. Weaned to room air today. No oxygen requirements at baseline.  Pulmonary toilet:  Increase activity as tolerated, out of bed as tolerated, cough and deep breathing exercises encourage, incentive spirometry q.1 hour  Continue home ASV  Overnight pulse done last night on home machine revealed 1:33:34 with SpO2 below 88% indicating he needs supplemental oxygen bled into his home ASV. Will order 2L nocturnally at discharge and plan for repeat overnight testing as an outpatient.   Home O2 eval done today shows that supplemental oxygen is not required during waking hours.   Central sleep apnea  Continue home ASV with EPAP 6-10, PS 4-8 with auto backup rate   Recommend improved compliance   Morbid obesity with BMI of 50.0-59.9, adult (HCC)  Weight loss encouraged  Further treatment per primary team  Moderate persistent asthma with acute exacerbation  No wheezing appreciated today- would recommend Dc'ing systemic steroids.   While inpatient continue Pulmicort every 12 hours until next/Atrovent nebs 3 times daily  At discharge resume Dulera 200/5 mcg 2 puffs twice daily and albuterol HFA/nebs every 6 hours as needed  Aspiration pneumonia (HCC)  Sputum culture positive for 4+ growth MRSA and mixed respiratory keely.  Given persistent oxygen requirement would escalate to IV vancomycin.  Volume overload  Still with some pitting edema on exam and noted elevated BNP.  Defer diuretic management to primary team but would advise net negative fluid balance.    Patient is stable from a pulmonary standpoint. Will sign off. Call with questions.     24 Hour Events : weaned off oxygen during waking hours  Subjective :  patient denies acute complaints and feels well enough to go home. Denies SOB, CP, wheeze, cough.     Objective :  Temp:  [98.2 °F (36.8 °C)-98.5 °F (36.9 °C)] 98.2 °F (36.8 °C)  HR:  [] 85  BP: (116-134)/(73-86) 130/77  Resp:  [17-18] 17  SpO2:  [88 %-95 %] 94 %  O2 Device: None (Room air)  Nasal Cannula O2 Flow Rate (L/min):  [2 L/min] 2 L/min    Physical Exam  Vitals reviewed.   Constitutional:       Appearance: Normal appearance. He is well-developed.   HENT:      Head: Normocephalic and atraumatic.      Nose: Nose normal.      Mouth/Throat:      Mouth: Mucous membranes are moist.   Eyes:      Extraocular Movements: Extraocular movements intact.   Cardiovascular:      Rate and Rhythm: Normal rate and regular rhythm.      Heart sounds: Normal heart sounds.   Pulmonary:      Effort: Pulmonary effort is normal. No respiratory distress.      Breath sounds: No wheezing, rhonchi or rales.   Musculoskeletal:         General: No swelling.   Skin:     General: Skin is warm and dry.   Neurological:      Mental Status: He is alert. Mental status is at baseline.   Psychiatric:         Mood and Affect: Mood normal.         Behavior: Behavior normal.         Lab Results: I have reviewed the following results:   .     12/11/24  0451   WBC 12.02*   HGB 12.4   HCT 39.3      SODIUM 138   K 3.7      CO2 32   BUN 13   CREATININE 0.64   GLUC 104   AST 25   ALT 41   ALB 3.6   TBILI 0.26   ALKPHOS 43     ABG: No new results in last 24 hours.    Other Study Results Review: Other studies reviewed include: overnight pulse ox last night

## 2024-12-11 NOTE — RESPIRATORY THERAPY NOTE
Home Oxygen Qualifying Test     Patient name: Sacha Foster        : 1990   Date of Test:  2024  Diagnosis:    Home Oxygen Test:    **Medicare Guidelines require item(s) 1-5 on all ambulatory patients or 1 and 2 on non-ambulatory patients.    1. Baseline SPO2 on Room Air at rest 92 %   If <= 88% on Room Air add O2 via NC to obtain SpO2 >=88%. If LPM needed, document LPM NA needed to reach =>88%    SPO2 during exertion on Room Air 89  %  During exertion monitor SPO2. If SPO2 increases >=89%, do not add supplemental oxygen    SPO2 on Oxygen at Rest NA % at NA LPM    SPO2 during exertion on Oxygen NA % at NA LPM    Test performed during exertion activity.      []  Supplemental Home Oxygen is indicated.    [x]  Client does not qualify for home oxygen.    Respiratory Additional Notes- Patient ambulated 550 feet without oxygen, oxygen maintained 89% and above, heart rate , patient did well and felt well. No oxygen needed for rest or ambulation at this time    Shyann Sommer, RT

## 2024-12-11 NOTE — CASE MANAGEMENT
Case Management Discharge Planning Note    Patient name Sacha Foster  Location /409-01 MRN [id number]  : 1990 Date 2024       Current Admission Date: 12/3/2024  Current Admission Diagnosis:Acute respiratory failure with hypoxia and hypercapnia ([facility])   Patient Active Problem List    Diagnosis Date Noted Date Diagnosed    Opioid dependence ([facility]) 2024     PTSD (post-traumatic stress disorder) 2024     Volume overload 2024     Nausea and vomiting 2024     Accidental overdose 2024     Acute respiratory failure with hypoxia and hypercapnia ([facility]) 2024     Aspiration pneumonia ([facility]) 2024     Painful orthopaedic hardware ([facility]) 2023     Accessory bone of foot 2023     Seasonal allergies 2023     Nicotine dependence, uncomplicated 2023     Closed displaced fracture of fifth metatarsal bone of right foot with nonunion 2023     Fixation hardware in lower extremity 2023     Moderate persistent asthma with acute exacerbation 2023     Fracture of base of fifth metatarsal bone of right foot at metaphyseal-diaphyseal junction with nonunion 2023     Mild persistent asthma without complication 2022     Long-term exposure involving bird droppings 2022     Lumbar radiculopathy 2022     Tinea cruris 2022     History of colonoscopy 2022     Ventral hernia without obstruction or gangrene 2022     Chronic bilateral low back pain without sciatica 2022     Chronic constipation 2022     Central sleep apnea      Chronic pain of both knees 2022     Eczema 2021     Arthralgia 10/17/2020     Colonic mass 2020     Drug dependence (HCC) 2019     Medication therapy continued 2019     IV drug abuse ([facility]) 2019     CATA (obstructive sleep apnea) 2018     Claustrophobia 03/15/2018     Restless leg syndrome 03/15/2018     Morbid obesity with BMI of  50.0-59.9, adult (HCC) 03/15/2018     Chronic pain syndrome 03/15/2018     Acid reflux 06/29/2017     Essential hypertension 11/17/2016     Generalized anxiety disorder 10/19/2016     Anxiety 07/14/2016     Depressed 07/14/2016     Methadone maintenance therapy patient (HCC) 07/14/2016     Obesity, Class III, BMI 40-49.9 (morbid obesity) (HCC) 07/14/2016     Chronic hepatitis C without hepatic coma (HCC) 02/21/2016     Bipolar disorder (HCC) 04/03/2015     Allergic rhinitis 08/14/2014     Insomnia 08/14/2014     Opioid dependence in remission (HCC) 08/14/2014       LOS (days): 7  Geometric Mean LOS (GMLOS) (days): 4.9  Days to GMLOS:-2.1     OBJECTIVE:  Risk of Unplanned Readmission Score: 16.31         Current admission status: Inpatient   Preferred Pharmacy:   [id number] - [place] - [address] [address]  Phone: [phone] Fax: [phone]    [address] [address]  Phone: [phone] Fax: [phone]    Primary Care Provider: Dalia Rodriguez PA-C    Primary Insurance: [employer]  Secondary Insurance:     DISCHARGE DETAILS:    Discharge planning discussed with:: patient  Freedom of Choice: Yes  Comments - [facility]          DME Referral Provided  Referral made for DME?: Yes  DME referral completed for the following items:: Home Oxygen concentrator  DME Supplier Name:: [employer]

## 2024-12-11 NOTE — DISCHARGE SUMMARY
"Discharge Summary - Hospitalist   Name: Sacha Foster 34 y.o. male I MRN: [id number]  Unit/Bed#: 409-01 I Date of Admission: 12/3/2024   Date of Service: 12/11/2024 I Hospital Day: 7     Assessment & Plan  Acute respiratory failure with hypoxia and hypercapnia (HCC)  This is a 34-year-old male patient with history of opiate use disorder on methadone, bipolar/anxiety/depression, obesity, CATA, asthma who presented with an unresponsive episode likely due to overdose, found to have respiratory failure.  His respiratory failure appears multifactorial, secondary to aspiration pneumonia, asthma exacerbation, possible fluid overload component in setting of CATA history.  Volume overload resolved  Currently on 2 L nasal cannula  Pulmonology consulting; we greatly appreciate their recommendations.  Continue antibiotic regimen (see \"Aspiration pneumonia\" below), plan to dc on doxycycline based on sputum culture 12/11/24  D/c IV Solu-Medrol; continue prednisone taper on discharge for 5 days 12/11/24  Encourage patient to use incentive spirometer and to get out of bed   Use BiPAP while sleeping (see \"Central sleep apnea\" below)  Wean off oxygen as able to keep oxygen saturations above 90%  Will need ambulatory pulse-ox before discharge  Vaping cessation  Accidental overdose  Unresponsive at home, given Narcan with response.  Most likely opioid overdose as the patient is on chronic methadone.  The patient also noted he utilized Xanax obtained from non-prescriber source  Aspiration pneumonia (HCC)  Secondary to overdose as well as persistent nausea/vomiting, also in the setting of Wegovy therapy  Continue Rocephin 2 g/dy (day 8 of antibiotics)  MRSA+ (12/8); 3 doses of doxycycline given  Pulmonology consulting; we greatly appreciate the recommendations  Switched Doxy to IV vancomycin (day 3/7 MRSA coverage)  Repeat imaging in 4-6 wks  Opioid dependence in remission (HCC)  Patient is on chronic methadone 150 mg daily.  He is " "treated at the [facility].  Central sleep apnea  Likely contributing with the current challenge of hypoxia and weaning off supplemental oxygen  Continue home BiPAP during hours of sleep  Acid reflux  Continue PPI  Generalized anxiety disorder  Continue with home Pregabalin & Klonopin  Depressed  Bipolar depression  Continue home Viibryd   Restart mood-stabilizer on discharge (see \"Bipolar\")  Patient follows with behavioral health outpatient for talk therapy.  Moderate persistent asthma with acute exacerbation  Patient is on Dulera 200-5 at home (high steroid dose)  Pulmicort 0.5 mg BID + TID Duo-Nebs  PRN Albuterol inhaler  D/c IV Solumedrol; continue prednisone taper  Pulmonary consulting; we appreciate their recommendations  Volume overload  Bilateral LE edema  BNP slightly elevated at 105  Received 40 mg IV Lasix x 2  2D echo: LVEF 65%, unable to assess diastolic function.    Morbid obesity with BMI of 50.0-59.9, adult ([facility])  BMI 54  Weight may be contributing to respiratory difficulty  Patient is on Wegovy, defer decision to continue to PCP continue as may be the cause of patient's nausea and vomiting.  Dietary changes, regular physical activity, and discuss medications with PCP  Chronic constipation  Associated with Methadone and Hx of OUD  Abdominal obstruction series showed \"large volume colonic fecal burden\"  Continue Lubiprostone in lieu of home Linzess  Continue scheduled MiraLAX & Senna, PRN Dulcolax  Nausea and vomiting  Improved, concern for Wegovy contributing to patient's symptoms just recently started approximately 1 week ago  Zofran as needed  Bipolar disorder ([facility])  Continue home Rexulti on discharge  Outpatient f/u with Psychiatry  PTSD (post-traumatic stress disorder)  Continue home prazosin     Medical Problems       Resolved Problems  Date Reviewed: 3/19/2024   None       Discharging Physician / Practitioner: Leanne Christiansen MD  PCP: Dalia Rodriguez PA-C  Admission Date: "   Admission Orders (From admission, onward)       Ordered        12/04/24 1036  INPATIENT ADMISSION  Once            12/03/24 2342  Place in Observation  Once                          Discharge Date: 12/11/24    Consultations During Hospital Stay:  Pulmonary     Procedures Performed:       Significant Findings / Test Results:   CT Chest  1.  Multifocal bronchiolitis most prominently involving the dependent right lower lobe, favoring sequelae of aspiration. See body of the report for full details.   2.  Hepatic steatosis.     Incidental Findings:          Test Results Pending at Discharge (will require follow up):        Outpatient Tests Requested:  CT chest in 6-8 weeks    Complications:  none    Reason for Admission: Unresponsive    Hospital Course:   Sacha Foster is a 34 y.o. male patient who originally presented to the hospital on 12/3/2024 due to being found unresponsive, this was felt due to likely opioid overdose in the setting of chronic methadone use.  On presentation patient was requiring 4 L nasal cannula, he did not recall the events that transpired that led to his arrival to the emergency room.  Noncontrast CT chest was obtained that revealed multifocal pneumonia, sputum culture revealed MRSA.  Patient was placed on vancomycin and ceftriaxone.  Completed 3 days of IV ceftriaxone and will be discharged on doxycycline for an additional 5 days.  He will require repeat imaging in 6 to 8 weeks        Please see above list of diagnoses and related plan for additional information.     Condition at Discharge: good    Discharge Day Visit / Exam:       Discussion with Family:     Discharge instructions/Information to patient and family:   See after visit summary for information provided to patient and family.      Provisions for Follow-Up Care:  See after visit summary for information related to follow-up care and any pertinent home health orders.      Mobility at time of Discharge:   Basic Mobility Inpatient  Raw Score: 24  JH-HLM Goal: 8: Walk 250 feet or more  JH-HLM Achieved: 8: Walk 250 feet ot more  HLM Goal achieved. Continue to encourage appropriate mobility.     Disposition:   Home    Planned Readmission: No    Discharge Medications:  See after visit summary for reconciled discharge medications provided to patient and/or family.      Administrative Statements   Discharge Statement:  I have spent a total time of 45 minutes in caring for this patient on the day of the visit/encounter. >30 minutes of time was spent on: Diagnostic results, Instructions for management, Importance of tx compliance, Risk factor reductions, Documenting in the medical record, and Communicating with other healthcare professionals .    **Please Note: This note may have been constructed using a voice recognition system**

## 2024-12-11 NOTE — PLAN OF CARE
Problem: RESPIRATORY - ADULT  Goal: Achieves optimal ventilation and oxygenation  Description: INTERVENTIONS:  - Assess for changes in respiratory status  - Assess for changes in mentation and behavior  - Position to facilitate oxygenation and minimize respiratory effort  - Oxygen administered by appropriate delivery if ordered  - Initiate smoking cessation education as indicated  - Encourage broncho-pulmonary hygiene including cough, deep breathe, Incentive Spirometry  - Assess the need for suctioning and aspirate as needed  - Assess and instruct to report SOB or any respiratory difficulty  - Respiratory Therapy support as indicated  Outcome: Progressing     Problem: METABOLIC, FLUID AND ELECTROLYTES - ADULT  Goal: Electrolytes maintained within normal limits  Description: INTERVENTIONS:  - Monitor labs and assess patient for signs and symptoms of electrolyte imbalances  - Administer electrolyte replacement as ordered  - Monitor response to electrolyte replacements, including repeat lab results as appropriate  - Instruct patient on fluid and nutrition as appropriate  Outcome: Progressing     Problem: PAIN - ADULT  Goal: Verbalizes/displays adequate comfort level or baseline comfort level  Description: Interventions:  - Encourage patient to monitor pain and request assistance  - Assess pain using appropriate pain scale  - Administer analgesics based on type and severity of pain and evaluate response  - Implement non-pharmacological measures as appropriate and evaluate response  - Consider cultural and social influences on pain and pain management  - Notify physician/advanced practitioner if interventions unsuccessful or patient reports new pain  Outcome: Progressing     Problem: INFECTION - ADULT  Goal: Absence or prevention of progression during hospitalization  Description: INTERVENTIONS:  - Assess and monitor for signs and symptoms of infection  - Monitor lab/diagnostic results  - Monitor all insertion sites,  i.e. indwelling lines, tubes, and drains  - Monitor endotracheal if appropriate and nasal secretions for changes in amount and color  - B[place]ton appropriate cooling/warming therapies per order  - Administer medications as ordered  - Instruct and encourage patient and family to use good hand hygiene technique  - Identify and instruct in appropriate isolation precautions for identified infection/condition  Outcome: Progressing     Problem: DISCHARGE PLANNING  Goal: Discharge to home or other facility with appropriate resources  Description: INTERVENTIONS:  - Identify barriers to discharge w/patient and caregiver  - Arrange for needed discharge resources and transportation as appropriate  - Identify discharge learning needs (meds, wound care, etc.)  - Arrange for interpretive services to assist at discharge as needed  - Refer to Case Management Department for coordinating discharge planning if the patient needs post-hospital services based on physician/advanced practitioner order or complex needs related to functional status, cognitive ability, or social support system  Outcome: Progressing     Problem: SKIN/TISSUE INTEGRITY - ADULT  Goal: Incision(s), wounds(s) or drain site(s) healing without S/S of infection  Description: INTERVENTIONS  - Assess and document dressing, incision, wound bed, drain sites and surrounding tissue  - Provide patient and family education  - Perform skin care/dressing changes as ordered  Outcome: Progressing

## 2024-12-11 NOTE — INCIDENTAL FINDINGS
The following findings require follow up:  Radiographic finding   Finding: multifocal pnumonia   Follow up required: Repeat CT chest   Follow up should be done within  6-8 weeks    Please notify the following clinician to assist with the follow up:   Dr. Prescott or your primary care doctor    Incidental finding results were discussed with the Patient by Leanne Christiansen MD on 12/11/24.   They expressed understanding and all questions answered.

## 2024-12-12 ENCOUNTER — PATIENT OUTREACH (OUTPATIENT)
Dept: CASE MANAGEMENT | Facility: OTHER | Age: 34
End: 2024-12-12

## 2024-12-12 ENCOUNTER — TRANSITIONAL CARE MANAGEMENT (OUTPATIENT)
Dept: INTERNAL MEDICINE CLINIC | Facility: CLINIC | Age: 34
End: 2024-12-12

## 2024-12-12 DIAGNOSIS — Z91.89 HIGH RISK FOR READMISSION: Primary | ICD-10-CM

## 2024-12-12 NOTE — PROGRESS NOTES
In basket message received with a referral from the HRR report. Chart reviewed.  Sacha was admitted to [facility] 12/3-12/11 with acute respiratory failure with hypoxia, bipolar disorder, opioid dependence in remission, morbid obesity,depression, methadone maintenance therapy CATA, asthma, aspiration pneumonia, PTSD, and volume overload.  He discharged home to self care.  Patient lives with his mother.  I spoke with Sacha who reports he is doing well.  He denies fever or shortness of breath.We reviewed his medications. I advised him to complete the course of antibiotics.  He has a TCM appointment on 12/19. He has transportation.  I advised him to return to the ED if he becomes short of breath or has high fever. He understands and agrees.  He took my contact information and repeated it to me.  He did consent to another call.

## 2024-12-12 NOTE — UTILIZATION REVIEW
NOTIFICATION OF ADMISSION DISCHARGE   This is a Notification of Discharge from [facility]. Please be advised that this patient has been discharge from our facility. Below you will find the admission and discharge date and time including the patient’s disposition.   UTILIZATION REVIEW CONTACT:  Andrew Preston  Utilization   Network Utilization Review Department  Phone: [phone] x carefully listen to the prompts. All voicemails are confidential.  Email: [email or web address]     ADMISSION INFORMATION  PRESENTATION DATE: 12/3/2024  9:48 PM  OBERVATION ADMISSION DATE: 12/03/2024 2342  INPATIENT ADMISSION DATE: 12/4/24 10:36 AM   DISCHARGE DATE: 12/11/2024 11:37 AM   DISPOSITION:Home/Self Care    Network Utilization Review Department  ATTENTION: Please call with any questions or concerns to [phone] and carefully listen to the prompts so that you are directed to the right person. All voicemails are confidential.   For Discharge needs, contact Care Management DC Support Team at [phone] opt. 2  Send all requests for admission clinical reviews, approved or denied determinations and any other requests to dedicated fax number below belonging to the campus where the patient is receiving treatment. List of dedicated fax numbers for the Facilities:  FACILITY NAME UR FAX NUMBER   ADMISSION DENIALS (Administrative/Medical Necessity) [phone]   DISCHARGE SUPPORT TEAM ([facility]) [phone]   PARENT CHILD HEALTH (Maternity/NICU/Pediatrics) [phone]   [facility] [phone]   [facility] [phone]   [facility] [phone]   [facility] [phone]   [facility] [phone]   [facility] [phone]   [facility] [phone]   [facility]  [phone]   [facility] [phone]   [facility] [phone]   [facility] [phone]   [facility] [phone]

## 2024-12-17 ENCOUNTER — CLINICAL SUPPORT (OUTPATIENT)
Dept: INTERNAL MEDICINE CLINIC | Facility: CLINIC | Age: 34
End: 2024-12-17
Payer: COMMERCIAL

## 2024-12-17 DIAGNOSIS — R79.89 LOW TESTOSTERONE IN MALE: Primary | ICD-10-CM

## 2024-12-17 PROCEDURE — 95117 IMMUNOTHERAPY INJECTIONS: CPT | Performed by: PHYSICIAN ASSISTANT

## 2024-12-17 PROCEDURE — NURSE

## 2024-12-17 RX ADMIN — TESTOSTERONE CYPIONATE 200 MG: 200 INJECTION, SOLUTION INTRAMUSCULAR at 09:49

## 2024-12-20 ENCOUNTER — PATIENT OUTREACH (OUTPATIENT)
Dept: CASE MANAGEMENT | Facility: OTHER | Age: 34
End: 2024-12-20

## 2024-12-20 NOTE — PROGRESS NOTES
I spoke with Sacha who reports he feels well. Denies fever chill or shortness of breath.  He had a virtual visit scheduled with his PCP but was not able to connect. He will see PCP in the office 12/26.   He finished his antibiotic as directed.  He has no needs at this time.  His mother will drive him to his appointment.  He has my contact information and I asked him to hunter me if I can assist him.

## 2024-12-23 DIAGNOSIS — E66.01 OBESITY, CLASS III, BMI 40-49.9 (MORBID OBESITY) (HCC): ICD-10-CM

## 2024-12-23 RX ORDER — SEMAGLUTIDE 0.25 MG/.5ML
INJECTION, SOLUTION SUBCUTANEOUS
Qty: 2 ML | Refills: 0 | Status: SHIPPED | OUTPATIENT
Start: 2024-12-23

## 2024-12-23 NOTE — TELEPHONE ENCOUNTER
Patient called stating he has 1 week left of Wegovy .25    He is asking if Dalia Rodriguez wants to increase it at this time.     He needs a refill.     [facility] [place]     Please notify patient

## 2024-12-26 ENCOUNTER — APPOINTMENT (OUTPATIENT)
Dept: RADIOLOGY | Facility: CLINIC | Age: 34
End: 2024-12-26
Payer: COMMERCIAL

## 2024-12-26 ENCOUNTER — OFFICE VISIT (OUTPATIENT)
Dept: INTERNAL MEDICINE CLINIC | Facility: CLINIC | Age: 34
End: 2024-12-26
Payer: COMMERCIAL

## 2024-12-26 ENCOUNTER — RESULTS FOLLOW-UP (OUTPATIENT)
Dept: INTERNAL MEDICINE CLINIC | Facility: CLINIC | Age: 34
End: 2024-12-26

## 2024-12-26 VITALS
HEART RATE: 98 BPM | BODY MASS INDEX: 45.1 KG/M2 | OXYGEN SATURATION: 96 % | TEMPERATURE: 98.1 F | SYSTOLIC BLOOD PRESSURE: 128 MMHG | HEIGHT: 70 IN | WEIGHT: 315 LBS | DIASTOLIC BLOOD PRESSURE: 80 MMHG

## 2024-12-26 DIAGNOSIS — B35.6 TINEA CRURIS: ICD-10-CM

## 2024-12-26 DIAGNOSIS — R11.0 NAUSEA: ICD-10-CM

## 2024-12-26 DIAGNOSIS — J69.0 ASPIRATION PNEUMONITIS (HCC): ICD-10-CM

## 2024-12-26 DIAGNOSIS — Z23 ENCOUNTER FOR IMMUNIZATION: ICD-10-CM

## 2024-12-26 DIAGNOSIS — G47.33 OSA TREATED WITH BIPAP: ICD-10-CM

## 2024-12-26 DIAGNOSIS — T50.901D ACCIDENTAL OVERDOSE, SUBSEQUENT ENCOUNTER: Primary | ICD-10-CM

## 2024-12-26 PROCEDURE — 90471 IMMUNIZATION ADMIN: CPT

## 2024-12-26 PROCEDURE — 99214 OFFICE O/P EST MOD 30 MIN: CPT | Performed by: INTERNAL MEDICINE

## 2024-12-26 PROCEDURE — 90656 IIV3 VACC NO PRSV 0.5 ML IM: CPT

## 2024-12-26 PROCEDURE — 71046 X-RAY EXAM CHEST 2 VIEWS: CPT

## 2024-12-26 RX ORDER — ONDANSETRON 4 MG/1
4 TABLET, ORALLY DISINTEGRATING ORAL AS NEEDED
Qty: 30 TABLET | Refills: 0 | Status: SHIPPED | OUTPATIENT
Start: 2024-12-26 | End: 2025-01-25

## 2024-12-26 RX ORDER — NYSTATIN 100000 [USP'U]/G
POWDER TOPICAL EVERY MORNING
Qty: 30 G | Refills: 2 | Status: SHIPPED | OUTPATIENT
Start: 2024-12-26

## 2024-12-26 NOTE — ASSESSMENT & PLAN NOTE
Requested a refill of the Mycostatin powder.    Orders:  •  nystatin (MYCOSTATIN) powder; Apply topically every morning

## 2024-12-26 NOTE — PROGRESS NOTES
Transition of Care Visit  Name: Sacha Foster      : 1990      MRN: [id number]  Encounter Provider: Aleksander Mota DO  Encounter Date: 2024   Encounter department: [facility]    Assessment & Plan  Accidental overdose, subsequent encounter         CATA treated with BiPAP         Aspiration pneumonitis (HCC)  Follow up on hospital imaging  Orders:  •  XR chest pa and lateral; Future    Tinea cruris  Requested a refill of the Mycostatin powder.    Orders:  •  nystatin (MYCOSTATIN) powder; Apply topically every morning    Nausea  Refilled PRN ondansettwon  Orders:  •  ondansetron (ZOFRAN-ODT) 4 mg disintegrating tablet; Take 1 tablet (4 mg total) by mouth as needed for nausea    Encounter for immunization  Flu shot accepted  Orders:  •  influenza vaccine preservative-free 0.5 mL IM (Fluzone, Afluria, Fluarix, Flulaval)         History of Present Illness     Transitional Care Management Review:   Sacha Foster is a 34 y.o. male here for TCM follow up.     During the TCM phone call patient stated:  TCM Call     Date and time call was made  2024  9:59 AM    Hospital care reviewed  Records reviewed    Patient was hospitialized at  [facility]    Date of Admission  24    Date of discharge  24    Diagnosis  Acute respiratory failure with hypoxia and hypercapnia (HCC)    Disposition  Home    Were the patients medications reviewed and updated  Yes    Current Symptoms  None      TCM Call     Post hospital issues  None    Should patient be enrolled in anticoag monitoring?  No    Scheduled for follow up?  Yes    Did you obtain your prescribed medications  Yes    Do you need help managing your prescriptions or medications  No    Is transportation to your appointment needed  No    I have advised the patient to call PCP with any new or worsening symptoms  Stacey Saab MA        The patient was noted to be obtunded.  History of Methadone use secondary to opioid use  "disorder.  In addition, the patient is prescribed Clonazepam and Temazepam by [facility].  The patient noted that he had also tried a friends Alprazolam prior to the accidental overdose.  Noted concern regarding use of Methadone with benzos (prescribed and unprescribed).  History of CATA with use of BiPap.  The patient noted marginal compliance.  He was admitted and there was some concern regarding possible aspiration pneumonitis.  Pulmonary medicine was consulted.  The patient was started on Vanco, but this was changed to Doxy on discharge secondary to MRSA noted on sputum.  This was finished at the time of the TCM.  Some concerns regarding CHF secondary to CXR changes and peripheral edema.  However, Echo was good with an LVEF of 65% and the BNP was 105 on admission.  The patient is doing well now.  Trying to loose weight with Wegovy started 12/23/2024.  He notes sporadic exercise.      Review of Systems   Constitutional:  Negative for chills, fatigue and fever.   HENT: Negative.     Respiratory:  Negative for cough, chest tightness and shortness of breath.    Cardiovascular:  Negative for chest pain and palpitations.   Gastrointestinal:  Negative for abdominal pain, constipation, diarrhea, nausea and vomiting.   Genitourinary: Negative.    Musculoskeletal:  Negative for back pain and myalgias.   Skin: Negative.    Neurological: Negative.    Psychiatric/Behavioral:  Negative for dysphoric mood. The patient is not nervous/anxious.      Objective   /80 (BP Location: Left arm, Patient Position: Sitting)   Pulse 98   Temp 98.1 °F (36.7 °C) (Tympanic)   Ht 5' 10\" (1.778 m)   Wt (!) 183 kg (403 lb)   SpO2 96%   BMI 57.82 kg/m²     Physical Exam  Vitals and nursing note reviewed.   Constitutional:       General: He is not in acute distress.     Appearance: He is well-developed.   HENT:      Head: Normocephalic and atraumatic.   Eyes:      Conjunctiva/sclera: Conjunctivae normal.   Cardiovascular:      Rate " and Rhythm: Normal rate and regular rhythm.      Heart sounds: No murmur heard.  Pulmonary:      Effort: Pulmonary effort is normal. No respiratory distress.      Breath sounds: Normal breath sounds.   Abdominal:      Palpations: Abdomen is soft.      Tenderness: There is no abdominal tenderness.   Musculoskeletal:         General: No swelling.      Cervical back: Neck supple.   Skin:     General: Skin is warm and dry.      Capillary Refill: Capillary refill takes less than 2 seconds.   Neurological:      Mental Status: He is alert.   Psychiatric:         Mood and Affect: Mood normal.       Medications have been reviewed by provider in current encounter

## 2024-12-27 ENCOUNTER — PATIENT OUTREACH (OUTPATIENT)
Dept: CASE MANAGEMENT | Facility: OTHER | Age: 34
End: 2024-12-27

## 2024-12-27 NOTE — PROGRESS NOTES
I spoke with Sacha's mother who reports he is doing well. He was seen by the PCP 12/26 and his chest xray was repeated which was negative.  I advised her I was closing his case. She has my contact information. I advised her to call me if they need assistance.

## 2024-12-31 ENCOUNTER — CLINICAL SUPPORT (OUTPATIENT)
Dept: INTERNAL MEDICINE CLINIC | Facility: CLINIC | Age: 34
End: 2024-12-31
Payer: COMMERCIAL

## 2024-12-31 DIAGNOSIS — R79.89 LOW TESTOSTERONE IN MALE: Primary | ICD-10-CM

## 2024-12-31 DIAGNOSIS — J30.2 SEASONAL ALLERGIES: ICD-10-CM

## 2024-12-31 PROCEDURE — NURSE

## 2024-12-31 PROCEDURE — 96372 THER/PROPH/DIAG INJ SC/IM: CPT

## 2024-12-31 RX ORDER — LEVOCETIRIZINE DIHYDROCHLORIDE 5 MG/1
5 TABLET, FILM COATED ORAL EVERY EVENING
Qty: 100 TABLET | Refills: 1 | Status: SHIPPED | OUTPATIENT
Start: 2024-12-31

## 2024-12-31 RX ORDER — TESTOSTERONE CYPIONATE 200 MG/ML
200 INJECTION, SOLUTION INTRAMUSCULAR ONCE
Status: COMPLETED | OUTPATIENT
Start: 2024-12-31 | End: 2024-12-31

## 2024-12-31 RX ADMIN — TESTOSTERONE CYPIONATE 200 MG: 200 INJECTION, SOLUTION INTRAMUSCULAR at 10:34

## 2025-01-03 PROBLEM — J69.0 ASPIRATION PNEUMONIA (HCC): Status: RESOLVED | Noted: 2024-12-03 | Resolved: 2025-01-03

## 2025-01-14 ENCOUNTER — CLINICAL SUPPORT (OUTPATIENT)
Dept: INTERNAL MEDICINE CLINIC | Facility: CLINIC | Age: 35
End: 2025-01-14
Payer: COMMERCIAL

## 2025-01-14 DIAGNOSIS — E29.1 HYPOGONADISM IN MALE: ICD-10-CM

## 2025-01-14 DIAGNOSIS — R79.89 LOW TESTOSTERONE IN MALE: Primary | ICD-10-CM

## 2025-01-14 PROCEDURE — 96372 THER/PROPH/DIAG INJ SC/IM: CPT | Performed by: PHYSICIAN ASSISTANT

## 2025-01-14 RX ORDER — TESTOSTERONE CYPIONATE 200 MG/ML
200 INJECTION, SOLUTION INTRAMUSCULAR ONCE
Status: COMPLETED | OUTPATIENT
Start: 2025-01-14 | End: 2025-01-14

## 2025-01-14 RX ADMIN — TESTOSTERONE CYPIONATE 200 MG: 200 INJECTION, SOLUTION INTRAMUSCULAR at 10:42

## 2025-01-17 ENCOUNTER — OFFICE VISIT (OUTPATIENT)
Dept: DENTISTRY | Facility: CLINIC | Age: 35
End: 2025-01-17

## 2025-01-17 VITALS — HEART RATE: 81 BPM | SYSTOLIC BLOOD PRESSURE: 122 MMHG | DIASTOLIC BLOOD PRESSURE: 82 MMHG

## 2025-01-17 DIAGNOSIS — K02.9 CARIES: Primary | ICD-10-CM

## 2025-01-17 PROCEDURE — D2392 RESIN-BASED COMPOSITE - 2 SURFACES, POSTERIOR: HCPCS

## 2025-01-17 NOTE — PROGRESS NOTES
Reviewing Tx Plan & Composite Restoration #20DO    Sacha Foster 34 y.o. male presents with self to [facility] for composite restoration  PMH reviewed, no changes, ASA II. Significant medical history: bipolar, HepC, hx of drug abuse. Significant allergies: bee venom, red dye. Significant medications: Methadone.    Pt originally presents for preliminary impressions of max RPD. Explained to patient that they have caries that should be controlled prior to RPD fabrication. Pt was originally presented extensive tx plan involving many bridges, crowns, implant #8 which the patient endorsed they aren't able to afford. Today reviewed plans fillings, updated fillings that are necessary and planned for #8, 9 crowns due to extensively restored, previously P/C/RCT with pins. Review #8/9 planned crowns with Q prior to proceeding. Suspicious PARL of #8 that pt wishes to monitor at this time.     Diagnosis:  Caries #20DO, overhang #19DO to be polished    Prognosis:  good    Consent:  Risks of specific procedure: need for RCT if pulp exposure occurs or in future if pulp is inflamed, need to revise tx plan based on extent of decay, damage to adjacent tooth and/or restoration.  Risks of any dental procedure: post procedural pain or sensitivity, local anesthetic side effects, allergic reaction to dental materials and medications, breakage of local anesthetic needle, aspiration of small dental tools, injury to nearby hard and soft tissues and anatomical structures.  Benefits: prevent further breakdown of tooth and its sequelae  Alternatives: no tx.  Tx plan for composite restoration #20DO reviewed. Opportunity to ask questions given, all questions answered to degree of medical and dental certainty.  Patient understands and consent given by self via verbal consent.    Anesthesia:  Topical 20% benzocaine.  1.5 carps 2% Lidocaine 1:100k epi via ZABRINA block and buccal infiltration.    Procedure details:  Isolation: cotton rolls and high volume  suction  Prepped teeth #20DO with high speed handpiece.  Polished distal of #19DO restoration, no signs of caries  Band placement: sectional matrix and wedge.   Etch with 37% H2PO4 15 seconds. Rinsed and suctioned.  Applied  with 20 second scrub, air dried, and light cured.  Restored with packable and flowable (A2 shade) and light cured.  Checked occlusion and adjusted with finishing burs.  Checked contacts with floss  Polished with enhance point.  Verified occlusion and contacts.    Patient dismissed ambulatory and alert.    NV: #31M resin, #11F resin  NV: Prophy.    Attending: Dr. Flores was present in clinic.

## 2025-01-21 ENCOUNTER — OFFICE VISIT (OUTPATIENT)
Dept: DENTISTRY | Facility: CLINIC | Age: 35
End: 2025-01-21

## 2025-01-21 VITALS — HEART RATE: 89 BPM | SYSTOLIC BLOOD PRESSURE: 124 MMHG | DIASTOLIC BLOOD PRESSURE: 85 MMHG

## 2025-01-21 DIAGNOSIS — K03.6 ACCRETIONS ON TEETH: Primary | ICD-10-CM

## 2025-01-21 NOTE — PROGRESS NOTES
Adult Prophy  Chief Complaint   Patient presents with    Routine Oral Cleaning        Method Used:  Prophy Method Used: Hand Scaling  Polished  Flossed      Radiographs Taken in Dexis: (Taken to assess periodontal health)  None  IO Photos taken - of 8/9 to show pt concerns     Intra/Extra Oral Cancer Screening:  Within normal limits    Oral Hygiene:  Poor    Plaque:  Moderate    Calculus:  Light    Bleeding:  Moderate    Stain:  Light    Periodontal Charting: Showed patient sub calc specks and bone height  Spot probing    Periodontal Classification:  Generalized  Mild  Periodontal Disease      Oral Hygiene Instruction:    Went over daily routine and c-shaped flossing.   Discussed Oral systemic link and role of plaque in gum disease.    No orders of the defined types were placed in this encounter.       Next Visit:  6 Month prophy  Dentist visit- periodic

## 2025-01-23 ENCOUNTER — TELEPHONE (OUTPATIENT)
Age: 35
End: 2025-01-23

## 2025-01-23 DIAGNOSIS — E66.01 MORBID OBESITY WITH BMI OF 50.0-59.9, ADULT (HCC): ICD-10-CM

## 2025-01-23 DIAGNOSIS — E66.01 MORBID OBESITY WITH BMI OF 50.0-59.9, ADULT (HCC): Primary | ICD-10-CM

## 2025-01-23 RX ORDER — SEMAGLUTIDE 0.5 MG/.5ML
INJECTION, SOLUTION SUBCUTANEOUS
Qty: 2 ML | Refills: 0 | Status: SHIPPED | OUTPATIENT
Start: 2025-01-23

## 2025-01-23 RX ORDER — SEMAGLUTIDE 0.5 MG/.5ML
INJECTION, SOLUTION SUBCUTANEOUS
Qty: 2 ML | Refills: 0 | Status: SHIPPED | OUTPATIENT
Start: 2025-01-23 | End: 2025-01-23 | Stop reason: SDUPTHER

## 2025-01-23 NOTE — TELEPHONE ENCOUNTER
Patient called back stating [facility] does not have the Semaglutide-Weight Management (Wegovy) 0.5 MG/0.5ML ,   He is asking to please send the medication to [facility] on [address] , [place] [phone]

## 2025-01-24 ENCOUNTER — TELEPHONE (OUTPATIENT)
Age: 35
End: 2025-01-24

## 2025-01-24 NOTE — TELEPHONE ENCOUNTER
Patient needs to schedule a in person follow up to discuss how he is doing on Wegovy since he started and get an updated weight. Patient was last seen for BMI back in September 2024. Since titrating needs to be seen. Once appt has been completed PA team will be able to submit PA for wegovy 0.5 mg.

## 2025-01-30 ENCOUNTER — OFFICE VISIT (OUTPATIENT)
Dept: INTERNAL MEDICINE CLINIC | Facility: CLINIC | Age: 35
End: 2025-01-30
Payer: COMMERCIAL

## 2025-01-30 VITALS
DIASTOLIC BLOOD PRESSURE: 72 MMHG | HEART RATE: 76 BPM | WEIGHT: 315 LBS | SYSTOLIC BLOOD PRESSURE: 128 MMHG | HEIGHT: 70 IN | TEMPERATURE: 97.9 F | BODY MASS INDEX: 45.1 KG/M2 | OXYGEN SATURATION: 95 %

## 2025-01-30 DIAGNOSIS — E29.1 HYPOGONADISM IN MALE: Primary | ICD-10-CM

## 2025-01-30 DIAGNOSIS — R79.89 LOW TESTOSTERONE IN MALE: ICD-10-CM

## 2025-01-30 DIAGNOSIS — Z23 ENCOUNTER FOR IMMUNIZATION: ICD-10-CM

## 2025-01-30 PROCEDURE — 90471 IMMUNIZATION ADMIN: CPT | Performed by: PHYSICIAN ASSISTANT

## 2025-01-30 PROCEDURE — 90677 PCV20 VACCINE IM: CPT | Performed by: PHYSICIAN ASSISTANT

## 2025-01-30 PROCEDURE — 96372 THER/PROPH/DIAG INJ SC/IM: CPT | Performed by: PHYSICIAN ASSISTANT

## 2025-01-30 PROCEDURE — 99213 OFFICE O/P EST LOW 20 MIN: CPT | Performed by: PHYSICIAN ASSISTANT

## 2025-01-30 RX ORDER — TESTOSTERONE CYPIONATE 200 MG/ML
200 INJECTION, SOLUTION INTRAMUSCULAR ONCE
Status: COMPLETED | OUTPATIENT
Start: 2025-01-30 | End: 2025-01-30

## 2025-01-30 RX ADMIN — TESTOSTERONE CYPIONATE 200 MG: 200 INJECTION, SOLUTION INTRAMUSCULAR at 11:39

## 2025-01-30 NOTE — ASSESSMENT & PLAN NOTE
Feeling better since starting testosterone injections. Injection given today and repeat labs ordered.   Orders:  •  Testosterone, free, total; Future   Warm 4 [place]/CTU

## 2025-01-30 NOTE — PROGRESS NOTES
Name: Sacha Foster      : 1990      MRN: [id number]  Encounter Provider: Dalia Rodriguez PA-C  Encounter Date: 2025   Encounter department: [facility]  :  Assessment & Plan  Encounter for immunization    Orders:  •  Pneumococcal Conjugate Vaccine 20-valent (Pcv20)    Low testosterone in male    Orders:  •  Testosterone, free, total; Future  •  testosterone cypionate (DEPO-TESTOSTERONE) 200 mg/mL IM injection 200 mg    Hypogonadism in male  Feeling better since starting testosterone injections. Injection given today and repeat labs ordered.   Orders:  •  Testosterone, free, total; Future          Tobacco Cessation Counseling: Tobacco cessation counseling was not provided. The patient is sincerely urged to quit consumption of tobacco. He is not ready to quit tobacco.       History of Present Illness   Pt presents for routine visit. He is doing well overall. BP is controlled. He is maintaining his sobriety and continues to go to the methadone clinic. He is on glp1 for weight loss and doing well and tolerating without issue. He is up to date with labs. He is in the process of getting an appt with general surgery regarding large incisional hernia. He will be due for repeat testosterone labs, informed to wait at least 2 weeks from this injection so it is accurate.       Review of Systems   Constitutional:  Negative for chills and fever.   HENT:  Negative for congestion, ear pain, hearing loss, postnasal drip, rhinorrhea, sinus pressure, sinus pain, sore throat and trouble swallowing.    Eyes:  Negative for pain and visual disturbance.   Respiratory:  Negative for cough, chest tightness, shortness of breath and wheezing.    Cardiovascular: Negative.  Negative for chest pain, palpitations and leg swelling.   Gastrointestinal:  Negative for abdominal pain, blood in stool, constipation, diarrhea, nausea and vomiting.   Endocrine: Negative for cold intolerance, heat intolerance,  "polydipsia, polyphagia and polyuria.   Genitourinary:  Negative for difficulty urinating, dysuria, flank pain and urgency.   Musculoskeletal:  Negative for arthralgias, back pain, gait problem and myalgias.   Skin:  Negative for rash.   Allergic/Immunologic: Negative.    Neurological:  Negative for dizziness, weakness, light-headedness and headaches.   Hematological: Negative.    Psychiatric/Behavioral:  Negative for behavioral problems, dysphoric mood and sleep disturbance. The patient is not nervous/anxious.        Objective   /72   Pulse 76   Temp 97.9 °F (36.6 °C) (Tympanic)   Ht 5' 10\" (1.778 m)   Wt (!) 179 kg (395 lb)   SpO2 95%   BMI 56.68 kg/m²      Physical Exam  Vitals and nursing note reviewed.   Constitutional:       General: He is not in acute distress.     Appearance: Normal appearance. He is well-developed. He is not diaphoretic.   HENT:      Head: Normocephalic and atraumatic.      Right Ear: External ear normal.      Left Ear: External ear normal.      Nose: Nose normal.      Mouth/Throat:      Pharynx: No oropharyngeal exudate.   Eyes:      General: No scleral icterus.        Right eye: No discharge.         Left eye: No discharge.      Conjunctiva/sclera: Conjunctivae normal.      Pupils: Pupils are equal, round, and reactive to light.   Neck:      Thyroid: No thyromegaly.   Cardiovascular:      Rate and Rhythm: Normal rate and regular rhythm.      Heart sounds: Normal heart sounds. No murmur heard.     No friction rub. No gallop.   Pulmonary:      Effort: Pulmonary effort is normal. No respiratory distress.      Breath sounds: Normal breath sounds. No wheezing or rales.   Abdominal:      General: Bowel sounds are normal. There is no distension.      Palpations: Abdomen is soft.      Tenderness: There is no abdominal tenderness.      Hernia: A hernia is present.   Musculoskeletal:         General: No tenderness or deformity. Normal range of motion.      Cervical back: Normal range of " motion and neck supple.   Skin:     General: Skin is warm and dry.   Neurological:      Mental Status: He is alert and oriented to person, place, and time.      Cranial Nerves: No cranial nerve deficit.   Psychiatric:         Behavior: Behavior normal.         Thought Content: Thought content normal.         Judgment: Judgment normal.

## 2025-02-03 ENCOUNTER — OFFICE VISIT (OUTPATIENT)
Dept: PULMONOLOGY | Facility: CLINIC | Age: 35
End: 2025-02-03
Payer: COMMERCIAL

## 2025-02-03 VITALS
SYSTOLIC BLOOD PRESSURE: 157 MMHG | TEMPERATURE: 97.6 F | DIASTOLIC BLOOD PRESSURE: 86 MMHG | BODY MASS INDEX: 45.1 KG/M2 | WEIGHT: 315 LBS | OXYGEN SATURATION: 94 % | HEART RATE: 96 BPM | HEIGHT: 70 IN

## 2025-02-03 DIAGNOSIS — Z79.891 CENTRAL SLEEP APNEA COMORBID WITH PRESCRIBED OPIOID USE: Primary | ICD-10-CM

## 2025-02-03 DIAGNOSIS — F17.200 NICOTINE DEPENDENCE, UNCOMPLICATED, UNSPECIFIED NICOTINE PRODUCT TYPE: ICD-10-CM

## 2025-02-03 DIAGNOSIS — J45.40 MODERATE PERSISTENT ASTHMA WITHOUT COMPLICATION: ICD-10-CM

## 2025-02-03 DIAGNOSIS — J45.30 MILD PERSISTENT ASTHMA WITHOUT COMPLICATION: ICD-10-CM

## 2025-02-03 DIAGNOSIS — G47.31 CENTRAL SLEEP APNEA COMORBID WITH PRESCRIBED OPIOID USE: Primary | ICD-10-CM

## 2025-02-03 PROCEDURE — 99213 OFFICE O/P EST LOW 20 MIN: CPT | Performed by: PHYSICIAN ASSISTANT

## 2025-02-03 RX ORDER — ALBUTEROL SULFATE 90 UG/1
2 INHALANT RESPIRATORY (INHALATION) EVERY 6 HOURS PRN
Qty: 18 G | Refills: 6 | Status: SHIPPED | OUTPATIENT
Start: 2025-02-03

## 2025-02-03 RX ORDER — MOMETASONE FUROATE AND FORMOTEROL FUMARATE DIHYDRATE 200; 5 UG/1; UG/1
2 AEROSOL RESPIRATORY (INHALATION) 2 TIMES DAILY
Qty: 39 G | Refills: 3 | Status: SHIPPED | OUTPATIENT
Start: 2025-02-03

## 2025-02-04 LAB

## 2025-02-11 NOTE — ASSESSMENT & PLAN NOTE
Compliance reviewed.  AHI 29.8 and it appears patient only uses machine on average 2 hours 28 minutes a day.  He may need repeat sleep study given his weight gain since last testing. For now, continue ASV EPAP 15, PS 4-10.  Continue to bleed 2 L nasal cannula and machine nightly.  I ordered large mask (hopefull to help with leak) and supplies at his request. Recommend he follow up with Dr. Prescott at his next visit.   Orders:    PAP DME Resupply/Reorder

## 2025-02-11 NOTE — PROGRESS NOTES
Name: Sacha Foster      : 1990      MRN: [id number]  Encounter Provider: Morgan Finney PA-C  Encounter Date: 2/3/2025   Encounter department: [facility]  :  Assessment & Plan  Central sleep apnea comorbid with prescribed opioid use  Compliance reviewed.  AHI 29.8 and it appears patient only uses machine on average 2 hours 28 minutes a day.  He may need repeat sleep study given his weight gain since last testing. For now, continue ASV EPAP 15, PS 4-10.  Continue to bleed 2 L nasal cannula and machine nightly.  I ordered large mask (hopefull to help with leak) and supplies at his request. Recommend he follow up with Dr. Prescott at his next visit.   Orders:    PAP DME Resupply/Reorder    Moderate persistent asthma without complication  Continue Dulera 200/5 mcg 2 puffs twice daily.  Continue albuterol HFA 2 puffs every 6 hours as needed.  No need for systemic steroids at this time.    Orders:    mometasone-formoterol (Dulera) 200-5 MCG/ACT inhaler; Inhale 2 puffs 2 (two) times a day Rinse mouth after use.    albuterol (Ventolin HFA) 90 mcg/act inhaler; Inhale 2 puffs every 6 (six) hours as needed for wheezing    Nicotine dependence, uncomplicated, unspecified nicotine product type  Complete smoking cessation encouraged  Nicotine replacement gum for breakthrough cravings ordered.    Orders:    nicotine polacrilex (NICORETTE) 4 mg gum; Chew 1 each (4 mg total) as needed for smoking cessation        History of Present Illness   Sacha Foster is a 34 y.o. male who presents to the office today for hospital follow-up.  Patient was hospitalized in December for moderate persistent asthma with acute exacerbation secondary to aspiration pneumonia.  Also found to have acute hypoxic and hypercapnic respiratory failure.  Patient sputum culture ultimately ended up being positive for MRSA was treated first with IV vancomycin and then switched to doxycycline to complete 7-day course.  He  had follow-up chest x-ray end of December that was clear.  Only, patient states that his respiratory symptoms have resolved.  Denies any worsening shortness of breath, cough or wheeze.  He is compliant on his inhalers and does not require rescue inhaler often.  He also reports he is compliant on his NIV but states that he does not sleep very well. Of note patient has gained 50lbs since his last office visit when settings were last adjusted in 2023.      Review of Systems   All other systems reviewed and are negative.    Past Medical History   Past Medical History:   Diagnosis Date    Allergic     Anemia 06/24/2018    Anxiety     from records    Arthritis     Asthma     Benign essential hypertension 11/17/2016    Bipolar 1 disorder (HCC)     from records    Chronic pain     Chronic pain disorder     back/ knees/ hip    Claustrophobia 03/15/2018    Community acquired pneumonia 06/24/2018    CPAP (continuous positive airway pressure) dependence     Dental abscess     11/9/23- pt was started on amoxicillin- will finish on 11/19/23    Depressed 07/14/2016    Depression     from records    GERD (gastroesophageal reflux disease)     Hepatitis C virus infection 08/14/2014    Heroin abuse (HCC) 07/24/2018    Infectious viral hepatitis     Obesity 10/12/2016    Obstructive sleep apnea     from records    Sleep disorder     Substance abuse ([facility])      Past Surgical History:   Procedure Laterality Date    ACHILLES TENDON SURGERY Right 11/27/2023    Procedure: transfer of peroneus brevis tendon to the cuboid bone;  Surgeon: James R Lachman, MD;  Location:  MAIN OR;  Service: Orthopedics    COLONOSCOPY      EGD      EPIDURAL BLOCK INJECTION Right 10/28/2022    Procedure: BLOCK / INJECTION EPIDURAL STEROID LUMBAR  right L4-5 and L5-S1 TFESI;  Surgeon: Lorena Hernandez MD;  Location: [facility] MAIN [facility];  Service: Pain Management     EPIDURAL BLOCK INJECTION Right 12/20/2022    Procedure: BLOCK / INJECTION EPIDURAL STEROID LUMBAR   right  L4-5 and L5-S1 TFESI;  Surgeon: Lorena Hernandez MD;  Location: [facility];  Service: Pain Management     TN INCISION & DRAINAGE ABSCESS COMPLICATED/MULTIPLE Left 05/02/2019    Procedure: INCISION AND DRAINAGE (I&D) EXTREMITY;  Surgeon: Fco Woodall MD;  Location: [facility];  Service: Orthopedics    TN LAPAROSCOPY COLECTOMY PARTIAL W/ANASTOMOSIS Left 05/21/2020    Procedure: LAPAROSCOPIC LEFT HEMICOLECTOMY TAKEDOWN SPLENIC FLECTURE, COLORECTAL ANASTOMOSIS.;  Surgeon: Abdelrahman Canales MD;  Location:  MAIN OR;  Service: Colorectal    TN NEUROPLASTY &/TRANSPOS MEDIAN NRV CARPAL TUNNE Left 01/09/2023    Procedure: RELEASE CARPAL TUNNEL;  Surgeon: Isauro Ledbetter DO;  Location: [facility];  Service: Orthopedics    TN OPEN TREATMENT METATARSAL FRACTURE EACH Right 02/24/2023    Procedure: OPEN REDUCTION W/ INTERNAL FIXATION (ORIF) FOOT;  Surgeon: Esteban Talamantes DPM;  Location: [facility];  Service: Podiatry    TN REMOVAL IMPLANT DEEP Right 11/27/2023    Procedure: REMOVAL HARDWARE FOOT, excision of painful os vesalanium, transfer of peroneus brevis tendon to the cuboid bone;  Surgeon: James R Lachman, MD;  Location:  MAIN OR;  Service: Orthopedics    WISDOM TOOTH EXTRACTION       Family History   Problem Relation Age of Onset    Diabetes Mother     Restless legs syndrome Mother     Sleep apnea Mother     Colon cancer Father     Alzheimer's disease Maternal Grandmother     Depression Family       reports that he has quit smoking. His smoking use included e-cigarettes and cigarettes. He has been exposed to tobacco smoke. He has never used smokeless tobacco. He reports that he does not currently use drugs after having used the following drugs: Marijuana, Prescription, and Methamphetamines. He reports that he does not drink alcohol.  Current Outpatient Medications on File Prior to Visit   Medication Sig Dispense Refill    clonazePAM (KlonoPIN) 1 mg tablet Take 1 mg by mouth 2 (two) times a day   "    levocetirizine (XYZAL) 5 MG tablet TAKE ONE TABLET BY MOUTH EVERY EVENING 100 tablet 1    Linzess 145 MCG CAPS Take 145 mcg by mouth every other day      METHADONE HCL PO Take 150 mg by mouth daily      nystatin (MYCOSTATIN) powder Apply topically every morning 30 g 2    omeprazole (PriLOSEC) 20 mg delayed release capsule Take 20 mg by mouth 2 (two) times a day       prazosin (MINIPRESS) 5 mg capsule       pregabalin (LYRICA) 150 mg capsule TAKE 1 CAPSULE (150 MG TOTAL) BY MOUTH 2 (TWO) TIMES A DAY 60 capsule 3    Rexulti 3 MG tablet Take 1 tablet (3 mg total) by mouth daily 30 tablet 2    Semaglutide-Weight Management (Wegovy) 0.5 MG/0.5ML Inject 0.5 mg under the skin weekly 2 mL 0    temazepam (RESTORIL) 15 mg capsule Take 15 mg by mouth daily at bedtime      Testosterone Cypionate 200 MG/ML SOLN Inject 200 mg as directed every 14 (fourteen) days 30 mL 3    vilazodone (VIIBRYD) 40 mg tablet 40 mg daily with breakfast      ondansetron (ZOFRAN-ODT) 4 mg disintegrating tablet Take 1 tablet (4 mg total) by mouth as needed for nausea 30 tablet 0     No current facility-administered medications on file prior to visit.     Allergies   Allergen Reactions    Red Dye - Food Allergy Diarrhea, Nausea Only and Abdominal Pain    Bee Venom Angioedema     Pt states he gets swelling at the site             Objective   /86 (BP Location: Left arm, Patient Position: Sitting, Cuff Size: Standard)   Pulse 96   Temp 97.6 °F (36.4 °C) (Temporal)   Ht 5' 10\" (1.778 m)   Wt (!) 180 kg (397 lb)   SpO2 94%   BMI 56.96 kg/m²      Physical Exam  Vitals reviewed.   Constitutional:       Appearance: Normal appearance. He is well-developed.   HENT:      Head: Normocephalic and atraumatic.      Nose: Nose normal.      Mouth/Throat:      Mouth: Mucous membranes are moist.   Eyes:      Extraocular Movements: Extraocular movements intact.   Cardiovascular:      Rate and Rhythm: Normal rate and regular rhythm.      Heart sounds: Normal " heart sounds.   Pulmonary:      Effort: Pulmonary effort is normal. No respiratory distress.      Breath sounds: No wheezing, rhonchi or rales.   Musculoskeletal:         General: No swelling.   Skin:     General: Skin is warm and dry.   Neurological:      Mental Status: He is alert. Mental status is at baseline.   Psychiatric:         Mood and Affect: Mood normal.         Behavior: Behavior normal.       Lab Results: I have reviewed pertinent labs.    Radiology Results Review: I personally reviewed the following image studies in PACS and associated radiology reports: chest xray. My interpretation of the radiology images/reports is: 12/26/24 CXR clear.    Compliance data 1/1/25-1/30/25  Settings: ASVauto EPAP 15, PS 4-10  % days with usage >= 4 hours 17%  % days used 87%  Average usage (days used) 2 hours 28 min  AHI 29.8  Leak 34

## 2025-02-11 NOTE — ASSESSMENT & PLAN NOTE
Complete smoking cessation encouraged  Nicotine replacement gum for breakthrough cravings ordered.    Orders:    nicotine polacrilex (NICORETTE) 4 mg gum; Chew 1 each (4 mg total) as needed for smoking cessation

## 2025-02-13 ENCOUNTER — CLINICAL SUPPORT (OUTPATIENT)
Dept: INTERNAL MEDICINE CLINIC | Facility: CLINIC | Age: 35
End: 2025-02-13
Payer: COMMERCIAL

## 2025-02-13 DIAGNOSIS — E29.1 TESTICULAR HYPOGONADISM: Primary | ICD-10-CM

## 2025-02-13 PROCEDURE — 96372 THER/PROPH/DIAG INJ SC/IM: CPT | Performed by: PHYSICIAN ASSISTANT

## 2025-02-13 RX ORDER — TESTOSTERONE CYPIONATE 200 MG/ML
200 INJECTION, SOLUTION INTRAMUSCULAR ONCE
Status: COMPLETED | OUTPATIENT
Start: 2025-02-13 | End: 2025-02-13

## 2025-02-13 RX ADMIN — TESTOSTERONE CYPIONATE 200 MG: 200 INJECTION, SOLUTION INTRAMUSCULAR at 09:58

## 2025-02-24 ENCOUNTER — APPOINTMENT (OUTPATIENT)
Dept: LAB | Facility: MEDICAL CENTER | Age: 35
End: 2025-02-24
Payer: COMMERCIAL

## 2025-02-24 ENCOUNTER — TELEPHONE (OUTPATIENT)
Age: 35
End: 2025-02-24

## 2025-02-24 DIAGNOSIS — E29.1 HYPOGONADISM IN MALE: ICD-10-CM

## 2025-02-24 DIAGNOSIS — R79.89 LOW TESTOSTERONE IN MALE: ICD-10-CM

## 2025-02-24 PROCEDURE — 84403 ASSAY OF TOTAL TESTOSTERONE: CPT

## 2025-02-24 PROCEDURE — 84402 ASSAY OF FREE TESTOSTERONE: CPT

## 2025-02-24 PROCEDURE — 36415 COLL VENOUS BLD VENIPUNCTURE: CPT

## 2025-02-24 NOTE — TELEPHONE ENCOUNTER
Patient calling about his Semaglutide-Weight Management (Wegovy) 0.5 MG/0.5ML he has 1 dosage left for this week. If you are going to keep it at 0.5mg/o.5ml it will go to [facility] in [place] phone [phone]      He didn't know if you are going to increase the medication. If you are it would go to [facility] pharmacy in [place] phone [phone]    Please let patient know his # [phone]

## 2025-02-25 LAB
TESTOST FREE SERPL-MCNC: 6.9 PG/ML (ref 8.7–25.1)
TESTOST SERPL-MCNC: 583 NG/DL (ref 264–916)

## 2025-02-26 ENCOUNTER — TELEPHONE (OUTPATIENT)
Age: 35
End: 2025-02-26

## 2025-02-26 ENCOUNTER — RESULTS FOLLOW-UP (OUTPATIENT)
Dept: INTERNAL MEDICINE CLINIC | Facility: CLINIC | Age: 35
End: 2025-02-26

## 2025-02-26 DIAGNOSIS — E66.01 OBESITY, CLASS III, BMI 40-49.9 (MORBID OBESITY) (HCC): Primary | ICD-10-CM

## 2025-02-26 RX ORDER — SEMAGLUTIDE 1 MG/.5ML
INJECTION, SOLUTION SUBCUTANEOUS
Qty: 2 ML | Refills: 0 | Status: SHIPPED | OUTPATIENT
Start: 2025-02-26

## 2025-02-26 NOTE — TELEPHONE ENCOUNTER
PA for   Semaglutide-Weight Management (Wegovy) 1 MG/0.5ML [ SUBMITTED to     via    []CMM-KEY:   []Surescripts-Case ID #     [id number]     []Availity-Auth ID # NDC #   []Faxed to plan   []Other website   []Phone call Case ID #     [x]PA sent as URGENT    All office notes, labs and other pertaining documents and studies sent. Clinical questions answered. Awaiting determination from insurance company.     Turnaround time for your insurance to make a decision on your Prior Authorization can take 7-21 business days.

## 2025-02-26 NOTE — TELEPHONE ENCOUNTER
Patient calling about Wegovy    Patient agrees to increase the dose of the Wegovy to 1mg.     Requesting new script Wegovy increased dose to 1mg    [facility] in [place]       Semaglutide-Weight Management (Wegovy) 0.5 MG/0.5ML Inject 0.5 mg under the skin weekly 2 mL 0 ordered     Please advise, Thank you.

## 2025-02-26 NOTE — TELEPHONE ENCOUNTER
Patient called returning call     Relayed message as per Dr. Gold's message below:    Joe Gold, DO to Sacha Foster Regarding result: Testosterone, free, total      2/26/25  6:13 AM  Your testosterone is improving. Continue replacement     This [facility] MyChart message has not been read.    Patient verbalized understanding no questions.

## 2025-02-27 ENCOUNTER — CLINICAL SUPPORT (OUTPATIENT)
Dept: INTERNAL MEDICINE CLINIC | Facility: CLINIC | Age: 35
End: 2025-02-27
Payer: COMMERCIAL

## 2025-02-27 DIAGNOSIS — R79.89 LOW TESTOSTERONE IN MALE: Primary | ICD-10-CM

## 2025-02-27 PROCEDURE — 96372 THER/PROPH/DIAG INJ SC/IM: CPT | Performed by: PHYSICIAN ASSISTANT

## 2025-02-27 RX ORDER — TESTOSTERONE CYPIONATE 200 MG/ML
50 INJECTION, SOLUTION INTRAMUSCULAR
Status: SHIPPED | OUTPATIENT
Start: 2025-02-27

## 2025-02-27 RX ADMIN — TESTOSTERONE CYPIONATE 200 MG: 200 INJECTION, SOLUTION INTRAMUSCULAR at 11:00

## 2025-03-13 ENCOUNTER — CLINICAL SUPPORT (OUTPATIENT)
Dept: INTERNAL MEDICINE CLINIC | Facility: CLINIC | Age: 35
End: 2025-03-13
Payer: COMMERCIAL

## 2025-03-13 DIAGNOSIS — R79.89 LOW TESTOSTERONE IN MALE: Primary | ICD-10-CM

## 2025-03-13 PROCEDURE — NURSE

## 2025-03-13 RX ADMIN — TESTOSTERONE CYPIONATE 200 MG: 200 INJECTION, SOLUTION INTRAMUSCULAR at 11:04

## 2025-03-24 DIAGNOSIS — M54.16 LUMBAR RADICULOPATHY: ICD-10-CM

## 2025-03-24 RX ORDER — PREGABALIN 150 MG/1
150 CAPSULE ORAL 2 TIMES DAILY
Qty: 60 CAPSULE | Refills: 0 | Status: SHIPPED | OUTPATIENT
Start: 2025-03-24

## 2025-03-25 DIAGNOSIS — E66.01 OBESITY, CLASS III, BMI 40-49.9 (MORBID OBESITY) (HCC): ICD-10-CM

## 2025-03-26 RX ORDER — SEMAGLUTIDE 1 MG/.5ML
INJECTION, SOLUTION SUBCUTANEOUS
Qty: 2 ML | Refills: 0 | Status: SHIPPED | OUTPATIENT
Start: 2025-03-26

## 2025-03-27 ENCOUNTER — CLINICAL SUPPORT (OUTPATIENT)
Dept: INTERNAL MEDICINE CLINIC | Facility: CLINIC | Age: 35
End: 2025-03-27

## 2025-03-27 ENCOUNTER — TELEPHONE (OUTPATIENT)
Age: 35
End: 2025-03-27

## 2025-03-27 DIAGNOSIS — R79.89 LOW TESTOSTERONE IN MALE: Primary | ICD-10-CM

## 2025-03-27 RX ADMIN — TESTOSTERONE CYPIONATE 50 MG: 200 INJECTION, SOLUTION INTRAMUSCULAR at 09:18

## 2025-03-27 NOTE — TELEPHONE ENCOUNTER
PA for WEGOVY 1GM SUBMITTED to [employer]    via    [x]CMM-KEY:   [x]PA sent as URGENT    All office notes, labs and other pertaining documents and studies sent. Clinical questions answered. Awaiting determination from insurance company.     Turnaround time for your insurance to make a decision on your Prior Authorization can take 7-21 business days.

## 2025-03-28 ENCOUNTER — HOSPITAL ENCOUNTER (EMERGENCY)
Facility: HOSPITAL | Age: 35
Discharge: HOME | End: 2025-03-28
Attending: EMERGENCY MEDICINE
Payer: COMMERCIAL

## 2025-03-28 VITALS
WEIGHT: 315 LBS | TEMPERATURE: 98.2 F | BODY MASS INDEX: 45.1 KG/M2 | SYSTOLIC BLOOD PRESSURE: 141 MMHG | DIASTOLIC BLOOD PRESSURE: 59 MMHG | RESPIRATION RATE: 16 BRPM | OXYGEN SATURATION: 91 % | HEART RATE: 92 BPM | HEIGHT: 70 IN

## 2025-03-28 DIAGNOSIS — R11.2 NAUSEA AND VOMITING: Primary | ICD-10-CM

## 2025-03-28 LAB
ALBUMIN SERPL BCG-MCNC: 4.2 G/DL (ref 3.5–5)
ALP SERPL-CCNC: 44 U/L (ref 34–104)
ALT SERPL W P-5'-P-CCNC: 17 U/L (ref 7–52)
ANION GAP SERPL CALCULATED.3IONS-SCNC: 6 MMOL/L (ref 4–13)
AST SERPL W P-5'-P-CCNC: 19 U/L (ref 13–39)
BASOPHILS # BLD AUTO: 0.03 THOUSANDS/ÂΜL (ref 0–0.1)
BASOPHILS NFR BLD AUTO: 0 % (ref 0–1)
BILIRUB SERPL-MCNC: 0.48 MG/DL (ref 0.2–1)
BUN SERPL-MCNC: 10 MG/DL (ref 5–25)
CALCIUM SERPL-MCNC: 9.2 MG/DL (ref 8.4–10.2)
CHLORIDE SERPL-SCNC: 101 MMOL/L (ref 96–108)
CO2 SERPL-SCNC: 31 MMOL/L (ref 21–32)
CREAT SERPL-MCNC: 1.07 MG/DL (ref 0.6–1.3)
EOSINOPHIL # BLD AUTO: 0.19 THOUSAND/ÂΜL (ref 0–0.61)
EOSINOPHIL NFR BLD AUTO: 3 % (ref 0–6)
ERYTHROCYTE [DISTWIDTH] IN BLOOD BY AUTOMATED COUNT: 14.6 % (ref 11.6–15.1)
GFR SERPL CREATININE-BSD FRML MDRD: 89 ML/MIN/1.73SQ M
GLUCOSE SERPL-MCNC: 99 MG/DL (ref 65–140)
HCT VFR BLD AUTO: 47.7 % (ref 36.5–49.3)
HGB BLD-MCNC: 15.2 G/DL (ref 12–17)
IMM GRANULOCYTES # BLD AUTO: 0.03 THOUSAND/UL (ref 0–0.2)
IMM GRANULOCYTES NFR BLD AUTO: 0 % (ref 0–2)
LIPASE SERPL-CCNC: 9 U/L (ref 11–82)
LYMPHOCYTES # BLD AUTO: 1.28 THOUSANDS/ÂΜL (ref 0.6–4.47)
LYMPHOCYTES NFR BLD AUTO: 17 % (ref 14–44)
MAGNESIUM SERPL-MCNC: 2 MG/DL (ref 1.9–2.7)
MCH RBC QN AUTO: 26 PG (ref 26.8–34.3)
MCHC RBC AUTO-ENTMCNC: 31.9 G/DL (ref 31.4–37.4)
MCV RBC AUTO: 82 FL (ref 82–98)
MONOCYTES # BLD AUTO: 0.48 THOUSAND/ÂΜL (ref 0.17–1.22)
MONOCYTES NFR BLD AUTO: 6 % (ref 4–12)
NEUTROPHILS # BLD AUTO: 5.65 THOUSANDS/ÂΜL (ref 1.85–7.62)
NEUTS SEG NFR BLD AUTO: 74 % (ref 43–75)
NRBC BLD AUTO-RTO: 0 /100 WBCS
PLATELET # BLD AUTO: 227 THOUSANDS/UL (ref 149–390)
PMV BLD AUTO: 9.1 FL (ref 8.9–12.7)
POTASSIUM SERPL-SCNC: 4.6 MMOL/L (ref 3.5–5.3)
PROT SERPL-MCNC: 7 G/DL (ref 6.4–8.4)
RBC # BLD AUTO: 5.85 MILLION/UL (ref 3.88–5.62)
SODIUM SERPL-SCNC: 138 MMOL/L (ref 135–147)
WBC # BLD AUTO: 7.66 THOUSAND/UL (ref 4.31–10.16)

## 2025-03-28 PROCEDURE — 36415 COLL VENOUS BLD VENIPUNCTURE: CPT | Performed by: EMERGENCY MEDICINE

## 2025-03-28 PROCEDURE — 83690 ASSAY OF LIPASE: CPT | Performed by: EMERGENCY MEDICINE

## 2025-03-28 PROCEDURE — 96374 THER/PROPH/DIAG INJ IV PUSH: CPT

## 2025-03-28 PROCEDURE — 96375 TX/PRO/DX INJ NEW DRUG ADDON: CPT

## 2025-03-28 PROCEDURE — 85025 COMPLETE CBC W/AUTO DIFF WBC: CPT | Performed by: EMERGENCY MEDICINE

## 2025-03-28 PROCEDURE — 99284 EMERGENCY DEPT VISIT MOD MDM: CPT | Performed by: EMERGENCY MEDICINE

## 2025-03-28 PROCEDURE — 80053 COMPREHEN METABOLIC PANEL: CPT | Performed by: EMERGENCY MEDICINE

## 2025-03-28 PROCEDURE — 83735 ASSAY OF MAGNESIUM: CPT | Performed by: EMERGENCY MEDICINE

## 2025-03-28 PROCEDURE — 96361 HYDRATE IV INFUSION ADD-ON: CPT

## 2025-03-28 PROCEDURE — 99283 EMERGENCY DEPT VISIT LOW MDM: CPT

## 2025-03-28 RX ORDER — METOCLOPRAMIDE 10 MG/1
10 TABLET ORAL EVERY 6 HOURS PRN
Qty: 30 TABLET | Refills: 0 | Status: SHIPPED | OUTPATIENT
Start: 2025-03-28

## 2025-03-28 RX ORDER — PANTOPRAZOLE SODIUM 40 MG/1
40 TABLET, DELAYED RELEASE ORAL DAILY
Qty: 30 TABLET | Refills: 0 | Status: SHIPPED | OUTPATIENT
Start: 2025-03-28

## 2025-03-28 RX ORDER — METOCLOPRAMIDE HYDROCHLORIDE 5 MG/ML
5 INJECTION INTRAMUSCULAR; INTRAVENOUS ONCE
Status: COMPLETED | OUTPATIENT
Start: 2025-03-28 | End: 2025-03-28

## 2025-03-28 RX ORDER — PANTOPRAZOLE SODIUM 40 MG/10ML
40 INJECTION, POWDER, LYOPHILIZED, FOR SOLUTION INTRAVENOUS ONCE
Status: COMPLETED | OUTPATIENT
Start: 2025-03-28 | End: 2025-03-28

## 2025-03-28 RX ADMIN — SODIUM CHLORIDE 1000 ML: 0.9 INJECTION, SOLUTION INTRAVENOUS at 09:29

## 2025-03-28 RX ADMIN — PANTOPRAZOLE SODIUM 40 MG: 40 INJECTION, POWDER, FOR SOLUTION INTRAVENOUS at 09:31

## 2025-03-28 RX ADMIN — METOCLOPRAMIDE 5 MG: 5 INJECTION, SOLUTION INTRAMUSCULAR; INTRAVENOUS at 09:31

## 2025-03-28 NOTE — TELEPHONE ENCOUNTER
PA for WEGOVY 1MG APPROVED     Date(s) approved           Patient advised by          [x]MyChart Message  []Phone call   []LMOM  []L/M to call office as no active Communication consent on file  []Unable to leave detailed message as VM not approved on Communication consent       Pharmacy advised by    []Fax  [x]Phone call  []Secure Chat    Spoke with [facility]  Antonette verified pharmacy received paid claim, copay $3, meddication is available for pick-up for patient     Approval letter scanned into [employer] UPON DETERMINATION

## 2025-03-28 NOTE — ED PROVIDER NOTES
"Time reflects when diagnosis was documented in both MDM as applicable and the Disposition within this note       Time User Action Codes Description Comment    3/28/2025 10:59 AM Stephanie Olson Add [R11.2] Nausea and vomiting           ED Disposition       ED Disposition   Discharge    Condition   Stable    Date/Time   Fri Mar 28, 2025 10:59 AM    Comment   Sacha Foster discharge to home/self care.                   Assessment & Plan       Medical Decision Making  34-year-old male presents for evaluation of nausea and vomiting episodes.  Patient believes maybe due to Wegovy injections which he increased his dose of the time of onset, he did not give himself a dose yesterday as he normally would have.  Patient is also on methadone although states no missed doses.  Symptoms may be related to the Wegovy and we will check for electrolyte abnormalities in the setting of volume loss.  Will additionally provide IV fluids for hydration, antiemetic and PPI.  Per chart review he had an EGD in 2021 showing evidence of GERD, ulcer, hiatal hernia, these may be contributing to his symptoms as well.  He does have history of colectomy, doubt bowel obstruction or volvulus at this time given lack of abdominal pain, changes to bowels.  Patient's abdominal exam may be limited due to body habitus however he denies abdominal pain and denies tenderness to palpation; doubt emergent surgical etiology at this time. He appears neurologically intact, doubt central source, additionally symptoms are associated with eating.    Amount and/or Complexity of Data Reviewed  Labs: ordered. Decision-making details documented in ED Course.    Risk  Prescription drug management.        ED Course as of 03/29/25 1239   Fri Mar 28, 2025   0908 EGD from 09/2012:  \"IMPRESSION:  GERD.  Hiatal hernia.  Normal esophagus  Patchy gastritis noted biopsies taken from antrum and body.  Scar of a heel ulcer seen in the antrum.  Second part of duodenum seen with " "some scalloping biopsies taken to rule out celiac disease.  No active bleeding seen.  Patient tolerated procedure well there were no complications.\"   0926 CBC and differential(!)  unremarkable   0938 Comprehensive metabolic panel  unremarkable   0938 Magnesium  normal   0938 LIPASE(!): 9  Normal/negative   1057 Doing well in ED, reglan helped with nasuea and tolerating sips of gatorade. Although having vomiting episodes, no significant electrolyte ab normality or hemoconcentration, likely retaining more food/fluid than realizing. Will d/c home on reglan and protonix (not taking pepcid) with previous EGD findings, may also help with n/v. Last EKG in December with normal qtc, no previous recent abnormalities in connection with methadone.        Medications   sodium chloride 0.9 % bolus 1,000 mL (0 mL Intravenous Stopped 3/28/25 1127)   metoclopramide (REGLAN) injection 5 mg (5 mg Intravenous Given 3/28/25 0931)   pantoprazole (PROTONIX) injection 40 mg (40 mg Intravenous Given 3/28/25 0931)       ED Risk Strat Scores                            SBIRT 22yo+      Flowsheet Row Most Recent Value   Initial Alcohol Screen: US AUDIT-C     1. How often do you have a drink containing alcohol? 0 Filed at: 03/28/2025 0841   2. How many drinks containing alcohol do you have on a typical day you are drinking?  0 Filed at: 03/28/2025 0841   3a. Male UNDER 65: How often do you have five or more drinks on one occasion? 0 Filed at: 03/28/2025 0841   3b. FEMALE Any Age, or MALE 65+: How often do you have 4 or more drinks on one occassion? 0 Filed at: 03/28/2025 0841   Audit-C Score 0 Filed at: 03/28/2025 0841   CHARLINE: How many times in the past year have you...    Used an illegal drug or used a prescription medication for non-medical reasons? Never Filed at: 03/28/2025 0841                            History of Present Illness       Chief Complaint   Patient presents with    Vomiting     Patient reports vomiting X3 weeks.        Past " Medical History:   Diagnosis Date    Allergic     Anemia 06/24/2018    Anxiety     from records    Arthritis     Asthma     Benign essential hypertension 11/17/2016    Bipolar 1 disorder (HCC)     from records    Chronic pain     Chronic pain disorder     back/ knees/ hip    Claustrophobia 03/15/2018    Community acquired pneumonia 06/24/2018    CPAP (continuous positive airway pressure) dependence     Dental abscess     11/9/23- pt was started on amoxicillin- will finish on 11/19/23    Depressed 07/14/2016    Depression     from records    GERD (gastroesophageal reflux disease)     Hepatitis C virus infection 08/14/2014    Heroin abuse (HCC) 07/24/2018    Infectious viral hepatitis     Obesity 10/12/2016    Obstructive sleep apnea     from records    Sleep disorder     Substance abuse (HCC)       Past Surgical History:   Procedure Laterality Date    ACHILLES TENDON SURGERY Right 11/27/2023    Procedure: transfer of peroneus brevis tendon to the cuboid bone;  Surgeon: James R Lachman, MD;  Location:  MAIN OR;  Service: Orthopedics    COLONOSCOPY      EGD      EPIDURAL BLOCK INJECTION Right 10/28/2022    Procedure: BLOCK / INJECTION EPIDURAL STEROID LUMBAR  right L4-5 and L5-S1 TFESI;  Surgeon: Lorena Hernandez MD;  Location: [facility];  Service: Pain Management     EPIDURAL BLOCK INJECTION Right 12/20/2022    Procedure: BLOCK / INJECTION EPIDURAL STEROID LUMBAR  right  L4-5 and L5-S1 TFESI;  Surgeon: Lorena Hernandez MD;  Location: [facility];  Service: Pain Management     [place] INCISION & DRAINAGE ABSCESS COMPLICATED/MULTIPLE Left 05/02/2019    Procedure: INCISION AND DRAINAGE (I&D) EXTREMITY;  Surgeon: Fco Woodall MD;  Location: [facility];  Service: Orthopedics    [place] LAPAROSCOPY COLECTOMY PARTIAL W/ANASTOMOSIS Left 05/21/2020    Procedure: LAPAROSCOPIC LEFT HEMICOLECTOMY TAKEDOWN SPLENIC FLECTURE, COLORECTAL ANASTOMOSIS.;  Surgeon: Abdelrahman Canales MD;  Location:  MAIN OR;  Service: Colorectal     [place] NEUROPLASTY &/TRANSPOS MEDIAN NRV CARPAL TUNNE Left 01/09/2023    Procedure: RELEASE CARPAL TUNNEL;  Surgeon: Isauro Ledbetter DO;  Location: [facility];  Service: Orthopedics    [place] OPEN TREATMENT METATARSAL FRACTURE EACH Right 02/24/2023    Procedure: OPEN REDUCTION W/ INTERNAL FIXATION (ORIF) FOOT;  Surgeon: Esetban Talamantes DPM;  Location: [facility];  Service: Podiatry    [place] REMOVAL IMPLANT DEEP Right 11/27/2023    Procedure: REMOVAL HARDWARE FOOT, excision of painful os vesalanium, transfer of peroneus brevis tendon to the cuboid bone;  Surgeon: James R Lachman, MD;  Location:  MAIN OR;  Service: Orthopedics    WISDOM TOOTH EXTRACTION        Family History   Problem Relation Age of Onset    Diabetes Mother     Restless legs syndrome Mother     Sleep apnea Mother     Colon cancer Father     Alzheimer's disease Maternal Grandmother     Depression Family       Social History     Tobacco Use    Smoking status: Former     Current packs/day: 0.00     Types: E-Cigarettes, Cigarettes     Passive exposure: Past    Smokeless tobacco: Never    Tobacco comments:     Vapes / 2019 quit cigs   Vaping Use    Vaping status: Some Days    Substances: Nicotine, Flavoring   Substance Use Topics    Alcohol use: Never    Drug use: Not Currently     Types: Marijuana, Prescription, Methamphetamines     Comment: on Methadone; occaionally marijuana- last used 2/10/23      E-Cigarette/Vaping    E-Cigarette Use Current Some Day User     Cartridges/Day 1     Comments NICOTINE       E-Cigarette/Vaping Substances    Nicotine Yes     THC No     CBD No     Flavoring Yes     Other No     Unknown No       I have reviewed and agree with the history as documented.     34-year-old male presents for evaluation of nausea vomiting episodes.  He reports episodes have been ongoing over the last 2 to 3 weeks, he believes it may be due to increasing his Wegovy dose as this happened around this time.  He is due for injection on Thursday and  did not give himself an injection yesterday.  Patient states he is unable to hold down any food and has had nausea and vomiting episodes.  He reports a soreness sensation in his upper abdomen after these episodes.  He denies changes to his bowels, denies other abdominal pain.  He denies fevers or other illnesses over this timeframe.  He does have Zofran at home which she says has not been helping, he did get some minor relief taking Pepto-Bismol.  Previously had a colectomy due to a mass in 2020.         Review of Systems   Constitutional:  Negative for activity change, appetite change and fever.   Respiratory:  Negative for cough.    Cardiovascular:  Negative for chest pain.   Gastrointestinal:  Positive for abdominal pain, nausea and vomiting. Negative for constipation and diarrhea.   Genitourinary:  Negative for dysuria.   All other systems reviewed and are negative.          Objective       ED Triage Vitals [03/28/25 0841]   Temperature Pulse Blood Pressure Respirations SpO2 Patient Position - Orthostatic VS   98.2 °F (36.8 °C) 92 143/84 18 90 % Sitting      Temp src Heart Rate Source BP Location FiO2 (%) Pain Score    -- Monitor Left arm -- No Pain      Vitals      Date and Time Temp Pulse SpO2 Resp BP Pain Score FACES Pain Rating User   03/28/25 1100 -- 92 91 % 16 141/59 -- -- CD   03/28/25 0945 -- 88 90 % 16 135/76 -- --    03/28/25 0841 98.2 °F (36.8 °C) 92 90 % 18 143/84 No Pain -- PP            Physical Exam  Vitals reviewed.   Constitutional:       General: He is not in acute distress.     Appearance: Normal appearance. He is not toxic-appearing.   HENT:      Head: Normocephalic and atraumatic.      Right Ear: External ear normal.      Left Ear: External ear normal.      Nose: Nose normal.      Mouth/Throat:      Mouth: Mucous membranes are dry.   Eyes:      General: No scleral icterus.        Right eye: No discharge.         Left eye: No discharge.   Cardiovascular:      Rate and Rhythm: Normal rate  and regular rhythm.   Pulmonary:      Effort: Pulmonary effort is normal. No respiratory distress.   Abdominal:      General: There is no distension.      Palpations: Abdomen is soft.      Tenderness: There is no abdominal tenderness. There is no guarding or rebound.      Comments: Surgical scar in LLQ   Musculoskeletal:         General: No deformity or signs of injury.   Skin:     General: Skin is warm.      Coloration: Skin is not jaundiced or pale.   Neurological:      General: No focal deficit present.      Mental Status: He is alert. Mental status is at baseline.         Results Reviewed       Procedure Component Value Units Date/Time    Comprehensive metabolic panel [[id number]] Collected: 03/28/25 0913    Lab Status: Final result Specimen: Blood from Arm, Left Updated: 03/28/25 0937     Sodium 138 mmol/L      Potassium 4.6 mmol/L      Chloride 101 mmol/L      CO2 31 mmol/L      ANION GAP 6 mmol/L      BUN 10 mg/dL      Creatinine 1.07 mg/dL      Glucose 99 mg/dL      Calcium 9.2 mg/dL      AST 19 U/L      ALT 17 U/L      Alkaline Phosphatase 44 U/L      Total Protein 7.0 g/dL      Albumin 4.2 g/dL      Total Bilirubin 0.48 mg/dL      eGFR 89 ml/min/1.73sq m     Narrative:      National Kidney Disease Foundation guidelines for Chronic Kidney Disease (CKD):     Stage 1 with normal or high GFR (GFR > 90 mL/min/1.73 square meters)    Stage 2 Mild CKD (GFR = 60-89 mL/min/1.73 square meters)    Stage 3A Moderate CKD (GFR = 45-59 mL/min/1.73 square meters)    Stage 3B Moderate CKD (GFR = 30-44 mL/min/1.73 square meters)    Stage 4 Severe CKD (GFR = 15-29 mL/min/1.73 square meters)    Stage 5 End Stage CKD (GFR <15 mL/min/1.73 square meters)  Note: GFR calculation is accurate only with a steady state creatinine    Lipase [[id number]]  (Abnormal) Collected: 03/28/25 0913    Lab Status: Final result Specimen: Blood from Arm, Left Updated: 03/28/25 0937     Lipase 9 u/L     Magnesium [[id number]]  (Normal) Collected:  03/28/25 0913    Lab Status: Final result Specimen: Blood from Arm, Left Updated: 03/28/25 0937     Magnesium 2.0 mg/dL     CBC and differential [[id number]]  (Abnormal) Collected: 03/28/25 0913    Lab Status: Final result Specimen: Blood from Arm, Left Updated: 03/28/25 0922     WBC 7.66 Thousand/uL      RBC 5.85 Million/uL      Hemoglobin 15.2 g/dL      Hematocrit 47.7 %      MCV 82 fL      MCH 26.0 pg      MCHC 31.9 g/dL      RDW 14.6 %      MPV 9.1 fL      Platelets 227 Thousands/uL      nRBC 0 /100 WBCs      Segmented % 74 %      Immature Grans % 0 %      Lymphocytes % 17 %      Monocytes % 6 %      Eosinophils Relative 3 %      Basophils Relative 0 %      Absolute Neutrophils 5.65 Thousands/µL      Absolute Immature Grans 0.03 Thousand/uL      Absolute Lymphocytes 1.28 Thousands/µL      Absolute Monocytes 0.48 Thousand/µL      Eosinophils Absolute 0.19 Thousand/µL      Basophils Absolute 0.03 Thousands/µL             No orders to display       Procedures    ED Medication and Procedure Management   Prior to Admission Medications   Prescriptions Last Dose Informant Patient Reported? Taking?   Linzess 145 MCG CAPS  Self Yes No   Sig: Take 145 mcg by mouth every other day   METHADONE HCL PO  Self Yes No   Sig: Take 150 mg by mouth daily   Rexulti 3 MG tablet  Self No No   Sig: Take 1 tablet (3 mg total) by mouth daily   Testosterone Cypionate 200 MG/ML SOLN  Self No No   Sig: Inject 200 mg as directed every 14 (fourteen) days   Wegovy 1 MG/0.5ML   No No   Sig: Inject 1 mg under the skin weekly   albuterol (Ventolin HFA) 90 mcg/act inhaler   No No   Sig: Inhale 2 puffs every 6 (six) hours as needed for wheezing   clonazePAM (KlonoPIN) 1 mg tablet  Self Yes No   Sig: Take 1 mg by mouth 2 (two) times a day   levocetirizine (XYZAL) 5 MG tablet  Self No No   Sig: TAKE ONE TABLET BY MOUTH EVERY EVENING   mometasone-formoterol (Dulera) 200-5 MCG/ACT inhaler   No No   Sig: Inhale 2 puffs 2 (two) times a day Rinse mouth  after use.   nicotine polacrilex (NICORETTE) 4 mg gum   No No   Sig: Chew 1 each (4 mg total) as needed for smoking cessation   nystatin (MYCOSTATIN) powder  Self No No   Sig: Apply topically every morning   omeprazole (PriLOSEC) 20 mg delayed release capsule  Self Yes No   Sig: Take 20 mg by mouth 2 (two) times a day    ondansetron (ZOFRAN-ODT) 4 mg disintegrating tablet   No No   Sig: Take 1 tablet (4 mg total) by mouth as needed for nausea   prazosin (MINIPRESS) 5 mg capsule  Self Yes No   pregabalin (LYRICA) 150 mg capsule   No No   Sig: TAKE 1 CAPSULE (150 MG TOTAL) BY MOUTH 2 (TWO) TIMES A DAY   temazepam (RESTORIL) 15 mg capsule  Self Yes No   Sig: Take 15 mg by mouth daily at bedtime   vilazodone (VIIBRYD) 40 mg tablet  Self Yes No   Si mg daily with breakfast      Facility-Administered Medications Last Administration Doses Remaining   testosterone cypionate (DEPO-TESTOSTERONE) 200 mg/mL IM injection 50 mg 3/27/2025  9:18 AM         Discharge Medication List as of 3/28/2025 11:01 AM        START taking these medications    Details   metoclopramide (Reglan) 10 mg tablet Take 1 tablet (10 mg total) by mouth every 6 (six) hours as needed (nausea), Starting Fri 3/28/2025, Normal      pantoprazole (PROTONIX) 40 mg tablet Take 1 tablet (40 mg total) by mouth daily, Starting Fri 3/28/2025, Normal           CONTINUE these medications which have NOT CHANGED    Details   albuterol (Ventolin HFA) 90 mcg/act inhaler Inhale 2 puffs every 6 (six) hours as needed for wheezing, Starting Mon 2/3/2025, Normal      clonazePAM (KlonoPIN) 1 mg tablet Take 1 mg by mouth 2 (two) times a day, Starting 2024, Historical Med      levocetirizine (XYZAL) 5 MG tablet TAKE ONE TABLET BY MOUTH EVERY EVENING, Starting 2024, Normal      Linzess 145 MCG CAPS Take 145 mcg by mouth every other day, Starting 2023, Historical Med      METHADONE HCL PO Take 150 mg by mouth daily, Historical Med       mometasone-formoterol (Dulera) 200-5 MCG/ACT inhaler Inhale 2 puffs 2 (two) times a day Rinse mouth after use., Starting Mon 2/3/2025, Normal      nicotine polacrilex (NICORETTE) 4 mg gum Chew 1 each (4 mg total) as needed for smoking cessation, Starting Mon 2/3/2025, Normal      nystatin (MYCOSTATIN) powder Apply topically every morning, Starting Thu 12/26/2024, Normal      omeprazole (PriLOSEC) 20 mg delayed release capsule Take 20 mg by mouth 2 (two) times a day , Historical Med      ondansetron (ZOFRAN-ODT) 4 mg disintegrating tablet Take 1 tablet (4 mg total) by mouth as needed for nausea, Starting Thu 12/26/2024, Until Thu 1/30/2025 at 2359, Normal      prazosin (MINIPRESS) 5 mg capsule Historical Med      pregabalin (LYRICA) 150 mg capsule TAKE 1 CAPSULE (150 MG TOTAL) BY MOUTH 2 (TWO) TIMES A DAY, Starting Mon 3/24/2025, Normal      Rexulti 3 MG tablet Take 1 tablet (3 mg total) by mouth daily, Starting Wed 12/11/2024, Normal      temazepam (RESTORIL) 15 mg capsule Take 15 mg by mouth daily at bedtime, Starting Mon 2/27/2023, Historical Med      Testosterone Cypionate 200 MG/ML SOLN Inject 200 mg as directed every 14 (fourteen) days, Starting Thu 11/21/2024, Normal      vilazodone (VIIBRYD) 40 mg tablet 40 mg daily with breakfast, Starting Mon 8/14/2023, Historical Med      Wegovy 1 MG/0.5ML Inject 1 mg under the skin weekly, Normal           No discharge procedures on file.  ED SEPSIS DOCUMENTATION   Time reflects when diagnosis was documented in both MDM as applicable and the Disposition within this note       Time User Action Codes Description Comment    3/28/2025 10:59 AM Stephanie Olson Add [R11.2] Nausea and vomiting                  Stephanie Olson,   03/29/25 4038

## 2025-04-03 ENCOUNTER — OFFICE VISIT (OUTPATIENT)
Dept: DENTISTRY | Facility: CLINIC | Age: 35
End: 2025-04-03
Payer: COMMERCIAL

## 2025-04-03 VITALS — SYSTOLIC BLOOD PRESSURE: 125 MMHG | DIASTOLIC BLOOD PRESSURE: 84 MMHG | HEART RATE: 97 BPM

## 2025-04-03 DIAGNOSIS — K02.9 TOOTH DECAY: Primary | ICD-10-CM

## 2025-04-03 PROCEDURE — D0120 PERIODIC ORAL EVALUATION - ESTABLISHED PATIENT: HCPCS | Performed by: STUDENT IN AN ORGANIZED HEALTH CARE EDUCATION/TRAINING PROGRAM

## 2025-04-03 PROCEDURE — D0274 BITEWINGS - 4 RADIOGRAPHIC IMAGES: HCPCS | Performed by: STUDENT IN AN ORGANIZED HEALTH CARE EDUCATION/TRAINING PROGRAM

## 2025-04-03 RX ORDER — SODIUM FLUORIDE 5 MG/ML
PASTE, DENTIFRICE DENTAL
Qty: 51 G | Refills: 10 | Status: SHIPPED | OUTPATIENT
Start: 2025-04-03

## 2025-04-03 RX ORDER — PRAZOSIN HYDROCHLORIDE 1 MG/1
CAPSULE ORAL
COMMUNITY
Start: 2025-03-29

## 2025-04-03 NOTE — PROGRESS NOTES
Periodic Exam    Sacha Foster presents for periodic exam. Reviewed PMH, no changes.     Completed oral cancer screening, no abnormal findings.   Obtained BWs and reviewed findings. Completed restorative exam and perio spot probing.   No new restorative needs at this time.   Gingiva erythematous, generalized BOP.     Pt tx planned for prophy.     Reinforced diet control, drinking water between meals.    [facility]ident rx

## 2025-04-10 ENCOUNTER — OFFICE VISIT (OUTPATIENT)
Dept: DENTISTRY | Facility: CLINIC | Age: 35
End: 2025-04-10

## 2025-04-10 ENCOUNTER — CLINICAL SUPPORT (OUTPATIENT)
Dept: INTERNAL MEDICINE CLINIC | Facility: CLINIC | Age: 35
End: 2025-04-10
Payer: COMMERCIAL

## 2025-04-10 VITALS — DIASTOLIC BLOOD PRESSURE: 84 MMHG | HEART RATE: 125 BPM | SYSTOLIC BLOOD PRESSURE: 127 MMHG

## 2025-04-10 DIAGNOSIS — K02.9 CARIES: Primary | ICD-10-CM

## 2025-04-10 DIAGNOSIS — R79.89 LOW TESTOSTERONE IN MALE: Primary | ICD-10-CM

## 2025-04-10 PROCEDURE — 96372 THER/PROPH/DIAG INJ SC/IM: CPT

## 2025-04-10 PROCEDURE — NURSE

## 2025-04-10 PROCEDURE — D2394 RESIN-BASED COMPOSITE - 4 OR MORE SURFACES, POSTERIOR: HCPCS

## 2025-04-10 RX ADMIN — TESTOSTERONE CYPIONATE 50 MG: 200 INJECTION, SOLUTION INTRAMUSCULAR at 12:05

## 2025-04-10 NOTE — PROGRESS NOTES
Composite Restoration #[id number]    Sacha Foster 34 y.o. male presents with self to [facility] for composite restoration  PMH reviewed, no changes, ASA III. Significant medical history: See chart. Significant allergies: red dye, bee venom. Significant medications: see chart.    Pt presents to visit 15 minutes late.    Diagnosis:  Caries #[id number]    Prognosis:  fair    Consent:  Risks of specific procedure: need for RCT if pulp exposure occurs or in future if pulp is inflamed, need to revise tx plan based on extent of decay, damage to adjacent tooth and/or restoration.  Risks of any dental procedure: post procedural pain or sensitivity, local anesthetic side effects, allergic reaction to dental materials and medications, breakage of local anesthetic needle, aspiration of small dental tools, injury to nearby hard and soft tissues and anatomical structures.  Benefits: prevent further breakdown of tooth and its sequelae.  Alternatives:  no tx.  Tx plan for composite restoration #14 reviewed. Opportunity to ask questions given, all questions answered to degree of medical and dental certainty.  Patient understands and consent given by self via verbal consent.    Anesthesia:  Topical 20% benzocaine.  1 carps 2% Lidocaine 1:100k epi via buccal infiltration.    Procedure details:  Isolation: DryShield   Prepped teeth #[id number] with high speed handpiece.  Limelite placed on pulpal floor  Caries removed with round carbide on slow speed.  Band placement: [place] matrix.   Etch with 37% H2PO4 15 seconds. Rinsed and suctioned.  Applied  with 20 second scrub, air dried, and light cured.  Restored with packable (A2 shade) and light cured.  Checked occlusion and adjusted with finishing burs.  Checked contacts with floss  Polished with enhance point.  Verified occlusion and contacts.    Patient dismissed ambulatory and alert.    NV: Continue caries control.    Attending: Dr. Mcrae was present in clinic.

## 2025-04-21 DIAGNOSIS — M54.16 LUMBAR RADICULOPATHY: ICD-10-CM

## 2025-04-22 RX ORDER — PREGABALIN 150 MG/1
150 CAPSULE ORAL 2 TIMES DAILY
Qty: 60 CAPSULE | Refills: 0 | Status: SHIPPED | OUTPATIENT
Start: 2025-04-22

## 2025-04-24 ENCOUNTER — CLINICAL SUPPORT (OUTPATIENT)
Dept: INTERNAL MEDICINE CLINIC | Facility: CLINIC | Age: 35
End: 2025-04-24

## 2025-04-24 DIAGNOSIS — R79.89 LOW TESTOSTERONE IN MALE: Primary | ICD-10-CM

## 2025-04-24 RX ADMIN — TESTOSTERONE CYPIONATE 50 MG: 200 INJECTION, SOLUTION INTRAMUSCULAR at 10:50

## 2025-04-28 ENCOUNTER — OFFICE VISIT (OUTPATIENT)
Dept: DENTISTRY | Facility: CLINIC | Age: 35
End: 2025-04-28
Payer: COMMERCIAL

## 2025-04-28 VITALS — HEART RATE: 82 BPM | DIASTOLIC BLOOD PRESSURE: 76 MMHG | SYSTOLIC BLOOD PRESSURE: 108 MMHG

## 2025-04-28 DIAGNOSIS — K02.9 TOOTH DECAY: Primary | ICD-10-CM

## 2025-04-28 PROCEDURE — D2392 RESIN-BASED COMPOSITE - 2 SURFACES, POSTERIOR: HCPCS | Performed by: STUDENT IN AN ORGANIZED HEALTH CARE EDUCATION/TRAINING PROGRAM

## 2025-04-28 PROCEDURE — D2394 RESIN-BASED COMPOSITE - 4 OR MORE SURFACES, POSTERIOR: HCPCS | Performed by: STUDENT IN AN ORGANIZED HEALTH CARE EDUCATION/TRAINING PROGRAM

## 2025-04-28 NOTE — PROGRESS NOTES
Composite Filling    Sacha Foster presents for composite filling. PMH reviewed, no changes.    Discussed with patient need for RCT if pulp exposure occurs or in future if pulp is inflamed. Pt understands and consents.    Applied topical benzocaine, administered 1 carps 2% lido 1:100k epi via IANB and 1 carps 4% articaine 1:100k epi via long buccal and infiltration     Prepped tooth #15, 31 with 245 carbide on high speed. Caries removed with round carbide on slow speed. #15 deep, dycal placed, followed by limelight, cured. Placed palodent matrix. Isolation with cotton rolls and dri-angles    Etch with 37% H2PO4, rinse, dry. Applied Adhese with 20 second scrub once, gentle air dry and light cured for 10s. Restored with Tetric bulk yoli and beautifil flowable resin shade A2 and light cured.    Refined with finishing burs, polished with enhance point. Verified occlusion and contacts. Pt left satisfied.    NV: Resins

## 2025-05-08 ENCOUNTER — TELEPHONE (OUTPATIENT)
Dept: INTERNAL MEDICINE CLINIC | Facility: CLINIC | Age: 35
End: 2025-05-08

## 2025-05-08 ENCOUNTER — CLINICAL SUPPORT (OUTPATIENT)
Dept: INTERNAL MEDICINE CLINIC | Facility: CLINIC | Age: 35
End: 2025-05-08

## 2025-05-08 DIAGNOSIS — R79.89 LOW TESTOSTERONE IN MALE: Primary | ICD-10-CM

## 2025-05-08 PROCEDURE — NURSE

## 2025-05-08 RX ADMIN — TESTOSTERONE CYPIONATE 50 MG: 200 INJECTION, SOLUTION INTRAMUSCULAR at 11:01

## 2025-05-08 NOTE — TELEPHONE ENCOUNTER
Pt came in today for his testosterone injection and states he stopped wegovy injection because it made him sick. He has a f/u with you later this month but just wanted to give you an update.

## 2025-05-19 ENCOUNTER — APPOINTMENT (OUTPATIENT)
Dept: LAB | Facility: MEDICAL CENTER | Age: 35
End: 2025-05-19
Attending: SURGERY
Payer: COMMERCIAL

## 2025-05-19 DIAGNOSIS — R10.9 STOMACH ACHE: ICD-10-CM

## 2025-05-19 LAB
ALBUMIN SERPL BCG-MCNC: 3.8 G/DL (ref 3.5–5)
ALP SERPL-CCNC: 48 U/L (ref 34–104)
ALT SERPL W P-5'-P-CCNC: 27 U/L (ref 7–52)
ANION GAP SERPL CALCULATED.3IONS-SCNC: 10 MMOL/L (ref 4–13)
AST SERPL W P-5'-P-CCNC: 21 U/L (ref 13–39)
BASOPHILS # BLD AUTO: 0.04 THOUSANDS/ÂΜL (ref 0–0.1)
BASOPHILS NFR BLD AUTO: 1 % (ref 0–1)
BILIRUB SERPL-MCNC: 0.35 MG/DL (ref 0.2–1)
BILIRUB UR QL STRIP: NEGATIVE
BUN SERPL-MCNC: 9 MG/DL (ref 5–25)
CALCIUM SERPL-MCNC: 9 MG/DL (ref 8.4–10.2)
CHLORIDE SERPL-SCNC: 101 MMOL/L (ref 96–108)
CLARITY UR: CLEAR
CO2 SERPL-SCNC: 28 MMOL/L (ref 21–32)
COLOR UR: NORMAL
CREAT SERPL-MCNC: 0.69 MG/DL (ref 0.6–1.3)
EOSINOPHIL # BLD AUTO: 0.31 THOUSAND/ÂΜL (ref 0–0.61)
EOSINOPHIL NFR BLD AUTO: 4 % (ref 0–6)
ERYTHROCYTE [DISTWIDTH] IN BLOOD BY AUTOMATED COUNT: 16.8 % (ref 11.6–15.1)
GFR SERPL CREATININE-BSD FRML MDRD: 122 ML/MIN/1.73SQ M
GLUCOSE P FAST SERPL-MCNC: 65 MG/DL (ref 65–99)
GLUCOSE UR STRIP-MCNC: NEGATIVE MG/DL
HCT VFR BLD AUTO: 46.4 % (ref 36.5–49.3)
HGB BLD-MCNC: 14.9 G/DL (ref 12–17)
HGB UR QL STRIP.AUTO: NEGATIVE
IMM GRANULOCYTES # BLD AUTO: 0.02 THOUSAND/UL (ref 0–0.2)
IMM GRANULOCYTES NFR BLD AUTO: 0 % (ref 0–2)
KETONES UR STRIP-MCNC: NEGATIVE MG/DL
LEUKOCYTE ESTERASE UR QL STRIP: NEGATIVE
LYMPHOCYTES # BLD AUTO: 2.56 THOUSANDS/ÂΜL (ref 0.6–4.47)
LYMPHOCYTES NFR BLD AUTO: 35 % (ref 14–44)
MCH RBC QN AUTO: 25.6 PG (ref 26.8–34.3)
MCHC RBC AUTO-ENTMCNC: 32.1 G/DL (ref 31.4–37.4)
MCV RBC AUTO: 80 FL (ref 82–98)
MONOCYTES # BLD AUTO: 0.52 THOUSAND/ÂΜL (ref 0.17–1.22)
MONOCYTES NFR BLD AUTO: 7 % (ref 4–12)
NEUTROPHILS # BLD AUTO: 3.86 THOUSANDS/ÂΜL (ref 1.85–7.62)
NEUTS SEG NFR BLD AUTO: 53 % (ref 43–75)
NITRITE UR QL STRIP: NEGATIVE
NRBC BLD AUTO-RTO: 0 /100 WBCS
PH UR STRIP.AUTO: 8 [PH]
PLATELET # BLD AUTO: 227 THOUSANDS/UL (ref 149–390)
PMV BLD AUTO: 10.3 FL (ref 8.9–12.7)
POTASSIUM SERPL-SCNC: 4.2 MMOL/L (ref 3.5–5.3)
PROT SERPL-MCNC: 7.2 G/DL (ref 6.4–8.4)
PROT UR STRIP-MCNC: NEGATIVE MG/DL
RBC # BLD AUTO: 5.83 MILLION/UL (ref 3.88–5.62)
SODIUM SERPL-SCNC: 139 MMOL/L (ref 135–147)
SP GR UR STRIP.AUTO: 1.01 (ref 1–1.03)
UROBILINOGEN UR STRIP-ACNC: <2 MG/DL
WBC # BLD AUTO: 7.31 THOUSAND/UL (ref 4.31–10.16)

## 2025-05-19 PROCEDURE — 36415 COLL VENOUS BLD VENIPUNCTURE: CPT

## 2025-05-19 PROCEDURE — 80053 COMPREHEN METABOLIC PANEL: CPT

## 2025-05-19 PROCEDURE — 85025 COMPLETE CBC W/AUTO DIFF WBC: CPT

## 2025-05-19 PROCEDURE — 81003 URINALYSIS AUTO W/O SCOPE: CPT

## 2025-05-22 DIAGNOSIS — M54.16 LUMBAR RADICULOPATHY: ICD-10-CM

## 2025-05-22 RX ORDER — PREGABALIN 150 MG/1
150 CAPSULE ORAL 2 TIMES DAILY
Qty: 60 CAPSULE | Refills: 1 | Status: ON HOLD | OUTPATIENT
Start: 2025-05-22

## 2025-05-23 ENCOUNTER — APPOINTMENT (EMERGENCY)
Dept: RADIOLOGY | Facility: HOSPITAL | Age: 35
DRG: 720 | End: 2025-05-23
Payer: COMMERCIAL

## 2025-05-23 ENCOUNTER — HOSPITAL ENCOUNTER (OUTPATIENT)
Dept: CT IMAGING | Facility: HOSPITAL | Age: 35
Discharge: HOME | End: 2025-05-23
Attending: SURGERY
Payer: COMMERCIAL

## 2025-05-23 ENCOUNTER — HOSPITAL ENCOUNTER (INPATIENT)
Facility: HOSPITAL | Age: 35
LOS: 2 days | Discharge: HOME | DRG: 720 | End: 2025-05-27
Attending: EMERGENCY MEDICINE | Admitting: HOSPITALIST
Payer: COMMERCIAL

## 2025-05-23 DIAGNOSIS — J96.01 ACUTE HYPOXIC RESPIRATORY FAILURE (HCC): ICD-10-CM

## 2025-05-23 DIAGNOSIS — R56.9 SEIZURE-LIKE ACTIVITY (HCC): ICD-10-CM

## 2025-05-23 DIAGNOSIS — F19.10 SUBSTANCE ABUSE (HCC): Primary | ICD-10-CM

## 2025-05-23 DIAGNOSIS — N17.9 AKI (ACUTE KIDNEY INJURY) (HCC): ICD-10-CM

## 2025-05-23 DIAGNOSIS — R10.9 STOMACH ACHE: ICD-10-CM

## 2025-05-23 LAB
ALBUMIN SERPL BCG-MCNC: 4.1 G/DL (ref 3.5–5)
ALP SERPL-CCNC: 55 U/L (ref 34–104)
ALT SERPL W P-5'-P-CCNC: 47 U/L (ref 7–52)
AMPHETAMINES SERPL QL SCN: NEGATIVE
ANION GAP SERPL CALCULATED.3IONS-SCNC: 13 MMOL/L (ref 4–13)
ANISOCYTOSIS BLD QL SMEAR: PRESENT
AST SERPL W P-5'-P-CCNC: 45 U/L (ref 13–39)
BARBITURATES UR QL: NEGATIVE
BASOPHILS # BLD MANUAL: 0 THOUSAND/UL (ref 0–0.1)
BASOPHILS NFR MAR MANUAL: 0 % (ref 0–1)
BENZODIAZ UR QL: NEGATIVE
BILIRUB SERPL-MCNC: 0.52 MG/DL (ref 0.2–1)
BUN SERPL-MCNC: 14 MG/DL (ref 5–25)
CALCIUM SERPL-MCNC: 8.8 MG/DL (ref 8.4–10.2)
CHLORIDE SERPL-SCNC: 94 MMOL/L (ref 96–108)
CO2 SERPL-SCNC: 26 MMOL/L (ref 21–32)
COCAINE UR QL: NEGATIVE
CREAT SERPL-MCNC: 1.35 MG/DL (ref 0.6–1.3)
EOSINOPHIL # BLD MANUAL: 0 THOUSAND/UL (ref 0–0.4)
EOSINOPHIL NFR BLD MANUAL: 0 % (ref 0–6)
ERYTHROCYTE [DISTWIDTH] IN BLOOD BY AUTOMATED COUNT: 16.5 % (ref 11.6–15.1)
FENTANYL UR QL SCN: NEGATIVE
GFR SERPL CREATININE-BSD FRML MDRD: 67 ML/MIN/1.73SQ M
GLUCOSE SERPL-MCNC: 175 MG/DL (ref 65–140)
HCT VFR BLD AUTO: 46.9 % (ref 36.5–49.3)
HGB BLD-MCNC: 14.6 G/DL (ref 12–17)
HYDROCODONE UR QL SCN: NEGATIVE
LYMPHOCYTES # BLD AUTO: 0.82 THOUSAND/UL (ref 0.6–4.47)
LYMPHOCYTES # BLD AUTO: 4 % (ref 14–44)
MCH RBC QN AUTO: 25.3 PG (ref 26.8–34.3)
MCHC RBC AUTO-ENTMCNC: 31.1 G/DL (ref 31.4–37.4)
MCV RBC AUTO: 81 FL (ref 82–98)
METHADONE UR QL: POSITIVE
MONOCYTES # BLD AUTO: 0.41 THOUSAND/UL (ref 0–1.22)
MONOCYTES NFR BLD: 2 % (ref 4–12)
NEUTROPHILS # BLD MANUAL: 19.31 THOUSAND/UL (ref 1.85–7.62)
NEUTS BAND NFR BLD MANUAL: 3 % (ref 0–8)
NEUTS SEG NFR BLD AUTO: 91 % (ref 43–75)
OPIATES UR QL SCN: NEGATIVE
OXYCODONE+OXYMORPHONE UR QL SCN: NEGATIVE
PCP UR QL: NEGATIVE
PLATELET # BLD AUTO: 199 THOUSANDS/UL (ref 149–390)
PLATELET BLD QL SMEAR: ADEQUATE
PMV BLD AUTO: 9.6 FL (ref 8.9–12.7)
POTASSIUM SERPL-SCNC: 5.3 MMOL/L (ref 3.5–5.3)
PROT SERPL-MCNC: 7.4 G/DL (ref 6.4–8.4)
RBC # BLD AUTO: 5.78 MILLION/UL (ref 3.88–5.62)
RBC MORPH BLD: PRESENT
SODIUM SERPL-SCNC: 133 MMOL/L (ref 135–147)
THC UR QL: NEGATIVE
WBC # BLD AUTO: 20.54 THOUSAND/UL (ref 4.31–10.16)

## 2025-05-23 PROCEDURE — 96374 THER/PROPH/DIAG INJ IV PUSH: CPT

## 2025-05-23 PROCEDURE — 71045 X-RAY EXAM CHEST 1 VIEW: CPT

## 2025-05-23 PROCEDURE — 80307 DRUG TEST PRSMV CHEM ANLYZR: CPT | Performed by: EMERGENCY MEDICINE

## 2025-05-23 PROCEDURE — 80053 COMPREHEN METABOLIC PANEL: CPT | Performed by: EMERGENCY MEDICINE

## 2025-05-23 PROCEDURE — 96361 HYDRATE IV INFUSION ADD-ON: CPT

## 2025-05-23 PROCEDURE — 85027 COMPLETE CBC AUTOMATED: CPT | Performed by: EMERGENCY MEDICINE

## 2025-05-23 PROCEDURE — 96375 TX/PRO/DX INJ NEW DRUG ADDON: CPT

## 2025-05-23 PROCEDURE — 99285 EMERGENCY DEPT VISIT HI MDM: CPT

## 2025-05-23 PROCEDURE — 36415 COLL VENOUS BLD VENIPUNCTURE: CPT | Performed by: EMERGENCY MEDICINE

## 2025-05-23 PROCEDURE — 85007 BL SMEAR W/DIFF WBC COUNT: CPT | Performed by: EMERGENCY MEDICINE

## 2025-05-23 PROCEDURE — 74176 CT ABD & PELVIS W/O CONTRAST: CPT

## 2025-05-23 PROCEDURE — 99291 CRITICAL CARE FIRST HOUR: CPT | Performed by: EMERGENCY MEDICINE

## 2025-05-23 RX ORDER — NALOXONE HYDROCHLORIDE 1 MG/ML
1 INJECTION PARENTERAL ONCE
Status: COMPLETED | OUTPATIENT
Start: 2025-05-23 | End: 2025-05-23

## 2025-05-23 RX ORDER — ONDANSETRON 2 MG/ML
4 INJECTION INTRAMUSCULAR; INTRAVENOUS ONCE
Status: COMPLETED | OUTPATIENT
Start: 2025-05-23 | End: 2025-05-23

## 2025-05-23 RX ADMIN — ONDANSETRON 4 MG: 2 INJECTION INTRAMUSCULAR; INTRAVENOUS at 22:16

## 2025-05-23 RX ADMIN — SODIUM CHLORIDE 1000 ML: 0.9 INJECTION, SOLUTION INTRAVENOUS at 23:05

## 2025-05-23 RX ADMIN — SODIUM CHLORIDE, PRESERVATIVE FREE 0.04 MG: 5 INJECTION INTRAVENOUS at 22:34

## 2025-05-24 PROBLEM — N17.9 AKI (ACUTE KIDNEY INJURY) (HCC): Status: ACTIVE | Noted: 2025-05-24

## 2025-05-24 PROBLEM — J96.01 ACUTE RESPIRATORY FAILURE WITH HYPOXIA (HCC): Status: ACTIVE | Noted: 2025-05-24

## 2025-05-24 LAB
ALBUMIN SERPL BCG-MCNC: 3.5 G/DL (ref 3.5–5)
ALP SERPL-CCNC: 42 U/L (ref 34–104)
ALT SERPL W P-5'-P-CCNC: 34 U/L (ref 7–52)
ANION GAP SERPL CALCULATED.3IONS-SCNC: 14 MMOL/L (ref 4–13)
AST SERPL W P-5'-P-CCNC: 29 U/L (ref 13–39)
BASOPHILS # BLD AUTO: 0.02 THOUSANDS/ÂΜL (ref 0–0.1)
BASOPHILS NFR BLD AUTO: 0 % (ref 0–1)
BILIRUB SERPL-MCNC: 0.3 MG/DL (ref 0.2–1)
BUN SERPL-MCNC: 10 MG/DL (ref 5–25)
CALCIUM SERPL-MCNC: 7.8 MG/DL (ref 8.4–10.2)
CHLORIDE SERPL-SCNC: 93 MMOL/L (ref 96–108)
CO2 SERPL-SCNC: 30 MMOL/L (ref 21–32)
CREAT SERPL-MCNC: 0.98 MG/DL (ref 0.6–1.3)
EOSINOPHIL # BLD AUTO: 0 THOUSAND/ÂΜL (ref 0–0.61)
EOSINOPHIL NFR BLD AUTO: 0 % (ref 0–6)
ERYTHROCYTE [DISTWIDTH] IN BLOOD BY AUTOMATED COUNT: 16 % (ref 11.6–15.1)
GFR SERPL CREATININE-BSD FRML MDRD: 99 ML/MIN/1.73SQ M
GLUCOSE SERPL-MCNC: 186 MG/DL (ref 65–140)
HCT VFR BLD AUTO: 42.2 % (ref 36.5–49.3)
HGB BLD-MCNC: 13.7 G/DL (ref 12–17)
IMM GRANULOCYTES # BLD AUTO: 0.04 THOUSAND/UL (ref 0–0.2)
IMM GRANULOCYTES NFR BLD AUTO: 0 % (ref 0–2)
L PNEUMO1 AG UR QL IA.RAPID: NEGATIVE
LACTATE SERPL-SCNC: 1.9 MMOL/L (ref 0.5–2)
LYMPHOCYTES # BLD AUTO: 1.52 THOUSANDS/ÂΜL (ref 0.6–4.47)
LYMPHOCYTES NFR BLD AUTO: 13 % (ref 14–44)
MAGNESIUM SERPL-MCNC: 1.7 MG/DL (ref 1.9–2.7)
MCH RBC QN AUTO: 25.8 PG (ref 26.8–34.3)
MCHC RBC AUTO-ENTMCNC: 32.5 G/DL (ref 31.4–37.4)
MCV RBC AUTO: 80 FL (ref 82–98)
MONOCYTES # BLD AUTO: 0.36 THOUSAND/ÂΜL (ref 0.17–1.22)
MONOCYTES NFR BLD AUTO: 3 % (ref 4–12)
NEUTROPHILS # BLD AUTO: 9.43 THOUSANDS/ÂΜL (ref 1.85–7.62)
NEUTS SEG NFR BLD AUTO: 84 % (ref 43–75)
NRBC BLD AUTO-RTO: 0 /100 WBCS
PHOSPHATE SERPL-MCNC: 4.2 MG/DL (ref 2.7–4.5)
PLATELET # BLD AUTO: 210 THOUSANDS/UL (ref 149–390)
PMV BLD AUTO: 9.6 FL (ref 8.9–12.7)
POTASSIUM SERPL-SCNC: 4 MMOL/L (ref 3.5–5.3)
PROCALCITONIN SERPL-MCNC: 3.4 NG/ML
PROT SERPL-MCNC: 8 G/DL (ref 6.4–8.4)
RBC # BLD AUTO: 5.3 MILLION/UL (ref 3.88–5.62)
S PNEUM AG UR QL: NEGATIVE
SODIUM SERPL-SCNC: 137 MMOL/L (ref 135–147)
WBC # BLD AUTO: 11.37 THOUSAND/UL (ref 4.31–10.16)

## 2025-05-24 PROCEDURE — 96361 HYDRATE IV INFUSION ADD-ON: CPT

## 2025-05-24 PROCEDURE — 94760 N-INVAS EAR/PLS OXIMETRY 1: CPT

## 2025-05-24 PROCEDURE — 84145 PROCALCITONIN (PCT): CPT | Performed by: EMERGENCY MEDICINE

## 2025-05-24 PROCEDURE — 87081 CULTURE SCREEN ONLY: CPT | Performed by: PHYSICIAN ASSISTANT

## 2025-05-24 PROCEDURE — 36415 COLL VENOUS BLD VENIPUNCTURE: CPT | Performed by: EMERGENCY MEDICINE

## 2025-05-24 PROCEDURE — 99223 1ST HOSP IP/OBS HIGH 75: CPT | Performed by: HOSPITALIST

## 2025-05-24 PROCEDURE — 80053 COMPREHEN METABOLIC PANEL: CPT | Performed by: PHYSICIAN ASSISTANT

## 2025-05-24 PROCEDURE — 87040 BLOOD CULTURE FOR BACTERIA: CPT | Performed by: EMERGENCY MEDICINE

## 2025-05-24 PROCEDURE — 94660 CPAP INITIATION&MGMT: CPT

## 2025-05-24 PROCEDURE — 96365 THER/PROPH/DIAG IV INF INIT: CPT

## 2025-05-24 PROCEDURE — 83735 ASSAY OF MAGNESIUM: CPT | Performed by: PHYSICIAN ASSISTANT

## 2025-05-24 PROCEDURE — 85025 COMPLETE CBC W/AUTO DIFF WBC: CPT | Performed by: PHYSICIAN ASSISTANT

## 2025-05-24 PROCEDURE — 83605 ASSAY OF LACTIC ACID: CPT | Performed by: EMERGENCY MEDICINE

## 2025-05-24 PROCEDURE — 87449 NOS EACH ORGANISM AG IA: CPT | Performed by: PHYSICIAN ASSISTANT

## 2025-05-24 PROCEDURE — 84100 ASSAY OF PHOSPHORUS: CPT | Performed by: PHYSICIAN ASSISTANT

## 2025-05-24 PROCEDURE — 87147 CULTURE TYPE IMMUNOLOGIC: CPT | Performed by: PHYSICIAN ASSISTANT

## 2025-05-24 RX ORDER — ALBUTEROL SULFATE 90 UG/1
2 INHALANT RESPIRATORY (INHALATION) EVERY 6 HOURS PRN
Status: DISCONTINUED | OUTPATIENT
Start: 2025-05-24 | End: 2025-05-27 | Stop reason: HOSPADM

## 2025-05-24 RX ORDER — TEMAZEPAM 15 MG/1
15 CAPSULE ORAL
Status: DISCONTINUED | OUTPATIENT
Start: 2025-05-24 | End: 2025-05-27 | Stop reason: HOSPADM

## 2025-05-24 RX ORDER — HEPARIN SODIUM 5000 [USP'U]/ML
5000 INJECTION, SOLUTION INTRAVENOUS; SUBCUTANEOUS EVERY 8 HOURS SCHEDULED
Status: DISCONTINUED | OUTPATIENT
Start: 2025-05-24 | End: 2025-05-27 | Stop reason: HOSPADM

## 2025-05-24 RX ORDER — ONDANSETRON 2 MG/ML
4 INJECTION INTRAMUSCULAR; INTRAVENOUS EVERY 6 HOURS PRN
Status: DISCONTINUED | OUTPATIENT
Start: 2025-05-24 | End: 2025-05-27 | Stop reason: HOSPADM

## 2025-05-24 RX ORDER — PANTOPRAZOLE SODIUM 40 MG/1
40 TABLET, DELAYED RELEASE ORAL
Status: DISCONTINUED | OUTPATIENT
Start: 2025-05-24 | End: 2025-05-24

## 2025-05-24 RX ORDER — LORATADINE 10 MG/1
10 TABLET ORAL DAILY
Status: DISCONTINUED | OUTPATIENT
Start: 2025-05-24 | End: 2025-05-27 | Stop reason: HOSPADM

## 2025-05-24 RX ORDER — MAGNESIUM SULFATE HEPTAHYDRATE 40 MG/ML
2 INJECTION, SOLUTION INTRAVENOUS ONCE
Status: COMPLETED | OUTPATIENT
Start: 2025-05-24 | End: 2025-05-24

## 2025-05-24 RX ORDER — PREGABALIN 75 MG/1
150 CAPSULE ORAL 2 TIMES DAILY
Status: DISCONTINUED | OUTPATIENT
Start: 2025-05-24 | End: 2025-05-27 | Stop reason: HOSPADM

## 2025-05-24 RX ORDER — VILAZODONE HYDROCHLORIDE 20 MG/1
40 TABLET ORAL
Status: DISCONTINUED | OUTPATIENT
Start: 2025-05-24 | End: 2025-05-27 | Stop reason: HOSPADM

## 2025-05-24 RX ORDER — CEFTRIAXONE 2 G/50ML
2000 INJECTION, SOLUTION INTRAVENOUS EVERY 24 HOURS
Status: DISCONTINUED | OUTPATIENT
Start: 2025-05-25 | End: 2025-05-24

## 2025-05-24 RX ORDER — ARIPIPRAZOLE 2 MG/1
2 TABLET ORAL DAILY
Status: DISCONTINUED | OUTPATIENT
Start: 2025-05-24 | End: 2025-05-27 | Stop reason: HOSPADM

## 2025-05-24 RX ORDER — SODIUM CHLORIDE 9 MG/ML
75 INJECTION, SOLUTION INTRAVENOUS CONTINUOUS
Status: DISCONTINUED | OUTPATIENT
Start: 2025-05-24 | End: 2025-05-25

## 2025-05-24 RX ORDER — CEFTRIAXONE 2 G/50ML
2000 INJECTION, SOLUTION INTRAVENOUS ONCE
Status: COMPLETED | OUTPATIENT
Start: 2025-05-24 | End: 2025-05-24

## 2025-05-24 RX ORDER — CLONAZEPAM 0.5 MG/1
1 TABLET ORAL 2 TIMES DAILY
Status: DISCONTINUED | OUTPATIENT
Start: 2025-05-24 | End: 2025-05-27 | Stop reason: HOSPADM

## 2025-05-24 RX ORDER — PRAZOSIN HYDROCHLORIDE 1 MG/1
5 CAPSULE ORAL
Status: DISCONTINUED | OUTPATIENT
Start: 2025-05-24 | End: 2025-05-27 | Stop reason: HOSPADM

## 2025-05-24 RX ORDER — FLUTICASONE FUROATE AND VILANTEROL 200; 25 UG/1; UG/1
1 POWDER RESPIRATORY (INHALATION)
Status: DISCONTINUED | OUTPATIENT
Start: 2025-05-24 | End: 2025-05-27 | Stop reason: HOSPADM

## 2025-05-24 RX ORDER — LUBIPROSTONE 24 UG/1
24 CAPSULE ORAL 2 TIMES DAILY
Status: DISCONTINUED | OUTPATIENT
Start: 2025-05-24 | End: 2025-05-27 | Stop reason: HOSPADM

## 2025-05-24 RX ORDER — ACETAMINOPHEN 325 MG/1
650 TABLET ORAL EVERY 6 HOURS PRN
Status: DISCONTINUED | OUTPATIENT
Start: 2025-05-24 | End: 2025-05-27 | Stop reason: HOSPADM

## 2025-05-24 RX ORDER — LUBIPROSTONE 24 UG/1
24 CAPSULE ORAL 2 TIMES DAILY
Status: DISCONTINUED | OUTPATIENT
Start: 2025-05-24 | End: 2025-05-24

## 2025-05-24 RX ORDER — NYSTATIN 100000 [USP'U]/G
POWDER TOPICAL EVERY MORNING
Status: DISCONTINUED | OUTPATIENT
Start: 2025-05-24 | End: 2025-05-27 | Stop reason: HOSPADM

## 2025-05-24 RX ORDER — PRAZOSIN HYDROCHLORIDE 1 MG/1
1 CAPSULE ORAL
Status: DISCONTINUED | OUTPATIENT
Start: 2025-05-24 | End: 2025-05-27 | Stop reason: HOSPADM

## 2025-05-24 RX ORDER — NICOTINE 21 MG/24HR
21 PATCH, TRANSDERMAL 24 HOURS TRANSDERMAL DAILY
Status: DISCONTINUED | OUTPATIENT
Start: 2025-05-24 | End: 2025-05-27 | Stop reason: HOSPADM

## 2025-05-24 RX ORDER — METHADONE HYDROCHLORIDE 10 MG/1
150 TABLET ORAL DAILY
Refills: 0 | Status: DISCONTINUED | OUTPATIENT
Start: 2025-05-24 | End: 2025-05-27 | Stop reason: HOSPADM

## 2025-05-24 RX ADMIN — NICOTINE 21 MG: 21 PATCH, EXTENDED RELEASE TRANSDERMAL at 10:06

## 2025-05-24 RX ADMIN — ARIPIPRAZOLE 2 MG: 2 TABLET ORAL at 08:15

## 2025-05-24 RX ADMIN — TEMAZEPAM 15 MG: 15 CAPSULE ORAL at 21:26

## 2025-05-24 RX ADMIN — HEPARIN SODIUM 5000 UNITS: 5000 INJECTION INTRAVENOUS; SUBCUTANEOUS at 05:44

## 2025-05-24 RX ADMIN — PREGABALIN 150 MG: 75 CAPSULE ORAL at 17:45

## 2025-05-24 RX ADMIN — CLONAZEPAM 1 MG: 0.5 TABLET ORAL at 17:46

## 2025-05-24 RX ADMIN — LUBIPROSTONE 24 MCG: 24 CAPSULE, GELATIN COATED ORAL at 10:05

## 2025-05-24 RX ADMIN — MAGNESIUM SULFATE HEPTAHYDRATE 2 G: 40 INJECTION, SOLUTION INTRAVENOUS at 08:29

## 2025-05-24 RX ADMIN — PANTOPRAZOLE SODIUM 40 MG: 40 TABLET, DELAYED RELEASE ORAL at 05:44

## 2025-05-24 RX ADMIN — SODIUM CHLORIDE 1000 ML: 0.9 INJECTION, SOLUTION INTRAVENOUS at 03:39

## 2025-05-24 RX ADMIN — HEPARIN SODIUM 5000 UNITS: 5000 INJECTION INTRAVENOUS; SUBCUTANEOUS at 14:50

## 2025-05-24 RX ADMIN — SODIUM CHLORIDE 75 ML/HR: 0.9 INJECTION, SOLUTION INTRAVENOUS at 05:44

## 2025-05-24 RX ADMIN — AMPICILLIN SODIUM AND SULBACTAM SODIUM 3 G: 2; 1 INJECTION, POWDER, FOR SOLUTION INTRAMUSCULAR; INTRAVENOUS at 09:33

## 2025-05-24 RX ADMIN — AMPICILLIN SODIUM AND SULBACTAM SODIUM 3 G: 2; 1 INJECTION, POWDER, FOR SOLUTION INTRAMUSCULAR; INTRAVENOUS at 15:57

## 2025-05-24 RX ADMIN — CLONAZEPAM 1 MG: 0.5 TABLET ORAL at 08:14

## 2025-05-24 RX ADMIN — LORATADINE 10 MG: 10 TABLET ORAL at 08:15

## 2025-05-24 RX ADMIN — PRAZOSIN HYDROCHLORIDE 5 MG: 1 CAPSULE ORAL at 21:27

## 2025-05-24 RX ADMIN — AMPICILLIN SODIUM AND SULBACTAM SODIUM 3 G: 2; 1 INJECTION, POWDER, FOR SOLUTION INTRAMUSCULAR; INTRAVENOUS at 21:26

## 2025-05-24 RX ADMIN — VILAZODONE HYDROCHLORIDE 40 MG: 20 TABLET ORAL at 09:17

## 2025-05-24 RX ADMIN — HEPARIN SODIUM 5000 UNITS: 5000 INJECTION INTRAVENOUS; SUBCUTANEOUS at 21:26

## 2025-05-24 RX ADMIN — PREGABALIN 150 MG: 75 CAPSULE ORAL at 08:14

## 2025-05-24 RX ADMIN — METHADONE HYDROCHLORIDE 150 MG: 10 TABLET ORAL at 09:31

## 2025-05-24 RX ADMIN — LUBIPROSTONE 24 MCG: 24 CAPSULE, GELATIN COATED ORAL at 21:27

## 2025-05-24 RX ADMIN — PRAZOSIN HYDROCHLORIDE 1 MG: 1 CAPSULE ORAL at 21:27

## 2025-05-24 RX ADMIN — CEFTRIAXONE 2000 MG: 2 INJECTION, SOLUTION INTRAVENOUS at 00:32

## 2025-05-24 RX ADMIN — SODIUM CHLORIDE 75 ML/HR: 0.9 INJECTION, SOLUTION INTRAVENOUS at 16:02

## 2025-05-24 RX ADMIN — FLUTICASONE FUROATE AND VILANTEROL TRIFENATATE 1 PUFF: 200; 25 POWDER RESPIRATORY (INHALATION) at 09:19

## 2025-05-24 NOTE — ASSESSMENT & PLAN NOTE
Patient reportedly found unresponsive at home  Unclear substance/drug use [facility] patient  Patient reportedly received Narcan from family as well as EMS with some improvement   Toxicology urine positive only for on methadone  Will keep NPO for now  Aspiration precaution  Cardiopulmonary monitoring  Continue IV fluids

## 2025-05-24 NOTE — PLAN OF CARE
Problem: PAIN - ADULT  Goal: Verbalizes/displays adequate comfort level or baseline comfort level  Description: Interventions:  - Encourage patient to monitor pain and request assistance  - Assess pain using appropriate pain scale  - Administer analgesics as ordered based on type and severity of pain and evaluate response  - Implement non-pharmacological measures as appropriate and evaluate response  - Consider cultural and social influences on pain and pain management  - Notify physician/advanced practitioner if interventions unsuccessful or patient reports new pain  - Educate patient/family on pain management process including their role and importance of  reporting pain   - Provide non-pharmacologic/complimentary pain relief interventions  5/24/2025 1102 by Birgit Bullock RN  Outcome: Progressing  5/24/2025 1101 by Birgit Bullock RN  Outcome: Progressing  5/24/2025 1100 by Birgit Bullock RN  Outcome: Progressing     Problem: INFECTION - ADULT  Goal: Absence or prevention of progression during hospitalization  Description: INTERVENTIONS:  - Assess and monitor for signs and symptoms of infection  - Monitor lab/diagnostic results  - Monitor all insertion sites, i.e. indwelling lines, tubes, and drains  - Monitor endotracheal if appropriate and nasal secretions for changes in amount and color  - Temple appropriate cooling/warming therapies per order  - Administer medications as ordered  - Instruct and encourage patient and family to use good hand hygiene technique  - Identify and instruct in appropriate isolation precautions for identified infection/condition  5/24/2025 1102 by Birgit Bullock RN  Outcome: Progressing  5/24/2025 1101 by Birgit Bullock RN  Outcome: Progressing  5/24/2025 1100 by Birgit Bullock RN  Outcome: Progressing  Goal: Absence of fever/infection during neutropenic period  Description: INTERVENTIONS:  - Monitor WBC  - Perform strict hand hygiene  - Limit to healthy  visitors only  - No plants, dried, fresh or silk flowers with cardenas in patient room  5/24/2025 1102 by Birgit Bullock RN  Outcome: Progressing  5/24/2025 1101 by Birgit Bullock RN  Outcome: Progressing  5/24/2025 1100 by Birgit Bullock RN  Outcome: Progressing     Problem: SAFETY ADULT  Goal: Patient will remain free of falls  Description: INTERVENTIONS:  - Educate patient/family on patient safety including physical limitations  - Instruct patient to call for assistance with activity   - Consider consulting OT/PT to assist with strengthening/mobility based on AM PAC & JH-HLM score  - Consult OT/PT to assist with strengthening/mobility   - Keep Call bell within reach  - Keep bed low and locked with side rails adjusted as appropriate  - Keep care items and personal belongings within reach  - Initiate and maintain comfort rounds  - Make Fall Risk Sign visible to staff  - Apply yellow socks and bracelet for high fall risk patients  - Consider moving patient to room near nurses station  5/24/2025 1102 by Birgit Bullock RN  Outcome: Progressing  5/24/2025 1101 by Birgit Bullock RN  Outcome: Progressing  5/24/2025 1100 by Birgit Bullock RN  Outcome: Progressing  Goal: Maintain or return to baseline ADL function  Description: INTERVENTIONS:  -  Assess patient's ability to carry out ADLs; assess patient's baseline for ADL function and identify physical deficits which impact ability to perform ADLs (bathing, care of mouth/teeth, toileting, grooming, dressing, etc.)  - Assess/evaluate cause of self-care deficits   - Assess range of motion  - Assess patient's mobility; develop plan if impaired  - Assess patient's need for assistive devices and provide as appropriate  - Encourage maximum independence but intervene and supervise when necessary  - Involve family in performance of ADLs  - Assess for home care needs following discharge   - Consider OT consult to assist with ADL evaluation and planning for  discharge  - Provide patient education as appropriate  - Monitor functional capacity and physical performance, use of AM PAC & -HLM   - Monitor gait, balance and fatigue with ambulation    5/24/2025 1102 by Birgit Bullock RN  Outcome: Progressing  5/24/2025 1101 by Birgit Bullock RN  Outcome: Progressing  5/24/2025 1100 by Birgit Bullock RN  Outcome: Progressing  Goal: Maintains/Returns to pre admission functional level  Description: INTERVENTIONS:  - Perform AM-PAC 6 Click Basic Mobility/ Daily Activity assessment daily.  - Set and communicate daily mobility goal to care team and patient/family/caregiver.   - Collaborate with rehabilitation services on mobility goals if consulted  - Reposition patient every 2 hours.  - Out of bed to chair three times a day   - Out of bed for meals three times a day  - Out of bed for toileting  - Record patient progress and toleration of activity level   5/24/2025 1102 by Birgit Blulock RN  Outcome: Progressing  5/24/2025 1101 by Birgit Bullock RN  Outcome: Progressing  5/24/2025 1100 by Birgit Bullock RN  Outcome: Progressing     Problem: DISCHARGE PLANNING  Goal: Discharge to home or other facility with appropriate resources  Description: INTERVENTIONS:  - Identify barriers to discharge w/patient and caregiver  - Arrange for needed discharge resources and transportation as appropriate  - Identify discharge learning needs (meds, wound care, etc.)  - Arrange for interpretive services to assist at discharge as needed  - Refer to Case Management Department for coordinating discharge planning if the patient needs post-hospital services based on physician/advanced practitioner order or complex needs related to functional status, cognitive ability, or social support system  5/24/2025 1102 by Birgit Bullock RN  Outcome: Progressing  5/24/2025 1101 by Birgit Bullock RN  Outcome: Progressing  5/24/2025 1100 by Birgit Bullock RN  Outcome: Progressing      Problem: Knowledge Deficit  Goal: Patient/family/caregiver demonstrates understanding of disease process, treatment plan, medications, and discharge instructions  Description: Complete learning assessment and assess knowledge base.  Interventions:  - Provide teaching at level of understanding  - Provide teaching via preferred learning methods  5/24/2025 1102 by Birgit Bullock RN  Outcome: Progressing  5/24/2025 1101 by Birgit Bullock RN  Outcome: Progressing  5/24/2025 1100 by Birgit Bullock RN  Outcome: Progressing

## 2025-05-24 NOTE — ASSESSMENT & PLAN NOTE
SIRS criteria 3/4 (tachycardia, tachypnea, leukocytosis, KATHY present)  Likely secondary to pneumonia   Received IV ceftriaxone   Received IV fluids bolus  Blood cultures pending  Continue Sepsis pathway  Continue IV fluids  Change IV Ceftriaxone for IV Unasyn to cover for aspiration pneumonia  Follow cultures  Monitor hemodynamic status  Patient previously positive for MRSA during hospital admission on December 2024 for which he required IV vancomycin, sputum cultures pending, will watch oxygenation status to determine need for IV Vanco.

## 2025-05-24 NOTE — ASSESSMENT & PLAN NOTE
Patient reportedly found unresponsive at home  Unclear substance/drug use [facility] patient  Patient reportedly received Narcan from family as well as EMS with some improvement   Toxicology urine positive only for on methadone  --Will keep NPO for now  --Aspiration precaution  --Cardiopulmonary monitoring  --Continue IV fluids

## 2025-05-24 NOTE — CASE MANAGEMENT
Case Management Assessment    Patient name Sacha Foster  Location / MRN [id number]  : 1990 Date 2025       Current Admission Date: 2025  Current Admission Diagnosis:Acute respiratory failure with hypoxia ([facility])   Patient Active Problem List    Diagnosis Date Noted    Acute respiratory failure with hypoxia ([facility]) 2025    KATHY (acute kidney injury) ([facility]) 2025    Central sleep apnea comorbid with prescribed opioid use 2025    Hypogonadism in male 2025    Opioid dependence ([facility]) 2024    PTSD (post-traumatic stress disorder) 2024    Volume overload 2024    Nausea and vomiting 2024    Accidental overdose 2024    Acute respiratory failure with hypoxia and hypercapnia ([facility]) 2024    Painful orthopaedic hardware ([facility]) 2023    Accessory bone of foot 2023    Seasonal allergies 2023    Nicotine dependence, uncomplicated 2023    Closed displaced fracture of fifth metatarsal bone of right foot with nonunion 2023    Fixation hardware in lower extremity 2023    Moderate persistent asthma with acute exacerbation 2023    Fracture of base of fifth metatarsal bone of right foot at metaphyseal-diaphyseal junction with nonunion 2023    Mild persistent asthma without complication 2022    Long-term exposure involving bird droppings 2022    Lumbar radiculopathy 2022    Tinea cruris 2022    History of colonoscopy 2022    Ventral hernia without obstruction or gangrene 2022    Chronic bilateral low back pain without sciatica 2022    Chronic constipation 2022    Central sleep apnea     Chronic pain of both knees 2022    Eczema 2021    Arthralgia 10/17/2020    Colonic mass 2020    Drug dependence ([facility]) 2019    Medication therapy continued 2019    IV drug abuse ([facility]) 2019    CATA treated with BiPAP 2018    Severe  sepsis ([facility]) 06/24/2018    Claustrophobia 03/15/2018    Restless leg syndrome 03/15/2018    Morbid obesity with BMI of 50.0-59.9, adult (HCC) 03/15/2018    Chronic pain syndrome 03/15/2018    Acid reflux 06/29/2017    Essential hypertension 11/17/2016    Generalized anxiety disorder 10/19/2016    Anxiety 07/14/2016    Depressed 07/14/2016    Methadone maintenance therapy patient (HCC) 07/14/2016    Obesity, Class III, BMI 40-49.9 (morbid obesity) 07/14/2016    Chronic hepatitis C without hepatic coma ([facility]CC) 02/21/2016    Bipolar disorder ([facility]CC) 04/03/2015    Allergic rhinitis 08/14/2014    Insomnia 08/14/2014    Opioid dependence in remission ([facility]) 08/14/2014      LOS (days): 0  Geometric Mean LOS (GMLOS) (days):   Days to GMLOS:     OBJECTIVE:              Current admission status: Observation       Preferred Pharmacy:   [facility] [place] [id number]  [place] [address]  [address] [place] [address]  Phone: [phone] Fax: [phone]    [facility] [address]   [address] [facility] [place] [facility] [place] [address]  Phone: [phone] Fax: [phone]    [facility] [address] [place] [address] [address]  [address]  [facility] [address]  Phone: [phone] Fax: [phone]    [facility] [address] - [address] [facility], [address]  [address] [place] [address], [address]  [facility] [address]  Phone: [phone] Fax: [phone]    Primary Care Provider: Dalia Rodriguez PA-C    Primary Insurance: [employer]  Secondary Insurance:     ASSESSMENT:  Active Health Care Proxies       Peyton Foster Health Care Representative - Mother   Primary Phone: [phone] (Mobile)  Home Phone: [phone]                 Advance Directives  Does patient have a Health Care POA?: No  Was patient offered paperwork?: Yes (declined)  Does patient currently have a Health Care decision maker?: Yes, please see Health Care Proxy section  Does patient have Advance  Directives?: No  Was patient offered paperwork?: Yes (declined)  Primary Contact: Peyton Foster (Mother)              Patient Information  Admitted from:: Home  Mental Status: Alert  During Assessment patient was accompanied by: Not accompanied during assessment  Assessment information provided by:: Patient  Primary Caregiver: Self  Support Systems: Self, Parent  County of Residence: [facility]  What city do you live in?: [place]  Type of Current Residence: 2 story home  Upon entering residence, is there a bedroom on the main floor (no further steps)?: No  A bedroom is located on the following floor levels of residence (select all that apply):: 2nd Floor  Upon entering residence, is there a bathroom on the main floor (no further steps)?: No  Indicate which floors of current residence have a bathroom (select all the apply):: 2nd Floor  Number of steps to 2nd floor from main floor: One Flight  Living Arrangements: Lives w/ Parent(s)  Is patient a ?: No    Activities of Daily Living Prior to Admission  Functional Status: Independent  Completes ADLs independently?: Yes  Ambulates independently?: Yes  Does patient use assisted devices?: Yes  Assisted Devices (DME) used: CPAP, Home Oxygen concentrator  DME Company Name (respiratory supplies): [employer]  O2 Rate(s): 2l with cpap at hs  Does patient currently own DME?: No  Does patient have a history of Outpatient Therapy (PT/OT)?: No  Does the patient have a history of Short-Term Rehab?: No  Does patient have a history of HHC?: No  Does patient currently have HHC?: No         Patient Information Continued  Income Source: Unemployed  Does patient have prescription coverage?: Yes  Can the patient afford their medications and any related supplies (such as glucometers or test strips)?: Yes  Does patient receive dialysis treatments?: No  Does patient have a history of substance abuse?: Yes  Historical substance use preference: Heroin (pt currently on methadone.  states he has not recently used)  Is patient currently in treatment for substance abuse?: Yes (follows with [facility])  Does patient have a history of Mental Health Diagnosis?: No         Means of Transportation  Means of Transport to [facility]:: Family transport  Cm met with the patient to evaluate the patients prior function and living situation and any barriers to d/c and form a safe d/c plan. Cm also evaluated the patient for any services in the home or needs for services. CM also discussed with patient that their preferences will be taken into account/consideration.  Pt admitted with respiratory failure. Pt was found somewhat unresponsive by family. Narcan given. Pt has hx of substance abuse but states he has not used and is on methadone. Pt follows with [facility] for same. Denies any substance abuse and states he is unsure what happened. UDS only positive for methadone. Declining any substance treatment as he follows with [facility]. No current discharge needs. CM to follow.

## 2025-05-24 NOTE — ASSESSMENT & PLAN NOTE
Patient with history of drug abuse with  Patient Is currently on methadone  UDS positive for methadone  --Will continue methadone once able to verify in the a.m.

## 2025-05-24 NOTE — ASSESSMENT & PLAN NOTE
Creatinine level at 1.35, now 0.98  Baseline appears to be between 0.6 and 1.0  Received IV fluids in the ER  Avoid nephrotoxic agents  Continue gentle IV fluid  Monitor creatinine level  Nephrology consult

## 2025-05-24 NOTE — ASSESSMENT & PLAN NOTE
Patient with history of drug abuse with  Patient Is currently on methadone  UDS positive for methadone  Continue Methadone

## 2025-05-24 NOTE — H&P
H&P - Hospitalist   Name: Sacha Foster 34 y.o. male I MRN: [id number]  Unit/Bed#:  I Date of Admission: 5/23/2025   Date of Service: 5/24/2025 I Hospital Day: 0     Assessment & Plan  Acute respiratory failure with hypoxia (HCC)  Presented to the ER for evaluation after reported period of unresponsiveness  Unclear etiology, concern for pnumonia  SpO2 as low 78% in the ER  Placed on oxygen initially at 6 L/min  --Admit to stepdown  --Cardiopulmonary monitoring  --Respiratory protocol  --Aspiration precaution  --Incentive spirometry  --Monitor respiratory status, SpO2  --A.m. labs  --Pulmonary consult unavailable  Essential hypertension  Blood pressure  is  stable  --Continue PTA Blood pressure medication  --Monitor blood pressure while admitted   Drug dependence ([facility])  Patient with history of drug abuse with  Patient Is currently on methadone  UDS positive for methadone  --Will continue methadone once able to verify in the a.m.  Central sleep apnea  --Respiratory protocol  --Continue CPAP at bedtime  KATHY (acute kidney injury) ([facility])  Creatinine level at 1.35  Baseline appears to be between 0.6 and 1.0  Received IV fluids in the ER  --Avoid nephrotoxic agents  --Continue gentle IV fluid  --Monitor creatinine level  --Check a.m. CMP  --Nephrology consult  Severe sepsis ([facility])  SIRS criteria 3/4 (tachycardia, tachypnea, leukocytosis  KATHY present  Likely secondary to pneumonia   Received IV ceftriaxone   Received IV fluids bolus  Blood cultures pending  --Continue Sepsis pathway  --Continue IV fluids  --Continue IV ceftriaxone for now  --Follow cultures  --Monitor hemodynamic status  --Am labs   Accidental overdose  Patient reportedly found unresponsive at home  Unclear substance/drug use [employer] patient  Patient reportedly received Narcan from family as well as EMS with some improvement   Toxicology urine positive only for on methadone  --Will keep NPO for now  --Aspiration precaution  --Cardiopulmonary  monitoring  --Continue IV fluids        VTE Pharmacologic Prophylaxis:   Moderate Risk (Score 3-4) - Pharmacological DVT Prophylaxis Ordered: heparin.  Code Status: Level 1 - Full Code   Discussion with family: Patient declined call to .     Anticipated Length of Stay: Patient will be admitted on an observation basis with an anticipated length of stay of less than 2 midnights secondary to acute respiratory failure with hypoxia, severe sepsis, acute kidney injury, accidental overdose.    History of Present Illness   Chief Complaint: Suspected Overdose     Sacha Foster is a 34 y.o. male with a PMH of hypertension, sleep apnea, substance abuse who presents to the emergency room for evaluation of possible accidental drug overdose.  Patient was reportedly found unresponsive by family and was given intranasal Narcan.  EMS was called and patient received 2 additional doses of Narcan with some improvement in mental status.  Patient had episode of vomiting after Narcan was administered.  Patient noted to have increased yawning and complained of feeling nauseated upon arrival to the ER.  Patient denies having any drug use today.  He reports that he took methamphetamines about a week ago.  Patient also reports taking methadone 150 mg daily.  However unable to do verify this on PDMP.     Workup in the emergency room included labs significant for sodium of 133, chloride of 94, creatinine of 1.35, glucose of 175, AST of 45, WBC of 20.54, lactic acid of 1.9, procalcitonin of 3.40.  Toxicology urine positive for methadone.  CT abdomen pelvis completed official report pending, chest x-ray completed official report pending.  While in the emergency room patient received IV ceftriaxone 2 g, Zofran 4 mg IV, normal saline 1 L.  Patient placed on oxygen at 6 L/min for SpO2 of 78%.  Oxygen was subsequently titrated down to 3 L/min.    Patient has been admitted on observation status [facility] level care for further  management of acute respiratory failure with hypoxia, severe sepsis, acute kidney injury, accidental overdose    Review of Systems   Constitutional:  Positive for activity change. Negative for diaphoresis and fever.   Eyes:  Negative for photophobia and visual disturbance.   Respiratory:  Positive for cough. Negative for chest tightness, shortness of breath and wheezing.    Cardiovascular:  Negative for chest pain, palpitations and leg swelling.   Gastrointestinal:  Positive for nausea and vomiting. Negative for abdominal pain and diarrhea.   Genitourinary:  Negative for difficulty urinating, dysuria, flank pain, frequency and hematuria.   Musculoskeletal:  Negative for back pain, gait problem, neck pain and neck stiffness.   Skin:  Negative for rash and wound.   Neurological:  Negative for dizziness, tremors, seizures, syncope, weakness and headaches.   Psychiatric/Behavioral:  Negative for agitation and confusion. The patient is not nervous/anxious.        Historical Information   Past Medical History[1]  Past Surgical History[2]  Social History[3]  E-Cigarette/Vaping    E-Cigarette Use Current Some Day User     Cartridges/Day 1     Comments NICOTINE      E-Cigarette/Vaping Substances    Nicotine Yes     THC No     CBD No     Flavoring Yes     Other No     Unknown No      Family History[4]  Social History:  Marital Status: Single   Occupation:   Patient Pre-hospital Living Situation: Home  Patient Pre-hospital Level of Mobility: walks  Patient Pre-hospital Diet Restrictions: None reported     Meds/Allergies   I have reviewed home medications using recent Epic encounter.  Prior to Admission medications    Medication Sig Start Date End Date Taking? Authorizing Provider   albuterol (Ventolin HFA) 90 mcg/act inhaler Inhale 2 puffs every 6 (six) hours as needed for wheezing 2/3/25   Morgan Finney PA-C   clonazePAM (KlonoPIN) 1 mg tablet Take 1 mg by mouth 2 (two) times a day 2/6/24   Historical  Provider, MD   levocetirizine (XYZAL) 5 MG tablet TAKE ONE TABLET BY MOUTH EVERY EVENING 12/31/24   Dalia Rodriguez PA-C   Linzess 145 MCG CAPS Take 145 mcg by mouth every other day 8/11/23   Historical Provider, MD   METHADONE HCL PO Take 150 mg by mouth daily    Historical Provider, MD   metoclopramide (Reglan) 10 mg tablet Take 1 tablet (10 mg total) by mouth every 6 (six) hours as needed (nausea) 3/28/25   Stephanie Olson DO   mometasone-formoterol (Dulera) 200-5 MCG/ACT inhaler Inhale 2 puffs 2 (two) times a day Rinse mouth after use. 2/3/25   Morgan Finney PA-C   nicotine polacrilex (NICORETTE) 4 mg gum Chew 1 each (4 mg total) as needed for smoking cessation  Patient not taking: Reported on 4/3/2025 2/3/25   Morgan Finney PA-C   nystatin (MYCOSTATIN) powder Apply topically every morning 12/26/24   Aleksander Mota,    omeprazole (PriLOSEC) 20 mg delayed release capsule Take 20 mg by mouth 2 (two) times a day     Historical Provider, MD   ondansetron (ZOFRAN-ODT) 4 mg disintegrating tablet Take 1 tablet (4 mg total) by mouth as needed for nausea 12/26/24 4/28/25  Aleksander Mota,    pantoprazole (PROTONIX) 40 mg tablet Take 1 tablet (40 mg total) by mouth daily  Patient not taking: Reported on 4/28/2025 3/28/25   Stephanie Olson DO   prazosin (MINIPRESS) 1 mg capsule  3/29/25   Historical Provider, MD   prazosin (MINIPRESS) 5 mg capsule  6/3/24   Historical Provider, MD   pregabalin (LYRICA) 150 mg capsule TAKE 1 CAPSULE (150 MG TOTAL) BY MOUTH 2 (TWO) TIMES A DAY 5/22/25   Dalia Rodriguez PA-C   Rexulti 3 MG tablet Take 1 tablet (3 mg total) by mouth daily 12/11/24   Leanne Christiansen MD   SODIUM FLUORIDE, DENTAL GEL, 1.1 % CREA Apply to teeth daily at bedtime 4/3/25   Norman Galindo, DMD   temazepam (RESTORIL) 15 mg capsule Take 15 mg by mouth daily at bedtime 2/27/23   Historical Provider, MD   Testosterone Cypionate 200 MG/ML SOLN Inject 200 mg as directed every 14 (fourteen) days 11/21/24    Dalia Rodriguez PA-C   vilazodone (VIIBRYD) 40 mg tablet 40 mg daily with breakfast 8/14/23   Historical Provider, MD   Wegovy 1 MG/0.5ML Inject 1 mg under the skin weekly  Patient not taking: Reported on 4/3/2025 3/26/25   Dalia Rodriguez PA-C     Allergies   Allergen Reactions    Red Dye - Food Allergy Diarrhea, Nausea Only and Abdominal Pain    Bee Venom Angioedema     Pt states he gets swelling at the site       Objective :  Temp:  [98.7 °F (37.1 °C)-99 °F (37.2 °C)] 99 °F (37.2 °C)  HR:  [] 93  BP: (108-132)/(55-74) 111/70  Resp:  [9-20] 9  SpO2:  [78 %-96 %] 94 %  O2 Device: Nasal cannula  Nasal Cannula O2 Flow Rate (L/min):  [3 L/min-6 L/min] 3 L/min    Physical Exam  Constitutional:       General: He is not in acute distress.     Appearance: He is ill-appearing.      Comments: Somnolent   HENT:      Head: Normocephalic and atraumatic.      Mouth/Throat:      Mouth: Mucous membranes are moist.      Pharynx: Oropharynx is clear.     Cardiovascular:      Rate and Rhythm: Regular rhythm. Tachycardia present.      Pulses: Normal pulses.   Pulmonary:      Effort: No respiratory distress.   Abdominal:      General: There is distension.      Palpations: There is no mass.      Tenderness: There is no abdominal tenderness.     Musculoskeletal:         General: No swelling or tenderness.      Cervical back: Neck supple.      Right lower leg: No edema.      Left lower leg: No edema.     Skin:     General: Skin is warm and dry.      Capillary Refill: Capillary refill takes less than 2 seconds.      Coloration: Skin is not jaundiced or pale.      Findings: No erythema or rash.          Lines/Drains:            Lab Results: I have reviewed the following results:  Results from last 7 days   Lab Units 05/23/25  2216 05/19/25  0713   WBC Thousand/uL 20.54* 7.31   HEMOGLOBIN g/dL 14.6 14.9   HEMATOCRIT % 46.9 46.4   PLATELETS Thousands/uL 199 227   BANDS PCT % 3  --    SEGS PCT %  --  53   LYMPHO PCT % 4* 35   MONO PCT  % 2* 7   EOS PCT % 0 4     Results from last 7 days   Lab Units 05/23/25  2216   SODIUM mmol/L 133*   POTASSIUM mmol/L 5.3   CHLORIDE mmol/L 94*   CO2 mmol/L 26   BUN mg/dL 14   CREATININE mg/dL 1.35*   ANION GAP mmol/L 13   CALCIUM mg/dL 8.8   ALBUMIN g/dL 4.1   TOTAL BILIRUBIN mg/dL 0.52   ALK PHOS U/L 55   ALT U/L 47   AST U/L 45*   GLUCOSE RANDOM mg/dL 175*             Lab Results   Component Value Date    HGBA1C 5.8 (H) 09/28/2024    HGBA1C 5.6 03/26/2022    HGBA1C 5.4 10/19/2020     Results from last 7 days   Lab Units 05/24/25  0032   LACTIC ACID mmol/L 1.9   PROCALCITONIN ng/ml 3.40*       Imaging Results Review: I reviewed radiology reports from this admission including: chest xray and CT abdomen/pelvis.  Other Study Results Review: EKG was reviewed.     Administrative Statements   I have spent a total time of 40 minutes in caring for this patient on the day of the visit/encounter including Documenting in the medical record, Reviewing/placing orders in the medical record (including tests, medications, and/or procedures), Obtaining or reviewing history  , and Communicating with other healthcare professionals .    ** Please Note: This note has been constructed using a voice recognition system. **         [1]   Past Medical History:  Diagnosis Date    Allergic     Anemia 06/24/2018    Anxiety     from records    Arthritis     Asthma     Benign essential hypertension 11/17/2016    Bipolar 1 disorder (HCC)     from records    Chronic pain     Chronic pain disorder     back/ knees/ hip    Claustrophobia 03/15/2018    Community acquired pneumonia 06/24/2018    CPAP (continuous positive airway pressure) dependence     Dental abscess     11/9/23- pt was started on amoxicillin- will finish on 11/19/23    Depressed 07/14/2016    Depression     from records    GERD (gastroesophageal reflux disease)     Hepatitis C virus infection 08/14/2014    Heroin abuse (HCC) 07/24/2018    Infectious viral hepatitis     Obesity  10/12/2016    Obstructive sleep apnea     from records    Sleep disorder     Substance abuse (HCC)    [2]   Past Surgical History:  Procedure Laterality Date    ACHILLES TENDON SURGERY Right 11/27/2023    Procedure: transfer of peroneus brevis tendon to the cuboid bone;  Surgeon: James R Lachman, MD;  Location:  MAIN OR;  Service: Orthopedics    COLONOSCOPY      EGD      EPIDURAL BLOCK INJECTION Right 10/28/2022    Procedure: BLOCK / INJECTION EPIDURAL STEROID LUMBAR  right L4-5 and L5-S1 TFESI;  Surgeon: Lorena Hernandez MD;  Location: [facility];  Service: Pain Management     EPIDURAL BLOCK INJECTION Right 12/20/2022    Procedure: BLOCK / INJECTION EPIDURAL STEROID LUMBAR  right  L4-5 and L5-S1 TFESI;  Surgeon: Lorena Hernandez MD;  Location: [facility];  Service: Pain Management     VT INCISION & DRAINAGE ABSCESS COMPLICATED/MULTIPLE Left 05/02/2019    Procedure: INCISION AND DRAINAGE (I&D) EXTREMITY;  Surgeon: Fco Woodall MD;  Location: [facility];  Service: Orthopedics    VT LAPAROSCOPY COLECTOMY PARTIAL W/ANASTOMOSIS Left 05/21/2020    Procedure: LAPAROSCOPIC LEFT HEMICOLECTOMY TAKEDOWN SPLENIC FLECTURE, COLORECTAL ANASTOMOSIS.;  Surgeon: Abdelrahman Canales MD;  Location:  MAIN OR;  Service: Colorectal    VT NEUROPLASTY &/TRANSPOS MEDIAN NRV CARPAL TUNNE Left 01/09/2023    Procedure: RELEASE CARPAL TUNNEL;  Surgeon: Isauro Ledbetter DO;  Location: [facility];  Service: Orthopedics    VT OPEN TREATMENT METATARSAL FRACTURE EACH Right 02/24/2023    Procedure: OPEN REDUCTION W/ INTERNAL FIXATION (ORIF) FOOT;  Surgeon: Esteban Talamantes DPM;  Location: [facility];  Service: Podiatry    VT REMOVAL IMPLANT DEEP Right 11/27/2023    Procedure: REMOVAL HARDWARE FOOT, excision of painful os vesalanium, transfer of peroneus brevis tendon to the cuboid bone;  Surgeon: James R Lachman, MD;  Location:  MAIN OR;  Service: Orthopedics    WISDOM TOOTH EXTRACTION     [3]   Social History  Tobacco Use     Smoking status: Former     Current packs/day: 0.00     Types: E-Cigarettes, Cigarettes     Passive exposure: Past    Smokeless tobacco: Never    Tobacco comments:     Vapes / 2019 quit cigs   Vaping Use    Vaping status: Some Days    Substances: Nicotine, Flavoring   Substance and Sexual Activity    Alcohol use: Never    Drug use: Not Currently     Types: Marijuana, Prescription, Methamphetamines     Comment: on Methadone; occaionally marijuana- last used 2/10/23    Sexual activity: Not Currently   [4]   Family History  Problem Relation Name Age of Onset    Diabetes Mother      Restless legs syndrome Mother      Sleep apnea Mother      Colon cancer Father      Alzheimer's disease Maternal Grandmother      Depression Family

## 2025-05-24 NOTE — ASSESSMENT & PLAN NOTE
Creatinine level at 1.35  Baseline appears to be between 0.6 and 1.0  Received IV fluids in the ER  --Avoid nephrotoxic agents  --Continue gentle IV fluid  --Monitor creatinine level  --Check a.m. CMP  --Nephrology consult

## 2025-05-24 NOTE — ASSESSMENT & PLAN NOTE
Presented to the ER for evaluation after reported period of unresponsiveness  Unclear etiology, concern for pnumonia  SpO2 as low 78% in the ER  Placed on oxygen initially at 6 L/min  --Admit to stepdown  --Cardiopulmonary monitoring  --Respiratory protocol  --Aspiration precaution  --Incentive spirometry  --Monitor respiratory status, SpO2  --A.m. labs  --Pulmonary consult unavailable

## 2025-05-24 NOTE — QUICK NOTE
"Spoke to mother in the phone who explained that the patient have had two episodes in which he \"passes out,\" has foaming in the mouth, and rolling eyes up. First episode was December 9 and he was hospitalized for 10 days as per mother. She explains that this time the patient had a barium study and he was doing the preparation for the study. He then went to get the testing done and came back home complaining of a headache. The mother did not think much of the headache but then he heard him making odd breathing noises and she found him in the floor foaming from his mouth with his eyes rolled back up. She thought that it might be a drug overdose of some kind and game him Narcan but the patient was awake at the moment of administration of the medication. Once the patient regained consciousness, he vomited several times. She also reports that the patient is in charge of taking his own medication but he does not take them regularly. Based on his Rapid Drug Urine Test patient's last benzodiazepine dose might have been 5-7 days ago as he screened negative. Patient denied overdose to mother and the mother did not find any evidence of missing medications at home. The patient also visits Methadone clinic twice a week for urine checks and medication renewals.        "

## 2025-05-24 NOTE — ASSESSMENT & PLAN NOTE
Blood pressure  is  stable  --Continue PTA Blood pressure medication  --Monitor blood pressure while admitted

## 2025-05-24 NOTE — ED PROVIDER NOTES
Time reflects when diagnosis was documented in both MDM as applicable and the Disposition within this note       Time User Action Codes Description Comment    5/24/2025  1:00 AM Luisa Rojas Add [F19.10] Substance abuse (HCC)     5/24/2025  1:01 AM Luisa Rojas Add [J96.01] Acute hypoxic respiratory failure (HCC)     5/24/2025  1:01 AM Luisa Rojas Add [N17.9] KATHY (acute kidney injury) (HCC)           ED Disposition       ED Disposition   Admit    Condition   Stable    Date/Time   Sat May 24, 2025  1:00 AM    Comment   Case was discussed with DERIAN and the patient's admission status was agreed to be Admission Status: observation status to the service of Dr. Yan.               Assessment & Plan       Medical Decision Making  Amount and/or Complexity of Data Reviewed  Labs: ordered.  Radiology: ordered.    Risk  Prescription drug management.  Decision regarding hospitalization.      34-year-old male with a history of polysubstance abuse presenting for evaluation after accidental overdose.   Patient does not remember what happened prior to him coming to the emergency department.  He is on methadone. Denies recent drug use.  He did get Narcan x 3 doses for EMS with improvement in mental status.  He is yawning and feeling nauseous and having some generalized abdominal discomfort.  He otherwise does not appear in any acute distress.  He is slightly sleepy but easily arouses and answers questions appropriately.  Will continue to monitor, will check CBC to evaluate for anemia or leukocytosis, CMP to evaluate for metabolic abnormality.  While monitoring, patient started to become more somnolent, became slightly hypoxic, placed on nasal cannula.  Will obtain chest x-ray to evaluate for pneumonia or aspiration pneumonitis.  Reviewed labs, patient has KATHY, will treat with fluid bolus.  Reassessed patient, still remains hypoxic requiring oxygen, reviewed and interpreted chest x-ray, shows suspected aspiration pneumonitis  given hypoxia, will add on blood cultures, lactate and Pro-Seymour.  Will start patient on Rocephin for possible pneumonia versus aspiration pneumonitis.  UDS is only positive for methadone.  Due to hypoxic respiratory failure, will plan for admission.  Patient agreeable with plan for admission.  Discussed with medicine for admission.      Critical Care Time Statement: Upon my evaluation, this patient had a high probability of imminent or life-threatening deterioration due to acute hypoxic respiratory failure, KATHY, which required my direct attention, intervention, and personal management.  I spent a total of 35 minutes directly providing critical care services, including interpretation of complex medical databases, evaluating for the presence of life-threatening injuries or illnesses, management of organ system failure(s) , complex medical decision making (to support/prevent further life-threatening deterioration)., and interpretation of hemodynamic data. This time is exclusive of procedures, teaching, treating other patients, family meetings, and any prior time recorded by providers other than myself.            Medications   naloxone (FOR EMS ONLY) (NARCAN) 2 MG/2ML injection 2 mg (0 mg Does not apply Given to EMS 5/23/25 2209)   ondansetron (ZOFRAN) injection 4 mg (4 mg Intravenous Given 5/23/25 2216)   naloxone (NARCAN) 0.04 mg/mL syringe 0.04 mg (0.04 mg Intravenous Given 5/23/25 2234)   sodium chloride 0.9 % bolus 1,000 mL (1,000 mL Intravenous New Bag 5/23/25 2305)   cefTRIAXone (ROCEPHIN) IVPB (premix in dextrose) 2,000 mg 50 mL (2,000 mg Intravenous New Bag 5/24/25 0032)       ED Risk Strat Scores                    No data recorded                            History of Present Illness       Chief Complaint   Patient presents with    Overdose - Accidental     Per family, pt found unresponsive. Total of 3 narcans given prehospital.        Past Medical History[1]   Past Surgical History[2]   Family History[3]    Social History[4]   E-Cigarette/Vaping    E-Cigarette Use Current Some Day User     Cartridges/Day 1     Comments NICOTINE       E-Cigarette/Vaping Substances    Nicotine Yes     THC No     CBD No     Flavoring Yes     Other No     Unknown No       I have reviewed and agree with the history as documented.     HPI    34-year-old male with a history of polysubstance abuse presenting for evaluation after accidental overdose.  Patient denies using drugs today.  He states he last used methamphetamines about a week ago.  He states he does inject.  He denies any use of opiates.  He was brought in by EMS.  Patient was found unresponsive.  He was given Narcan by family as well as 2 doses of Narcan by EMS with improvement in mental status.  He did vomit after receiving Narcan.  He is yawning.  He states he has some nausea and generalized abdominal discomfort.  Denies chest pain or shortness of breath.    Review of Systems   Constitutional:  Negative for appetite change, chills and fever.   HENT:  Negative for congestion, rhinorrhea and sore throat.    Respiratory:  Negative for cough and shortness of breath.    Cardiovascular:  Negative for chest pain.   Gastrointestinal:  Positive for abdominal pain, nausea and vomiting. Negative for diarrhea.   Genitourinary:  Negative for dysuria, frequency, hematuria and urgency.   Musculoskeletal:  Negative for arthralgias and myalgias.   Skin:  Negative for rash.   Neurological:  Negative for dizziness, weakness, light-headedness, numbness and headaches.   All other systems reviewed and are negative.          Objective       ED Triage Vitals   Temperature Pulse Blood Pressure Respirations SpO2 Patient Position - Orthostatic VS   05/23/25 2205 05/23/25 2200 05/23/25 2205 05/23/25 2200 05/23/25 2205 05/23/25 2200   98.7 °F (37.1 °C) (!) 117 118/67 14 93 % Sitting      Temp src Heart Rate Source BP Location FiO2 (%) Pain Score    -- 05/23/25 2200 05/23/25 2200 -- --     Monitor Left arm         Vitals      Date and Time Temp Pulse SpO2 Resp BP Pain Score FACES Pain Rating User   05/23/25 2300 -- 110 90 % 20 114/74 -- --    05/23/25 2231 -- -- 90 % 11 -- -- --    05/23/25 2225 -- -- 78 % -- -- -- --    05/23/25 2205 98.7 °F (37.1 °C) -- 93 % -- 118/67 -- --    05/23/25 2200 -- 117 -- 14 -- -- -- CD            Physical Exam  Vitals and nursing note reviewed.   Constitutional:       General: He is not in acute distress.     Appearance: Normal appearance. He is well-developed. He is obese. He is not ill-appearing, toxic-appearing or diaphoretic.   HENT:      Head: Normocephalic and atraumatic.      Right Ear: External ear normal.      Left Ear: External ear normal.      Nose: Nose normal.      Mouth/Throat:      Mouth: Mucous membranes are moist.      Pharynx: Oropharynx is clear.      Comments: Vomit around the mouth.    Eyes:      Extraocular Movements: Extraocular movements intact.      Conjunctiva/sclera: Conjunctivae normal.       Cardiovascular:      Rate and Rhythm: Regular rhythm. Tachycardia present.      Pulses: Normal pulses.      Heart sounds: Normal heart sounds. No murmur heard.     No friction rub. No gallop.   Pulmonary:      Effort: Pulmonary effort is normal. No respiratory distress.      Breath sounds: Normal breath sounds. No wheezing or rales.   Abdominal:      General: There is no distension.      Palpations: Abdomen is soft.      Tenderness: There is no abdominal tenderness. There is no guarding or rebound.     Musculoskeletal:         General: No tenderness.      Cervical back: Neck supple.      Right lower leg: No edema.      Left lower leg: No edema.     Skin:     General: Skin is warm and dry.      Coloration: Skin is not pale.      Findings: No rash.     Neurological:      General: No focal deficit present.      Mental Status: He is alert and oriented to person, place, and time.      Cranial Nerves: No cranial nerve deficit.      Sensory: No sensory deficit.       Motor: No weakness.      Comments: Slightly somnolent but is easily arousable and open eyes to voice, answers questions appropriately.   Psychiatric:         Mood and Affect: Mood normal.         Behavior: Behavior normal.         Results Reviewed       Procedure Component Value Units Date/Time    Procalcitonin [[id number]]  (Abnormal) Collected: 05/24/25 0032    Lab Status: Final result Specimen: Blood from Arm, Right Updated: 05/24/25 0104     Procalcitonin 3.40 ng/ml     Lactic acid, plasma (w/reflex if result > 2.0) [[id number]]  (Normal) Collected: 05/24/25 0032    Lab Status: Final result Specimen: Blood from Arm, Right Updated: 05/24/25 0055     LACTIC ACID 1.9 mmol/L     Narrative:      Result may be elevated if tourniquet was used during collection.    Blood culture #1 [[id number]] Collected: 05/24/25 0032    Lab Status: In process Specimen: Blood from Arm, Right Updated: 05/24/25 0037    Blood culture #2 [[id number]] Collected: 05/24/25 0032    Lab Status: In process Specimen: Blood from Arm, Left Updated: 05/24/25 0036    Rapid drug screen, urine [[id number]]  (Abnormal) Collected: 05/23/25 2305    Lab Status: Final result Specimen: Urine, Clean Catch Updated: 05/23/25 2332     Amph/Meth UR Negative     Barbiturate Ur Negative     Benzodiazepine Urine Negative     Cocaine Urine Negative     Methadone Urine Positive     Opiate Urine Negative     PCP Ur Negative     THC Urine Negative     Oxycodone Urine Negative     Fentanyl Urine Negative     HYDROCODONE URINE Negative    Narrative:      Presumptive report. If requested, specimen will be sent to reference lab for confirmation.  FOR MEDICAL PURPOSES ONLY.   IF CONFIRMATION NEEDED PLEASE CONTACT THE LAB WITHIN 5 DAYS.    Drug Screen Cutoff Levels:  AMPHETAMINE/METHAMPHETAMINES  1000 ng/mL  BARBITURATES     200 ng/mL  BENZODIAZEPINES     200 ng/mL  COCAINE      300 ng/mL  METHADONE      300 ng/mL  OPIATES      300 ng/mL  PHENCYCLIDINE     25 ng/mL  THC       50  ng/mL  OXYCODONE      100 ng/mL  FENTANYL      5 ng/mL  HYDROCODONE     300 ng/mL    Manual Differential(PHLEBS Do Not Order) [[id number]]  (Abnormal) Collected: 05/23/25 2216    Lab Status: Final result Specimen: Blood from Arm, Right Updated: 05/23/25 2324     Segmented % 91 %      Bands % 3 %      Lymphocytes % 4 %      Monocytes % 2 %      Eosinophils % 0 %      Basophils % 0 %      Absolute Neutrophils 19.31 Thousand/uL      Absolute Lymphocytes 0.82 Thousand/uL      Absolute Monocytes 0.41 Thousand/uL      Absolute Eosinophils 0.00 Thousand/uL      Absolute Basophils 0.00 Thousand/uL      Total Counted --     RBC Morphology Present     Platelet Estimate Adequate     Anisocytosis Present    Comprehensive metabolic panel [[id number]]  (Abnormal) Collected: 05/23/25 2216    Lab Status: Final result Specimen: Blood from Arm, Right Updated: 05/23/25 2243     Sodium 133 mmol/L      Potassium 5.3 mmol/L      Chloride 94 mmol/L      CO2 26 mmol/L      ANION GAP 13 mmol/L      BUN 14 mg/dL      Creatinine 1.35 mg/dL      Glucose 175 mg/dL      Calcium 8.8 mg/dL      AST 45 U/L      ALT 47 U/L      Alkaline Phosphatase 55 U/L      Total Protein 7.4 g/dL      Albumin 4.1 g/dL      Total Bilirubin 0.52 mg/dL      eGFR 67 ml/min/1.73sq m     Narrative:      National Kid[employer] guidelines for Chronic Kidney Disease (CKD):     Stage 1 with normal or high GFR (GFR > 90 mL/min/1.73 square meters)    Stage 2 Mild CKD (GFR = 60-89 mL/min/1.73 square meters)    Stage 3A Moderate CKD (GFR = 45-59 mL/min/1.73 square meters)    Stage 3B Moderate CKD (GFR = 30-44 mL/min/1.73 square meters)    Stage 4 Severe CKD (GFR = 15-29 mL/min/1.73 square meters)    Stage 5 End Stage CKD (GFR <15 mL/min/1.73 square meters)  Note: GFR calculation is accurate only with a steady state creatinine    CBC and differential [[id number]]  (Abnormal) Collected: 05/23/25 2216    Lab Status: Final result Specimen: Blood from Arm, Right Updated:  05/23/25 2226     WBC 20.54 Thousand/uL      RBC 5.78 Million/uL      Hemoglobin 14.6 g/dL      Hematocrit 46.9 %      MCV 81 fL      MCH 25.3 pg      MCHC 31.1 g/dL      RDW 16.5 %      MPV 9.6 fL      Platelets 199 Thousands/uL     Narrative:      This is an appended report.  These results have been appended to a previously verified report.            XR chest 1 view portable    (Results Pending)       ECG 12 Lead Documentation Only    Date/Time: 5/24/2025 1:12 AM    Performed by: Luisa Rojas MD  Authorized by: Luisa Rojas MD    Indications / Diagnosis:  Tachycardia  ECG reviewed by me, the ED Provider: yes    Patient location:  ED  Previous ECG:     Previous ECG:  Compared to current    Similarity:  No change    Comparison to cardiac monitor: Yes    Interpretation:     Interpretation: non-specific    Rate:     ECG rate:  113    ECG rate assessment: tachycardic    Rhythm:     Rhythm: sinus tachycardia    Ectopy:     Ectopy: none    QRS:     QRS axis:  Normal    QRS intervals:  Normal  Conduction:     Conduction: abnormal      Abnormal conduction: incomplete RBBB    ST segments:     ST segments:  Normal  T waves:     T waves: normal        ED Medication and Procedure Management   Prior to Admission Medications   Prescriptions Last Dose Informant Patient Reported? Taking?   Linzess 145 MCG CAPS  Self Yes No   Sig: Take 145 mcg by mouth every other day   METHADONE HCL PO  Self Yes No   Sig: Take 150 mg by mouth daily   Rexulti 3 MG tablet  Self No No   Sig: Take 1 tablet (3 mg total) by mouth daily   SODIUM FLUORIDE, DENTAL GEL, 1.1 % CREA   No No   Sig: Apply to teeth daily at bedtime   Testosterone Cypionate 200 MG/ML SOLN  Self No No   Sig: Inject 200 mg as directed every 14 (fourteen) days   Wegovy 1 MG/0.5ML   No No   Sig: Inject 1 mg under the skin weekly   Patient not taking: Reported on 4/3/2025   albuterol (Ventolin HFA) 90 mcg/act inhaler   No No   Sig: Inhale 2 puffs every 6 (six) hours as needed  for wheezing   clonazePAM (KlonoPIN) 1 mg tablet  Self Yes No   Sig: Take 1 mg by mouth 2 (two) times a day   levocetirizine (XYZAL) 5 MG tablet  Self No No   Sig: TAKE ONE TABLET BY MOUTH EVERY EVENING   metoclopramide (Reglan) 10 mg tablet   No No   Sig: Take 1 tablet (10 mg total) by mouth every 6 (six) hours as needed (nausea)   mometasone-formoterol (Dulera) 200-5 MCG/ACT inhaler   No No   Sig: Inhale 2 puffs 2 (two) times a day Rinse mouth after use.   nicotine polacrilex (NICORETTE) 4 mg gum   No No   Sig: Chew 1 each (4 mg total) as needed for smoking cessation   Patient not taking: Reported on 4/3/2025   nystatin (MYCOSTATIN) powder  Self No No   Sig: Apply topically every morning   omeprazole (PriLOSEC) 20 mg delayed release capsule  Self Yes No   Sig: Take 20 mg by mouth 2 (two) times a day    ondansetron (ZOFRAN-ODT) 4 mg disintegrating tablet   No No   Sig: Take 1 tablet (4 mg total) by mouth as needed for nausea   pantoprazole (PROTONIX) 40 mg tablet   No No   Sig: Take 1 tablet (40 mg total) by mouth daily   Patient not taking: Reported on 2025   prazosin (MINIPRESS) 1 mg capsule   Yes No   prazosin (MINIPRESS) 5 mg capsule  Self Yes No   pregabalin (LYRICA) 150 mg capsule   No No   Sig: TAKE 1 CAPSULE (150 MG TOTAL) BY MOUTH 2 (TWO) TIMES A DAY   temazepam (RESTORIL) 15 mg capsule  Self Yes No   Sig: Take 15 mg by mouth daily at bedtime   vilazodone (VIIBRYD) 40 mg tablet  Self Yes No   Si mg daily with breakfast      Facility-Administered Medications Last Administration Doses Remaining   testosterone cypionate (DEPO-TESTOSTERONE) 200 mg/mL IM injection 50 mg 2025 11:01 AM         Patient's Medications   Discharge Prescriptions    No medications on file     No discharge procedures on file.  ED SEPSIS DOCUMENTATION   Time reflects when diagnosis was documented in both MDM as applicable and the Disposition within this note       Time User Action Codes Description Comment    2025   1:00 AM Luisa Rojas [F19.10] Substance abuse ([facility])     5/24/2025  1:01 AM Luisa Rojas [J96.01] Acute hypoxic respiratory failure ([facility])     5/24/2025  1:01 AM Luisa Rojas [N17.9] KATHY (acute kidney injury) ([facility])                      [1]   Past Medical History:  Diagnosis Date    Allergic     Anemia 06/24/2018    Anxiety     from records    Arthritis     Asthma     Benign essential hypertension 11/17/2016    Bipolar 1 disorder ([facility])     from records    Chronic pain     Chronic pain disorder     back/ knees/ hip    Claustrophobia 03/15/2018    Community acquired pneumonia 06/24/2018    CPAP (continuous positive airway pressure) dependence     Dental abscess     11/9/23- pt was started on amoxicillin- will finish on 11/19/23    Depressed 07/14/2016    Depression     from records    GERD (gastroesophageal reflux disease)     Hepatitis C virus infection 08/14/2014    Heroin abuse ([facility]) 07/24/2018    Infectious viral hepatitis     Obesity 10/12/2016    Obstructive sleep apnea     from records    Sleep disorder     Substance abuse ([facility])    [2]   Past Surgical History:  Procedure Laterality Date    ACHILLES TENDON SURGERY Right 11/27/2023    Procedure: transfer of peroneus brevis tendon to the cuboid bone;  Surgeon: James R Lachman, MD;  Location:  MAIN OR;  Service: Orthopedics    COLONOSCOPY      EGD      EPIDURAL BLOCK INJECTION Right 10/28/2022    Procedure: BLOCK / INJECTION EPIDURAL STEROID LUMBAR  right L4-5 and L5-S1 TFESI;  Surgeon: Lorena Hernandez MD;  Location: [facility];  Service: Pain Management     EPIDURAL BLOCK INJECTION Right 12/20/2022    Procedure: BLOCK / INJECTION EPIDURAL STEROID LUMBAR  right  L4-5 and L5-S1 TFESI;  Surgeon: Lorena Hernandez MD;  Location: [facility];  Service: Pain Management     MA INCISION & DRAINAGE ABSCESS COMPLICATED/MULTIPLE Left 05/02/2019    Procedure: INCISION AND DRAINAGE (I&D) EXTREMITY;  Surgeon: Fco Woodall MD;  Location: [facility];   Service: Orthopedics    NE LAPAROSCOPY COLECTOMY PARTIAL W/ANASTOMOSIS Left 05/21/2020    Procedure: LAPAROSCOPIC LEFT HEMICOLECTOMY TAKEDOWN SPLENIC FLECTURE, COLORECTAL ANASTOMOSIS.;  Surgeon: Abdelrahman Canales MD;  Location: [facility];  Service: Colorectal    NE NEUROPLASTY &/TRANSPOS MEDIAN NRV CARPAL TUNNE Left 01/09/2023    Procedure: RELEASE CARPAL TUNNEL;  Surgeon: Isauro Ledbetter DO;  Location: [facility];  Service: Orthopedics    NE OPEN TREATMENT METATARSAL FRACTURE EACH Right 02/24/2023    Procedure: OPEN REDUCTION W/ INTERNAL FIXATION (ORIF) FOOT;  Surgeon: Esteban Talamantes DPM;  Location: [facility];  Service: Podiatry    NE REMOVAL IMPLANT DEEP Right 11/27/2023    Procedure: REMOVAL HARDWARE FOOT, excision of painful os vesalanium, transfer of peroneus brevis tendon to the cuboid bone;  Surgeon: James R Lachman, MD;  Location:  MAIN OR;  Service: Orthopedics    WISDOM TOOTH EXTRACTION     [3]   Family History  Problem Relation Name Age of Onset    Diabetes Mother      Restless legs syndrome Mother      Sleep apnea Mother      Colon cancer Father      Alzheimer's disease Maternal Grandmother      Depression Family     [4]   Social History  Tobacco Use    Smoking status: Former     Current packs/day: 0.00     Types: E-Cigarettes, Cigarettes     Passive exposure: Past    Smokeless tobacco: Never    Tobacco comments:     Vapes / 2019 quit cigs   Vaping Use    Vaping status: Some Days    Substances: Nicotine, Flavoring   Substance Use Topics    Alcohol use: Never    Drug use: Not Currently     Types: Marijuana, Prescription, Methamphetamines     Comment: on Methadone; occaionally marijuana- last used 2/10/23        Luisa Rojas MD  05/28/25 0103

## 2025-05-24 NOTE — PROGRESS NOTES
Progress Note - Hospitalist   Name: Sacha Foster 34 y.o. male I MRN: [id number]  Unit/Bed#:  I Date of Admission: 5/23/2025   Date of Service: 5/24/2025 I Hospital Day: 0    Assessment & Plan  Acute respiratory failure with hypoxia (HCC)  Presented to the ER for evaluation after reported period of unresponsiveness  Unclear etiology, concern for pneumonia  SpO2 as low 78% in the ER  Placed on oxygen initially at 6 L/min now in 2L/min  Increased Procalcitonin 3.4  WBC on ED 20.54, now 11.37  Cardiopulmonary monitoring  Respiratory protocol  Aspiration precaution  Incentive spirometry  Monitor respiratory status, SpO2  Sputum culture pending  Essential hypertension  Blood pressure is stable  Continue PTA Blood pressure medication  Monitor blood pressure while admitted   Drug dependence ([facility])  Patient with history of drug abuse with  Patient Is currently on methadone  UDS positive for methadone  Continue Methadone  Central sleep apnea  Respiratory protocol  Continue CPAP at bedtime  KATHY (acute kidney injury) ([facility])  Creatinine level at 1.35, now 0.98  Baseline appears to be between 0.6 and 1.0  Received IV fluids in the ER  Avoid nephrotoxic agents  Continue gentle IV fluid  Monitor creatinine level  Nephrology consult  Severe sepsis ([facility])  SIRS criteria 3/4 (tachycardia, tachypnea, leukocytosis, KATHY present)  Likely secondary to pneumonia   Received IV ceftriaxone   Received IV fluids bolus  Blood cultures pending  Continue Sepsis pathway  Continue IV fluids  Change IV Ceftriaxone for IV Unasyn to cover for aspiration pneumonia  Follow cultures  Monitor hemodynamic status  Patient previously positive for MRSA during hospital admission on December 2024 for which he required IV vancomycin, sputum cultures pending, will watch oxygenation status to determine need for IV Vanco.  Accidental overdose  Patient reportedly found unresponsive at home  Unclear substance/drug use [employer] patient  Patient reportedly  received Narcan from family as well as EMS with some improvement   Toxicology urine positive only for on methadone  Will keep NPO for now  Aspiration precaution  Cardiopulmonary monitoring  Continue IV fluids      VTE Pharmacologic Prophylaxis: VTE Score: 6 High Risk (Score >/= 5) - Pharmacological DVT Prophylaxis Ordered: heparin. Sequential Compression Devices Ordered.    Mobility:   Basic Mobility Inpatient Raw Score: 24  JH-HLM Goal: 8: Walk 250 feet or more  JH-HLM Achieved: 6: Walk 10 steps or more (walked to bathroom 2x)  JH-HLM Goal NOT achieved. Continue with multidisciplinary rounding and encourage appropriate mobility to improve upon JH-HLM goals.    Discussions with Specialists or Other Care Team Provider: attending    Education and Discussions with Family / Patient: Attempted to update  (father and mother) via phone. Unable to contact.    Current Length of Stay: 0 day(s)  Current Patient Status: Observation   Certification Statement: The patient, admitted on an observation basis, will now require > 2 midnight hospital stay due to rapid improvement of symptoms  Discharge Plan: Anticipate discharge later today or tomorrow to home.    Code Status: Level 1 - Full Code    Subjective   Patient examined at bedside. No acute events overnight. Reports feeling better and denies SOB. WBC trending down, most likely aspiration pneumonia.    Objective :  Temp:  [98.1 °F (36.7 °C)-99 °F (37.2 °C)] 98.7 °F (37.1 °C)  HR:  [] 86  BP: (108-132)/(55-74) 111/66  Resp:  [6-20] 15  SpO2:  [78 %-96 %] 94 %  O2 Device: Nasal cannula  Nasal Cannula O2 Flow Rate (L/min):  [3 L/min-6 L/min] 3 L/min    Body mass index is 52.23 kg/m².     Input and Output Summary (last 24 hours):   No intake or output data in the 24 hours ending 05/24/25 0827    Physical Exam  Vitals and nursing note reviewed.   Constitutional:       General: He is not in acute distress.     Appearance: He is well-developed.   HENT:      Head:  Normocephalic and atraumatic.     Eyes:      Conjunctiva/sclera: Conjunctivae normal.       Cardiovascular:      Rate and Rhythm: Normal rate.      Heart sounds: Normal heart sounds. No murmur heard.  Pulmonary:      Effort: Pulmonary effort is normal. No respiratory distress.      Comments: Decreased respiratory sound  Abdominal:      Palpations: Abdomen is soft.      Tenderness: There is no abdominal tenderness.     Musculoskeletal:         General: No swelling.      Cervical back: Neck supple.     Skin:     General: Skin is warm and dry.      Capillary Refill: Capillary refill takes less than 2 seconds.     Neurological:      Mental Status: He is alert.     Psychiatric:         Mood and Affect: Mood normal.           Lab Results: I have reviewed the following results:   Results from last 7 days   Lab Units 05/24/25  0544 05/23/25  2216   WBC Thousand/uL 11.37* 20.54*   HEMOGLOBIN g/dL 13.7 14.6   HEMATOCRIT % 42.2 46.9   PLATELETS Thousands/uL 210 199   BANDS PCT %  --  3   SEGS PCT % 84*  --    LYMPHO PCT % 13* 4*   MONO PCT % 3* 2*   EOS PCT % 0 0     Results from last 7 days   Lab Units 05/24/25  0544   SODIUM mmol/L 137   POTASSIUM mmol/L 4.0   CHLORIDE mmol/L 93*   CO2 mmol/L 30   BUN mg/dL 10   CREATININE mg/dL 0.98   ANION GAP mmol/L 14*   CALCIUM mg/dL 7.8*   ALBUMIN g/dL 3.5   TOTAL BILIRUBIN mg/dL 0.30   ALK PHOS U/L 42   ALT U/L 34   AST U/L 29   GLUCOSE RANDOM mg/dL 186*                 Results from last 7 days   Lab Units 05/24/25  0032   LACTIC ACID mmol/L 1.9   PROCALCITONIN ng/ml 3.40*       Recent Cultures (last 7 days):         Imaging Results Review: I reviewed radiology reports from this admission including: chest xray and CT abdomen/pelvis.  Other Study Results Review: EKG was reviewed.     Last 24 Hours Medication List:     Current Facility-Administered Medications:     acetaminophen (TYLENOL) tablet 650 mg, Q6H PRN    albuterol (PROVENTIL HFA,VENTOLIN HFA) inhaler 2 puff, Q6H PRN     ampicillin-sulbactam (UNASYN) 3 g in sodium chloride 0.9 % 100 mL IVPB, Q6H    ARIPiprazole (ABILIFY) tablet 2 mg, Daily    clonazePAM (KlonoPIN) tablet 1 mg, BID    fluticasone-vilanterol 200-25 mcg/actuation 1 puff, Daily    heparin (porcine) subcutaneous injection 5,000 Units, Q8H MARICEL **AND** [CANCELED] Platelet count, Once    loratadine (CLARITIN) tablet 10 mg, Daily    lubiprostone (AMITIZA) capsule 24 mcg, BID    magnesium sulfate 2 g/50 mL IVPB (premix) 2 g, Once    methadone (Dolophine) tablet 150 mg, Daily    nystatin (MYCOSTATIN) powder, QAM    ondansetron (ZOFRAN) injection 4 mg, Q6H PRN    pantoprazole (PROTONIX) EC tablet 40 mg, Early Morning    prazosin (MINIPRESS) capsule 1 mg, HS    prazosin (MINIPRESS) capsule 5 mg, HS    pregabalin (LYRICA) capsule 150 mg, BID    sodium chloride 0.9 % infusion, Continuous, Last Rate: 75 mL/hr (05/24/25 0544)    temazepam (RESTORIL) capsule 15 mg, HS    vilazodone (VIIBRYD) tablet 40 mg, Daily With Breakfast    Administrative Statements   Today, Patient Was Seen By: Ann Schwarz MD  I have spent a total time of 30 minutes in caring for this patient on the day of the visit/encounter including Documenting in the medical record, Reviewing/placing orders in the medical record (including tests, medications, and/or procedures), Obtaining or reviewing history  , and Communicating with other healthcare professionals .    **Please Note: This note may have been constructed using a voice recognition system.**

## 2025-05-24 NOTE — ASSESSMENT & PLAN NOTE
Presented to the ER for evaluation after reported period of unresponsiveness  Unclear etiology, concern for pneumonia  SpO2 as low 78% in the ER  Placed on oxygen initially at 6 L/min now in 2L/min  Increased Procalcitonin 3.4  WBC on ED 20.54, now 11.37  Cardiopulmonary monitoring  Respiratory protocol  Aspiration precaution  Incentive spirometry  Monitor respiratory status, SpO2  Sputum culture pending

## 2025-05-24 NOTE — ASSESSMENT & PLAN NOTE
Blood pressure is stable  Continue PTA Blood pressure medication  Monitor blood pressure while admitted

## 2025-05-24 NOTE — ASSESSMENT & PLAN NOTE
SIRS criteria 3/4 (tachycardia, tachypnea, leukocytosis  KATHY present  Likely secondary to pneumonia   Received IV ceftriaxone   Received IV fluids bolus  Blood cultures pending  --Continue Sepsis pathway  --Continue IV fluids  --Continue IV ceftriaxone for now  --Follow cultures  --Monitor hemodynamic status  --Am labs

## 2025-05-24 NOTE — RESPIRATORY THERAPY NOTE
"RT Protocol Note  Sacha Foster 34 y.o. male MRN: [id number]  Unit/Bed#:  Encounter: [id number]    Assessment    Principal Problem:    Acute respiratory failure with hypoxia (HCC)  Active Problems:    Essential hypertension    Drug dependence (HCC)    Central sleep apnea    KATHY (acute kidney injury) (HCC)      Home Pulmonary Medications:  Albuterol MDI PRN  Home Devices/Therapy: BiPAP/CPAP    Past Medical History[1]  Social History[2]    Subjective         Objective    Physical Exam:   Assessment Type: Assess only  General Appearance: Alert  Respiratory Pattern: Symmetrical, Spontaneous, Normal  Chest Assessment: Chest expansion symmetrical  Bilateral Breath Sounds: Diminished  Cough: None  O2 Device: 2L NC    Vitals:  Blood pressure 132/72, pulse 92, temperature 99 °F (37.2 °C), temperature source Tympanic, resp. rate 14, height 5' 10\" (1.778 m), weight (!) 165 kg (364 lb), SpO2 96%.              O2 Device: 2L NC     Plan    Respiratory Plan: No distress/Pulmonary history        Resp Comments: Pt admitted for acute RF with hypoxia. He wears 2L at home HS with an ASV CPAP device and has a PRN Albuterol inhaler. Currenty his BS are diminihsed and he has a non-productive cough. He was placed on a BiPAP for HS. No didtress noted at this time. Will continue to monitor.        [1]   Past Medical History:  Diagnosis Date    Allergic     Anemia 06/24/2018    Anxiety     from records    Arthritis     Asthma     Benign essential hypertension 11/17/2016    Bipolar 1 disorder (HCC)     from records    Chronic pain     Chronic pain disorder     back/ knees/ hip    Claustrophobia 03/15/2018    Community acquired pneumonia 06/24/2018    CPAP (continuous positive airway pressure) dependence     Dental abscess     11/9/23- pt was started on amoxicillin- will finish on 11/19/23    Depressed 07/14/2016    Depression     from records    GERD (gastroesophageal reflux disease)     Hepatitis C virus infection 08/14/2014    " Heroin abuse (HCC) 2018    Infectious viral hepatitis     Obesity 10/12/2016    Obstructive sleep apnea     from records    Sleep disorder     Substance abuse (HCC)    [2]   Social History  Socioeconomic History    Marital status: Single   Tobacco Use    Smoking status: Former     Current packs/day: 0.00     Types: E-Cigarettes, Cigarettes     Passive exposure: Past    Smokeless tobacco: Never    Tobacco comments:     Vapes /  quit cigs   Vaping Use    Vaping status: Some Days    Substances: Nicotine, Flavoring   Substance and Sexual Activity    Alcohol use: Never    Drug use: Not Currently     Types: Marijuana, Prescription, Methamphetamines     Comment: on Methadone; occaionally marijuana- last used 2/10/23    Sexual activity: Not Currently   Social History Narrative    H/O intravenous drug use in remission    Hepatitis C virus Infection    Lives with parents    No caffeine use    Unemployed     Social Drivers of Health     Financial Resource Strain: High Risk (10/10/2022)    Overall Financial Resource Strain (CARDIA)     Difficulty of Paying Living Expenses: Very hard   Food Insecurity: No Food Insecurity (2024)    Nursing - Inadequate Food Risk Classification     Ran Out of Food in the Last Year: Never true   Transportation Needs: No Transportation Needs (2024)    Nursing - Transportation Risk Classification     Lack of Transportation: No    Received from [employer]   Intimate Partner Violence: Unknown (2024)    Nursing IPS     Physically Hurt by Someone: No     Hurt or Threatened by Someone: No   Housing Stability: Unknown (2024)    Nursing: Inadequate Housing Risk Classification     Unable to Pay for Housing in the Last Year: No     Has Housin

## 2025-05-25 PROBLEM — R40.4 EPISODE OF UNRESPONSIVENESS: Status: ACTIVE | Noted: 2024-12-04

## 2025-05-25 LAB
ANION GAP SERPL CALCULATED.3IONS-SCNC: 5 MMOL/L (ref 4–13)
BASOPHILS # BLD AUTO: 0.04 THOUSANDS/ÂΜL (ref 0–0.1)
BASOPHILS NFR BLD AUTO: 1 % (ref 0–1)
BUN SERPL-MCNC: 9 MG/DL (ref 5–25)
CALCIUM SERPL-MCNC: 8.7 MG/DL (ref 8.4–10.2)
CHLORIDE SERPL-SCNC: 102 MMOL/L (ref 96–108)
CO2 SERPL-SCNC: 32 MMOL/L (ref 21–32)
CREAT SERPL-MCNC: 0.86 MG/DL (ref 0.6–1.3)
EOSINOPHIL # BLD AUTO: 0.14 THOUSAND/ÂΜL (ref 0–0.61)
EOSINOPHIL NFR BLD AUTO: 2 % (ref 0–6)
ERYTHROCYTE [DISTWIDTH] IN BLOOD BY AUTOMATED COUNT: 16.2 % (ref 11.6–15.1)
GFR SERPL CREATININE-BSD FRML MDRD: 112 ML/MIN/1.73SQ M
GLUCOSE P FAST SERPL-MCNC: 168 MG/DL (ref 65–99)
GLUCOSE SERPL-MCNC: 168 MG/DL (ref 65–140)
HCT VFR BLD AUTO: 40.9 % (ref 36.5–49.3)
HGB BLD-MCNC: 12.9 G/DL (ref 12–17)
IMM GRANULOCYTES # BLD AUTO: 0.02 THOUSAND/UL (ref 0–0.2)
IMM GRANULOCYTES NFR BLD AUTO: 0 % (ref 0–2)
LYMPHOCYTES # BLD AUTO: 1.81 THOUSANDS/ÂΜL (ref 0.6–4.47)
LYMPHOCYTES NFR BLD AUTO: 22 % (ref 14–44)
MCH RBC QN AUTO: 25.4 PG (ref 26.8–34.3)
MCHC RBC AUTO-ENTMCNC: 31.5 G/DL (ref 31.4–37.4)
MCV RBC AUTO: 81 FL (ref 82–98)
MONOCYTES # BLD AUTO: 0.47 THOUSAND/ÂΜL (ref 0.17–1.22)
MONOCYTES NFR BLD AUTO: 6 % (ref 4–12)
MRSA NOSE QL CULT: ABNORMAL
MRSA NOSE QL CULT: ABNORMAL
NEUTROPHILS # BLD AUTO: 5.86 THOUSANDS/ÂΜL (ref 1.85–7.62)
NEUTS SEG NFR BLD AUTO: 69 % (ref 43–75)
NRBC BLD AUTO-RTO: 0 /100 WBCS
PLATELET # BLD AUTO: 158 THOUSANDS/UL (ref 149–390)
PMV BLD AUTO: 9.6 FL (ref 8.9–12.7)
POTASSIUM SERPL-SCNC: 3.9 MMOL/L (ref 3.5–5.3)
RBC # BLD AUTO: 5.07 MILLION/UL (ref 3.88–5.62)
SODIUM SERPL-SCNC: 139 MMOL/L (ref 135–147)
WBC # BLD AUTO: 8.34 THOUSAND/UL (ref 4.31–10.16)

## 2025-05-25 PROCEDURE — 99254 IP/OBS CNSLTJ NEW/EST MOD 60: CPT | Performed by: PSYCHIATRY & NEUROLOGY

## 2025-05-25 PROCEDURE — 99232 SBSQ HOSP IP/OBS MODERATE 35: CPT | Performed by: HOSPITALIST

## 2025-05-25 PROCEDURE — 80048 BASIC METABOLIC PNL TOTAL CA: CPT | Performed by: STUDENT IN AN ORGANIZED HEALTH CARE EDUCATION/TRAINING PROGRAM

## 2025-05-25 PROCEDURE — 94660 CPAP INITIATION&MGMT: CPT

## 2025-05-25 PROCEDURE — 4A10X4Z MONITORING OF CENTRAL NERVOUS ELECTRICAL ACTIVITY, EXTERNAL APPROACH: ICD-10-PCS | Performed by: FAMILY MEDICINE

## 2025-05-25 PROCEDURE — 85025 COMPLETE CBC W/AUTO DIFF WBC: CPT | Performed by: STUDENT IN AN ORGANIZED HEALTH CARE EDUCATION/TRAINING PROGRAM

## 2025-05-25 PROCEDURE — 94760 N-INVAS EAR/PLS OXIMETRY 1: CPT

## 2025-05-25 RX ADMIN — NICOTINE 21 MG: 21 PATCH, EXTENDED RELEASE TRANSDERMAL at 09:10

## 2025-05-25 RX ADMIN — LUBIPROSTONE 24 MCG: 24 CAPSULE, GELATIN COATED ORAL at 09:07

## 2025-05-25 RX ADMIN — CLONAZEPAM 1 MG: 0.5 TABLET ORAL at 09:08

## 2025-05-25 RX ADMIN — HEPARIN SODIUM 5000 UNITS: 5000 INJECTION INTRAVENOUS; SUBCUTANEOUS at 14:36

## 2025-05-25 RX ADMIN — AMPICILLIN SODIUM AND SULBACTAM SODIUM 3 G: 2; 1 INJECTION, POWDER, FOR SOLUTION INTRAMUSCULAR; INTRAVENOUS at 21:12

## 2025-05-25 RX ADMIN — PREGABALIN 150 MG: 75 CAPSULE ORAL at 09:08

## 2025-05-25 RX ADMIN — AMPICILLIN SODIUM AND SULBACTAM SODIUM 3 G: 2; 1 INJECTION, POWDER, FOR SOLUTION INTRAMUSCULAR; INTRAVENOUS at 14:38

## 2025-05-25 RX ADMIN — TEMAZEPAM 15 MG: 15 CAPSULE ORAL at 21:13

## 2025-05-25 RX ADMIN — PREGABALIN 150 MG: 75 CAPSULE ORAL at 17:33

## 2025-05-25 RX ADMIN — VILAZODONE HYDROCHLORIDE 40 MG: 20 TABLET ORAL at 09:11

## 2025-05-25 RX ADMIN — PRAZOSIN HYDROCHLORIDE 1 MG: 1 CAPSULE ORAL at 21:13

## 2025-05-25 RX ADMIN — METHADONE HYDROCHLORIDE 150 MG: 10 TABLET ORAL at 09:08

## 2025-05-25 RX ADMIN — AMPICILLIN SODIUM AND SULBACTAM SODIUM 3 G: 2; 1 INJECTION, POWDER, FOR SOLUTION INTRAMUSCULAR; INTRAVENOUS at 09:11

## 2025-05-25 RX ADMIN — NYSTATIN: 100000 POWDER TOPICAL at 09:11

## 2025-05-25 RX ADMIN — PRAZOSIN HYDROCHLORIDE 5 MG: 1 CAPSULE ORAL at 21:13

## 2025-05-25 RX ADMIN — FLUTICASONE FUROATE AND VILANTEROL TRIFENATATE 1 PUFF: 200; 25 POWDER RESPIRATORY (INHALATION) at 09:11

## 2025-05-25 RX ADMIN — LORATADINE 10 MG: 10 TABLET ORAL at 09:09

## 2025-05-25 RX ADMIN — AMPICILLIN SODIUM AND SULBACTAM SODIUM 3 G: 2; 1 INJECTION, POWDER, FOR SOLUTION INTRAMUSCULAR; INTRAVENOUS at 02:59

## 2025-05-25 RX ADMIN — HEPARIN SODIUM 5000 UNITS: 5000 INJECTION INTRAVENOUS; SUBCUTANEOUS at 05:23

## 2025-05-25 RX ADMIN — LUBIPROSTONE 24 MCG: 24 CAPSULE, GELATIN COATED ORAL at 21:12

## 2025-05-25 RX ADMIN — CLONAZEPAM 1 MG: 0.5 TABLET ORAL at 17:33

## 2025-05-25 RX ADMIN — HEPARIN SODIUM 5000 UNITS: 5000 INJECTION INTRAVENOUS; SUBCUTANEOUS at 21:12

## 2025-05-25 RX ADMIN — ARIPIPRAZOLE 2 MG: 2 TABLET ORAL at 09:09

## 2025-05-25 RX ADMIN — SODIUM CHLORIDE 75 ML/HR: 0.9 INJECTION, SOLUTION INTRAVENOUS at 06:46

## 2025-05-25 NOTE — PROGRESS NOTES
Progress Note - Hospitalist   Name: Sacha Foster 34 y.o. male I MRN: [id number]  Unit/Bed#:  I Date of Admission: 5/23/2025   Date of Service: 5/25/2025 I Hospital Day: 0    Assessment & Plan  Acute respiratory failure with hypoxia (HCC)  Presented to the ER for evaluation after reported period of unresponsiveness  Unclear etiology, concern for pneumonia  SpO2 as low 78% in the ER  Placed on oxygen initially at 6 L/min  Increased Procalcitonin 3.4  WBC on ED 20.54    Suspect aspiration pneumonia/pneumonitis while patient was unresponsive  Clinically improving, located discontinue cardiopulmonary monitoring  Respiratory protocol  Aspiration precaution  Status respiratory  Urine Legionella and strep negative  Empirically covering with Unasyn for possible aspiration pneumonia  Continue to wean oxygen as tolerated to maintain oxygen saturation below 90%  Severe sepsis (HCC)  SIRS criteria 3/4 (tachycardia, tachypnea, leukocytosis, KATHY present)  Likely secondary to pneumonia versus aspiration pneumonitis  Now status post IV fluids  Treated with Unasyn for suspected aspiration pneumonitis versus pneumonia  Blood cultures pending  MRSA swab in process  Sepsis resolved  Episode of unresponsiveness  Patient reportedly found unresponsive at home  Unclear substance/drug use in history  Patient reportedly received Narcan from family as well as EMS with some improvement   Toxicology urine positive only for on methadone  Patient's mother reports a similar episode, and repeat port concerning for possible seizure  Given patient's questionable compliance, and being on benzodiazepines, which was negative on the urine drug screen -suspect possible withdrawal seizures  EEG ordered  Neurology consult for further recommendations  Ultimately, compliance with home benzodiazepines would be treatment if episodes are truly seizures due to benzo withdrawal  Essential hypertension  Blood pressure is stable  Continue PTA Blood  pressure medication  Monitor blood pressure while admitted   Drug dependence (HCC)  Patient with history of drug abuse with  Patient Is currently on methadone  UDS positive for methadone  Continue Methadone  Central sleep apnea  Respiratory protocol  Continue CPAP at bedtime  KATHY (acute kidney injury) ([facility])  Creatinine level at 1.35  Now improved  Okay to stop IV fluids  Resolved    VTE Pharmacologic Prophylaxis: VTE Score: 6 Moderate Risk (Score 3-4) - Pharmacological DVT Prophylaxis Ordered: heparin.    Mobility:   Basic Mobility Inpatient Raw Score: 24  JH-HLM Goal: 8: Walk 250 feet or more  JH-HLM Achieved: 7: Walk 25 feet or more  JH-HLM Goal NOT achieved. Continue with multidisciplinary rounding and encourage appropriate mobility to improve upon JH-HLM goals.    Patient Centered Rounds: I performed bedside rounds with nursing staff today.   Discussions with Specialists or Other Care Team Provider: none    Education and Discussions with Family / Patient: Attempted to update  (father) via phone. Unable to contact.    Current Length of Stay: 0 day(s)  Current Patient Status: Inpatient   Certification Statement: The patient will continue to require additional inpatient hospital stay due to unresponsive episode ongoing work up, hypox resp failure  Discharge Plan: Anticipate discharge in 24-48 hrs to home.    Code Status: Level 1 - Full Code    Subjective   Patient feels much better, his breathing feels better, reports overnight he coughed up a significant bout of sputum which relieved some dyspnea and congestion.  No other acute concerns at this time other than significant familia of monitoring and connected wires which have been further reduced with his clinical improvement    Objective :  Temp:  [44.6 °F (7 °C)-97.8 °F (36.6 °C)] 97 °F (36.1 °C)  HR:  [73-88] 80  BP: (102-150)/(63-86) 150/77  Resp:  [6-20] 20  SpO2:  [90 %-95 %] 93 %  O2 Device: BiPAP  Nasal Cannula O2 Flow Rate (L/min):  [3 L/min] 3  L/min    Body mass index is 52.23 kg/m².     Input and Output Summary (last 24 hours):     Intake/Output Summary (Last 24 hours) at 5/25/2025 1305  Last data filed at 5/25/2025 0822  Gross per 24 hour   Intake 2981.25 ml   Output 1000 ml   Net 1981.25 ml       Physical Exam  Vitals and nursing note reviewed.   Constitutional:       General: He is not in acute distress.     Appearance: He is well-developed. He is obese.   HENT:      Head: Normocephalic and atraumatic.     Eyes:      Conjunctiva/sclera: Conjunctivae normal.       Cardiovascular:      Rate and Rhythm: Normal rate and regular rhythm.      Heart sounds: No murmur heard.  Pulmonary:      Effort: Pulmonary effort is normal. No respiratory distress.      Comments: Mildly reduced, do not appreciate any coarse rhonchorous sounds, no wheezing  Abdominal:      Palpations: Abdomen is soft.      Tenderness: There is no abdominal tenderness.     Musculoskeletal:         General: No swelling.      Cervical back: Neck supple.     Skin:     General: Skin is warm and dry.     Neurological:      Mental Status: He is alert.     Psychiatric:         Mood and Affect: Mood normal.           Lines/Drains:              Lab Results: I have reviewed the following results:   Results from last 7 days   Lab Units 05/25/25  0523 05/24/25  0544 05/23/25  2216   WBC Thousand/uL 8.34   < > 20.54*   HEMOGLOBIN g/dL 12.9   < > 14.6   HEMATOCRIT % 40.9   < > 46.9   PLATELETS Thousands/uL 158   < > 199   BANDS PCT %  --   --  3   SEGS PCT % 69   < >  --    LYMPHO PCT % 22   < > 4*   MONO PCT % 6   < > 2*   EOS PCT % 2   < > 0    < > = values in this interval not displayed.     Results from last 7 days   Lab Units 05/25/25  0523 05/24/25  0544   SODIUM mmol/L 139 137   POTASSIUM mmol/L 3.9 4.0   CHLORIDE mmol/L 102 93*   CO2 mmol/L 32 30   BUN mg/dL 9 10   CREATININE mg/dL 0.86 0.98   ANION GAP mmol/L 5 14*   CALCIUM mg/dL 8.7 7.8*   ALBUMIN g/dL  --  3.5   TOTAL BILIRUBIN mg/dL  --  0.30    ALK PHOS U/L  --  42   ALT U/L  --  34   AST U/L  --  29   GLUCOSE RANDOM mg/dL 168* 186*                 Results from last 7 days   Lab Units 05/24/25  0032   LACTIC ACID mmol/L 1.9   PROCALCITONIN ng/ml 3.40*       Recent Cultures (last 7 days):   Results from last 7 days   Lab Units 05/24/25  0542 05/24/25  0032   BLOOD CULTURE   --  Received in Microbiology Lab. Culture in Progress.  Received in Microbiology Lab. Culture in Progress.   LEGIONELLA URINARY ANTIGEN  Negative  --        Imaging Results Review: No pertinent imaging studies reviewed.  Other Study Results Review: No additional pertinent studies reviewed.    Last 24 Hours Medication List:     Current Facility-Administered Medications:     acetaminophen (TYLENOL) tablet 650 mg, Q6H PRN    albuterol (PROVENTIL HFA,VENTOLIN HFA) inhaler 2 puff, Q6H PRN    ampicillin-sulbactam (UNASYN) 3 g in sodium chloride 0.9 % 100 mL IVPB, Q6H, Last Rate: 3 g (05/25/25 0911)    ARIPiprazole (ABILIFY) tablet 2 mg, Daily    clonazePAM (KlonoPIN) tablet 1 mg, BID    fluticasone-vilanterol 200-25 mcg/actuation 1 puff, Daily    heparin (porcine) subcutaneous injection 5,000 Units, Q8H MARICEL **AND** [CANCELED] Platelet count, Once    loratadine (CLARITIN) tablet 10 mg, Daily    lubiprostone (AMITIZA) capsule 24 mcg, BID    methadone (Dolophine) tablet 150 mg, Daily    nicotine (NICODERM CQ) 21 mg/24 hr TD 24 hr patch 21 mg, Daily    nystatin (MYCOSTATIN) powder, QAM    ondansetron (ZOFRAN) injection 4 mg, Q6H PRN    prazosin (MINIPRESS) capsule 1 mg, HS    prazosin (MINIPRESS) capsule 5 mg, HS    pregabalin (LYRICA) capsule 150 mg, BID    temazepam (RESTORIL) capsule 15 mg, HS    vilazodone (VIIBRYD) tablet 40 mg, Daily With Breakfast    Administrative Statements   Today, Patient Was Seen By: Jose Roberto Yan, DO      **Please Note: This note may have been constructed using a voice recognition system.**

## 2025-05-25 NOTE — ASSESSMENT & PLAN NOTE
Presented to the ER for evaluation after reported period of unresponsiveness  Unclear etiology, concern for pneumonia  SpO2 as low 78% in the ER  Placed on oxygen initially at 6 L/min  Increased Procalcitonin 3.4  WBC on ED 20.54    Suspect aspiration pneumonia/pneumonitis while patient was unresponsive  Clinically improving, located discontinue cardiopulmonary monitoring  Respiratory protocol  Aspiration precaution  Status respiratory  Urine Legionella and strep negative  Empirically covering with Unasyn for possible aspiration pneumonia  Continue to wean oxygen as tolerated to maintain oxygen saturation below 90%

## 2025-05-25 NOTE — TELEMEDICINE
Tele-Consultation - Neurology   Name: Sacha Foster 34 y.o. male I MRN: [id number]  Unit/Bed#:  I Date of Admission: 5/23/2025   Date of Service: 5/25/2025 I Hospital Day: 0   Inpatient consult to Neurology  Consult performed by: Jose Angel Camacho MD  Consult ordered by: Ann Schwarz MD        Physician Requesting Evaluation: Jose Roberto Yan,    Reason for Evaluation / Principal Problem: Altered mental status    Assessment & Plan  Acute respiratory failure with hypoxia (HCC)    Essential hypertension    Severe sepsis (HCC)    Drug dependence (HCC)    Central sleep apnea    Episode of unresponsiveness  Darian presents for an evaluation with regard to an episode of altered mental status.  His description of the event is most consistent with a nonepileptic seizure although withdrawal seizure remains a possibility.  Metabolic encephalopathy also remains possible however considering his spontaneous resolution and the symptoms described below would consider that to be somewhat less likely    - Continue seizure precautions for now  - He indicates that he takes clonazepam 3 times per day, however pharmacy confirms that clonazepam should show up in the urine drug screen and according to the prescription drug monitoring program he filled 30-day supplies greater than 30 days apart multiple times recently, the suggest that he may not be using it as much.  Would clarify with him and his family.  - Agree with routine EEG.  Ideally this should be performed inpatient however if he is otherwise clinically stable it could be performed in the office  - At this point would not recommend initiating antiseizure medication unless the EEG shows epileptiform changes  - Would recommend sending a form to the Department of Motor Vehicles indicating that he had a loss of consciousness/loss of awareness of unclear etiology.  Depending on ultimate diagnosis he may require a driving restriction      [facility] (acute kidney  "injury) (Formerly [facility])    Neurology service will follow.    Sacha Foster will need follow up in in 6 weeks with epilepsy attending. He will require a routine EEG within 4 weeks if not completed in the hospital    History of Present Illness   Sacha Foster is a 34 y.o.  male who presents with altered mental status.  He reports that he had a few changes on the day of his episode.  In particular at 1 point during the day he did experience a headache with no additional accompanying symptoms, but that is quite unusual for him as he does not get headaches at baseline.  He also had an episode of vomiting which he indicates may have been related to \"overhydration.\"  He did require a CT scan earlier in the day and reported needing to drink contrast for it.  Otherwise however he was feeling in his usual state of health when he was sitting in waiting for dinner.  He vaguely remembers potentially dozing off.  The next thing he knew people were attempting to awaken him/get him to interact.  He reports being conscious and able to smell and hear but not see or move his body.  He recalls receiving Narcan.  He reports that over the course of the last week he would continue to utilize his daily medications but in the evening he would take his med pack out but would forget to actually take the pills.  He confirmed that clonazepam for him is actually taken 3 times per day at baseline    He denies hallucinations.  He notes that for the last week he has had some intermittent jaw tremoring but denies sweats or significant tremors.    He reports no personal or family history of seizures, no history of meningitis/encephalitis, no prior traumatic brain injuries.    He did question whether his episode could have been related to sleep and hypoxia.  Although he does have known sleep apnea he reports that it is quite easy for him to fall asleep intermittently but he has not had similar episodes in coming from sleep.  He also confirms that this " episode is different than the one he experienced in December and that for the December event he was quite unconscious/unaware of what was happening around him.    Would favor nonepileptic seizure although sleep paralysis is possible.  Epileptic seizure remains a possibility.  Metabolic encephalopathy also remains possible but given his resolution of symptoms and his recollection of wakefulness without being able to move this may be less likely      Historical Information   Past Medical History[1]  Past Surgical History[2]  Social History[3]  E-Cigarette/Vaping    E-Cigarette Use Current Every Day User     Cartridges/Day 1     Comments NICOTINE      E-Cigarette/Vaping Substances    Nicotine Yes     THC No     CBD No     Flavoring Yes     Other No     Unknown No          Objective :  Temp:  [44.6 °F (7 °C)-97.8 °F (36.6 °C)] 97 °F (36.1 °C)  HR:  [73-85] 80  BP: (102-150)/(63-86) 150/77  Resp:  [6-20] 20  SpO2:  [90 %-95 %] 93 %  O2 Device: BiPAP  Nasal Cannula O2 Flow Rate (L/min):  [3 L/min] 3 L/min    Physical ExamNeurological Exam    At the time of my evaluation he was awake and alert.  He was in no distress.  He is a moderate historian.  There is minimal to no dysarthria and no aphasia.  Extraocular movements were intact without nystagmus.  His face was symmetric.  There was no obvious lateralizing weakness in either upper extremity.  Rapid alternating movements were intact.  There was no drift, there was no asterixis, there was no clear tremor/ataxia.  He was able to perform finger-nose with eyes closed suggesting preserved proprioceptive function.      Lab Results: I have reviewed the following results:CBC:   Results from last 7 days   Lab Units 05/25/25  0523 05/24/25  0544 05/23/25  2216   WBC Thousand/uL 8.34 11.37* 20.54*   RBC Million/uL 5.07 5.30 5.78*   HEMOGLOBIN g/dL 12.9 13.7 14.6   HEMATOCRIT % 40.9 42.2 46.9   MCV fL 81* 80* 81*   PLATELETS Thousands/uL 158 210 199   , BMP/CMP:   Results from last 7  days   Lab Units 05/25/25 0523 05/24/25  0544 05/23/25 2216 05/19/25  0713   SODIUM mmol/L 139 137 133* 139   POTASSIUM mmol/L 3.9 4.0 5.3 4.2   CHLORIDE mmol/L 102 93* 94* 101   CO2 mmol/L 32 30 26 28   BUN mg/dL 9 10 14 9   CREATININE mg/dL 0.86 0.98 1.35* 0.69   CALCIUM mg/dL 8.7 7.8* 8.8 9.0   AST U/L  --  29 45* 21   ALT U/L  --  34 47 27   ALK PHOS U/L  --  42 55 48   EGFR ml/min/1.73sq m 112 99 67 122     Recent Labs     05/23/25 2216 05/24/25 0544 05/25/25 0523   WBC 20.54* 11.37* 8.34   HGB 14.6 13.7 12.9   HCT 46.9 42.2 40.9    210 158   BANDSPCT 3  --   --    SODIUM 133* 137 139   K 5.3 4.0 3.9   CL 94* 93* 102   CO2 26 30 32   BUN 14 10 9   CREATININE 1.35* 0.98 0.86   GLUC 175* 186* 168*   MG  --  1.7*  --    PHOS  --  4.2  --                Administrative Statements   VIRTUAL CARE DOCUMENTATION:     1. This service was provided via Telemedicine using Apply[employer] Kit     2. Parties in the room with patient during teleconsult Patient only    3. Confidentiality My office door was closed     4. Participants No one else was in the room    5. Patient acknowledged consent and understanding of privacy and security of the  Telemedicine consult. I informed the patient that I have reviewed their record in Epic and presented the opportunity for them to ask any questions regarding the visit today.  The patient agreed to participate.    6. I have spent a total time of 25 minutes in caring for this patient on the day of the visit/encounter including Diagnostic results, Prognosis, Patient and family education, Risk factor reductions, Impressions, Counseling / Coordination of care, Documenting in the medical record, Reviewing/placing orders in the medical record (including tests, medications, and/or procedures), Obtaining or reviewing history  , and Communicating with other healthcare professionals , not including the time spent for establishing the audio/video connection.          [1]   Past Medical  History:  Diagnosis Date    Allergic     Anemia 06/24/2018    Anxiety     from records    Arthritis     Asthma     Benign essential hypertension 11/17/2016    Bipolar 1 disorder (HCC)     from records    Chronic pain     Chronic pain disorder     back/ knees/ hip    Claustrophobia 03/15/2018    Community acquired pneumonia 06/24/2018    CPAP (continuous positive airway pressure) dependence     Dental abscess     11/9/23- pt was started on amoxicillin- will finish on 11/19/23    Depressed 07/14/2016    Depression     from records    GERD (gastroesophageal reflux disease)     Hepatitis C virus infection 08/14/2014    Heroin abuse (HCC) 07/24/2018    Infectious viral hepatitis     Obesity 10/12/2016    Obstructive sleep apnea     from records    Sleep disorder     Substance abuse (HCC)    [2]   Past Surgical History:  Procedure Laterality Date    ACHILLES TENDON SURGERY Right 11/27/2023    Procedure: transfer of peroneus brevis tendon to the cuboid bone;  Surgeon: James R Lachman, MD;  Location:  MAIN OR;  Service: Orthopedics    COLONOSCOPY      EGD      EPIDURAL BLOCK INJECTION Right 10/28/2022    Procedure: BLOCK / INJECTION EPIDURAL STEROID LUMBAR  right L4-5 and L5-S1 TFESI;  Surgeon: Lorena Hernandez MD;  Location: [facility];  Service: Pain Management     EPIDURAL BLOCK INJECTION Right 12/20/2022    Procedure: BLOCK / INJECTION EPIDURAL STEROID LUMBAR  right  L4-5 and L5-S1 TFESI;  Surgeon: Lorena Hernandez MD;  Location: [facility];  Service: Pain Management     MO INCISION & DRAINAGE ABSCESS COMPLICATED/MULTIPLE Left 05/02/2019    Procedure: INCISION AND DRAINAGE (I&D) EXTREMITY;  Surgeon: Fco Woodall MD;  Location: [facility];  Service: Orthopedics    MO LAPAROSCOPY COLECTOMY PARTIAL W/ANASTOMOSIS Left 05/21/2020    Procedure: LAPAROSCOPIC LEFT HEMICOLECTOMY TAKEDOWN SPLENIC FLECTURE, COLORECTAL ANASTOMOSIS.;  Surgeon: Abdelrahman Canales MD;  Location: [facility];  Service: Colorectal    MO  NEUROPLASTY &/TRANSPOS MEDIAN NRV CARPAL TUNNE Left 01/09/2023    Procedure: RELEASE CARPAL TUNNEL;  Surgeon: Isauro Ledbetter DO;  Location: [facility];  Service: Orthopedics    TN OPEN TREATMENT METATARSAL FRACTURE EACH Right 02/24/2023    Procedure: OPEN REDUCTION W/ INTERNAL FIXATION (ORIF) FOOT;  Surgeon: Esteban Talamantes DPM;  Location: [facility];  Service: Podiatry    TN REMOVAL IMPLANT DEEP Right 11/27/2023    Procedure: REMOVAL HARDWARE FOOT, excision of painful os vesalanium, transfer of peroneus brevis tendon to the cuboid bone;  Surgeon: James R Lachman, MD;  Location:  MAIN OR;  Service: Orthopedics    WISDOM TOOTH EXTRACTION     [3]   Social History  Tobacco Use    Smoking status: Former     Types: E-Cigarettes, Cigarettes     Passive exposure: Past    Smokeless tobacco: Never    Tobacco comments:     Vapes / 2019 quit cigs   Vaping Use    Vaping status: Every Day    Substances: Nicotine, Flavoring   Substance and Sexual Activity    Alcohol use: Never    Drug use: Not Currently     Types: Marijuana, Prescription, Methamphetamines     Comment: on Methadone; occaionally marijuana- last used 2/10/23    Sexual activity: Not Currently

## 2025-05-25 NOTE — UTILIZATION REVIEW
OBSERVATION 5- 24-25 CHANGED TO INPATIENT 5-25-25 FOR HYPOXIA, POSSIBLE ASPIRATION, IV ANTIBIOTICS, EEG TO EVALUATE FOR SEIZURE.    Initial Clinical Review    Admission: Date/Time/Statement:   Admission Orders (From admission, onward)       Ordered        05/25/25 0736  INPATIENT ADMISSION  Once            05/24/25 0101  Place in Observation  Once                            ED Arrival Information       Expected   -    Arrival   5/23/2025 21:50    Acuity   Emergent              Means of arrival   Ambulance    Escorted by   [facility]    Service   Hospitalist    Admission type   Emergency              Arrival complaint   Overdose             Chief Complaint   Patient presents with    Overdose - Accidental     Per family, pt found unresponsive. Total of 3 narcans given prehospital.        Initial Presentation: 34 y.o. male presents to ed from home on 5-23 via ems for evaluation and treatment of found unresponsive.  Total 3 narcans prior to arrival.  PMHX:  POLYSUBSTANCE ABUSE, not on home O2, on Bipap/ Cpap hs.       Clinical assessment significant for vomit around the mouth, somnolent, hypoxia 78% ra, heart rate 98 - 117.  PROCAL 3.40, UDS + Methadone, CR 1.35, AST 45, , WBC 20.54.  Initially treated with iv zofranx 1, iv narcan, iv .9% ns bolus x1, iv ceftriaxone x1. 6L O2nc. Admit to observation for acute respiratory failure with hypoxia possibly pneumonia or aspiration pneumonitis.  Plan includes rocephin.    Sputum culture pending.  Keep NPO for now.  Aspiration precaution.  Continue iv fluids.     Anticipated Length of Stay/Certification Statement:  Patient will be admitted on an observation basis with an anticipated length of stay of less than 2 midnights secondary to acute respiratory failure with hypoxia, severe sepsis, acute kidney injury, accidental overdose.     Date: 5-25-25    Day 2: observation changed to inpatient med surg  Certification Statement: The patient, admitted on an observation  basis, will now require > 2 midnight hospital stay due to rapid improvement of symptoms  Discharge Plan: Anticipate discharge later today or tomorrow to home.    Creatinine 1.35> 0.98.  blood cultures pending.  Change iv ceftriaxone for iv unasyn to cover aspiration pneumonia.  Follow up blood cultures.  Wean oxygen to room air.  Currently 3L O2nc.   Mother reports patient has a seizure like episode on 12-9-24 for which he was hospitalized for 10 days.  Today he returned from a barium study, reported a headache, then had a seizure like event.  She found him on the floor foaming at the mouth with his eyes rolled back.  She thought it was a drug overdose she she administered narcan.  He vomited several times when he regained consciousness.  Patient denied OD.  He is on Methadone.   Neurology consult: recommend routine EEG. Monitor off anti -seizure medications for now.    ED Treatment-Medication Administration from 05/23/2025 2150 to 05/24/2025 0153         Date/Time Order Dose Route Action     05/23/2025 2209 naloxone (FOR EMS ONLY) (NARCAN) 2 MG/2ML injection 2 mg   Given to EMS     05/23/2025 2216 ondansetron (ZOFRAN) injection 4 mg 4 mg Intravenous Given     05/23/2025 2234 naloxone (NARCAN) 0.04 mg/mL syringe 0.04 mg 0.04 mg Intravenous Given     05/23/2025 2305 sodium chloride 0.9 % bolus 1,000 mL 1,000 mL Intravenous New Bag     05/24/2025 0032 cefTRIAXone (ROCEPHIN) IVPB (premix in dextrose) 2,000 mg 50 mL 2,000 mg Intravenous New Bag       Scheduled Medications:  ampicillin-sulbactam, 3 g, Intravenous, Q6H  ARIPiprazole, 2 mg, Oral, Daily  clonazePAM, 1 mg, Oral, BID  fluticasone-vilanterol, 1 puff, Inhalation, Daily  heparin (porcine), 5,000 Units, Subcutaneous, Q8H MARICEL  loratadine, 10 mg, Oral, Daily  lubiprostone, 24 mcg, Oral, BID  methadone, 150 mg, Oral, Daily  nicotine, 21 mg, Transdermal, Daily  nystatin, , Topical, QAM  prazosin, 1 mg, Oral, HS  prazosin, 5 mg, Oral, HS  pregabalin, 150 mg, Oral,  BID  temazepam, 15 mg, Oral, HS  vilazodone, 40 mg, Oral, Daily With Breakfast      Continuous IV Infusions:     PRN Meds:  acetaminophen, 650 mg, Oral, Q6H PRN  albuterol, 2 puff, Inhalation, Q6H PRN  ondansetron, 4 mg, Intravenous, Q6H PRN      ED Triage Vitals   Temperature Pulse Respirations Blood Pressure SpO2 Pain Score   05/23/25 2205 05/23/25 2200 05/23/25 2200 05/23/25 2205 05/23/25 2205 05/24/25 0145   98.7 °F (37.1 °C) (!) 117 14 118/67 93 % No Pain     Weight (last 2 days)       Date/Time Weight    05/24/25 0137 165 (364)            Vital Signs (last 3 days)       Date/Time Temp Pulse Resp BP MAP (mmHg) SpO2 Nasal Cannula O2 Flow Rate (L/min) O2 Device Gabriela Coma Scale Score Pain    05/25/25 0823 97 °F (36.1 °C) -- 20 150/77 109 -- -- -- -- --    05/25/25 0800 -- -- -- -- -- -- -- -- 15 No Pain    05/25/25 0614 -- 80 15 115/79 93 93 % -- -- -- --    05/25/25 0414 97.8 °F (36.6 °C) 73 10 105/63 79 94 % -- -- -- --    05/25/25 0400 -- -- -- -- -- -- -- -- 15 --    05/25/25 0307 -- -- -- -- -- 94 % -- -- -- --    05/25/25 0214 -- 79 12 107/73 85 92 % -- -- -- --    05/25/25 0014 -- 82 11 134/63 91 90 % -- BiPAP -- --    05/25/25 0000 -- -- -- -- -- -- -- -- 15 No Pain    05/24/25 2214 -- 82 6 127/69 91 94 % -- BiPAP -- --    05/24/25 2200 -- -- -- -- -- 95 % -- -- -- --    05/24/25 2127 -- -- -- 142/86 -- -- -- -- -- --    05/24/25 2014 97.7 °F (36.5 °C) 85 9 129/73 95 93 % 3 L/min Nasal cannula -- --    05/24/25 2000 -- -- -- -- -- -- -- -- 15 No Pain    05/24/25 1815 -- 78 9 121/80 96 92 % -- -- -- --    05/24/25 1615 -- 82 9 121/80 96 92 % -- -- -- --    05/24/25 1600 -- -- -- -- -- -- -- -- 15 No Pain    05/24/25 1500 97.8 °F (36.6 °C) -- -- 102/73 85 -- 3 L/min Nasal cannula -- --    05/24/25 1415 -- 88 -- 115/66 84 -- -- -- -- --    05/24/25 1215 97.7 °F (36.5 °C) 98 31 143/68 98 -- -- -- -- --    05/24/25 1200 -- -- -- -- -- -- -- -- 15 No Pain    05/24/25 1148 -- -- -- -- -- -- -- -- -- --     05/24/25 1100 97.7 °F (36.5 °C) -- -- -- -- -- -- -- -- --    05/24/25 0931 -- -- -- -- -- -- -- -- -- Med Not Given for Pain - for MAR use only    05/24/25 0814 97.4 °F (36.3 °C) 88 17 115/63 83 93 % -- -- -- --    05/24/25 0800 -- -- -- -- -- -- -- -- 15 No Pain    05/24/25 0600 98.7 °F (37.1 °C) 86 15 111/66 82 -- -- -- -- --    05/24/25 0445 -- -- -- 122/73 -- -- -- -- -- --    05/24/25 0415 -- -- -- 122/72 -- -- -- -- -- --    05/24/25 0400 -- -- -- 117/69 -- -- -- -- -- --    05/24/25 0358 98.9 °F (37.2 °C) -- -- -- -- -- -- -- -- --    05/24/25 0315 -- 93 6 114/70 86 -- -- -- -- --    05/24/25 0300 -- -- -- -- -- -- -- -- 15 No Pain    05/24/25 0244 98.1 °F (36.7 °C) 93 9 111/70 85 94 % -- -- -- --    05/24/25 0238 -- 92 14 -- -- 96 % -- -- -- --    05/24/25 0211 98.1 °F (36.7 °C) 93 9 110/70 -- 94 % -- -- -- --    05/24/25 0145 -- -- -- -- -- -- -- -- -- No Pain    05/24/25 0137 99 °F (37.2 °C) 98 13 132/72 -- -- -- -- -- --    05/24/25 0100 -- 109 13 108/55 73 90 % 3 L/min Nasal cannula -- --    05/23/25 2303 -- -- -- -- -- -- -- Nasal cannula 15 --    05/23/25 2300 -- 110 20 114/74 88 90 % 3 L/min Nasal cannula -- --    05/23/25 2231 -- -- 11 -- -- 90 % 6 L/min Nasal cannula -- --    05/23/25 2225 -- -- -- -- -- 78 % -- None (Room air) -- --    05/23/25 2205 98.7 °F (37.1 °C) -- -- 118/67 -- 93 % -- None (Room air) -- --    05/23/25 2200 -- 117 14 -- -- -- -- -- -- --              Pertinent Labs/Diagnostic Test Results:     Radiology:    XR chest 1 view portable   Final  (05/24 1111)      Somewhat limited study.      No acute cardiopulmonary disease.                   Results from last 7 days   Lab Units 05/25/25  0523 05/24/25  0544 05/23/25  2216 05/19/25  0713   WBC Thousand/uL 8.34 11.37* 20.54* 7.31   HEMOGLOBIN g/dL 12.9 13.7 14.6 14.9   HEMATOCRIT % 40.9 42.2 46.9 46.4   PLATELETS Thousands/uL 158 210 199 227   TOTAL NEUT ABS Thousands/µL 5.86 9.43*  --  3.86   BANDS PCT %  --   --  3  --           Results from last 7 days   Lab Units 05/25/25 0523 05/24/25 0544 05/23/25 2216 05/19/25  0713   SODIUM mmol/L 139 137 133* 139   POTASSIUM mmol/L 3.9 4.0 5.3 4.2   CHLORIDE mmol/L 102 93* 94* 101   CO2 mmol/L 32 30 26 28   ANION GAP mmol/L 5 14* 13 10   BUN mg/dL 9 10 14 9   CREATININE mg/dL 0.86 0.98 1.35* 0.69   EGFR ml/min/1.73sq m 112 99 67 122   CALCIUM mg/dL 8.7 7.8* 8.8 9.0   MAGNESIUM mg/dL  --  1.7*  --   --    PHOSPHORUS mg/dL  --  4.2  --   --      Results from last 7 days   Lab Units 05/24/25 0544 05/23/25 2216 05/19/25  0713   AST U/L 29 45* 21   ALT U/L 34 47 27   ALK PHOS U/L 42 55 48   TOTAL PROTEIN g/dL 8.0 7.4 7.2   ALBUMIN g/dL 3.5 4.1 3.8   TOTAL BILIRUBIN mg/dL 0.30 0.52 0.35         Results from last 7 days   Lab Units 05/25/25 0523 05/24/25 0544 05/23/25 2216   GLUCOSE RANDOM mg/dL 168* 186* 175*       Results from last 7 days   Lab Units 05/24/25  0032   PROCALCITONIN ng/ml 3.40*     Results from last 7 days   Lab Units 05/24/25  0032   LACTIC ACID mmol/L 1.9     Results from last 7 days   Lab Units 05/19/25  0713   CLARITY UA  Clear   COLOR UA  Light Yellow   SPEC GRAV UA  1.014   PH UA  8.0   GLUCOSE UA mg/dl Negative   KETONES UA mg/dl Negative   BLOOD UA  Negative   PROTEIN UA mg/dl Negative   NITRITE UA  Negative   BILIRUBIN UA  Negative   UROBILINOGEN UA (BE) mg/dl <2.0   LEUKOCYTES UA  Negative     Results from last 7 days   Lab Units 05/24/25  0542   STREP PNEUMONIAE ANTIGEN, URINE  Negative   LEGIONELLA URINARY ANTIGEN  Negative         Results from last 7 days   Lab Units 05/23/25  2305   AMPH/METH  Negative   BARBITURATE UR  Negative   BENZODIAZEPINE UR  Negative   COCAINE UR  Negative   METHADONE URINE  Positive*   OPIATE UR  Negative   PCP UR  Negative   THC UR  Negative     Results from last 7 days   Lab 05/24/25  0032   BLOOD CULTURE Received in Microbiology Lab. Culture in Progress.  Received in Microbiology Lab. Culture in Progress.       Past Medical  History[1]  Diagnosis Date    Allergic      Anemia 06/24/2018    Anxiety       from records    Arthritis      Asthma      Benign essential hypertension 11/17/2016    Bipolar 1 disorder (HCC)       from records    Chronic pain      Chronic pain disorder       back/ knees/ hip    Claustrophobia 03/15/2018    Community acquired pneumonia 06/24/2018    CPAP (continuous positive airway pressure) dependence      Dental abscess       11/9/23- pt was started on amoxicillin- will finish on 11/19/23    Depressed 07/14/2016    Depression       from records    GERD (gastroesophageal reflux disease)      Hepatitis C virus infection 08/14/2014    Heroin abuse (HCC) 07/24/2018    Infectious viral hepatitis      Obesity 10/12/2016    Obstructive sleep apnea       from records    Sleep disorder      Substance abuse (HCC)       Present on Admission:   Acute respiratory failure with hypoxia (HCC)   Central sleep apnea   KATHY (acute kidney injury) (HCC)   Essential hypertension   Drug dependence (HCC)   Severe sepsis (HCC)   Episode of unresponsiveness      Admitting Diagnosis:     Substance abuse (HCC) [F19.10]  Overdose [T50.901A]  KATHY (acute kidney injury) (HCC) [N17.9]  Acute hypoxic respiratory failure (HCC) [J96.01]    Age/Sex: 34 y.o. male    Network Utilization Review Department  ATTENTION: Please call with any questions or concerns to [phone] and carefully listen to the prompts so that you are directed to the right person. All voicemails are confidential.   For Discharge needs, contact Care Management DC Support Team at [phone] opt. 2  Send all requests for admission clinical reviews, approved or denied determinations and any other requests to dedicated fax number below belonging to the campus where the patient is receiving treatment. List of dedicated fax numbers for the Facilities:  FACILITY NAME UR FAX NUMBER   ADMISSION DENIALS (Administrative/Medical Necessity) [phone]   DISCHARGE SUPPORT TEAM (NETWORK)  [phone]   PARENT CHILD HEALTH (Maternity/NICU/Pediatrics) [phone]   [facility] [phone]   [facility] [phone]   [facility] [phone]   [facility] [phone]   [facility] [phone]   [facility] [phone]   [facility] [phone]   [facility] [phone]   [facility] [phone]   [facility] [phone]   [facility] [phone]   [facility] [phone]               [1]   Past Medical History:  Diagnosis Date    Allergic     Anemia 06/24/2018    Anxiety     from records    Arthritis     Asthma     Benign essential hypertension 11/17/2016    Bipolar 1 disorder (HCC)     from records    Chronic pain     Chronic pain disorder     back/ knees/ hip    Claustrophobia 03/15/2018    Community acquired pneumonia 06/24/2018    CPAP (continuous positive airway pressure) dependence     Dental abscess     11/9/23- pt was started on amoxicillin- will finish on 11/19/23    Depressed 07/14/2016    Depression     from records    GERD (gastroesophageal reflux disease)     Hepatitis C virus infection 08/14/2014    Heroin abuse (HCC) 07/24/2018    Infectious viral hepatitis     Obesity 10/12/2016    Obstructive sleep apnea     from records    Sleep disorder     Substance abuse ([facility])

## 2025-05-25 NOTE — PLAN OF CARE
Problem: PAIN - ADULT  Goal: Verbalizes/displays adequate comfort level or baseline comfort level  Description: Interventions:  - Encourage patient to monitor pain and request assistance  - Assess pain using appropriate pain scale  - Administer analgesics as ordered based on type and severity of pain and evaluate response  - Implement non-pharmacological measures as appropriate and evaluate response  - Consider cultural and social influences on pain and pain management  - Notify physician/advanced practitioner if interventions unsuccessful or patient reports new pain  - Educate patient/family on pain management process including their role and importance of  reporting pain   - Provide non-pharmacologic/complimentary pain relief interventions  Outcome: Progressing     Problem: INFECTION - ADULT  Goal: Absence or prevention of progression during hospitalization  Description: INTERVENTIONS:  - Assess and monitor for signs and symptoms of infection  - Monitor lab/diagnostic results  - Monitor all insertion sites, i.e. indwelling lines, tubes, and drains  - Monitor endotracheal if appropriate and nasal secretions for changes in amount and color  - Hooper appropriate cooling/warming therapies per order  - Administer medications as ordered  - Instruct and encourage patient and family to use good hand hygiene technique  - Identify and instruct in appropriate isolation precautions for identified infection/condition  Outcome: Progressing  Goal: Absence of fever/infection during neutropenic period  Description: INTERVENTIONS:  - Monitor WBC  - Perform strict hand hygiene  - Limit to healthy visitors only  - No plants, dried, fresh or silk flowers with cardenas in patient room  Outcome: Progressing     Problem: SAFETY ADULT  Goal: Patient will remain free of falls  Description: INTERVENTIONS:  - Educate patient/family on patient safety including physical limitations  - Instruct patient to call for assistance with activity   -  Consider consulting OT/PT to assist with strengthening/mobility based on AM PAC & JH-HLM score  - Consult OT/PT to assist with strengthening/mobility   - Keep Call bell within reach  - Keep bed low and locked with side rails adjusted as appropriate  - Keep care items and personal belongings within reach  - Initiate and maintain comfort rounds  - Make Fall Risk Sign visible to staff  - Apply yellow socks and bracelet for high fall risk patients  - Consider moving patient to room near nurses station  Outcome: Progressing  Goal: Maintain or return to baseline ADL function  Description: INTERVENTIONS:  -  Assess patient's ability to carry out ADLs; assess patient's baseline for ADL function and identify physical deficits which impact ability to perform ADLs (bathing, care of mouth/teeth, toileting, grooming, dressing, etc.)  - Assess/evaluate cause of self-care deficits   - Assess range of motion  - Assess patient's mobility; develop plan if impaired  - Assess patient's need for assistive devices and provide as appropriate  - Encourage maximum independence but intervene and supervise when necessary  - Involve family in performance of ADLs  - Assess for home care needs following discharge   - Consider OT consult to assist with ADL evaluation and planning for discharge  - Provide patient education as appropriate  - Monitor functional capacity and physical performance, use of AM PAC & JH-HLM   - Monitor gait, balance and fatigue with ambulation    Outcome: Progressing  Goal: Maintains/Returns to pre admission functional level  Description: INTERVENTIONS:  - Perform AM-PAC 6 Click Basic Mobility/ Daily Activity assessment daily.  - Set and communicate daily mobility goal to care team and patient/family/caregiver.   - Collaborate with rehabilitation services on mobility goals if consulted  - Reposition patient every 2 hours.  - Out of bed to chair three times a day   - Out of bed for meals three times a day  - Out of bed for  toileting  - Record patient progress and toleration of activity level   Outcome: Progressing     Problem: DISCHARGE PLANNING  Goal: Discharge to home or other facility with appropriate resources  Description: INTERVENTIONS:  - Identify barriers to discharge w/patient and caregiver  - Arrange for needed discharge resources and transportation as appropriate  - Identify discharge learning needs (meds, wound care, etc.)  - Arrange for interpretive services to assist at discharge as needed  - Refer to Case Management Department for coordinating discharge planning if the patient needs post-hospital services based on physician/advanced practitioner order or complex needs related to functional status, cognitive ability, or social support system  Outcome: Progressing     Problem: Knowledge Deficit  Goal: Patient/family/caregiver demonstrates understanding of disease process, treatment plan, medications, and discharge instructions  Description: Complete learning assessment and assess knowledge base.  Interventions:  - Provide teaching at level of understanding  - Provide teaching via preferred learning methods  Outcome: Progressing

## 2025-05-25 NOTE — ASSESSMENT & PLAN NOTE
Patient reportedly found unresponsive at home  Unclear substance/drug use in history  Patient reportedly received Narcan from family as well as EMS with some improvement   Toxicology urine positive only for on methadone  Patient's mother reports a similar episode, and repeat port concerning for possible seizure  Given patient's questionable compliance, and being on benzodiazepines, which was negative on the urine drug screen -suspect possible withdrawal seizures  EEG ordered  Neurology consult for further recommendations  Ultimately, compliance with home benzodiazepines would be treatment if episodes are truly seizures due to benzo withdrawal

## 2025-05-25 NOTE — ASSESSMENT & PLAN NOTE
SIRS criteria 3/4 (tachycardia, tachypnea, leukocytosis, KATHY present)  Likely secondary to pneumonia versus aspiration pneumonitis  Now status post IV fluids  Treated with Unasyn for suspected aspiration pneumonitis versus pneumonia  Blood cultures pending  MRSA swab in process  Sepsis resolved

## 2025-05-25 NOTE — PLAN OF CARE
Problem: PAIN - ADULT  Goal: Verbalizes/displays adequate comfort level or baseline comfort level  Description: Interventions:  - Encourage patient to monitor pain and request assistance  - Assess pain using appropriate pain scale  - Administer analgesics as ordered based on type and severity of pain and evaluate response  - Implement non-pharmacological measures as appropriate and evaluate response  - Consider cultural and social influences on pain and pain management  - Notify physician/advanced practitioner if interventions unsuccessful or patient reports new pain  - Educate patient/family on pain management process including their role and importance of  reporting pain   - Provide non-pharmacologic/complimentary pain relief interventions  Outcome: Progressing     Problem: INFECTION - ADULT  Goal: Absence or prevention of progression during hospitalization  Description: INTERVENTIONS:  - Assess and monitor for signs and symptoms of infection  - Monitor lab/diagnostic results  - Monitor all insertion sites, i.e. indwelling lines, tubes, and drains  - Monitor endotracheal if appropriate and nasal secretions for changes in amount and color  - [place] appropriate cooling/warming therapies per order  - Administer medications as ordered  - Instruct and encourage patient and family to use good hand hygiene technique  - Identify and instruct in appropriate isolation precautions for identified infection/condition  Outcome: Progressing  Goal: Absence of fever/infection during neutropenic period  Description: INTERVENTIONS:  - Monitor WBC  - Perform strict hand hygiene  - Limit to healthy visitors only  - No plants, dried, fresh or silk flowers with cardenas in patient room  Outcome: Progressing     Problem: SAFETY ADULT  Goal: Patient will remain free of falls  Description: INTERVENTIONS:  - Educate patient/family on patient safety including physical limitations  - Instruct patient to call for assistance with activity   -  Consider consulting OT/PT to assist with strengthening/mobility based on AM PAC & JH-HLM score  - Consult OT/PT to assist with strengthening/mobility   - Keep Call bell within reach  - Keep bed low and locked with side rails adjusted as appropriate  - Keep care items and personal belongings within reach  - Initiate and maintain comfort rounds  - Make Fall Risk Sign visible to staff  - Apply yellow socks and bracelet for high fall risk patients  - Consider moving patient to room near nurses station  Outcome: Progressing  Goal: Maintain or return to baseline ADL function  Description: INTERVENTIONS:  -  Assess patient's ability to carry out ADLs; assess patient's baseline for ADL function and identify physical deficits which impact ability to perform ADLs (bathing, care of mouth/teeth, toileting, grooming, dressing, etc.)  - Assess/evaluate cause of self-care deficits   - Assess range of motion  - Assess patient's mobility; develop plan if impaired  - Assess patient's need for assistive devices and provide as appropriate  - Encourage maximum independence but intervene and supervise when necessary  - Involve family in performance of ADLs  - Assess for home care needs following discharge   - Consider OT consult to assist with ADL evaluation and planning for discharge  - Provide patient education as appropriate  - Monitor functional capacity and physical performance, use of AM PAC & JH-HLM   - Monitor gait, balance and fatigue with ambulation    Outcome: Progressing  Goal: Maintains/Returns to pre admission functional level  Description: INTERVENTIONS:  - Perform AM-PAC 6 Click Basic Mobility/ Daily Activity assessment daily.  - Set and communicate daily mobility goal to care team and patient/family/caregiver.   - Collaborate with rehabilitation services on mobility goals if consulted  - Reposition patient every 2 hours.  - Out of bed to chair three times a day   - Out of bed for meals three times a day  - Out of bed for  toileting  - Record patient progress and toleration of activity level   Outcome: Progressing     Problem: DISCHARGE PLANNING  Goal: Discharge to home or other facility with appropriate resources  Description: INTERVENTIONS:  - Identify barriers to discharge w/patient and caregiver  - Arrange for needed discharge resources and transportation as appropriate  - Identify discharge learning needs (meds, wound care, etc.)  - Arrange for interpretive services to assist at discharge as needed  - Refer to Case Management Department for coordinating discharge planning if the patient needs post-hospital services based on physician/advanced practitioner order or complex needs related to functional status, cognitive ability, or social support system  Outcome: Progressing     Problem: Knowledge Deficit  Goal: Patient/family/caregiver demonstrates understanding of disease process, treatment plan, medications, and discharge instructions  Description: Complete learning assessment and assess knowledge base.  Interventions:  - Provide teaching at level of understanding  - Provide teaching via preferred learning methods  Outcome: Progressing

## 2025-05-25 NOTE — ASSESSMENT & PLAN NOTE
Darian presents for an evaluation with regard to an episode of altered mental status.  His description of the event is most consistent with a nonepileptic seizure although withdrawal seizure remains a possibility.  Metabolic encephalopathy also remains possible however considering his spontaneous resolution and the symptoms described below would consider that to be somewhat less likely    - Continue seizure precautions for now  - He indicates that he takes clonazepam 3 times per day, however pharmacy confirms that clonazepam should show up in the urine drug screen and according to the prescription drug monitoring program he filled 30-day supplies greater than 30 days apart multiple times recently, the suggest that he may not be using it as much.  Would clarify with him and his family.  - Agree with routine EEG.  Ideally this should be performed inpatient however if he is otherwise clinically stable it could be performed in the office  - At this point would not recommend initiating antiseizure medication unless the EEG shows epileptiform changes  - Would recommend sending a form to the Department of Motor Vehicles indicating that he had a loss of consciousness/loss of awareness of unclear etiology.  Depending on ultimate diagnosis he may require a driving restriction

## 2025-05-26 LAB
ANION GAP SERPL CALCULATED.3IONS-SCNC: 5 MMOL/L (ref 4–13)
BASOPHILS # BLD AUTO: 0.02 THOUSANDS/ÂΜL (ref 0–0.1)
BASOPHILS NFR BLD AUTO: 0 % (ref 0–1)
BUN SERPL-MCNC: 7 MG/DL (ref 5–25)
CALCIUM SERPL-MCNC: 9.1 MG/DL (ref 8.4–10.2)
CHLORIDE SERPL-SCNC: 101 MMOL/L (ref 96–108)
CO2 SERPL-SCNC: 37 MMOL/L (ref 21–32)
CREAT SERPL-MCNC: 0.83 MG/DL (ref 0.6–1.3)
EOSINOPHIL # BLD AUTO: 0.21 THOUSAND/ÂΜL (ref 0–0.61)
EOSINOPHIL NFR BLD AUTO: 3 % (ref 0–6)
ERYTHROCYTE [DISTWIDTH] IN BLOOD BY AUTOMATED COUNT: 16.1 % (ref 11.6–15.1)
GFR SERPL CREATININE-BSD FRML MDRD: 113 ML/MIN/1.73SQ M
GLUCOSE SERPL-MCNC: 80 MG/DL (ref 65–140)
HCT VFR BLD AUTO: 43.6 % (ref 36.5–49.3)
HGB BLD-MCNC: 13.3 G/DL (ref 12–17)
IMM GRANULOCYTES # BLD AUTO: 0.01 THOUSAND/UL (ref 0–0.2)
IMM GRANULOCYTES NFR BLD AUTO: 0 % (ref 0–2)
LYMPHOCYTES # BLD AUTO: 1.84 THOUSANDS/ÂΜL (ref 0.6–4.47)
LYMPHOCYTES NFR BLD AUTO: 29 % (ref 14–44)
MCH RBC QN AUTO: 25 PG (ref 26.8–34.3)
MCHC RBC AUTO-ENTMCNC: 30.5 G/DL (ref 31.4–37.4)
MCV RBC AUTO: 82 FL (ref 82–98)
MONOCYTES # BLD AUTO: 0.51 THOUSAND/ÂΜL (ref 0.17–1.22)
MONOCYTES NFR BLD AUTO: 8 % (ref 4–12)
NEUTROPHILS # BLD AUTO: 3.75 THOUSANDS/ÂΜL (ref 1.85–7.62)
NEUTS SEG NFR BLD AUTO: 60 % (ref 43–75)
NRBC BLD AUTO-RTO: 0 /100 WBCS
PLATELET # BLD AUTO: 185 THOUSANDS/UL (ref 149–390)
PMV BLD AUTO: 9.9 FL (ref 8.9–12.7)
POTASSIUM SERPL-SCNC: 4.3 MMOL/L (ref 3.5–5.3)
PROCALCITONIN SERPL-MCNC: 2.16 NG/ML
RBC # BLD AUTO: 5.32 MILLION/UL (ref 3.88–5.62)
SODIUM SERPL-SCNC: 143 MMOL/L (ref 135–147)
WBC # BLD AUTO: 6.34 THOUSAND/UL (ref 4.31–10.16)

## 2025-05-26 PROCEDURE — 99232 SBSQ HOSP IP/OBS MODERATE 35: CPT | Performed by: FAMILY MEDICINE

## 2025-05-26 PROCEDURE — 94760 N-INVAS EAR/PLS OXIMETRY 1: CPT

## 2025-05-26 PROCEDURE — 80048 BASIC METABOLIC PNL TOTAL CA: CPT | Performed by: HOSPITALIST

## 2025-05-26 PROCEDURE — 85025 COMPLETE CBC W/AUTO DIFF WBC: CPT | Performed by: HOSPITALIST

## 2025-05-26 PROCEDURE — 84145 PROCALCITONIN (PCT): CPT | Performed by: HOSPITALIST

## 2025-05-26 PROCEDURE — 94660 CPAP INITIATION&MGMT: CPT

## 2025-05-26 RX ORDER — PRAZOSIN HYDROCHLORIDE 1 MG/1
3 CAPSULE ORAL ONCE
Status: COMPLETED | OUTPATIENT
Start: 2025-05-26 | End: 2025-05-26

## 2025-05-26 RX ADMIN — HEPARIN SODIUM 5000 UNITS: 5000 INJECTION INTRAVENOUS; SUBCUTANEOUS at 14:33

## 2025-05-26 RX ADMIN — ARIPIPRAZOLE 2 MG: 2 TABLET ORAL at 08:44

## 2025-05-26 RX ADMIN — VILAZODONE HYDROCHLORIDE 40 MG: 20 TABLET ORAL at 08:42

## 2025-05-26 RX ADMIN — TEMAZEPAM 15 MG: 15 CAPSULE ORAL at 21:54

## 2025-05-26 RX ADMIN — AMPICILLIN SODIUM AND SULBACTAM SODIUM 3 G: 2; 1 INJECTION, POWDER, FOR SOLUTION INTRAMUSCULAR; INTRAVENOUS at 09:10

## 2025-05-26 RX ADMIN — AMPICILLIN SODIUM AND SULBACTAM SODIUM 3 G: 2; 1 INJECTION, POWDER, FOR SOLUTION INTRAMUSCULAR; INTRAVENOUS at 21:54

## 2025-05-26 RX ADMIN — LORATADINE 10 MG: 10 TABLET ORAL at 08:44

## 2025-05-26 RX ADMIN — PREGABALIN 150 MG: 75 CAPSULE ORAL at 08:43

## 2025-05-26 RX ADMIN — CLONAZEPAM 1 MG: 0.5 TABLET ORAL at 08:44

## 2025-05-26 RX ADMIN — NICOTINE 21 MG: 21 PATCH, EXTENDED RELEASE TRANSDERMAL at 08:45

## 2025-05-26 RX ADMIN — HEPARIN SODIUM 5000 UNITS: 5000 INJECTION INTRAVENOUS; SUBCUTANEOUS at 05:53

## 2025-05-26 RX ADMIN — METHADONE HYDROCHLORIDE 150 MG: 10 TABLET ORAL at 09:08

## 2025-05-26 RX ADMIN — PREGABALIN 150 MG: 75 CAPSULE ORAL at 18:16

## 2025-05-26 RX ADMIN — AMPICILLIN SODIUM AND SULBACTAM SODIUM 3 G: 2; 1 INJECTION, POWDER, FOR SOLUTION INTRAMUSCULAR; INTRAVENOUS at 02:57

## 2025-05-26 RX ADMIN — FLUTICASONE FUROATE AND VILANTEROL TRIFENATATE 1 PUFF: 200; 25 POWDER RESPIRATORY (INHALATION) at 08:47

## 2025-05-26 RX ADMIN — PRAZOSIN HYDROCHLORIDE 3 MG: 1 CAPSULE ORAL at 21:54

## 2025-05-26 RX ADMIN — AMPICILLIN SODIUM AND SULBACTAM SODIUM 3 G: 2; 1 INJECTION, POWDER, FOR SOLUTION INTRAMUSCULAR; INTRAVENOUS at 14:34

## 2025-05-26 RX ADMIN — HEPARIN SODIUM 5000 UNITS: 5000 INJECTION INTRAVENOUS; SUBCUTANEOUS at 21:54

## 2025-05-26 RX ADMIN — CLONAZEPAM 1 MG: 0.5 TABLET ORAL at 18:16

## 2025-05-26 RX ADMIN — LUBIPROSTONE 24 MCG: 24 CAPSULE, GELATIN COATED ORAL at 08:44

## 2025-05-26 RX ADMIN — LUBIPROSTONE 24 MCG: 24 CAPSULE, GELATIN COATED ORAL at 21:54

## 2025-05-26 NOTE — ASSESSMENT & PLAN NOTE
SIRS criteria 3/4 (tachycardia, tachypnea, leukocytosis, KATHY present)  Likely secondary to pneumonia versus aspiration pneumonitis  Continue Unasyn with transition to Augmentin on discharge to complete 7 days  Cultures NGTD

## 2025-05-26 NOTE — MALNUTRITION/BMI
This medical record reflects morbid obesity.       BMI Findings:  Adult BMI Classifications: Morbid Obesity 50-59.9        Body mass index is 52.23 kg/m².     See Nutrition note dated 5/26/2025 for additional details.  Completed nutrition assessment is viewable in the nutrition documentation.

## 2025-05-26 NOTE — UTILIZATION REVIEW
*PLEASE SEE INITIAL REVIEW COMPLETED 5/26/25 BY ISAIAS BUSCH RN    Continued Stay Review / INPATIENT DAY 2:  Date: 5/26/25                      Current Patient Class: Inpatient    Current Level of Care: Med Surg    HPI:  35 y/o male with PMHx HTN, CATA, Polysubstance abuse on methadone - initially presented to [facility] ED on 5/23/25 after found unresponsive and placed in Observation on 5/24/25 at 0101 2nd suspected accidental drug overdose. Administered Narcan prior to the ED and reported to have some response.  Also associated acute respiratory failure with hypoxia and required supplemental oxygen, Sepsis with significant leukocytosis and elevated procalcitonin - suspected due to associated aspiration in the setting of unresponsive episode and reported emesis.  Converted to Inpatient 5/25/25 at 0736 after > 30 hours Observation 2nd need for continued in hospital management - aspiration pneumonia with hypoxia - yet requiring supp O2 + IV Abx.        5/26/25 INPATIENT DAY 2:  SpO2 91 - 94 % with NC O2 at 2- 1 L/min  Continued IV Unasyn q6hrs  MD Certification Statement: The patient will continue to require additional inpatient hospital stay.        Acute respiratory failure with hypoxia (HCC)  Presented to the ER for evaluation after reported period of unresponsiveness  Unclear etiology, concern for pneumonia  SpO2 as low 78% in the ER  Placed on oxygen initially at 6 L/min  Increased Procalcitonin 3.4  WBC on ED 20.54     Suspect aspiration pneumonia/pneumonitis while patient was unresponsive  Clinically improving, located discontinue cardiopulmonary monitoring  Respiratory protocol  Urine Legionella and strep negative  Now on 2-1 L nasal cannula, will order incentive spirometry and wean as tolerated    Severe sepsis (HCC)  SIRS criteria 3/4 (tachycardia, tachypnea, leukocytosis, KATHY present)  Likely secondary to pneumonia versus aspiration pneumonitis  Continue Unasyn with transition to Augmentin on discharge to  complete 7 days  Cultures NGTD     Episode of unresponsiveness  Patient reportedly found unresponsive at home  Unclear substance/drug use in history  Patient reportedly received Narcan from family as well as EMS with some improvement   Toxicology urine positive only for on methadone  Patient's mother reports a similar episode, and repeat port concerning for possible seizure  Given patient's questionable compliance, and being on benzodiazepines, which was negative on the urine drug screen -suspect possible withdrawal seizures  EEG ordered  Neurology consult for further recommendations  Ultimately, compliance with home benzodiazepines would be treatment if episodes are truly seizures due to benzo withdrawal    Diet Regular; House    Scheduled Medications:  ampicillin-sulbactam, 3 g, Intravenous, Q6H  ARIPiprazole, 2 mg, Oral, Daily  clonazePAM, 1 mg, Oral, BID  fluticasone-vilanterol, 1 puff, Inhalation, Daily  heparin (porcine), 5,000 Units, Subcutaneous, Q8H MARICEL  loratadine, 10 mg, Oral, Daily  lubiprostone, 24 mcg, Oral, BID  methadone, 150 mg, Oral, Daily  nicotine, 21 mg, Transdermal, Daily  nystatin, , Topical, QAM  prazosin, 1 mg, Oral, HS  prazosin, 5 mg, Oral, HS  pregabalin, 150 mg, Oral, BID  temazepam, 15 mg, Oral, HS  vilazodone, 40 mg, Oral, Daily With Breakfast    Continuous IV Infusions: NONE  IVF sodium chloride 0.9 % infusion    Ordered Dose: 75 mL/hr Route: Intravenous Frequency: Continuous, @ 75 mL/hr   Scheduled Start Date/Time: 05/24/25 0215 End Date/Time: 05/25/25 1030      PRN Meds:  acetaminophen, 650 mg, Oral, Q6H PRN  albuterol, 2 puff, Inhalation, Q6H PRN  ondansetron, 4 mg, Intravenous, Q6H PRN    Discharge Plan:   To be determined   Inpatient Case Management following for all discharge needs      Vital Signs (last 3 days)       Date/Time Temp Pulse Resp BP MAP (mmHg) SpO2 Calculated FIO2 (%) - Nasal Cannula Nasal Cannula O2 Flow Rate (L/min) O2 Device O2 Interface Device Patient Position  - Orthostatic VS Gabriela Coma Scale Score Pain    05/26/25 1134 -- -- -- -- -- -- 24 1 L/min Nasal cannula -- -- -- --    05/26/25 1102 -- -- -- -- -- 94 % 24 1 L/min Nasal cannula -- -- -- --    05/26/25 0908 -- -- -- -- -- -- -- -- -- -- -- -- Med Not Given for Pain - for MAR use only    05/26/25 0750 97.3 °F (36.3 °C) -- 22 122/74 94 -- 28 2 L/min Nasal cannula -- Sitting -- No Pain    05/26/25 0730 -- -- -- -- -- -- -- -- -- -- -- 15 No Pain    05/26/25 0319 -- -- -- -- -- -- -- -- -- Full face mask -- -- --    05/25/25 2259 97.8 °F (36.6 °C) 87 20 135/82 104 94 % -- -- BiPAP -- -- -- No Pain    05/25/25 2217 -- 71 -- -- -- 94 % -- -- -- Full face mask -- -- --    05/25/25 2113 -- -- -- 130/72 -- -- -- -- -- -- -- -- --    05/25/25 2020 -- -- -- -- -- -- 32 3 L/min Nasal cannula -- -- 15 No Pain    05/25/25 1600 97 °F (36.1 °C) -- 20 126/71 91 -- 28 2 L/min Nasal cannula -- Lying -- --    05/25/25 0908 -- -- -- -- -- -- -- -- -- -- -- -- Med Not Given for Pain - for MAR use only    05/25/25 0823 97 °F (36.1 °C) -- 20 150/77 109 -- -- -- -- -- Sitting -- --    05/25/25 0800 -- -- -- -- -- -- -- -- -- -- -- 15 No Pain    05/25/25 0614 -- 80 15 115/79 93 93 % -- -- -- -- -- -- --    05/25/25 0414 97.8 °F (36.6 °C) 73 10 105/63 79 94 % -- -- -- -- Lying -- --    05/25/25 0400 -- -- -- -- -- -- -- -- -- -- -- 15 --    05/25/25 0307 -- -- -- -- -- 94 % -- -- -- -- -- -- --    05/25/25 0214 -- 79 12 107/73 85 92 % -- -- -- -- -- -- --    05/25/25 0014 -- 82 11 134/63 91 90 % -- -- BiPAP -- Lying -- --    05/25/25 0000 -- -- -- -- -- -- -- -- -- -- -- 15 No Pain    05/24/25 2214 -- 82 6 127/69 91 94 % -- -- BiPAP -- -- -- --    05/24/25 2200 -- -- -- -- -- 95 % -- -- -- Full face mask -- -- --    05/24/25 2127 -- -- -- 142/86 -- -- -- -- -- -- -- -- --    05/24/25 2014 97.7 °F (36.5 °C) 85 9 129/73 95 93 % 32 3 L/min Nasal cannula -- Lying -- --    05/24/25 2000 -- -- -- -- -- -- -- -- -- -- -- 15 No Pain    05/24/25  1815 -- 78 9 121/80 96 92 % -- -- -- -- -- -- --    05/24/25 1615 -- 82 9 121/80 96 92 % -- -- -- -- -- -- --    05/24/25 1600 -- -- -- -- -- -- -- -- -- -- -- 15 No Pain    05/24/25 1500 97.8 °F (36.6 °C) -- -- 102/73 85 -- 32 3 L/min Nasal cannula -- Lying -- --    05/24/25 1430 44.6 °F (7 °C) -- -- -- -- -- -- -- -- -- -- -- --    05/24/25 1415 -- 88 -- 115/66 84 -- -- -- -- -- -- -- --    05/24/25 1215 97.7 °F (36.5 °C) 98 31 143/68 98 -- -- -- -- -- -- -- --    05/24/25 1200 -- -- -- -- -- -- -- -- -- -- -- 15 No Pain    05/24/25 1148 -- -- -- -- -- -- -- -- -- Full face mask -- -- --    05/24/25 1100 97.7 °F (36.5 °C) -- -- -- -- -- -- -- -- -- -- -- --    05/24/25 0931 -- -- -- -- -- -- -- -- -- -- -- -- Med Not Given for Pain - for MAR use only    05/24/25 0814 97.4 °F (36.3 °C) 88 17 115/63 83 93 % -- -- -- -- -- -- --    05/24/25 0800 -- -- -- -- -- -- -- -- -- -- -- 15 No Pain    05/24/25 0600 98.7 °F (37.1 °C) 86 15 111/66 82 -- -- -- -- -- Lying -- --    05/24/25 0445 -- -- -- 122/73 -- -- -- -- -- -- -- -- --    05/24/25 0415 -- -- -- 122/72 -- -- -- -- -- -- -- -- --    05/24/25 0400 -- -- -- 117/69 -- -- -- -- -- -- -- -- --    05/24/25 0358 98.9 °F (37.2 °C) -- -- -- -- -- -- -- -- -- -- -- --    05/24/25 0315 -- 93 6 114/70 86 -- -- -- -- -- -- -- --    05/24/25 0300 -- -- -- -- -- -- -- -- -- -- -- 15 No Pain    05/24/25 0244 98.1 °F (36.7 °C) 93 9 111/70 85 94 % -- -- -- Full face mask -- -- --    05/24/25 0238 -- 92 14 -- -- 96 % -- -- -- -- -- -- --    05/24/25 0211 98.1 °F (36.7 °C) 93 9 110/70 -- 94 % -- -- -- -- -- -- --    05/24/25 0145 -- -- -- -- -- -- -- -- -- -- -- -- No Pain    05/24/25 0137 99 °F (37.2 °C) 98 13 132/72 -- -- -- -- -- -- -- -- --    05/24/25 0100 -- 109 13 108/55 73 90 % 32 3 L/min Nasal cannula -- -- -- --    05/23/25 2303 -- -- -- -- -- -- -- -- Nasal cannula -- -- 15 --    05/23/25 2300 -- 110 20 114/74 88 90 % 32 3 L/min Nasal cannula -- Sitting -- --    05/23/25  2231 -- -- 11 -- -- 90 % 44 6 L/min Nasal cannula -- -- -- --    05/23/25 2225 -- -- -- -- -- 78 % -- -- None (Room air) -- -- -- --    05/23/25 2205 98.7 °F (37.1 °C) -- -- 118/67 -- 93 % -- -- None (Room air) -- -- -- --    05/23/25 2200 -- 117 14 -- -- -- -- -- -- -- Sitting -- --     Weight (last 2 days)       Date/Time Weight    05/24/25 0137 165 (364)      I/O 05/24  0701 05/25  0701 05/26  0701    Intake   (mL/kg) 4,121.3   (25) 780   (4.7) 900   (5.5)    P.O. 3,120 780 900    I.V.   (mL/kg) 901.3   (5.5) -- --    IV Piggyback 100 -- --    Output 1,625 -- --    Urine   (mL/kg/hr) 1,625   (0.4) -- --    Net +2,496.3 +780 +900    Unmeasured Urine Occurrence 7  x 6  x 4  x    Unmeasured Stool Occurrence -- -- 1  x        Pertinent Labs/Diagnostic Results:   XR chest 1 view portable   Final Interpretation by Jordan Cabral MD (05/24 1111)      Somewhat limited study.      No acute cardiopulmonary disease.       Results from last 7 days   Lab Units 05/26/25  0549 05/25/25  0523 05/24/25  0544 05/23/25 2216   WBC Thousand/uL 6.34 8.34 11.37* 20.54*   HEMOGLOBIN g/dL 13.3 12.9 13.7 14.6   HEMATOCRIT % 43.6 40.9 42.2 46.9   PLATELETS Thousands/uL 185 158 210 199   TOTAL NEUT ABS Thousands/µL 3.75 5.86 9.43*  --    BANDS PCT %  --   --   --  3     Results from last 7 days   Lab Units 05/26/25  0549 05/25/25  0523 05/24/25  0544 05/23/25 2216   SODIUM mmol/L 143 139 137 133*   POTASSIUM mmol/L 4.3 3.9 4.0 5.3   CHLORIDE mmol/L 101 102 93* 94*   CO2 mmol/L 37* 32 30 26   ANION GAP mmol/L 5 5 14* 13   BUN mg/dL 7 9 10 14   CREATININE mg/dL 0.83 0.86 0.98 1.35*   EGFR ml/min/1.73sq m 113 112 99 67   CALCIUM mg/dL 9.1 8.7 7.8* 8.8   MAGNESIUM mg/dL  --   --  1.7*  --    PHOSPHORUS mg/dL  --   --  4.2  --      Results from last 7 days   Lab Units 05/24/25  0544 05/23/25  2216   AST U/L 29 45*   ALT U/L 34 47   ALK PHOS U/L 42 55   TOTAL PROTEIN g/dL 8.0 7.4   ALBUMIN g/dL 3.5 4.1   TOTAL BILIRUBIN mg/dL 0.30 0.52        Results from last 7 days   Lab Units 05/26/25  0549 05/25/25  0523 05/24/25  0544 05/23/25  2216   GLUCOSE RANDOM mg/dL 80 168* 186* 175*       Results from last 7 days   Lab Units 05/26/25  0549 05/24/25  0032   PROCALCITONIN ng/ml 2.16* 3.40*     Results from last 7 days   Lab Units 05/24/25  0032   LACTIC ACID mmol/L 1.9     Results from last 7 days   Lab Units 05/24/25  0542   STREP PNEUMONIAE ANTIGEN, URINE  Negative   LEGIONELLA URINARY ANTIGEN  Negative     Results from last 7 days   Lab Units 05/23/25  2305   AMPH/METH  Negative   BARBITURATE UR  Negative   BENZODIAZEPINE UR  Negative   COCAINE UR  Negative   METHADONE URINE  Positive*   OPIATE UR  Negative   PCP UR  Negative   THC UR  Negative     Results from last 7 days   Lab Units 05/24/25  0032   BLOOD CULTURE  No Growth at 24 hrs.  No Growth at 24 hrs.     Network Utilization Review Department  ATTENTION: Please call with any questions or concerns to [phone] and carefully listen to the prompts so that you are directed to the right person. All voicemails are confidential.   For Discharge needs, contact Care Management DC Support Team at [phone] opt. 2  Send all requests for admission clinical reviews, approved or denied determinations and any other requests to dedicated fax number below belonging to the campus where the patient is receiving treatment. List of dedicated fax numbers for the Facilities:  FACILITY NAME UR FAX NUMBER   ADMISSION DENIALS (Administrative/Medical Necessity) [phone]   DISCHARGE SUPPORT TEAM (NETWORK) [phone]   PARENT CHILD HEALTH (Maternity/NICU/Pediatrics) [phone]   [facility] [phone]   [facility] [phone]   [facility] [phone]   [facility] [phone]   [facility] [phone]   [facility] [phone]   [facility]  [facility] [phone]   [facility] [phone]   [facility] [phone]   [facility] [phone]   [facility] [phone]   [facility] [phone]

## 2025-05-26 NOTE — PLAN OF CARE
Problem: PAIN - ADULT  Goal: Verbalizes/displays adequate comfort level or baseline comfort level  Description: Interventions:  - Encourage patient to monitor pain and request assistance  - Assess pain using appropriate pain scale  - Administer analgesics as ordered based on type and severity of pain and evaluate response  - Implement non-pharmacological measures as appropriate and evaluate response  - Consider cultural and social influences on pain and pain management  - Notify physician/advanced practitioner if interventions unsuccessful or patient reports new pain  - Educate patient/family on pain management process including their role and importance of  reporting pain   - Provide non-pharmacologic/complimentary pain relief interventions  Outcome: Progressing     Problem: SAFETY ADULT  Goal: Patient will remain free of falls  Description: INTERVENTIONS:  - Educate patient/family on patient safety including physical limitations  - Instruct patient to call for assistance with activity   - Consider consulting OT/PT to assist with strengthening/mobility based on AM PAC & JH-HLM score  - Consult OT/PT to assist with strengthening/mobility   - Keep Call bell within reach  - Keep bed low and locked with side rails adjusted as appropriate  - Keep care items and personal belongings within reach  - Initiate and maintain comfort rounds  - Make Fall Risk Sign visible to staff  - Apply yellow socks and bracelet for high fall risk patients  - Consider moving patient to room near nurses station  Outcome: Progressing  Goal: Maintain or return to baseline ADL function  Description: INTERVENTIONS:  -  Assess patient's ability to carry out ADLs; assess patient's baseline for ADL function and identify physical deficits which impact ability to perform ADLs (bathing, care of mouth/teeth, toileting, grooming, dressing, etc.)  - Assess/evaluate cause of self-care deficits   - Assess range of motion  - Assess patient's mobility;  develop plan if impaired  - Assess patient's need for assistive devices and provide as appropriate  - Encourage maximum independence but intervene and supervise when necessary  - Involve family in performance of ADLs  - Assess for home care needs following discharge   - Consider OT consult to assist with ADL evaluation and planning for discharge  - Provide patient education as appropriate  - Monitor functional capacity and physical performance, use of AM PAC & JH-HLM   - Monitor gait, balance and fatigue with ambulation    Outcome: Progressing  Goal: Maintains/Returns to pre admission functional level  Description: INTERVENTIONS:  - Perform AM-PAC 6 Click Basic Mobility/ Daily Activity assessment daily.  - Set and communicate daily mobility goal to care team and patient/family/caregiver.   - Collaborate with rehabilitation services on mobility goals if consulted  - Reposition patient every 2 hours.  - Out of bed to chair three times a day   - Out of bed for meals three times a day  - Out of bed for toileting  - Record patient progress and toleration of activity level   Outcome: Progressing     Problem: DISCHARGE PLANNING  Goal: Discharge to home or other facility with appropriate resources  Description: INTERVENTIONS:  - Identify barriers to discharge w/patient and caregiver  - Arrange for needed discharge resources and transportation as appropriate  - Identify discharge learning needs (meds, wound care, etc.)  - Arrange for interpretive services to assist at discharge as needed  - Refer to Case Management Department for coordinating discharge planning if the patient needs post-hospital services based on physician/advanced practitioner order or complex needs related to functional status, cognitive ability, or social support system  Outcome: Progressing

## 2025-05-26 NOTE — PLAN OF CARE
Problem: PAIN - ADULT  Goal: Verbalizes/displays adequate comfort level or baseline comfort level  Description: Interventions:  - Encourage patient to monitor pain and request assistance  - Assess pain using appropriate pain scale  - Administer analgesics as ordered based on type and severity of pain and evaluate response  - Implement non-pharmacological measures as appropriate and evaluate response  - Consider cultural and social influences on pain and pain management  - Notify physician/advanced practitioner if interventions unsuccessful or patient reports new pain  - Educate patient/family on pain management process including their role and importance of  reporting pain   - Provide non-pharmacologic/complimentary pain relief interventions  Outcome: Progressing     Problem: INFECTION - ADULT  Goal: Absence or prevention of progression during hospitalization  Description: INTERVENTIONS:  - Assess and monitor for signs and symptoms of infection  - Monitor lab/diagnostic results  - Monitor all insertion sites, i.e. indwelling lines, tubes, and drains  - Monitor endotracheal if appropriate and nasal secretions for changes in amount and color  - [place] appropriate cooling/warming therapies per order  - Administer medications as ordered  - Instruct and encourage patient and family to use good hand hygiene technique  - Identify and instruct in appropriate isolation precautions for identified infection/condition  Outcome: Progressing  Goal: Absence of fever/infection during neutropenic period  Description: INTERVENTIONS:  - Monitor WBC  - Perform strict hand hygiene  - Limit to healthy visitors only  - No plants, dried, fresh or silk flowers with cardenas in patient room  Outcome: Progressing     Problem: SAFETY ADULT  Goal: Patient will remain free of falls  Description: INTERVENTIONS:  - Educate patient/family on patient safety including physical limitations  - Instruct patient to call for assistance with activity   -  Consider consulting OT/PT to assist with strengthening/mobility based on AM PAC & JH-HLM score  - Consult OT/PT to assist with strengthening/mobility   - Keep Call bell within reach  - Keep bed low and locked with side rails adjusted as appropriate  - Keep care items and personal belongings within reach  - Initiate and maintain comfort rounds  - Make Fall Risk Sign visible to staff  - Apply yellow socks and bracelet for high fall risk patients  - Consider moving patient to room near nurses station  Outcome: Progressing  Goal: Maintain or return to baseline ADL function  Description: INTERVENTIONS:  -  Assess patient's ability to carry out ADLs; assess patient's baseline for ADL function and identify physical deficits which impact ability to perform ADLs (bathing, care of mouth/teeth, toileting, grooming, dressing, etc.)  - Assess/evaluate cause of self-care deficits   - Assess range of motion  - Assess patient's mobility; develop plan if impaired  - Assess patient's need for assistive devices and provide as appropriate  - Encourage maximum independence but intervene and supervise when necessary  - Involve family in performance of ADLs  - Assess for home care needs following discharge   - Consider OT consult to assist with ADL evaluation and planning for discharge  - Provide patient education as appropriate  - Monitor functional capacity and physical performance, use of AM PAC & JH-HLM   - Monitor gait, balance and fatigue with ambulation    Outcome: Progressing  Goal: Maintains/Returns to pre admission functional level  Description: INTERVENTIONS:  - Perform AM-PAC 6 Click Basic Mobility/ Daily Activity assessment daily.  - Set and communicate daily mobility goal to care team and patient/family/caregiver.   - Collaborate with rehabilitation services on mobility goals if consulted  - Reposition patient every 2 hours.  - Out of bed to chair three times a day   - Out of bed for meals three times a day  - Out of bed for  toileting  - Record patient progress and toleration of activity level   Outcome: Progressing     Problem: DISCHARGE PLANNING  Goal: Discharge to home or other facility with appropriate resources  Description: INTERVENTIONS:  - Identify barriers to discharge w/patient and caregiver  - Arrange for needed discharge resources and transportation as appropriate  - Identify discharge learning needs (meds, wound care, etc.)  - Arrange for interpretive services to assist at discharge as needed  - Refer to Case Management Department for coordinating discharge planning if the patient needs post-hospital services based on physician/advanced practitioner order or complex needs related to functional status, cognitive ability, or social support system  Outcome: Progressing     Problem: Knowledge Deficit  Goal: Patient/family/caregiver demonstrates understanding of disease process, treatment plan, medications, and discharge instructions  Description: Complete learning assessment and assess knowledge base.  Interventions:  - Provide teaching at level of understanding  - Provide teaching via preferred learning methods  Outcome: Progressing

## 2025-05-26 NOTE — RESPIRATORY THERAPY NOTE
05/25/25 2217   Respiratory Protocol   Respiratory Plan Discontinue Protocol  (Will D/C Respiratory protocol. Pt has not needed nebulized medication in >48hrs. Will continue PAP therapy and monitor.)   Respiratory Assessment   Resp Comments Pt on BiPAP for HS   O2 Device BiPAP   Additional Assessments   Pulse 71   SpO2 94 %

## 2025-05-26 NOTE — ASSESSMENT & PLAN NOTE
Presented to the ER for evaluation after reported period of unresponsiveness  Unclear etiology, concern for pneumonia  SpO2 as low 78% in the ER  Placed on oxygen initially at 6 L/min  Increased Procalcitonin 3.4  WBC on ED 20.54    Suspect aspiration pneumonia/pneumonitis while patient was unresponsive  Clinically improving, located discontinue cardiopulmonary monitoring  Respiratory protocol  Urine Legionella and strep negative  Now on 2 L nasal cannula, will order incentive spirometry and wean as tolerated

## 2025-05-26 NOTE — PROGRESS NOTES
Progress Note - Hospitalist   Name: Sacha Foster 34 y.o. male I MRN: [id number]  Unit/Bed#:  I Date of Admission: 5/23/2025   Date of Service: 5/26/2025 I Hospital Day: 1    Assessment & Plan  Acute respiratory failure with hypoxia (HCC)  Presented to the ER for evaluation after reported period of unresponsiveness  Unclear etiology, concern for pneumonia  SpO2 as low 78% in the ER  Placed on oxygen initially at 6 L/min  Increased Procalcitonin 3.4  WBC on ED 20.54    Suspect aspiration pneumonia/pneumonitis while patient was unresponsive  Clinically improving, located discontinue cardiopulmonary monitoring  Respiratory protocol  Urine Legionella and strep negative  Now on 2 L nasal cannula, will order incentive spirometry and wean as tolerated  Severe sepsis (HCC)  SIRS criteria 3/4 (tachycardia, tachypnea, leukocytosis, KATHY present)  Likely secondary to pneumonia versus aspiration pneumonitis  Continue Unasyn with transition to Augmentin on discharge to complete 7 days  Cultures NGTD   Episode of unresponsiveness  Patient reportedly found unresponsive at home  Unclear substance/drug use in history  Patient reportedly received Narcan from family as well as EMS with some improvement   Toxicology urine positive only for on methadone  Patient's mother reports a similar episode, and repeat port concerning for possible seizure  Given patient's questionable compliance, and being on benzodiazepines, which was negative on the urine drug screen -suspect possible withdrawal seizures  EEG ordered  Neurology consult for further recommendations  Ultimately, compliance with home benzodiazepines would be treatment if episodes are truly seizures due to benzo withdrawal  Essential hypertension  Blood pressure is stable  Continue PTA Blood pressure medication  Monitor blood pressure while admitted   Drug dependence (HCC)  Patient with history of drug abuse with  Patient Is currently on methadone  UDS positive for  methadone  Continue Methadone  Central sleep apnea  Respiratory protocol  Continue CPAP at bedtime  KATHY (acute kidney injury) (HCC)  Creatinine level at 1.35  Now improved  Okay to stop IV fluids  Resolved    VTE Pharmacologic Prophylaxis: VTE Score: 6 Moderate Risk (Score 3-4) - Pharmacological DVT Prophylaxis Ordered: heparin.    Mobility:   Basic Mobility Inpatient Raw Score: 24  JH-HLM Goal: 8: Walk 250 feet or more  JH-HLM Achieved: 7: Walk 25 feet or more  JH-HLM Goal achieved. Continue to encourage appropriate mobility.    Patient Centered Rounds: I performed bedside rounds with nursing staff today.   Discussions with Specialists or Other Care Team Provider:     Education and Discussions with Family / Patient: Updated  (mother) via phone.    Current Length of Stay: 1 day(s)  Current Patient Status: Inpatient   Certification Statement: The patient will continue to require additional inpatient hospital stay due to EEG  Discharge Plan: Anticipate discharge tomorrow to home.    Code Status: Level 1 - Full Code    Subjective   Seen and examined, feels ok. No acute complaints or events overnight     Objective :  Temp:  [97 °F (36.1 °C)-97.8 °F (36.6 °C)] 97.3 °F (36.3 °C)  HR:  [71-87] 87  BP: (122-150)/(71-82) 122/74  Resp:  [20-22] 22  SpO2:  [94 %] 94 %  O2 Device: Nasal cannula  Nasal Cannula O2 Flow Rate (L/min):  [2 L/min-3 L/min] 2 L/min    Body mass index is 52.23 kg/m².     Input and Output Summary (last 24 hours):     Intake/Output Summary (Last 24 hours) at 5/26/2025 0812  Last data filed at 5/25/2025 1634  Gross per 24 hour   Intake 780 ml   Output --   Net 780 ml       Physical Exam  Constitutional:       Appearance: Normal appearance.     Eyes:      Pupils: Pupils are equal, round, and reactive to light.       Cardiovascular:      Rate and Rhythm: Normal rate and regular rhythm.      Pulses: Normal pulses.      Heart sounds: No murmur heard.  Pulmonary:      Effort: Pulmonary effort is  normal. No respiratory distress.      Breath sounds: No wheezing or rales.   Abdominal:      General: Abdomen is flat. There is no distension.      Tenderness: There is no abdominal tenderness.     Musculoskeletal:         General: Normal range of motion.      Right lower leg: No edema.      Left lower leg: No edema.     Skin:     Capillary Refill: Capillary refill takes 2 to 3 seconds.     Neurological:      Mental Status: He is alert.           Lines/Drains:              Lab Results: I have reviewed the following results:   Results from last 7 days   Lab Units 05/26/25  0549 05/24/25  0544 05/23/25  2216   WBC Thousand/uL 6.34   < > 20.54*   HEMOGLOBIN g/dL 13.3   < > 14.6   HEMATOCRIT % 43.6   < > 46.9   PLATELETS Thousands/uL 185   < > 199   BANDS PCT %  --   --  3   SEGS PCT % 60   < >  --    LYMPHO PCT % 29   < > 4*   MONO PCT % 8   < > 2*   EOS PCT % 3   < > 0    < > = values in this interval not displayed.     Results from last 7 days   Lab Units 05/26/25  0549 05/25/25  0523 05/24/25  0544   SODIUM mmol/L 143   < > 137   POTASSIUM mmol/L 4.3   < > 4.0   CHLORIDE mmol/L 101   < > 93*   CO2 mmol/L 37*   < > 30   BUN mg/dL 7   < > 10   CREATININE mg/dL 0.83   < > 0.98   ANION GAP mmol/L 5   < > 14*   CALCIUM mg/dL 9.1   < > 7.8*   ALBUMIN g/dL  --   --  3.5   TOTAL BILIRUBIN mg/dL  --   --  0.30   ALK PHOS U/L  --   --  42   ALT U/L  --   --  34   AST U/L  --   --  29   GLUCOSE RANDOM mg/dL 80   < > 186*    < > = values in this interval not displayed.                 Results from last 7 days   Lab Units 05/26/25  0549 05/24/25  0032   LACTIC ACID mmol/L  --  1.9   PROCALCITONIN ng/ml 2.16* 3.40*       Recent Cultures (last 7 days):   Results from last 7 days   Lab Units 05/24/25  0542 05/24/25  0032   BLOOD CULTURE   --  No Growth at 24 hrs.  No Growth at 24 hrs.   LEGIONELLA URINARY ANTIGEN  Negative  --        Imaging Results Review: I reviewed radiology reports from this admission including: CT  head.  Other Study Results Review: EKG was reviewed.     Last 24 Hours Medication List:     Current Facility-Administered Medications:     acetaminophen (TYLENOL) tablet 650 mg, Q6H PRN    albuterol (PROVENTIL HFA,VENTOLIN HFA) inhaler 2 puff, Q6H PRN    ampicillin-sulbactam (UNASYN) 3 g in sodium chloride 0.9 % 100 mL IVPB, Q6H, Last Rate: 3 g (05/26/25 0257)    ARIPiprazole (ABILIFY) tablet 2 mg, Daily    clonazePAM (KlonoPIN) tablet 1 mg, BID    fluticasone-vilanterol 200-25 mcg/actuation 1 puff, Daily    heparin (porcine) subcutaneous injection 5,000 Units, Q8H MARICEL **AND** [CANCELED] Platelet count, Once    loratadine (CLARITIN) tablet 10 mg, Daily    lubiprostone (AMITIZA) capsule 24 mcg, BID    methadone (Dolophine) tablet 150 mg, Daily    nicotine (NICODERM CQ) 21 mg/24 hr TD 24 hr patch 21 mg, Daily    nystatin (MYCOSTATIN) powder, QAM    ondansetron (ZOFRAN) injection 4 mg, Q6H PRN    prazosin (MINIPRESS) capsule 1 mg, HS    prazosin (MINIPRESS) capsule 5 mg, HS    pregabalin (LYRICA) capsule 150 mg, BID    temazepam (RESTORIL) capsule 15 mg, HS    vilazodone (VIIBRYD) tablet 40 mg, Daily With Breakfast    Administrative Statements   Today, Patient Was Seen By: Leanne Christiansen MD      **Please Note: This note may have been constructed using a voice recognition system.**

## 2025-05-27 ENCOUNTER — TRANSITIONAL CARE MANAGEMENT (OUTPATIENT)
Dept: INTERNAL MEDICINE CLINIC | Facility: CLINIC | Age: 35
End: 2025-05-27

## 2025-05-27 ENCOUNTER — TELEPHONE (OUTPATIENT)
Age: 35
End: 2025-05-27

## 2025-05-27 ENCOUNTER — APPOINTMENT (INPATIENT)
Dept: NEUROLOGY | Facility: HOSPITAL | Age: 35
DRG: 720 | End: 2025-05-27
Attending: STUDENT IN AN ORGANIZED HEALTH CARE EDUCATION/TRAINING PROGRAM
Payer: COMMERCIAL

## 2025-05-27 VITALS
SYSTOLIC BLOOD PRESSURE: 140 MMHG | BODY MASS INDEX: 45.1 KG/M2 | WEIGHT: 315 LBS | TEMPERATURE: 98 F | OXYGEN SATURATION: 91 % | HEART RATE: 85 BPM | HEIGHT: 70 IN | RESPIRATION RATE: 18 BRPM | DIASTOLIC BLOOD PRESSURE: 90 MMHG

## 2025-05-27 PROBLEM — R56.9 SEIZURE-LIKE ACTIVITY (HCC): Status: ACTIVE | Noted: 2025-05-27

## 2025-05-27 LAB
ANION GAP SERPL CALCULATED.3IONS-SCNC: 7 MMOL/L (ref 4–13)
BASOPHILS # BLD AUTO: 0.02 THOUSANDS/ÂΜL (ref 0–0.1)
BASOPHILS NFR BLD AUTO: 0 % (ref 0–1)
BUN SERPL-MCNC: 8 MG/DL (ref 5–25)
CALCIUM SERPL-MCNC: 9.1 MG/DL (ref 8.4–10.2)
CHLORIDE SERPL-SCNC: 101 MMOL/L (ref 96–108)
CO2 SERPL-SCNC: 33 MMOL/L (ref 21–32)
CREAT SERPL-MCNC: 0.83 MG/DL (ref 0.6–1.3)
EOSINOPHIL # BLD AUTO: 0.27 THOUSAND/ÂΜL (ref 0–0.61)
EOSINOPHIL NFR BLD AUTO: 4 % (ref 0–6)
ERYTHROCYTE [DISTWIDTH] IN BLOOD BY AUTOMATED COUNT: 16.1 % (ref 11.6–15.1)
GFR SERPL CREATININE-BSD FRML MDRD: 113 ML/MIN/1.73SQ M
GLUCOSE SERPL-MCNC: 97 MG/DL (ref 65–140)
HCT VFR BLD AUTO: 42 % (ref 36.5–49.3)
HGB BLD-MCNC: 13.2 G/DL (ref 12–17)
IMM GRANULOCYTES # BLD AUTO: 0.02 THOUSAND/UL (ref 0–0.2)
IMM GRANULOCYTES NFR BLD AUTO: 0 % (ref 0–2)
LYMPHOCYTES # BLD AUTO: 1.87 THOUSANDS/ÂΜL (ref 0.6–4.47)
LYMPHOCYTES NFR BLD AUTO: 28 % (ref 14–44)
MCH RBC QN AUTO: 25.7 PG (ref 26.8–34.3)
MCHC RBC AUTO-ENTMCNC: 31.4 G/DL (ref 31.4–37.4)
MCV RBC AUTO: 82 FL (ref 82–98)
MONOCYTES # BLD AUTO: 0.52 THOUSAND/ÂΜL (ref 0.17–1.22)
MONOCYTES NFR BLD AUTO: 8 % (ref 4–12)
NEUTROPHILS # BLD AUTO: 3.99 THOUSANDS/ÂΜL (ref 1.85–7.62)
NEUTS SEG NFR BLD AUTO: 60 % (ref 43–75)
NRBC BLD AUTO-RTO: 0 /100 WBCS
PLATELET # BLD AUTO: 185 THOUSANDS/UL (ref 149–390)
PMV BLD AUTO: 9.6 FL (ref 8.9–12.7)
POTASSIUM SERPL-SCNC: 4.3 MMOL/L (ref 3.5–5.3)
PROCALCITONIN SERPL-MCNC: 1.11 NG/ML
RBC # BLD AUTO: 5.14 MILLION/UL (ref 3.88–5.62)
SODIUM SERPL-SCNC: 141 MMOL/L (ref 135–147)
WBC # BLD AUTO: 6.69 THOUSAND/UL (ref 4.31–10.16)

## 2025-05-27 PROCEDURE — 80048 BASIC METABOLIC PNL TOTAL CA: CPT | Performed by: FAMILY MEDICINE

## 2025-05-27 PROCEDURE — 95819 EEG AWAKE AND ASLEEP: CPT | Performed by: PSYCHIATRY & NEUROLOGY

## 2025-05-27 PROCEDURE — 99239 HOSP IP/OBS DSCHRG MGMT >30: CPT | Performed by: FAMILY MEDICINE

## 2025-05-27 PROCEDURE — 85025 COMPLETE CBC W/AUTO DIFF WBC: CPT | Performed by: FAMILY MEDICINE

## 2025-05-27 PROCEDURE — 95816 EEG AWAKE AND DROWSY: CPT

## 2025-05-27 PROCEDURE — 94760 N-INVAS EAR/PLS OXIMETRY 1: CPT

## 2025-05-27 PROCEDURE — 94660 CPAP INITIATION&MGMT: CPT

## 2025-05-27 PROCEDURE — 84145 PROCALCITONIN (PCT): CPT | Performed by: FAMILY MEDICINE

## 2025-05-27 RX ADMIN — LUBIPROSTONE 24 MCG: 24 CAPSULE, GELATIN COATED ORAL at 08:47

## 2025-05-27 RX ADMIN — AMPICILLIN SODIUM AND SULBACTAM SODIUM 3 G: 2; 1 INJECTION, POWDER, FOR SOLUTION INTRAMUSCULAR; INTRAVENOUS at 08:42

## 2025-05-27 RX ADMIN — ARIPIPRAZOLE 2 MG: 2 TABLET ORAL at 08:49

## 2025-05-27 RX ADMIN — VILAZODONE HYDROCHLORIDE 40 MG: 20 TABLET ORAL at 08:48

## 2025-05-27 RX ADMIN — HEPARIN SODIUM 5000 UNITS: 5000 INJECTION INTRAVENOUS; SUBCUTANEOUS at 05:50

## 2025-05-27 RX ADMIN — PREGABALIN 150 MG: 75 CAPSULE ORAL at 08:48

## 2025-05-27 RX ADMIN — NICOTINE 21 MG: 21 PATCH, EXTENDED RELEASE TRANSDERMAL at 08:44

## 2025-05-27 RX ADMIN — AMPICILLIN SODIUM AND SULBACTAM SODIUM 3 G: 2; 1 INJECTION, POWDER, FOR SOLUTION INTRAMUSCULAR; INTRAVENOUS at 02:27

## 2025-05-27 RX ADMIN — FLUTICASONE FUROATE AND VILANTEROL TRIFENATATE 1 PUFF: 200; 25 POWDER RESPIRATORY (INHALATION) at 08:46

## 2025-05-27 RX ADMIN — METHADONE HYDROCHLORIDE 150 MG: 10 TABLET ORAL at 08:49

## 2025-05-27 RX ADMIN — LORATADINE 10 MG: 10 TABLET ORAL at 08:46

## 2025-05-27 RX ADMIN — CLONAZEPAM 1 MG: 0.5 TABLET ORAL at 08:51

## 2025-05-27 NOTE — PLAN OF CARE
Problem: PAIN - ADULT  Goal: Verbalizes/displays adequate comfort level or baseline comfort level  Description: Interventions:  - Encourage patient to monitor pain and request assistance  - Assess pain using appropriate pain scale  - Administer analgesics as ordered based on type and severity of pain and evaluate response  - Implement non-pharmacological measures as appropriate and evaluate response  - Consider cultural and social influences on pain and pain management  - Notify physician/advanced practitioner if interventions unsuccessful or patient reports new pain  - Educate patient/family on pain management process including their role and importance of  reporting pain   - Provide non-pharmacologic/complimentary pain relief interventions  Outcome: Progressing     Problem: INFECTION - ADULT  Goal: Absence or prevention of progression during hospitalization  Description: INTERVENTIONS:  - Assess and monitor for signs and symptoms of infection  - Monitor lab/diagnostic results  - Monitor all insertion sites, i.e. indwelling lines, tubes, and drains  - Monitor endotracheal if appropriate and nasal secretions for changes in amount and color  - [place] appropriate cooling/warming therapies per order  - Administer medications as ordered  - Instruct and encourage patient and family to use good hand hygiene technique  - Identify and instruct in appropriate isolation precautions for identified infection/condition  Outcome: Progressing  Goal: Absence of fever/infection during neutropenic period  Description: INTERVENTIONS:  - Monitor WBC  - Perform strict hand hygiene  - Limit to healthy visitors only  - No plants, dried, fresh or silk flowers with cardenas in patient room  Outcome: Progressing     Problem: SAFETY ADULT  Goal: Patient will remain free of falls  Description: INTERVENTIONS:  - Educate patient/family on patient safety including physical limitations  - Instruct patient to call for assistance with activity   -  Consider consulting OT/PT to assist with strengthening/mobility based on AM PAC & JH-HLM score  - Consult OT/PT to assist with strengthening/mobility   - Keep Call bell within reach  - Keep bed low and locked with side rails adjusted as appropriate  - Keep care items and personal belongings within reach  - Initiate and maintain comfort rounds  - Make Fall Risk Sign visible to staff  - Apply yellow socks and bracelet for high fall risk patients  - Consider moving patient to room near nurses station  Outcome: Progressing  Goal: Maintain or return to baseline ADL function  Description: INTERVENTIONS:  -  Assess patient's ability to carry out ADLs; assess patient's baseline for ADL function and identify physical deficits which impact ability to perform ADLs (bathing, care of mouth/teeth, toileting, grooming, dressing, etc.)  - Assess/evaluate cause of self-care deficits   - Assess range of motion  - Assess patient's mobility; develop plan if impaired  - Assess patient's need for assistive devices and provide as appropriate  - Encourage maximum independence but intervene and supervise when necessary  - Involve family in performance of ADLs  - Assess for home care needs following discharge   - Consider OT consult to assist with ADL evaluation and planning for discharge  - Provide patient education as appropriate  - Monitor functional capacity and physical performance, use of AM PAC & JH-HLM   - Monitor gait, balance and fatigue with ambulation    Outcome: Progressing  Goal: Maintains/Returns to pre admission functional level  Description: INTERVENTIONS:  - Perform AM-PAC 6 Click Basic Mobility/ Daily Activity assessment daily.  - Set and communicate daily mobility goal to care team and patient/family/caregiver.   - Collaborate with rehabilitation services on mobility goals if consulted  - Reposition patient every 2 hours.  - Out of bed to chair three times a day   - Out of bed for meals three times a day  - Out of bed for  toileting  - Record patient progress and toleration of activity level   Outcome: Progressing     Problem: DISCHARGE PLANNING  Goal: Discharge to home or other facility with appropriate resources  Description: INTERVENTIONS:  - Identify barriers to discharge w/patient and caregiver  - Arrange for needed discharge resources and transportation as appropriate  - Identify discharge learning needs (meds, wound care, etc.)  - Arrange for interpretive services to assist at discharge as needed  - Refer to Case Management Department for coordinating discharge planning if the patient needs post-hospital services based on physician/advanced practitioner order or complex needs related to functional status, cognitive ability, or social support system  Outcome: Progressing     Problem: Knowledge Deficit  Goal: Patient/family/caregiver demonstrates understanding of disease process, treatment plan, medications, and discharge instructions  Description: Complete learning assessment and assess knowledge base.  Interventions:  - Provide teaching at level of understanding  - Provide teaching via preferred learning methods  Outcome: Progressing

## 2025-05-27 NOTE — CASE MANAGEMENT
Case Management Discharge Planning Note    Patient name Sacha Foster  Location / MRN [id number]  : 1990 Date 2025       Current Admission Date: 2025  Current Admission Diagnosis:Acute respiratory failure with hypoxia ([facility])   Patient Active Problem List    Diagnosis Date Noted    Seizure-like activity ([facility]) 2025    Acute respiratory failure with hypoxia ([facility]) 2025    KATHY (acute kidney injury) ([facility]) 2025    Central sleep apnea comorbid with prescribed opioid use 2025    Hypogonadism in male 2025    Opioid dependence ([facility]) 2024    PTSD (post-traumatic stress disorder) 2024    Volume overload 2024    Nausea and vomiting 2024    Episode of unresponsiveness 2024    Acute respiratory failure with hypoxia and hypercapnia ([facility]) 2024    Painful orthopaedic hardware ([facility]) 2023    Accessory bone of foot 2023    Seasonal allergies 2023    Nicotine dependence, uncomplicated 2023    Closed displaced fracture of fifth metatarsal bone of right foot with nonunion 2023    Fixation hardware in lower extremity 2023    Moderate persistent asthma with acute exacerbation 2023    Fracture of base of fifth metatarsal bone of right foot at metaphyseal-diaphyseal junction with nonunion 2023    Mild persistent asthma without complication 2022    Long-term exposure involving bird droppings 2022    Lumbar radiculopathy 2022    Tinea cruris 2022    History of colonoscopy 2022    Ventral hernia without obstruction or gangrene 2022    Chronic bilateral low back pain without sciatica 2022    Chronic constipation 2022    Central sleep apnea     Chronic pain of both knees 2022    Eczema 2021    Arthralgia 10/17/2020    Colonic mass 2020    Drug dependence ([facility]) 2019    Medication therapy continued 2019    IV drug abuse ([facility])  01/22/2019    CATA treated with BiPAP 06/24/2018    Severe sepsis (HCC) 06/24/2018    Claustrophobia 03/15/2018    Restless leg syndrome 03/15/2018    Morbid obesity with BMI of 50.0-59.9, adult (HCC) 03/15/2018    Chronic pain syndrome 03/15/2018    Acid reflux 06/29/2017    Essential hypertension 11/17/2016    Generalized anxiety disorder 10/19/2016    Anxiety 07/14/2016    Depressed 07/14/2016    Methadone maintenance therapy patient (HCC) 07/14/2016    Obesity, Class III, BMI 40-49.9 (morbid obesity) 07/14/2016    Chronic hepatitis C without hepatic coma (HCC) 02/21/2016    Bipolar disorder (HCC) 04/03/2015    Allergic rhinitis 08/14/2014    Insomnia 08/14/2014    Opioid dependence in remission ([facility]) 08/14/2014      LOS (days): 2  Geometric Mean LOS (GMLOS) (days):   Days to GMLOS:     OBJECTIVE:  Risk of Unplanned Readmission Score: 23.07         Current admission status: Inpatient   Preferred Pharmacy:   [id number] - [place] - [address]  [address] [facility] [place] [address]  Phone: [phone] Fax: [phone]    [facility] [address]   [address] [facility] [address]  Phone: [phone] Fax: [phone]    [facility] [address] [place] [address] - [address]  [address]  [facility] [address]  Phone: [phone] Fax: [phone]    [facility] [id number]  [address]  [facility] [address]  Phone: [phone] Fax: [phone]    Primary Care Provider: Dalia Rodriguez PA-C    Primary Insurance: MENABANQER  Secondary Insurance:     DISCHARGE DETAILS:patient discharging home. No discharge needs noted. Primary nurse to review AVS, all follow up providers listed.

## 2025-05-27 NOTE — DISCHARGE SUMMARY
Discharge Summary - Hospitalist   Name: Sacha Foster 34 y.o. male I MRN: [id number]  Unit/Bed#:  I Date of Admission: 5/23/2025   Date of Service: 5/27/2025 I Hospital Day: 2     Assessment & Plan  Acute respiratory failure with hypoxia (HCC)  Presented to the ER for evaluation after reported period of unresponsiveness  Unclear etiology, concern for pneumonia  SpO2 as low 78% in the ER  Placed on oxygen initially at 6 L/min    Resolved, patient saturating well on room air on day of discharge    Suspect aspiration pneumonia/pneumonitis while patient was unresponsive  Clinically improving, located discontinue cardiopulmonary monitoring  Respiratory protocol  Urine Legionella and strep negative  Now on 2 L nasal cannula, will order incentive spirometry and wean as tolerated  Severe sepsis (HCC)  SIRS criteria 3/4 (tachycardia, tachypnea, leukocytosis, KATHY present)  Likely secondary to pneumonia versus aspiration pneumonitis  Continue Unasyn with transition to Augmentin on discharge to complete 7 days  Cultures NGTD     Will discharge on Augmentin to complete 7 days of antibiotics  Episode of unresponsiveness  Patient reportedly found unresponsive at home  Unclear substance/drug use in history  Patient reportedly received Narcan from family as well as EMS with some improvement   Toxicology urine positive only for on methadone  Patient's mother reports a similar episode, and repeat port concerning for possible seizure  Given patient's questionable compliance, and being on benzodiazepines, which was negative on the urine drug screen -suspect possible withdrawal seizures  EEG ordered  Neurology consult for further recommendations  Ultimately, compliance with home benzodiazepines would be treatment if episodes are truly seizures due to benzo withdrawal    EEG normal, will have patient follow-up with neurology as an outpatient  Essential hypertension  Blood pressure is stable  Continue PTA Blood pressure  medication  Monitor blood pressure while admitted   Drug dependence (HCC)  Patient with history of drug abuse with  Patient Is currently on methadone  UDS positive for methadone  Continue Methadone  Central sleep apnea  Respiratory protocol  Continue CPAP at bedtime  KATHY (acute kidney injury) (HCC)  Creatinine level at 1.35  Now improved  Okay to stop IV fluids  Resolved  Seizure-like activity (HCC)       Medical Problems       Resolved Problems  Date Reviewed: 5/24/2025   None       Discharging Physician / Practitioner: Leanne Christiansen MD  PCP: Dalia Rodriguez PA-C  Admission Date:   Admission Orders (From admission, onward)       Ordered        05/25/25 0736  INPATIENT ADMISSION  Once            05/24/25 0101  Place in Observation  Once                          Discharge Date: 05/27/25    Next Steps for Physician/AP Assuming Care:      Test Results Pending at Discharge (will require follow up):      Medication Changes for Discharge & Rationale:     See after visit summary for reconciled discharge medications provided to patient and/or family.     Consultations During Hospital Stay:  Neurology    Procedures Performed:   EEG : essentially normal    Significant Findings / Test Results:       Incidental Findings:          Hospital Course:   Sacha Foster is a 34 y.o. male patient who originally presented to the hospital on 5/23/2025 due to being found unresponsive.  It appeared as an accidental overdose.  He was administered Narcan and had some response.  He was found to be hypoxic, and met criteria for sepsis.  The patient underwent neurochecks, as well as an EEG both of which were unrevealing.  It was felt that he did not take his dose of clonazepam, but on further questioning he did's take his Klonopin as prescribed.  He does not take Restoril every day.  Nonetheless he was taking his Klonopin daily.  Ultimately it is unclear what transpired, his EEG was normal and the patient will be discharged without any  AEDs.  He will have follow-up with neurology to assess if further testing is warranted.    He was treated with Unasyn with improving white blood cell count and procalcitonin, will discharge on Augmentin to complete 7 days of antibiotics.  Blood cultures were negative prior to discharge.    Renal function also normalized with IV fluids.  Subsequently discontinued and patient was tolerating regular diet.            Please see above list of diagnoses and related plan for additional information.     Discharge Day Visit / Exam:       Discussion with Family:     Discharge instructions/Information to patient and family:   See after visit summary for information provided to patient and family.      Provisions for Follow-Up Care:  See after visit summary for information related to follow-up care and any pertinent home health orders.      Mobility at time of Discharge:   Basic Mobility Inpatient Raw Score: 24  JH-HLM Goal: 8: Walk 250 feet or more  JH-HLM Achieved: 8: Walk 250 feet ot more  HLM Goal achieved. Continue to encourage appropriate mobility.     Disposition:   Home    Planned Readmission: No    Administrative Statements   Discharge Statement:  I have spent a total time of 40 minutes in caring for this patient on the day of the visit/encounter. >30 minutes of time was spent on: Diagnostic results, Patient and family education, Importance of tx compliance, and Documenting in the medical record.    **Please Note: This note may have been constructed using a voice recognition system**

## 2025-05-27 NOTE — PLAN OF CARE
Problem: PAIN - ADULT  Goal: Verbalizes/displays adequate comfort level or baseline comfort level  Description: Interventions:  - Encourage patient to monitor pain and request assistance  - Assess pain using appropriate pain scale  - Administer analgesics as ordered based on type and severity of pain and evaluate response  - Implement non-pharmacological measures as appropriate and evaluate response  - Consider cultural and social influences on pain and pain management  - Notify physician/advanced practitioner if interventions unsuccessful or patient reports new pain  - Educate patient/family on pain management process including their role and importance of  reporting pain   - Provide non-pharmacologic/complimentary pain relief interventions  Outcome: Progressing     Problem: INFECTION - ADULT  Goal: Absence or prevention of progression during hospitalization  Description: INTERVENTIONS:  - Assess and monitor for signs and symptoms of infection  - Monitor lab/diagnostic results  - Monitor all insertion sites, i.e. indwelling lines, tubes, and drains  - Monitor endotracheal if appropriate and nasal secretions for changes in amount and color  - [place] appropriate cooling/warming therapies per order  - Administer medications as ordered  - Instruct and encourage patient and family to use good hand hygiene technique  - Identify and instruct in appropriate isolation precautions for identified infection/condition  Outcome: Progressing  Goal: Absence of fever/infection during neutropenic period  Description: INTERVENTIONS:  - Monitor WBC  - Perform strict hand hygiene  - Limit to healthy visitors only  - No plants, dried, fresh or silk flowers with cardenas in patient room  Outcome: Progressing     Problem: Knowledge Deficit  Goal: Patient/family/caregiver demonstrates understanding of disease process, treatment plan, medications, and discharge instructions  Description: Complete learning assessment and assess knowledge  base.  Interventions:  - Provide teaching at level of understanding  - Provide teaching via preferred learning methods  Outcome: Progressing

## 2025-05-27 NOTE — ASSESSMENT & PLAN NOTE
Presented to the ER for evaluation after reported period of unresponsiveness  Unclear etiology, concern for pneumonia  SpO2 as low 78% in the ER  Placed on oxygen initially at 6 L/min    Resolved, patient saturating well on room air on day of discharge    Suspect aspiration pneumonia/pneumonitis while patient was unresponsive  Clinically improving, located discontinue cardiopulmonary monitoring  Respiratory protocol  Urine Legionella and strep negative  Now on 2 L nasal cannula, will order incentive spirometry and wean as tolerated

## 2025-05-27 NOTE — ASSESSMENT & PLAN NOTE
SIRS criteria 3/4 (tachycardia, tachypnea, leukocytosis, KATHY present)  Likely secondary to pneumonia versus aspiration pneumonitis  Continue Unasyn with transition to Augmentin on discharge to complete 7 days  Cultures NGTD     Will discharge on Augmentin to complete 7 days of antibiotics

## 2025-05-27 NOTE — UTILIZATION REVIEW
NOTIFICATION OF INPATIENT ADMISSION   AUTHORIZATION REQUEST   SERVICING FACILITY:   [place] [address]  Tax ID:  [id number]  NPI: [id number] ATTENDING PROVIDER:  Attending Name and NPI#: Leanne Christiansen Md [[id number]]  Address: [address] [place] [address]  Phone: [phone]     ADMISSION INFORMATION:  Place of Service: Inpatient [facility]  Place of Service Code: 21  Inpatient Admission Date/Time: 5/25/25  7:36 AM  Discharge Date/Time: No discharge date for patient encounter.  Admitting Diagnosis Code/Description:  Substance abuse (HCC) [F19.10]  Overdose [T50.901A]  KATHY (acute kidney injury) (HCC) [N17.9]  Acute hypoxic respiratory failure (HCC) [J96.01]     UTILIZATION REVIEW CONTACT:  Yi Galvez Utilization   Network Utilization Review Department  Phone: [phone]  Fax: [phone]  Email: [email or web address]  Contact for approvals/pending authorizations, clinical reviews, and discharge.  .     PHYSICIAN ADVISORY SERVICES:  Medical Necessity Denial & Limn-hi-Kcsc Review  Phone: [phone]  Fax: [phone]  Email: [email or web address]     DISCHARGE SUPPORT TEAM:  For Patients Discharge Needs & Updates  Phone: [phone] opt. 2 Fax: [phone]  Email: [email or web address]

## 2025-05-27 NOTE — ASSESSMENT & PLAN NOTE
Patient reportedly found unresponsive at home  Unclear substance/drug use in history  Patient reportedly received Narcan from family as well as EMS with some improvement   Toxicology urine positive only for on methadone  Patient's mother reports a similar episode, and repeat port concerning for possible seizure  Given patient's questionable compliance, and being on benzodiazepines, which was negative on the urine drug screen -suspect possible withdrawal seizures  EEG ordered  Neurology consult for further recommendations  Ultimately, compliance with home benzodiazepines would be treatment if episodes are truly seizures due to benzo withdrawal    EEG normal, will have patient follow-up with neurology as an outpatient

## 2025-05-27 NOTE — TELEPHONE ENCOUNTER
CHAPARRITA PARENT ON CONSENT FORM , SACHA FATHER ON CONSENT FORM         1ST ATTEMPT,     Called pt no answer, LMOM.  [employer] MESSAGE SENT AS WELL.      Thank you,     Yessenia MAJOR/ TAYLOR LI/ Episode of unresponsiveness     DC- 5/27/2025-    ---- Message from Jose Angel Camacho MD sent at 5/27/2025  7:08 AM EDT -----  Sacha Foster will need follow up in in 6 weeks with epilepsy attending. He will require a routine EEG within 4 weeks if not completed in the hospital

## 2025-05-28 ENCOUNTER — PATIENT OUTREACH (OUTPATIENT)
Dept: CASE MANAGEMENT | Facility: OTHER | Age: 35
End: 2025-05-28

## 2025-05-28 LAB
ATRIAL RATE: 113 BPM
P AXIS: 86 DEGREES
PR INTERVAL: 172 MS
QRS AXIS: 70 DEGREES
QRSD INTERVAL: 106 MS
QT INTERVAL: 338 MS
QTC INTERVAL: 463 MS
T WAVE AXIS: 44 DEGREES
VENTRICULAR RATE: 113 BPM

## 2025-05-28 PROCEDURE — 93010 ELECTROCARDIOGRAM REPORT: CPT | Performed by: INTERNAL MEDICINE

## 2025-05-28 NOTE — PROGRESS NOTES
HRR referral received. Chart reviewed.  Admitted to [facility] 5/23-5/27 with acute respiratory failure with hypoxia, severe sepsis and KATHY which resolved in the hospital.  He discharged home to self care.  I spoke with Sacha who reports feeling well. He did get his antibiotic and is taking it as directed.I advised him to take the medication as directed until finished.  He has an appointment to see his PCP 5/30.  He has transportation.  He took my contact information and repeated it to me.  I will continue to follow.

## 2025-05-28 NOTE — TELEPHONE ENCOUNTER
Hello,     Can you please call patient back and re-schedule with attending as well as inform abot Eeg testing unless already completed or scheduled.    If not after re-scheduling warm transfer to central scheduling.    Thank you,     Yessenia Foster will need follow up in in 6 weeks with epilepsy attending. He will require a routine EEG within 4 weeks if not completed in the hospital

## 2025-05-28 NOTE — TELEPHONE ENCOUNTER
Yessenia--patient is on my schedule for HFU, instructions are to schedule with attending. It was also scheduled as a NP, and not a HFU.  Please reschedule HFU with attending, as per the inpatient instructions

## 2025-05-29 LAB
BACTERIA BLD CULT: NORMAL
BACTERIA BLD CULT: NORMAL

## 2025-05-30 ENCOUNTER — TELEPHONE (OUTPATIENT)
Dept: INTERNAL MEDICINE CLINIC | Facility: CLINIC | Age: 35
End: 2025-05-30

## 2025-05-30 ENCOUNTER — OFFICE VISIT (OUTPATIENT)
Dept: INTERNAL MEDICINE CLINIC | Facility: CLINIC | Age: 35
End: 2025-05-30
Payer: COMMERCIAL

## 2025-05-30 ENCOUNTER — APPOINTMENT (OUTPATIENT)
Dept: RADIOLOGY | Facility: CLINIC | Age: 35
End: 2025-05-30
Payer: COMMERCIAL

## 2025-05-30 VITALS
TEMPERATURE: 98.6 F | DIASTOLIC BLOOD PRESSURE: 78 MMHG | OXYGEN SATURATION: 96 % | SYSTOLIC BLOOD PRESSURE: 108 MMHG | HEART RATE: 85 BPM | WEIGHT: 315 LBS | HEIGHT: 70 IN | BODY MASS INDEX: 45.1 KG/M2

## 2025-05-30 DIAGNOSIS — F11.21 OPIOID DEPENDENCE IN REMISSION (HCC): ICD-10-CM

## 2025-05-30 DIAGNOSIS — T17.908A ASPIRATION INTO AIRWAY, INITIAL ENCOUNTER: ICD-10-CM

## 2025-05-30 DIAGNOSIS — J96.01 ACUTE RESPIRATORY FAILURE WITH HYPOXIA (HCC): ICD-10-CM

## 2025-05-30 DIAGNOSIS — T17.908A ASPIRATION INTO AIRWAY, INITIAL ENCOUNTER: Primary | ICD-10-CM

## 2025-05-30 PROCEDURE — 71046 X-RAY EXAM CHEST 2 VIEWS: CPT

## 2025-05-30 PROCEDURE — 99496 TRANSJ CARE MGMT HIGH F2F 7D: CPT | Performed by: INTERNAL MEDICINE

## 2025-05-30 NOTE — TELEPHONE ENCOUNTER
Pt notified to change PCP to our office - RTE is coming back listed as someone else. Pt states he will call today to update this.

## 2025-05-30 NOTE — PROGRESS NOTES
Transition of Care Visit:  Name: Sacha Foster      : 1990      MRN: [id number]  Encounter Provider: Aleksander Mota DO  Encounter Date: 2025   Encounter department: [facility]    Assessment & Plan  Aspiration into airway, initial encounter  The patient noted to have a transient change in consciousness with possible aspiration noted as the cause.  Reviewed labs and evaluation by the ER  Elevated WBC and glucose with confusional state?  EEG negative and on pregablin.  No change in dosing of Methadone and Benzodiazepines  Requested the patient follow up with Psyche for adjustment of Benzo dose  Orders:  •  XR chest pa and lateral; Future  •  CBC and differential; Future  •  FL barium swallow ROUTINE esophagus; Future    Acute respiratory failure with hypoxia (HCC)  O2 sat noted to be normal today  No SOB noted       Opioid dependence in remission (HCC)  On Methadone and the patient will continue that            History of Present Illness     Transitional Care Management Review:   Sacha Foster is a 34 y.o. male here for TCM follow up.     During the TCM phone call patient stated:  TCM Call (since 2025)     Date and time call was made  2025  3:43 PM    Hospital care reviewed  Records reviewed    Patient was hospitialized at  [facility]    Date of Admission  25    Date of discharge  25    Diagnosis  Acute respiratory failure with hypoxia (HCC)    Disposition  Home    Were the patients medications reviewed and updated  Yes    Current Symptoms  None      TCM Call (since 2025)     Post hospital issues  None    Scheduled for follow up?  Yes    Did you obtain your prescribed medications  Yes    Do you need help managing your prescriptions or medications  No    Is transportation to your appointment needed  No    I have advised the patient to call PCP with any new or worsening symptoms  CELSO Matthews    Living Arrangements  Alone        HPI  Review  "of Systems   Constitutional:  Negative for chills, fatigue and fever.   HENT: Negative.     Respiratory:  Negative for cough, chest tightness and shortness of breath.    Cardiovascular:  Negative for chest pain and palpitations.   Gastrointestinal:  Negative for abdominal pain, constipation, diarrhea, nausea and vomiting.   Genitourinary: Negative.    Musculoskeletal:  Negative for back pain and myalgias.   Skin: Negative.    Neurological: Negative.    Psychiatric/Behavioral:  Negative for dysphoric mood. The patient is not nervous/anxious.      Objective   /78   Pulse 85   Temp 98.6 °F (37 °C)   Ht 5' 10\" (1.778 m)   Wt (!) 168 kg (370 lb)   SpO2 96%   BMI 53.09 kg/m²     Physical Exam  Exam conducted with a chaperone present.       Medications have been reviewed by provider in current encounter      "

## 2025-06-01 ENCOUNTER — RESULTS FOLLOW-UP (OUTPATIENT)
Dept: INTERNAL MEDICINE CLINIC | Facility: CLINIC | Age: 35
End: 2025-06-01

## 2025-06-02 ENCOUNTER — RA CDI HCC (OUTPATIENT)
Dept: OTHER | Facility: HOSPITAL | Age: 35
End: 2025-06-02

## 2025-06-02 NOTE — PROGRESS NOTES
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

## 2025-06-03 ENCOUNTER — OFFICE VISIT (OUTPATIENT)
Dept: INTERNAL MEDICINE CLINIC | Facility: CLINIC | Age: 35
End: 2025-06-03
Payer: COMMERCIAL

## 2025-06-03 VITALS
BODY MASS INDEX: 45.1 KG/M2 | DIASTOLIC BLOOD PRESSURE: 88 MMHG | WEIGHT: 315 LBS | OXYGEN SATURATION: 97 % | HEART RATE: 82 BPM | HEIGHT: 70 IN | TEMPERATURE: 97.9 F | SYSTOLIC BLOOD PRESSURE: 118 MMHG

## 2025-06-03 DIAGNOSIS — E66.812 CLASS 2 OBESITY DUE TO EXCESS CALORIES WITHOUT SERIOUS COMORBIDITY WITH BODY MASS INDEX (BMI) OF 35.0 TO 35.9 IN ADULT: Primary | ICD-10-CM

## 2025-06-03 DIAGNOSIS — E66.09 CLASS 2 OBESITY DUE TO EXCESS CALORIES WITHOUT SERIOUS COMORBIDITY WITH BODY MASS INDEX (BMI) OF 35.0 TO 35.9 IN ADULT: Primary | ICD-10-CM

## 2025-06-03 DIAGNOSIS — R79.89 LOW TESTOSTERONE IN MALE: ICD-10-CM

## 2025-06-03 PROBLEM — J96.02 ACUTE RESPIRATORY FAILURE WITH HYPOXIA AND HYPERCAPNIA (HCC): Status: RESOLVED | Noted: 2024-12-04 | Resolved: 2025-06-03

## 2025-06-03 PROBLEM — F11.20 OPIOID DEPENDENCE (HCC): Status: RESOLVED | Noted: 2024-12-11 | Resolved: 2025-06-03

## 2025-06-03 PROBLEM — J96.01 ACUTE RESPIRATORY FAILURE WITH HYPOXIA AND HYPERCAPNIA (HCC): Status: RESOLVED | Noted: 2024-12-04 | Resolved: 2025-06-03

## 2025-06-03 PROBLEM — J45.41 MODERATE PERSISTENT ASTHMA WITH ACUTE EXACERBATION: Status: RESOLVED | Noted: 2023-03-09 | Resolved: 2025-06-03

## 2025-06-03 PROBLEM — J96.01 ACUTE RESPIRATORY FAILURE WITH HYPOXIA (HCC): Status: RESOLVED | Noted: 2025-05-24 | Resolved: 2025-06-03

## 2025-06-03 PROCEDURE — 99214 OFFICE O/P EST MOD 30 MIN: CPT | Performed by: PHYSICIAN ASSISTANT

## 2025-06-03 RX ORDER — TIRZEPATIDE 2.5 MG/.5ML
2.5 INJECTION, SOLUTION SUBCUTANEOUS WEEKLY
Qty: 2 ML | Refills: 0 | Status: SHIPPED | OUTPATIENT
Start: 2025-06-03 | End: 2025-07-01

## 2025-06-03 NOTE — ASSESSMENT & PLAN NOTE
Restart testosterone, recheck level 1 month. Adjust treatment according to results  Orders:  •  Testosterone, free, total; Future

## 2025-06-03 NOTE — ASSESSMENT & PLAN NOTE
Will try zepbound. Directions for use and possible side effects discussed and patient verbalized understanding of these.  Prior Authorization Clinical Questions for Weight Management Pharmacotherapy    1. Does the patient have a contrainidcation to medication prescribed for weight management?: No  2. Does the patient have a diagnosis of obesity, confirmed by a BMI greater than or equal to 30 kg/m^2?: Yes  3. Does the patient have a BMI of greater than or equal to 27 kg/m^2 with at least one weight-related comorbidity/risk factor/complication (e.g. diabetes, dyslipidemia, coronary artery disease)?: Yes  4. Weight-related co-morbidities/risk factors: hypertension, male hypogonadism, obstructive sleep apnea (CATA), depression, asthma/reactive airway disease  5. WEGOVY CVA Indication: Does patient have established documented cardiovascular disease (history of a prior heart attack (myocardial infarction), stroke, or symptomatic peripheral arterial disease (PAD)?: No  6. ZEPBOUND CATA Indication: Does patient have documented CATA diagnosed via sleep study (insurance will require copy of sleep study results for approval)?: Yes  7. Has the patient been on a weight loss regimen of low-calorie diet, increased physical activity, and lifestyle modifications for a minimum of 6 months?: Yes  8. Has the patient completed a comprehensive weight loss program (ie, Weight Watchers, Noom, Bariatrics, other batsheva on phone)? If so, what?: No  9. Does the patient have a history of type 2 diabetes?: No  10. Has the member tried and failed other weight loss medication within the past 12 months?: Yes   -- Q10 Further explanation: wegovy, could not tolerate   11. Will the member use requested medication in combination with another GLP agonist or weight loss drug?: No  12. Is the medication a controlled substance?: No     Baseline weight (in pounds): 367 lbs  Current weight (in pounds): 367 lbs  Weight loss percentage: 0%           Orders:  •   tirzepatide (Zepbound) 2.5 mg/0.5 mL auto-injector; Inject 0.5 mL (2.5 mg total) under the skin once a week for 28 days

## 2025-06-03 NOTE — PROGRESS NOTES
Name: Sacha Foster      : 1990      MRN: [id number]  Encounter Provider: Dalia Rodriguez PA-C  Encounter Date: 6/3/2025   Encounter department: [facility]  :  Assessment & Plan  Class 2 obesity due to excess calories without serious comorbidity with body mass index (BMI) of 35.0 to 35.9 in adult  Will try zepbound. Directions for use and possible side effects discussed and patient verbalized understanding of these.  Prior Authorization Clinical Questions for Weight Management Pharmacotherapy    1. Does the patient have a contrainidcation to medication prescribed for weight management?: No  2. Does the patient have a diagnosis of obesity, confirmed by a BMI greater than or equal to 30 kg/m^2?: Yes  3. Does the patient have a BMI of greater than or equal to 27 kg/m^2 with at least one weight-related comorbidity/risk factor/complication (e.g. diabetes, dyslipidemia, coronary artery disease)?: Yes  4. Weight-related co-morbidities/risk factors: hypertension, male hypogonadism, obstructive sleep apnea (CATA), depression, asthma/reactive airway disease  5. WEGOVY CVA Indication: Does patient have established documented cardiovascular disease (history of a prior heart attack (myocardial infarction), stroke, or symptomatic peripheral arterial disease (PAD)?: No  6. ZEPBOUND CATA Indication: Does patient have documented CATA diagnosed via sleep study (insurance will require copy of sleep study results for approval)?: Yes  7. Has the patient been on a weight loss regimen of low-calorie diet, increased physical activity, and lifestyle modifications for a minimum of 6 months?: Yes  8. Has the patient completed a comprehensive weight loss program (ie, Weight Watchers, Noom, Bariatrics, other batsheva on phone)? If so, what?: No  9. Does the patient have a history of type 2 diabetes?: No  10. Has the member tried and failed other weight loss medication within the past 12 months?: Yes   -- Q10 Further  explanation: wegovy, could not tolerate   11. Will the member use requested medication in combination with another GLP agonist or weight loss drug?: No  12. Is the medication a controlled substance?: No     Baseline weight (in pounds): 367 lbs  Current weight (in pounds): 367 lbs  Weight loss percentage: 0%           Orders:  •  tirzepatide (Zepbound) 2.5 mg/0.5 mL auto-injector; Inject 0.5 mL (2.5 mg total) under the skin once a week for 28 days    Low testosterone in male  Restart testosterone, recheck level 1 month. Adjust treatment according to results  Orders:  •  Testosterone, free, total; Future           History of Present Illness   Pt presents for routine visit. He was recently hospitalized for unresponsive episode. Pt denies any intentional OD. He has appt with neurology for follow up. He is up to date with labs. He notes he was unable to tolerate wegovy and desires trying zepbound as his mother does well with this. He notes he has been out of testosterone for about a month and desires restarting this. He has surgery at the end of the month for hernia repair. Pre op is scheduled but pt has to do his PATs.       Review of Systems   Constitutional:  Negative for chills and fever.   HENT:  Negative for congestion, ear pain, hearing loss, postnasal drip, rhinorrhea, sinus pressure, sinus pain, sore throat and trouble swallowing.    Eyes:  Negative for pain and visual disturbance.   Respiratory:  Negative for cough, chest tightness, shortness of breath and wheezing.    Cardiovascular: Negative.  Negative for chest pain, palpitations and leg swelling.   Gastrointestinal:  Negative for abdominal pain, blood in stool, constipation, diarrhea, nausea and vomiting.   Endocrine: Negative for cold intolerance, heat intolerance, polydipsia, polyphagia and polyuria.   Genitourinary:  Negative for difficulty urinating, dysuria, flank pain and urgency.   Musculoskeletal:  Negative for arthralgias, back pain, gait problem  "and myalgias.   Skin:  Negative for rash.   Allergic/Immunologic: Negative.    Neurological:  Negative for dizziness, weakness, light-headedness and headaches.   Hematological: Negative.    Psychiatric/Behavioral:  Negative for behavioral problems, dysphoric mood and sleep disturbance. The patient is not nervous/anxious.        Objective   /88   Pulse 82   Temp 97.9 °F (36.6 °C)   Ht 5' 10\" (1.778 m)   Wt (!) 166 kg (367 lb)   SpO2 97%   BMI 52.66 kg/m²      Physical Exam  Vitals and nursing note reviewed.   Constitutional:       General: He is not in acute distress.     Appearance: Normal appearance. He is well-developed. He is obese. He is not diaphoretic.   HENT:      Head: Normocephalic and atraumatic.      Right Ear: External ear normal.      Left Ear: External ear normal.      Nose: Nose normal.      Mouth/Throat:      Pharynx: No oropharyngeal exudate.     Eyes:      General: No scleral icterus.        Right eye: No discharge.         Left eye: No discharge.      Conjunctiva/sclera: Conjunctivae normal.      Pupils: Pupils are equal, round, and reactive to light.     Neck:      Thyroid: No thyromegaly.     Cardiovascular:      Rate and Rhythm: Normal rate and regular rhythm.      Heart sounds: Normal heart sounds. No murmur heard.     No friction rub. No gallop.   Pulmonary:      Effort: Pulmonary effort is normal. No respiratory distress.      Breath sounds: Normal breath sounds. No wheezing or rales.   Abdominal:      General: Bowel sounds are normal. There is no distension.      Palpations: Abdomen is soft.      Tenderness: There is no abdominal tenderness.     Musculoskeletal:         General: No tenderness or deformity. Normal range of motion.      Cervical back: Normal range of motion and neck supple.     Skin:     General: Skin is warm and dry.     Neurological:      Mental Status: He is alert and oriented to person, place, and time.      Cranial Nerves: No cranial nerve deficit. "     Psychiatric:         Behavior: Behavior normal.         Thought Content: Thought content normal.         Judgment: Judgment normal.

## 2025-06-04 ENCOUNTER — PATIENT OUTREACH (OUTPATIENT)
Dept: CASE MANAGEMENT | Facility: OTHER | Age: 35
End: 2025-06-04

## 2025-06-04 ENCOUNTER — TELEPHONE (OUTPATIENT)
Age: 35
End: 2025-06-04

## 2025-06-04 NOTE — TELEPHONE ENCOUNTER
PA for (Zepbound) 2.5 mg/0.5 mL auto-injector SUBMITTED to     via    [x][facility]-KEY: [id number]  []Surescripts-Case ID #   []Availity-Auth ID # NDC #   []Faxed to plan   []Other website   []Phone call Case ID #     [x]PA sent as URGENT    All office notes, labs and other pertaining documents and studies sent. Clinical questions answered. Awaiting determination from insurance company.     Turnaround time for your insurance to make a decision on your Prior Authorization can take 7-21 business days.

## 2025-06-04 NOTE — TELEPHONE ENCOUNTER
PA for (Zepbound) 2.5 mg/0.5 mL APPROVED     Date(s) approved 6/4/25-12/4/25    All strengths of drug are approved.     Case #[id number]    Patient advised by          [][facility] Message  []Phone call   [x]LMOM  []L/M to call office as no active Communication consent on file  []Unable to leave detailed message as VM not approved on Communication consent       Pharmacy advised by    [x]Fax  []Phone call  []Secure Chat    Specialty Pharmacy    []     Approval letter scanned into Media Yes

## 2025-06-04 NOTE — PROGRESS NOTES
I spoke with Sacha who denies any symptoms.He saw his PCP and had a repeat CXR which was normal.He was started on Zepbound for weight loss management.  Sacha lives with his parents and denies any needs at this time.  He declined furhter outreach but has my contact information.I advised him to call me if I can assist him.  I updated the care coordination note.

## 2025-06-06 ENCOUNTER — APPOINTMENT (OUTPATIENT)
Dept: LAB | Facility: HOSPITAL | Age: 35
End: 2025-06-06
Payer: COMMERCIAL

## 2025-06-06 ENCOUNTER — HOSPITAL ENCOUNTER (OUTPATIENT)
Dept: RADIOLOGY | Facility: HOSPITAL | Age: 35
End: 2025-06-06
Payer: COMMERCIAL

## 2025-06-06 ENCOUNTER — HOSPITAL ENCOUNTER (OUTPATIENT)
Dept: RADIOLOGY | Facility: HOSPITAL | Age: 35
End: 2025-06-06
Attending: INTERNAL MEDICINE
Payer: COMMERCIAL

## 2025-06-06 DIAGNOSIS — K43.2 INCISIONAL HERNIA, WITHOUT OBSTRUCTION OR GANGRENE: ICD-10-CM

## 2025-06-06 DIAGNOSIS — T17.908A ASPIRATION INTO AIRWAY, INITIAL ENCOUNTER: ICD-10-CM

## 2025-06-06 LAB
ATRIAL RATE: 75 BPM
P AXIS: 40 DEGREES
PR INTERVAL: 186 MS
QRS AXIS: 30 DEGREES
QRSD INTERVAL: 102 MS
QT INTERVAL: 394 MS
QTC INTERVAL: 441 MS
T WAVE AXIS: 37 DEGREES
VENTRICULAR RATE: 75 BPM

## 2025-06-06 PROCEDURE — 93010 ELECTROCARDIOGRAM REPORT: CPT | Performed by: INTERNAL MEDICINE

## 2025-06-06 PROCEDURE — 71046 X-RAY EXAM CHEST 2 VIEWS: CPT

## 2025-06-06 PROCEDURE — 74220 X-RAY XM ESOPHAGUS 1CNTRST: CPT

## 2025-06-10 DIAGNOSIS — M54.16 LUMBAR RADICULOPATHY: ICD-10-CM

## 2025-06-10 RX ORDER — PREGABALIN 150 MG/1
150 CAPSULE ORAL 2 TIMES DAILY
Qty: 60 CAPSULE | Refills: 1 | Status: SHIPPED | OUTPATIENT
Start: 2025-06-10

## 2025-06-17 ENCOUNTER — CONSULT (OUTPATIENT)
Dept: INTERNAL MEDICINE CLINIC | Facility: CLINIC | Age: 35
End: 2025-06-17
Payer: COMMERCIAL

## 2025-06-17 VITALS
WEIGHT: 315 LBS | DIASTOLIC BLOOD PRESSURE: 78 MMHG | HEIGHT: 70 IN | HEART RATE: 88 BPM | BODY MASS INDEX: 45.1 KG/M2 | SYSTOLIC BLOOD PRESSURE: 122 MMHG | OXYGEN SATURATION: 93 % | TEMPERATURE: 98.6 F

## 2025-06-17 DIAGNOSIS — I10 ESSENTIAL HYPERTENSION: ICD-10-CM

## 2025-06-17 DIAGNOSIS — E66.812 CLASS 2 OBESITY DUE TO EXCESS CALORIES WITHOUT SERIOUS COMORBIDITY WITH BODY MASS INDEX (BMI) OF 35.0 TO 35.9 IN ADULT: ICD-10-CM

## 2025-06-17 DIAGNOSIS — K21.9 GASTROESOPHAGEAL REFLUX DISEASE WITHOUT ESOPHAGITIS: ICD-10-CM

## 2025-06-17 DIAGNOSIS — F11.20 METHADONE MAINTENANCE THERAPY PATIENT (HCC): ICD-10-CM

## 2025-06-17 DIAGNOSIS — J96.01 ACUTE RESPIRATORY FAILURE WITH HYPOXIA (HCC): ICD-10-CM

## 2025-06-17 DIAGNOSIS — F41.1 GENERALIZED ANXIETY DISORDER: ICD-10-CM

## 2025-06-17 DIAGNOSIS — J45.30 MILD PERSISTENT ASTHMA WITHOUT COMPLICATION: ICD-10-CM

## 2025-06-17 DIAGNOSIS — E66.09 CLASS 2 OBESITY DUE TO EXCESS CALORIES WITHOUT SERIOUS COMORBIDITY WITH BODY MASS INDEX (BMI) OF 35.0 TO 35.9 IN ADULT: ICD-10-CM

## 2025-06-17 DIAGNOSIS — Z01.818 PRE-OP EXAM: Primary | ICD-10-CM

## 2025-06-17 PROCEDURE — 99244 OFF/OP CNSLTJ NEW/EST MOD 40: CPT | Performed by: INTERNAL MEDICINE

## 2025-06-17 RX ORDER — OMEPRAZOLE 40 MG/1
40 CAPSULE, DELAYED RELEASE ORAL DAILY
Qty: 90 CAPSULE | Refills: 1 | Status: SHIPPED | COMMUNITY
Start: 2025-06-17 | End: 2025-12-14

## 2025-06-17 NOTE — PATIENT INSTRUCTIONS
Pre-operative Medication Instructions    Avoid herbs or non-directed vitamins one week prior to surgery  Avoid aspirin containing medications or non-steroidal anti-inflammatory drugs one week preceding surgery  May take tylenol for pain up until the night before surgery    5HT3 Antagonists     Medication Name     ondansetron (ZOFRAN-ODT) 4 mg disintegrating tablet      Continue to take this medication on your normal schedule.  If this is an oral medication and you take it in the morning, then you may take this medicine with a sip of water.    Alpha Adrenergic Antagonist     Medication Name     vilazodone (VIIBRYD) 40 mg tablet      Continue to take this medication on your normal schedule.  If this is an oral medication and you take it in the morning, then you may take this medicine with a sip of water.    Alpha-1 Adrenergic Blockers     Medication Name     prazosin (MINIPRESS) 1 mg capsule     prazosin (MINIPRESS) 5 mg capsule      Continue to take this medication on your normal schedule.  If this is an oral medication and you take it in the morning, then you may take this medicine with a sip of water.    Antidepressant Meds     Medication Name     vilazodone (VIIBRYD) 40 mg tablet      Continue to take this medication on your normal schedule.  If this is an oral medication and you take it in the morning, then you may take this medicine with a sip of water.    Seizure Medication     Medication Name     clonazePAM (KlonoPIN) 1 mg tablet     pregabalin (LYRICA) 150 mg capsule      Continue to take this medication on your normal schedule.  If this is an oral medication and you take it in the morning, then you may take this medicine with a sip of water.    Antipsychotic Medications     Medication Name     Rexulti 3 MG tablet      Continue to take this medication on your normal schedule.  If this is an oral medication and you take it in the morning, then you may take this medicine with a sip of water.    Benzodiazepine  Antagonist     Medication Name     clonazePAM (KlonoPIN) 1 mg tablet     temazepam (RESTORIL) 15 mg capsule      If this medication is needed please continue to take on your normal schedule.  If you take it in the morning, then you may take this medicine with a sip of water.    GLP Weight Loss Drugs     Medication Name     tirzepatide (Zepbound) 2.5 mg/0.5 mL auto-injector      For once weekly weight loss drugs, hold for 7 days. If undergoing bariatric surgery, then hold for 4 weeks.    Opioid Medications     Medication Name     METHADONE HCL PO      Continue to take this medication on your normal schedule.  If this is an oral medication and you take it in the morning, then you may take this medicine with a sip of water.    Testosterone     Medication Name     testosterone cypionate (DEPO-TESTOSTERONE) 200 mg/mL IM injection 50 mg      Continue to take this medication on your normal schedule.  If this is an oral medication and you take it in the morning, then you may take this medicine with a sip of water.  This medication may need to be discontinued for a least 4 weeks prior to your surgery if the surgical procedure is associated with a high risk of blod clots as in hip or knee replacement for example.  Please consult with your Surgeon to discuss discontinuing this medication before your surgery.

## 2025-06-17 NOTE — PROGRESS NOTES
Pre-operative Clearance  Name: Sacha Foster      : 1990      MRN: [id number]  Encounter Provider: Aleksander Mota DO  Encounter Date: 2025   Encounter department: [facility]    :  Assessment & Plan  Pre-op exam  Cleared for surgery low cardiac risk       Generalized anxiety disorder  Clonazepam 1 mg BID       Methadone maintenance therapy patient (HCC)  Methadone 150 mg QDday       Essential hypertension  BP good and no change at this time       Mild persistent asthma without complication  Under control with Dulera and Prn Albuterol       Gastroesophageal reflux disease without esophagitis    Orders:  •  omeprazole (PriLOSEC) 40 MG capsule; Take 1 capsule (40 mg total) by mouth daily    Acute respiratory failure with hypoxia ([facility])         Class 2 obesity due to excess calories without serious comorbidity with body mass index (BMI) of 35.0 to 35.9 in adult    Hold GLP1 prior to surgery         Assessment & Plan          Pre-operative Clearance:     Revised Cardiac Risk Index:  RCI RISK CLASS I (0 risk factors, risk of major cardiac complications approximately 0.5%)    Clearance:  Patient is medically optimized (CLEARED) for proposed surgery without any additional cardiac testing.      Medication Instructions:   - Avoid herbs or non-directed vitamins one week prior to surgery    - Avoid aspirin containing medications or non-steroidal anti-inflammatory drugs one week preceding surgery    - May take tylenol for pain up until the night before surgery    - 5HT3 Antagonist (ie, zofran):  Continue to take this medication on your normal schedule.   - Alpha Adrenergic Antagonist (ie, trazodone, viibryd, trintellix): Continue to take this medication on your normal schedule.  - Alpha-1 Adrenergic Blocker (ie, tamsulosin, doxazosin, alfusozin): Continue to take this medication on your normal schedule.  - Antidepressants: Continue to take this medication on your normal schedule.  -  Antiepileptic meds: Continue to take this medication on your normal schedule.  - Antipsychotic meds: Continue to take this medication on your normal schedule.  - Benzodiazepines (ie, alprazolam, lorazepam, diazepam):  If the medication is needed, continue to take it on your normal schedule.  - GLP weight loss meds: Stop medication 1 week prior to procedure/surgery. If undergoing bariatric surgery, then stop medication 4 weeks prior.  - Opioids: Continue to take this medication on your normal schedule.  - Testosterone: Continue to take this medication on your normal schedule. This medication may need to be discontinued for at least 4 weeks prior to surgery if the surgical procedure is associated with high risk of blood clots. Consult with your surgeon.       History of Present Illness     Pre-Op Examination     Surgery: Reduction and repair of the incarderated left lower wall spigelian hernia w/mesh   Anticipated Date of Surgery: 6/27/2025   Surgeon: Dr Brunson     Previous history of bleeding disorders or clots?: No    Previous Anesthesia reaction?: No    Prolonged steroid use in the last 6 months?: No      Assessment of Cardiac Risk:   - Unstable or severe angina or MI in the last 6 weeks or history of stent placement in the last year?: No    - Decompensated heart failure (e.g. New onset heart failure, NYHA  Class IV heart failure, or worsening existing heart failure)?: No    - Significant arrhythmias such as high grade AV block, symptomatic ventricular arrhythmia, newly recognized ventricular tachycardia, supraventricular tachycardia with resting heart rate >100, or symptomatic bradycardia?: No    - Severe heart valve disease including aortic stenosis or symptomatic mitral stenosis?: No      Pre-operative Risk Factors:  - Elevated-risk surgery: No    - History of cerebrovascular disease: No    - History of ischemic heart disease: No    - History of congestive heart failure: No    - Pre-operative treatment with  insulin: No    - Pre-operative creatinine >2 mg/dL: No      D[facility] Activity Status Index (DASI):    - DASI Total Score: 58.2   - METs: 9.9     Medications of Perioperative Concern:    GLP agonists    Review of Systems  Past Medical History   Past Medical History:   Diagnosis Date   • Allergic    • Anemia 06/24/2018   • Anxiety     from records   • Arthritis    • Asthma    • Benign essential hypertension 11/17/2016   • Bipolar 1 disorder (HCC)     from records   • Bleeding gums past two years   • Chronic pain    • Chronic pain disorder     back/ knees/ hip   • Claustrophobia 03/15/2018   • Community acquired pneumonia 06/24/2018   • CPAP (continuous positive airway pressure) dependence    • Dental abscess     11/9/23- pt was started on amoxicillin- will finish on 11/19/23   • Dental caries Often on thru years   • Depressed 07/14/2016   • Depression     from records   • Dry mouth Last year/ sleep machine   • Eating disorder    • GERD (gastroesophageal reflux disease)    • Halitosis last few years   • Hepatitis C    • Hepatitis C virus infection 08/14/2014   • Heroin abuse (HCC) 07/24/2018   • Infectious viral hepatitis    • Obesity 10/12/2016   • Obstructive sleep apnea     from records   • Sleep disorder    • Substance abuse (HCC)      Past Surgical History:   Procedure Laterality Date   • ACHILLES TENDON SURGERY Right 11/27/2023    Procedure: transfer of peroneus brevis tendon to the cuboid bone;  Surgeon: James R Lachman, MD;  Location:  MAIN OR;  Service: Orthopedics   • COLONOSCOPY     • EGD     • EPIDURAL BLOCK INJECTION Right 10/28/2022    Procedure: BLOCK / INJECTION EPIDURAL STEROID LUMBAR  right L4-5 and L5-S1 TFESI;  Surgeon: Lorena Hernandez MD;  Location: [facility];  Service: Pain Management    • EPIDURAL BLOCK INJECTION Right 12/20/2022    Procedure: BLOCK / INJECTION EPIDURAL STEROID LUMBAR  right  L4-5 and L5-S1 TFESI;  Surgeon: Lorena Hernandez MD;  Location: [facility];  Service: Pain  Management    • ME INCISION & DRAINAGE ABSCESS COMPLICATED/MULTIPLE Left 05/02/2019    Procedure: INCISION AND DRAINAGE (I&D) EXTREMITY;  Surgeon: Fco Woodall MD;  Location: [facility];  Service: Orthopedics   • ME LAPAROSCOPY COLECTOMY PARTIAL W/ANASTOMOSIS Left 05/21/2020    Procedure: LAPAROSCOPIC LEFT HEMICOLECTOMY TAKEDOWN SPLENIC FLECTURE, COLORECTAL ANASTOMOSIS.;  Surgeon: Abdelrahman Canales MD;  Location:  MAIN OR;  Service: Colorectal   • ME NEUROPLASTY &/TRANSPOS MEDIAN NRV CARPAL TUNNE Left 01/09/2023    Procedure: RELEASE CARPAL TUNNEL;  Surgeon: Isauro Ledbetter DO;  Location: [facility];  Service: Orthopedics   • ME OPEN TREATMENT METATARSAL FRACTURE EACH Right 02/24/2023    Procedure: OPEN REDUCTION W/ INTERNAL FIXATION (ORIF) FOOT;  Surgeon: Esteban Talamantes DPM;  Location: [facility];  Service: Podiatry   • ME REMOVAL IMPLANT DEEP Right 11/27/2023    Procedure: REMOVAL HARDWARE FOOT, excision of painful os vesalanium, transfer of peroneus brevis tendon to the cuboid bone;  Surgeon: James R Lachman, MD;  Location:  MAIN OR;  Service: Orthopedics   • ROOT CANAL  several   • WISDOM TOOTH EXTRACTION       Family History   Problem Relation Name Age of Onset   • Diabetes Mother Peyton ward    • Restless legs syndrome Mother Peyton ward    • Sleep apnea Mother Peyton ward    • Colon cancer Father Sacha Ward    • Alcohol abuse Father Sacha Ward    • Alzheimer's disease Maternal Grandmother Destiny Martinez    • Mental illness Maternal Grandmother Destiny Martinez         Alzheimers   • Dementia Maternal Grandmother Destiny Martinez    • Depression Family       Social History     Tobacco Use   • Smoking status: Every Day     Types: E-Cigarettes, Cigarettes     Passive exposure: Past   • Smokeless tobacco: Never   • Tobacco comments:     Vapes / 2019 quit cigs   Vaping Use   • Vaping status: Every Day   • Substances: Nicotine, Flavoring   Substance and Sexual Activity   •  Alcohol use: Not Currently   • Drug use: Not Currently     Types: Amphetamines, Codeine, Fentanyl, Heroin, Hydrocodone, Hydromorphone, Marijuana, Methamphetamines, Morphine, Nitrous oxide, Opium, Oxycodone     Comment: on Methadone; occaionally marijuana- last used 2/10/23   • Sexual activity: Not Currently     Partners: Female     Current Outpatient Medications on File Prior to Visit   Medication Sig   • albuterol (Ventolin HFA) 90 mcg/act inhaler Inhale 2 puffs every 6 (six) hours as needed for wheezing   • clonazePAM (KlonoPIN) 1 mg tablet Take 1 mg by mouth in the morning and 1 mg in the evening.   • levocetirizine (XYZAL) 5 MG tablet TAKE ONE TABLET BY MOUTH EVERY EVENING   • Linzess 145 MCG CAPS Take 145 mcg by mouth every other day   • METHADONE HCL PO Take 150 mg by mouth in the morning.   • mometasone-formoterol (Dulera) 200-5 MCG/ACT inhaler Inhale 2 puffs 2 (two) times a day Rinse mouth after use.   • nystatin (MYCOSTATIN) powder Apply topically every morning   • ondansetron (ZOFRAN-ODT) 4 mg disintegrating tablet Take 1 tablet (4 mg total) by mouth as needed for nausea   • prazosin (MINIPRESS) 1 mg capsule Take 1 mg by mouth daily at bedtime   • prazosin (MINIPRESS) 5 mg capsule Take 5 mg by mouth daily at bedtime   • pregabalin (LYRICA) 150 mg capsule TAKE 1 CAPSULE (150 MG TOTAL) BY MOUTH 2 (TWO) TIMES A DAY   • Rexulti 3 MG tablet Take 1 tablet (3 mg total) by mouth daily   • temazepam (RESTORIL) 15 mg capsule Take 15 mg by mouth daily at bedtime   • tirzepatide (Zepbound) 2.5 mg/0.5 mL auto-injector Inject 0.5 mL (2.5 mg total) under the skin once a week for 28 days   • vilazodone (VIIBRYD) 40 mg tablet 40 mg daily with breakfast     Allergies   Allergen Reactions   • Red Dye - Food Allergy Diarrhea, Nausea Only and Abdominal Pain   • Bee Venom Angioedema     Pt states he gets swelling at the site     Objective   /78 (BP Location: Left arm, Patient Position: Sitting)   Pulse 88   Temp 98.6 °F  "(37 °C) (Tympanic)   Ht 5' 10\" (1.778 m)   Wt (!) 161 kg (356 lb)   SpO2 93%   BMI 51.08 kg/m²     Physical Exam      Aleksander Mota DO  "

## 2025-06-23 ENCOUNTER — TELEPHONE (OUTPATIENT)
Age: 35
End: 2025-06-23

## 2025-06-23 DIAGNOSIS — R79.89 LOW TESTOSTERONE IN MALE: Primary | ICD-10-CM

## 2025-06-23 PROBLEM — A41.9 SEVERE SEPSIS (HCC): Status: RESOLVED | Noted: 2018-06-24 | Resolved: 2025-06-23

## 2025-06-23 PROBLEM — R65.20 SEVERE SEPSIS (HCC): Status: RESOLVED | Noted: 2018-06-24 | Resolved: 2025-06-23

## 2025-06-23 NOTE — TELEPHONE ENCOUNTER
PA for Testosterone Cypionate 200 MG/ML SOSY SUBMITTED to     via    [x][facility]-KEY: [id number]  []Surescripts-Case ID #   []Availity-Auth ID # NDC #   []Faxed to plan   []Other website   []Phone call Case ID #     [x]PA sent as URGENT    All office notes, labs and other pertaining documents and studies sent. Clinical questions answered. Awaiting determination from insurance company.     Turnaround time for your insurance to make a decision on your Prior Authorization can take 7-21 business days.

## 2025-06-25 NOTE — TELEPHONE ENCOUNTER
PA for Testosterone Cypionate 200 MG/ML SOSY  APPROVED     Date(s) approved 6/23/25 - 6/23/26 - All strengths are approved    Dispensed to patient       Pharmacy advised by    [x]Fax  []Phone call  []Secure Chat       Approval letter scanned into Media No not at time of determination

## 2025-07-02 DIAGNOSIS — E66.09 CLASS 2 OBESITY DUE TO EXCESS CALORIES WITHOUT SERIOUS COMORBIDITY WITH BODY MASS INDEX (BMI) OF 35.0 TO 35.9 IN ADULT: ICD-10-CM

## 2025-07-02 DIAGNOSIS — E66.812 CLASS 2 OBESITY DUE TO EXCESS CALORIES WITHOUT SERIOUS COMORBIDITY WITH BODY MASS INDEX (BMI) OF 35.0 TO 35.9 IN ADULT: ICD-10-CM

## 2025-07-02 RX ORDER — TIRZEPATIDE 2.5 MG/.5ML
2.5 INJECTION, SOLUTION SUBCUTANEOUS WEEKLY
Qty: 2 ML | Refills: 0 | Status: SHIPPED | OUTPATIENT
Start: 2025-07-02 | End: 2025-07-30

## 2025-07-22 ENCOUNTER — OFFICE VISIT (OUTPATIENT)
Dept: DENTISTRY | Facility: CLINIC | Age: 35
End: 2025-07-22
Payer: COMMERCIAL

## 2025-07-22 VITALS — DIASTOLIC BLOOD PRESSURE: 81 MMHG | HEART RATE: 88 BPM | SYSTOLIC BLOOD PRESSURE: 119 MMHG

## 2025-07-22 DIAGNOSIS — K03.6 ACCRETIONS ON TEETH: Primary | ICD-10-CM

## 2025-07-22 PROCEDURE — D1110 PROPHYLAXIS - ADULT: HCPCS | Performed by: DENTAL HYGIENIST

## 2025-07-22 PROCEDURE — D1330 ORAL HYGIENE INSTRUCTIONS: HCPCS | Performed by: DENTAL HYGIENIST

## 2025-07-22 PROCEDURE — D0190 SCREENING OF A PATIENT: HCPCS | Performed by: DENTAL HYGIENIST

## 2025-07-22 NOTE — PROGRESS NOTES
Adult Prophy  Chief Complaint   Patient presents with    Routine Oral Cleaning    No changes- pt goes back to [facility] in a few weeks for more resins     Method Used:  Prophy Method Used: Hand Scaling  Polished  Flossed  Fluoride    Radiographs Taken in [facility]: (Taken to assess periodontal health)  None    Intra/Extra Oral Cancer Screening:  Within normal limits    Oral Hygiene:  Poor    Plaque:  Moderate    Calculus:  Light  Moderate  Sub calc spots   Sub calc under 10-direct facial gingiva looks pierced     Bleeding:  Generalized  Moderate  Heavy    Stain:  Light    Periodontal Charting:   Full probing    Periodontal Classification:  Generalized  Mild  Periodontal Disease    Nutritional Counseling:  Discussed dietary habits and suggested better food choices  Discussed pH and the role it plays in decay    Oral Hygiene Instruction:    Went over daily routine and c-shaped flossing.    Discussed Oral systemic link and role of plaque in gum disease.    No orders of the defined types were placed in this encounter.       Next Visit:  6 Month prophy  Dentist visit- rm

## 2025-07-25 ENCOUNTER — OFFICE VISIT (OUTPATIENT)
Dept: NEUROLOGY | Facility: CLINIC | Age: 35
End: 2025-07-25
Payer: COMMERCIAL

## 2025-07-30 DIAGNOSIS — E66.813 OBESITY, CLASS III, BMI 40-49.9 (MORBID OBESITY): Primary | ICD-10-CM

## 2025-07-30 RX ORDER — TIRZEPATIDE 5 MG/.5ML
5 INJECTION, SOLUTION SUBCUTANEOUS WEEKLY
Qty: 2 ML | Refills: 0 | Status: SHIPPED | OUTPATIENT
Start: 2025-07-30

## 2025-08-06 ENCOUNTER — OFFICE VISIT (OUTPATIENT)
Dept: DENTISTRY | Facility: CLINIC | Age: 35
End: 2025-08-06

## 2025-08-06 DIAGNOSIS — K02.9 TOOTH DECAY: Primary | ICD-10-CM

## 2025-08-06 PROCEDURE — D2331 RESIN-BASED COMPOSITE - 2 SURFACES, ANTERIOR: HCPCS

## 2025-08-21 ENCOUNTER — HOSPITAL ENCOUNTER (OUTPATIENT)
Facility: MEDICAL CENTER | Age: 35
Discharge: HOME | End: 2025-08-21
Attending: PSYCHIATRY & NEUROLOGY
Payer: COMMERCIAL

## 2025-08-21 DIAGNOSIS — R40.4 EPISODE OF UNRESPONSIVENESS: ICD-10-CM

## 2025-08-21 PROCEDURE — 70551 MRI BRAIN STEM W/O DYE: CPT

## (undated) DEVICE — GLOVE SRG BIOGEL 7

## (undated) DEVICE — GAUZE SPONGES,USP TYPE VII GAUZE, 12 PLY: Brand: CURITY

## (undated) DEVICE — U-DRAPE: Brand: CONVERTORS

## (undated) DEVICE — LIGHT GLOVE GREEN

## (undated) DEVICE — 3000CC GUARDIAN II: Brand: GUARDIAN

## (undated) DEVICE — CABLE BIPOLAR DISP MEGADYNE

## (undated) DEVICE — SPONGE STICK WITH PVP-I: Brand: KENDALL

## (undated) DEVICE — PAD CAST 4 IN COTTON NON STERILE

## (undated) DEVICE — PADDING CAST 4 IN  COTTON STRL

## (undated) DEVICE — BLADE BEAVER 6900

## (undated) DEVICE — CHLORAPREP HI-LITE 26ML ORANGE

## (undated) DEVICE — K-WIRE BLUNT/TROCAR
Type: IMPLANTABLE DEVICE | Site: HEEL | Status: NON-FUNCTIONAL
Removed: 2023-02-24

## (undated) DEVICE — TUBING SMOKE EVAC W/FILTRATION DEVICE PLUMEPORT ACTIV

## (undated) DEVICE — GLOVE SRG BIOGEL 8

## (undated) DEVICE — PADDING CAST 3IN COTTON STRL

## (undated) DEVICE — TRAY FOLEY 16FR URIMETER SURESTEP

## (undated) DEVICE — SYRINGE 10ML LL

## (undated) DEVICE — NEEDLE 25G X 1 1/2

## (undated) DEVICE — IMPERVIOUS STOCKINETTE: Brand: DEROYAL

## (undated) DEVICE — OCCLUSIVE GAUZE STRIP,3% BISMUTH TRIBROMOPHENATE IN PETROLATUM BLEND: Brand: XEROFORM

## (undated) DEVICE — SUT PDS II 1 CTX 36 IN Z371T

## (undated) DEVICE — NON-STERILE REUSABLE TOURNIQUET CUFF SINGLE BLADDER, DUAL PORT AND QUICK CONNECT CONNECTOR: Brand: COLOR CUFF

## (undated) DEVICE — INTENT TO BE USED WITH SUTURE MATERIAL FOR TISSUE CLOSURE: Brand: RICHARD-ALLAN® NEEDLE 1/2 CIRCLE TAPER

## (undated) DEVICE — GLOVE SRG BIOGEL ECLIPSE 6.5

## (undated) DEVICE — HALF SHEET: Brand: CONVERTORS

## (undated) DEVICE — GAUZE SPONGES,16 PLY: Brand: CURITY

## (undated) DEVICE — CUFF TOURNIQUET 30 X 4 IN QUICK CONNECT DISP 1BLA

## (undated) DEVICE — VISUALIZATION SYSTEM: Brand: CLEARIFY

## (undated) DEVICE — Device: Brand: OMNICLOSE TROCAR SITE CLOSURE DEVICE

## (undated) DEVICE — NEEDLE BLUNT 18 G X 1 1/2 W FILTER

## (undated) DEVICE — COBAN 4 IN STERILE

## (undated) DEVICE — ACE WRAP 3 IN STERILE

## (undated) DEVICE — SUT MONOCRYL 4-0 PS-2 27 IN Y426H

## (undated) DEVICE — PLUMEPEN PRO 10FT

## (undated) DEVICE — SMALL BONES: Brand: ORTHOLOC 3DI

## (undated) DEVICE — INTENDED FOR TISSUE SEPARATION, AND OTHER PROCEDURES THAT REQUIRE A SHARP SURGICAL BLADE TO PUNCTURE OR CUT.: Brand: BARD-PARKER SAFETY BLADES SIZE 15, STERILE

## (undated) DEVICE — ENDOSCOPIC CURVED INTRALUMINAL STAPLER (ILS) 24 TITANIUM ADJUSTABLE HEIGHT STAPLES

## (undated) DEVICE — PENCIL ELECTROSURG E-Z CLEAN -0035H

## (undated) DEVICE — IV EXTENSION TUBING 33 IN

## (undated) DEVICE — SPONGE LAP 18 X 18 IN

## (undated) DEVICE — TRAY FOLEY 16FR URIMETER SILICONE SURESTEP

## (undated) DEVICE — CURITY STRETCH BANDAGE: Brand: CURITY

## (undated) DEVICE — ABDOMINAL PAD: Brand: DERMACEA

## (undated) DEVICE — SPINAL NEEDLE QUINCKE 25 G X 4.69

## (undated) DEVICE — SUT VICRYL 2-0 CT-2 27 IN J269H

## (undated) DEVICE — BETHLEHEM UNIVERSAL  MIONR EXT: Brand: CARDINAL HEALTH

## (undated) DEVICE — CURITY NON-ADHERENT STRIPS: Brand: CURITY

## (undated) DEVICE — FLEXIBLE ADHESIVE BANDAGE,X-LARGE: Brand: CURITY

## (undated) DEVICE — SUT VICRYL 0 CT-1 27 IN J260H

## (undated) DEVICE — GOWN ,SIRUS ,NONREINFORCED 4XL: Brand: MEDLINE

## (undated) DEVICE — SUT PDS II 1 XLH 96 IN LOOPED Z881G

## (undated) DEVICE — SUT VICRYL 3-0 SH 27 IN J416H

## (undated) DEVICE — NEEDLE SPINAL 22G X 3.5IN  QUINCKE

## (undated) DEVICE — Device: Brand: DISPOSABLE BULB & BLADDER

## (undated) DEVICE — PAD GROUNDING ADULT

## (undated) DEVICE — CULTURE TUBE AEROBIC

## (undated) DEVICE — TRAY EPIDURAL PERIFIX 20GA X 3.5IN TUOHY 8ML

## (undated) DEVICE — SPLINT ORTHO-GLASS 4IN X 15FT

## (undated) DEVICE — SYRINGE EPI 8ML LUER SLIP LOSS OF RESISTANCE PLASTIC PERFIX

## (undated) DEVICE — INTENDED FOR TISSUE SEPARATION, AND OTHER PROCEDURES THAT REQUIRE A SHARP SURGICAL BLADE TO PUNCTURE OR CUT.: Brand: BARD-PARKER SAFETY BLADES SIZE 10, STERILE

## (undated) DEVICE — TROCAR: Brand: KII FIOS FIRST ENTRY

## (undated) DEVICE — DRAPE C-ARMOUR

## (undated) DEVICE — MAYO STAND COVER: Brand: CONVERTORS

## (undated) DEVICE — 3M™ DURAPORE™ SURGICAL TAPE 1538-1, 1 INCH X 10 YARD (2,5CM X 9,1M), 12 ROLLS/BOX: Brand: 3M™ DURAPORE™

## (undated) DEVICE — IRRIG ENDO FLO TUBING

## (undated) DEVICE — SUT ETHILON 3-0 PS-1 18 IN 1663H

## (undated) DEVICE — TUBING SUCTION 5MM X 12 FT

## (undated) DEVICE — CHLORAPREP HI-LITE 10.5ML ORANGE

## (undated) DEVICE — CAST PADDING 6 IN STERILE

## (undated) DEVICE — HARMONIC 1100 SHEARS, 36CM SHAFT LENGTH: Brand: HARMONIC

## (undated) DEVICE — BANDAGE, ESMARK LF STR 6"X9' (20/CS): Brand: CYPRESS

## (undated) DEVICE — NEEDLE 21 G X 1

## (undated) DEVICE — UNDER BUTTOCKS DRAPE W/FLUID CONTROL POUCH: Brand: CONVERTORS

## (undated) DEVICE — GLOVE INDICATOR PI UNDERGLOVE SZ 8.5 BLUE

## (undated) DEVICE — GLOVE INDICATOR PI UNDERGLOVE SZ 8 BLUE

## (undated) DEVICE — STRETCH BANDAGE: Brand: CURITY

## (undated) DEVICE — TROCAR: Brand: KII® SLEEVE

## (undated) DEVICE — SUT ETHILON 3-0 INFS-1 30 IN 669H

## (undated) DEVICE — SYRINGE 5ML LL

## (undated) DEVICE — LUBRICANT SURGILUBE TUBE 4 OZ  FLIP TOP

## (undated) DEVICE — GLOVE INDICATOR PI UNDERGLOVE SZ 6.5 BLUE

## (undated) DEVICE — ECHELON FLEX 60 ARTICULATING ENDOSCOPIC LINEAR CUTTER (NO CARTRIDGE): Brand: ECHELON FLEX ENDOPATH

## (undated) DEVICE — PACK PBDS STERILE LAP LITHOTOMY RF

## (undated) DEVICE — SUT VICRYL 4-0 PS-2 18 IN J496G

## (undated) DEVICE — LAPAROSCOPIC TROCAR SLEEVE/SINGLE USE: Brand: KII® SLEEVE

## (undated) DEVICE — SYRINGE BULB 2 OZ

## (undated) DEVICE — INSUFLATION TUBING INSUFLOW (LEXION)

## (undated) DEVICE — 10FR FRAZIER SUCTION HANDLE: Brand: CARDINAL HEALTH

## (undated) DEVICE — INTENDED FOR TISSUE SEPARATION, AND OTHER PROCEDURES THAT REQUIRE A SHARP SURGICAL BLADE TO PUNCTURE OR CUT.: Brand: BARD-PARKER SAFETY BLADES SIZE 11, STERILE

## (undated) DEVICE — DRAPE C-ARM X-RAY

## (undated) DEVICE — BANDAGE ACE VELCRO 6 IN LF

## (undated) DEVICE — POV-IOD SOLUTION 4OZ BT

## (undated) DEVICE — SUT PDS II 3-0 SH 27 IN Z316H

## (undated) DEVICE — ENDOPATH ECHELON ENDOSCOPIC LINEAR CUTTER RELOADS, BLUE, 60MM: Brand: ECHELON ENDOPATH

## (undated) DEVICE — GLOVE INDICATOR PI UNDERGLOVE SZ 7 BLUE

## (undated) DEVICE — ACE WRAP 4 IN UNSTERILE

## (undated) DEVICE — SUT MONOCRYL PLUS 4-0 PS-2 18 IN MCP496G

## (undated) DEVICE — BANDAID SHEER SPOT

## (undated) DEVICE — INTENDED FOR TISSUE SEPARATION, AND OTHER PROCEDURES THAT REQUIRE A SHARP SURGICAL BLADE TO PUNCTURE OR CUT.: Brand: BARD-PARKER ® CARBON RIB-BACK BLADES

## (undated) DEVICE — PREMIUM DRY TRAY LF: Brand: MEDLINE INDUSTRIES, INC.

## (undated) DEVICE — ASTOUND STANDARD SURGICAL GOWN, XL: Brand: CONVERTORS

## (undated) DEVICE — SPLINT 5 X 30 IN XFAST SET PLASTER

## (undated) DEVICE — PADDING CAST 6IN COTTON STRL

## (undated) DEVICE — WOUND RETRACTOR AND PROTECTOR: Brand: ALEXIS O WOUND PROTECTOR-RETRACTOR

## (undated) DEVICE — PROXIMATE PLUS MD MULTI-DIRECTIONAL RELEASE SKIN STAPLERS CONTAINS 35 STAINLESS STEEL STAPLES APPROXIMATE CLOSED DIMENSIONS: 6.9MM X 3.9MM WIDE: Brand: PROXIMATE

## (undated) DEVICE — ACE WRAP 4 IN STERILE

## (undated) DEVICE — SUT ETHILON 4-0 PS-2 18 IN 1667H

## (undated) DEVICE — GLOVE SRG BIOGEL 7.5

## (undated) DEVICE — ADHESIVE SKIN HIGH VISCOSITY EXOFIN 1ML

## (undated) DEVICE — BANDAGE ACE VELCRO 4 IN LF

## (undated) DEVICE — 3M™ IOBAN™ 2 ANTIMICROBIAL INCISE DRAPE 6648EZ: Brand: IOBAN™ 2

## (undated) DEVICE — COBAN 6 IN STERILE

## (undated) DEVICE — SPONGE SCRUB 4 PCT CHLORHEXIDINE

## (undated) DEVICE — CAST PADDING 6IN UNSTERILE

## (undated) DEVICE — NEPTUNE E-SEP SMOKE EVACUATION PENCIL, COATED, 70MM BLADE, PUSH BUTTON SWITCH: Brand: NEPTUNE E-SEP

## (undated) DEVICE — POOLE SUCTION HANDLE: Brand: CARDINAL HEALTH

## (undated) DEVICE — GLOVE SRG LF STRL BGL SKNSNS 7 PF

## (undated) DEVICE — SINGLE PORT MANIFOLD: Brand: NEPTUNE 2

## (undated) DEVICE — BETHLEHEM UNIV MAJ EXT ,KIT: Brand: CARDINAL HEALTH

## (undated) DEVICE — ACE WRAP 6 IN UNSTERILE

## (undated) DEVICE — DRESSING XEROFORM 5 X 9

## (undated) DEVICE — DRAPE SHEET THREE QUARTER

## (undated) DEVICE — TENODESIS: Brand: G-FORCE

## (undated) DEVICE — SUT VICRYL 0 UR-6 27 IN J603H

## (undated) DEVICE — CULTURE TUBE ANAEROBIC

## (undated) DEVICE — THIN OFFSET (13.3 X 0.38 X 42.0MM)

## (undated) DEVICE — 3M™ STERI-DRAPE™ U-DRAPE 1015: Brand: STERI-DRAPE™